# Patient Record
Sex: FEMALE | Race: WHITE | NOT HISPANIC OR LATINO | Employment: OTHER | URBAN - METROPOLITAN AREA
[De-identification: names, ages, dates, MRNs, and addresses within clinical notes are randomized per-mention and may not be internally consistent; named-entity substitution may affect disease eponyms.]

---

## 2017-01-23 ENCOUNTER — GENERIC CONVERSION - ENCOUNTER (OUTPATIENT)
Dept: OTHER | Facility: OTHER | Age: 72
End: 2017-01-23

## 2017-01-31 ENCOUNTER — HOSPITAL ENCOUNTER (OUTPATIENT)
Dept: NON INVASIVE DIAGNOSTICS | Facility: CLINIC | Age: 72
Discharge: HOME | End: 2017-01-31
Payer: MEDICARE

## 2017-01-31 DIAGNOSIS — I65.23 OCCLUSION AND STENOSIS OF BILATERAL CAROTID ARTERIES: ICD-10-CM

## 2017-01-31 DIAGNOSIS — I74.09 OTHER ARTERIAL EMBOLISM AND THROMBOSIS OF ABDOMINAL AORTA (HCC): ICD-10-CM

## 2017-01-31 DIAGNOSIS — I70.213 ATHEROSCLEROSIS OF NATIVE ARTERY OF BOTH LOWER EXTREMITIES WITH INTERMITTENT CLAUDICATION (HCC): ICD-10-CM

## 2017-01-31 PROCEDURE — 93923 UPR/LXTR ART STDY 3+ LVLS: CPT

## 2017-01-31 PROCEDURE — 93978 VASCULAR STUDY: CPT

## 2017-01-31 PROCEDURE — 93925 LOWER EXTREMITY STUDY: CPT

## 2017-01-31 PROCEDURE — 93880 EXTRACRANIAL BILAT STUDY: CPT

## 2017-02-20 ENCOUNTER — HOSPITAL ENCOUNTER (OUTPATIENT)
Dept: CT IMAGING | Facility: HOSPITAL | Age: 72
Discharge: HOME | End: 2017-02-20
Payer: COMMERCIAL

## 2017-02-20 DIAGNOSIS — Z72.0 TOBACCO USE: ICD-10-CM

## 2017-03-21 ENCOUNTER — ALLSCRIPTS OFFICE VISIT (OUTPATIENT)
Dept: OTHER | Facility: OTHER | Age: 72
End: 2017-03-21

## 2017-04-06 ENCOUNTER — GENERIC CONVERSION - ENCOUNTER (OUTPATIENT)
Dept: OTHER | Facility: OTHER | Age: 72
End: 2017-04-06

## 2017-04-07 ENCOUNTER — ALLSCRIPTS OFFICE VISIT (OUTPATIENT)
Dept: OTHER | Facility: OTHER | Age: 72
End: 2017-04-07

## 2017-04-14 ENCOUNTER — GENERIC CONVERSION - ENCOUNTER (OUTPATIENT)
Dept: OTHER | Facility: OTHER | Age: 72
End: 2017-04-14

## 2017-06-26 ENCOUNTER — ALLSCRIPTS OFFICE VISIT (OUTPATIENT)
Dept: OTHER | Facility: OTHER | Age: 72
End: 2017-06-26

## 2017-07-10 ENCOUNTER — GENERIC CONVERSION - ENCOUNTER (OUTPATIENT)
Dept: OTHER | Facility: OTHER | Age: 72
End: 2017-07-10

## 2017-07-15 ENCOUNTER — APPOINTMENT (OUTPATIENT)
Dept: LAB | Facility: CLINIC | Age: 72
End: 2017-07-15
Payer: MEDICARE

## 2017-07-15 ENCOUNTER — ALLSCRIPTS OFFICE VISIT (OUTPATIENT)
Dept: OTHER | Facility: OTHER | Age: 72
End: 2017-07-15

## 2017-07-15 DIAGNOSIS — H65.191 OTHER ACUTE NONSUPPURATIVE OTITIS MEDIA, RIGHT EAR: ICD-10-CM

## 2017-07-15 LAB
BASOPHILS # BLD AUTO: 0.05 THOUSANDS/ΜL (ref 0–0.1)
BASOPHILS NFR BLD AUTO: 1 % (ref 0–1)
EOSINOPHIL # BLD AUTO: 0.13 THOUSAND/ΜL (ref 0–0.61)
EOSINOPHIL NFR BLD AUTO: 2 % (ref 0–6)
ERYTHROCYTE [DISTWIDTH] IN BLOOD BY AUTOMATED COUNT: 14.4 % (ref 11.6–15.1)
HCT VFR BLD AUTO: 45 % (ref 34.8–46.1)
HGB BLD-MCNC: 15.2 G/DL (ref 11.5–15.4)
LYMPHOCYTES # BLD AUTO: 1.92 THOUSANDS/ΜL (ref 0.6–4.47)
LYMPHOCYTES NFR BLD AUTO: 25 % (ref 14–44)
MCH RBC QN AUTO: 30.3 PG (ref 26.8–34.3)
MCHC RBC AUTO-ENTMCNC: 33.8 G/DL (ref 31.4–37.4)
MCV RBC AUTO: 90 FL (ref 82–98)
MONOCYTES # BLD AUTO: 0.41 THOUSAND/ΜL (ref 0.17–1.22)
MONOCYTES NFR BLD AUTO: 5 % (ref 4–12)
NEUTROPHILS # BLD AUTO: 5.01 THOUSANDS/ΜL (ref 1.85–7.62)
NEUTS SEG NFR BLD AUTO: 67 % (ref 43–75)
NRBC BLD AUTO-RTO: 0 /100 WBCS
PLATELET # BLD AUTO: 344 THOUSANDS/UL (ref 149–390)
PMV BLD AUTO: 10.1 FL (ref 8.9–12.7)
RBC # BLD AUTO: 5.02 MILLION/UL (ref 3.81–5.12)
WBC # BLD AUTO: 7.55 THOUSAND/UL (ref 4.31–10.16)

## 2017-07-15 PROCEDURE — 36415 COLL VENOUS BLD VENIPUNCTURE: CPT

## 2017-07-15 PROCEDURE — 85025 COMPLETE CBC W/AUTO DIFF WBC: CPT

## 2017-07-17 ENCOUNTER — ALLSCRIPTS OFFICE VISIT (OUTPATIENT)
Dept: OTHER | Facility: OTHER | Age: 72
End: 2017-07-17

## 2017-10-17 ENCOUNTER — APPOINTMENT (OUTPATIENT)
Dept: LAB | Facility: CLINIC | Age: 72
End: 2017-10-17
Payer: MEDICARE

## 2017-10-17 ENCOUNTER — ALLSCRIPTS OFFICE VISIT (OUTPATIENT)
Dept: OTHER | Facility: OTHER | Age: 72
End: 2017-10-17

## 2017-10-17 ENCOUNTER — GENERIC CONVERSION - ENCOUNTER (OUTPATIENT)
Dept: OTHER | Facility: OTHER | Age: 72
End: 2017-10-17

## 2017-10-17 DIAGNOSIS — R73.01 IMPAIRED FASTING GLUCOSE: ICD-10-CM

## 2017-10-17 DIAGNOSIS — R09.89 OTHER SPECIFIED SYMPTOMS AND SIGNS INVOLVING THE CIRCULATORY AND RESPIRATORY SYSTEMS: ICD-10-CM

## 2017-10-17 LAB
ALBUMIN SERPL BCP-MCNC: 3.6 G/DL (ref 3.5–5)
ALP SERPL-CCNC: 79 U/L (ref 46–116)
ALT SERPL W P-5'-P-CCNC: 21 U/L (ref 12–78)
ANION GAP SERPL CALCULATED.3IONS-SCNC: 6 MMOL/L (ref 4–13)
AST SERPL W P-5'-P-CCNC: 14 U/L (ref 5–45)
BASOPHILS # BLD AUTO: 0.05 THOUSANDS/ΜL (ref 0–0.1)
BASOPHILS NFR BLD AUTO: 1 % (ref 0–1)
BILIRUB SERPL-MCNC: 0.33 MG/DL (ref 0.2–1)
BILIRUB UR QL STRIP: NEGATIVE
BUN SERPL-MCNC: 16 MG/DL (ref 5–25)
CALCIUM SERPL-MCNC: 8.7 MG/DL (ref 8.3–10.1)
CHLORIDE SERPL-SCNC: 107 MMOL/L (ref 100–108)
CHOLEST SERPL-MCNC: 126 MG/DL (ref 50–200)
CLARITY UR: CLEAR
CO2 SERPL-SCNC: 26 MMOL/L (ref 21–32)
COLOR UR: YELLOW
CREAT SERPL-MCNC: 0.96 MG/DL (ref 0.6–1.3)
CREAT UR-MCNC: 110 MG/DL
EOSINOPHIL # BLD AUTO: 0.11 THOUSAND/ΜL (ref 0–0.61)
EOSINOPHIL NFR BLD AUTO: 1 % (ref 0–6)
ERYTHROCYTE [DISTWIDTH] IN BLOOD BY AUTOMATED COUNT: 14 % (ref 11.6–15.1)
EST. AVERAGE GLUCOSE BLD GHB EST-MCNC: 128 MG/DL
GFR SERPL CREATININE-BSD FRML MDRD: 60 ML/MIN/1.73SQ M
GLUCOSE P FAST SERPL-MCNC: 100 MG/DL (ref 65–99)
GLUCOSE UR STRIP-MCNC: NEGATIVE MG/DL
HBA1C MFR BLD: 6.1 % (ref 4.2–6.3)
HCT VFR BLD AUTO: 46.3 % (ref 34.8–46.1)
HDLC SERPL-MCNC: 49 MG/DL (ref 40–60)
HGB BLD-MCNC: 15.2 G/DL (ref 11.5–15.4)
HGB UR QL STRIP.AUTO: NEGATIVE
KETONES UR STRIP-MCNC: NEGATIVE MG/DL
LEUKOCYTE ESTERASE UR QL STRIP: NEGATIVE
LYMPHOCYTES # BLD AUTO: 2.33 THOUSANDS/ΜL (ref 0.6–4.47)
LYMPHOCYTES NFR BLD AUTO: 24 % (ref 14–44)
MCH RBC QN AUTO: 29.8 PG (ref 26.8–34.3)
MCHC RBC AUTO-ENTMCNC: 32.8 G/DL (ref 31.4–37.4)
MCV RBC AUTO: 91 FL (ref 82–98)
MICROALBUMIN UR-MCNC: <5 MG/L (ref 0–20)
MICROALBUMIN/CREAT 24H UR: <5 MG/G CREATININE (ref 0–30)
MONOCYTES # BLD AUTO: 0.58 THOUSAND/ΜL (ref 0.17–1.22)
MONOCYTES NFR BLD AUTO: 6 % (ref 4–12)
NEUTROPHILS # BLD AUTO: 6.51 THOUSANDS/ΜL (ref 1.85–7.62)
NEUTS SEG NFR BLD AUTO: 68 % (ref 43–75)
NITRITE UR QL STRIP: NEGATIVE
NONHDLC SERPL-MCNC: 77 MG/DL
NRBC BLD AUTO-RTO: 0 /100 WBCS
PH UR STRIP.AUTO: 5.5 [PH] (ref 4.5–8)
PLATELET # BLD AUTO: 321 THOUSANDS/UL (ref 149–390)
PMV BLD AUTO: 10.3 FL (ref 8.9–12.7)
POTASSIUM SERPL-SCNC: 4.4 MMOL/L (ref 3.5–5.3)
PROT SERPL-MCNC: 7.1 G/DL (ref 6.4–8.2)
PROT UR STRIP-MCNC: NEGATIVE MG/DL
RBC # BLD AUTO: 5.1 MILLION/UL (ref 3.81–5.12)
SODIUM SERPL-SCNC: 139 MMOL/L (ref 136–145)
SP GR UR STRIP.AUTO: 1.02 (ref 1–1.03)
T4 FREE SERPL-MCNC: 1.17 NG/DL (ref 0.76–1.46)
TSH SERPL DL<=0.05 MIU/L-ACNC: 1.46 UIU/ML (ref 0.36–3.74)
UROBILINOGEN UR QL STRIP.AUTO: 0.2 E.U./DL
WBC # BLD AUTO: 9.6 THOUSAND/UL (ref 4.31–10.16)

## 2017-10-17 PROCEDURE — 82465 ASSAY BLD/SERUM CHOLESTEROL: CPT

## 2017-10-17 PROCEDURE — 82570 ASSAY OF URINE CREATININE: CPT

## 2017-10-17 PROCEDURE — 81003 URINALYSIS AUTO W/O SCOPE: CPT

## 2017-10-17 PROCEDURE — 83036 HEMOGLOBIN GLYCOSYLATED A1C: CPT

## 2017-10-17 PROCEDURE — 83718 ASSAY OF LIPOPROTEIN: CPT

## 2017-10-17 PROCEDURE — 36415 COLL VENOUS BLD VENIPUNCTURE: CPT

## 2017-10-17 PROCEDURE — 82043 UR ALBUMIN QUANTITATIVE: CPT

## 2017-10-17 PROCEDURE — 84443 ASSAY THYROID STIM HORMONE: CPT

## 2017-10-17 PROCEDURE — 80053 COMPREHEN METABOLIC PANEL: CPT

## 2017-10-17 PROCEDURE — 84439 ASSAY OF FREE THYROXINE: CPT

## 2017-10-17 PROCEDURE — 85025 COMPLETE CBC W/AUTO DIFF WBC: CPT

## 2017-10-18 ENCOUNTER — TRANSCRIBE ORDERS (OUTPATIENT)
Dept: ADMINISTRATIVE | Facility: HOSPITAL | Age: 72
End: 2017-10-18

## 2017-10-18 DIAGNOSIS — Z72.0 TOBACCO ABUSE: Primary | ICD-10-CM

## 2018-01-11 NOTE — MISCELLANEOUS
Message  patient called, she had GI workup for abd pain and her GI dr told her to see if we wanted to move up Nov 2016 dopplers, However she needs to go to [place] for an ill sister  she wanted to know, if she can do it when she comes back  she denies weight loss or voimiting, and abd pain is with and without eating  she will call us when she returns from [place] and try to get doppler then  Active Problems    1  Abdominal bruit (785 9) (R09 89)   2  Abdominal discomfort, epigastric (789 06) (R10 13)   3  Abdominal pain, RUQ (789 01) (R10 11)   4  Adhesive capsulitis of left shoulder (726 0) (M75 02)   5  Aortoiliac occlusive disease (444 09) (I74 09)   6  Atherosclerosis of both carotid arteries (433 10,433 30) (I65 23)   7  Atherosclerosis of native artery of both lower extremities with intermittent claudication   (440 21) (I70 213)   8  Bilateral carotid bruits (785 9) (R09 89)   9  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)   10  Chest pain (786 50) (R07 9)   11  Chronic obstructive pulmonary disease (496) (J44 9)   12  Encounter for colorectal cancer screening (V76 51) (Z12 11,Z12 12)   13  Hyperlipidemia (272 4) (E78 5)   14  Impaired fasting glucose (790 21) (R73 01)   15  Murmur (785 2) (R01 1)   16  Need for influenza vaccination (V04 81) (Z23)   17  Need for pneumococcal vaccination (V03 82) (Z23)   18  Oral thrush (112 0) (B37 0)   19  Osteoporosis (733 00) (M81 0)   20  Preoperative clearance (V72 84) (Z01 818)   21  Restless leg syndrome (333 94) (G25 81)   22  Shoulder pain (719 41) (M25 519)   23  Sinusitis (473 9) (J32 9)   24  Subclavian artery stenosis, left (447 1) (I77 1)   25  Tachycardia (785 0) (R00 0)   26  Vision loss (369 9) (H54 7)    Current Meds   1  ALPRAZolam 0 5 MG Oral Tablet (Xanax); 1 tab bid prn; Last Rx:21Wsi4472 Ordered   2  Aspirin EC 81 MG Oral Tablet Delayed Release; TAKE 1 TABLET DAILY; Therapy: 04Aug2016 to (Evaluate:02Nov2016); Last Rx:04Aug2016 Ordered   3  Atorvastatin Calcium 40 MG Oral Tablet; TAKE 1 TABLET DAILY  Requested for:   32Adw3644; Last Rx:94Stk5347 Ordered   4  Breo Ellipta 100-25 MCG/INH Inhalation Aerosol Powder Breath Activated; take 1   inhalation by mouth once daily; Therapy: 51Nby1937 to (Evaluate:82Jbg2531)  Requested for: 18WSS3970; Last   Rx:15Mar2016 Ordered   5  Carafate 1 GM/10ML Oral Suspension; TAKE 10 ML 4 TIMES DAILY; Therapy: 72Mwi8989 to (Evaluate:75Fxb2927)  Requested for: 98Qwl5780; Last   Rx:54Czt7309; Status: [address] to Pharmacy - Awaiting Verification   Ordered   6  Eye Drops SOLN;   Therapy: (Recorded:30Bdh6359) to Recorded   7  Famotidine 40 MG Oral Tablet; TAKE 1 TABLET TWICE DAILY; Therapy: 44Edl2219 to (Evaluate:36Wiw3351)  Requested for: 23Pbm0628; Last   Rx:42Izu9539; Status: ACTIVE - Transmit to Pharmacy - Awaiting Verification   Ordered   8  Omeprazole 20 MG Oral Capsule Delayed Release; TAKE 1 CAPSULE Daily; Therapy: 99NAC6615 to (Kajal Blake)  Requested for: 25TPZ2543; Last   Rx:11Mar2016 Ordered   9  Risedronate Sodium 35 MG Oral Tablet (Actonel); take 1 tablet by mouth every week; Therapy: 69OMJ3301 to (Lashanda John)  Requested for: 21Hxi9686; Last   Rx:69Xon4547 Ordered   10  ROPINIRole HCl - 0 5 MG Oral Tablet; 1 at hs;    Therapy: 89WDQ5506 to (Last Rx:03Nyk5233)  Requested for: 04Ozi2953 Ordered    Allergies    1  Augmentin TABS   2  Spiriva HandiHaler CAPS   3  Tylenol with Codeine #3 TABS    Signatures   Electronically signed by :  Joey Powell, ; Sep  9 2016  2:04PM EST                       (Author)

## 2018-01-12 VITALS
SYSTOLIC BLOOD PRESSURE: 116 MMHG | DIASTOLIC BLOOD PRESSURE: 64 MMHG | HEIGHT: 60 IN | HEART RATE: 96 BPM | WEIGHT: 121.5 LBS | OXYGEN SATURATION: 93 % | BODY MASS INDEX: 23.85 KG/M2

## 2018-01-12 NOTE — RESULT NOTES
Verified Results  NM MYOCARDIAL PERFUSION SPECT (RX STRESS AND/OR REST) 63LTG5217 11:52AM Ana Maria Pemberton Order Number: [id number]    - Patient Instructions: To schedule this appointment, please contact Central Scheduling at [phone]  Test Name Result Flag Reference   NM MYOCARDIAL PERFUSION SPECT (RX STRESS AND/OR REST) (Report)     [address], [address] [place] [address]   [phone]     Rest/Stress Gated SPECT Myocardial Perfusion Imaging After Regadenoson     Patient: Wilfredo Victoria   MR number: [id number]   Account number: [de-identified]   : 1945   Age: 73 years   Gender: Female   Status: Outpatient   Location: [facility]   Height: 60 in   Weight: 120 lb   BP: 122/ 60 mmHg     Allergies: ALLERGIES NOT ON FILE     Diagnosis: R07 9 - Chest pain, unspecified     Referring Physician: Lesly Munguia MD   Technician: Gerda PETERS, BA, AART(N)   Group: [facility]   Other: Flor Villanueva MS, CCT   Interpreting Physician: Marie Schmidt MD     INDICATIONS: Chest Discomfort Detection of coronary artery disease  HISTORY: Chest Discomfort The patient is a 73year old  female  Chest   pain status: recent onset chest pain  Coronary artery disease risk factors:   smoking, family history of premature coronary artery disease, and   post-menopausal state  Co-morbidity: history of lung disease  Medications:   aspirin and a lipid lowering agent  REST ECG: Normal baseline ECG  PROCEDURE: The study was performed at [address] [place] [address]  A   regadenoson infusion pharmacologic stress test was performed  Gated SPECT   myocardial perfusion imaging was performed after stress and at rest  Systolic   blood pressure was 122 mmHg, at the start of the study  Diastolic blood   pressure was 60 mmHg, at the start of the study  The heart rate was 84 bpm, at   the start of the study   Patient was not experiencing chest pain at the time of   the injection of the radiopharmaceutical    Regadenoson protocol:   HR bpm SBP mmHg DBP mmHg Symptoms ST change Rhythm/conduct   Baseline 84 122 60 none none NSR, no ectopy   Immediate 122 -- -- -- -- --   1 min 116 130 60 -- -- --   2 min 109 -- -- -- -- --   3 min 108 122 58 -- -- --   4 min 104 -- -- -- -- --   No medications or fluids given  STRESS SUMMARY: Duration of pharmacologic stress was 1 min  Maximal heart rate   during stress was 122 bpm  The heart rate response to stress was normal  There   was normal resting blood pressure with an appropriate response to stress  The   rate-pressure product for the peak heart rate and blood pressure was 81742  There was no chest pain during stress  The stress test was terminated due to   protocol completion  The stress ECG was negative for ischemia  There were no   stress arrhythmias or conduction abnormalities  ISOTOPE ADMINISTRATION:   Resting isotope administration Stress isotope administration   Agent Sestamibi Sestamibi   Dose 9 81 mCi 31 71 mCi   Date 08/10/2016 08/10/2016     The radiopharmaceutical was injected at the peak effect of pharmacologic   stress  MYOCARDIAL PERFUSION IMAGING:   The image quality was good  Rotating projection images reveal mild breast   attenuation and mild subdiaphragmatic activity  Left ventricular size was   normal      PERFUSION DEFECTS:   - There were no perfusion defects  GATED SPECT:   The calculated left ventricular ejection fraction was 85 %  Left ventricular   ejection fraction was within normal limits by visual estimate  There was no   diagnostic evidence for left ventricular regional abnormality  SUMMARY:   - Stress results: There was no chest pain during stress  - ECG conclusions: The stress ECG was negative for ischemia  There were no   stress arrhythmias or conduction abnormalities  - Perfusion imaging:  There were no perfusion defects    - Gated SPECT: The calculated left ventricular ejection fraction was 85 %  Left ventricular ejection fraction was within normal limits by visual estimate  There was no diagnostic evidence for left ventricular regional abnormality  IMPRESSIONS: Normal study after pharmacologic stress  Myocardial perfusion   imaging was normal at rest and with stress   Left ventricular systolic function   was normal      Prepared and signed by     Abdoulaye Kitchen MD   Signed 08/10/2016 18:31:03

## 2018-01-12 NOTE — PROGRESS NOTES
Assessment    1  Tachycardia (785 0) (R00 0)   2  Chest pain (786 50) (R07 9)    Plan   EKG/ECG- POC; Status:Active; Requested IKI:[id number];   PYF:32EJH5302; Last Updated By:Jeremy Haji; 2/4/2016 2:31:58 PM;Ordered; For:Chest pain, Tachycardia; Ordered By:Jeremy Haji;      Discussion/Summary    CHEST PAIN AND TACHYCARDIA- PT WITH RISK FACTORS INCLUDING TOBACCO USE AND VASCUALR DISEASE  N[facility] Po UNCHANGED EKG  WE OBTAINED AN APPT FOR THE PT TO BE SEEN BY DR TYSON CASH AT 2PM      CASE D/W  [facility] WHO EVALUATED THE PT AND AGREES WITH THE A/P  Chief Complaint  UPPER ABD/LOWER STERNAL PAIN      History of Present Illness  HPI: PT REPORTS PAIN FOR 3 DAYS DESCRIBED AS A ZAPPING FEELING IN THE CENTER OF HER CHEST    SHE INDICATES EPIGASTRIC/LOWER STERNAL AREA  SHE DENIES IT BEING HEARTBURN/REFLUX  COMES AND GOES  NON EXERTIONAL, NOT RELATED TO FOOD OR EMPTY STOMACH  NO RELATED SX OF SOB/N/V/DIZZINESS/SWEATING  SHE THOUGHT WAS ANXIETY SO SHE TOOK 1/4 OF HER ALPRAZOLAM 0 5MG AND TUMS  IT HELPED THE FIRST TIME, BUT NOT AFTER THAT  NO CHANGE IN HER STOOLS  NO DIARRHEA OR CONSTIPATION  NO RECENT ILLNESS SUCH AS URI SX, NO F/C/S S    SHE DESCRIBES FEELING OFF A BIT, LIKE "NOT HERSELF" AND A LITTLE LH LATELY    SHE DOES SMOKE AND HAS VASCULAR DISEASE FOR WHICH SHE SEES DR Adeline Arias  Chest Pain (Brief): The patient is being seen for an initial evaluation of chest pain  Symptoms:  chest pain, but no shortness of breath, no cough, no fever, no orthopnea, no peripheral edema and no palpitations  Associated symptoms:  anxiety, but no sweating, no dizziness, no syncope, no heartburn, no nausea, no vomiting, no leg pain, no leg swelling, no fatigue, no weakness and no weight gain  Review of Systems    Constitutional: No fever, no chills, feels well, no tiredness, no recent weight gain or loss  ENT: no ear ache, no loss of hearing, no nosebleeds or nasal discharge, no sore throat or hoarseness  Cardiovascular: chest pain and fast heart rate, but the heart rate was not slow, no intermittent leg claudication, no palpitations and no lower extremity edema  Respiratory: no complaints of shortness of breath, no wheezing, no dyspnea on exertion, no orthopnea or PND  Gastrointestinal: no complaints of abdominal pain, no constipation, no nausea or diarrhea, no vomiting, no bloody stools and as noted in HPI  Genitourinary: no complaints of dysuria, no incontinence, no pelvic pain, no dysmenorrhea, no vaginal discharge or abnormal vaginal bleeding  Musculoskeletal: no complaints of arthralgia, no myalgia, no joint swelling or stiffness, no limb pain or swelling  Neurological: no complaints of headache, no confusion, no numbness or tingling, no dizziness or fainting  ROS reviewed  Active Problems    1  Abdominal bruit (785 9) (R09 89)   2  Adhesive capsulitis of left shoulder (726 0) (M75 02)   3  Aortoiliac occlusive disease (444 09) (I74 09)   4  Atherosclerosis of native artery of both lower extremities with intermittent claudication   (440 21) (I70 213)   5  Bilateral carotid bruits (785 9) (R09 89)   6  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)   7  Chronic obstructive pulmonary disease (496) (J44 9)   8  Headache (784 0) (R51)   9  Hyperlipidemia (272 4) (E78 5)   10  Impaired fasting glucose (790 21) (R73 01)   11  Murmur (785 2) (R01 1)   12  Need for influenza vaccination (V04 81) (Z23)   13  Need for pneumococcal vaccination (V03 82) (Z23)   14  Oral thrush (112 0) (B37 0)   15  Osteoporosis (733 00) (M81 0)   16  Preoperative clearance (V72 84) (Z01 818)   17  Shoulder pain (719 41) (M25 519)   18  Subclavian artery stenosis, left (447 1) (I77 1)   19  Vision loss (369 9) (H54 7)    Past Medical History  Active Problems And Past Medical History Reviewed: The active problems and past medical history were reviewed and updated today        Social History    · Alcohol Use (History)   · Cigarette smoker (305 1) (F17 210)   · Current every day smoker (305 1) (F17 200)   · Current Smoker (305 1)   · Living With Adult Children   · Marital History -    · Never Used Drugs   · 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  The social history was reviewed and updated today  Family History  Family History Reviewed: The family history was reviewed and updated today  Current Meds   1  ALPRAZolam 0 5 MG Oral Tablet; 1 tab bid prn; Last Rx:22Sep2015 Ordered   2  Atorvastatin Calcium 20 MG Oral Tablet; TAKE 1 TABLET DAILY AS DIRECTED    Requested for: 43UFW9511; Last Rx:29Sep2015 Ordered   3  Breo Ellipta 100-25 MCG/INH Inhalation Aerosol Powder Breath Activated; USE 1   INHALATION ONCE DAILY; Therapy: 67Stx3279 to (Evaluate:16Mar2016)  Requested for: 60PDQ2563; Last   Rx:59Uim0956 Ordered   4  Eye Drops SOLN;   Therapy: (Recorded:01Ceq4302) to Recorded   5  Nystatin 624771 UNIT/ML Mouth/Throat Suspension; SWISH AND SWALLOW ONE   TEASPOONFUL (5 ML) FOUR TIMES DAILY; Therapy: 27FPZ2734 to (Last Rx:19Jan2016)  Requested for: 06OJG8630 Ordered   6  Risedronate Sodium 35 MG Oral Tablet; take 1 tablet by mouth every week; Therapy: 62XBO6627 to (Pasha Parsons)  Requested for: 77MWV8224; Last   Rx:16Nov2015 Ordered    The medication list was reviewed and updated today  Allergies    1  Augmentin TABS   2  Spiriva HandiHaler CAPS   3  Tylenol with Codeine #3 TABS    Vitals   Recorded: 72SQI8822 01:21PM   Heart Rate 547   Systolic 255   Diastolic 78   Height 5 ft    Weight 117 lb 8 0 oz   BMI Calculated 22 95   BSA Calculated 1 48     Physical Exam    Constitutional   General appearance: No acute distress, well appearing and well nourished  Eyes   Conjunctiva and lids: No swelling, erythema or discharge  Pupils and irises: Equal, round and reactive to light      Ears, Nose, Mouth, and Throat   External inspection of ears and nose: Normal     Otoscopic examination: Tympanic membranes translucent with normal light reflex  Canals patent without erythema  Nasal mucosa, septum, and turbinates: Normal without edema or erythema  Oropharynx: Normal with no erythema, edema, exudate or lesions  Pulmonary   Respiratory effort: No increased work of breathing or signs of respiratory distress  Auscultation of lungs: Clear to auscultation  Cardiovascular   Auscultation of heart: Abnormal   The heart rate was tachycardic  Examination of extremities for edema and/or varicosities: Normal     Abdomen   Abdomen: Non-tender, no masses  Liver and spleen: No hepatomegaly or splenomegaly  Lymphatic   Palpation of lymph nodes in neck: No lymphadenopathy  Musculoskeletal   Gait and station: Normal          Results/Data    Rhythm and rate: sinus tachycardia  ST segment: the ST segments are normal    T waves:   normal    Comparison to prior ECGs: COMPARED TO [facility] EKG IN 9/2015, SEEN AND REVIEWED BY DR COWAN no interval change        Signatures   Electronically signed by : Alfonso Lopez, [facility]; Feb 4 2016  2:35PM EST                       (Author)    Electronically signed by : EZEQUIEL Alva ; Feb 4 2016  4:26PM EST

## 2018-01-13 VITALS
SYSTOLIC BLOOD PRESSURE: 118 MMHG | WEIGHT: 119.25 LBS | HEIGHT: 60 IN | OXYGEN SATURATION: 96 % | DIASTOLIC BLOOD PRESSURE: 68 MMHG | HEART RATE: 109 BPM | TEMPERATURE: 98.4 F | BODY MASS INDEX: 23.41 KG/M2

## 2018-01-13 VITALS
WEIGHT: 118.25 LBS | OXYGEN SATURATION: 96 % | DIASTOLIC BLOOD PRESSURE: 70 MMHG | BODY MASS INDEX: 23.22 KG/M2 | SYSTOLIC BLOOD PRESSURE: 124 MMHG | TEMPERATURE: 99 F | HEIGHT: 60 IN | HEART RATE: 102 BPM

## 2018-01-14 VITALS
OXYGEN SATURATION: 99 % | DIASTOLIC BLOOD PRESSURE: 62 MMHG | BODY MASS INDEX: 23.41 KG/M2 | TEMPERATURE: 98.5 F | HEIGHT: 60 IN | WEIGHT: 119.25 LBS | HEART RATE: 92 BPM | SYSTOLIC BLOOD PRESSURE: 120 MMHG

## 2018-01-14 VITALS
WEIGHT: 121 LBS | HEIGHT: 60 IN | RESPIRATION RATE: 16 BRPM | DIASTOLIC BLOOD PRESSURE: 78 MMHG | SYSTOLIC BLOOD PRESSURE: 136 MMHG | HEART RATE: 88 BPM | BODY MASS INDEX: 23.75 KG/M2

## 2018-01-14 NOTE — PROGRESS NOTES
Assessment    1  Chest pain (786 50) (R07 9)   2  Aortoiliac occlusive disease (444 09) (I74 09)   3  Subclavian artery stenosis, left (447 1) (I77 1)   4  Hyperlipidemia (272 4) (E78 5)   5  Atherosclerosis of both carotid arteries (433 10,433 30) (I65 23)   6  Chronic obstructive pulmonary disease (496) (J44 9)   7  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)    Discussion/Summary    EKG from earlier reviewed  in view of persistent chest pain in a patient with severe vascular disease, I referred the patient to go to the ER immediately  EMS were called and will be transferring causing the patient to theER  Further management based on workup in the hospital   counseling regarding tobacco cessation given  S/L NTG 0 4 mg x 1 relieved the pain    F/U after discharge from hospital    The treatment plan was reviewed with the patient/guardian  The patient/guardian understands and agrees with the treatment plan   total time of encounter was 60 minutes and 50 minutes was spent counseling  Chief Complaint  lower chest pain      History of Present Illness  HPI: Referred by Dr Payal Morocho  46-year-old female vasculopath with aortoiliac obstructive disease, left subclavian artery stenosis, bilateral carotid artery plaque, celiac artery stenosis, COPD, HLP, heavy smoking history who is referred for a three-day history of chest pain  Patient states that she has been getting chest pains for the last 3 days, episodic, located in the lower chest, pounding and constrictive in nature  Denies associated SOB, lightheadedness, diaphoresis  Comes and goes multiple times during the day  It is happening even while I was examining the patient  Patient was given sublingual nitroglycerin and it eased the pain  denies nausea, vomiting  Patient is not a hypertensive but her readings today have been on the higher side  She has also been tachycardic  Review of Systems      Cardiac: as noted in HPI     Skin: No complaints of nonhealing sores or skin rash  Genitourinary: No complaints of recurrent urinary tract infections, frequent urination at night, difficult urination, blood in urine, kidney stones, loss of bladder control, kidney problems, denies any birth control or hormone replacement, is not post menopausal, not currently pregnant  Psychological: No complaints of feeling depressed, anxiety, panic attacks, or difficulty concentrating  General: No complaints of trouble sleeping, lack of energy, fatigue, appetite changes, weight changes, fever, frequent infections, or night sweats  Respiratory: as noted in HPI  HEENT: No complaints of serious problems, hearing problems, nose problems, throat problems, or snoring  Gastrointestinal: as noted in HPI   Hematologic: No complaints of bleeding disorders, anemia, blood clots, or excessive brusing  Neurological: No complaints of numbness, tingling, dizziness, weakness, seizures, headaches, syncope or fainting, AM fatigue, daytime sleepiness, no witnessed apnea episodes  Musculoskeletal: No complaints of arthritis, back pain, or painfull swelling  ROS reviewed  Active Problems    1  Abdominal bruit (785 9) (R09 89)   2  Adhesive capsulitis of left shoulder (726 0) (M75 02)   3  Aortoiliac occlusive disease (444 09) (I74 09)   4  Atherosclerosis of native artery of both lower extremities with intermittent claudication   (440 21) (I70 213)   5  Bilateral carotid bruits (785 9) (R09 89)   6  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)   7  Chest pain (786 50) (R07 9)   8  Chronic obstructive pulmonary disease (496) (J44 9)   9  Headache (784 0) (R51)   10  Hyperlipidemia (272 4) (E78 5)   11  Impaired fasting glucose (790 21) (R73 01)   12  Murmur (785 2) (R01 1)   13  Need for influenza vaccination (V04 81) (Z23)   14  Need for pneumococcal vaccination (V03 82) (Z23)   15  Oral thrush (112 0) (B37 0)   16  Osteoporosis (733 00) (M81 0)   17  Preoperative clearance (V72 84) (Z01 818)   18  Shoulder pain (719 41) (M25 519)   19  Subclavian artery stenosis, left (447 1) (I77 1)   20  Tachycardia (785 0) (R00 0)   21  Vision loss (369 9) (H54 7)    Past Medical History    · History of Anxiety (300 00) (F41 9)   · History of Encounter for screening mammogram for malignant neoplasm of breast  (V76 12) (Z12 31)   · History of chronic sinusitis (V12 69) (Z87 09)   · History of upper respiratory infection (V12 09) (Z87 09)   · History of Limb pain (729 5) (M79 609)    The active problems and past medical history were reviewed and updated today  Surgical History    · History of  Section   · History of Complete Colonoscopy   · History of Tonsillectomy    The surgical history was reviewed and updated today  Family History    · Family history of Mother  At Age 64    · Family history of Father  At Age 39    · Family history of Arthritis (V17 7)   · Family history of Carcinoma Of The Pancreas   · Family history of Sister  At Age 79    · Family history of Brother  At Age 76    · Family history of Acute Myocardial Infarction (V17 3)   · Family history of Cirrhosis   · Family history of Prostate Cancer (V16 42)   · Family history of Skin Cancer (V16 8)   · Family history of Stroke Syndrome (V17 1)    The family history was reviewed and updated today  Social History    · Alcohol Use (History)   · Cigarette smoker (305 1) (F17 210)   · Current every day smoker (305 1) (F17 200)   · Current Smoker (305 1)   · Living With Adult Children   · Marital History -    · Never Used Drugs   · 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  The social history was reviewed and updated today  The social history was reviewed and is unchanged  Current Meds   1  ALPRAZolam 0 5 MG Oral Tablet (Xanax); 1 tab bid prn; Last Rx:37Ajc6072 Ordered   2  Atorvastatin Calcium 20 MG Oral Tablet; TAKE 1 TABLET DAILY AS DIRECTED    Requested for: 10RGV5845; Last Rx:02Vfz0574 Ordered   3  Breo Ellipta 100-25 MCG/INH Inhalation Aerosol Powder Breath Activated; USE 1   INHALATION ONCE DAILY; Therapy: 30Yfk8673 to (Evaluate:16Mar2016)  Requested for: 39ZQP4528; Last   Rx:18Sep2015 Ordered   4  Eye Drops SOLN;   Therapy: (Recorded:62Vim7225) to Recorded   5  Nystatin 287591 UNIT/ML Mouth/Throat Suspension; SWISH AND SWALLOW ONE   TEASPOONFUL (5 ML) FOUR TIMES DAILY; Therapy: 29VLI2714 to (Last Rx:19Jan2016)  Requested for: 74AZO1498 Ordered   6  Risedronate Sodium 35 MG Oral Tablet (Actonel); take 1 tablet by mouth every week; Therapy: 75PEQ2171 to (Jason Simmons)  Requested for: 33GJS2168; Last   Rx:16Nov2015 Ordered    The medication list was reviewed and updated today  Allergies    1  Augmentin TABS   2  Spiriva HandiHaler CAPS   3  Tylenol with Codeine #3 TABS    Vitals  Signs [Data Includes: Current Encounter]    Heart Rate: 088  Systolic: 974  Diastolic: 78  Height: 5 ft   Weight: 117 lb 8 0 oz  BMI Calculated: 22 95  BSA Calculated: 1 48  O2 Saturation: 97    Physical Exam    Constitutional   General appearance: Abnormal   Anxious  Eyes   Conjunctiva and Sclera examination: Conjunctiva pink, sclera anicteric  Ears, Nose, Mouth, and Throat - External inspection of ears and nose: Normal without deformities or discharge  Oropharynx: Abnormal  poor oral hygiene  Neck   Neck and thyroid: Normal, supple, trachea midline, no thyromegaly  Pulmonary   Respiratory effort: No increased work of breathing or signs of respiratory distress  Auscultation of lungs: Abnormal   Occasional wheeze b/l  Cardiovascular   Palpation of heart: Normal PMI, no thrills  Auscultation of heart: Normal rate and rhythm, normal S1 and S2, no murmurs  Carotid pulses: Abnormal   Carotid artery bruit +  Examination of extremities for edema and/or varicosities: Normal     Chest - Chest: Normal    Abdomen   Abdomen: Non-tender and no distention      Liver and spleen: No hepatomegaly or splenomegaly  Musculoskeletal Gait and station: Normal gait  Digits and nails: Normal without clubbing or cyanosis  Skin - Skin and subcutaneous tissue: Normal without rashes or lesions  Skin is warm and well perfused, normal turgor      Neurologic - Speech: Normal     Psychiatric - Orientation to person, place, and time: Normal  Mood and affect: Normal       Results/Data  2/4/16 - NSR, no acute ST-T changes      Signatures   Electronically signed by : EZEQUIEL Gama ; Feb 4 2016  3:23PM EST                       (Author)

## 2018-01-15 VITALS
OXYGEN SATURATION: 96 % | HEIGHT: 60 IN | DIASTOLIC BLOOD PRESSURE: 68 MMHG | BODY MASS INDEX: 23.98 KG/M2 | SYSTOLIC BLOOD PRESSURE: 132 MMHG | HEART RATE: 90 BPM | WEIGHT: 122.13 LBS

## 2018-01-15 VITALS
BODY MASS INDEX: 23.73 KG/M2 | DIASTOLIC BLOOD PRESSURE: 62 MMHG | OXYGEN SATURATION: 98 % | HEART RATE: 91 BPM | WEIGHT: 121.5 LBS | SYSTOLIC BLOOD PRESSURE: 116 MMHG

## 2018-01-15 NOTE — PROGRESS NOTES
Assessment    1  Encounter for preventive health examination (V70 0) (Z00 00)    Plan  Hyperlipidemia    · (1) CBC/PLT/DIFF; Status:Active; Requested for:15Fmn5609;    · (1) COMPREHENSIVE METABOLIC PANEL; Status:Active; Requested for:08Klr3459;    · (1) LDL,DIRECT; Status:Active; Requested for:56Kbh5914;    · (1) LIPID PANEL, NON FASTING W/O TRIG; Status:Active; Requested for:86Nrq2543;    · (1) T4, FREE; Status:Active; Requested for:48Wfo6296;    · (1) TSH; Status:Active; Requested for:29Vkg7406;    · (1) URINALYSIS (will reflex a microscopy if leukocytes, occult blood, protein or nitrites are  not within normal limits); Status:Active; Requested for:43Sqe6030;   Impaired fasting glucose    · (1) HEMOGLOBIN A1C; Status:Active; Requested for:71Fyj6097;    · (1) MICROALBUMIN CREATININE RATIO, RANDOM URINE; Status:Active; Requested  for:46Hui0929;   Tobacco abuse    · * CT LUNG SCREENING PROGRAM; Status:Hold For - Scheduling; Requested  for:37Cws6658;     Discussion/Summary    Routine laboratory testing may be done today  Come back after the new year with your daughter for a smoking cessation program together  Impression: Initial Annual Wellness Visit, with preventive exam as well as age and risk appropriate counseling completed  Cardiovascular screening and counseling: due for a lipid panel and Dx - V81 2 Screen for CV Disorder  Diabetes screening and counseling: due for blood glucose and Dx - V77 1 Screen for DM  Colorectal cancer screening and counseling: screening is current  Breast cancer screening and counseling: the risks and benefits of screening were discussed  Cervical cancer screening and counseling: screening not indicated  Osteoporosis screening and counseling: screening not indicated  Abdominal aortic aneurysm screening and counseling: screening not indicated  Glaucoma screening and counseling: the risks and benefits of screening were discussed     HIV screening and counseling: screening not indicated  Immunizations: influenza vaccine is due today, the risks and benefits of pneumococcal vaccination were discussed with the patient and hepatitis B vaccination series is not indicated at this time due to the patient's low risk of ferdinand the disease  Advance Directive Planning: not complete, she was encouraged to follow-up with me to discuss her questions and/or decisions  History of Present Illness  Annual wellness visit   A vasculopath:  Abdominal ultrasound documents aortic stenosis distally above the bifurcation secondary to atherosclerosis with the presence of plaque  Examination of the legs was unremarkable  This aortic stenosis is felt to be the cause of her claudication symptoms  Nonobstructive carotid disease; bilateral plaque with less than 50% obstruction  Left subclavian stenosis; again recommendation is control of risk factors and avoidance of blood pressure in the left arm  Incidental left celiac nonocclusive plaque without symptoms  Risk factors are principally cigarette smoking although she does have impaired fasting glucose with hemoglobin A1c last reported at 6  The patient also has chronic lung disease, and osteoporosis   The patient is being seen for the initial annual wellness visit  Medicare Screening and Risk Factors   Hospitalizations: no previous hospitalizations  Once per lifetime medicare screening tests: AAA screening US has not yet been done  Medicare Screening Tests Risk Questions   Osteoporosis risk assessment: , female gender and over 48years of age  Drug and Alcohol Use: The patient is a current cigarette smoker  The patient reports rare alcohol use  She has never used illicit drugs     Diet and Physical Activity: Current diet includes frequent junk food, 1 servings of vegetables per day, 1 servings of meat per day, 2 servings of dairy products per day, 0 cups of coffee per day, 4 cups of tea per day and 1 cans of regular soda per day  She exercises infrequently  Exercise: walking  Mood Disorder and Cognitive Impairment Screening: PHQ-9 Depression Scale   Over the past 2 weeks, how often have you been bothered by the following problems? 1 ) Little interest or pleasure in doing things? Not at all    2 ) Feeling down, depressed or hopeless? Not at all    3 ) Trouble falling asleep or sleeping too much? Not at all    4 ) Feeling tired or having little energy? Not at all    5 ) Poor appetite or overeating? Not at all    6 ) Feeling bad about yourself, or that you are a failure, or have let yourself or your family down? Not at all    7 ) Trouble concentrating on things, such as reading a newspaper or watching television? Not at all    8 ) Moving or speaking so slowly that other people could have noticed, or the opposite, moving or speaking faster than usual? Not at all    9 ) Thoughts that you would be off dead or of hurting yourself in some way? Not at all  TOTAL SCORE: 0    Functional Ability/Level of Safety: Hearing is normal bilaterally, normal in the right ear, normal in the left ear and a hearing aid is not used  The patient is currently able to do activities of daily living without limitations, able to do instrumental activities of daily living without limitations, able to participate in social activities without limitations and able to drive without limitations  Fall risk factors: The patient fell 0 times in the past 12 months  Advance Directives: Advance directives: no living will, no durable power of  for health care directives and no advance directives  Co-Managers and Medical Equipment/Suppliers: See Patient Care Team      Patient Care Team    Care Team Member Role Specialty Office Number   Arielle HORN  Pulmonary Medicine [phone]     Review of Systems    Over the past 2 weeks, how often have you been bothered by the following problems? 1 ) Little interest or pleasure in doing things? Not at all  2  ) Feeling down, depressed or hopeless? Not at all    3 ) Trouble falling asleep or sleeping too much? Not at all    4 ) Feeling tired or having little energy? Not at all    5 ) Poor appetite or overeating? Not at all    6 ) Feeling bad about yourself, or that you are a failure, or have let yourself or your family down? Not at all    7 ) Trouble concentrating on things, such as reading a newspaper or watching television? Not at all    8 ) Moving or speaking so slowly that other people could have noticed, or the opposite, moving or speaking faster than usual? Not at all    9 ) Thoughts that you would be better off dead or of hurting yourself in some way? 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)   8  Chest pain (786 50) (R07 9)   9  Chronic obstructive pulmonary disease (496) (J44 9)   10  Encounter for colorectal cancer screening (V76 51) (Z12 11,Z12 12)   11  Hyperlipidemia (272 4) (E78 5)   12  Impaired fasting glucose (790 21) (R73 01)   13  Murmur (785 2) (R01 1)   14  Need for influenza vaccination (V04 81) (Z23)   15  Need for pneumococcal vaccination (V03 82) (Z23)   16  Need for revaccination (V05 9) (Z23)   17  Oral thrush (112 0) (B37 0)   18  Osteoporosis (733 00) (M81 0)   19  Preoperative clearance (V72 84) (Z01 818)   20  Restless leg syndrome (333 94) (G25 81)   21  Shoulder pain (719 41) (M25 519)   22  Sinusitis (473 9) (J32 9)   23  Subclavian artery stenosis, left (447 1) (I77 1)   24  Tachycardia (785 0) (R00 0)   25  Tobacco abuse (305 1) (Z72 0)   26   Vision loss (369 9) (H54 7)    Past Medical History    1  History of Anxiety (300 00) (F41 9)   2  History of Chest pain (786 50) (R07 9)   3  History of Encounter for screening mammogram for malignant neoplasm of breast   (V76 12) (Z12 31)   4  History of Headache (784 0) (R51)   5  History of chronic sinusitis (V12 69) (Z87 09)   6  History of upper respiratory infection (V12 09) (Z87 09)   7  History of Limb pain (729 5) (M79 609)    Surgical History    1  History of  Section   2  History of Complete Colonoscopy   3  History of Tonsillectomy    Family History  Mother    1  Family history of Mother  At Age 64  Father    2  Family history of Father  At Age 39  Sister    1  Family history of Arthritis (V17 7)   4  Family history of Carcinoma Of The Pancreas   5  Family history of Sister  At Age 79  Brother    10  Family history of Brother  At Age 76  Family History    7  Family history of Acute Myocardial Infarction (V17 3)   8  Family history of Cirrhosis   9  Family history of Prostate Cancer (V16 42)   10  Family history of Skin Cancer (V16 8)   11  Family history of Stroke Syndrome (V17 1)    Social History    · Alcohol Use (History)   · Cigarette smoker (305 1) (F17 210)   · Current every day smoker (305 1) (F17 200)   · Current Smoker (305 1)   · Living With Adult Children   · Marital History -    · Never Used Drugs   · Retired From Work    Current Meds   1  ALPRAZolam 0 5 MG Oral Tablet; 1 tab bid prn; Last Rx:23Gkt1565 Ordered   2  Aspirin EC 81 MG Oral Tablet Delayed Release; TAKE 1 TABLET DAILY; Therapy: 14Zuq4985 to (Evaluate:2016); Last Rx:21Ofx3238 Ordered   3  Atorvastatin Calcium 40 MG Oral Tablet; TAKE 1 TABLET DAILY  Requested for:   49Gyw9313; Last Rx:41Tys5297 Ordered   4  Breo Ellipta 100-25 MCG/INH Inhalation Aerosol Powder Breath Activated; take 1   inhalation by mouth once daily;    Therapy: 97Uqb6049 to (Evaluate:2017)  Requested for: 48VLK8046; Last Rx: 35SBV8628 Ordered   5  Carafate 1 GM/10ML Oral Suspension; TAKE 10 ML 4 TIMES DAILY; Therapy: 80Frr4280 to (Evaluate:27Rpt9458)  Requested for: 97Aks6809; Last   Rx:71Fps7703; Status: [address] to Pharmacy - Awaiting Verification Ordered   6  Cyclobenzaprine HCl - 10 MG Oral Tablet; TAKE 1 TABLET 3 TIMES DAILY AS NEEDED; Therapy: 57IHN8866 to (Flori Segura)  Requested for: 92JDG1726; Last   Rx:05Oct2016; Status: ACTIVE - Transmit to [facility] Verification Ordered   7  Eye Drops SOLN;   Therapy: (Recorded:15Gsr4521) to Recorded   8  Famotidine 40 MG Oral Tablet; TAKE 1 TABLET TWICE DAILY; Therapy: 17Ejl0411 to (Evaluate:29Mcn7835)  Requested for: 13Xqz2201; Last   Rx:50Xwj4727; Status: ACTIVE - Transmit to [facility] Verification Ordered   9  Ibuprofen 600 MG Oral Tablet; TAKE 1 TABLET EVERY 6 TO 8 HOURS AS NEEDED; Therapy: 25JWY8577 to (Merrick Blake)  Requested for: 76WCJ8186; Last   Rx:05Oct2016 Ordered   10  Omeprazole 20 MG Oral Capsule Delayed Release; TAKE 1 CAPSULE Daily; Therapy: 52MDQ2444 to (Fabiana Sheridan)  Requested for: 02EHI3891; Last    Rx:11Mar2016 Ordered   11  Risedronate Sodium 35 MG Oral Tablet; take 1 tablet by mouth every week; Therapy: 21BZO8101 to (Flori Segura)  Requested for: 68Peo6124; Last    Rx:38Uyl4117 Ordered   12  ROPINIRole HCl - 0 5 MG Oral Tablet; 1 at hs;    Therapy: 99MMW7577 to (Last Rx:85Soc5275)  Requested for: 68Qba6848 Ordered    Allergies    1  Augmentin TABS   2  Spiriva HandiHaler CAPS   3   Tylenol with Codeine #3 TABS    Immunizations  Influenza --- Belvia Fries: 15-Oct-2015   PCV --- Belvia Fries: 04-Aug-2016   PPSV --- Belvia Fries: 16-Nov-2015; Jay Davila: never   Tdap --- Alivia Valdivia: not sure     Vitals  Signs   Recorded: 22Dec2016 01:15PM   Heart Rate: 84  Systolic: 140  Diastolic: 70  Height: 5 ft   Weight: 117 lb 4 00 oz  BMI Calculated: 22 9  BSA Calculated: 1 48    Results/Data  PHQ-2 Adult Depression Screening 61Rah4835 01: 17PM User, [employer]     Test Name Result Flag Reference   PHQ-2 Adult Depression Score 0     Over the last two weeks, how often have you been bothered by any of the following problems? Little interest or pleasure in doing things: Not at all - 0  Feeling down, depressed, or hopeless: Not at all - 0   PHQ-2 Adult Depression Screening Negative       Falls Risk Assessment (Dx Z13 89 Screen for Neurologic Disorder) 94KIH0611 01:17PM User, [employer]     Test Name Result Flag Reference   Falls Risk      No falls in the past year       Health Management  Health Maintenance   COLONOSCOPY; every 5 years; Last 13CXC7919; Next Due: 26VJR5479; Active  Digital Bilateral Screening Mammogram With CAD; every 1 year; Next Due: 31SVT4262;  Overdue    Signatures   Electronically signed by : Crissie Habermann, M D ; Dec 22 2016  1:43PM EST                       (Author)

## 2018-01-15 NOTE — RESULT NOTES
Verified Results  * US ABDOMEN COMPLETE 31Gyi1411 10:20AM Rory Myers Order Number: [id number]    - Patient Instructions: To schedule this appointment, please contact Central Scheduling at [phone]  Test Name Result Flag Reference   US ABDOMEN COMPLETE (Report)     ABDOMEN ULTRASOUND, COMPLETE     INDICATION: Upper abdominal pain  COMPARISON: None     TECHNIQUE:  Real-time ultrasound of the abdomen was performed with a curvilinear transducer with both volumetric sweeps and still imaging techniques  FINDINGS:     PANCREAS: Visualized portions of the pancreas are within normal limits  AORTA AND IVC: Atherosclerotic changes abdominal aorta  IVC unremarkable by grayscale  LIVER:   Size: Within normal range  The liver measures 13 4 cm in the midclavicular line  Contour: Surface contour is smooth  Parenchyma: Echogenicity and echotexture are within normal limits  No evidence of suspicious mass  The main portal vein is patent and hepatopetal       BILIARY:   The gallbladder is normal in caliber  No wall thickening or pericholecystic fluid  No stones or sludge identified  Sonographic Marybeth Pages sign is negative  No intrahepatic biliary dilatation  CBD measures 5 mm  No choledocholithiasis  KIDNEY:    Right kidney measures 9 3 x 3 6 cm  Within normal limits  Left kidney measures 9 8 x 4 3 cm  No hydronephrosis or shadowing calculus  2 1 x 1 8 x 2 1 cm simple upper pole cyst      SPLEEN:    Measures 7 4 cm  Within normal limits  ASCITES: None  IMPRESSION:     No acute findings       2 cm simple left renal cyst        Workstation performed: [id number]     Signed by:   Giles Naylor DO   9/4/16

## 2018-01-22 VITALS
BODY MASS INDEX: 23.95 KG/M2 | HEIGHT: 60 IN | WEIGHT: 122 LBS | OXYGEN SATURATION: 96 % | HEART RATE: 90 BPM | DIASTOLIC BLOOD PRESSURE: 68 MMHG | SYSTOLIC BLOOD PRESSURE: 132 MMHG

## 2018-02-19 ENCOUNTER — TRANSCRIBE ORDERS (OUTPATIENT)
Dept: NON INVASIVE DIAGNOSTICS | Facility: CLINIC | Age: 73
End: 2018-02-19

## 2018-02-19 DIAGNOSIS — I77.1 CELIAC ARTERY STENOSIS (HCC): ICD-10-CM

## 2018-02-19 DIAGNOSIS — I65.23 ASYMPTOMATIC BILATERAL CAROTID ARTERY STENOSIS: ICD-10-CM

## 2018-02-19 DIAGNOSIS — I74.09 AORTOILIAC OCCLUSIVE DISEASE (HCC): Primary | ICD-10-CM

## 2018-02-19 DIAGNOSIS — I77.1 SUBCLAVIAN ARTERY STENOSIS, LEFT (HCC): ICD-10-CM

## 2018-02-19 DIAGNOSIS — I73.9 INTERMITTENT CLAUDICATION (HCC): ICD-10-CM

## 2018-02-21 ENCOUNTER — HOSPITAL ENCOUNTER (OUTPATIENT)
Dept: NON INVASIVE DIAGNOSTICS | Facility: CLINIC | Age: 73
Discharge: HOME | End: 2018-02-21
Payer: MEDICARE

## 2018-02-21 ENCOUNTER — OFFICE VISIT (OUTPATIENT)
Dept: PULMONOLOGY | Facility: CLINIC | Age: 73
End: 2018-02-21
Payer: MEDICARE

## 2018-02-21 VITALS
OXYGEN SATURATION: 95 % | HEART RATE: 101 BPM | WEIGHT: 124 LBS | DIASTOLIC BLOOD PRESSURE: 80 MMHG | SYSTOLIC BLOOD PRESSURE: 116 MMHG | BODY MASS INDEX: 24.35 KG/M2 | HEIGHT: 60 IN

## 2018-02-21 DIAGNOSIS — F41.9 ANXIETY: ICD-10-CM

## 2018-02-21 DIAGNOSIS — J43.2 CENTRILOBULAR EMPHYSEMA (HCC): ICD-10-CM

## 2018-02-21 DIAGNOSIS — R00.2 PALPITATIONS: ICD-10-CM

## 2018-02-21 DIAGNOSIS — Z72.0 TOBACCO ABUSE: ICD-10-CM

## 2018-02-21 DIAGNOSIS — I77.1 CELIAC ARTERY STENOSIS (HCC): ICD-10-CM

## 2018-02-21 DIAGNOSIS — Z72.0 TOBACCO USE: ICD-10-CM

## 2018-02-21 DIAGNOSIS — I74.09 AORTOILIAC OCCLUSIVE DISEASE (HCC): ICD-10-CM

## 2018-02-21 DIAGNOSIS — I73.9 INTERMITTENT CLAUDICATION (HCC): ICD-10-CM

## 2018-02-21 DIAGNOSIS — J43.2 CENTRILOBULAR EMPHYSEMA (HCC): Primary | ICD-10-CM

## 2018-02-21 PROBLEM — I65.23 ASYMPTOMATIC BILATERAL CAROTID ARTERY STENOSIS: Status: ACTIVE | Noted: 2017-03-21

## 2018-02-21 PROCEDURE — 93978 VASCULAR STUDY: CPT

## 2018-02-21 PROCEDURE — 93923 UPR/LXTR ART STDY 3+ LVLS: CPT

## 2018-02-21 PROCEDURE — 93971 EXTREMITY STUDY: CPT | Performed by: SURGERY

## 2018-02-21 PROCEDURE — 99214 OFFICE O/P EST MOD 30 MIN: CPT | Performed by: PHYSICIAN ASSISTANT

## 2018-02-21 RX ORDER — ALBUTEROL SULFATE 90 UG/1
2 AEROSOL, METERED RESPIRATORY (INHALATION) EVERY 6 HOURS PRN
COMMUNITY
Start: 2017-10-17 | End: 2019-04-11 | Stop reason: SDUPTHER

## 2018-02-21 RX ORDER — FLUTICASONE FUROATE AND VILANTEROL 100; 25 UG/1; UG/1
POWDER RESPIRATORY (INHALATION) DAILY
COMMUNITY
Start: 2015-09-18 | End: 2018-04-08 | Stop reason: SDUPTHER

## 2018-02-21 RX ORDER — ASPIRIN 81 MG/1
1 TABLET ORAL DAILY
COMMUNITY
Start: 2016-08-04 | End: 2020-12-08 | Stop reason: ALTCHOICE

## 2018-02-21 RX ORDER — ALPRAZOLAM 0.5 MG/1
1 TABLET ORAL 2 TIMES DAILY PRN
COMMUNITY
End: 2018-05-25 | Stop reason: SDUPTHER

## 2018-02-21 RX ORDER — ROPINIROLE 0.5 MG/1
TABLET, FILM COATED ORAL
Refills: 0 | COMMUNITY
Start: 2018-02-04 | End: 2018-10-11 | Stop reason: SDUPTHER

## 2018-02-21 RX ORDER — NICOTINE POLACRILEX 4 MG/1
1 GUM, CHEWING ORAL DAILY
COMMUNITY
Start: 2016-03-11 | End: 2018-05-24 | Stop reason: HOSPADM

## 2018-02-21 RX ORDER — ATORVASTATIN CALCIUM 40 MG/1
TABLET, FILM COATED ORAL
Refills: 0 | COMMUNITY
Start: 2017-12-26 | End: 2018-10-11 | Stop reason: SDUPTHER

## 2018-02-21 NOTE — PROGRESS NOTES
Assessment:    1  Centrilobular emphysema (HCC)     2  Palpitations     3  Anxiety     4  Tobacco abuse         Plan: 80 y/o female still actively smoking about 12 cigarettes a day with greater than a 55 pack year smoking history with significant PMH of COPD/emphysema, PAD, and Osteoporosis presenting for f/u of her COPD  1   COPD, mild -continue with Breo 1 puff once daily  Rinse mouth after use  Patient has not felt the need to use her rescue inhaler  Advised patient to still keep on hand and use as needed  Patient has PFT scheduled for this Friday  Episodes of palpitations/SOB do not seem to be related to COPD  Advised patient to follow up with her PCP or cardiologist for further evaluation  Discussed that having increased anxiety/stress may be precipitating these episodes  Patient understands  Review deep breathing exercises and to attempt to vagal maneuvers if this episode occurs again  Discussed reasons to go into the ED including episode that persists, chest pain, lightheadedness/dizziness  Patient understands  2   Tobacco abuse -strongly encouraged patient to quit smoking  She is very stressed and seems very reluctant to quit completely  Encouraged patient to try to cut down on the number of cigarettes  She will try this  Subjective:     Patient ID: Haydee Anne is a 81 y o  female  Chief Complaint: SOB    HPI  80 y/o female still actively smoking about 12 cigarettes a day with greater than a 55 pack year smoking history with significant PMH of COPD/emphysema, PAD, and Osteoporosis presenting for f/u of her COPD  Patient is complaining of episodes palpitations/shortness of breath that come on randomly  States these episodes may last a few minutes but never exceed more than 15-30 min  She reports a fullness sensation in her throat when she has the symptoms  This has happened in the past, however, has never lasted for a week    She does not notice any increased shortness of breath with exertion above baseline  Patient has not felt the need to use her rescue inhaler and continues to use her Breo 1 puff once a day  She states she is going to [place] to see her sick sister who has end-stage COPD until April  She reports being extremely stressed out with a family member and present and her sister being so sick  She has plans to get the PFTs completed this Friday  Review of Systems  Review of Systems   Constitution: Negative for chills, decreased appetite, fever, malaise/fatigue and weight gain  HENT: Negative for congestion  Eyes: Negative for visual disturbance  Cardiovascular: Positive for irregular heartbeat and palpitations  Negative for dyspnea on exertion, leg swelling and orthopnea  Respiratory: Positive for shortness of breath  Negative for cough, sleep disturbances due to breathing, sputum production and wheezing  Hematologic/Lymphatic: Negative for adenopathy  Skin: Negative for rash  Musculoskeletal: Negative for muscle weakness  Gastrointestinal: Negative for abdominal pain, diarrhea, nausea and vomiting  Neurological: Negative for excessive daytime sleepiness  Psychiatric/Behavioral: Negative for altered mental status and hallucinations  Allergic/Immunologic: Negative for environmental allergies  Objective:  /80 (BP Location: Left arm, Patient Position: Sitting)   Pulse 101   Ht 5' (1 524 m)   Wt 56 2 kg (124 lb)   SpO2 95%   BMI 24 22 kg/m²     Physical Exam   Constitutional: She is oriented to person, place, and time  She appears well-developed and well-nourished  HENT:   Head: Normocephalic and atraumatic  Neck: Normal range of motion  Cardiovascular: Normal rate and regular rhythm  No murmur heard  Pulmonary/Chest: Effort normal and breath sounds normal  She has no wheezes  She has no rales  Abdominal: Soft  There is no tenderness  Musculoskeletal: Normal range of motion  She exhibits no edema or tenderness  Lymphadenopathy:     She has no cervical adenopathy  Neurological: She is alert and oriented to person, place, and time  Skin: Skin is warm and dry  Psychiatric: She has a normal mood and affect  Her behavior is normal    Nursing note and vitals reviewed  Lab/Image Review: Reviewed all pertinent labs/radiology results  No past medical history on file  Social History   Substance Use Topics    Smoking status: Current Every Day Smoker     Packs/day: 15 00     Types: Cigars     Start date: 2/21/1962    Smokeless tobacco: Never Used    Alcohol use Not on file       No family history on file      Patient Active Problem List   Diagnosis    Anxiety    Aortoiliac occlusive disease ([facility] [address] )    Asymptomatic bilateral carotid artery stenosis    Atherosclerosis of native artery of both lower extremities with intermittent claudication (HCC)    Centrilobular emphysema (HCC)    Hyperlipidemia    Impaired fasting glucose    Osteoporosis    Restless leg syndrome    Subclavian artery stenosis, left (HCC)    Tobacco abuse

## 2018-02-26 ENCOUNTER — TELEPHONE (OUTPATIENT)
Dept: ADMINISTRATIVE | Facility: HOSPITAL | Age: 73
End: 2018-02-26

## 2018-02-26 NOTE — TELEPHONE ENCOUNTER
called pt to schedule follow up from irene / she stated she is going to [facility] to help her sister on a family emergency  i have scheduled her for 4/10 when she will return   she complains of job le pain w/ walking no pain at rest

## 2018-02-26 NOTE — TELEPHONE ENCOUNTER
----- Message from Bessy Lima sent at 2/22/2018  8:53 AM EST -----  Per consensus " Call patient for OV with PA-C/CRNP to review study and severity of symptoms"

## 2018-02-27 ENCOUNTER — HOSPITAL ENCOUNTER (OUTPATIENT)
Dept: NON INVASIVE DIAGNOSTICS | Facility: CLINIC | Age: 73
Discharge: HOME | End: 2018-02-27
Payer: MEDICARE

## 2018-02-27 DIAGNOSIS — I77.1 SUBCLAVIAN ARTERY STENOSIS, LEFT (HCC): ICD-10-CM

## 2018-02-27 DIAGNOSIS — I65.23 ASYMPTOMATIC BILATERAL CAROTID ARTERY STENOSIS: ICD-10-CM

## 2018-02-27 PROCEDURE — 93880 EXTRACRANIAL BILAT STUDY: CPT

## 2018-03-01 ENCOUNTER — HOSPITAL ENCOUNTER (OUTPATIENT)
Dept: PULMONOLOGY | Facility: HOSPITAL | Age: 73
Discharge: HOME | End: 2018-03-01
Payer: MEDICARE

## 2018-03-01 DIAGNOSIS — Z72.0 TOBACCO ABUSE: ICD-10-CM

## 2018-03-01 PROCEDURE — 94760 N-INVAS EAR/PLS OXIMETRY 1: CPT

## 2018-03-01 PROCEDURE — 94727 GAS DIL/WSHOT DETER LNG VOL: CPT

## 2018-03-01 PROCEDURE — 94729 DIFFUSING CAPACITY: CPT

## 2018-03-01 PROCEDURE — 94060 EVALUATION OF WHEEZING: CPT | Performed by: INTERNAL MEDICINE

## 2018-03-01 PROCEDURE — 94726 PLETHYSMOGRAPHY LUNG VOLUMES: CPT | Performed by: INTERNAL MEDICINE

## 2018-03-01 PROCEDURE — 94060 EVALUATION OF WHEEZING: CPT

## 2018-03-01 PROCEDURE — 94729 DIFFUSING CAPACITY: CPT | Performed by: INTERNAL MEDICINE

## 2018-03-01 RX ORDER — ALBUTEROL SULFATE 2.5 MG/3ML
2.5 SOLUTION RESPIRATORY (INHALATION) ONCE
Status: COMPLETED | OUTPATIENT
Start: 2018-03-01 | End: 2018-03-01

## 2018-03-01 RX ADMIN — ALBUTEROL SULFATE 2.5 MG: 2.5 SOLUTION RESPIRATORY (INHALATION) at 10:29

## 2018-03-02 PROCEDURE — 93880 EXTRACRANIAL BILAT STUDY: CPT | Performed by: SURGERY

## 2018-03-06 DIAGNOSIS — R91.1 LUNG NODULE: ICD-10-CM

## 2018-03-06 DIAGNOSIS — R06.02 SHORTNESS OF BREATH: Primary | ICD-10-CM

## 2018-03-07 NOTE — PROGRESS NOTES
History of Present Illness    Revaccination   Vaccine Information: Vaccine(s) Given (names): [id number]  Spoke with patient regarding vaccine out of temperature range and risks and benefits of revaccination  Action(s): Pt will be revaccinated  Pt contacted and will call back  Pt called (attempt 1): [phone]    Pt called (attempt 2): [phone]    Pt called (attempt 3): [phone]    Letter Sent (Regular and Certified): [id number]   Active Problems    1  Abdominal bruit (785 9) (R09 89)   2  Adhesive capsulitis of left shoulder (726 0) (M75 02)   3  Aortoiliac occlusive disease (444 09) (I74 09)   4  Atherosclerosis of both carotid arteries (433 10,433 30) (I65 23)   5  Atherosclerosis of native artery of both lower extremities with intermittent claudication   (440 21) (I70 213)   6  Bilateral carotid bruits (785 9) (R09 89)   7  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)   8  Chest pain (786 50) (R07 9)   9  Chronic obstructive pulmonary disease (496) (J44 9)   10  Encounter for colorectal cancer screening (V76 51) (Z12 11,Z12 12)   11  Hyperlipidemia (272 4) (E78 5)   12  Impaired fasting glucose (790 21) (R73 01)   13  Murmur (785 2) (R01 1)   14  Need for influenza vaccination (V04 81) (Z23)   15  Need for pneumococcal vaccination (V03 82) (Z23)   16  Need for revaccination (V05 9) (Z23)   17  Oral thrush (112 0) (B37 0)   18  Osteoporosis (733 00) (M81 0)   19  Preoperative clearance (V72 84) (Z01 818)   20  Restless leg syndrome (333 94) (G25 81)   21  Shoulder pain (719 41) (M25 519)   22  Sinusitis (473 9) (J32 9)   23  Subclavian artery stenosis, left (447 1) (I77 1)   24  Tachycardia (785 0) (R00 0)   25  Tobacco abuse (305 1) (Z72 0)   26  Vision loss (369 9) (H54 7)    Immunizations  Influenza --- Florencia Russell: 15-Oct-2015  (70y)   PCV --- Florencia Russell: 04-Aug-2016  (71y)   PPSV --- Florencia Russell: 16-Nov-2015  (70y);  Series2: never   Tdap --- Florencia Russell: not sure     Current Meds 1  ALPRAZolam 0 5 MG Oral Tablet; 1 tab bid prn   2  Aspirin EC 81 MG Oral Tablet Delayed Release; TAKE 1 TABLET DAILY   3  Atorvastatin Calcium 40 MG Oral Tablet; TAKE 1 TABLET DAILY   4  Breo Ellipta 100-25 MCG/INH Inhalation Aerosol Powder Breath Activated; take 1   inhalation by mouth once daily   5  Carafate 1 GM/10ML Oral Suspension; TAKE 10 ML 4 TIMES DAILY   6  Cyclobenzaprine HCl - 10 MG Oral Tablet; TAKE 1 TABLET 3 TIMES DAILY AS NEEDED   7  Eye Drops SOLN   8  Famotidine 40 MG Oral Tablet; TAKE 1 TABLET TWICE DAILY   9  Ibuprofen 600 MG Oral Tablet; TAKE 1 TABLET EVERY 6 TO 8 HOURS AS NEEDED   10  Omeprazole 20 MG Oral Capsule Delayed Release; TAKE 1 CAPSULE Daily   11  Risedronate Sodium 35 MG Oral Tablet; take 1 tablet by mouth every week   12  ROPINIRole HCl - 0 5 MG Oral Tablet; 1 at hs    Allergies    1  Augmentin TABS   2  Spiriva HandiHaler CAPS   3   Tylenol with Codeine #3 TABS    Signatures   Electronically signed by : EZEQUIEL Spann ; Dec 28 2016  3:45PM EST

## 2018-03-21 DIAGNOSIS — I65.23 OCCLUSION AND STENOSIS OF BILATERAL CAROTID ARTERIES: ICD-10-CM

## 2018-03-21 DIAGNOSIS — I77.1 STRICTURE OF ARTERY (HCC): ICD-10-CM

## 2018-03-21 DIAGNOSIS — I74.09 OTHER ARTERIAL EMBOLISM AND THROMBOSIS OF ABDOMINAL AORTA (HCC): ICD-10-CM

## 2018-03-21 DIAGNOSIS — I73.9 PERIPHERAL VASCULAR DISEASE (HCC): ICD-10-CM

## 2018-04-06 ENCOUNTER — TELEPHONE (OUTPATIENT)
Dept: PULMONOLOGY | Facility: CLINIC | Age: 73
End: 2018-04-06

## 2018-04-06 NOTE — TELEPHONE ENCOUNTER
She was due for a ct chest on 3/6/18 that is why propably they called, let her know to get it done when she is back   Thnx

## 2018-04-06 NOTE — TELEPHONE ENCOUNTER
The pt called and said our office called her and she does not know why  She is in [place] at the moment and does not know when she will be back      Please advise  Thank you

## 2018-04-08 DIAGNOSIS — J44.9 COPD (CHRONIC OBSTRUCTIVE PULMONARY DISEASE) WITH CHRONIC BRONCHITIS (HCC): Primary | ICD-10-CM

## 2018-04-09 RX ORDER — FLUTICASONE FUROATE AND VILANTEROL TRIFENATATE 100; 25 UG/1; UG/1
POWDER RESPIRATORY (INHALATION)
Qty: 3 EACH | Refills: 3 | Status: SHIPPED | OUTPATIENT
Start: 2018-04-09 | End: 2018-10-29 | Stop reason: SDUPTHER

## 2018-05-14 ENCOUNTER — OFFICE VISIT (OUTPATIENT)
Dept: PULMONOLOGY | Facility: CLINIC | Age: 73
End: 2018-05-14
Payer: MEDICARE

## 2018-05-14 VITALS
SYSTOLIC BLOOD PRESSURE: 120 MMHG | HEIGHT: 60 IN | WEIGHT: 125 LBS | HEART RATE: 86 BPM | DIASTOLIC BLOOD PRESSURE: 70 MMHG | BODY MASS INDEX: 24.54 KG/M2

## 2018-05-14 DIAGNOSIS — Z72.0 TOBACCO ABUSE: ICD-10-CM

## 2018-05-14 DIAGNOSIS — R91.1 LUNG NODULE: ICD-10-CM

## 2018-05-14 DIAGNOSIS — J43.2 CENTRILOBULAR EMPHYSEMA (HCC): ICD-10-CM

## 2018-05-14 DIAGNOSIS — R06.02 SHORTNESS OF BREATH: Primary | ICD-10-CM

## 2018-05-14 PROCEDURE — 99214 OFFICE O/P EST MOD 30 MIN: CPT | Performed by: INTERNAL MEDICINE

## 2018-05-14 RX ORDER — PANTOPRAZOLE SODIUM 40 MG/1
40 TABLET, DELAYED RELEASE ORAL DAILY
COMMUNITY
End: 2018-08-24 | Stop reason: CLARIF

## 2018-05-14 RX ORDER — ATORVASTATIN CALCIUM 20 MG/1
TABLET, FILM COATED ORAL
COMMUNITY
End: 2018-05-24 | Stop reason: HOSPADM

## 2018-05-14 RX ORDER — SIMVASTATIN 40 MG
40 TABLET ORAL
COMMUNITY
End: 2018-05-24 | Stop reason: HOSPADM

## 2018-05-14 RX ORDER — ALPRAZOLAM 0.5 MG/1
TABLET ORAL
COMMUNITY
End: 2018-05-17 | Stop reason: CLARIF

## 2018-05-14 RX ORDER — FERROUS SULFATE 325(65) MG
325 TABLET ORAL
COMMUNITY
End: 2018-08-24 | Stop reason: CLARIF

## 2018-05-14 RX ORDER — FUROSEMIDE 40 MG/1
40 TABLET ORAL 2 TIMES DAILY
Status: ON HOLD | COMMUNITY
End: 2018-05-24

## 2018-05-14 RX ORDER — FLUTICASONE FUROATE AND VILANTEROL 100; 25 UG/1; UG/1
POWDER RESPIRATORY (INHALATION)
COMMUNITY
End: 2018-05-14 | Stop reason: SDUPTHER

## 2018-05-14 NOTE — PROGRESS NOTES
Assessment/Plan:   Diagnoses and all orders for this visit:    Shortness of breath    Centrilobular emphysema (HCC)    Lung nodule    Tobacco abuse    Other orders  -     calcium-vitamin D (OSCAL) 250-125 MG-UNIT per tablet; Take 1 tablet by mouth daily  -     simvastatin (ZOCOR) 40 mg tablet; Take 40 mg by mouth daily at bedtime  -     pantoprazole (PROTONIX) 40 mg tablet; Take 40 mg by mouth daily  -     metoprolol tartrate (LOPRESSOR) 25 mg tablet; Take 12 5 mg by mouth every 12 (twelve) hours  -     furosemide (LASIX) 40 mg tablet; Take 40 mg by mouth 2 (two) times a day  -     ferrous sulfate 325 (65 Fe) mg tablet; Take 325 mg by mouth daily with breakfast  -     atorvastatin (LIPITOR) 20 mg tablet; atorvastatin 20 mg tablet  -     ALPRAZolam (XANAX) 0 5 mg tablet; alprazolam 0 5 mg tablet  -     sodium chloride () 2 % hypertonic ophthalmic solution; Sue 128 2 % eye drops  -     Discontinue: fluticasone furoate-vilanterol (BREO ELLIPTA); as directed      COPD /chronic bronchitis, continue with the albuterol via the nebulizer 4 times daily as needed  Continue with breo one puff daily  Rinse mouth after use  Long discussion again with the patient with regards to smoking cessation, she says she is trying to quit on her own, does not tolerate the Chantix, she is having the nicotine patches and she is will plan to use it from next week  CT of the chest for lung cancer screening in 2017 with moderate diffuse centrilobular emphysema no evidence of any lung nodules  She still did not get the CT of the chest that was ordered for this years in 2018  She will  the order and schedule the CT chest   Her recent PFTs demonstrated that the spirometry is normal, no obstructive ventilatory limitation, lung volumes are normal moderately decreased DLCO  Vaccinations up-to-date  Follow-up in 6 months or when necessary earlier as needed  Return in about 6 months (around 11/14/2018)    All questions are answered to the patient's satisfaction and understanding  She verbalizes understanding  She is encouraged to call with any further questions or concerns  Portions of the record may have been created with voice recognition software  Occasional wrong word or "sound a like" substitutions may have occurred due to the inherent limitations of voice recognition software  Read the chart carefully and recognize, using context, where substitutions have occurred  ______________________________________________________________________    Chief Complaint: No chief complaint on file  Patient ID: Mae Nuñez is a 81 y o  y o  female has a past medical history of Chronic sinusitis  5/14/2018  80 y/o female still actively smoking about 12 cigarettes a day with greater than a 55 pack year smoking history with significant PMH of COPD/emphysema, PAD, and Osteoporosis presenting for f/u of her COPD      Review of Systems   Constitutional: Positive for fatigue  Negative for activity change, appetite change, chills, diaphoresis, fever and unexpected weight change  HENT: Negative for congestion, dental problem, drooling, nosebleeds, postnasal drip, rhinorrhea, sinus pressure, sore throat and voice change  Eyes: Negative for discharge, itching and visual disturbance  Respiratory: Positive for cough (clear sputum, no hemoptysis), shortness of breath and wheezing  Cardiovascular: Negative for chest pain, palpitations and leg swelling  Endocrine: Negative for cold intolerance and heat intolerance  Allergic/Immunologic: Negative for food allergies and immunocompromised state  Neurological: Negative for dizziness, facial asymmetry, speech difficulty and weakness  Hematological: Negative for adenopathy  Psychiatric/Behavioral: Negative for agitation, confusion and sleep disturbance  The patient is not nervous/anxious  Smoking history: She reports that she has been smoking Cigars    She started smoking about 56 years ago  She has been smoking about 15 00 packs per day   She has never used smokeless tobacco     The following portions of the patient's history were reviewed and updated as appropriate: allergies, current medications, past family history, past medical history, past social history, past surgical history and problem list     Immunization History   Administered Date(s) Administered    Influenza Split High Dose Preservative Free IM 10/15/2015, 12/22/2016    Pneumococcal Conjugate 13-Valent 08/04/2016    Pneumococcal Polysaccharide PPV23 1945, 11/16/2015    Tdap 1945     Current Outpatient Prescriptions   Medication Sig Dispense Refill    albuterol (VENTOLIN HFA) 90 mcg/act inhaler Inhale 2 puffs every 6 (six) hours as needed      ALPRAZolam (XANAX) 0 5 mg tablet Take 1 tablet by mouth 2 (two) times a day as needed      aspirin ([employer] ASPIRIN EC) 81 mg EC tablet Take 1 tablet by mouth daily      atorvastatin (LIPITOR) 40 mg tablet   0    BREO ELLIPTA USE 1 PUFF EVERY DAY 3 each 3    pantoprazole (PROTONIX) 40 mg tablet Take 40 mg by mouth daily      sodium chloride () 2 % hypertonic ophthalmic solution Sue 128 2 % eye drops      ALPRAZolam (XANAX) 0 5 mg tablet alprazolam 0 5 mg tablet      atorvastatin (LIPITOR) 20 mg tablet atorvastatin 20 mg tablet      calcium-vitamin D (OSCAL) 250-125 MG-UNIT per tablet Take 1 tablet by mouth daily      ferrous sulfate 325 (65 Fe) mg tablet Take 325 mg by mouth daily with breakfast      furosemide (LASIX) 40 mg tablet Take 40 mg by mouth 2 (two) times a day      metoprolol tartrate (LOPRESSOR) 25 mg tablet Take 12 5 mg by mouth every 12 (twelve) hours      Naphazoline-Polyethyl Glycol 0 012-0 2 % SOLN Apply to eye      Omeprazole 20 MG TBEC Take 1 capsule by mouth daily      rOPINIRole (REQUIP) 0 5 mg tablet   0    simvastatin (ZOCOR) 40 mg tablet Take 40 mg by mouth daily at bedtime       No current facility-administered medications for this visit  Allergies: Augmentin  [amoxicillin-pot clavulanate]; Tiotropium; and Tylenol with codeine #3  [acetaminophen-codeine]    Objective:  Vitals:    05/14/18 1056   BP: 120/70   Pulse: 86   Weight: 56 7 kg (125 lb)   Height: 5' (1 524 m)   PF: 94 L/min        Wt Readings from Last 3 Encounters:   05/14/18 56 7 kg (125 lb)   02/21/18 56 2 kg (124 lb)   10/17/17 55 3 kg (122 lb)     Body mass index is 24 41 kg/m²  Physical Exam   Constitutional: She is oriented to person, place, and time  She appears well-developed and well-nourished  HENT:   Head: Normocephalic and atraumatic  Eyes: Conjunctivae are normal  Pupils are equal, round, and reactive to light  Neck: Normal range of motion  Neck supple  No JVD present  No thyromegaly present  Cardiovascular: Normal rate, regular rhythm and normal heart sounds  Exam reveals no gallop and no friction rub  No murmur heard  Pulmonary/Chest: Effort normal and breath sounds normal  No respiratory distress  She has no wheezes  She has no rales  She exhibits no tenderness  Abdominal: Soft  Bowel sounds are normal    Musculoskeletal: Normal range of motion  She exhibits no edema, tenderness or deformity  Lymphadenopathy:     She has no cervical adenopathy  Neurological: She is alert and oriented to person, place, and time  Skin: Skin is warm and dry  Psychiatric: She has a normal mood and affect  Nursing note and vitals reviewed

## 2018-05-17 ENCOUNTER — OFFICE VISIT (OUTPATIENT)
Dept: INTERNAL MEDICINE CLINIC | Facility: CLINIC | Age: 73
End: 2018-05-17
Payer: MEDICARE

## 2018-05-17 VITALS
HEIGHT: 60 IN | TEMPERATURE: 98.7 F | DIASTOLIC BLOOD PRESSURE: 68 MMHG | BODY MASS INDEX: 24.54 KG/M2 | WEIGHT: 125 LBS | SYSTOLIC BLOOD PRESSURE: 120 MMHG | HEART RATE: 107 BPM | OXYGEN SATURATION: 94 %

## 2018-05-17 DIAGNOSIS — J06.9 VIRAL UPPER RESPIRATORY TRACT INFECTION: Primary | ICD-10-CM

## 2018-05-17 LAB — S PYO AG THROAT QL: NEGATIVE

## 2018-05-17 PROCEDURE — 87880 STREP A ASSAY W/OPTIC: CPT | Performed by: PHYSICIAN ASSISTANT

## 2018-05-17 PROCEDURE — 99214 OFFICE O/P EST MOD 30 MIN: CPT | Performed by: PHYSICIAN ASSISTANT

## 2018-05-17 RX ORDER — FEXOFENADINE HCL AND PSEUDOEPHEDRINE HCI 60; 120 MG/1; MG/1
1 TABLET, EXTENDED RELEASE ORAL 2 TIMES DAILY
Qty: 30 TABLET | Refills: 0 | Status: SHIPPED | OUTPATIENT
Start: 2018-05-17 | End: 2018-05-24 | Stop reason: HOSPADM

## 2018-05-17 NOTE — PROGRESS NOTES
Assessment/Plan:  Coughing sore throat runny nose symptoms on off for 5 weeks with multiple treatments rapid strep negative her symptoms are due to allergies  Will treat these more aggressively with anti-inflammatories and decongestant antihistamine       Diagnoses and all orders for this visit:    Viral upper respiratory tract infection  -     POCT rapid strepA  -     fexofenadine-pseudoephedrine (ALLEGRA-D)  MG per tablet; Take 1 tablet by mouth 2 (two) times a day        No problem-specific Assessment & Plan notes found for this encounter  Subjective:      Patient ID: Aster Lemus is a 81 y o  female  Five week history of runny nose intermittent coughing plugged up ears scratchy throat that has been intermittent  Today her sore throat is worse no hoarseness no stridor hemoptysis or worsening shortness of breath she has known severe COPD chronic bronchitis in the past 5 weeks she has been treated with steroids and 2 courses of antibiotics with no benefit to her symptoms  She is a smoker      Sore Throat    Associated symptoms include coughing  Pertinent negatives include no abdominal pain, congestion, diarrhea, drooling, ear discharge, ear pain, headaches, neck pain, shortness of breath, stridor, trouble swallowing or vomiting  Fever   Associated symptoms include coughing and a sore throat  Pertinent negatives include no abdominal pain, arthralgias, chest pain, chills, congestion, diaphoresis, fatigue, fever, headaches, joint swelling, myalgias, nausea, neck pain, numbness, rash, vomiting or weakness  Cough   Associated symptoms include a sore throat  Pertinent negatives include no chest pain, chills, ear pain, eye redness, fever, headaches, myalgias, postnasal drip, rash, rhinorrhea, shortness of breath or wheezing  The following portions of the patient's history were reviewed and updated as appropriate:   She has a past medical history of Chronic sinusitis  ,   does not have any pertinent problems on file  ,   has a past surgical history that includes  section; Colonoscopy; and Tonsillectomy  ,  family history includes Arthritis in her sister; Cirrhosis in her family; Heart attack in her family; Pancreatic cancer in her sister; Prostate cancer in her family; Skin cancer in her family; Stroke in her family  ,   reports that she has been smoking Cigars  She started smoking about 56 years ago  She has been smoking about 15 00 packs per day  She has never used smokeless tobacco  She reports that she drinks alcohol  She reports that she does not use drugs  ,  is allergic to augmentin  [amoxicillin-pot clavulanate]; spiriva handihaler  [tiotropium bromide monohydrate]; tiotropium; and tylenol with codeine #3  [acetaminophen-codeine]     Current Outpatient Prescriptions   Medication Sig Dispense Refill    albuterol (VENTOLIN HFA) 90 mcg/act inhaler Inhale 2 puffs every 6 (six) hours as needed      ALPRAZolam (XANAX) 0 5 mg tablet Take 1 tablet by mouth 2 (two) times a day as needed      aspirin ([employer] ASPIRIN EC) 81 mg EC tablet Take 1 tablet by mouth daily      atorvastatin (LIPITOR) 40 mg tablet   0    BREO ELLIPTA USE 1 PUFF EVERY DAY 3 each 3    rOPINIRole (REQUIP) 0 5 mg tablet   0    sodium chloride () 2 % hypertonic ophthalmic solution Sue 128 2 % eye drops      atorvastatin (LIPITOR) 20 mg tablet atorvastatin 20 mg tablet      calcium-vitamin D (OSCAL) 250-125 MG-UNIT per tablet Take 1 tablet by mouth daily      ferrous sulfate 325 (65 Fe) mg tablet Take 325 mg by mouth daily with breakfast      fexofenadine-pseudoephedrine (ALLEGRA-D)  MG per tablet Take 1 tablet by mouth 2 (two) times a day 30 tablet 0    furosemide (LASIX) 40 mg tablet Take 40 mg by mouth 2 (two) times a day      metoprolol tartrate (LOPRESSOR) 25 mg tablet Take 12 5 mg by mouth every 12 (twelve) hours      Naphazoline-Polyethyl Glycol 0 012-0 2 % SOLN Apply to eye      Omeprazole 20 MG TBEC Take 1 capsule by mouth daily      pantoprazole (PROTONIX) 40 mg tablet Take 40 mg by mouth daily      simvastatin (ZOCOR) 40 mg tablet Take 40 mg by mouth daily at bedtime       No current facility-administered medications for this visit  Review of Systems   Constitutional: Negative for activity change, appetite change, chills, diaphoresis, fatigue, fever and unexpected weight change  HENT: Positive for sore throat  Negative for congestion, dental problem, drooling, ear discharge, ear pain, facial swelling, hearing loss, nosebleeds, postnasal drip, rhinorrhea, sinus pain, sinus pressure, sneezing, tinnitus, trouble swallowing and voice change  Eyes: Negative for photophobia, pain, discharge, redness, itching and visual disturbance  Respiratory: Positive for cough  Negative for apnea, choking, chest tightness, shortness of breath, wheezing and stridor  Cardiovascular: Negative for chest pain, palpitations and leg swelling  Gastrointestinal: Negative for abdominal distention, abdominal pain, anal bleeding, blood in stool, constipation, diarrhea, nausea, rectal pain and vomiting  Endocrine: Negative for cold intolerance, heat intolerance, polydipsia, polyphagia and polyuria  Genitourinary: Negative for decreased urine volume, difficulty urinating, dysuria, enuresis, flank pain, frequency, genital sores, hematuria and urgency  Musculoskeletal: Negative for arthralgias, back pain, gait problem, joint swelling, myalgias, neck pain and neck stiffness  Skin: Negative for color change, pallor, rash and wound  Neurological: Negative for dizziness, tremors, seizures, syncope, facial asymmetry, speech difficulty, weakness, light-headedness, numbness and headaches  Hematological: Negative for adenopathy  Does not bruise/bleed easily     Psychiatric/Behavioral: Negative for agitation, behavioral problems, confusion, decreased concentration, dysphoric mood, hallucinations, self-injury, sleep disturbance and suicidal ideas  The patient is not nervous/anxious and is not hyperactive  Objective:  Vitals:    05/17/18 1803   BP: 120/68   BP Location: Right arm   Patient Position: Sitting   Cuff Size: Large   Pulse: (!) 107   Temp: 98 7 °F (37 1 °C)   TempSrc: Oral   SpO2: 94%   Weight: 56 7 kg (125 lb)   Height: 5' (1 524 m)     Body mass index is 24 41 kg/m²  Physical Exam   Constitutional: She is oriented to person, place, and time  She appears well-developed  HENT:   Head: Normocephalic  Right Ear: External ear normal    Left Ear: External ear normal    Mouth/Throat: Oropharynx is clear and moist    Eyes: Conjunctivae are normal  Pupils are equal, round, and reactive to light  Neck: Neck supple  No thyromegaly present  Cardiovascular: Normal rate, regular rhythm and intact distal pulses  Murmur heard  Pulmonary/Chest: Breath sounds normal  No respiratory distress (Decreased breath sounds)  She has no wheezes (Scattered rhonchi)  Abdominal: Soft  Bowel sounds are normal    Musculoskeletal: Normal range of motion  Neurological: She is alert and oriented to person, place, and time  She has normal reflexes  Skin: Skin is warm and dry

## 2018-05-21 ENCOUNTER — APPOINTMENT (EMERGENCY)
Dept: RADIOLOGY | Facility: HOSPITAL | Age: 73
DRG: 191 | End: 2018-05-21
Payer: MEDICARE

## 2018-05-21 ENCOUNTER — HOSPITAL ENCOUNTER (INPATIENT)
Facility: HOSPITAL | Age: 73
LOS: 3 days | Discharge: HOME | DRG: 191 | End: 2018-05-24
Attending: EMERGENCY MEDICINE | Admitting: FAMILY MEDICINE
Payer: MEDICARE

## 2018-05-21 DIAGNOSIS — Z72.0 TOBACCO ABUSE: ICD-10-CM

## 2018-05-21 DIAGNOSIS — J18.9 PNEUMONIA: Primary | ICD-10-CM

## 2018-05-21 DIAGNOSIS — R06.00 DYSPNEA: ICD-10-CM

## 2018-05-21 DIAGNOSIS — J44.0 CHRONIC OBSTRUCTIVE PULMONARY DISEASE WITH ACUTE LOWER RESPIRATORY INFECTION (HCC): ICD-10-CM

## 2018-05-21 DIAGNOSIS — J40 TRACHEOBRONCHITIS: ICD-10-CM

## 2018-05-21 PROBLEM — R50.9 FEVER: Status: ACTIVE | Noted: 2018-05-21

## 2018-05-21 PROBLEM — I73.9 PVD (PERIPHERAL VASCULAR DISEASE) (HCC): Status: ACTIVE | Noted: 2018-05-21

## 2018-05-21 LAB
ALBUMIN SERPL BCP-MCNC: 2.8 G/DL (ref 3.5–5)
ALP SERPL-CCNC: 110 U/L (ref 46–116)
ALT SERPL W P-5'-P-CCNC: 27 U/L (ref 12–78)
ANION GAP SERPL CALCULATED.3IONS-SCNC: 12 MMOL/L (ref 4–13)
AST SERPL W P-5'-P-CCNC: 17 U/L (ref 5–45)
BASOPHILS # BLD MANUAL: 0.06 THOUSAND/UL (ref 0–0.1)
BASOPHILS NFR MAR MANUAL: 1 % (ref 0–1)
BILIRUB SERPL-MCNC: 0.3 MG/DL (ref 0.2–1)
BUN SERPL-MCNC: 13 MG/DL (ref 5–25)
CALCIUM SERPL-MCNC: 8.8 MG/DL (ref 8.3–10.1)
CHLORIDE SERPL-SCNC: 100 MMOL/L (ref 100–108)
CO2 SERPL-SCNC: 25 MMOL/L (ref 21–32)
CREAT SERPL-MCNC: 1.19 MG/DL (ref 0.6–1.3)
EOSINOPHIL # BLD MANUAL: 0 THOUSAND/UL (ref 0–0.4)
EOSINOPHIL NFR BLD MANUAL: 0 % (ref 0–6)
ERYTHROCYTE [DISTWIDTH] IN BLOOD BY AUTOMATED COUNT: 13.9 % (ref 11.6–15.1)
GFR SERPL CREATININE-BSD FRML MDRD: 46 ML/MIN/1.73SQ M
GLUCOSE SERPL-MCNC: 139 MG/DL (ref 65–140)
HCT VFR BLD AUTO: 39.4 % (ref 34.8–46.1)
HGB BLD-MCNC: 13 G/DL (ref 11.5–15.4)
LACTATE SERPL-SCNC: 3.2 MMOL/L (ref 0.5–2)
LYMPHOCYTES # BLD AUTO: 0.68 THOUSAND/UL (ref 0.6–4.47)
LYMPHOCYTES # BLD AUTO: 11 % (ref 14–44)
MCH RBC QN AUTO: 29.2 PG (ref 26.8–34.3)
MCHC RBC AUTO-ENTMCNC: 33 G/DL (ref 31.4–37.4)
MCV RBC AUTO: 89 FL (ref 82–98)
MONOCYTES # BLD AUTO: 0.31 THOUSAND/UL (ref 0–1.22)
MONOCYTES NFR BLD: 5 % (ref 4–12)
NEUTROPHILS # BLD MANUAL: 5.14 THOUSAND/UL (ref 1.85–7.62)
NEUTS BAND NFR BLD MANUAL: 2 % (ref 0–8)
NEUTS SEG NFR BLD AUTO: 81 % (ref 43–75)
NRBC BLD AUTO-RTO: 0 /100 WBCS
PLATELET # BLD AUTO: 304 THOUSANDS/UL (ref 149–390)
PLATELET # BLD AUTO: 328 THOUSANDS/UL (ref 149–390)
PLATELET BLD QL SMEAR: ADEQUATE
PMV BLD AUTO: 9.4 FL (ref 8.9–12.7)
PMV BLD AUTO: 9.6 FL (ref 8.9–12.7)
POTASSIUM SERPL-SCNC: 3.5 MMOL/L (ref 3.5–5.3)
PROT SERPL-MCNC: 7 G/DL (ref 6.4–8.2)
RBC # BLD AUTO: 4.45 MILLION/UL (ref 3.81–5.12)
RBC MORPH BLD: NORMAL
S PYO AG THROAT QL: NEGATIVE
SODIUM SERPL-SCNC: 137 MMOL/L (ref 136–145)
TOTAL CELLS COUNTED SPEC: 100
TROPONIN I SERPL-MCNC: <0.02 NG/ML
WBC # BLD AUTO: 6.19 THOUSAND/UL (ref 4.31–10.16)

## 2018-05-21 PROCEDURE — 85049 AUTOMATED PLATELET COUNT: CPT | Performed by: FAMILY MEDICINE

## 2018-05-21 PROCEDURE — 96374 THER/PROPH/DIAG INJ IV PUSH: CPT

## 2018-05-21 PROCEDURE — 96361 HYDRATE IV INFUSION ADD-ON: CPT

## 2018-05-21 PROCEDURE — 84484 ASSAY OF TROPONIN QUANT: CPT | Performed by: EMERGENCY MEDICINE

## 2018-05-21 PROCEDURE — 87040 BLOOD CULTURE FOR BACTERIA: CPT | Performed by: EMERGENCY MEDICINE

## 2018-05-21 PROCEDURE — 80053 COMPREHEN METABOLIC PANEL: CPT | Performed by: EMERGENCY MEDICINE

## 2018-05-21 PROCEDURE — 36415 COLL VENOUS BLD VENIPUNCTURE: CPT | Performed by: EMERGENCY MEDICINE

## 2018-05-21 PROCEDURE — 87430 STREP A AG IA: CPT | Performed by: EMERGENCY MEDICINE

## 2018-05-21 PROCEDURE — 94760 N-INVAS EAR/PLS OXIMETRY 1: CPT

## 2018-05-21 PROCEDURE — 93005 ELECTROCARDIOGRAM TRACING: CPT

## 2018-05-21 PROCEDURE — 85007 BL SMEAR W/DIFF WBC COUNT: CPT | Performed by: EMERGENCY MEDICINE

## 2018-05-21 PROCEDURE — 71046 X-RAY EXAM CHEST 2 VIEWS: CPT

## 2018-05-21 PROCEDURE — 83605 ASSAY OF LACTIC ACID: CPT | Performed by: EMERGENCY MEDICINE

## 2018-05-21 PROCEDURE — 99285 EMERGENCY DEPT VISIT HI MDM: CPT

## 2018-05-21 PROCEDURE — 99223 1ST HOSP IP/OBS HIGH 75: CPT | Performed by: FAMILY MEDICINE

## 2018-05-21 PROCEDURE — 87070 CULTURE OTHR SPECIMN AEROBIC: CPT | Performed by: EMERGENCY MEDICINE

## 2018-05-21 PROCEDURE — 87631 RESP VIRUS 3-5 TARGETS: CPT | Performed by: FAMILY MEDICINE

## 2018-05-21 PROCEDURE — 94664 DEMO&/EVAL PT USE INHALER: CPT

## 2018-05-21 PROCEDURE — 94640 AIRWAY INHALATION TREATMENT: CPT

## 2018-05-21 PROCEDURE — 85027 COMPLETE CBC AUTOMATED: CPT | Performed by: EMERGENCY MEDICINE

## 2018-05-21 RX ORDER — ASPIRIN 81 MG/1
81 TABLET ORAL DAILY
Status: DISCONTINUED | OUTPATIENT
Start: 2018-05-22 | End: 2018-05-24 | Stop reason: HOSPADM

## 2018-05-21 RX ORDER — ALBUTEROL SULFATE 90 UG/1
2 AEROSOL, METERED RESPIRATORY (INHALATION) EVERY 6 HOURS PRN
Status: DISCONTINUED | OUTPATIENT
Start: 2018-05-21 | End: 2018-05-24 | Stop reason: HOSPADM

## 2018-05-21 RX ORDER — ACETAMINOPHEN 325 MG/1
650 TABLET ORAL ONCE
Status: COMPLETED | OUTPATIENT
Start: 2018-05-21 | End: 2018-05-21

## 2018-05-21 RX ORDER — OXYMETAZOLINE HYDROCHLORIDE 0.05 G/100ML
2 SPRAY NASAL ONCE
Status: COMPLETED | OUTPATIENT
Start: 2018-05-21 | End: 2018-05-21

## 2018-05-21 RX ORDER — ACETAMINOPHEN 325 MG/1
650 TABLET ORAL EVERY 6 HOURS PRN
Status: DISCONTINUED | OUTPATIENT
Start: 2018-05-21 | End: 2018-05-24 | Stop reason: HOSPADM

## 2018-05-21 RX ORDER — NICOTINE 21 MG/24HR
1 PATCH, TRANSDERMAL 24 HOURS TRANSDERMAL DAILY
Status: DISCONTINUED | OUTPATIENT
Start: 2018-05-22 | End: 2018-05-24 | Stop reason: HOSPADM

## 2018-05-21 RX ORDER — ALPRAZOLAM 0.5 MG/1
0.5 TABLET ORAL 2 TIMES DAILY PRN
Status: DISCONTINUED | OUTPATIENT
Start: 2018-05-21 | End: 2018-05-23

## 2018-05-21 RX ORDER — FUROSEMIDE 40 MG/1
40 TABLET ORAL 2 TIMES DAILY
Status: DISCONTINUED | OUTPATIENT
Start: 2018-05-21 | End: 2018-05-22

## 2018-05-21 RX ORDER — PANTOPRAZOLE SODIUM 20 MG/1
20 TABLET, DELAYED RELEASE ORAL
Status: DISCONTINUED | OUTPATIENT
Start: 2018-05-22 | End: 2018-05-24 | Stop reason: HOSPADM

## 2018-05-21 RX ORDER — ROPINIROLE 0.25 MG/1
0.5 TABLET, FILM COATED ORAL
Status: DISCONTINUED | OUTPATIENT
Start: 2018-05-21 | End: 2018-05-24 | Stop reason: HOSPADM

## 2018-05-21 RX ORDER — ONDANSETRON 2 MG/ML
4 INJECTION INTRAMUSCULAR; INTRAVENOUS EVERY 6 HOURS PRN
Status: DISCONTINUED | OUTPATIENT
Start: 2018-05-21 | End: 2018-05-24 | Stop reason: HOSPADM

## 2018-05-21 RX ORDER — ACETAMINOPHEN 325 MG/1
650 TABLET ORAL ONCE
Status: DISCONTINUED | OUTPATIENT
Start: 2018-05-21 | End: 2018-05-21

## 2018-05-21 RX ORDER — SODIUM CHLORIDE, SODIUM LACTATE, POTASSIUM CHLORIDE, CALCIUM CHLORIDE 600; 310; 30; 20 MG/100ML; MG/100ML; MG/100ML; MG/100ML
75 INJECTION, SOLUTION INTRAVENOUS CONTINUOUS
Status: DISCONTINUED | OUTPATIENT
Start: 2018-05-21 | End: 2018-05-22

## 2018-05-21 RX ORDER — ATORVASTATIN CALCIUM 40 MG/1
40 TABLET, FILM COATED ORAL
Status: DISCONTINUED | OUTPATIENT
Start: 2018-05-22 | End: 2018-05-24 | Stop reason: HOSPADM

## 2018-05-21 RX ORDER — ALBUTEROL SULFATE 2.5 MG/3ML
5 SOLUTION RESPIRATORY (INHALATION) ONCE
Status: COMPLETED | OUTPATIENT
Start: 2018-05-21 | End: 2018-05-21

## 2018-05-21 RX ADMIN — SODIUM CHLORIDE 1000 ML: 0.9 INJECTION, SOLUTION INTRAVENOUS at 18:25

## 2018-05-21 RX ADMIN — CEFTRIAXONE 1000 MG: 1 INJECTION, SOLUTION INTRAVENOUS at 18:56

## 2018-05-21 RX ADMIN — ACETAMINOPHEN 650 MG: 325 TABLET ORAL at 18:27

## 2018-05-21 RX ADMIN — OXYMETAZOLINE HYDROCHLORIDE 2 SPRAY: 5 SPRAY NASAL at 16:27

## 2018-05-21 RX ADMIN — AZITHROMYCIN MONOHYDRATE 500 MG: 500 INJECTION, POWDER, LYOPHILIZED, FOR SOLUTION INTRAVENOUS at 20:01

## 2018-05-21 RX ADMIN — ROPINIROLE 0.5 MG: 0.25 TABLET, FILM COATED ORAL at 21:07

## 2018-05-21 RX ADMIN — DEXAMETHASONE SODIUM PHOSPHATE 10 MG: 10 INJECTION, SOLUTION INTRAMUSCULAR; INTRAVENOUS at 16:28

## 2018-05-21 RX ADMIN — FLUTICASONE PROPIONATE AND SALMETEROL 2 PUFF: 50; 250 POWDER RESPIRATORY (INHALATION) at 21:04

## 2018-05-21 RX ADMIN — IPRATROPIUM BROMIDE 0.5 MG: 0.5 SOLUTION RESPIRATORY (INHALATION) at 16:29

## 2018-05-21 RX ADMIN — ALBUTEROL SULFATE 5 MG: 2.5 SOLUTION RESPIRATORY (INHALATION) at 16:29

## 2018-05-21 RX ADMIN — SODIUM CHLORIDE, SODIUM LACTATE, POTASSIUM CHLORIDE, AND CALCIUM CHLORIDE 75 ML/HR: .6; .31; .03; .02 INJECTION, SOLUTION INTRAVENOUS at 20:51

## 2018-05-21 RX ADMIN — METOPROLOL TARTRATE 12.5 MG: 25 TABLET ORAL at 21:07

## 2018-05-21 RX ADMIN — SODIUM CHLORIDE 1000 ML: 0.9 INJECTION, SOLUTION INTRAVENOUS at 20:01

## 2018-05-21 NOTE — ED PROVIDER NOTES
History  Chief Complaint   Patient presents with    Cough     states she has been having fevers as high as 101 since Friday, cough with green mucus, h/o COPD      68-year-old female presents for evaluation of cough  She states that she has been coughing on and off for the past month  She went to her pulmonologist last week, he stated that everything seemed to be allergies  She saw her doctor last week as well, the same diagnosis was made  Since then she has developed a fever and states that her coughing is worse  Her cough is productive of greenish mucus  She is feeling unwell, run down, presents mildly tachycardic and is coughing, feeling chilled  She does have a past medical history of COPD  She was treated multiple times last month for sinusitis, she was given prednisone, Levaquin, doxycycline did not seem to alleviate her sinusitis  Prior to Admission Medications   Prescriptions Last Dose Informant Patient Reported? Taking?    ALPRAZolam (XANAX) 0 5 mg tablet  Self Yes No   Sig: Take 1 tablet by mouth 2 (two) times a day as needed   BREO ELLIPTA  Self No No   Sig: USE 1 PUFF EVERY DAY   Naphazoline-Polyethyl Glycol 0 012-0 2 % SOLN  Self Yes No   Sig: Apply to eye   Omeprazole 20 MG TBEC  Self Yes No   Sig: Take 1 capsule by mouth daily   albuterol (VENTOLIN HFA) 90 mcg/act inhaler  Self Yes No   Sig: Inhale 2 puffs every 6 (six) hours as needed   aspirin ([employer] ASPIRIN EC) 81 mg EC tablet  Self Yes No   Sig: Take 1 tablet by mouth daily   atorvastatin (LIPITOR) 20 mg tablet  Self Yes No   Sig: atorvastatin 20 mg tablet   atorvastatin (LIPITOR) 40 mg tablet  Self Yes No   calcium-vitamin D (OSCAL) 250-125 MG-UNIT per tablet  Self Yes No   Sig: Take 1 tablet by mouth daily   ferrous sulfate 325 (65 Fe) mg tablet  Self Yes No   Sig: Take 325 mg by mouth daily with breakfast   fexofenadine-pseudoephedrine (ALLEGRA-D)  MG per tablet   No No   Sig: Take 1 tablet by mouth 2 (two) times a day furosemide (LASIX) 40 mg tablet  Self Yes No   Sig: Take 40 mg by mouth 2 (two) times a day   metoprolol tartrate (LOPRESSOR) 25 mg tablet  Self Yes No   Sig: Take 12 5 mg by mouth every 12 (twelve) hours   pantoprazole (PROTONIX) 40 mg tablet  Self Yes No   Sig: Take 40 mg by mouth daily   rOPINIRole (REQUIP) 0 5 mg tablet  Self Yes No   simvastatin (ZOCOR) 40 mg tablet  Self Yes No   Sig: Take 40 mg by mouth daily at bedtime   sodium chloride () 2 % hypertonic ophthalmic solution  Self Yes No   Sig: Sue 128 2 % eye drops      Facility-Administered Medications: None       Past Medical History:   Diagnosis Date    Chronic sinusitis     Last assessed: 13    [facility]     Hyperlipidemia     Restless leg syndrome        Past Surgical History:   Procedure Laterality Date    CATARACT EXTRACTION       SECTION      COLONOSCOPY      Complete  Resolved: 13    TONSILLECTOMY         Family History   Problem Relation Age of Onset    Arthritis Sister     Pancreatic cancer Sister     Heart attack Family      Acute Myocardial Infarction    Cirrhosis Family     Prostate cancer Family     Skin cancer Family     Stroke Family      Syndrome     I have reviewed and agree with the history as documented  Social History   Substance Use Topics    Smoking status: Current Every Day Smoker     Packs/day: 15 00     Types: Cigars     Start date: 1962    Smokeless tobacco: Never Used    Alcohol use No        Review of Systems   Constitutional: Positive for chills, fatigue and fever (102 at home)  HENT: Positive for congestion, postnasal drip, rhinorrhea and sore throat  Negative for sinus pain and sinus pressure  Respiratory: Positive for cough, chest tightness, shortness of breath and wheezing  Cardiovascular: Negative for chest pain, palpitations and leg swelling     Gastrointestinal: Negative for abdominal pain, constipation, diarrhea, nausea and vomiting  Genitourinary: Negative for dysuria and flank pain  Musculoskeletal: Negative for back pain and neck pain  Skin: Negative for color change and rash  Allergic/Immunologic: Negative for immunocompromised state  Neurological: Negative for dizziness, syncope, light-headedness and headaches  Physical Exam  Physical Exam   Constitutional: She is oriented to person, place, and time  She appears well-developed and well-nourished  No distress  HENT:   Head: Normocephalic and atraumatic  Mouth/Throat: Oropharynx is clear and moist    Nasal turbinates are pale consistent with chronic sinus disease  Eyes: Conjunctivae and EOM are normal  Pupils are equal, round, and reactive to light  Right eye exhibits no discharge  Left eye exhibits no discharge  No scleral icterus  Neck: Normal range of motion  Neck supple  No JVD present  Cardiovascular: Regular rhythm, normal heart sounds and intact distal pulses  Exam reveals no gallop and no friction rub  No murmur heard  Mild tachycardia   Pulmonary/Chest: She is in respiratory distress  She has wheezes  She has rales  She exhibits no tenderness  Decreased air movement  Abdominal: Soft  Bowel sounds are normal  She exhibits no distension  There is no tenderness  There is no rebound and no guarding  Musculoskeletal: Normal range of motion  She exhibits no edema, tenderness or deformity  Neurological: She is alert and oriented to person, place, and time  No cranial nerve deficit  Skin: Skin is warm and dry  No rash noted  She is not diaphoretic  No erythema  No pallor  Psychiatric: She has a normal mood and affect  Her behavior is normal    Vitals reviewed        Vital Signs  ED Triage Vitals [05/21/18 1531]   Temperature Pulse Respirations Blood Pressure SpO2   98 6 °F (37 °C) 104 18 137/62 99 %      Temp Source Heart Rate Source Patient Position - Orthostatic VS BP Location FiO2 (%)   Oral Monitor Sitting Right arm -- Pain Score       No Pain           Vitals:    05/21/18 1531 05/21/18 1804   BP: 137/62 105/51   Pulse: 104 (!) 119   Patient Position - Orthostatic VS: Sitting Lying       Visual Acuity      ED Medications  Medications   sodium chloride 0 9 % bolus 1,000 mL (1,000 mL Intravenous New Bag 5/21/18 1825)   azithromycin (ZITHROMAX) 500 mg in sodium chloride 0 9% 250mL IVPB 500 mg (not administered)   cefTRIAXone (ROCEPHIN) IVPB (premix) 1,000 mg (1,000 mg Intravenous New Bag 5/21/18 1856)   albuterol inhalation solution 5 mg (5 mg Nebulization Given 5/21/18 1629)   ipratropium (ATROVENT) 0 02 % inhalation solution 0 5 mg (0 5 mg Nebulization Given 5/21/18 1629)   dexamethasone 10 mg/mL oral liquid 10 mg 1 mL (10 mg Oral Given 5/21/18 1628)   oxymetazoline (AFRIN) 0 05 % nasal spray 2 spray (2 sprays Each Nare Given 5/21/18 1627)   acetaminophen (TYLENOL) tablet 650 mg (650 mg Oral Given 5/21/18 1827)       Diagnostic Studies  Results Reviewed     Procedure Component Value Units Date/Time    Blood culture [[id number]] Collected:  05/21/18 1847    Lab Status: In process Specimen:  Blood from Arm, Left Updated:  05/21/18 1850    Troponin I [[id number]]  (Normal) Collected:  05/21/18 1822    Lab Status:  Final result Specimen:  Blood from Arm, Right Updated:  05/21/18 1850     Troponin I <0 02 ng/mL     Narrative:         [employer] Chemistry analyzer 99% cutoff is > 0 04 ng/mL in network labs    o cTnI 99% cutoff is useful only when applied to patients in the clinical setting of myocardial ischemia  o cTnI 99% cutoff should be interpreted in the context of clinical history, ECG findings and possibly cardiac imaging to establish correct diagnosis  o cTnI 99% cutoff may be suggestive but clearly not indicative of a coronary event without the clinical setting of myocardial ischemia      Comprehensive metabolic panel [[id number]]  (Abnormal) Collected:  05/21/18 1822    Lab Status:  Final result Specimen:  Blood from Arm, Right Updated:  05/21/18 1848     Sodium 137 mmol/L      Potassium 3 5 mmol/L      Chloride 100 mmol/L      CO2 25 mmol/L      Anion Gap 12 mmol/L      BUN 13 mg/dL      Creatinine 1 19 mg/dL      Glucose 139 mg/dL      Calcium 8 8 mg/dL      AST 17 U/L      ALT 27 U/L      Alkaline Phosphatase 110 U/L      Total Protein 7 0 g/dL      Albumin 2 8 (L) g/dL      Total Bilirubin 0 30 mg/dL      eGFR 46 ml/min/1 73sq m     Narrative:         National Kidney Disease Education Program recommendations are as follows:  GFR calculation is accurate only with a steady state creatinine  Chronic Kidney disease less than 60 ml/min/1 73 sq  meters  Kidney failure less than 15 ml/min/1 73 sq  meters  Blood culture [[id number]] Collected:  05/21/18 1822    Lab Status: In process Specimen:  Blood from Arm, Right Updated:  05/21/18 1828    CBC and differential [[id number]] Collected:  05/21/18 1822    Lab Status: In process Specimen:  Blood from Arm, Right Updated:  05/21/18 1828    Lactic acid, plasma [[id number]] Collected:  05/21/18 1822    Lab Status: In process Specimen:  Blood from Arm, Right Updated:  05/21/18 1828    Rapid Beta strep screen [[id number]]  (Normal) Collected:  05/21/18 1626    Lab Status:  Final result Specimen:  Throat from Throat Updated:  05/21/18 1644     Rapid Strep A Screen Negative    Throat culture [[id number]] Collected:  05/21/18 1626    Lab Status: In process Specimen:  Throat from Throat Updated:  05/21/18 1644                 XR chest 2 views   ED Interpretation by Claudia Bell DO (05/21 1718)   Posterior pneumonia                   Procedures  ECG 12 Lead Documentation  Date/Time: 5/21/2018 6:39 PM  Performed by: Yadira Mauricio  Authorized by: Yadira Mauricio     Indications / Diagnosis:  SOB  ECG reviewed by me, the ED Provider: yes    Patient location:  ED  Previous ECG:     Previous ECG:  Unavailable    Comparison to cardiac monitor: Yes    Interpretation:     Interpretation: normal    Rate:     ECG rate:  108    ECG rate assessment: tachycardic    Rhythm:     Rhythm: sinus tachycardia    Ectopy:     Ectopy: none    QRS:     QRS axis:  Normal    QRS intervals:  Normal  Conduction:     Conduction: normal    ST segments:     ST segments:  Normal  T waves:     T waves: normal             Phone Contacts  ED Phone Contact    ED Course  ED Course as of May 21 1907   Mon May 21, 2018   1717 RAPID STREP A SCREEN: Negative                               MDM  Number of Diagnoses or Management Options  Diagnosis management comments: Cough, COPD exacerbation  Also with sinus disease, either allergies or sinusitis  Will treat with breathing treatments, allergy treatment with Afrin to clear up nasal congestion and postnasal drip  Evaluation w x-ray for pneumonia  Strep swab because of sore throat  Discussed with patient that the chest x-ray is concerning for pneumonia  Patient does not appear much improved after breathing treatment  Now amounting a fever as well  Still tachycardic  Patient will be treated for pneumonia with IV antibiotics, brought into the hospital as she has failed outpatient treatment of this element in the past month  Amount and/or Complexity of Data Reviewed  Clinical lab tests: reviewed and ordered  Tests in the radiology section of CPT®: ordered and reviewed      CritCare Time    Disposition  Final diagnoses:   Pneumonia   Dyspnea     Time reflects when diagnosis was documented in both MDM as applicable and the Disposition within this note     Time User Action Codes Description Comment    5/21/2018  7:05 PM Olaf Sampson Add [J18 9] Pneumonia     5/21/2018  7:05 PM Sher Sampson Add [R06 00] Dyspnea       ED Disposition     ED Disposition Condition Comment    Admit  Case was discussed with S[facility] Barajas [facility]) and the patient's admission status was agreed to be Admission Status: inpatient status to the service of Dr Janett Real)           Follow-up Information    None         Patient's Medications   Discharge Prescriptions    No medications on file     No discharge procedures on file      ED Provider  Electronically Signed by           Abdoulaye Guardado DO  05/21/18 5093

## 2018-05-21 NOTE — H&P
H&P- Davis Nelson 1945, 72 y o  female MRN: [id number]    Unit/Bed#: ED 13 Encounter: [id number]    Primary Care Provider: Brittnee Collier MD   Date and time admitted to hospital: 5/21/2018  3:26 PM        * Fever   Assessment & Plan    Most likely secondary to pulmonary infection  No wbc Elevation  Follow chest x-ray  -continue IV antibiotic for now  -Tylenol  -IV fluid  -follow procalcitonin        Hyperlipidemia   Assessment & Plan    Continue statin        Chronic obstructive pulmonary disease with acute lower respiratory infection (HCC)   Assessment & Plan    Continue home medication  -IV antibiotic  -p rm  Nebulizer  -incentive spirometry            VTE Prophylaxis: Enoxaparin (Lovenox)  / sequential compression device   Code Status:full code  POLST: There is no POLST form on file for this patient (pre-hospital)    Anticipated Length of Stay:  Patient will be admitted on an Inpatient basis with an anticipated length of stay of > 2 midnights  Justification for Hospital Stay: fever most likely secondary to community-acquired pneumonia on IV antibiotic  Total Time for Visit, including Counseling / Coordination of Care: 1 hour  Greater than 50% of this total time spent on direct patient counseling and coordination of care  Chief Complaint:   Fever and cough    History of Present Illness:    Davis Nelson is a 72 y o  female with significant past medical history of COPD, HTN  Patient came to the emergency department complaining of fever and cough for the last 4 days  Patient said that she was recently evaluated by PCP with a were conceded that she was having an allergy and viral  Infection  Patient stated fever is getting worse for the last 24 hours component with congested cough greenish  sputum  Review of Systems:    Review of Systems   Constitutional: Positive for chills, diaphoresis and fever  Negative for activity change and appetite change  HENT: Positive for congestion   Negative for dental problem, drooling, ear discharge, ear pain and facial swelling  Eyes: Negative for pain, discharge, redness and itching  Respiratory: Positive for cough  Negative for apnea, choking, chest tightness, shortness of breath, wheezing and stridor  Gastrointestinal: Negative for abdominal distention, abdominal pain, anal bleeding, blood in stool and constipation  Endocrine: Negative for cold intolerance and heat intolerance  Genitourinary: Negative for difficulty urinating, dyspareunia, dysuria, enuresis, flank pain and frequency  Musculoskeletal: Negative for arthralgias, back pain, gait problem, joint swelling, myalgias and neck pain  Skin: Positive for pallor  Neurological: Positive for light-headedness  Negative for dizziness and headaches  Psychiatric/Behavioral: Negative for agitation  Past Medical and Surgical History:     Past Medical History:   Diagnosis Date    Chronic sinusitis     Last assessed: 13    [facility]     Hyperlipidemia     Restless leg syndrome        Past Surgical History:   Procedure Laterality Date    CATARACT EXTRACTION       SECTION      COLONOSCOPY      Complete  Resolved: 13    TONSILLECTOMY         Meds/Allergies:    Prior to Admission medications    Medication Sig Start Date End Date Taking?  Authorizing Provider   albuterol (VENTOLIN HFA) 90 mcg/act inhaler Inhale 2 puffs every 6 (six) hours as needed 10/17/17   Historical Provider, MD   ALPRAZolam Iris Summer) 0 5 mg tablet Take 1 tablet by mouth 2 (two) times a day as needed    Historical Provider, MD   aspirin ([employer] ASPIRIN EC) 81 mg EC tablet Take 1 tablet by mouth daily 16   Historical Provider, MD   atorvastatin (LIPITOR) 20 mg tablet atorvastatin 20 mg tablet    Historical Provider, MD   atorvastatin (LIPITOR) 40 mg tablet  17   Historical Provider, MD Gunjan Montero USE 1 PUFF EVERY DAY 18   Kirk Almaguer MD calcium-vitamin D (OSCAL) 250-125 MG-UNIT per tablet Take 1 tablet by mouth daily    Historical Provider, MD   ferrous sulfate 325 (65 Fe) mg tablet Take 325 mg by mouth daily with breakfast    Historical Provider, MD   fexofenadine-pseudoephedrine (ALLEGRA-D)  MG per tablet Take 1 tablet by mouth 2 (two) times a day 5/17/18   Kami Saucedo PA-C   furosemide (LASIX) 40 mg tablet Take 40 mg by mouth 2 (two) times a day    Historical Provider, MD   metoprolol tartrate (LOPRESSOR) 25 mg tablet Take 12 5 mg by mouth every 12 (twelve) hours    Historical Provider, MD   Naphazoline-Polyethyl Glycol 0 012-0 2 % SOLN Apply to eye    Historical Provider, MD   Omeprazole 20 MG TBEC Take 1 capsule by mouth daily 3/11/16   Historical Provider, MD   pantoprazole (PROTONIX) 40 mg tablet Take 40 mg by mouth daily    Historical Provider, MD   rOPINIRole (REQUIP) 0 5 mg tablet  2/4/18   Historical Provider, MD   simvastatin (ZOCOR) 40 mg tablet Take 40 mg by mouth daily at bedtime    Historical Provider, MD   sodium chloride () 2 % hypertonic ophthalmic solution Sue 128 2 % eye drops    Historical Provider, MD     I have reviewed home medications with patient personally  Allergies: Allergies   Allergen Reactions    Augmentin  [Amoxicillin-Pot Clavulanate]     Spiriva Handihaler  [Tiotropium Bromide Monohydrate]     Tiotropium     Tylenol With Codeine #3  [Acetaminophen-Codeine]        Social History:     Marital Status:     Occupation:   Patient Pre-hospital Living Situation:   Patient Pre-hospital Level of Mobility:   Patient Pre-hospital Diet Restrictions:   Substance Use History:   History   Alcohol Use No     History   Smoking Status    Current Every Day Smoker    Packs/day: 15 00    Types: Cigars    Start date: 2/21/1962   Smokeless Tobacco    Never Used     History   Drug Use No       Family History:    non-contributory    Physical Exam:     Vitals:   Blood Pressure: 105/51 (05/21/18 1804)  Pulse: (!) 119 (05/21/18 1804)  Temperature: 98 6 °F (37 °C) (05/21/18 1531)  Temp Source: Oral (05/21/18 1531)  Respirations: 18 (05/21/18 1804)  Height: 5' (152 4 cm) (05/21/18 1531)  Weight - Scale: 55 3 kg (122 lb) (05/21/18 1531)  SpO2: 94 % (05/21/18 1804)    Physical Exam   Constitutional: She is oriented to person, place, and time  No distress  Acutely ill appearance   HENT:   Head: Normocephalic and atraumatic  Right Ear: External ear normal    Left Ear: External ear normal    Mouth/Throat: No oropharyngeal exudate  Cardiovascular: Regular rhythm, normal heart sounds and intact distal pulses  Exam reveals no gallop and no friction rub  No murmur heard  Sinus tachycardia   Pulmonary/Chest: She has no wheezes  She has no rales  She exhibits no tenderness  Decreased breath sounds bilaterally with  Right lower field crackles   Abdominal: Soft  Bowel sounds are normal  She exhibits no distension and no mass  There is no tenderness  There is no rebound and no guarding  Musculoskeletal: She exhibits no edema or tenderness  Neurological: She is alert and oriented to person, place, and time  No cranial nerve deficit  Coordination normal    Skin: Skin is warm and dry  She is not diaphoretic  Psychiatric: She has a normal mood and affect  Additional Data:     Lab Results: I have personally reviewed pertinent reports          Results from last 7 days  Lab Units 05/21/18  1822   WBC Thousand/uL 6 19   HEMOGLOBIN g/dL 13 0   HEMATOCRIT % 39 4   PLATELETS Thousands/uL 328   LYMPHO PCT % 11*   MONO PCT MAN % 5   EOSINO PCT MANUAL % 0       Results from last 7 days  Lab Units 05/21/18  1822   SODIUM mmol/L 137   POTASSIUM mmol/L 3 5   CHLORIDE mmol/L 100   CO2 mmol/L 25   BUN mg/dL 13   CREATININE mg/dL 1 19   CALCIUM mg/dL 8 8   TOTAL PROTEIN g/dL 7 0   BILIRUBIN TOTAL mg/dL 0 30   ALK PHOS U/L 110   ALT U/L 27   AST U/L 17   GLUCOSE RANDOM mg/dL 139           Imaging: I have personally reviewed pertinent reports  No results found  EKG, Pathology, and Other Studies Reviewed on Admission:   · EKG:     Allscripts / Epic Records Reviewed: Yes     ** Please Note: This note has been constructed using a voice recognition system   **

## 2018-05-21 NOTE — ASSESSMENT & PLAN NOTE
Most likely secondary to pulmonary infection  No wbc Elevation     Follow chest x-ray  -continue IV antibiotic for now  -Tylenol  -IV fluid  -follow procalcitonin

## 2018-05-21 NOTE — ED PROCEDURE NOTE
PROCEDURE  ECG 12 Lead Documentation  Date/Time: 5/21/2018 6:39 PM  Performed by: Lizette Buchanan by: Leatha Rodriguez     Indications / Diagnosis:  Pneumonia  ECG reviewed by me, the ED Provider: yes    Patient location:  ED  Previous ECG:     Previous ECG:  Unavailable    Comparison to cardiac monitor: Yes    Interpretation:     Interpretation: abnormal    Rate:     ECG rate:  108    ECG rate assessment: tachycardic    Rhythm:     Rhythm: sinus tachycardia    Ectopy:     Ectopy: none    QRS:     QRS axis:  Normal    QRS intervals:  Normal  Conduction:     Conduction: normal    ST segments:     ST segments:  Normal  T waves:     T waves: flattening      Flattening:  V2         Eduar Martinez DO  05/21/18 1919

## 2018-05-22 PROBLEM — J40 TRACHEOBRONCHITIS: Status: ACTIVE | Noted: 2018-05-22

## 2018-05-22 LAB
ANION GAP SERPL CALCULATED.3IONS-SCNC: 11 MMOL/L (ref 4–13)
ATRIAL RATE: 108 BPM
BASOPHILS # BLD MANUAL: 0 THOUSAND/UL (ref 0–0.1)
BASOPHILS NFR MAR MANUAL: 0 % (ref 0–1)
BUN SERPL-MCNC: 12 MG/DL (ref 5–25)
CALCIUM SERPL-MCNC: 8.3 MG/DL (ref 8.3–10.1)
CHLORIDE SERPL-SCNC: 104 MMOL/L (ref 100–108)
CO2 SERPL-SCNC: 23 MMOL/L (ref 21–32)
CREAT SERPL-MCNC: 0.9 MG/DL (ref 0.6–1.3)
EOSINOPHIL # BLD MANUAL: 0 THOUSAND/UL (ref 0–0.4)
EOSINOPHIL NFR BLD MANUAL: 0 % (ref 0–6)
ERYTHROCYTE [DISTWIDTH] IN BLOOD BY AUTOMATED COUNT: 14.2 % (ref 11.6–15.1)
FLUAV AG SPEC QL: NORMAL
FLUBV AG SPEC QL: NORMAL
GFR SERPL CREATININE-BSD FRML MDRD: 65 ML/MIN/1.73SQ M
GLUCOSE SERPL-MCNC: 204 MG/DL (ref 65–140)
HCT VFR BLD AUTO: 37.3 % (ref 34.8–46.1)
HGB BLD-MCNC: 12.1 G/DL (ref 11.5–15.4)
L PNEUMO1 AG UR QL IA.RAPID: NEGATIVE
LYMPHOCYTES # BLD AUTO: 0.89 THOUSAND/UL (ref 0.6–4.47)
LYMPHOCYTES # BLD AUTO: 16 % (ref 14–44)
MCH RBC QN AUTO: 29.1 PG (ref 26.8–34.3)
MCHC RBC AUTO-ENTMCNC: 32.4 G/DL (ref 31.4–37.4)
MCV RBC AUTO: 90 FL (ref 82–98)
MONOCYTES # BLD AUTO: 0.22 THOUSAND/UL (ref 0–1.22)
MONOCYTES NFR BLD: 4 % (ref 4–12)
NEUTROPHILS # BLD MANUAL: 4.46 THOUSAND/UL (ref 1.85–7.62)
NEUTS BAND NFR BLD MANUAL: 1 % (ref 0–8)
NEUTS SEG NFR BLD AUTO: 79 % (ref 43–75)
NRBC BLD AUTO-RTO: 0 /100 WBCS
P AXIS: 66 DEGREES
PLATELET # BLD AUTO: 321 THOUSANDS/UL (ref 149–390)
PLATELET BLD QL SMEAR: ADEQUATE
PMV BLD AUTO: 10.1 FL (ref 8.9–12.7)
POTASSIUM SERPL-SCNC: 4.2 MMOL/L (ref 3.5–5.3)
PR INTERVAL: 168 MS
QRS AXIS: 70 DEGREES
QRSD INTERVAL: 74 MS
QT INTERVAL: 314 MS
QTC INTERVAL: 420 MS
RBC # BLD AUTO: 4.16 MILLION/UL (ref 3.81–5.12)
RSV B RNA SPEC QL NAA+PROBE: NORMAL
S PNEUM AG UR QL: NEGATIVE
SODIUM SERPL-SCNC: 138 MMOL/L (ref 136–145)
T WAVE AXIS: 50 DEGREES
TOTAL CELLS COUNTED SPEC: 100
VENTRICULAR RATE: 108 BPM
WBC # BLD AUTO: 5.57 THOUSAND/UL (ref 4.31–10.16)

## 2018-05-22 PROCEDURE — 87449 NOS EACH ORGANISM AG IA: CPT | Performed by: FAMILY MEDICINE

## 2018-05-22 PROCEDURE — 94760 N-INVAS EAR/PLS OXIMETRY 1: CPT

## 2018-05-22 PROCEDURE — 99233 SBSQ HOSP IP/OBS HIGH 50: CPT | Performed by: INTERNAL MEDICINE

## 2018-05-22 PROCEDURE — 85027 COMPLETE CBC AUTOMATED: CPT | Performed by: FAMILY MEDICINE

## 2018-05-22 PROCEDURE — 93010 ELECTROCARDIOGRAM REPORT: CPT | Performed by: INTERNAL MEDICINE

## 2018-05-22 PROCEDURE — 85007 BL SMEAR W/DIFF WBC COUNT: CPT | Performed by: FAMILY MEDICINE

## 2018-05-22 PROCEDURE — 80048 BASIC METABOLIC PNL TOTAL CA: CPT | Performed by: FAMILY MEDICINE

## 2018-05-22 RX ORDER — AZITHROMYCIN 250 MG/1
500 TABLET, FILM COATED ORAL EVERY 24 HOURS
Status: DISCONTINUED | OUTPATIENT
Start: 2018-05-22 | End: 2018-05-23

## 2018-05-22 RX ORDER — BENZONATATE 100 MG/1
100 CAPSULE ORAL 3 TIMES DAILY PRN
Status: DISCONTINUED | OUTPATIENT
Start: 2018-05-22 | End: 2018-05-24 | Stop reason: HOSPADM

## 2018-05-22 RX ORDER — METHYLPREDNISOLONE SODIUM SUCCINATE 40 MG/ML
40 INJECTION, POWDER, LYOPHILIZED, FOR SOLUTION INTRAMUSCULAR; INTRAVENOUS EVERY 8 HOURS SCHEDULED
Status: DISCONTINUED | OUTPATIENT
Start: 2018-05-22 | End: 2018-05-23

## 2018-05-22 RX ADMIN — PANTOPRAZOLE SODIUM 20 MG: 20 TABLET, DELAYED RELEASE ORAL at 06:06

## 2018-05-22 RX ADMIN — AZITHROMYCIN 500 MG: 250 TABLET, FILM COATED ORAL at 20:25

## 2018-05-22 RX ADMIN — ASPIRIN 81 MG: 81 TABLET, COATED ORAL at 08:51

## 2018-05-22 RX ADMIN — ALPRAZOLAM 0.5 MG: 0.5 TABLET ORAL at 22:35

## 2018-05-22 RX ADMIN — METHYLPREDNISOLONE SODIUM SUCCINATE 40 MG: 40 INJECTION, POWDER, FOR SOLUTION INTRAMUSCULAR; INTRAVENOUS at 21:30

## 2018-05-22 RX ADMIN — ATORVASTATIN CALCIUM 40 MG: 40 TABLET, FILM COATED ORAL at 16:00

## 2018-05-22 RX ADMIN — BENZONATATE 100 MG: 100 CAPSULE ORAL at 13:05

## 2018-05-22 RX ADMIN — CEFTRIAXONE 1000 MG: 1 INJECTION, SOLUTION INTRAVENOUS at 20:25

## 2018-05-22 RX ADMIN — METHYLPREDNISOLONE SODIUM SUCCINATE 40 MG: 40 INJECTION, POWDER, FOR SOLUTION INTRAMUSCULAR; INTRAVENOUS at 16:00

## 2018-05-22 RX ADMIN — ROPINIROLE 0.5 MG: 0.25 TABLET, FILM COATED ORAL at 21:30

## 2018-05-22 RX ADMIN — BENZONATATE 100 MG: 100 CAPSULE ORAL at 20:00

## 2018-05-22 RX ADMIN — FLUTICASONE PROPIONATE AND SALMETEROL 2 PUFF: 50; 250 POWDER RESPIRATORY (INHALATION) at 08:51

## 2018-05-22 RX ADMIN — FLUTICASONE PROPIONATE AND SALMETEROL 2 PUFF: 50; 250 POWDER RESPIRATORY (INHALATION) at 21:33

## 2018-05-22 RX ADMIN — FUROSEMIDE 40 MG: 40 TABLET ORAL at 08:48

## 2018-05-22 RX ADMIN — METOPROLOL TARTRATE 12.5 MG: 25 TABLET ORAL at 21:30

## 2018-05-22 RX ADMIN — ALPRAZOLAM 0.5 MG: 0.5 TABLET ORAL at 16:00

## 2018-05-22 RX ADMIN — NICOTINE 1 PATCH: 14 PATCH, EXTENDED RELEASE TRANSDERMAL at 08:50

## 2018-05-22 RX ADMIN — ENOXAPARIN SODIUM 40 MG: 40 INJECTION SUBCUTANEOUS at 08:48

## 2018-05-22 NOTE — RESPIRATORY THERAPY NOTE
RT Protocol Note  Shanta Stager 72 y o  female MRN: [id number]  Unit/Bed#: -01 Encounter: [id number]    Assessment    Principal Problem:    Fever  Active Problems:    Hyperlipidemia    Chronic obstructive pulmonary disease with acute lower respiratory infection ([facility] )    PVD (peripheral vascular disease) ([facility] )      Home Pulmonary Medications:  Breo  Albuterol inhaler       Past Medical History:   Diagnosis Date    Chronic sinusitis     Last assessed: 11/11/13    COPD [facility]chronic obstructive pulmonary disease) ([facility])     Hyperlipidemia     Restless leg syndrome      Social History     Social History    Marital status:      Spouse name: N/A    Number of children: 2    Years of education: N/A     Occupational History    Retired/      Social History Main Topics    Smoking status: Current Every Day Smoker     Packs/day: 15 00     Types: Cigars     Start date: 2/21/1962    Smokeless tobacco: Never Used    Alcohol use No    Drug use: No    Sexual activity: Not Asked     Other Topics Concern    None     Social History Narrative    Living w/adult children    No advance directive on file       Subjective    Subjective Data: No SOB at this time  Objective    Physical Exam:   Assessment Type: Assess only  General Appearance: Alert, Awake  Respiratory Pattern: Normal, Spontaneous, Symmetrical  Chest Assessment: Chest expansion symmetrical, Trachea midline  Bilateral Breath Sounds: Diminished, Clear, Rhonchi  Cough: Strong, Non-productive  O2 Device: Room Air    Vitals:  Blood pressure 118/56, pulse 98, temperature 98 7 °F (37 1 °C), temperature source Oral, resp  rate 18, height 5' (1 524 m), weight 55 3 kg (122 lb), SpO2 95 %  Imaging and other studies: I have personally reviewed pertinent reports  O2 Device: Room Air     Plan    Respiratory Plan: No distress/Pulmonary history        Resp Comments: Pt here for fever  possible pneumonia  HX COPD  Breo at home  Albuterol PRN  No sob or distress

## 2018-05-22 NOTE — PROGRESS NOTES
The azithromycin has / have been converted to Oral per [facility] IV-to-PO Auto-Conversion Protocol for Adults as approved by the Pharmacy and Therapeutics Committee  The patient met all eligible criteria:  3 Age = 27years old   2) Received at least one dose of the IV form   3) Receiving at least one other scheduled oral/enteral medication   4) Tolerating an oral/enteral diet      and did not have any exclusions:   1) Critical care patient   2) Active GI bleed (IF assessing H2RAs or PPIs)   3) Continuous tube feeding (IF assessing cipro, doxycycline, levofloxacin, minocycline, rifampin, or voriconazole)   4) Receiving PO vancomycin (IF assessing metronidazole)   5) Persistent nausea and/or vomiting   6) Ileus or gastrointestinal obstruction   7) Lynn/nasogastric tube set for continuous suction   8) Specific order not to automatically convert to PO (in the order's comments or if discussed in the most recent Infectious Disease or primary team's progress notes)     The azithromycin has / have been converted to Oral per [facility] IV-to-PO Auto-Conversion Protocol for Adults as approved by the Pharmacy and Therapeutics Committee  The patient met all eligible criteria:  3 Age = 27years old   2) Received at least one dose of the IV form   3) Receiving at least one other scheduled oral/enteral medication   4) Tolerating an oral/enteral diet      and did not have any exclusions:   1) Critical care patient   2) Active GI bleed (IF assessing H2RAs or PPIs)   3) Continuous tube feeding (IF assessing cipro, doxycycline, levofloxacin, minocycline, rifampin, or voriconazole)   4) Receiving PO vancomycin (IF assessing metronidazole)   5) Persistent nausea and/or vomiting   6) Ileus or gastrointestinal obstruction   7) Lynn/nasogastric tube set for continuous suction   8) Specific order not to automatically convert to PO (in the order's comments or if discussed in the most recent Infectious Disease or primary team's progress notes)

## 2018-05-22 NOTE — PROGRESS NOTES
[facility] Internal Medicine Progress Note  Patient: Mak Hernandez 72 y o  female   MRN: [id number]  PCP: Mamta Duong MD  Unit/Bed#: -[facility] Encounter: [id number]  Date Of Visit: 18    Assessment/Plan:    Principal Problem:    Chronic obstructive pulmonary disease with acute lower respiratory infection ([facility] )  Active Problems:    Hyperlipidemia    Tobacco abuse    Fever    PVD (peripheral vascular disease) ([facility] )    Tracheobronchitis    Present on Admission:   Hyperlipidemia   Fever   Chronic obstructive pulmonary disease with acute lower respiratory infection ([facility] )   PVD (peripheral vascular disease) ([facility] )   Tracheobronchitis   Tobacco abuse      · COPD exacerbation with acute tracheobronchitis-likely causing her symptoms  Continue with current treatment  She also continues to smoke cigarettes and was strongly encouraged to quit smoking  Continue with nicotine patch  · Fever-likely secondary to tracheobronchitis  Cultures so far have been negative  · Hyperlipidemia  Continue with statin  · PVD  Secondary risk factor reduction      VTE Pharmacologic Prophylaxis:   Pharmacologic: Enoxaparin (Lovenox)  Mechanical VTE Prophylaxis in Place: Yes    Patient Centered Rounds: I have performed bedside rounds with nursing staff today  Discussions with Specialists or Other Care Team Provider:  Yes    Education and Discussions with Family / Patient:  Yes      Current Length of Stay: 1 day(s)    Current Patient Status: Inpatient   Certification Statement: The patient will continue to require additional inpatient hospital stay due to COPD exacerbation/IV antibiotics/steroids    Discharge Plan:  Home when stable    Code Status: Level 1 - Full Code      Subjective:   Feels better  Continues to have significant cough  Trying to bring up phlegm  Denies any nausea or vomiting    Denies any fever this morning    Objective:     Vitals:   Temp (24hrs), Av 5 °F (36 9 °C), Min:98 °F (36 7 °C), Max:98 8 °F (37 1 °C)    HR:  [] 83  Resp:  [18-35] 18  BP: (103-137)/(51-62) 103/62  SpO2:  [94 %-99 %] 95 %  Body mass index is 23 83 kg/m²  Input and Output Summary (last 24 hours): Intake/Output Summary (Last 24 hours) at 05/22/18 1500  Last data filed at 05/22/18 1300   Gross per 24 hour   Intake             1520 ml   Output                0 ml   Net             1520 ml           Physical Exam:     Vital signs are reviewed as above  Constitutional:  Patient in bed  Having bouts of cough and also short of breath with cough  Eyes: EOM grossly intact  Conjunctivae slightly pale  Patient has anicteric sclera  HENT: Oropharynx are moist    Neck: Neck is supple  I was not able to visualize any JVD at 90°  There is no significant lymphadenopathy  I also did not notice any significant thyromegaly  Cardiac: I did not hear any rubs or gallop  Patient appears to be in sinus rhythm  Respiratory:  Having bouts of cough  Poor air entry in general with bilateral coarse wheezing  GI: Abdomen is soft  It is grossly nontender  Bowel sounds are adequate  I was not able to appreciate any hepatosplenomegaly  Neurologic:  Patient is awake and alert  Neurological examination is grossly intact  No obvious focal neurological deficit noticed  Skin: Skin is warm and dry  Psychiatric:  Anxious  Musculoskeletal  Patient moving all extremities while in bed   Has chronic arthritic joints   Extremities: Patient has no significant cyanosis, clubbing, or lower extremity edema       Additional Data:     Labs:      Results from last 7 days  Lab Units 05/22/18  0508   WBC Thousand/uL 5 57   HEMOGLOBIN g/dL 12 1   HEMATOCRIT % 37 3   PLATELETS Thousands/uL 321   LYMPHO PCT % 16   MONO PCT MAN % 4   EOSINO PCT MANUAL % 0       Results from last 7 days  Lab Units 05/22/18  0508 05/21/18  1822   SODIUM mmol/L 138 137   POTASSIUM mmol/L 4 2 3 5   CHLORIDE mmol/L 104 100   CO2 mmol/L 23 25   BUN mg/dL 12 13   CREATININE mg/dL 0 90 1 19 CALCIUM mg/dL 8 3 8 8   TOTAL PROTEIN g/dL  --  7 0   BILIRUBIN TOTAL mg/dL  --  0 30   ALK PHOS U/L  --  110   ALT U/L  --  27   AST U/L  --  17   GLUCOSE RANDOM mg/dL 204* 139           * I Have Reviewed All Lab Data Listed Above  * Additional Pertinent Lab Tests Reviewed: All Labs Within Last 24 Hours Reviewed      Recent Cultures (last 7 days):       Results from last 7 days  Lab Units 05/22/18  0301 05/21/18 2003   INFLUENZA A PCR   --  None Detected   INFLUENZA B PCR   --  None Detected   RSV PCR   --  None Detected   LEGIONELLA URINARY ANTIGEN  Negative  --        Last 24 Hours Medication List:     Current Facility-Administered Medications:  acetaminophen 650 mg Oral Q6H PRN Eugenio Gaona MD   albuterol 2 puff Inhalation Q6H PRN Suzie Adamson MD   ALPRAZolam 0 5 mg Oral BID PRN Suzie Adamson MD   aspirin 81 mg Oral Daily Suzie Adamson MD   atorvastatin 40 mg Oral Daily With Damian Rasheed MD   azithromycin 500 mg Oral Q24H Rika Nicole MD   benzonatate 100 mg Oral TID PRN Rika Nicole MD   cefTRIAXone 1,000 mg Intravenous Q24H Suzie Adamson MD   enoxaparin 40 mg Subcutaneous Daily Suzie Adamson MD   fluticasone-salmeterol 2 puff Inhalation Q12H [facility] Suzie Adamson MD   metoprolol tartrate 12 5 mg Oral Q12H [facility] Suzie Adamson MD   nicotine 1 patch Transdermal Daily Suzie Adamson MD   ondansetron 4 mg Intravenous Q6H PRN Suzie Adamson MD   pantoprazole 20 mg Oral Early Morning Suzie Adamson MD   rOPINIRole 0 5 mg Oral HS Suzie Adamson MD        Today, Patient Was Seen By: Rika Nicole MD    ** Please Note: Dragon 360 Dictation voice to text software may have been used in the creation of this document   **

## 2018-05-22 NOTE — PHYSICIAN ADVISOR
Current patient class: Inpatient  The patient is currently on Hospital Day: 2      The patient was admitted to the hospital at [facility] on 5/21/18 for the following diagnosis:  Cough [R05]  Pneumonia [J18 9]  Dyspnea [R06 00]       There is documentation in the medical record of an expected length of stay of at least 2 midnights  The patient is therefore expected to satisfy the 2 midnight benchmark and given the 2 midnight presumption is appropriate for INPATIENT ADMISSION  Given this expectation of a satisfying stay, CMS instructs us that the patient is most often appropriate for inpatient admission under part A provided medical necessity is documented in the chart  After review of the relevant documentation, labs, vital signs and test results, the patient is appropriate for INPATIENT ADMISSION  Admission to the hospital as an inpatient is a complex decision making process which requires the practitioner to consider the patients presenting complaint, history and physical examination and all relevant testing  With this in mind, in this case, the patient was deemed appropriate for INPATIENT ADMISSION  After review of the documentation and testing available at the time of the admission I concur with this clinical determination of medical necessity  Rationale is as follows: The patient is a 72 yrs old Female who presented to the ED at 5/21/2018  3:26 PM with a chief complaint of Cough (states she has been having fevers as high as 101 since Friday, cough with green mucus, h/o COPD )     Patient admitted with a complaint of fever and a productive cough for greenish mucus  She has a medical history of COPD and HTN  Abnormal labs included an elevated lactate level of 3 2, blood culture results are pending  A CXR did not reveal acute pathology and a CT of the  Chest is ordered  She was place on IV antibiotics, nebulizer treatment and incentive spirometry    The concern for pneumonia continues and IV antibiotics will be maintained untill all lab and imaging results are completed  A two night admission status to the hospital would be considered appropriate for her  The patients vitals on arrival were ED Triage Vitals [18 1531]   Temperature Pulse Respirations Blood Pressure SpO2   98 6 °F (37 °C) 104 18 137/62 99 %      Temp Source Heart Rate Source Patient Position - Orthostatic VS BP Location FiO2 (%)   Oral Monitor Sitting Right arm --      Pain Score       No Pain           Past Medical History:   Diagnosis Date    Chronic sinusitis     Last assessed: 13    [facility]     Hyperlipidemia     Restless leg syndrome      Past Surgical History:   Procedure Laterality Date    CATARACT EXTRACTION       SECTION      COLONOSCOPY      Complete  Resolved: 13    TONSILLECTOMY             Consults have been placed to:   None    Vitals:    18 2300 18 0300 18 0700 18 0928   BP: 109/52 106/53 103/62    BP Location: Right arm Right arm Right arm    Pulse: 80 91 83    Resp:  18    Temp: 98 4 °F (36 9 °C) 98 °F (36 7 °C) 98 8 °F (37 1 °C)    TempSrc: Oral Oral Oral    SpO2: 95% 95% 95% 95%   Weight:       Height:           Most recent labs:    Recent Labs      18   1822   18   0508   WBC  6 19   --   5 57   HGB  13 0   --   12 1   HCT  39 4   --   37 3   PLT  328   < >  321   K  3 5   --   4 2   NA  137   --   138   CALCIUM  8 8   --   8 3   BUN  13   --   12   CREATININE  1 19   --   0 90   TROPONINI  <0 02   --    --    AST  17   --    --    ALT  27   --    --    ALKPHOS  110   --    --    BILITOT  0 30   --    --     < > = values in this interval not displayed         Scheduled Meds:  Current Facility-Administered Medications:  acetaminophen 650 mg Oral Q6H PRN Augie Borden MD   albuterol 2 puff Inhalation Q6H PRN Windy Gaona MD   ALPRAZolam 0 5 mg Oral BID PRN Eugenio MEHTA Jenni Martínez MD   aspirin 81 mg Oral Daily Guilherme Bearden MD   atorvastatin 40 mg Oral Daily With Norm Harman MD   azithromycin 500 mg Oral Q24H Sue Ojeda MD   benzonatate 100 mg Oral TID PRN Sue Ojeda MD   cefTRIAXone 1,000 mg Intravenous Q24H Guilherme Bearden MD   enoxaparin 40 mg Subcutaneous Daily Guilherme Bearden MD   fluticasone-salmeterol 2 puff Inhalation Q12H [facility] Guilherme Bearden MD   metoprolol tartrate 12 5 mg Oral Q12H [facility] Guilherme Bearden MD   nicotine 1 patch Transdermal Daily Guilherme Bearden MD   ondansetron 4 mg Intravenous Q6H PRN Guilherme Bearden MD   pantoprazole 20 mg Oral Early Morning Guilherme Bearden MD   rOPINIRole 0 5 mg Oral HS Guilherme Bearden MD     Continuous Infusions:   PRN Meds:   acetaminophen    albuterol    ALPRAZolam    benzonatate    ondansetron    Surgical procedures (if appropriate):

## 2018-05-22 NOTE — CASE MANAGEMENT
Initial Clinical Review    Admission: Date/Time/Statement: 5/21/18 @ 1907     Orders Placed This Encounter   Procedures    Inpatient Admission (expected length of stay for this patient is greater than two midnights)     Standing Status:   Standing     Number of Occurrences:   1     Order Specific Question:   Admitting Physician     Answer:   Clare Heard [[id number]]     Order Specific Question:   Level of Care     Answer:   Med Surg [16]     Order Specific Question:   Bed request comments     Answer:   tele     Order Specific Question:   Estimated length of stay     Answer:   More than 2 Midnights     Order Specific Question:   Certification     Answer:   I certify that inpatient services are medically necessary for this patient for a duration of greater than two midnights  See H&P and MD Progress Notes for additional information about the patient's course of treatment  ED: Date/Time/Mode of Arrival:   ED Arrival Information     Expected Arrival Acuity Means of Arrival Escorted By Service Admission Type    - 5/21/2018 15:25 Urgent Ambulance [facility] General Medicine Urgent    Arrival Complaint    fever          Chief Complaint:   Chief Complaint   Patient presents with    Cough     states she has been having fevers as high as 101 since Friday, cough with green mucus, h/o COPD        History of Illness: Everett Ely is a 72 y o  female with significant past medical history of COPD, HTN  Patient came to the emergency department complaining of fever and cough for the last 4 days  Patient said that she was recently evaluated by PCP with a were conceded that she was having an allergy and viral  Infection  Patient stated fever is getting worse for the last 24 hours component with congested cough greenish  sputum      ED Vital Signs:   ED Triage Vitals [05/21/18 1531]   Temperature Pulse Respirations Blood Pressure SpO2   98 6 °F (37 °C) 104 18 137/62 99 %      Temp Source Heart Rate Source Patient Position - Orthostatic VS BP Location FiO2 (%)   Oral Monitor Sitting Right arm --      Pain Score       No Pain        Wt Readings from Last 1 Encounters:   05/21/18 55 3 kg (122 lb)       Vital Signs (abnormal): wnl     Abnormal Labs/Diagnostic Test Results: lactic acid  3 2, alb  2 8  CXR -wnl     ED Treatment:   Medication Administration from 05/21/2018 1525 to 05/21/2018 2034       Date/Time Order Dose Route Action Action by Comments     05/21/2018 1629 albuterol inhalation solution 5 mg 5 mg Nebulization Given Rissa Martínez RN      05/21/2018 1629 ipratropium (ATROVENT) 0 02 % inhalation solution 0 5 mg 0 5 mg Nebulization Given Rissa Martínez RN      05/21/2018 1628 dexamethasone 10 mg/mL oral liquid 10 mg 1 mL 10 mg Oral Given Rissa Martínez RN      05/21/2018 1627 oxymetazoline (AFRIN) 0 05 % nasal spray 2 spray 2 spray Each Nare Given Rissa Martínez RN      05/21/2018 1827 acetaminophen (TYLENOL) tablet 650 mg 650 mg Oral Given Rissa Martínez RN      05/21/2018 2000 sodium chloride 0 9 % bolus 1,000 mL 0 mL Intravenous Stopped Norberto Degree CHUCHO Edwards      05/21/2018 1825 sodium chloride 0 9 % bolus 1,000 mL 1,000 mL Intravenous [address] Rissa Martínez RN      05/21/2018 2001 azithromycin (ZITHROMAX) 500 mg in sodium chloride 0 9% 250mL IVPB 500 mg 500 mg Intravenous New [address] CHUCHO Edwards      05/21/2018 1926 cefTRIAXone (ROCEPHIN) IVPB (premix) 1,000 mg 0 mg Intravenous Stopped Norberto Degree CHUCHO Edwards      05/21/2018 1856 cefTRIAXone (ROCEPHIN) IVPB (premix) 1,000 mg 1,000 mg Intravenous New Bag Norberto Degree CHUCHO Edwards      05/21/2018 2001 sodium chloride 0 9 % bolus 1,000 mL 1,000 mL Intravenous New Bag Kaushik Miller RN           Past Medical/Surgical History:    Active Ambulatory Problems     Diagnosis Date Noted    Anxiety 11/06/2013    [facility]ortoiliac occlusive disease [facility] ) 11/24/2015    Asymptomatic bilateral carotid artery stenosis 03/21/2017    Atherosclerosis of native artery of both lower extremities with intermittent claudication ([facility] ) 10/15/2015    Centrilobular emphysema ([facility] ) 08/04/2015    Hyperlipidemia 11/01/2013    Impaired fasting glucose 11/18/2013    Osteoporosis 11/06/2013    Restless leg syndrome 08/04/2016    Subclavian artery stenosis, left ([facility] ) 11/24/2015    Tobacco abuse 09/26/2016     Resolved Ambulatory Problems     Diagnosis Date Noted    No Resolved Ambulatory Problems     Past Medical History:   Diagnosis Date    Chronic sinusitis     [facility]     Hyperlipidemia     Restless leg syndrome        Admitting Diagnosis: Cough [R05]  Pneumonia [J18 9]  Dyspnea [R06 00]    Age/Sex: 72 y o  female    Assessment/Plan:   * Fever   Assessment & Plan     Most likely secondary to pulmonary infection  No wbc Elevation  Follow chest x-ray  -continue IV antibiotic for now  -Tylenol  -IV fluid  -follow procalcitonin          Hyperlipidemia   Assessment & Plan     Continue statin          Chronic obstructive pulmonary disease with acute lower respiratory infection ([facility] )   Assessment & Plan     Continue home medication  -IV antibiotic  -p rm  Nebulizer  -incentive spirometry                VTE Prophylaxis: Enoxaparin (Lovenox)  / sequential compression device   Code Status:full code  POLST: There is no POLST form on file for this patient (pre-hospital)     Anticipated Length of Stay:  Patient will be admitted on an Inpatient basis with an anticipated length of stay of > 2 midnights     Justification for Hospital Stay: fever most likely secondary to community-acquired pneumonia on IV antibiotic    Admission Orders:  Scheduled Meds:   Current Facility-Administered Medications:  acetaminophen 650 mg Oral Q6H PRN Cinda Ruelas MD   albuterol 2 puff Inhalation Q6H PRN Eugenio Gaona MD   ALPRAZolam 0 5 mg Oral BID PRN Cinda Ruelas MD   aspirin 81 mg Oral Daily Cinda Ruelas MD atorvastatin 40 mg Oral Daily With Anibal Lopez MD   cefTRIAXone 1,000 mg Intravenous Q24H Martine Sotelo MD   And       azithromycin 500 mg Intravenous Q24H Eugenio Gaona MD   benzonatate 100 mg Oral TID PRN David Mckenna MD   enoxaparin 40 mg Subcutaneous Daily Martine Sotelo MD   fluticasone-salmeterol 2 puff Inhalation Q12H [address] Martine Sotelo MD   metoprolol tartrate 12 5 mg Oral Q12H [address] Martine Sotelo MD   nicotine 1 patch Transdermal Daily Martine Sotelo MD   ondansetron 4 mg Intravenous Q6H PRN Martine Sotelo MD   pantoprazole 20 mg Oral Early Morning Martine Sotelo MD   rOPINIRole 0 5 mg Oral HS Eugenio Gaona MD     Continuous Infusions:    PRN Meds:   acetaminophen    albuterol    ALPRAZolam    benzonatate    ondansetron    Tele   Reg diet   SCD  elevate HOB  Up and OOB as fawad   Aspiration precautions  Droplet isolation   Oral care   Inc spirom   5/22  Cbc, bmp , strep pneumoniae , legionella antigen ua   Gluc  204

## 2018-05-23 PROBLEM — R50.9 FEVER: Status: RESOLVED | Noted: 2018-05-21 | Resolved: 2018-05-23

## 2018-05-23 LAB — BACTERIA THROAT CULT: NORMAL

## 2018-05-23 PROCEDURE — 99232 SBSQ HOSP IP/OBS MODERATE 35: CPT | Performed by: INTERNAL MEDICINE

## 2018-05-23 RX ORDER — CEFDINIR 300 MG/1
300 CAPSULE ORAL EVERY 12 HOURS SCHEDULED
Status: DISCONTINUED | OUTPATIENT
Start: 2018-05-23 | End: 2018-05-24 | Stop reason: HOSPADM

## 2018-05-23 RX ORDER — PREDNISONE 20 MG/1
40 TABLET ORAL DAILY
Status: DISCONTINUED | OUTPATIENT
Start: 2018-05-23 | End: 2018-05-24 | Stop reason: HOSPADM

## 2018-05-23 RX ORDER — ALPRAZOLAM 0.5 MG/1
0.5 TABLET ORAL 3 TIMES DAILY PRN
Status: DISCONTINUED | OUTPATIENT
Start: 2018-05-23 | End: 2018-05-24 | Stop reason: HOSPADM

## 2018-05-23 RX ORDER — PREDNISONE 20 MG/1
40 TABLET ORAL DAILY
Status: DISCONTINUED | OUTPATIENT
Start: 2018-05-24 | End: 2018-05-23

## 2018-05-23 RX ADMIN — ALPRAZOLAM 0.5 MG: 0.5 TABLET ORAL at 11:41

## 2018-05-23 RX ADMIN — ENOXAPARIN SODIUM 40 MG: 40 INJECTION SUBCUTANEOUS at 10:26

## 2018-05-23 RX ADMIN — BENZONATATE 100 MG: 100 CAPSULE ORAL at 02:27

## 2018-05-23 RX ADMIN — FLUTICASONE PROPIONATE AND SALMETEROL 2 PUFF: 50; 250 POWDER RESPIRATORY (INHALATION) at 21:18

## 2018-05-23 RX ADMIN — CEFDINIR 300 MG: 300 CAPSULE ORAL at 21:18

## 2018-05-23 RX ADMIN — ROPINIROLE 0.5 MG: 0.25 TABLET, FILM COATED ORAL at 21:21

## 2018-05-23 RX ADMIN — ATORVASTATIN CALCIUM 40 MG: 40 TABLET, FILM COATED ORAL at 16:21

## 2018-05-23 RX ADMIN — METOPROLOL TARTRATE 12.5 MG: 25 TABLET ORAL at 10:26

## 2018-05-23 RX ADMIN — CEFDINIR 300 MG: 300 CAPSULE ORAL at 10:26

## 2018-05-23 RX ADMIN — FLUTICASONE PROPIONATE AND SALMETEROL 2 PUFF: 50; 250 POWDER RESPIRATORY (INHALATION) at 10:27

## 2018-05-23 RX ADMIN — ASPIRIN 81 MG: 81 TABLET, COATED ORAL at 10:25

## 2018-05-23 RX ADMIN — PANTOPRAZOLE SODIUM 20 MG: 20 TABLET, DELAYED RELEASE ORAL at 05:27

## 2018-05-23 RX ADMIN — PREDNISONE 40 MG: 20 TABLET ORAL at 10:26

## 2018-05-23 RX ADMIN — NICOTINE 1 PATCH: 14 PATCH, EXTENDED RELEASE TRANSDERMAL at 10:28

## 2018-05-23 RX ADMIN — METOPROLOL TARTRATE 12.5 MG: 25 TABLET ORAL at 21:18

## 2018-05-23 NOTE — PLAN OF CARE
Problem: DISCHARGE PLANNING  Goal: Discharge to home or other facility with appropriate resources  INTERVENTIONS:  - Identify barriers to discharge w/patient and caregiver  - Arrange for needed discharge resources and transportation as appropriate  - Identify discharge learning needs (meds, wound care, etc )  - Arrange for interpretive services to assist at discharge as needed  - Refer to Case Management Department for coordinating discharge planning if the patient needs post-hospital services based on physician/advanced practitioner order or complex needs related to functional status, cognitive ability, or social support system   Outcome: Progressing  Spoke with patient in her room andshe states she lives with her daughter in an apartment with her daughter  She has a full flight of stairs which she uses daily but states that sometimes she has to rest half way up due to SOB  She states she is independent with ADL's and ambulation and has no DME  She states she and her daughter are always walking everywhere and are seldom home which makes it impossible for homecare for her  She purchases her meds at [facility] in [place] and can afford her medications  There is no history of mental illness nor substance abuse  She has no POA  Neither her or her daughter drive, but she says she will call a friend or Darshana Cameron to transfer home at [facility]

## 2018-05-23 NOTE — SOCIAL WORK
Spoke with patient in her room anddianae states she lives with her daughter in an apartment with her daughter  She has a full flight of stairs which she uses daily but states that sometimes she has to rest half way up due to SOB  She states she is independent with ADL's and ambulation and has no DME  She states she and her daughter are always walking everywhere and are seldom home which makes it impossible for homecare for her  She purchases her meds at [facility] in [facility] and can afford her medications  There is no history of mental illness nor substance abuse  She has no POA  Neither her or her daughter drive, but she says she will call a friend or Carmela Swan to transfer home at [facility]  CM reviewed discharge planning process including the following: identifying help at home, patient preference for discharge planning needs, pharmacy preference, and availability of treatment team to discuss questions or concerns patient and/or family may have regarding understanding medications and recognizing signs and symptoms once discharged  CM also encouraged patient to follow up with all recommended appointments after discharge  Patient advised of importance for patient and family to participate in managing patients medical well being  CM name and role reviewed and Discharge Checklist provided  Encouraged patient and caregiver to review prior to discharge

## 2018-05-23 NOTE — PROGRESS NOTES
[address] [facility] Progress Note  Patient: Mee Zepeda 72 y o  female   MRN: [id number]  PCP: Dionna Benjamin MD  Unit/Bed#: -[facility] Encounter: [id number]  Date Of Visit: 05/23/18    Assessment/Plan:    Principal Problem:    Chronic obstructive pulmonary disease with acute lower respiratory infection ([facility] )  Active Problems:    Hyperlipidemia    Tobacco abuse    PVD (peripheral vascular disease) ([facility] )    Tracheobronchitis    Present on Admission:   Hyperlipidemia   (Resolved) Fever   Chronic obstructive pulmonary disease with acute lower respiratory infection ([facility] )   PVD (peripheral vascular disease) ([place] [address] )   Tracheobronchitis   Tobacco abuse      · COPD exacerbation with acute tracheobronchitis  She sounds better  I think she had some issues with IV steroids or night and was hallucinating  Changed steroids to prednisone  Also changed antibiotics to oral   Fever is resolved  · Hallucinations-likely secondary to medications  Back to her baseline  Continue to monitor  · PVD  Encouraged to quit smoking  Continue with statin  · Anxiety disorder  While being on steroids, I thing she had increase in her anxiety symptoms and also was hallucinating as above  Increase the frequency of her benzodiazepine to be taken p r n  VTE Pharmacologic Prophylaxis:   Pharmacologic: Enoxaparin (Lovenox)  Mechanical VTE Prophylaxis in Place: Yes    Patient Centered Rounds: I have performed bedside rounds with nursing staff today  Discussions with Specialists or Other Care Team Provider:  Yes    Education and Discussions with Family / Patient:  Yes        Current Length of Stay: 2 day(s)    Current Patient Status: Inpatient   Certification Statement: The patient will continue to require additional inpatient hospital stay due to COPD exacerbation/fever/hallucinations    Discharge Plan:  Home hopefully tomorrow    Code Status: Level 1 - Full Code      Subjective:   Patient feels better this morning    She was having lot more coughing last night and then mentally she just went below stay  She was seeing things and see was shooting someone in the room and just could not keep herself straight  Denies any chest pain  Denies any nausea or vomiting  Denies any fever or chills today    Objective:     Vitals:   Temp (24hrs), Av 4 °F (36 9 °C), Min:97 8 °F (36 6 °C), Max:98 9 °F (37 2 °C)    HR:  [76-98] 76  Resp:  [18-19] 18  BP: ()/(58-59) 121/59  SpO2:  [90 %-95 %] 91 %  Body mass index is 23 83 kg/m²  Input and Output Summary (last 24 hours): Intake/Output Summary (Last 24 hours) at 18 0997  Last data filed at 18 2236   Gross per 24 hour   Intake              480 ml   Output                0 ml   Net              480 ml           Physical Exam:     Vital signs are reviewed as above  Constitutional:  Patient sitting in bed  She is definitely less short of breath and also not coughing as much    Eyes: EOM grossly intact  Conjunctivae slightly pale  Patient has anicteric sclera  HENT: Oropharynx are moist   Neck: Neck is supple  Cardiac: I did not hear any rubs or gallop  Patient appears to be in sinus rhythm  Respiratory:  Definitely less short of breath and not having as much cough as yesterday  Also has somewhat better air entry with less wheezing and coarse crackles   GI: Abdomen is soft  It is grossly nontender  Bowel sounds are adequate  I was not able to appreciate any hepatosplenomegaly  Neurologic:  Patient is awake and alert  Neurological examination is grossly intact  No obvious focal neurological deficit noticed  Skin: Skin is warm and dry  Psychiatric: Mood and affect are pleasant although was anxious at times  Musculoskeletal  Patient moving all extremities      Extremities: Patient has no significant cyanosis, clubbing, or lower extremity edema       Additional Data:     Labs:      Results from last 7 days  Lab Units 18  0508   WBC Thousand/uL 5 57 HEMOGLOBIN g/dL 12 1   HEMATOCRIT % 37 3   PLATELETS Thousands/uL 321   LYMPHO PCT % 16   MONO PCT MAN % 4   EOSINO PCT MANUAL % 0       Results from last 7 days  Lab Units 05/22/18  0508 05/21/18  1822   SODIUM mmol/L 138 137   POTASSIUM mmol/L 4 2 3 5   CHLORIDE mmol/L 104 100   CO2 mmol/L 23 25   BUN mg/dL 12 13   CREATININE mg/dL 0 90 1 19   CALCIUM mg/dL 8 3 8 8   TOTAL PROTEIN g/dL  --  7 0   BILIRUBIN TOTAL mg/dL  --  0 30   ALK PHOS U/L  --  110   ALT U/L  --  27   AST U/L  --  17   GLUCOSE RANDOM mg/dL 204* 139           * I Have Reviewed All Lab Data Listed Above  * Additional Pertinent Lab Tests Reviewed: All Labs Within Last 24 Hours Reviewed      Recent Cultures (last 7 days):       Results from last 7 days  Lab Units 05/22/18  0301 05/21/18 2003 05/21/18  1847 05/21/18  1822   BLOOD CULTURE   --   --  No Growth at 24 hrs  No Growth at 24 hrs     INFLUENZA A PCR   --  None Detected  --   --    INFLUENZA B PCR   --  None Detected  --   --    RSV PCR   --  None Detected  --   --    LEGIONELLA URINARY ANTIGEN  Negative  --   --   --        Last 24 Hours Medication List:     Current Facility-Administered Medications:  acetaminophen 650 mg Oral Q6H PRN Amber Hager MD   albuterol 2 puff Inhalation Q6H PRN Amber Hager MD   ALPRAZolam 0 5 mg Oral TID PRN Aline Powers MD   aspirin 81 mg Oral Daily Amber Hager MD   atorvastatin 40 mg Oral Daily With Ian Yan MD   benzonatate 100 mg Oral TID PRN Aline Powers MD   cefdinir 300 mg Oral Q12H [address] Aline Powers MD   enoxaparin 40 mg Subcutaneous Daily Amber Hager MD   fluticasone-salmeterol 2 puff Inhalation Q12H [address] Amber Hager MD   metoprolol tartrate 12 5 mg Oral Q12H [address] Amber Hager MD   nicotine 1 patch Transdermal Daily Amber Hager MD   ondansetron 4 mg Intravenous Q6H PRN Amber Hager MD   pantoprazole 20 mg Oral Early Morning Petra Menard MD   predniSONE 40 mg Oral Daily Ramon Ford MD   rOPINIRole 0 5 mg Oral HS Petra Menard MD        Today, Patient Was Seen By: Ramon Ford MD    ** Please Note: Dragon 360 Dictation voice to text software may have been used in the creation of this document   **

## 2018-05-24 ENCOUNTER — TRANSITIONAL CARE MANAGEMENT (OUTPATIENT)
Dept: INTERNAL MEDICINE CLINIC | Facility: CLINIC | Age: 73
End: 2018-05-24

## 2018-05-24 VITALS
HEIGHT: 60 IN | OXYGEN SATURATION: 95 % | RESPIRATION RATE: 18 BRPM | BODY MASS INDEX: 23.95 KG/M2 | DIASTOLIC BLOOD PRESSURE: 67 MMHG | WEIGHT: 122 LBS | TEMPERATURE: 98.7 F | SYSTOLIC BLOOD PRESSURE: 146 MMHG | HEART RATE: 68 BPM

## 2018-05-24 PROCEDURE — 99238 HOSP IP/OBS DSCHRG MGMT 30/<: CPT | Performed by: INTERNAL MEDICINE

## 2018-05-24 RX ORDER — BENZONATATE 100 MG/1
100 CAPSULE ORAL 3 TIMES DAILY PRN
Qty: 20 CAPSULE | Refills: 0 | Status: SHIPPED | OUTPATIENT
Start: 2018-05-24 | End: 2018-08-24 | Stop reason: CLARIF

## 2018-05-24 RX ORDER — NICOTINE 21 MG/24HR
1 PATCH, TRANSDERMAL 24 HOURS TRANSDERMAL DAILY
Qty: 10 PATCH | Refills: 0 | Status: SHIPPED | OUTPATIENT
Start: 2018-05-25 | End: 2018-06-04

## 2018-05-24 RX ORDER — NICOTINE 21 MG/24HR
1 PATCH, TRANSDERMAL 24 HOURS TRANSDERMAL DAILY
Qty: 10 PATCH | Refills: 0 | Status: SHIPPED | OUTPATIENT
Start: 2018-05-25 | End: 2018-05-24

## 2018-05-24 RX ORDER — CEFDINIR 300 MG/1
300 CAPSULE ORAL EVERY 12 HOURS SCHEDULED
Qty: 14 CAPSULE | Refills: 0 | Status: SHIPPED | OUTPATIENT
Start: 2018-05-24 | End: 2018-05-31

## 2018-05-24 RX ORDER — HYDROCODONE POLISTIREX AND CHLORPHENIRAMINE POLISTIREX 10; 8 MG/5ML; MG/5ML
5 SUSPENSION, EXTENDED RELEASE ORAL EVERY 12 HOURS PRN
Qty: 120 ML | Refills: 0 | Status: SHIPPED | OUTPATIENT
Start: 2018-05-24 | End: 2018-06-03

## 2018-05-24 RX ORDER — PREDNISONE 10 MG/1
40 TABLET ORAL DAILY
Qty: 26 TABLET | Refills: 0 | Status: SHIPPED | OUTPATIENT
Start: 2018-05-25 | End: 2018-05-27

## 2018-05-24 RX ORDER — FUROSEMIDE 40 MG/1
40 TABLET ORAL DAILY
Refills: 0
Start: 2018-05-24 | End: 2018-08-24 | Stop reason: CLARIF

## 2018-05-24 RX ORDER — HYDROCODONE POLISTIREX AND CHLORPHENIRAMINE POLISTIREX 10; 8 MG/5ML; MG/5ML
5 SUSPENSION, EXTENDED RELEASE ORAL EVERY 12 HOURS PRN
Status: DISCONTINUED | OUTPATIENT
Start: 2018-05-24 | End: 2018-05-24 | Stop reason: HOSPADM

## 2018-05-24 RX ADMIN — ASPIRIN 81 MG: 81 TABLET, COATED ORAL at 08:09

## 2018-05-24 RX ADMIN — METOPROLOL TARTRATE 12.5 MG: 25 TABLET ORAL at 08:16

## 2018-05-24 RX ADMIN — ENOXAPARIN SODIUM 40 MG: 40 INJECTION SUBCUTANEOUS at 08:09

## 2018-05-24 RX ADMIN — FLUTICASONE PROPIONATE AND SALMETEROL 2 PUFF: 50; 250 POWDER RESPIRATORY (INHALATION) at 08:10

## 2018-05-24 RX ADMIN — ALPRAZOLAM 0.5 MG: 0.5 TABLET ORAL at 10:58

## 2018-05-24 RX ADMIN — CEFDINIR 300 MG: 300 CAPSULE ORAL at 08:09

## 2018-05-24 RX ADMIN — PANTOPRAZOLE SODIUM 20 MG: 20 TABLET, DELAYED RELEASE ORAL at 05:01

## 2018-05-24 RX ADMIN — NICOTINE 1 PATCH: 14 PATCH, EXTENDED RELEASE TRANSDERMAL at 08:12

## 2018-05-24 RX ADMIN — BENZONATATE 100 MG: 100 CAPSULE ORAL at 05:01

## 2018-05-24 RX ADMIN — PREDNISONE 40 MG: 20 TABLET ORAL at 08:09

## 2018-05-24 NOTE — DISCHARGE SUMMARY
Discharge Summary - [facility]    Patient Information: Tk Heard 72 y o  female MRN: [id number]  Unit/Bed#: -01 Encounter: [id number]    Discharging Physician / Practitioner: Mamta Simms MD  PCP: Cristiano Baires MD  Admission Date: 5/21/2018  Discharge Date: 05/24/18    Reason for Admission:  Fever    Discharge Diagnoses:     Principal Problem:    Chronic obstructive pulmonary disease with acute lower respiratory infection ([facility] )  Active Problems:    Hyperlipidemia    Tobacco abuse    PVD (peripheral vascular disease) ([facility] )    Tracheobronchitis  Resolved Problems:    Fever    Present on Admission:   Hyperlipidemia   (Resolved) Fever   Chronic obstructive pulmonary disease with acute lower respiratory infection ([facility] )   PVD (peripheral vascular disease) ([place] [address] )   Tracheobronchitis   Tobacco abuse    Consultations During Hospital Stay:  · None    Procedures Performed:     · Chest x-ray unremarkable-specially not showing any pneumonia    Significant Findings:     · As above    Incidental Findings:   · None     Test Results Pending at Discharge (will require follow up): · None     Outpatient Tests Requested:  · None    Complications:  None    Hospital Course:     Tk Heard is a 72 y o  female patient who originally presented to the hospital on 5/21/2018 due to cough and fevers  She has seen a pulmonologist and also her primary care physician on outpatient basis  Diagnosed with some allergies and prescribed medications  She appeared  to have COPD exacerbation with tracheobronchitis  Feeling better since she came in  No more fevers  Cultures have been negative  She would continue tapering doses of oral steroids in addition to antibiotics for few more days  Note:  Her med rec has multiple duplicate medications  Discussed with RN    Patient resume her medications what she was taking at home including statin/PPI/diuretic-not sure why she is on a diuretic although I have cut back on the dose  Patient also had an episode of hallucinating when she was on IV steroids  Changed to prednisone and she has been feeling fine  Condition at Discharge: fair     Discharge Day Visit / Exam:     Subjective:  Had lot more coughing last night although better now  Denies any fever or chills  Denies any nausea or vomiting  She in general feels better than since she came in  Vitals: Blood Pressure: 146/67 (05/24/18 0816)  Pulse: 68 (05/24/18 0816)  Temperature: 98 7 °F (37 1 °C) (05/24/18 0816)  Temp Source: Oral (05/24/18 0816)  Respirations: 18 (05/24/18 0816)  Height: 5' (152 4 cm) (05/21/18 2109)  Weight - Scale: 55 3 kg (122 lb) (05/21/18 2109)  SpO2: 95 % (05/24/18 0816)  Exam:        Vital signs are reviewed as above  Lying in bed  Air entry is improving  S1 and S2 audible  Awake and alert  Oriented x3  Mood and affect are pleasant  She gets anxious quite easily  Abdomen is soft  Nontender  Bowel sounds are audible            Discharge instructions/Information to patient and family:   See after visit summary for information provided to patient and family  Provisions for Follow-Up Care:  See after visit summary for information related to follow-up care and any pertinent home health orders  Disposition:     Home    For Discharges to [facility] SNF:   · Not Applicable to this Patient - Not Applicable to this Patient    Planned Readmission:  None     Discharge Statement:  I spent 25+ minutes discharging the patient  This time was spent on the day of discharge  I had direct contact with the patient on the day of discharge  Greater than 50% of the total time was spent examining patient, answering all patient questions, arranging and discussing plan of care with patient as well as directly providing post-discharge instructions  Additional time then spent on discharge activities      Discharge Medications:  See after visit summary for reconciled discharge medications provided to patient and family  ** Please Note: Dragon 360 Dictation voice to text software may have been used in the creation of this document   **

## 2018-05-24 NOTE — PLAN OF CARE

## 2018-05-24 NOTE — DISCHARGE INSTRUCTIONS
Benzonatate (By mouth)   Benzonatate (hqu-FIZ-rt-rivas)  Treats cough  Brand Name(s): Dominik Parkinson   There may be other brand names for this medicine  When This Medicine Should Not Be Used: You should not use this medicine if you have had an allergic reaction to benzonatate in the past    How to Use This Medicine:   Capsule, Liquid Filled Capsule  · Your doctor will tell you how much medicine to take and how often  · Swallow the capsules whole; do not crush or chew  Chewing the capsule may numb your mouth and throat and you could choke  If a dose is missed:   · Take the missed dose as soon as possible  · If it is almost time for the next dose, wait until then to take your medicine and skip the missed dose  · You should not use two doses at the same time  How to Store and Dispose of This Medicine:   · Keep this medication in the original tightly closed, light-resistant container  · Store at room temperature, away from heat, moisture, and light  · Ask your pharmacist, doctor, or health caregiver about the best way to dispose of any outdated medicine or medicine no longer needed  · Keep all medicine away from children  Drugs and Foods to Avoid:   Ask your doctor or pharmacist before using any other medicine, including over-the-counter medicines, vitamins, and herbal products  · Avoid drinking alcohol while taking this medicine  Warnings While Using This Medicine:   · If you are pregnant or breastfeeding, talk to your doctor before taking this medicine  · Benzonatate is not recommended for children younger than 8years old    Possible Side Effects While Using This Medicine:   Call your doctor right away if you notice any of these side effects:  · Trouble breathing  · Tightness in the chest or throat  · Tremors, shakiness, seizures  · Skin rash, itching  If you notice these less serious side effects, talk with your doctor:   · Nausea, upset stomach  · Headache  · Mild dizziness  · Drowsiness  · Constipation  · Stuffy nose  If you notice other side effects that you think are caused by this medicine, tell your doctor  Call your doctor for medical advice about side effects  You may report side effects to FDA at 3-356-BUZ-7422  © 2017 [address] [employer] Information is for End User's use only and may not be sold, redistributed or otherwise used for commercial purposes  The above information is an  only  It is not intended as medical advice for individual conditions or treatments  Talk to your doctor, nurse or pharmacist before following any medical regimen to see if it is safe and effective for you  Cefdinir (By mouth)   Cefdinir (SEF-di-gentry)  Treats bacterial infections  This medicine is a cephalosporin antibiotic  Brand Name(s):   There may be other brand names for this medicine  When This Medicine Should Not Be Used: This medicine is not right for everyone  Do not use it if you had an allergic reaction to cefdinir or to any type of cephalosporin  How to Use This Medicine:   Capsule, Liquid  · Your doctor will tell you how much medicine to use  Do not use more than directed  · Shake the oral liquid well before use  · Measure the oral liquid medicine with a marked measuring spoon, oral syringe, or medicine cup  · Take all of the medicine in your prescription to clear up your infection, even if you feel better after the first few doses  · This medicine is not for long-term use  · Missed dose: Take a dose as soon as you remember  If it is almost time for your next dose, wait until then and take a regular dose  Do not take extra medicine to make up for a missed dose  · Store the medicine in a closed container at room temperature, away from heat, moisture, and direct light  Discard any unused portion of the oral liquid after 10 days    Drugs and Foods to Avoid:   Ask your doctor or pharmacist before using any other medicine, including over-the-counter medicines, vitamins, and herbal products  · Some medicines can affect how cefdinir works  Tell your doctor if you are also taking probenecid  · If you use antacids or iron supplements (including those found in multivitamins), take them at least 2 hours before or 2 hours after you take cefdinir  Warnings While Using This Medicine:   · Tell your doctor if you are pregnant or breastfeeding, have kidney disease, or had an allergic reaction to penicillin antibiotics or any other medicines  · This medicine can cause diarrhea  Call your doctor if the diarrhea becomes severe, does not stop, or is bloody  Do not take any medicine to stop diarrhea until you have talked to your doctor  Diarrhea can occur 2 months or more after you stop taking this medicine  · If you have diabetes, use Clinistix® or Valeria-Tape® for urine glucose tests while you are taking cefdinir  Cefdinir capsules and liquid may cause incorrect results on the Clinitest® urine glucose test   · Tell any doctor or dentist who treats you that you are using this medicine  This medicine may affect certain medical test results  · Call your doctor if your symptoms do not improve or if they get worse  · Keep all medicine out of the reach of children  Never share your medicine with anyone    Possible Side Effects While Using This Medicine:   Call your doctor right away if you notice any of these side effects:  · Allergic reaction: Itching or hives, swelling in your face or hands, swelling or tingling in your mouth or throat, chest tightness, trouble breathing  · Blistering, peeling, red skin rash  · Red or black stools  · Severe diarrhea or stomach pain  · Sores or white patches on your lips, mouth, or throat  If you notice these less serious side effects, talk with your doctor:   · Bloated feeling, constipation, loss of appetite, nausea, vomiting, or upset stomach  · Dizziness, drowsiness, sleepiness, or unusual weakness  · Dry mouth  · Trouble sleeping  If you notice other side effects that you think are caused by this medicine, tell your doctor  Call your doctor for medical advice about side effects  You may report side effects to FDA at 8-384-LFL-3257  © 2017 [address] [employer] Information is for End User's use only and may not be sold, redistributed or otherwise used for commercial purposes  The above information is an  only  It is not intended as medical advice for individual conditions or treatments  Talk to your doctor, nurse or pharmacist before following any medical regimen to see if it is safe and effective for you  Furosemide (By mouth)   Furosemide (exvj-KB-bt-mide)  Treats fluid retention (edema) and high blood pressure  This medicine is a diuretic (water pill)  Brand Name(s): Active-Medicated Specimen Collection Kit, Diuscreen Multi-Drug Medicated Test Kit, Lasix, RX-Specimen Collection Kit, Specimen Collection Kit   There may be other brand names for this medicine  When This Medicine Should Not Be Used: This medicine is not right for everyone  Do not use it if you had an allergic reaction to furosemide  How to Use This Medicine:   Liquid, Tablet  · Take your medicine as directed  Your dose may need to be changed several times to find what works best for you  · You may take this medicine with food if it upsets your stomach  · Oral liquid: Measure the oral liquid medicine with a marked measuring spoon, oral syringe, or medicine cup  · Tablet: Swallow the tablet whole  Do not crush, break, or chew it  · Missed dose: Take a dose as soon as you remember  If it is almost time for your next dose, wait until then and take a regular dose  Do not take extra medicine to make up for a missed dose  · Store the medicine in a closed container at room temperature, away from heat, moisture, and direct light    Drugs and Foods to Avoid:   Ask your doctor or pharmacist before using any other medicine, including over-the-counter medicines, vitamins, and herbal products  · Some medicines can affect how furosemide works  Tell your doctor if you are also using any of the following:  ¨ Cisplatin, cyclosporine, digoxin, ethacrynic acid, licorice, lithium, methotrexate, or phenytoin  ¨ Adrenocorticotropic hormone (ACTH)  ¨ Laxative  ¨ Medicine to treat an infection  ¨ NSAID pain or arthritis medicine (including aspirin, diclofenac, ibuprofen, indomethacin, naproxen)  ¨ Other blood pressure medicines  ¨ Steroid medicine (including dexamethasone, hydrocortisone, methylprednisolone, prednisolone, prednisone)  ¨ Thyroid medicine  · If you also take sucralfate, allow at least 2 hours between the time you take furosemide and the time you take sucralfate  · Alcohol, narcotic pain medicine, or sleeping pills may cause you to feel more lightheaded, dizzy, or faint when used with this medicine  Warnings While Using This Medicine:   · Tell your doctor if you are pregnant or breastfeeding, or if you have kidney disease, liver disease (including cirrhosis), diabetes, gout, low blood pressure, lupus, an enlarged prostate, trouble urinating, or an allergy to sulfa drugs  Tell your doctor if you are on a low-salt diet  · This medicine may cause the following problems:   ¨ Low levels of minerals in your blood, such as potassium and sodium  ¨ Blood sugar level changes  ¨ Hearing problems  · Make sure any doctor or dentist who treats you knows that you are using this medicine  · This medicine could lower your blood pressure too much, especially when you first use it or if you are dehydrated  Stand or sit up slowly if you feel lightheaded or dizzy  · This medicine may make your skin more sensitive to sunlight  Wear sunscreen  Do not use sunlamps or tanning beds  · Your doctor will do lab tests at regular visits to check on the effects of this medicine  Keep all appointments  · Keep all medicine out of the reach of children   Never share your medicine with anyone  Possible Side Effects While Using This Medicine:   Call your doctor right away if you notice any of these side effects:  · Allergic reaction: Itching or hives, swelling in your face or hands, swelling or tingling in your mouth or throat, chest tightness, trouble breathing  · Blistering, peeling, red skin rash  · Confusion, weakness, muscle twitching  · Dry mouth, increased thirst, muscle cramps, uneven heartbeat  · Sudden and severe stomach pain, nausea, vomiting, fever, lightheadedness  · Hearing loss, ringing in the ears  · Lightheadedness, dizziness, fainting  · Severe diarrhea  · Unusual bleeding or bruising  · Yellow skin or eyes  If you notice these less serious side effects, talk with your doctor:   · Loss of appetite, stomach cramps  If you notice other side effects that you think are caused by this medicine, tell your doctor  Call your doctor for medical advice about side effects  You may report side effects to FDA at 9-969-FDA-0064  © 2017 [address] [employer]  Information is for End User's use only and may not be sold, redistributed or otherwise used for commercial purposes  The above information is an  only  It is not intended as medical advice for individual conditions or treatments  Talk to your doctor, nurse or pharmacist before following any medical regimen to see if it is safe and effective for you  Hydrocodone/Chlorpheniramine (By mouth)   Chlorpheniramine (klor-fen-IR-a-meen), Hydrocodone (uib-zlbz-CLO-done)  Treats cough and other symptoms of a cold or allergies  This medicine contains a narcotic cough suppressant  Brand Name(s): HYDROcodone and Chlorpheniramine Pennkinetic, TussiCaps, Tussionex Pennkinetic, Vituz   There may be other brand names for this medicine  When This Medicine Should Not Be Used: This medicine is not right for everyone  Do not use it if you had an allergic reaction to hydrocodone or chlorpheniramine    How to Use This Medicine:   Long Acting Capsule, Liquid  · Your doctor will tell you how much medicine to use  Do not use more than directed  · Do not mix this medicine with liquids or other medicines  · Oral liquid: Measure the oral liquid medicine with a marked measuring spoon, oral syringe, or medicine cup  Shake the medicine well before you use it  Rinse the measuring device or dosing spoon after each use  · Swallow the extended-release capsule whole  Do not crush, break, or chew it  · This medicine should come with a Medication Guide  Ask your pharmacist for a copy if you do not have one  · Missed dose: Take a dose as soon as you remember  If it is almost time for your next dose, wait until then and take a regular dose  Do not take extra medicine to make up for a missed dose  · Store the medicine in a closed container at room temperature, away from heat, moisture, and direct light  Drugs and Foods to Avoid:   Ask your doctor or pharmacist before using any other medicine, including over-the-counter medicines, vitamins, and herbal products  · Do not use this medicine if you are using or have used an MAO inhibitor within the past 14 days  · Some medicines can affect how hydrocodone and chlorpheniramine work  Tell your doctor if you are using medicine to treat depression, anxiety, or mental health problems  · Do not drink alcohol while you are using this medicine  · Tell your doctor if you use anything else that makes you sleepy  Some examples are allergy medicine, narcotic pain medicine, and alcohol  Warnings While Using This Medicine:   · Tell your doctor if you are pregnant or breastfeeding, or if you have kidney disease, liver disease, Say disease, an enlarged prostate or trouble urinating, glaucoma, heart disease, high blood pressure, lung or breathing problems (such as asthma), stomach or bowel problems, or thyroid problems   Tell your doctor if you have a history of head injury or drug or alcohol addiction  · This medicine may cause the following problems:  ¨ Respiratory depression (severe breathing problem)  ¨ Risk of overdose, which can lead to death  · This medicine may make you dizzy or drowsy  Do not drive or do anything else that could be dangerous until you know how this medicine affects you  · This medicine can be habit-forming  Do not use more than your prescribed dose  Call your doctor if you think your medicine is not working  · This medicine may cause constipation, especially with long-term use  Ask your doctor if you should use a laxative to prevent and treat constipation  · Keep all medicine out of the reach of children  Never share your medicine with anyone  Possible Side Effects While Using This Medicine:   Call your doctor right away if you notice any of these side effects:  · Allergic reaction: Itching or hives, swelling in your face or hands, swelling or tingling in your mouth or throat, chest tightness, trouble breathing  · Blue lips, fingernails, or skin, trouble breathing  · Extreme dizziness or weakness, shallow breathing, slow heartbeat, seizures, and cold, clammy skin  · Severe constipation  If you notice these less serious side effects, talk with your doctor:   · Drowsiness  · Dry mouth or throat  · Mild constipation, nausea, or vomiting  If you notice other side effects that you think are caused by this medicine, tell your doctor  Call your doctor for medical advice about side effects  You may report side effects to FDA at 4-751-FDA-7867  © 2017 [address] [employer] Information is for End User's use only and may not be sold, redistributed or otherwise used for commercial purposes  The above information is an  only  It is not intended as medical advice for individual conditions or treatments  Talk to your doctor, nurse or pharmacist before following any medical regimen to see if it is safe and effective for you        Prednisone (By mouth)   Prednisone (PRED-ni-sone)  Treats many diseases and conditions, especially problems related to inflammation  This medicine is a corticosteroid  Brand Name(s): Contrast Allergy PreMed Pack, Aneesh, predniSONE Intensol   There may be other brand names for this medicine  When This Medicine Should Not Be Used: This medicine is not right for everyone  Do not use if you had an allergic reaction to prednisone or if you are pregnant  How to Use This Medicine:   Liquid, Tablet, Delayed Release Tablet  · Take your medicine as directed  Your dose may need to be changed several times to find what works best for you  · It is best to take this medicine with food or milk  · Swallow the delayed-release tablet whole  Do not crush, break, or chew it  · Measure the oral liquid medicine with a marked measuring spoon, oral syringe, or medicine cup  · Missed dose: Take a dose as soon as you remember  If it is almost time for your next dose, wait until then and take a regular dose  Do not take extra medicine to make up for a missed dose  · Store the medicine in a closed container at room temperature, away from heat, moisture, and direct light  Do not freeze the oral liquid  Drugs and Foods to Avoid:   Ask your doctor or pharmacist before using any other medicine, including over-the-counter medicines, vitamins, and herbal products  · Tell your doctor if you use any of the following:  ¨ Aminoglutethimide, amphotericin B, carbamazepine, cholestyramine, cyclosporine, digoxin, isoniazid, ketoconazole, phenobarbital, phenytoin, or rifampin  ¨ Blood thinner, such as warfarin  ¨ NSAID pain or arthritis medicine, such as aspirin, diclofenac, ibuprofen, naproxen, celecoxib  ¨ Diuretic (water pill)  ¨ Diabetes medicine  ¨ Macrolide antibiotic, such as azithromycin, clarithromycin, erythromycin  ¨ Estrogen, including birth control pills or hormone replacement therapy  · This medicine may interfere with vaccines   Ask your doctor before you get a flu shot or any other vaccines  Warnings While Using This Medicine:   · It is not safe to take this medicine during pregnancy  It could harm an unborn baby  Tell your doctor right away if you become pregnant  · Tell your doctor if you are breastfeeding or if you have kidney problems, heart failure, high blood pressure, a recent heart attack, diabetes, glaucoma, osteoporosis, or thyroid problems  Tell your doctor about any infection you have  Also tell your doctor if you have had mental or emotional problems (such as depression) or stomach or bowel problems (such as an ulcer or diverticulitis)  · This medicine may cause the following problems:  ¨ Mood or behavior changes  ¨ Higher blood pressure, retaining water, changes in salt or potassium levels in your body  ¨ Cataracts or glaucoma (with long-term use)  ¨ Weak bones or osteoporosis (with long-term use)  ¨ Slow growth in children (with long-term use)  ¨ Muscle problems (with high doses, especially if you have myasthenia gravis or similar nerve and muscle problems)  · Do not stop using this medicine suddenly  Your doctor will need to slowly decrease your dose before you stop it completely  · This medicine could cause you to get infections more easily  Tell your doctor right away if you are exposed to chicken pox, measles, or other serious infection  Tell your doctor if you had a serious infection in the past, such as tuberculosis or herpes  · Tell your doctor about any extra stress or anxiety in your life  Your dose might need to be changed for a short time  · Tell any doctor or dentist who treats you that you are using this medicine  This medicine may affect certain medical test results  · Keep all medicine out of the reach of children  Never share your medicine with anyone    Possible Side Effects While Using This Medicine:   Call your doctor right away if you notice any of these side effects:  · Allergic reaction: Itching or hives, swelling in your face or hands, swelling or tingling in your mouth or throat, chest tightness, trouble breathing  · Dark freckles, skin color changes, coldness, weakness, tiredness, nausea, vomiting, weight loss  · Depression, unusual thoughts, feelings, or behaviors, trouble sleeping  · Fever, chills, cough, sore throat, and body aches  · Muscle pain or weakness  · Rapid weight gain, swelling in your hands, ankles, or feet  · Severe stomach pain, nausea, vomiting, or red or black stools  · Skin changes or growths  · Trouble seeing, eye pain, headache  If you notice these less serious side effects, talk with your doctor:   · Increased appetite  · Round, puffy face  · Weight gain around your neck, upper back, breast, face, or waist  If you notice other side effects that you think are caused by this medicine, tell your doctor  Call your doctor for medical advice about side effects  You may report side effects to FDA at 9-773-FDA-6268  © 2017 [address] [employer] Information is for End User's use only and may not be sold, redistributed or otherwise used for commercial purposes  The above information is an  only  It is not intended as medical advice for individual conditions or treatments  Talk to your doctor, nurse or pharmacist before following any medical regimen to see if it is safe and effective for you

## 2018-05-25 ENCOUNTER — TRANSITIONAL CARE MANAGEMENT (OUTPATIENT)
Dept: INTERNAL MEDICINE CLINIC | Facility: CLINIC | Age: 73
End: 2018-05-25

## 2018-05-25 DIAGNOSIS — F41.1 GENERALIZED ANXIETY DISORDER: Primary | ICD-10-CM

## 2018-05-26 LAB
BACTERIA BLD CULT: NORMAL
BACTERIA BLD CULT: NORMAL

## 2018-05-29 ENCOUNTER — TELEPHONE (OUTPATIENT)
Dept: INTERNAL MEDICINE CLINIC | Facility: CLINIC | Age: 73
End: 2018-05-29

## 2018-05-29 RX ORDER — ALPRAZOLAM 0.5 MG/1
TABLET ORAL
Qty: 30 TABLET | Refills: 5 | Status: SHIPPED | OUTPATIENT
Start: 2018-05-29 | End: 2018-06-05 | Stop reason: SDUPTHER

## 2018-05-29 NOTE — TELEPHONE ENCOUNTER
Please specify directions on how to take the alprazolam, pharm  Is asking  According to allscripts she takes it BID however you only ask to dispense 30 a month    Please clarify so that I can inform the pharmacy

## 2018-05-29 NOTE — TELEPHONE ENCOUNTER
[facility] CALLED AND NEEDS THE DIRECTION ON THE ALPRAZOLAM MEDICATION    [id number] [facility]  [phone]

## 2018-06-05 ENCOUNTER — PATIENT OUTREACH (OUTPATIENT)
Dept: FAMILY MEDICINE CLINIC | Facility: CLINIC | Age: 73
End: 2018-06-05

## 2018-06-05 ENCOUNTER — OFFICE VISIT (OUTPATIENT)
Dept: INTERNAL MEDICINE CLINIC | Facility: CLINIC | Age: 73
End: 2018-06-05
Payer: MEDICARE

## 2018-06-05 VITALS
WEIGHT: 124 LBS | SYSTOLIC BLOOD PRESSURE: 130 MMHG | HEART RATE: 93 BPM | DIASTOLIC BLOOD PRESSURE: 78 MMHG | HEIGHT: 60 IN | OXYGEN SATURATION: 100 % | TEMPERATURE: 98.4 F | BODY MASS INDEX: 24.35 KG/M2

## 2018-06-05 DIAGNOSIS — F41.9 ANXIETY: Primary | ICD-10-CM

## 2018-06-05 DIAGNOSIS — F41.1 GENERALIZED ANXIETY DISORDER: ICD-10-CM

## 2018-06-05 PROCEDURE — 99495 TRANSJ CARE MGMT MOD F2F 14D: CPT | Performed by: PHYSICIAN ASSISTANT

## 2018-06-05 RX ORDER — ALPRAZOLAM 0.5 MG/1
TABLET ORAL
Qty: 30 TABLET | Refills: 0 | Status: SHIPPED | OUTPATIENT
Start: 2018-06-05 | End: 2019-02-05 | Stop reason: SDUPTHER

## 2018-06-05 NOTE — PROGRESS NOTES
Assessment/Plan:  She is doing well with her breathing  She feels very weak most likely from the prolonged high doses of steroids  She has not smoked in 2 weeks since she has been discharged  She finished the prednisone yesterday she is off of antibiotics  I reviewed all of her hospital data  No changes in her medications continue close follow-up with Pulmonary and here in a month    No problem-specific Assessment & Plan notes found for this encounter  Diagnoses and all orders for this visit:    Anxiety    Generalized anxiety disorder  -     ALPRAZolam (XANAX) 0 5 mg tablet; anxiety        Subjective:     Patient ID: Demetrio Hammond is a 72 y o  female  Hospital follow-up she was hospitalized for 3 days 2 weeks ago because of decompensated COPD  She received a lot of IV steroids which caused encephalopathy with confusion  This has resolved her breathing has improved  She is not smoking any cigarettes she has a nicotine patch  She has been feeling somewhat anxious and nervous  Her respiratory status is stable no chest pain dizziness syncope she is afraid she might be over eating        Review of Systems   Constitutional: Positive for appetite change and fatigue  Negative for activity change, chills, diaphoresis, fever and unexpected weight change  HENT: Negative for congestion, dental problem, drooling, ear discharge, ear pain, facial swelling, hearing loss, nosebleeds, postnasal drip, rhinorrhea, sinus pain, sinus pressure, sneezing, sore throat, tinnitus, trouble swallowing and voice change  Eyes: Negative for photophobia, pain, discharge, redness, itching and visual disturbance  Respiratory: Negative for apnea, cough, choking, chest tightness, shortness of breath, wheezing and stridor  Cardiovascular: Negative for chest pain, palpitations and leg swelling     Gastrointestinal: Negative for abdominal distention, abdominal pain, anal bleeding, blood in stool, constipation, diarrhea, nausea, rectal pain and vomiting  Endocrine: Negative for cold intolerance, heat intolerance, polydipsia, polyphagia and polyuria  Genitourinary: Negative for decreased urine volume, difficulty urinating, dysuria, enuresis, flank pain, frequency, genital sores, hematuria and urgency  Musculoskeletal: Negative for arthralgias, back pain, gait problem, joint swelling, myalgias, neck pain and neck stiffness  Skin: Negative for color change, pallor, rash and wound  Neurological: Negative for dizziness, tremors, seizures, syncope, facial asymmetry, speech difficulty, weakness, light-headedness, numbness and headaches  Hematological: Negative for adenopathy  Does not bruise/bleed easily  Psychiatric/Behavioral: Negative for agitation, behavioral problems, confusion, decreased concentration, dysphoric mood, hallucinations, self-injury, sleep disturbance and suicidal ideas  The patient is nervous/anxious  The patient is not hyperactive  Objective:     Physical Exam   Constitutional: She is oriented to person, place, and time  She appears well-developed  HENT:   Head: Normocephalic  Right Ear: External ear normal    Left Ear: External ear normal    Mouth/Throat: Oropharynx is clear and moist    Eyes: Conjunctivae are normal  Pupils are equal, round, and reactive to light  Neck: Neck supple  No thyromegaly present  Cardiovascular: Normal rate, regular rhythm, normal heart sounds and intact distal pulses  Exam reveals no gallop and no friction rub  No murmur heard  Pulmonary/Chest: Effort normal and breath sounds normal  No respiratory distress (Decreased breath sounds)  She has no wheezes  She has no rales  She exhibits no tenderness  Abdominal: Soft  Bowel sounds are normal  She exhibits no distension  Musculoskeletal: Normal range of motion  She exhibits no edema  Neurological: She is alert and oriented to person, place, and time  She has normal reflexes  Skin: Skin is warm and dry  Psychiatric: She has a normal mood and affect  Her behavior is normal  Judgment and thought content normal          Vitals:    06/05/18 1550   BP: 130/78   BP Location: Left arm   Pulse: 93   Temp: 98 4 °F (36 9 °C)   SpO2: 100%   Weight: 56 2 kg (124 lb)   Height: 5' (1 524 m)       Transitional Care Management Review:  Mee Zepeda is a 72 y o  female here for TCM follow up       During the TCM phone call patient stated:    Hospital care reviewed:  Records reviewed  Patient was hopsitalized at:  [facility]  Date of admission:  5/21/18  Date of discharge:  5/24/18  Diagnosis:  pneumonia  Were the patients medicaitons reviewed and updated:  (Comment: discharge meds reviewed)  Current symptoms:  Cough  Cough Severity:  Moderate  Did you obtain your prescribed medications:  Yes  Do you need help managing your perscriptions or medications:  No  Is transportation to your appointments needed:  No  I have advised the patient to call PCP with any new or worsening symptoms (please type in name along with any credentials):  Mariela Chicas LPN  Living Arrangements:  Children  Are you recieving outpatient services:  No  Are you recieving home care services:  No  Interperter language line required?:  No  Counseling:  Patient  Counseling topics:  Importance of RX compliance  Comments:  Pt will ball back next week to schedule  TCM 5/24-pneumonia             Lizette Hutton PA-C

## 2018-06-06 ENCOUNTER — TELEPHONE (OUTPATIENT)
Dept: INTERNAL MEDICINE CLINIC | Facility: CLINIC | Age: 73
End: 2018-06-06

## 2018-06-06 NOTE — TELEPHONE ENCOUNTER
Pt was here yesterday to see Sami-She was fine  She had just left our office, picked up dinner and had a 103 4 fever by the time she got home  She's hasn't been out of the house, afraid of getting sick again and she said it hit her out of no where  She had no energy and fever of 103 4, not able to eat and keeps sweating though her clothes-had to change 5 times last night  She's asking for a doctor's opinion  She's afraid of ending up in the hospital again       Call back at [phone]

## 2018-06-07 ENCOUNTER — APPOINTMENT (EMERGENCY)
Dept: CT IMAGING | Facility: HOSPITAL | Age: 73
End: 2018-06-07
Payer: MEDICARE

## 2018-06-07 ENCOUNTER — HOSPITAL ENCOUNTER (EMERGENCY)
Facility: HOSPITAL | Age: 73
Discharge: HOME HEALTH | End: 2018-06-07
Admitting: EMERGENCY MEDICINE
Payer: MEDICARE

## 2018-06-07 VITALS
TEMPERATURE: 98.4 F | SYSTOLIC BLOOD PRESSURE: 133 MMHG | RESPIRATION RATE: 18 BRPM | DIASTOLIC BLOOD PRESSURE: 69 MMHG | HEART RATE: 95 BPM | OXYGEN SATURATION: 98 %

## 2018-06-07 DIAGNOSIS — J44.1 COPD EXACERBATION (HCC): Primary | ICD-10-CM

## 2018-06-07 LAB
ALBUMIN SERPL BCP-MCNC: 2.9 G/DL (ref 3.5–5)
ALP SERPL-CCNC: 90 U/L (ref 46–116)
ALT SERPL W P-5'-P-CCNC: 62 U/L (ref 12–78)
ANION GAP SERPL CALCULATED.3IONS-SCNC: 8 MMOL/L (ref 4–13)
AST SERPL W P-5'-P-CCNC: 21 U/L (ref 5–45)
ATRIAL RATE: 86 BPM
BACTERIA UR QL AUTO: ABNORMAL /HPF
BASOPHILS # BLD AUTO: 0.04 THOUSANDS/ΜL (ref 0–0.1)
BASOPHILS NFR BLD AUTO: 0 % (ref 0–1)
BILIRUB SERPL-MCNC: 0.5 MG/DL (ref 0.2–1)
BILIRUB UR QL STRIP: NEGATIVE
BUN SERPL-MCNC: 13 MG/DL (ref 5–25)
CALCIUM SERPL-MCNC: 9.1 MG/DL (ref 8.3–10.1)
CHLORIDE SERPL-SCNC: 101 MMOL/L (ref 100–108)
CLARITY UR: CLEAR
CO2 SERPL-SCNC: 27 MMOL/L (ref 21–32)
COLOR UR: ABNORMAL
CREAT SERPL-MCNC: 0.97 MG/DL (ref 0.6–1.3)
EOSINOPHIL # BLD AUTO: 0.09 THOUSAND/ΜL (ref 0–0.61)
EOSINOPHIL NFR BLD AUTO: 1 % (ref 0–6)
ERYTHROCYTE [DISTWIDTH] IN BLOOD BY AUTOMATED COUNT: 15 % (ref 11.6–15.1)
GFR SERPL CREATININE-BSD FRML MDRD: 59 ML/MIN/1.73SQ M
GLUCOSE SERPL-MCNC: 153 MG/DL (ref 65–140)
GLUCOSE UR STRIP-MCNC: NEGATIVE MG/DL
HCT VFR BLD AUTO: 42.5 % (ref 34.8–46.1)
HGB BLD-MCNC: 13.7 G/DL (ref 11.5–15.4)
HGB UR QL STRIP.AUTO: ABNORMAL
IMM GRANULOCYTES # BLD AUTO: 0.06 THOUSAND/UL (ref 0–0.2)
IMM GRANULOCYTES NFR BLD AUTO: 1 % (ref 0–2)
KETONES UR STRIP-MCNC: NEGATIVE MG/DL
LACTATE SERPL-SCNC: 1.7 MMOL/L (ref 0.5–2)
LEUKOCYTE ESTERASE UR QL STRIP: NEGATIVE
LYMPHOCYTES # BLD AUTO: 1.08 THOUSANDS/ΜL (ref 0.6–4.47)
LYMPHOCYTES NFR BLD AUTO: 10 % (ref 14–44)
MCH RBC QN AUTO: 29.3 PG (ref 26.8–34.3)
MCHC RBC AUTO-ENTMCNC: 32.2 G/DL (ref 31.4–37.4)
MCV RBC AUTO: 91 FL (ref 82–98)
MONOCYTES # BLD AUTO: 0.77 THOUSAND/ΜL (ref 0.17–1.22)
MONOCYTES NFR BLD AUTO: 7 % (ref 4–12)
NEUTROPHILS # BLD AUTO: 9.3 THOUSANDS/ΜL (ref 1.85–7.62)
NEUTS SEG NFR BLD AUTO: 81 % (ref 43–75)
NITRITE UR QL STRIP: NEGATIVE
NON-SQ EPI CELLS URNS QL MICRO: ABNORMAL /HPF
NRBC BLD AUTO-RTO: 0 /100 WBCS
P AXIS: 60 DEGREES
PH UR STRIP.AUTO: 6 [PH] (ref 4.5–8)
PLATELET # BLD AUTO: 365 THOUSANDS/UL (ref 149–390)
PMV BLD AUTO: 9.8 FL (ref 8.9–12.7)
POTASSIUM SERPL-SCNC: 3.7 MMOL/L (ref 3.5–5.3)
PR INTERVAL: 146 MS
PROT SERPL-MCNC: 7.4 G/DL (ref 6.4–8.2)
PROT UR STRIP-MCNC: NEGATIVE MG/DL
QRS AXIS: 66 DEGREES
QRSD INTERVAL: 72 MS
QT INTERVAL: 338 MS
QTC INTERVAL: 404 MS
RBC # BLD AUTO: 4.68 MILLION/UL (ref 3.81–5.12)
RBC #/AREA URNS AUTO: ABNORMAL /HPF
SODIUM SERPL-SCNC: 136 MMOL/L (ref 136–145)
SP GR UR STRIP.AUTO: 1.01 (ref 1–1.03)
T WAVE AXIS: 63 DEGREES
UROBILINOGEN UR QL STRIP.AUTO: 0.2 E.U./DL
VENTRICULAR RATE: 86 BPM
WBC # BLD AUTO: 11.34 THOUSAND/UL (ref 4.31–10.16)
WBC #/AREA URNS AUTO: ABNORMAL /HPF

## 2018-06-07 PROCEDURE — 80053 COMPREHEN METABOLIC PANEL: CPT | Performed by: NURSE PRACTITIONER

## 2018-06-07 PROCEDURE — 83605 ASSAY OF LACTIC ACID: CPT | Performed by: NURSE PRACTITIONER

## 2018-06-07 PROCEDURE — 36415 COLL VENOUS BLD VENIPUNCTURE: CPT | Performed by: NURSE PRACTITIONER

## 2018-06-07 PROCEDURE — 99284 EMERGENCY DEPT VISIT MOD MDM: CPT

## 2018-06-07 PROCEDURE — 71250 CT THORAX DX C-: CPT

## 2018-06-07 PROCEDURE — 93010 ELECTROCARDIOGRAM REPORT: CPT | Performed by: INTERNAL MEDICINE

## 2018-06-07 PROCEDURE — 81001 URINALYSIS AUTO W/SCOPE: CPT | Performed by: NURSE PRACTITIONER

## 2018-06-07 PROCEDURE — 85025 COMPLETE CBC W/AUTO DIFF WBC: CPT | Performed by: NURSE PRACTITIONER

## 2018-06-07 PROCEDURE — 96360 HYDRATION IV INFUSION INIT: CPT

## 2018-06-07 PROCEDURE — 93005 ELECTROCARDIOGRAM TRACING: CPT

## 2018-06-07 RX ORDER — ALBUTEROL SULFATE 2.5 MG/3ML
2.5 SOLUTION RESPIRATORY (INHALATION) EVERY 6 HOURS PRN
Qty: 75 ML | Refills: 0 | Status: SHIPPED | OUTPATIENT
Start: 2018-06-07 | End: 2018-06-14

## 2018-06-07 RX ORDER — METHYLPREDNISOLONE 4 MG/1
TABLET ORAL
Qty: 21 TABLET | Refills: 0 | Status: SHIPPED | OUTPATIENT
Start: 2018-06-07 | End: 2018-07-05

## 2018-06-07 RX ORDER — DOXYCYCLINE HYCLATE 100 MG/1
100 CAPSULE ORAL 2 TIMES DAILY
Qty: 20 CAPSULE | Refills: 0 | Status: SHIPPED | OUTPATIENT
Start: 2018-06-07 | End: 2018-06-17

## 2018-06-07 RX ADMIN — SODIUM CHLORIDE 1000 ML: 0.9 INJECTION, SOLUTION INTRAVENOUS at 10:49

## 2018-06-07 NOTE — ED PROVIDER NOTES
History  Chief Complaint   Patient presents with    Fever - 9 weeks to 74 years     Pt c/o fever (104) and chills since Tuesday  Patient was recently admitted for pna x 2 weeks ago  Denies SOB  Last tylenol was 50      60-year-old female presenting here today with a chief complaint of intermittent fevers  She reports that she had been admitted about 2 weeks ago and since her discharge she has never felt better  She was treated for bronchitis/pneumonia/COPD exacerbation  She reports that she has had increased cough chills sweats  Will evaluate her for recurrence airway disease as well as the possibility of other sources of fever such as urinary tract infection from cough reflux            Prior to Admission Medications   Prescriptions Last Dose Informant Patient Reported? Taking?    ALPRAZolam (XANAX) 0 5 mg tablet   No No   Sig: anxiety   BREO ELLIPTA  Self No No   Sig: USE 1 PUFF EVERY DAY   Naphazoline-Polyethyl Glycol 0 012-0 2 % SOLN  Self Yes No   Sig: Apply to eye   albuterol (VENTOLIN HFA) 90 mcg/act inhaler  Self Yes No   Sig: Inhale 2 puffs every 6 (six) hours as needed   aspirin ([employer] ASPIRIN EC) 81 mg EC tablet  Self Yes No   Sig: Take 1 tablet by mouth daily   atorvastatin (LIPITOR) 40 mg tablet  Self Yes No   benzonatate (TESSALON PERLES) 100 mg capsule   No No   Sig: Take 1 capsule (100 mg total) by mouth 3 (three) times a day as needed for cough   calcium-vitamin D (OSCAL) 250-125 MG-UNIT per tablet  Self Yes No   Sig: Take 1 tablet by mouth daily   ferrous sulfate 325 (65 Fe) mg tablet  Self Yes No   Sig: Take 325 mg by mouth daily with breakfast   furosemide (LASIX) 40 mg tablet   No No   Sig: Take 1 tablet (40 mg total) by mouth daily   metoprolol tartrate (LOPRESSOR) 25 mg tablet  Self Yes No   Sig: Take 12 5 mg by mouth every 12 (twelve) hours   pantoprazole (PROTONIX) 40 mg tablet  Self Yes No   Sig: Take 40 mg by mouth daily   rOPINIRole (REQUIP) 0 5 mg tablet  Self Yes No   sodium chloride () 2 % hypertonic ophthalmic solution  Self Yes No   Sig: Sue 128 2 % eye drops      Facility-Administered Medications: None       Past Medical History:   Diagnosis Date    Chronic sinusitis     Last assessed: 13    COPD (chronic obstructive pulmonary disease) (HCC)     Hyperlipidemia     PNA (pneumonia)     Restless leg syndrome        Past Surgical History:   Procedure Laterality Date    CATARACT EXTRACTION       SECTION      COLONOSCOPY      Complete  Resolved: 13    TONSILLECTOMY         Family History   Problem Relation Age of Onset    Arthritis Sister     Pancreatic cancer Sister     Heart attack Family      Acute Myocardial Infarction    Cirrhosis Family     Prostate cancer Family     Skin cancer Family     Stroke Family      Syndrome     I have reviewed and agree with the history as documented  Social History   Substance Use Topics    Smoking status: Former Smoker     Packs/day: 15 00     Types: Cigarettes     Start date: 1962     Quit date: 2018    Smokeless tobacco: Never Used    Alcohol use No        Review of Systems   Constitutional: Negative for activity change, fatigue and fever  HENT: Negative for congestion, ear pain, rhinorrhea and sore throat  Eyes: Negative  Respiratory: Positive for cough and wheezing  Negative for shortness of breath  Gastrointestinal: Negative for abdominal pain, diarrhea, nausea and vomiting  Endocrine: Negative  Genitourinary: Negative for difficulty urinating, dyspareunia, dysuria, flank pain, frequency, menstrual problem, pelvic pain, urgency, vaginal bleeding, vaginal discharge and vaginal pain  Musculoskeletal: Negative for arthralgias and myalgias  Skin: Negative for color change and pallor  Neurological: Negative for dizziness, speech difficulty, weakness and headaches  Hematological: Negative for adenopathy  Psychiatric/Behavioral: Negative for confusion         Physical Exam  Physical Exam   Constitutional: She is oriented to person, place, and time  She appears well-developed and well-nourished  She is cooperative  Non-toxic appearance  She does not have a sickly appearance  She does not appear ill  No distress  HENT:   Head: Normocephalic and atraumatic  Right Ear: Tympanic membrane and external ear normal    Left Ear: Tympanic membrane and external ear normal    Nose: No rhinorrhea, sinus tenderness or nasal deformity  No epistaxis  Right sinus exhibits no maxillary sinus tenderness and no frontal sinus tenderness  Left sinus exhibits no maxillary sinus tenderness and no frontal sinus tenderness  Mouth/Throat: Oropharynx is clear and moist and mucous membranes are normal  Normal dentition  Eyes: EOM are normal  Pupils are equal, round, and reactive to light  Neck: Normal range of motion  Neck supple  Cardiovascular: Normal rate, regular rhythm and normal heart sounds  No murmur heard  Pulmonary/Chest: Effort normal  No accessory muscle usage  No respiratory distress  She has wheezes  She has no rales  She exhibits no tenderness  Abdominal: Soft  She exhibits no distension  There is no guarding  Musculoskeletal: Normal range of motion  She exhibits no edema or tenderness  Lymphadenopathy:     She has no cervical adenopathy  Neurological: She is alert and oriented to person, place, and time  She exhibits normal muscle tone  Skin: Skin is warm and dry  No rash noted  No erythema  Psychiatric: She has a normal mood and affect  Nursing note and vitals reviewed        Vital Signs  ED Triage Vitals [06/07/18 0942]   Temperature Pulse Respirations Blood Pressure SpO2   98 4 °F (36 9 °C) 100 18 147/72 99 %      Temp Source Heart Rate Source Patient Position - Orthostatic VS BP Location FiO2 (%)   Oral Monitor Lying Left arm --      Pain Score       --           Vitals:    06/07/18 0942 06/07/18 1230   BP: 147/72 133/69   Pulse: 100 95   Patient Position - Orthostatic VS: Lying Lying       Visual Acuity      ED Medications  Medications   sodium chloride 0 9 % bolus 1,000 mL (0 mL Intravenous Stopped 6/7/18 1149)       Diagnostic Studies  Results Reviewed     Procedure Component Value Units Date/Time    Urine Microscopic [[id number]]  (Abnormal) Collected:  06/07/18 1305    Lab Status:  Final result Specimen:  Urine from Urine, Clean Catch Updated:  06/07/18 1326     RBC, UA 1-2 (A) /hpf      WBC, UA 0-1 (A) /hpf      Epithelial Cells Occasional /hpf      Bacteria, UA None Seen /hpf     UA w Reflex to Microscopic w Reflex to Culture [[id number]]  (Abnormal) Collected:  06/07/18 1305    Lab Status:  Final result Specimen:  Urine from Urine, Clean Catch Updated:  06/07/18 1311     Color, UA Light Yellow     Clarity, UA Clear     Specific Gravity, UA 1 010     pH, UA 6 0     Leukocytes, UA Negative     Nitrite, UA Negative     Protein, UA Negative mg/dl      Glucose, UA Negative mg/dl      Ketones, UA Negative mg/dl      Urobilinogen, UA 0 2 E U /dl      Bilirubin, UA Negative     Blood, UA Trace-Intact (A)    Lactic acid, plasma [[id number]]  (Normal) Collected:  06/07/18 1038    Lab Status:  Final result Specimen:  Blood from Arm, Right Updated:  06/07/18 1110     LACTIC ACID 1 7 mmol/L     Narrative:         Result may be elevated if tourniquet was used during collection      Comprehensive metabolic panel [[id number]]  (Abnormal) Collected:  06/07/18 1038    Lab Status:  Final result Specimen:  Blood from Arm, Right Updated:  06/07/18 1105     Sodium 136 mmol/L      Potassium 3 7 mmol/L      Chloride 101 mmol/L      CO2 27 mmol/L      Anion Gap 8 mmol/L      BUN 13 mg/dL      Creatinine 0 97 mg/dL      Glucose 153 (H) mg/dL      Calcium 9 1 mg/dL      AST 21 U/L      ALT 62 U/L      Alkaline Phosphatase 90 U/L      Total Protein 7 4 g/dL      Albumin 2 9 (L) g/dL      Total Bilirubin 0 50 mg/dL      eGFR 59 ml/min/1 73sq m     Narrative:         National Kidney Disease Education Program recommendations are as follows:  GFR calculation is accurate only with a steady state creatinine  Chronic Kidney disease less than 60 ml/min/1 73 sq  meters  Kidney failure less than 15 ml/min/1 73 sq  meters  CBC and differential [[id number]]  (Abnormal) Collected:  06/07/18 1038    Lab Status:  Final result Specimen:  Blood from Arm, Right Updated:  06/07/18 1049     WBC 11 34 (H) Thousand/uL      RBC 4 68 Million/uL      Hemoglobin 13 7 g/dL      Hematocrit 42 5 %      MCV 91 fL      MCH 29 3 pg      MCHC 32 2 g/dL      RDW 15 0 %      MPV 9 8 fL      Platelets 603 Thousands/uL      nRBC 0 /100 WBCs      Neutrophils Relative 81 (H) %      Immat GRANS % 1 %      Lymphocytes Relative 10 (L) %      Monocytes Relative 7 %      Eosinophils Relative 1 %      Basophils Relative 0 %      Neutrophils Absolute 9 30 (H) Thousands/µL      Immature Grans Absolute 0 06 Thousand/uL      Lymphocytes Absolute 1 08 Thousands/µL      Monocytes Absolute 0 77 Thousand/µL      Eosinophils Absolute 0 09 Thousand/µL      Basophils Absolute 0 04 Thousands/µL                  CT chest without contrast   Final Result by Santino Deal MD (06/07 1140)      Minimal new scattered areas of subpleural reticular interstitial disease  Differential includes interstitial scarring, postinflammatory disease, or peripheral infectious bronchiolitis  No airspace consolidation  Trace new bilateral pleural effusions  COPD  Workstation performed: [id number]                    Procedures  Procedures       Phone Contacts  ED Phone Contact    ED Course                               MDM  Number of Diagnoses or Management Options  COPD exacerbation [facility]): new and requires workup  Diagnosis management comments: Patient offered rehabilitation services but declined because of distance to [place]  She did okay visiting nurses  Will treat her for COPD exacerbation again         Amount and/or Complexity of Data Reviewed  Clinical lab tests: ordered and reviewed  Tests in the radiology section of CPT®: reviewed and ordered  Tests in the medicine section of CPT®: reviewed and ordered  Independent visualization of images, tracings, or specimens: yes    Patient Progress  Patient progress: stable    CritCare Time    Disposition  Final diagnoses:   COPD exacerbation ([address] )     Time reflects when diagnosis was documented in both MDM as applicable and the Disposition within this note     Time User Action Codes Description Comment    6/7/2018  2:15 PM Isela Claudio Add [J44 1] COPD exacerbation [facility])       ED Disposition     ED Disposition Condition Comment    Discharge  Everett Ely discharge to home/self care      Condition at discharge: Good        Follow-up Information     Follow up With Specialties Details Why [address] [facility]  Follow up  [facility] [address]  Follow up [address]    Karoline Contreras MD Internal Medicine Schedule an appointment as soon as possible for a visit For Continued Evaluation [address] [address]  [phone]            Discharge Medication List as of 6/7/2018  2:18 PM      START taking these medications    Details   albuterol (2 5 mg/3 mL) 0 083 % nebulizer solution Take 1 vial (2 5 mg total) by nebulization every 6 (six) hours as needed for wheezing or shortness of breath for up to 7 days, Starting Thu 6/7/2018, Until Thu 6/14/2018, Print      doxycycline hyclate (VIBRAMYCIN) 100 mg capsule Take 1 capsule (100 mg total) by mouth 2 (two) times a day for 10 days, Starting Thu 6/7/2018, Until Sun 6/17/2018, Print      Methylprednisolone (MEDROL) 4 MG TBPK Use as directed on package, Print         CONTINUE these medications which have NOT CHANGED    Details   albuterol (VENTOLIN HFA) 90 mcg/act inhaler Inhale 2 puffs every 6 (six) hours as needed, Starting Tue 10/17/2017, Historical Med      ALPRAZolam (XANAX) 0 5 mg tablet anxiety, Normal      aspirin ([employer] ASPIRIN EC) 81 mg EC tablet Take 1 tablet by mouth daily, Starting Thu 8/4/2016, Historical Med      atorvastatin (LIPITOR) 40 mg tablet Starting Tue 12/26/2017, Historical Med      benzonatate (TESSALON PERLES) 100 mg capsule Take 1 capsule (100 mg total) by mouth 3 (three) times a day as needed for cough, Starting Thu 5/24/2018, Print      BREO ELLIPTA USE 1 PUFF EVERY DAY, Normal      calcium-vitamin D (OSCAL) 250-125 MG-UNIT per tablet Take 1 tablet by mouth daily, Historical Med      ferrous sulfate 325 (65 Fe) mg tablet Take 325 mg by mouth daily with breakfast, Historical Med      furosemide (LASIX) 40 mg tablet Take 1 tablet (40 mg total) by mouth daily, Starting Thu 5/24/2018, No Print      metoprolol tartrate (LOPRESSOR) 25 mg tablet Take 12 5 mg by mouth every 12 (twelve) hours, Historical Med      Naphazoline-Polyethyl Glycol 0 012-0 2 % SOLN Apply to eye, Historical Med      pantoprazole (PROTONIX) 40 mg tablet Take 40 mg by mouth daily, Historical Med      rOPINIRole (REQUIP) 0 5 mg tablet Starting Sun 2/4/2018, Historical Med      sodium chloride () 2 % hypertonic ophthalmic solution Sue 128 2 % eye drops, Historical Med             Outpatient Discharge Orders  Nebulizer     Ambulatory Referral to Home Health   Standing Status: Future  Standing Exp   Date: 12/07/18         ED Provider  Electronically Signed by           ABIGAIL Bashir  06/07/18 4167

## 2018-06-07 NOTE — SOCIAL WORK
CM followed up with pt and family at bedside  Pt reports she is not interested in SNF at this time and decided she is open to [facility]  Pt agreeable to [facility]  Pt reports she saw PCP since previous admission and they advised her to return to ED  CM confirmed PCP and offered to setup OP CM and pt was agreeable  CM offered information for Sylvie Agrawal should pt decide to pursue pulmonary rehab after returning home  Pt is agreeable and reports if she goes home and things do not improve in next couple days she will reach out to [facility] lm to make OP CM referral  CM sent [facility] referral  CM updated AVS  CM updated nurse   CM attempted to update ED Attending in person but he was unavailable, CM updated via TT

## 2018-06-07 NOTE — SOCIAL WORK
CM consulted as pt to discuss SNF as pt had recent IP Stay 5/21-5/24  CM met with pt and family at bedside  Pt explained she has hx of smoking and recently quit which she feels has exacerbated her symptoms  Pt reported, "I should've kept smoking" and CM stressed importance of smoking cessation  CM discussed STR and pt reports she was open to it when talking to ED Attending but at this time is unsure  CM discussed importance of STR for pulmonary and pt agrees it would be beneficial but is unsure if she wants to go  CM offered HHC and pt declined  CM offered pt and family time to discuss and pt agreed  CM updated ED Attending and nurse  CM will follow up with pt and family

## 2018-06-07 NOTE — DISCHARGE INSTRUCTIONS
COPD (Chronic Obstructive Pulmonary Disease)   WHAT YOU NEED TO KNOW:   Chronic obstructive pulmonary disease (COPD) is a lung disease that makes it hard for you to breathe  It is usually a result of lung damage caused by years of irritation and inflammation in your lungs  DISCHARGE INSTRUCTIONS:   Call 911 if:   · You feel lightheaded, short of breath, and have chest pain  Return to the emergency department if:   · You are confused, dizzy, or feel faint  · Your arm or leg feels warm, tender, and painful  It may look swollen and red  · You cough up blood  Contact your healthcare provider if:   · You have more shortness of breath than usual      · You need more medicine than usual to control your symptoms  · You are coughing or wheezing more than usual      · You are coughing up more mucus, or it is a different color or has a different odor  · You gain more than 3 pounds in a week  · You have a fever, a runny or stuffy nose, and a sore throat, or other cold or flu symptoms  · Your skin, lips, or nails start to turn blue  · You have swelling in your legs or ankles  · You are very tired or weak for more than a day  · You notice changes in your mood, or changes in your ability to think or concentrate  · You have questions or concerns about your condition or care  Medicines:   · Medicines  may be used to open your airways, decrease swelling and inflammation in your lungs, or treat an infection  You may need 2 or more medicines  A short-acting medicine relieves symptoms quickly  Long-acting medicines will control or prevent symptoms  Ask for more information about the medicines you are given and how to use them safely  · Take your medicine as directed  Contact your healthcare provider if you think your medicine is not helping or if you have side effects  Tell him or her if you are allergic to any medicine  Keep a list of the medicines, vitamins, and herbs you take  Include the amounts, and when and why you take them  Bring the list or the pill bottles to follow-up visits  Carry your medicine list with you in case of an emergency  Help make breathing easier:   · Use pursed-lip breathing any time you feel short of breath  Take a deep breath in through your nose  Slowly breathe out through your mouth with your lips pursed for twice as long as you inhaled  You can also practice this breathing pattern while you bend, lift, climb stairs, or exercise  It slows down your breathing and helps move more air in and out of your lungs  · Do not smoke, and avoid others who smoke  Nicotine and other substances can cause lung irritation or damage and make it harder for you to breathe  Do not use e-cigarettes or smokeless tobacco  They still contain nicotine  Ask your healthcare provider for information if you currently smoke and need help to quit  For support and more information:  ¨ [facility]  Phone: 1- 777 - 234-8776  Web Address: [employer]      · Be aware of and avoid anything that makes your symptoms worse  Stay out of high altitudes and places with high humidity  Stay inside, or cover your mouth and nose with a scarf when you are outside during cold weather  Stay inside on days when air pollution or pollen counts are high  Do not use aerosol sprays such as deodorant, bug spray, and hair spray  Manage COPD and help prevent exacerbations:  COPD is a serious condition that gets worse over time  A COPD exacerbation means your symptoms suddenly get worse  It is important to prevent exacerbations  An exacerbation can cause more lung damage  COPD cannot be cured, but you can take action to feel better and prevent COPD exacerbations:  · Protect yourself from germs  Germs can get into your lungs and cause an infection  An infection in your lungs can create more mucus and make it harder to breathe   An infection can also create swelling in your airways and prevent air from getting in  You can decrease your risk for infection by doing the following:     [facility] your hands often with soap and water  Carry germ-killing gel with you  You can use the gel to clean your hands when soap and water are not available  ¨ Do not touch your eyes, nose, or mouth unless you have washed your hands first      ¨ Always cover your mouth when you cough  Cough into a tissue or your shirtsleeve so you do not spread germs from your hands  ¨ Try to avoid people who have a cold or the flu  If you are sick, stay away from others as much as possible  · Drink more liquids  This will help to keep your air passages moist and help you cough up mucus  Ask how much liquid to drink each day and which liquids are best for you  · Exercise daily  Exercise for at least 20 minutes each day to help increase your energy and decrease shortness of breath  Walking or riding a bike are good ways to exercise  Talk to your healthcare provider about the best exercise plan for you  · Ask about vaccines  Your healthcare provider may recommend that you get regular flu and pneumonia vaccines  Pneumonia can become life-threatening for a person who has COPD  Ask about other vaccines you may need  Ask your healthcare provider about the flu and pneumonia vaccines  All adults should get the flu (influenza) vaccine every year as soon as it becomes available  The pneumonia vaccine is given to adults aged 72 or older to prevent pneumococcal disease, such as pneumonia  Adults aged 23 to 59 years who are at high risk for pneumococcal disease also should get the pneumococcal vaccine  It may need to be repeated 1 or 5 years later  Pulmonary rehabilitation:  Your healthcare provider may recommend a program to help you manage your symptoms and improve your quality of life  It may include nutritional counseling and exercise to strengthen your lungs     Make decisions about your choices for future treatment:  Ask for information about advanced medical directives and living kim  These documents help you decide and write down your choices for treatment and end-of-life care  It is best to complete them when you feel well and can think clearly about your wishes  The information can then be kept for future use if you are in the hospital or become very ill  Follow up with your healthcare provider as directed: You may need more tests  Your healthcare provider may refer you to a pulmonary (lung) specialist  Write down your questions so you remember to ask them during your visits  © 2017 [facility] [place] Information is for End User's use only and may not be sold, redistributed or otherwise used for commercial purposes  All illustrations and images included in CareNotes® are the copyrighted property of [employer]  or [address]  The above information is an  only  It is not intended as medical advice for individual conditions or treatments  Talk to your doctor, nurse or pharmacist before following any medical regimen to see if it is safe and effective for you

## 2018-06-14 ENCOUNTER — PATIENT OUTREACH (OUTPATIENT)
Dept: FAMILY MEDICINE CLINIC | Facility: CLINIC | Age: 73
End: 2018-06-14

## 2018-06-14 NOTE — SOCIAL WORK
Received call from Pavel at [facility] pt is on her way into their facility as a direct admission  Pavel requesting H&P and Facesheet from last admission for direct admission process as case is closed out in [place] - requested information faxed to [address] [place] [address]

## 2018-06-20 DIAGNOSIS — F41.1 GENERALIZED ANXIETY DISORDER: ICD-10-CM

## 2018-06-21 ENCOUNTER — PATIENT OUTREACH (OUTPATIENT)
Dept: FAMILY MEDICINE CLINIC | Facility: CLINIC | Age: 73
End: 2018-06-21

## 2018-06-29 ENCOUNTER — PATIENT OUTREACH (OUTPATIENT)
Dept: FAMILY MEDICINE CLINIC | Facility: CLINIC | Age: 73
End: 2018-06-29

## 2018-07-02 ENCOUNTER — TELEPHONE (OUTPATIENT)
Dept: PULMONOLOGY | Facility: CLINIC | Age: 73
End: 2018-07-02

## 2018-07-02 DIAGNOSIS — J43.2 CENTRILOBULAR EMPHYSEMA (HCC): Primary | ICD-10-CM

## 2018-07-02 RX ORDER — ALBUTEROL SULFATE 2.5 MG/3ML
2.5 SOLUTION RESPIRATORY (INHALATION) EVERY 6 HOURS PRN
Qty: 360 ML | Refills: 1 | Status: SHIPPED | OUTPATIENT
Start: 2018-07-02 | End: 2018-07-05 | Stop reason: SDUPTHER

## 2018-07-05 ENCOUNTER — OFFICE VISIT (OUTPATIENT)
Dept: INTERNAL MEDICINE CLINIC | Facility: CLINIC | Age: 73
End: 2018-07-05
Payer: MEDICARE

## 2018-07-05 VITALS
SYSTOLIC BLOOD PRESSURE: 132 MMHG | DIASTOLIC BLOOD PRESSURE: 72 MMHG | WEIGHT: 126.9 LBS | OXYGEN SATURATION: 97 % | BODY MASS INDEX: 24.91 KG/M2 | HEIGHT: 60 IN | HEART RATE: 112 BPM

## 2018-07-05 DIAGNOSIS — R06.02 SHORTNESS OF BREATH: ICD-10-CM

## 2018-07-05 DIAGNOSIS — I74.09 AORTOILIAC OCCLUSIVE DISEASE (HCC): ICD-10-CM

## 2018-07-05 DIAGNOSIS — R73.01 IMPAIRED FASTING GLUCOSE: Primary | ICD-10-CM

## 2018-07-05 DIAGNOSIS — J43.2 CENTRILOBULAR EMPHYSEMA (HCC): ICD-10-CM

## 2018-07-05 DIAGNOSIS — E78.5 HYPERLIPIDEMIA, UNSPECIFIED HYPERLIPIDEMIA TYPE: ICD-10-CM

## 2018-07-05 DIAGNOSIS — I65.23 ASYMPTOMATIC BILATERAL CAROTID ARTERY STENOSIS: ICD-10-CM

## 2018-07-05 DIAGNOSIS — I70.213 ATHEROSCLEROSIS OF NATIVE ARTERY OF BOTH LOWER EXTREMITIES WITH INTERMITTENT CLAUDICATION (HCC): ICD-10-CM

## 2018-07-05 DIAGNOSIS — J44.1 COPD EXACERBATION (HCC): ICD-10-CM

## 2018-07-05 PROCEDURE — 99214 OFFICE O/P EST MOD 30 MIN: CPT | Performed by: INTERNAL MEDICINE

## 2018-07-05 RX ORDER — METHYLPREDNISOLONE 4 MG/1
TABLET ORAL
Qty: 21 TABLET | Refills: 0 | Status: SHIPPED | OUTPATIENT
Start: 2018-07-05 | End: 2018-07-13

## 2018-07-05 RX ORDER — BUDESONIDE 1 MG/2ML
1 INHALANT ORAL DAILY
Qty: 60 ML | Refills: 0 | Status: SHIPPED | OUTPATIENT
Start: 2018-07-05 | End: 2018-08-24

## 2018-07-05 RX ORDER — AZITHROMYCIN 250 MG/1
TABLET, FILM COATED ORAL
Qty: 6 TABLET | Refills: 0 | Status: SHIPPED | OUTPATIENT
Start: 2018-07-05 | End: 2018-07-10

## 2018-07-05 RX ORDER — ALBUTEROL SULFATE 2.5 MG/3ML
2.5 SOLUTION RESPIRATORY (INHALATION) EVERY 6 HOURS PRN
Qty: 360 ML | Refills: 0 | Status: SHIPPED | OUTPATIENT
Start: 2018-07-05 | End: 2018-10-11 | Stop reason: SDUPTHER

## 2018-07-05 NOTE — PATIENT INSTRUCTIONS
A patient with recent multiple exacerbations of COPD who continues to have moderate symptoms recommendations for today include:   Repeat course of Zithromax x5 days   Repeat course of corticosteroids x5 days   Continued use of inhaler with 3 medications, not 2  Use ipratropium, use albuterol, and also use Pulmicort  Pulmicort should be twice a day while he albuterol  /Ipratropium 4 times a day  Continue Breo     Follow-up here in about 2 weeks

## 2018-07-05 NOTE — PROGRESS NOTES
Assessment/Plan:       Diagnoses and all orders for this visit:    Impaired fasting glucose    Centrilobular emphysema (HCC)  -     albuterol (2 5 mg/3 mL) 0 083 % nebulizer solution; Take 1 vial (2 5 mg total) by nebulization every 6 (six) hours as needed for wheezing or shortness of breath    Aortoiliac occlusive disease (HCC)    Asymptomatic bilateral carotid artery stenosis    Atherosclerosis of native artery of both lower extremities with intermittent claudication (HCC)    Hyperlipidemia, unspecified hyperlipidemia type              Subjective:      Patient ID: Sumanth Richards is a 72 y o  female  Follow-up visit of a 65-year-old female who has had a 3 month history of multiple pulmonary problems  Hospitalized several times with exacerbations of bronchitis and superimposed pneumonia  Went to pulmonary rehab  Finally home only in the past several days  Continues to complain of shortness of breath on exertion and continues to have symptoms suggesting rhinitis / sinusitis, and postnasal drip, but does not have wheezing  Medications are outlined  Currently using Breo, also a nebulizer with ipratropium and albuterol  Not currently on corticosteroids  Not currently on antibiotic  Recent chest CT without contrast documents interstitial inflammatory changes, not very severe, along with minimal pleural effusion  The following portions of the patient's history were reviewed and updated as appropriate:   She has a past medical history of Chronic sinusitis; COPD (chronic obstructive pulmonary disease) ([address] ); Hyperlipidemia; PNA (pneumonia); and Restless leg syndrome  ,   does not have any pertinent problems on file  ,   has a past surgical history that includes  section; Colonoscopy; Tonsillectomy; and Cataract extraction  ,  family history includes Arthritis in her sister; Cirrhosis in her family; Heart attack in her family;  Pancreatic cancer in her sister; Prostate cancer in her family; Skin cancer in her family; Stroke in her family  ,   reports that she quit smoking about 6 weeks ago  Her smoking use included Cigarettes  She started smoking about 56 years ago  She smoked 15 00 packs per day  She has never used smokeless tobacco  She reports that she does not drink alcohol or use drugs  ,  is allergic to augmentin [amoxicillin-pot clavulanate]; spiriva handihaler  [tiotropium bromide monohydrate]; tiotropium; and tylenol with codeine #3  [acetaminophen-codeine]     Current Outpatient Prescriptions   Medication Sig Dispense Refill    albuterol (2 5 mg/3 mL) 0 083 % nebulizer solution Take 1 vial (2 5 mg total) by nebulization every 6 (six) hours as needed for wheezing or shortness of breath 360 mL 1    albuterol (VENTOLIN HFA) 90 mcg/act inhaler Inhale 2 puffs every 6 (six) hours as needed      ALPRAZolam (XANAX) 0 5 mg tablet anxiety 30 tablet 0    aspirin ([employer] ASPIRIN EC) 81 mg EC tablet Take 1 tablet by mouth daily      atorvastatin (LIPITOR) 40 mg tablet   0    benzonatate (TESSALON PERLES) 100 mg capsule Take 1 capsule (100 mg total) by mouth 3 (three) times a day as needed for cough 20 capsule 0    BREO ELLIPTA USE 1 PUFF EVERY DAY 3 each 3    calcium-vitamin D (OSCAL) 250-125 MG-UNIT per tablet Take 1 tablet by mouth daily      ferrous sulfate 325 (65 Fe) mg tablet Take 325 mg by mouth daily with breakfast      furosemide (LASIX) 40 mg tablet Take 1 tablet (40 mg total) by mouth daily  0    Methylprednisolone (MEDROL) 4 MG TBPK Use as directed on package 21 tablet 0    metoprolol tartrate (LOPRESSOR) 25 mg tablet Take 12 5 mg by mouth every 12 (twelve) hours      Naphazoline-Polyethyl Glycol 0 012-0 2 % SOLN Apply to eye      pantoprazole (PROTONIX) 40 mg tablet Take 40 mg by mouth daily      rOPINIRole (REQUIP) 0 5 mg tablet   0    sodium chloride () 2 % hypertonic ophthalmic solution Sue 128 2 % eye drops       No current facility-administered medications for this visit  Review of Systems   Constitutional: Negative for chills and fever  HENT: Positive for postnasal drip and rhinorrhea  Negative for sore throat and trouble swallowing  Eyes: Negative for pain  Respiratory: Positive for shortness of breath  Negative for cough and wheezing  Cardiovascular: Negative for chest pain and leg swelling  Gastrointestinal: Negative for abdominal pain, diarrhea, nausea and vomiting  Endocrine: Negative for cold intolerance and heat intolerance  Genitourinary: Negative for dysuria, frequency and pelvic pain  Musculoskeletal: Negative for arthralgias and joint swelling  Skin: Negative for rash and wound  Allergic/Immunologic: Negative for immunocompromised state  Neurological: Negative for dizziness, seizures, syncope and headaches  Psychiatric/Behavioral: Negative for dysphoric mood  The patient is not nervous/anxious  Objective:  Vitals:    07/05/18 1258   BP: 132/72   Pulse: (!) 112   SpO2: 97%      Physical Exam   Constitutional: She is oriented to person, place, and time  She appears well-developed and well-nourished  HENT:   Head: Normocephalic and atraumatic  Eyes: EOM are normal  Pupils are equal, round, and reactive to light  Neck: Normal range of motion  Neck supple  No tracheal deviation present  No thyromegaly present  Cardiovascular: Normal rate, regular rhythm and normal heart sounds  Exam reveals no gallop  No murmur heard  Pulmonary/Chest: No respiratory distress  She has decreased breath sounds  She has no wheezes  She has no rales  Abdominal: Soft  Bowel sounds are normal  There is no tenderness  Musculoskeletal: Normal range of motion  She exhibits no tenderness or deformity  Neurological: She is alert and oriented to person, place, and time  Coordination normal    Skin: Skin is warm  Psychiatric: She has a normal mood and affect   Judgment normal

## 2018-07-07 ENCOUNTER — TELEPHONE (OUTPATIENT)
Dept: INTERNAL MEDICINE CLINIC | Facility: CLINIC | Age: 73
End: 2018-07-07

## 2018-07-07 NOTE — TELEPHONE ENCOUNTER
Sees Dr Alex De La Cruz; on a lot of meds- 3 steroids  One of the steroids is to help infection  Losing voice    How can she handle taking with the other meds?     Please call this p#: [phone]

## 2018-07-13 ENCOUNTER — OFFICE VISIT (OUTPATIENT)
Dept: INTERNAL MEDICINE CLINIC | Facility: CLINIC | Age: 73
End: 2018-07-13
Payer: MEDICARE

## 2018-07-13 VITALS
HEART RATE: 123 BPM | HEIGHT: 60 IN | DIASTOLIC BLOOD PRESSURE: 72 MMHG | BODY MASS INDEX: 25.09 KG/M2 | OXYGEN SATURATION: 98 % | SYSTOLIC BLOOD PRESSURE: 132 MMHG | WEIGHT: 127.8 LBS

## 2018-07-13 DIAGNOSIS — J43.2 CENTRILOBULAR EMPHYSEMA (HCC): Primary | ICD-10-CM

## 2018-07-13 PROBLEM — J32.9 CHRONIC SINUSITIS: Status: ACTIVE | Noted: 2018-07-13

## 2018-07-13 PROBLEM — J18.9 PNA (PNEUMONIA): Status: RESOLVED | Noted: 2018-07-13 | Resolved: 2018-07-13

## 2018-07-13 PROCEDURE — 99214 OFFICE O/P EST MOD 30 MIN: CPT | Performed by: INTERNAL MEDICINE

## 2018-07-13 NOTE — PROGRESS NOTES
Assessment/Plan:       Diagnoses and all orders for this visit:    Centrilobular emphysema ([address] )  -     Pulse Oximetry with exercise; Future  -     Ambulatory Referral to Pulmonary Rehabilitation; Future              Subjective:      Patient ID: Tk Heard is a 72 y o  female  Severe centrilobular emphysema  A 53-year-old woman who has a greater than 50 pack year history of smoking developed acute bronchopneumonia few months ago  She required hospitalization and rehab  Since that incident, she has has struggle to get back to baseline  Continues to report intermittent coughing with principally severe dyspnea with fairly minimal exertion    Using albuterol, Breo, and budesonide via inhalation  Complaint of chest tightness  The following portions of the patient's history were reviewed and updated as appropriate:   She has a past medical history of Chronic sinusitis; Hyperlipidemia; PNA (pneumonia); and Restless leg syndrome  ,   does not have any pertinent problems on file  ,   has a past surgical history that includes  section; Colonoscopy; Tonsillectomy; and Cataract extraction  ,  family history includes Arthritis in her sister; Cirrhosis in her family; Heart attack in her family; Pancreatic cancer in her sister; Prostate cancer in her family; Skin cancer in her family; Stroke in her family  ,   reports that she quit smoking about 7 weeks ago  Her smoking use included Cigarettes  She started smoking about 56 years ago  She smoked 15 00 packs per day  She has never used smokeless tobacco  She reports that she does not drink alcohol or use drugs  ,  is allergic to augmentin [amoxicillin-pot clavulanate]; spiriva handihaler  [tiotropium bromide monohydrate]; tiotropium; and tylenol with codeine #3  [acetaminophen-codeine]     Current Outpatient Prescriptions   Medication Sig Dispense Refill    albuterol (2 5 mg/3 mL) 0 083 % nebulizer solution Take 1 vial (2 5 mg total) by nebulization every 6 (six) hours as needed for wheezing or shortness of breath 360 mL 0    albuterol (VENTOLIN HFA) 90 mcg/act inhaler Inhale 2 puffs every 6 (six) hours as needed      ALPRAZolam (XANAX) 0 5 mg tablet anxiety 30 tablet 0    aspirin ([employer]S ASPIRIN EC) 81 mg EC tablet Take 1 tablet by mouth daily      atorvastatin (LIPITOR) 40 mg tablet   0    benzonatate (TESSALON PERLES) 100 mg capsule Take 1 capsule (100 mg total) by mouth 3 (three) times a day as needed for cough 20 capsule 0    BREO ELLIPTA USE 1 PUFF EVERY DAY 3 each 3    budesonide (PULMICORT) 1 MG/2ML nebulizer solution Take 1 mL (1 mg total) by nebulization daily 60 mL 0    calcium-vitamin D (OSCAL) 250-125 MG-UNIT per tablet Take 1 tablet by mouth daily      ferrous sulfate 325 (65 Fe) mg tablet Take 325 mg by mouth daily with breakfast      furosemide (LASIX) 40 mg tablet Take 1 tablet (40 mg total) by mouth daily  0    metoprolol tartrate (LOPRESSOR) 25 mg tablet Take 12 5 mg by mouth every 12 (twelve) hours      Naphazoline-Polyethyl Glycol 0 012-0 2 % SOLN Apply to eye      pantoprazole (PROTONIX) 40 mg tablet Take 40 mg by mouth daily      rOPINIRole (REQUIP) 0 5 mg tablet   0    sodium chloride () 2 % hypertonic ophthalmic solution Sue 128 2 % eye drops       No current facility-administered medications for this visit  Review of Systems   Constitutional: Positive for activity change and fatigue  Respiratory: Positive for cough, chest tightness and shortness of breath  Negative for wheezing  Cardiovascular: Negative for chest pain, palpitations and leg swelling  Endocrine: Negative for heat intolerance  Genitourinary: Negative for difficulty urinating  Musculoskeletal: Positive for arthralgias  Neurological: Negative for dizziness and numbness  Hematological: Negative for adenopathy  Psychiatric/Behavioral: Positive for dysphoric mood           Objective:  Vitals:    07/13/18 1503   BP: 132/72   Pulse: (!) 123 SpO2: 98%      Physical Exam   Constitutional: She is oriented to person, place, and time  No distress  HENT:   Head: Normocephalic and atraumatic  Eyes: EOM are normal  Pupils are equal, round, and reactive to light  Neck: Normal range of motion  Neck supple  No tracheal deviation present  No thyromegaly present  Cardiovascular: Normal rate, regular rhythm and normal heart sounds  Exam reveals no gallop  No murmur heard  Pulmonary/Chest: No respiratory distress  She has decreased breath sounds  She has no wheezes  She has no rales  Abdominal: Soft  Bowel sounds are normal  There is no tenderness  Musculoskeletal: Normal range of motion  She exhibits no tenderness or deformity  Neurological: She is alert and oriented to person, place, and time  Coordination normal    Skin: Skin is warm  Psychiatric: She has a normal mood and affect   Judgment normal

## 2018-07-13 NOTE — PATIENT INSTRUCTIONS
Very classic pink puffer -typical centrilobular cigarette smokers emphysema  Slowly improving with time  Her pulse ox with  Modest exertion does not justify use of home oxygen    Recommendation here  Referral to outpatient pulmonary rehabilitation  Home nocturnal O2 assessment  Follow up with Pulmonary Medicine and with me

## 2018-07-19 ENCOUNTER — TELEPHONE (OUTPATIENT)
Dept: PULMONOLOGY | Facility: CLINIC | Age: 73
End: 2018-07-19

## 2018-07-24 ENCOUNTER — TELEPHONE (OUTPATIENT)
Dept: INTERNAL MEDICINE CLINIC | Facility: CLINIC | Age: 73
End: 2018-07-24

## 2018-07-24 NOTE — TELEPHONE ENCOUNTER
Sore throat since 2-1/2 weeks ago    Dr prescribed ABX  Sherman Mercado which she finished    Now her right gland is really hurting   up to her ear    Couldn't even swallow water through the night    Took advil then tylenol    Had to get up 2 am and take xanax to calm her down     Is asking if medication can be prescribed again ? ???

## 2018-07-25 NOTE — TELEPHONE ENCOUNTER
Daughter called-she has the cell phone  Scheduled for Wednesday 8/1- she only wants to see Xiomy Arrington  Daughter is letting her mom know

## 2018-08-01 ENCOUNTER — HOSPITAL ENCOUNTER (OUTPATIENT)
Dept: PULMONOLOGY | Facility: HOSPITAL | Age: 73
Discharge: HOME | End: 2018-08-01
Attending: INTERNAL MEDICINE
Payer: MEDICARE

## 2018-08-01 DIAGNOSIS — J43.2 CENTRILOBULAR EMPHYSEMA (HCC): ICD-10-CM

## 2018-08-01 PROCEDURE — 94618 PULMONARY STRESS TESTING: CPT

## 2018-08-15 ENCOUNTER — TELEPHONE (OUTPATIENT)
Dept: PULMONOLOGY | Facility: CLINIC | Age: 73
End: 2018-08-15

## 2018-08-24 ENCOUNTER — OFFICE VISIT (OUTPATIENT)
Dept: PULMONOLOGY | Facility: CLINIC | Age: 73
End: 2018-08-24
Payer: MEDICARE

## 2018-08-24 ENCOUNTER — OFFICE VISIT (OUTPATIENT)
Dept: INTERNAL MEDICINE CLINIC | Facility: CLINIC | Age: 73
End: 2018-08-24
Payer: MEDICARE

## 2018-08-24 VITALS
HEART RATE: 99 BPM | DIASTOLIC BLOOD PRESSURE: 64 MMHG | SYSTOLIC BLOOD PRESSURE: 128 MMHG | WEIGHT: 133 LBS | BODY MASS INDEX: 25.11 KG/M2 | OXYGEN SATURATION: 96 % | HEIGHT: 61 IN

## 2018-08-24 VITALS
WEIGHT: 135 LBS | OXYGEN SATURATION: 95 % | HEIGHT: 61 IN | BODY MASS INDEX: 25.49 KG/M2 | DIASTOLIC BLOOD PRESSURE: 80 MMHG | SYSTOLIC BLOOD PRESSURE: 120 MMHG | HEART RATE: 85 BPM

## 2018-08-24 DIAGNOSIS — R09.81 CHRONIC NASAL CONGESTION: ICD-10-CM

## 2018-08-24 DIAGNOSIS — J43.2 CENTRILOBULAR EMPHYSEMA (HCC): Primary | ICD-10-CM

## 2018-08-24 DIAGNOSIS — J43.2 CENTRILOBULAR EMPHYSEMA (HCC): ICD-10-CM

## 2018-08-24 DIAGNOSIS — R06.00 DOE (DYSPNEA ON EXERTION): Primary | ICD-10-CM

## 2018-08-24 PROCEDURE — 99214 OFFICE O/P EST MOD 30 MIN: CPT | Performed by: PHYSICIAN ASSISTANT

## 2018-08-24 PROCEDURE — 99213 OFFICE O/P EST LOW 20 MIN: CPT | Performed by: INTERNAL MEDICINE

## 2018-08-24 RX ORDER — FLUTICASONE PROPIONATE 50 MCG
1 SPRAY, SUSPENSION (ML) NASAL
Qty: 16 G | Refills: 3 | Status: SHIPPED | OUTPATIENT
Start: 2018-08-24 | End: 2019-05-31 | Stop reason: CLARIF

## 2018-08-24 NOTE — PROGRESS NOTES
Assessment:    1  MENDOZA (dyspnea on exertion)  Complete pulmonary function test   2  Centrilobular emphysema (HCC)  Complete pulmonary function test   3  Chronic nasal congestion  fluticasone (FLONASE) 50 mcg/act nasal spray       Plan: 72 y o  female former smoker who quit in 5/2018 with greater than a 55 pack year smoking history with significant PMH of COPD/emphysema, PAD, HTN, HLD who presents for f/u after multiple hospitalizations and for SOB  Patient was hospitalized from 5/21/2018 to 5/24/2018 for COPD exacerbation and acute tracheobronchitis treated with IV antibiotics and IV Solu-Medrol  1   Dyspnea on exertion - Decline in functional status and SOB since COPD exacerbation in May 2018  Last PFTs completed in March 2018 with normal spirometry, lung volumes, with moderately decreased DLCO of 47 %  Given significant worsening in activity level, will plan on repeat PFTs to compare to prior to assess for any decline in lung function  Discussed natural declines/progression of COPD  She is unable to complete spirometry today due to another doctor's appointment  Will order for complete PFTs with DLCO  Reviewed 6 minutes walk test, 97 % on room air at rest   On room air with exertion, maintain SpO2 of 91-94 % throughout the walk  She was able to complete the entire testing without having to stop for shortness of breath  Discussed continuing Breo 100/25 one puff once daily  Demonstrated proper inhaler technique  Rinse mouth after use  Advised patient to start using the nebulizer 1st thing in the a m  prior to LABA/ICS inhaler  Discussed considering addition of LAMA, however, apparently patient had a bad reaction to Spiriva handihaler where she felt that her breathing was significantly worse  Will hold off for now  Discussed how allergy/chronic sinusitis can precipitate breathing issues  Will attempt to better control the symptoms    Add in fluticasone nasal spray on a daily basis once in each nostril twice daily  Reviewed proper nasal spray technique  Discussed addition of Neti pot or nasal saline rinses to use nightly  Given long history of chronic sinusitis and recurrent infections also discussed considering ENT evaluation in the future if no improvement with above treatment  Patient understands and is in agreement  Follow-up in 2 months or sooner if needed    Subjective:     Patient ID: Jerri Marrero is a 72 y o  female  Chief Complaint: SOB    HPI  72 y o  female former smoker who quit in 5/2018 with greater than a 55 pack year smoking history with significant PMH of COPD/emphysema, PAD, HTN, HLD who presents for f/u after multiple hospitalizations and for SOB  Patient was hospitalized from 5/21/2018 to 5/24/2018 for COPD exacerbation and acute tracheobronchitis treated with IV antibiotics and IV Solu-Medrol  On top of this hospitalization she was also treated with multiple courses of prednisone while she was in [place] taking care of her sister  She states that ever since this hospitalization she has not been feeling well 2/2 shortness of breath and cough/hoarseness  She states that prior to this hospitalization she was able to walk around without difficulty  Now, she feels as though her activities are severely limited  She uses her Breo inhaler once daily  She rarely uses her rescue inhaler even though she should probably use it more frequently she also complains of chronic congestion and sinus issues for which she has seen an ENT specialist within the last year  She has stopped using any nasal sprays other than saline or rinses  She does state that her sinus issues have been going on for years  She currently denies any fever, chills, chest pain  Does continue to have a cough as well that is productive of mucus  Review of Systems  Review of Systems   Constitution: Negative for chills, decreased appetite, fever, malaise/fatigue and weight gain  HENT: Positive for hoarse voice  Negative for congestion  Recurrent sinusitis   Eyes: Negative for visual disturbance  Cardiovascular: Negative for dyspnea on exertion, leg swelling and orthopnea  Respiratory: Positive for cough, shortness of breath and sputum production  Negative for sleep disturbances due to breathing and wheezing  Hematologic/Lymphatic: Negative for adenopathy  Skin: Negative for rash  Musculoskeletal: Negative for muscle weakness  Gastrointestinal: Negative for abdominal pain, diarrhea, nausea and vomiting  Neurological: Negative for excessive daytime sleepiness  Psychiatric/Behavioral: Negative for altered mental status and hallucinations  Allergic/Immunologic: Positive for environmental allergies  Objective:  /80   Pulse 85   Ht 5' 1" (1 549 m)   Wt 61 2 kg (135 lb)   SpO2 95%   BMI 25 51 kg/m²     Physical Exam   Constitutional: She is oriented to person, place, and time  She appears well-developed and well-nourished  No distress  HENT:   Head: Normocephalic and atraumatic  Neck: Normal range of motion  (+)hoarseness   Cardiovascular: Normal rate and regular rhythm  No murmur heard  Pulmonary/Chest: Effort normal and breath sounds normal  No respiratory distress  She has no wheezes  She has no rales  She exhibits no tenderness  Abdominal: Soft  There is no tenderness  Musculoskeletal: Normal range of motion  She exhibits no edema or tenderness  Lymphadenopathy:     She has no cervical adenopathy  Neurological: She is alert and oriented to person, place, and time  Skin: Skin is warm and dry  She is not diaphoretic  Psychiatric: She has a normal mood and affect  Her behavior is normal    Nursing note and vitals reviewed  Lab/Image Review: Reviewed all pertinent labs/radiology results       Past Medical History:   Diagnosis Date    Chronic sinusitis     Last assessed: 11/11/13    Hyperlipidemia     Lung nodule     PNA (pneumonia)     Restless leg syndrome     Tobacco abuse        Social History   Substance Use Topics    Smoking status: Former Smoker     Packs/day: 15 00     Types: Cigarettes     Start date: 2/21/1962     Quit date: 5/24/2018    Smokeless tobacco: Never Used    Alcohol use No       Family History   Problem Relation Age of Onset    Arthritis Sister     Pancreatic cancer Sister     Heart attack Family         Acute Myocardial Infarction    Cirrhosis Family     Prostate cancer Family     Skin cancer Family     Stroke Family         Syndrome       Patient Active Problem List   Diagnosis    Anxiety    Aortoiliac occlusive disease ([facility] )    Asymptomatic bilateral carotid artery stenosis    Atherosclerosis of native artery of both lower extremities with intermittent claudication (HCC)    Centrilobular emphysema (HCC)    Hyperlipidemia    Impaired fasting glucose    Osteoporosis    Restless leg syndrome    Subclavian artery stenosis, left (HCC)    Tobacco abuse    PVD (peripheral vascular disease) ([facility] )    Chronic sinusitis       Current Outpatient Prescriptions on File Prior to Visit   Medication Sig Dispense Refill    albuterol (2 5 mg/3 mL) 0 083 % nebulizer solution Take 1 vial (2 5 mg total) by nebulization every 6 (six) hours as needed for wheezing or shortness of breath 360 mL 0    albuterol (VENTOLIN HFA) 90 mcg/act inhaler Inhale 2 puffs every 6 (six) hours as needed      ALPRAZolam (XANAX) 0 5 mg tablet anxiety 30 tablet 0    aspirin ([employer] ASPIRIN EC) 81 mg EC tablet Take 1 tablet by mouth daily      atorvastatin (LIPITOR) 40 mg tablet   0    benzonatate (TESSALON PERLES) 100 mg capsule Take 1 capsule (100 mg total) by mouth 3 (three) times a day as needed for cough 20 capsule 0    BREO ELLIPTA USE 1 PUFF EVERY DAY 3 each 3    Naphazoline-Polyethyl Glycol 0 012-0 2 % SOLN Apply to eye      rOPINIRole (REQUIP) 0 5 mg tablet   0    sodium chloride () 2 % hypertonic ophthalmic solution Sue 128 2 % eye drops      calcium-vitamin D (OSCAL) 250-125 MG-UNIT per tablet Take 1 tablet by mouth daily      ferrous sulfate 325 (65 Fe) mg tablet Take 325 mg by mouth daily with breakfast      furosemide (LASIX) 40 mg tablet Take 1 tablet (40 mg total) by mouth daily (Patient not taking: Reported on 8/24/2018 )  0    metoprolol tartrate (LOPRESSOR) 25 mg tablet Take 12 5 mg by mouth every 12 (twelve) hours      pantoprazole (PROTONIX) 40 mg tablet Take 40 mg by mouth daily      [DISCONTINUED] budesonide (PULMICORT) 1 MG/2ML nebulizer solution Take 1 mL (1 mg total) by nebulization daily 60 mL 0     No current facility-administered medications on file prior to visit          Allergies   Allergen Reactions    Augmentin [Amoxicillin-Pot Clavulanate]      Pt received ceftriaxone 1 g IV on 5-21-18    Spiriva Handihaler  [Tiotropium Bromide Monohydrate]     Tiotropium     Tylenol With Codeine #3  [Acetaminophen-Codeine]

## 2018-08-24 NOTE — PROGRESS NOTES
Assessment/Plan:       Diagnoses and all orders for this visit:    Centrilobular emphysema [facility])          Patient Instructions   Doing well; revisit end oct        Subjective:      Patient ID: Jordon Becker is a 72 y o  female  Severe centrilobular emphysema  A 53-year-old woman who has a greater than 50 pack year history of smoking developed acute bronchopneumonia a  few months ago  She required hospitalization and rehab  Since that incident, she has has struggle to get back to baseline  However, she stopped smoking and has had a great deal of imorovement   Using albuterol, Breo, and budesonide via inhalation  Complaint of chest tightness  The following portions of the patient's history were reviewed and updated as appropriate:   She has a past medical history of Chronic sinusitis; Hyperlipidemia; Lung nodule; PNA (pneumonia); Restless leg syndrome; and Tobacco abuse ,   does not have any pertinent problems on file  ,   has a past surgical history that includes  section; Colonoscopy; Tonsillectomy; and Cataract extraction  ,  family history includes Arthritis in her sister; Cirrhosis in her family; Heart attack in her family; Pancreatic cancer in her sister; Prostate cancer in her family; Skin cancer in her family; Stroke in her family  ,   reports that she quit smoking about 3 months ago  Her smoking use included Cigarettes  She started smoking about 56 years ago  She smoked 15 00 packs per day  She has never used smokeless tobacco  She reports that she does not drink alcohol or use drugs  ,  is allergic to augmentin [amoxicillin-pot clavulanate]; spiriva handihaler  [tiotropium bromide monohydrate]; tiotropium; and tylenol with codeine #3  [acetaminophen-codeine]     Current Outpatient Prescriptions   Medication Sig Dispense Refill    albuterol (2 5 mg/3 mL) 0 083 % nebulizer solution Take 1 vial (2 5 mg total) by nebulization every 6 (six) hours as needed for wheezing or shortness of breath 360 mL 0    albuterol (VENTOLIN HFA) 90 mcg/act inhaler Inhale 2 puffs every 6 (six) hours as needed      ALPRAZolam (XANAX) 0 5 mg tablet anxiety 30 tablet 0    aspirin ([employer] ASPIRIN EC) 81 mg EC tablet Take 1 tablet by mouth daily      atorvastatin (LIPITOR) 40 mg tablet   0    BREO ELLIPTA USE 1 PUFF EVERY DAY 3 each 3    fluticasone (FLONASE) 50 mcg/act nasal spray 1 spray into each nostril 2 (two) times a day 16 g 3    Naphazoline-Polyethyl Glycol 0 012-0 2 % SOLN Apply to eye      rOPINIRole (REQUIP) 0 5 mg tablet   0    sodium chloride () 2 % hypertonic ophthalmic solution Sue 128 2 % eye drops       No current facility-administered medications for this visit  Review of Systems   Constitutional: Positive for fatigue  Negative for activity change  Respiratory: Positive for shortness of breath  Negative for cough, chest tightness and wheezing  Cardiovascular: Negative for chest pain, palpitations and leg swelling  Endocrine: Negative for heat intolerance  Genitourinary: Negative for difficulty urinating  Musculoskeletal: Positive for arthralgias  Neurological: Negative for dizziness and numbness  Hematological: Negative for adenopathy  Psychiatric/Behavioral: Negative for dysphoric mood  Objective:  Vitals:    08/24/18 1255   BP: 128/64   Pulse: 99   SpO2: 96%      Physical Exam   Constitutional: She is oriented to person, place, and time  No distress  Eyes: EOM are normal  Pupils are equal, round, and reactive to light  Cardiovascular: Normal rate, regular rhythm and normal heart sounds  Pulmonary/Chest: Effort normal  No respiratory distress  She has decreased breath sounds  She has no wheezes  She has no rales  Abdominal: Soft  Bowel sounds are normal  There is no tenderness  Musculoskeletal: Normal range of motion  She exhibits no tenderness or deformity  Neurological: She is alert and oriented to person, place, and time   Coordination normal    Skin: Skin is warm  Psychiatric: She has a normal mood and affect   Judgment normal

## 2018-09-24 ENCOUNTER — HOSPITAL ENCOUNTER (OUTPATIENT)
Dept: PULMONOLOGY | Facility: HOSPITAL | Age: 73
Discharge: HOME | End: 2018-09-24
Payer: MEDICARE

## 2018-09-24 DIAGNOSIS — R06.00 DOE (DYSPNEA ON EXERTION): ICD-10-CM

## 2018-09-24 DIAGNOSIS — J43.2 CENTRILOBULAR EMPHYSEMA (HCC): ICD-10-CM

## 2018-09-24 PROCEDURE — 94727 GAS DIL/WSHOT DETER LNG VOL: CPT

## 2018-09-24 PROCEDURE — 94060 EVALUATION OF WHEEZING: CPT

## 2018-09-24 PROCEDURE — 94729 DIFFUSING CAPACITY: CPT

## 2018-09-24 PROCEDURE — 94729 DIFFUSING CAPACITY: CPT | Performed by: INTERNAL MEDICINE

## 2018-09-24 PROCEDURE — 94060 EVALUATION OF WHEEZING: CPT | Performed by: INTERNAL MEDICINE

## 2018-09-24 PROCEDURE — 94760 N-INVAS EAR/PLS OXIMETRY 1: CPT

## 2018-09-24 PROCEDURE — 94726 PLETHYSMOGRAPHY LUNG VOLUMES: CPT | Performed by: INTERNAL MEDICINE

## 2018-09-24 RX ORDER — ALBUTEROL SULFATE 2.5 MG/3ML
2.5 SOLUTION RESPIRATORY (INHALATION) ONCE
Status: COMPLETED | OUTPATIENT
Start: 2018-09-24 | End: 2018-09-24

## 2018-09-24 RX ADMIN — ALBUTEROL SULFATE 2.5 MG: 2.5 SOLUTION RESPIRATORY (INHALATION) at 12:44

## 2018-09-25 DIAGNOSIS — I74.09 EMBOLISM FROM ABDOMINAL AORTA (HCC): Primary | ICD-10-CM

## 2018-10-11 ENCOUNTER — OFFICE VISIT (OUTPATIENT)
Dept: INTERNAL MEDICINE CLINIC | Facility: CLINIC | Age: 73
End: 2018-10-11
Payer: MEDICARE

## 2018-10-11 ENCOUNTER — OFFICE VISIT (OUTPATIENT)
Dept: PULMONOLOGY | Facility: CLINIC | Age: 73
End: 2018-10-11
Payer: MEDICARE

## 2018-10-11 VITALS
SYSTOLIC BLOOD PRESSURE: 120 MMHG | HEART RATE: 85 BPM | OXYGEN SATURATION: 94 % | WEIGHT: 135.4 LBS | DIASTOLIC BLOOD PRESSURE: 76 MMHG | TEMPERATURE: 98.1 F | BODY MASS INDEX: 25.58 KG/M2

## 2018-10-11 VITALS
HEART RATE: 80 BPM | BODY MASS INDEX: 25.68 KG/M2 | HEIGHT: 61 IN | SYSTOLIC BLOOD PRESSURE: 118 MMHG | OXYGEN SATURATION: 98 % | WEIGHT: 136 LBS | DIASTOLIC BLOOD PRESSURE: 70 MMHG

## 2018-10-11 DIAGNOSIS — R60.0 LOCALIZED EDEMA: Primary | ICD-10-CM

## 2018-10-11 DIAGNOSIS — Z23 NEED FOR INFLUENZA VACCINATION: ICD-10-CM

## 2018-10-11 DIAGNOSIS — F41.1 GENERALIZED ANXIETY DISORDER: ICD-10-CM

## 2018-10-11 DIAGNOSIS — J32.9 CHRONIC SINUSITIS, UNSPECIFIED LOCATION: ICD-10-CM

## 2018-10-11 DIAGNOSIS — R93.89 ABNORMAL CHEST CT: ICD-10-CM

## 2018-10-11 DIAGNOSIS — J43.2 CENTRILOBULAR EMPHYSEMA (HCC): Primary | ICD-10-CM

## 2018-10-11 PROCEDURE — G0008 ADMIN INFLUENZA VIRUS VAC: HCPCS | Performed by: PHYSICIAN ASSISTANT

## 2018-10-11 PROCEDURE — 99214 OFFICE O/P EST MOD 30 MIN: CPT | Performed by: PHYSICIAN ASSISTANT

## 2018-10-11 PROCEDURE — 90662 IIV NO PRSV INCREASED AG IM: CPT | Performed by: PHYSICIAN ASSISTANT

## 2018-10-11 RX ORDER — OLOPATADINE HYDROCHLORIDE 1 MG/ML
SOLUTION/ DROPS OPHTHALMIC
Refills: 0 | COMMUNITY
Start: 2018-09-11

## 2018-10-11 RX ORDER — ALBUTEROL SULFATE 2.5 MG/3ML
2.5 SOLUTION RESPIRATORY (INHALATION) EVERY 6 HOURS PRN
Qty: 360 ML | Refills: 3 | Status: SHIPPED | OUTPATIENT
Start: 2018-10-11

## 2018-10-11 RX ORDER — ROPINIROLE 0.5 MG/1
0.5 TABLET, FILM COATED ORAL
Qty: 90 TABLET | Refills: 0 | Status: CANCELLED | OUTPATIENT
Start: 2018-10-11

## 2018-10-11 RX ORDER — ROPINIROLE 0.5 MG/1
0.5 TABLET, FILM COATED ORAL
Qty: 90 TABLET | Refills: 1 | Status: SHIPPED | OUTPATIENT
Start: 2018-10-11 | End: 2019-01-15 | Stop reason: SDUPTHER

## 2018-10-11 RX ORDER — ATORVASTATIN CALCIUM 40 MG/1
40 TABLET, FILM COATED ORAL DAILY
Qty: 90 TABLET | Refills: 2 | Status: SHIPPED | OUTPATIENT
Start: 2018-10-11 | End: 2018-10-14 | Stop reason: SDUPTHER

## 2018-10-11 NOTE — PROGRESS NOTES
Assessment:    1  Centrilobular emphysema (HCC)  albuterol (2 5 mg/3 mL) 0 083 % nebulizer solution   2  Abnormal chest CT  CT chest without contrast   3  Chronic sinusitis, unspecified location         Plan:     Patient overall doing well, still with some dyspnea on exertion  She will continue with the Breo daily, albuterol 4 times per day as needed  MENDOZA likely multifactorial due to her emphysema, has also gained weight since her last visit which would contribute  Also likely deconditioning due to her PAD as she has pain with walking so has been walking significantly less - used to walk a few miles now only 1  She had recent repeat PFTs which showed normal spirometry with moderately decreased DLCO similar to prior PFT  I did discuss with her repeating an echo to look for pulmonary hypertension given decreased DLCO on PFT and shortness of breath  She does not currently wish to have an echocardiogram done  For her voice changes as well as her postnasal drip I recommended she go see ENT, she will let us know how she would like to see for me to put the referral in  She also has an appointment with her PCP today and will discuss it with them as well  Follow-up in 4 months or sooner if necessary    Subjective:     Patient ID: Susanna Phoenix is a 72 y o  female  Chief Complaint:  Patient is a 70 year-old female recently quit smoking in May 2018 with greater than 55 pack year smoking history past medical history of COPD/emphysema, PAD, osteoporosis, HTN, HLD  She was hospitalized in May for tracheobronchitis and COPD exacerbation  She is here today for follow-up  She overall is feeling improved, though she does still note some shortness of breath with exertion  She does also noticed that her voice trials off and she becomes short of breath if she talks for a long time  She has put on over 10 lb since she quit smoking in May  She is using her Breo as well as albuterol usually once per day            The following portions of the patient's history were reviewed in this encounter and updated as appropriate:   Review of Systems   Constitutional: Negative  HENT: Negative  Respiratory: Positive for shortness of breath  Cardiovascular: Negative  Gastrointestinal: Negative  Genitourinary: Negative  Musculoskeletal: Negative  Skin: Negative  Allergic/Immunologic: Negative  Neurological: Negative  Psychiatric/Behavioral: Negative  Objective:  /70   Pulse 80   Ht 5' 1" (1 549 m)   Wt 61 7 kg (136 lb)   SpO2 98%   BMI 25 70 kg/m²     Physical Exam   Constitutional: She is oriented to person, place, and time  She appears well-developed and well-nourished  No distress  HENT:   Mouth/Throat: Oropharynx is clear and moist    Eyes: Pupils are equal, round, and reactive to light  Neck: Normal range of motion  Cardiovascular: Normal rate and regular rhythm  Pulmonary/Chest: Effort normal  No respiratory distress  She has no decreased breath sounds  She has no wheezes  She has no rhonchi  She has no rales  Abdominal: Soft  There is no tenderness  Musculoskeletal: Normal range of motion  She exhibits no edema  Neurological: She is alert and oriented to person, place, and time  Skin: Skin is warm and dry  Psychiatric: She has a normal mood and affect

## 2018-10-11 NOTE — PROGRESS NOTES
Assessment/Plan:  Her medications refilled I reviewed all of her records breathing has been better now that she has quit smoking she has noticed increased swelling of her right leg compared to left no pain in the leg she has had arterial vascular evaluations to her legs  Will check the vein continue regular follow-up       Diagnoses and all orders for this visit:    Localized edema  -     VAS lower limb venous duplex study, unilateral/limited; Future    Generalized anxiety disorder  -     atorvastatin (LIPITOR) 40 mg tablet; Take 1 tablet (40 mg total) by mouth daily  -     rOPINIRole (REQUIP) 0 5 mg tablet; Take 1 tablet (0 5 mg total) by mouth daily at bedtime    Need for influenza vaccination  -     influenza vaccine, 8665-1082, high-dose, PF 0 5 mL, for patients 65 yr+ (FLUZONE HIGH-DOSE)    Other orders  -     olopatadine (PATANOL) 0 1 % ophthalmic solution;   -     Cancel: rOPINIRole (REQUIP) 0 5 mg tablet; Take 1 tablet (0 5 mg total) by mouth daily at bedtime        No problem-specific Assessment & Plan notes found for this encounter  Subjective:      Patient ID: Karyna Murphy is a 72 y o  female  She is here for follow-up medication refills her breathing has been stable following closely with Pulmonary she notices that her right leg is a little larger than left no pain in the leg or chest pain        The following portions of the patient's history were reviewed and updated as appropriate:   She has a past medical history of Chronic sinusitis; Hyperlipidemia; Lung nodule; PNA (pneumonia); Restless leg syndrome; and Tobacco abuse ,   does not have any pertinent problems on file  ,   has a past surgical history that includes  section; Colonoscopy; Tonsillectomy; and Cataract extraction  ,  family history includes Arthritis in her sister; Cirrhosis in her family; Heart attack in her family;  Pancreatic cancer in her sister; Prostate cancer in her family; Skin cancer in her family; Stroke in her family  ,   reports that she quit smoking about 4 months ago  Her smoking use included Cigarettes  She started smoking about 56 years ago  She smoked 15 00 packs per day  She has never used smokeless tobacco  She reports that she does not drink alcohol or use drugs  ,  is allergic to augmentin [amoxicillin-pot clavulanate]; spiriva handihaler  [tiotropium bromide monohydrate]; tiotropium; and tylenol with codeine #3  [acetaminophen-codeine]     Current Outpatient Prescriptions   Medication Sig Dispense Refill    albuterol (2 5 mg/3 mL) 0 083 % nebulizer solution Take 1 vial (2 5 mg total) by nebulization every 6 (six) hours as needed for wheezing or shortness of breath 360 mL 3    albuterol (VENTOLIN HFA) 90 mcg/act inhaler Inhale 2 puffs every 6 (six) hours as needed      ALPRAZolam (XANAX) 0 5 mg tablet anxiety 30 tablet 0    aspirin ([employer] ASPIRIN EC) 81 mg EC tablet Take 1 tablet by mouth daily      atorvastatin (LIPITOR) 40 mg tablet Take 1 tablet (40 mg total) by mouth daily 90 tablet 2    BREO ELLIPTA USE 1 PUFF EVERY DAY 3 each 3    fluticasone (FLONASE) 50 mcg/act nasal spray 1 spray into each nostril 2 (two) times a day 16 g 3    Naphazoline-Polyethyl Glycol 0 012-0 2 % SOLN Apply to eye      rOPINIRole (REQUIP) 0 5 mg tablet Take 1 tablet (0 5 mg total) by mouth daily at bedtime 90 tablet 1    sodium chloride () 2 % hypertonic ophthalmic solution Sue 128 2 % eye drops      olopatadine (PATANOL) 0 1 % ophthalmic solution   0     No current facility-administered medications for this visit  Review of Systems   Constitutional: Negative for activity change, appetite change, chills, diaphoresis, fatigue, fever and unexpected weight change  HENT: Negative for congestion, dental problem, drooling, ear discharge, ear pain, facial swelling, hearing loss, nosebleeds, postnasal drip, rhinorrhea, sinus pain, sinus pressure, sneezing, sore throat, tinnitus, trouble swallowing and voice change  Eyes: Negative for photophobia, pain, discharge, redness, itching and visual disturbance  Respiratory: Positive for shortness of breath  Negative for apnea, cough, choking, chest tightness, wheezing and stridor  Cardiovascular: Positive for leg swelling  Negative for chest pain and palpitations  Gastrointestinal: Negative for abdominal distention, abdominal pain, anal bleeding, blood in stool, constipation, diarrhea, nausea, rectal pain and vomiting  Endocrine: Negative for cold intolerance, heat intolerance, polydipsia, polyphagia and polyuria  Genitourinary: Negative for decreased urine volume, difficulty urinating, dysuria, enuresis, flank pain, frequency, genital sores, hematuria and urgency  Musculoskeletal: Negative for arthralgias, back pain, gait problem, joint swelling, myalgias, neck pain and neck stiffness  Skin: Negative for color change, pallor, rash and wound  Neurological: Negative for dizziness, tremors, seizures, syncope, facial asymmetry, speech difficulty, weakness, light-headedness, numbness and headaches  Hematological: Negative for adenopathy  Does not bruise/bleed easily  Psychiatric/Behavioral: Negative for agitation, behavioral problems, confusion, decreased concentration, dysphoric mood, hallucinations, self-injury, sleep disturbance and suicidal ideas  The patient is not nervous/anxious and is not hyperactive  Objective:  Vitals:    10/11/18 1134   BP: 120/76   BP Location: Right arm   Patient Position: Sitting   Cuff Size: Standard   Pulse: 85   Temp: 98 1 °F (36 7 °C)   SpO2: 94%   Weight: 61 4 kg (135 lb 6 4 oz)     Body mass index is 25 58 kg/m²  Physical Exam   Constitutional: She is oriented to person, place, and time  She appears well-developed  HENT:   Head: Normocephalic  Right Ear: External ear normal    Left Ear: External ear normal    Mouth/Throat: Oropharynx is clear and moist    Eyes: Pupils are equal, round, and reactive to light  Conjunctivae are normal    Neck: Neck supple  No thyromegaly present  Cardiovascular: Normal rate, regular rhythm, normal heart sounds and intact distal pulses  No murmur heard  Pulmonary/Chest: Effort normal and breath sounds normal  No respiratory distress  She has no wheezes  She has no rales  She exhibits no tenderness  Decreased breath sounds   Abdominal: Soft  Bowel sounds are normal  She exhibits no distension and no mass  Musculoskeletal: Normal range of motion  She exhibits edema (Entire right leg a little larger than left no pitting edema)  She exhibits no tenderness or deformity  Neurological: She is alert and oriented to person, place, and time  She has normal reflexes  Skin: Skin is warm and dry

## 2018-10-14 DIAGNOSIS — F41.1 GENERALIZED ANXIETY DISORDER: ICD-10-CM

## 2018-10-15 RX ORDER — ATORVASTATIN CALCIUM 40 MG/1
TABLET, FILM COATED ORAL
Qty: 90 TABLET | Refills: 3 | Status: SHIPPED | OUTPATIENT
Start: 2018-10-15 | End: 2019-11-17 | Stop reason: SDUPTHER

## 2018-10-19 ENCOUNTER — HOSPITAL ENCOUNTER (OUTPATIENT)
Dept: NON INVASIVE DIAGNOSTICS | Facility: CLINIC | Age: 73
Discharge: HOME | End: 2018-10-19
Payer: MEDICARE

## 2018-10-19 DIAGNOSIS — I74.09 EMBOLISM FROM ABDOMINAL AORTA (HCC): ICD-10-CM

## 2018-10-19 PROCEDURE — 93978 VASCULAR STUDY: CPT | Performed by: SURGERY

## 2018-10-19 PROCEDURE — 93978 VASCULAR STUDY: CPT

## 2018-10-23 ENCOUNTER — TELEPHONE (OUTPATIENT)
Dept: VASCULAR SURGERY | Facility: CLINIC | Age: 73
End: 2018-10-23

## 2018-10-23 NOTE — TELEPHONE ENCOUNTER
----- Message from Stanley Villanueva MD sent at 10/22/2018  4:40 PM EDT -----  Needs ov f/u ap vs vs  ----- Message -----  From: Dub Layer  Sent: 10/22/2018  10:34 AM  To: Stanley Villanueva MD    pls rev vas lab study

## 2018-10-29 DIAGNOSIS — J44.9 COPD (CHRONIC OBSTRUCTIVE PULMONARY DISEASE) WITH CHRONIC BRONCHITIS (HCC): ICD-10-CM

## 2018-10-29 RX ORDER — FLUTICASONE FUROATE AND VILANTEROL 100; 25 UG/1; UG/1
1 POWDER RESPIRATORY (INHALATION) DAILY
Qty: 3 EACH | Refills: 3 | Status: SHIPPED | OUTPATIENT
Start: 2018-10-29 | End: 2020-01-02

## 2018-10-30 ENCOUNTER — OFFICE VISIT (OUTPATIENT)
Dept: VASCULAR SURGERY | Facility: CLINIC | Age: 73
End: 2018-10-30
Payer: MEDICARE

## 2018-10-30 VITALS
SYSTOLIC BLOOD PRESSURE: 130 MMHG | DIASTOLIC BLOOD PRESSURE: 74 MMHG | HEART RATE: 72 BPM | RESPIRATION RATE: 18 BRPM | WEIGHT: 137 LBS | HEIGHT: 60 IN | BODY MASS INDEX: 26.9 KG/M2

## 2018-10-30 DIAGNOSIS — Z72.0 TOBACCO ABUSE: ICD-10-CM

## 2018-10-30 DIAGNOSIS — I65.23 ASYMPTOMATIC BILATERAL CAROTID ARTERY STENOSIS: ICD-10-CM

## 2018-10-30 DIAGNOSIS — I74.09 AORTOILIAC OCCLUSIVE DISEASE (HCC): Primary | ICD-10-CM

## 2018-10-30 DIAGNOSIS — I70.213 ATHEROSCLEROSIS OF NATIVE ARTERY OF BOTH LOWER EXTREMITIES WITH INTERMITTENT CLAUDICATION (HCC): ICD-10-CM

## 2018-10-30 DIAGNOSIS — I77.1 SUBCLAVIAN ARTERY STENOSIS, LEFT (HCC): ICD-10-CM

## 2018-10-30 PROCEDURE — 99215 OFFICE O/P EST HI 40 MIN: CPT | Performed by: PHYSICIAN ASSISTANT

## 2018-10-30 RX ORDER — CILOSTAZOL 50 MG/1
50 TABLET ORAL 2 TIMES DAILY
Qty: 60 TABLET | Refills: 2 | Status: SHIPPED | OUTPATIENT
Start: 2018-10-30 | End: 2018-10-30 | Stop reason: CLARIF

## 2018-10-30 NOTE — PATIENT INSTRUCTIONS
Aorto-Iliac Disease  Peripheral Arterial Disease  / claudication    Discussion: We reviewed Connie's history, symptoms and imaging study  Emma Ye describes porgression in claudication symptoms  The imaging -  AOIL shows severe stenosis at the bifurcation with overall preserved/normal ISABELLA's and above healing toe proessures  The LE arterial study is essentially on from February, 2017 so it had not been followed  I explained to Emma Ye that would should do further testing  She is anxious about things and isn't ready for surgery despite the increase in pain  I offered her cilostozol but she wants to hold off due to finances  Plan:  - Check exercise ISABELLA +/- CTA abd with runoff  - I encouraged Emma Ye to continue regular walking program  - patient education regarding peripheral arterial disease  - smoking cessation was discussed; recent quit  - continue optimal medical therapy on aspirin and moderate-high intensity statin; no recent lipid testing  - We will follow up after testing; Also her routine surveillance for March 2019 was scheduled    -Congratulations on quitting smoking! Symptoms of stroke:  - Unable to speak or understand speech  - Unable to move one side of the body (arm or leg) or visual changes  - Call 911 for any symptoms of stroke   ________________________________________________________________________________________________    Peripheral Artery Disease   AMBULATORY CARE:   Peripheral artery disease (PAD)  is narrow, weak, or blocked arteries  It may affect any arteries outside of your heart and brain  PAD is usually the result of a buildup of fat and cholesterol, also called plaque, along your artery walls  Inflammation, a blood clot, or abnormal cell growth could also block your arteries  PAD prevents normal blood flow to your legs and arms  You are at risk of an amputation if poor blood flow keeps wounds from healing or causes gangrene (tissue death)   Without treatment, PAD can also cause a heart attack or stroke  Common symptoms include:  Mild PAD usually does not cause symptoms  As the disease worsens over time, you may have the following:  · Pain or cramps in your leg or hip while you walk     · A numb, weak, or heavy feeling in your legs     · Dry, scaly, red, or pale skin on your legs     · Thick or brittle nails, or hair loss on your arms and legs     · Foot sores that will not heal     · Burning or aching in your feet and toes while resting (this may be worse when you lie down)  Call 911 for the following:   · You have any of the following signs of a heart attack:      ¨ Squeezing, pressure, or pain in your chest that lasts longer than 5 minutes or returns    ¨ Discomfort or pain in your back, neck, jaw, stomach, or arm     ¨ Trouble breathing    ¨ Nausea or vomiting    ¨ Lightheadedness or a sudden cold sweat, especially with chest pain or trouble breathing    · You have any of the following signs of a stroke:      ¨ Numbness or drooping on one side of your face     ¨ Weakness in an arm or leg    ¨ Confusion or difficulty speaking    ¨ Dizziness, a severe headache, or vision loss  Seek care immediately if:   · You have sores or wounds that will not heal      · You notice black or discolored skin on your arm or leg  · Your skin is cool to the touch  Contact your healthcare provider if:   · You have leg pain when you walk 1/8 mile (200 meters) or less, even with treatment  · Your legs are red, dry, or pale, even with treatment  · You have questions or concerns about your condition or care  Treatment for PAD  can help reduce your risk of a heart attack, stroke, or amputation  You may need more than one of the following:  · Medicines  may be given to prevent blood clots and reduce the risk of a heart attack or stroke  You may be given medicine to help prevent your PAD from getting worse      · A supervised exercise program  helps you stay active in normal daily activities and may prevent disability  Healthcare providers will help you safely walk or do strength training exercises 3 times a week for 30 to 60 minutes  You will do this for several months, then transition to walking on your own  · Angioplasty  is a procedure to open your artery so blood can flow through normally  A thin tube called a catheter is used to insert a small balloon into your artery  The balloon is inflated to open your blocked artery, and then removed  A tube called a stent may be placed in your artery to hold it open  · Bypass surgery  is used to make a new connection to your artery with a vein from another part of your body, or an artificial graft  The vein or graft is attached to your artery above and below your blockage  This allows blood to flow around the blocked portion of your artery  Manage and prevent PAD:   · Walk for 30 to 60 minutes at least 4 times a week  Your healthcare provider may also refer you to an supervised exercise program  The program helps increase how far you can walk without pain  It also helps you stay active in normal daily activities and may prevent disability caused by PAD  · Do not smoke  Nicotine and other chemicals in cigarettes and cigars can worsen PAD  They can also increase your risk for a heart attack or stroke  Ask your healthcare provider for information if you currently smoke and need help to quit  E-cigarettes or smokeless tobacco still contain nicotine  Talk to your healthcare provider before you use these products  · Manage other health conditions  Take your medicines as directed and follow your healthcare provider's instructions if you have high blood pressure or high cholesterol  Perform foot care and check your blood sugar levels as directed if you have diabetes  · Eat heart healthy foods  Eat whole grains, fruits, and vegetables every day  Limit salt and high-fat foods  Ask your healthcare provider for more information on a heart healthy diet  Ask if you need to lose weight  Your healthcare provider can help you create a healthy weight-loss plan  Follow up with your healthcare provider as directed:  Write down your questions so you remember to ask them during your visits  © 2017 [employer]  Information is for End User's use only and may not be sold, redistributed or otherwise used for commercial purposes  All illustrations and images included in CareNotes® are the copyrighted property of [employer]  or [employer]  The above information is an  only  It is not intended as medical advice for individual conditions or treatments  Talk to your doctor, nurse or pharmacist before following any medical regimen to see if it is safe and effective for you  Carotid Artery Disease   AMBULATORY CARE:   Carotid artery disease  is a condition that causes narrow or blocked carotid arteries  Your carotid arteries are the blood vessels that supply your brain with most of the blood it needs to work  You have 2 carotid arteries, one on each side of your neck  Call 911 for any of the following:   · You have any of the following signs of a stroke:      ¨ Numbness or drooping on one side of your face     ¨ Weakness in an arm or leg    ¨ Confusion or difficulty speaking    ¨ Dizziness, a severe headache, or vision loss    · You have any of the following signs of a heart attack:      ¨ Squeezing, pressure, or pain in your chest that lasts longer than 5 minutes or returns    ¨ Discomfort or pain in your back, neck, jaw, stomach, or arm     ¨ Trouble breathing    ¨ Nausea or vomiting    ¨ Lightheadedness or a sudden cold sweat, especially with chest pain or trouble breathing  Contact your healthcare provider if:   · You have questions or concerns about your condition or care  Signs and symptoms of carotid artery disease: You may have no signs or symptoms   Most commonly, carotid artery disease causes transient ischemic attacks (TIAs), or mini-strokes  You may have numbness, weakness, lack of movement, or vision or speech problems  A TIA goes away quickly and does not cause permanent damage  A TIA may be a warning sign that you are about to have a stroke  If you have any symptoms of a TIA or stroke, seek care immediately  Warning signs of a stroke: The word F A S T  can help you remember and recognize warning signs of a stroke  · F = Face:  One side of the face droops  · A = Arms:  One arm starts to drop when both arms are raised  · S = Speech:  Speech is slurred or sounds different than usual     · T = Time:  A person who is having a stroke needs to be seen immediately  A stroke is a medical emergency that needs immediate treatment  Some medicines and treatments work best if given within a few hours of a stroke  Treatment  for carotid artery disease depends on how narrow your arteries have become, your symptoms, and your general health  The goal of treatment is to lower your risk for a stroke  You may need any of the following:  · Take aspirin if directed  Your healthcare provider may suggest that you take an aspirin a day to prevent blood clots from forming in the carotid arteries  If your healthcare provider wants you to take aspirin daily, do not take acetaminophen or ibuprofen instead  · Control risk factors  High blood pressure, high cholesterol, heart disease, diabetes, and being overweight increase your risk for atherosclerosis  · Procedures can help open blocked arteries  A carotid endarterectomy is used to cut plaque out of the artery  An angioplasty is used to push the plaque against the artery wall with a balloon device  Sometimes a stent is placed during an angioplasty  A stent is a metal mesh tube that is placed in the artery to keep it open  Manage carotid artery disease:   · Eat a variety of healthy foods    Healthy foods include fruit, vegetables, whole-grain breads, low-fat dairy products, lean meat, and fish  Choose fish that are high in omega-3 fatty acids, such as salmon and fresh tuna  Ask your healthcare provider for more information on a heart healthy diet and the DASH eating plan  · Limit sodium (salt)  Sodium may increase your blood pressure  Add less table salt to your foods  Read food labels and choose foods that are low in sodium  Your healthcare provider may suggest you follow a low sodium diet  · Reach or maintain a healthy weight  Extra weight makes your heart work harder  Ask your healthcare provider how much you should weight  He can help you create a safe weight loss plan  Even a weight loss of 10% of your body weight can help your heart function better  · Exercise as directed  Exercise helps improve heart function and can help you manage your weight  Exercise can also help lower your cholesterol and blood sugar levels  Try to get at least 30 minutes of exercise at least 5 times each week  Try to be active every day  Your healthcare provider can help you create an exercise plan that works best for you  · Limit alcohol  Alcohol can increase your blood pressure and triglyceride levels  Men should limit alcohol to 2 drinks per day  Women should limit alcohol to 1 drink per day  A drink of alcohol is 12 ounces of beer, 5 ounces of wine, or 1½ ounces of liquor  · Do not smoke  Nicotine and other chemicals in cigarettes and cigars can cause heart and lung damage  Ask your healthcare provider for information if you currently smoke and need help to quit  E-cigarettes or smokeless tobacco still contain nicotine  Talk to your healthcare provider before you use these products  Follow up with your healthcare provider as directed:  Write down your questions so you remember to ask them during your visits  © 2017 [employer] Information is for End User's use only and may not be sold, redistributed or otherwise used for commercial purposes   All illustrations and images included in CareNotes® are the copyrighted property of [employer]  or [employer]  The above information is an  only  It is not intended as medical advice for individual conditions or treatments  Talk to your doctor, nurse or pharmacist before following any medical regimen to see if it is safe and effective for you

## 2018-10-30 NOTE — ASSESSMENT & PLAN NOTE
Plan:  -No symptoms of arm pain or dizziness  -avoid BP's in LEFT arm  -continue with routine surveillance with annual duplex

## 2018-10-30 NOTE — LETTER
October 30, 2018     Kirstie Neff MD  [address] [address]    Patient: Rakesh Noyola   YOB: 1945   Date of Visit: 10/30/2018     Dear Dr Diego Mix      Thank you for referring Rakesh Noyola to me for evaluation  Below are the relevant portions of my assessment and plan of care  If you have questions, please do not hesitate to call me  I look forward to following Zoltan Perez along with you  Sincerely,        Shalom Brock PA-C        CC: No Recipients    Progress Notes: Aortoiliac occlusive disease ([facility] [address] )  Rakesh Noyola 73 y/o F HTN, HLD, IFG, emphysema on inhalers/recent quit smoking in May 2018 who comes in for follow up of severe vascular disease  Zoltan Perez has known history of  AIOD, PAD with intermittent claudication, as well as carotid artery stenosis and subclavian disease  Zoltan Perez reports progression in her leg pain though she is limited by leg pain and MENDOZA  When she walks, she describes earlier onset pain in the both hips and down legs which makes her stop  She notices that she has to stop more frequently  These symptoms are particularly worse when walking up hills while she can walk flat for much longer  However, she also gets these hip/leg pain bending over and working around the house  Walking flat she does fine  She does not drive so she relies on walking  She quit smoking earlier this year and has gained some weight  VAS abdominal aorta/iliacs 10/19/2018:   Patient aorta; >75% stenosis at the aortic bifurcation  Bilateral Iliac, internal iliac, EIA patent  SMA, celiac, bilateral prox renals are patent  R ISABELLA 1/01/86/88; L 0 90/93/90    Exam:  I was unable to palpate the femoral pulses, but I did find good signals with the doppler  2+ pulses in the feet which are warm and overall motor-sensory intact  No wounds  Biphasic AT/DP signals bilaterally  n1 - 2 + LE edema    Discussion: We reviewed Connie's history, symptoms and imaging study  Amol May describes porgression in claudication symptoms  The imaging -  AOIL shows severe stenosis at the bifurcation with overall preserved/normal ISABELLA's and above healing toe proessures  The LE arterial study is essentially on from February, 2017 so it had not been followed  I explained to Amol May that would should do further testing  She is anxious about things and isn't ready for surgery despite the increase in pain  I offered her cilostozol but she wants to hold off due to finances  Plan:  - Check exercise ISABELLA +/- CTA abd with runoff  - I encouraged Amlo May to continue regular walking program  - patient education regarding peripheral arterial disease  - smoking cessation was discussed; recent quit  - continue optimal medical therapy on aspirin and moderate-high intensity statin; no recent lipid testing  - We will follow up after testing; Also her routine surveillance for March 2019 was scheduled  Asymptomatic bilateral carotid artery stenosis  VAS carotid du 2/27/18  CLEOPATRA < 34% 32/76  LICA  <64% 18/78; Vertebral artery flow is retrograde and monophasic waveforms in the subclavian artery suggestive of proximal disease  There is a >20 mmHg brachial pressure gradient (R>L)      Plan:  -No symptoms of carotid artery disease  -We reviewed last carotid duplex which looks ok  -Patient education regarding carotid artery disease/TIA  -Continue with aspirin and high intensity statin therapy  -Routine clinical monitoring and annual surveillance given SC and vertebral disease;  follow-up annual duplex in February 2019    Subclavian artery stenosis, left (HCC)  Plan:  -No symptoms of arm pain or dizziness  -avoid BP's in LEFT arm  -continue with routine surveillance with annual duplex    Tobacco abuse  -Stop smoking in May 2018  -motivated; continued smoking cessation was encouraged    Atherosclerosis of native artery of both lower extremities with intermittent claudication ([address] )  -Management as above under aortoiliac occlusive disease

## 2018-10-30 NOTE — ASSESSMENT & PLAN NOTE
VAS carotid du 2/27/18  CLEOPATRA < 67% 64/37  LICA  <32% 52/95; Vertebral artery flow is retrograde and monophasic waveforms in the subclavian artery suggestive of proximal disease  There is a >20 mmHg brachial pressure gradient (R>L)      Plan:  -No symptoms of carotid artery disease  -We reviewed last carotid duplex which looks ok  -Patient education regarding carotid artery disease/TIA  -Continue with aspirin and high intensity statin therapy  -Routine clinical monitoring and annual surveillance given SC and vertebral disease;  follow-up annual duplex in February 2019

## 2018-10-30 NOTE — PROGRESS NOTES
Assessment/Plan: Aortoiliac occlusive disease ([facility] [address] )  Max Narvaez 71 y/o F HTN, HLD, IFG, emphysema on inhalers/recent quit smoking in May 2018 who comes in for follow up of severe vascular disease  Ha Presume has known history of  AIOD, PAD with intermittent claudication, as well as carotid artery stenosis and subclavian disease  Ha Presume reports progression in her leg pain though she is limited by leg pain and MENDOZA  When she walks, she describes earlier onset pain in the both hips and down legs which makes her stop  She notices that she has to stop more frequently  These symptoms are particularly worse when walking up hills while she can walk flat for much longer  However, she also gets these hip/leg pain bending over and working around the house  Walking flat she does fine  She does not drive so she relies on walking  She quit smoking earlier this year and has gained some weight  VAS abdominal aorta/iliacs 10/19/2018:   Patient aorta; >75% stenosis at the aortic bifurcation  Bilateral Iliac, internal iliac, EIA patent  SMA, celiac, bilateral prox renals are patent  R ISABELLA 1/01/86/88; L 0 90/93/90    Exam:  I was unable to palpate the femoral pulses, but I did find good signals with the doppler  2+ pulses in the feet which are warm and overall motor-sensory intact  No wounds  Biphasic AT/DP signals bilaterally  1 - 2 + LE edema    Discussion: We reviewed Connie's history, symptoms and imaging study  Ha Presume describes progression in claudication symptoms  Though sx are daily and affecting her activities, she feels that she is not at the point where she really wants to go forward with invasive procedures  We discussed that the imaging -  AOIL shows severe stenosis at the bifurcation with increased velocity, but overall preserved/normal ISABELLA's and above healing toe proessures  The LE arterial study is essentially on from February, 2017 so it had not been followed       I explained to Ha Presume that would should do further testing  She is anxious about things and isn't ready for surgery despite the increase in pain  I offered her cilostozol but she wants to hold off due to finances  Plan:  - Check exercise ISABELLA +/- CTA abd with runoff  - I encouraged Ila Alves to continue regular walking program  - patient education regarding peripheral arterial disease  - smoking cessation was discussed; recent quit  - continue optimal medical therapy on aspirin and moderate-high intensity statin; no recent lipid testing  - We will follow up after testing; also her routine surveillance for March 2019 was scheduled  Asymptomatic bilateral carotid artery stenosis  VAS carotid du 2/27/18  CLEOPATRA < 92% 75/56  LICA  <70% 49/34; Vertebral artery flow is retrograde and monophasic waveforms in the subclavian artery suggestive of proximal disease  There is a >20 mmHg brachial pressure gradient (R>L)  Plan:  -No symptoms of carotid artery disease  -We reviewed last carotid duplex which looks ok  -Patient education regarding carotid artery disease/TIA  -Continue with aspirin and high intensity statin therapy  -Routine clinical monitoring and annual surveillance given SC and vertebral disease;  follow-up annual duplex in February 2019    Subclavian artery stenosis, left (HCC)  Plan:  -No symptoms of arm pain or dizziness  -avoid BP's in LEFT arm  -continue with routine surveillance with annual duplex    Tobacco abuse  -Stop smoking in May 2018  -motivated; continued smoking cessation was encouraged    Atherosclerosis of native artery of both lower extremities with intermittent claudication ([address] )  -Management as above under aortoiliac occlusive disease         Subjective:      Patient ID: Selena Pretty is a 72 y o  female  Patient had an C/ [address] on 10/19  Patient has KARYN leg pain  In her hips to calves/feet  She can walk about a block before pain starts  She has pain with bending and doing daiy activities in her hips and thighs   She has swelling in her right leg  Patient has a history of HLD and COPD  Patient takes ASA and atorvastatin  HPI   Selena Pretty 73 y/o F HTN, HLD, IFG, emphysema on inhalers/recent quit smoking in May 2018 who comes in for follow up of severe vascular disease  Ila Alves is followed AIOD with intermittent claudication, as well as carotid artery stenosis and subclavian disease  Ila Alves quit smoking this year and has gained some weight  Unfortunately, she reports progression in her leg pain  She describes at earlier onset pain and stopping more frequently  Ila Alves does not drive so she walks or takes the bus where ever she goes  She reports that a short distance walk of about 3 blocks that she was able to do last year now requires multiple times due to leg pain and shortness of breath  In the past, she had thigh pain  Now she has bilateral "hip" pain which goes from her hips down to her legs and feet  The symptoms occur when walking up any hills, mbut also bending over to do work at home  If she is walking in [place] where it is flat, she could walk much further before she has pain  She has no foot wounds  Ila Alves describes limitations due to leg pain but she is also short of breath  No CP  We reviewed her aorto iliac duplex study  Ila Alves also complains of leg swelling which worsens during the day  No calf pain  She tells me PCP is sending her for venous du but she isn't going to go  Since the study was ordered, I suggested that she go  We fit her with Tubigrip stockings to wear during the day and I asked her to raise her legs when she is at rest   Given her vascular disease, we will hold off on formal compression  Ila Alves has had no symptoms of TIA or stroke  We reviewed her last carotid duplex which is stable  I told her that since she does have subclavian and vertebral disease,w e will continue following the carotid du on an annual basis      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 is no longer seen; no sx of  mesenteric ischemia  Rx: NEBS, albuterol, rescue ASA 81, atorvastatin 40, Requip, BreoEllipta, Flonase, Xanax      VAS abdominal aorta/iliacs 10/19/2018:   Patient aorta; >75% stenosis at the aortic bifurcation  Bilateral Iliac, internal iliac, EIA patent  SMA, celiac, bilateral prox renals are patent  R ISABELLA 121/86/88  L ISABELLA  108/93/90    VAS carotid du 2/27/18  CLEOPATRA < 50 99/88  LICA<50% 45/06; Vertebral artery flow is retrograde and monophasic waveforms in the subclavian artery suggestive of proximal disease  There is a >20 mmHg brachial pressure gradient (R>L)  The following portions of the patient's history were reviewed and updated as appropriate: allergies, current medications, past family history, past medical history, past social history, past surgical history and problem list     Review of Systems   Constitutional: Negative  HENT: Negative  Eyes: Negative  Respiratory: Positive for shortness of breath (with walking)  Cardiovascular: Positive for leg swelling  Gastrointestinal: Negative  Endocrine: Negative  Genitourinary: Negative  Musculoskeletal:        Leg pain   Skin: Negative  Allergic/Immunologic: Negative  Neurological: Negative  Hematological: Negative  Psychiatric/Behavioral: Negative  Objective:    /74 (BP Location: Right arm, Patient Position: Sitting)   Pulse 72   Resp 18   Ht 5' (1 524 m)   Wt 62 1 kg (137 lb)   BMI 26 76 kg/m²        Physical Exam   Constitutional: She is oriented to person, place, and time  She appears well-developed and well-nourished  She is cooperative  HENT:   Head: Normocephalic and atraumatic  Eyes: Pupils are equal, round, and reactive to light  EOM are normal    Neck: Trachea normal  Neck supple  No JVD present  No thyromegaly present  Cardiovascular: Normal rate, regular rhythm, S1 normal, S2 normal and normal heart sounds  Exam reveals no gallop and no friction rub      No murmur heard  Pulses:       Carotid pulses are 2+ on the right side, and 2+ on the left side  Radial pulses are 2+ on the right side, and 2+ on the left side  Femoral pulses are 0 on the right side, and 0 on the left side  Dorsalis pedis pulses are 2+ on the right side, and 2+ on the left side  Posterior tibial pulses are 2+ on the right side, and 1+ on the left side  I was unable to palpate the femoral pulses, but I did find signals with the doppler  2+ pulses in the feet which are warm and overall motor-sensory intact  No wounds  Biphasic AT/DP signals bilaterally  1 - 2 + LE edema    No chronic skin changes   Pulmonary/Chest: Effort normal and breath sounds normal  No accessory muscle usage  No respiratory distress  She has no wheezes  She has no rales  Abdominal: Soft  Bowel sounds are normal  She exhibits no distension  There is no hepatosplenomegaly  There is no tenderness  Musculoskeletal: Normal range of motion  She exhibits edema  She exhibits no deformity  Neurological: She is alert and oriented to person, place, and time  Grossly normal    Skin: Skin is warm and dry  No lesion and no rash noted  No cyanosis  Nails show no clubbing  Psychiatric: She has a normal mood and affect  Nursing note and vitals reviewed          Vitals:    10/30/18 1311   BP: 130/74   BP Location: Right arm   Patient Position: Sitting   Pulse: 72   Resp: 18   Weight: 62 1 kg (137 lb)   Height: 5' (1 524 m)       Patient Active Problem List   Diagnosis    Anxiety    Aortoiliac occlusive disease ([facility] [address] )    Asymptomatic bilateral carotid artery stenosis    Atherosclerosis of native artery of both lower extremities with intermittent claudication (HCC)    Centrilobular emphysema (HCC)    Hyperlipidemia    Impaired fasting glucose    Osteoporosis    Restless leg syndrome    Subclavian artery stenosis, left (HCC)    Tobacco abuse    PVD (peripheral vascular disease) ([facility] [address] )    Chronic sinusitis    MENDOZA (dyspnea on exertion)       Past Surgical History:   Procedure Laterality Date    CATARACT EXTRACTION       SECTION      COLONOSCOPY      Complete  Resolved: 13    TONSILLECTOMY         Family History   Problem Relation Age of Onset    Arthritis Sister     Pancreatic cancer Sister     Heart attack Family         Acute Myocardial Infarction    Cirrhosis Family     Prostate cancer Family     Skin cancer Family     Stroke Family         Syndrome       Social History     Social History    Marital status:      Spouse name: N/A    Number of children: 2    Years of education: N/A     Occupational History    Retired/      Social History Main Topics    Smoking status: Former Smoker     Packs/day: 15 00     Types: Cigarettes     Start date: 1962     Quit date: 2018    Smokeless tobacco: Never Used    Alcohol use No    Drug use: No    Sexual activity: Not on file     Other Topics Concern    Not on file     Social History Narrative    Living w/adult children    No advance directive on file       Allergies   Allergen Reactions    Augmentin [Amoxicillin-Pot Clavulanate]      Pt received ceftriaxone 1 g IV on 18    Spiriva Handihaler  [Tiotropium Bromide Monohydrate]     Tiotropium     Tylenol With Codeine #3  [Acetaminophen-Codeine]          Current Outpatient Prescriptions:     albuterol (2 5 mg/3 mL) 0 083 % nebulizer solution, Take 1 vial (2 5 mg total) by nebulization every 6 (six) hours as needed for wheezing or shortness of breath, Disp: 360 mL, Rfl: 3    albuterol (VENTOLIN HFA) 90 mcg/act inhaler, Inhale 2 puffs every 6 (six) hours as needed, Disp: , Rfl:     ALPRAZolam (XANAX) 0 5 mg tablet, anxiety, Disp: 30 tablet, Rfl: 0    aspirin ([employer] ASPIRIN EC) 81 mg EC tablet, Take 1 tablet by mouth daily, Disp: , Rfl:     atorvastatin (LIPITOR) 40 mg tablet, TAKE 1 TABLET DAILY  , Disp: 90 tablet, Rfl: 3    fluticasone (FLONASE) 50 mcg/act nasal spray, 1 spray into each nostril 2 (two) times a day, Disp: 16 g, Rfl: 3    fluticasone-vilanterol (BREO ELLIPTA) 100-25 mcg/inh inhaler, Inhale 1 puff daily Rinse mouth after use , Disp: 3 each, Rfl: 3    olopatadine (PATANOL) 0 1 % ophthalmic solution, , Disp: , Rfl: 0    rOPINIRole (REQUIP) 0 5 mg tablet, Take 1 tablet (0 5 mg total) by mouth daily at bedtime, Disp: 90 tablet, Rfl: 1    sodium chloride () 2 % hypertonic ophthalmic solution, Sue 128 2 % eye drops, Disp: , Rfl:         VAS abdominal aorta/iliacs 10/19/2018: Impression:  The aorta is patent and normal in caliber  There is a >75% stenosis at the  aortic bifurcation  The bilateral common iliac, internal iliac, and external iliac arteries are  patent and normal in caliber  The superior mesenteric, celiac, and bilateral proximal renal arteries are  patent  Right Lower Limb  Ankle Pressure: 121  mm Hg ,  ISABELLA: 1 01  Previous 0 92  Right Metatarsal Pressure: 86  mm Hg  Right Big Toe Pressure 88 mm Hg ,     Left Lower Limb  Ankle Pressure 108  mm Hg ,  ISABELLA: 0 90  Previous 0 86  Left Metatarsal Pressure: 93  mm Hg  Left Big Toe Pressure 90  mm Hg ,     In comparison to the study of 2/21/2018, there is no significant interval  change in the disease process  Recommend repeat testing in 6 months as per protocol unless clinically  indicated  SIGNATURE:  Electronically Signed by: Cecille Leahy on 2018-10-19 04:40:12 PM    VAS carotid du 2/27/18  Indications:  Carotid disease w/o CVA [I65 29]  Yearly surveillance of carotid artery disease  Patient is asymptomatic at this  time  Risk Factors  The patient has history of hyperlipidemia and smoking  Clinical  Right Pressure:  118/58 mm Hg, Left Pressure:  88/ mm Hg  FINDINGS:     Right        Impression  PSV  EDV (cm/s)  Direction of Flow  Ratio    Dist  ICA                 79          24                      0 80    Mid   ICA                  81 29                      0 82    Prox  ICA    1 - 49%      92          27                      0 94    Dist CCA                  87          20                              Mid CCA                   98          16                      0 86    Prox CCA                  91          21                      1 00    Ext Carotid               92          14                      0 93    Prox Vert                 98          27  Antegrade                   Subclavian               133          16                              Innominate               114          22                                 Left         Impression  PSV  EDV (cm/s)  Direction of Flow  Ratio    Dist  ICA                 87          27                      1 04    Mid  ICA                 112          34                      1 33    Prox  ICA    1 - 49%      93          26                      1 11    Dist CCA                  86          24                              Mid CCA                   84          22                      0 73    Prox CCA                 116          19                              Ext Carotid               49           0                      0 58    Prox Vert                 57           0  Retrograde                  Subclavian               170          23                                       CONCLUSION:  Impression  RIGHT:  There is <50% stenosis noted in the internal carotid artery  Plaque is  heterogenous and smooth  Vertebral artery flow is antegrade  There is no significant subclavian artery  disease  LEFT:  There is <50% stenosis noted in the internal carotid artery  Plaque is  heterogenous and smooth  Vertebral artery flow is retrograde and monophasic  waveforms are visualized in the subclavian artery suggestive of proximal  disease  There is a >20 mmHg brachial pressure gradient (R>L)  Compared to previous study on 1-31-17, there is no significant change    Recommend repeat testing in 1 yr as per protocol unless clinically indicated  Internal carotid artery stenosis determination by consensus criteria from:  abigail Ritchie al  Carotid Artery Stenosis: Gray-Scale and Doppler US Diagnosis  - Society of Radiologists in [facility] [address], Radiology 2003;  [phone]  SIGNATURE:  Electronically Signed by: Rosi Selby MD, LUCAS on 2018-03-02 06:10:15 AM      LEATHA SNYDER Jan 2017  CLINICAL:  Indications: Atherosclerosis of native arteries of extremities with  intermittent claudication, bilateral legs [I70 213]  Patient reports increasing pain in both thighs (R=L) after walking 2 5 blocks  (Previously 5)  Subsides with rest  Worse on inclines  Denies rest pain  Risk Factors: The patient has history of hyperlipidemia  Current smoker  Clinical  Right Pressure:  110/ mm Hg, Left Pressure:  74/ mm Hg  FINDINGS:     Segment                Rig       Left                                            PSV  EDV  PSV  EDV    Common Femoral Artery   95    0   84    6    Prox Profunda           94    0   43    0    Prox SFA                95    0   97    0    Mid SFA                 60    0   71    0    Dist SFA                45    0   45    0    Proximal Pop            36    0   34    0    Distal Pop              35    0   43    0    Tibioperoneal           35        31         Prox Post Tibial        42    0   54    0    Dist Post Tibial        51    0   46    0    Prox  Ant  Tibial       34    0   44    0    Dist  Ant  Tibial       30    0   45    0    Prox Peroneal           22    8   37    0    Dist Peroneal           25    0   26    0             CONCLUSION:  Impression:  RIGHT LOWER LIMB:  This resting evaluation shows no evidence of significant lower extremity  arterial occlusive disease  Biphasic waveforms at ankle  Ankle/Brachial index: 091 (Normal) , Prior 0 92  PVR tracings are  dampened  Metatarsal pressure of 79 mmHg  Great toe pressure of 53 mmHg, within the healing range       LEFT LOWER LIMB:  This resting evaluation shows no evidence of significant lower extremity  arterial occlusive disease  Biphasic waveforms at ankle  Ankle/Brachial index:  0 95 (Normal)  , Prior 0 94  PVR tracings are dampened  Metatarsal pressure of 113 mmHg  Great toe pressure of 68 mmHg, within the healing range     Compared to previous study on 11-4-15, there is no significant change  Recommend repeat testing in 1year as per protocol unless otherwise indicated       SIGNATURE:  Electronically Signed by: Mauro Sales MD, RPVI on 2017-01-31 03:49:11 PM

## 2018-10-30 NOTE — ASSESSMENT & PLAN NOTE
Harrison Butterfield 73 y/o F HTN, HLD, IFG, emphysema on inhalers/recent quit smoking in May 2018 who comes in for follow up of severe vascular disease  Natan Leon has known history of  AIOD, PAD with intermittent claudication, as well as carotid artery stenosis and subclavian disease  Natan Leon reports progression in her leg pain though she is limited by leg pain and MENDOZA  When she walks, she describes earlier onset pain in the both hips and down legs which makes her stop  She notices that she has to stop more frequently  These symptoms are particularly worse when walking up hills while she can walk flat for much longer  However, she also gets these hip/leg pain bending over and working around the house  Walking flat she does fine  She does not drive so she relies on walking  She quit smoking earlier this year and has gained some weight  VAS abdominal aorta/iliacs 10/19/2018:   Patient aorta; >75% stenosis at the aortic bifurcation  Bilateral Iliac, internal iliac, EIA patent  SMA, celiac, bilateral prox renals are patent  R ISABELLA 1/01/86/88; L 0 90/93/90    Exam:  I was unable to palpate the femoral pulses, but I did find good signals with the doppler  2+ pulses in the feet which are warm and overall motor-sensory intact  No wounds  Biphasic AT/DP signals bilaterally  1 - 2 + LE edema    Discussion: We reviewed Connie's history, symptoms and imaging study  Natan Leon describes progression in claudication symptoms  Though sx are daily and affecting her activities, she feels that she is not at the point where she really wants to go forward with invasive procedures  We discussed that the imaging -  AOIL shows severe stenosis at the bifurcation with increased velocity, but overall preserved/normal ISABELLA's and above healing toe proessures  The LE arterial study is essentially on from February, 2017 so it had not been followed  I explained to Natan Leon that would should do further testing   She is anxious about things and isn't ready for surgery despite the increase in pain  I offered her cilostozol but she wants to hold off due to finances  Plan:  - Check exercise ISABELLA +/- CTA abd with runoff  - I encouraged Cecilio Watters to continue regular walking program  - patient education regarding peripheral arterial disease  - smoking cessation was discussed; recent quit  - continue optimal medical therapy on aspirin and moderate-high intensity statin; no recent lipid testing  - We will follow up after testing; also her routine surveillance for March 2019 was scheduled

## 2018-10-30 NOTE — LETTER
October 30, 2018     Colletta Lolling, MD  [address] [address]    Patient: Misael Kennedy   YOB: 1945   Date of Visit: 10/30/2018     Dear Dr Kavya Morris      Thank you for referring Misael Kennedy to me for evaluation  Below are the relevant portions of my assessment and plan of care  If you have questions, please do not hesitate to call me  I look forward to following Maury Pinzon along with you  Sincerely,        Aniya Chaudhari PA-C        CC: No Recipients    Progress Notes: Aortoiliac occlusive disease ([address] )  Misael Kennedy 71 y/o F HTN, HLD, IFG, emphysema on inhalers/recent quit smoking in May 2018 who comes in for follow up of severe vascular disease  Maury Pinzon has known history of  AIOD, PAD with intermittent claudication, as well as carotid artery stenosis and subclavian disease  Maury Pinzon reports progression in her leg pain though she is limited by leg pain and MENDOZA  When she walks, she describes earlier onset pain in the both hips and down legs which makes her stop  She notices that she has to stop more frequently  These symptoms are particularly worse when walking up hills while she can walk flat for much longer  However, she also gets these hip/leg pain bending over and working around the house  Walking flat she does fine  She does not drive so she relies on walking  She quit smoking earlier this year and has gained some weight  VAS abdominal aorta/iliacs 10/19/2018:   Patient aorta; >75% stenosis at the aortic bifurcation  Bilateral Iliac, internal iliac, EIA patent  SMA, celiac, bilateral prox renals are patent  R ISABELLA 1/01/86/88; L 0 90/93/90    Exam:  I was unable to palpate the femoral pulses, but I did find good signals with the doppler  2+ pulses in the feet which are warm and overall motor-sensory intact  No wounds  Biphasic AT/DP signals bilaterally  1 - 2 + LE edema    Discussion: We reviewed Connie's history, symptoms and imaging study  Zoltan Perez describes progression in claudication symptoms  Though sx are daily and affecting her activities, she feels that she is not at the point where she really wants to go forward with invasive procedures  We discussed that the imaging -  AOIL shows severe stenosis at the bifurcation with increased velocity, but overall preserved/normal ISABELLA's and above healing toe proessures  The LE arterial study is essentially on from February, 2017 so it had not been followed  I explained to Zoltna Perez that would should do further testing  She is anxious about things and isn't ready for surgery despite the increase in pain  I offered her cilostozol but she wants to hold off due to finances  Plan:  - Check exercise ISABELLA +/- CTA abd with runoff  - I encouraged Zoltan Perez to continue regular walking program  - patient education regarding peripheral arterial disease  - smoking cessation was discussed; recent quit  - continue optimal medical therapy on aspirin and moderate-high intensity statin; no recent lipid testing  - We will follow up after testing; also her routine surveillance for March 2019 was scheduled  Asymptomatic bilateral carotid artery stenosis  VAS carotid du 2/27/18  CLEOPATRA < 95% 38/87  LICA  <27% 34/67; Vertebral artery flow is retrograde and monophasic waveforms in the subclavian artery suggestive of proximal disease  There is a >20 mmHg brachial pressure gradient (R>L)      Plan:  -No symptoms of carotid artery disease  -We reviewed last carotid duplex which looks ok  -Patient education regarding carotid artery disease/TIA  -Continue with aspirin and high intensity statin therapy  -Routine clinical monitoring and annual surveillance given SC and vertebral disease;  follow-up annual duplex in February 2019    Subclavian artery stenosis, left (HCC)  Plan:  -No symptoms of arm pain or dizziness  -avoid BP's in LEFT arm  -continue with routine surveillance with annual duplex    Tobacco abuse  -Stop smoking in May 2018  -motivated; continued smoking cessation was encouraged    Atherosclerosis of native artery of both lower extremities with intermittent claudication ([facility] [address] )  -Management as above under aortoiliac occlusive disease

## 2019-01-15 DIAGNOSIS — F41.1 GENERALIZED ANXIETY DISORDER: ICD-10-CM

## 2019-01-15 RX ORDER — ROPINIROLE 0.5 MG/1
0.5 TABLET, FILM COATED ORAL
Qty: 90 TABLET | Refills: 0 | Status: SHIPPED | OUTPATIENT
Start: 2019-01-15 | End: 2019-01-17 | Stop reason: SDUPTHER

## 2019-01-15 RX ORDER — ROPINIROLE 0.5 MG/1
0.5 TABLET, FILM COATED ORAL
Qty: 90 TABLET | Refills: 0 | Status: SHIPPED | OUTPATIENT
Start: 2019-01-15 | End: 2019-01-15 | Stop reason: SDUPTHER

## 2019-01-15 NOTE — TELEPHONE ENCOUNTER
Patient is not been sleeping well at night , keeps waking  up     In Nov  she stated take 1 and 1/2 pill of ROPINIROLE daily       So she only has 6 days left and would like a refill with a  high dosage

## 2019-01-16 NOTE — TELEPHONE ENCOUNTER
Patient called to check on status of med    Higher dosage    Please call pt with questions   #[phone]

## 2019-01-17 ENCOUNTER — TELEPHONE (OUTPATIENT)
Dept: INTERNAL MEDICINE CLINIC | Facility: CLINIC | Age: 74
End: 2019-01-17

## 2019-01-17 DIAGNOSIS — F41.1 GENERALIZED ANXIETY DISORDER: ICD-10-CM

## 2019-01-17 RX ORDER — ROPINIROLE 1 MG/1
TABLET, FILM COATED ORAL
Qty: 60 TABLET | Refills: 5 | Status: SHIPPED | OUTPATIENT
Start: 2019-01-17 | End: 2019-11-04 | Stop reason: SDUPTHER

## 2019-01-17 NOTE — TELEPHONE ENCOUNTER
Patient is asking Dr Juanita Brownlee for a new script for Ropinirole  She is currently taking 0 5 mg tablet but would like a script to increase the dosage  She has been taking 1 and 1/2 tablets per night  She is awakened after about 1 hour and feels that the dosage might need to be stronger  Please advise patient  She only has enough pills through Saturday

## 2019-02-04 ENCOUNTER — TELEPHONE (OUTPATIENT)
Dept: INTERNAL MEDICINE CLINIC | Facility: CLINIC | Age: 74
End: 2019-02-04

## 2019-02-04 NOTE — TELEPHONE ENCOUNTER
Pt cld to have her XANAX refilled and sent to [facility] [address]  She stated she hasn't had this medication since June but is running low on it

## 2019-02-05 ENCOUNTER — HOSPITAL ENCOUNTER (OUTPATIENT)
Dept: NON INVASIVE DIAGNOSTICS | Facility: CLINIC | Age: 74
Discharge: HOME | End: 2019-02-05
Payer: MEDICARE

## 2019-02-05 ENCOUNTER — TELEPHONE (OUTPATIENT)
Dept: INTERNAL MEDICINE CLINIC | Facility: CLINIC | Age: 74
End: 2019-02-05

## 2019-02-05 DIAGNOSIS — R60.0 LOCALIZED EDEMA: ICD-10-CM

## 2019-02-05 DIAGNOSIS — F41.1 GENERALIZED ANXIETY DISORDER: ICD-10-CM

## 2019-02-05 DIAGNOSIS — I70.213 ATHEROSCLEROSIS OF NATIVE ARTERY OF BOTH LOWER EXTREMITIES WITH INTERMITTENT CLAUDICATION (HCC): ICD-10-CM

## 2019-02-05 DIAGNOSIS — I74.09 AORTOILIAC OCCLUSIVE DISEASE (HCC): ICD-10-CM

## 2019-02-05 PROCEDURE — 93971 EXTREMITY STUDY: CPT

## 2019-02-05 PROCEDURE — 93924 LWR XTR VASC STDY BILAT: CPT | Performed by: SURGERY

## 2019-02-05 PROCEDURE — 93924 LWR XTR VASC STDY BILAT: CPT

## 2019-02-05 PROCEDURE — 93971 EXTREMITY STUDY: CPT | Performed by: SURGERY

## 2019-02-05 RX ORDER — ALPRAZOLAM 0.5 MG/1
TABLET ORAL
Qty: 30 TABLET | Refills: 0 | Status: SHIPPED | OUTPATIENT
Start: 2019-02-05 | End: 2019-02-08

## 2019-02-05 RX ORDER — ALPRAZOLAM 0.5 MG/1
0.5 TABLET ORAL 2 TIMES DAILY PRN
Qty: 30 TABLET | Refills: 2 | Status: CANCELLED | OUTPATIENT
Start: 2019-02-05

## 2019-02-05 NOTE — TELEPHONE ENCOUNTER
Rec'ed RX for Xanax   5  Qty 30  Anxiety    No directions on the RX  Jayson Bhatia Pls put specific instructions on RX and re send

## 2019-02-08 DIAGNOSIS — F41.1 GENERALIZED ANXIETY DISORDER: ICD-10-CM

## 2019-02-08 RX ORDER — ALPRAZOLAM 0.5 MG/1
TABLET ORAL
Qty: 30 TABLET | Refills: 5 | Status: SHIPPED | OUTPATIENT
Start: 2019-02-08 | End: 2019-05-31 | Stop reason: CLARIF

## 2019-02-08 NOTE — TELEPHONE ENCOUNTER
Patient called, her Cynthia Arriaga was called into [facility]    Pt is unable to take the [facility] Cynthia Arriaga    She needs it called into [facility] # [phone]    Patient # [phone]

## 2019-02-13 NOTE — TELEPHONE ENCOUNTER
Pt called stating that [facility] received her prescription for her [facility] jane Colberto but there is no specific directions for it   The directions need to be sent into [facility] [address]  phone [phone]

## 2019-02-13 NOTE — TELEPHONE ENCOUNTER
Since he ordered 30 pills, I presume the instructions are 1 tab daily as needed for anxiety    Please call the pharmacy with these instructions

## 2019-03-11 ENCOUNTER — HOSPITAL ENCOUNTER (OUTPATIENT)
Dept: NON INVASIVE DIAGNOSTICS | Facility: CLINIC | Age: 74
Discharge: HOME | End: 2019-03-11
Payer: MEDICARE

## 2019-03-11 DIAGNOSIS — I77.1 SUBCLAVIAN ARTERY STENOSIS, LEFT (HCC): ICD-10-CM

## 2019-03-11 DIAGNOSIS — I74.09 AORTOILIAC OCCLUSIVE DISEASE (HCC): ICD-10-CM

## 2019-03-11 DIAGNOSIS — I70.213 ATHEROSCLEROSIS OF NATIVE ARTERY OF BOTH LOWER EXTREMITIES WITH INTERMITTENT CLAUDICATION (HCC): ICD-10-CM

## 2019-03-11 DIAGNOSIS — I65.23 ASYMPTOMATIC BILATERAL CAROTID ARTERY STENOSIS: ICD-10-CM

## 2019-03-11 PROCEDURE — 93880 EXTRACRANIAL BILAT STUDY: CPT

## 2019-03-11 PROCEDURE — 93978 VASCULAR STUDY: CPT | Performed by: SURGERY

## 2019-03-11 PROCEDURE — 93880 EXTRACRANIAL BILAT STUDY: CPT | Performed by: SURGERY

## 2019-03-11 PROCEDURE — 93978 VASCULAR STUDY: CPT

## 2019-03-25 ENCOUNTER — OFFICE VISIT (OUTPATIENT)
Dept: VASCULAR SURGERY | Facility: CLINIC | Age: 74
End: 2019-03-25
Payer: MEDICARE

## 2019-03-25 VITALS
WEIGHT: 142 LBS | HEART RATE: 96 BPM | BODY MASS INDEX: 27.88 KG/M2 | HEIGHT: 60 IN | DIASTOLIC BLOOD PRESSURE: 78 MMHG | SYSTOLIC BLOOD PRESSURE: 140 MMHG | TEMPERATURE: 98.8 F

## 2019-03-25 DIAGNOSIS — I65.23 ASYMPTOMATIC BILATERAL CAROTID ARTERY STENOSIS: ICD-10-CM

## 2019-03-25 DIAGNOSIS — I70.213 ATHEROSCLEROSIS OF NATIVE ARTERY OF BOTH LOWER EXTREMITIES WITH INTERMITTENT CLAUDICATION (HCC): ICD-10-CM

## 2019-03-25 DIAGNOSIS — I74.09 AORTOILIAC OCCLUSIVE DISEASE (HCC): ICD-10-CM

## 2019-03-25 DIAGNOSIS — I73.9 PVD (PERIPHERAL VASCULAR DISEASE) (HCC): ICD-10-CM

## 2019-03-25 DIAGNOSIS — Z72.0 TOBACCO ABUSE: Primary | ICD-10-CM

## 2019-03-25 PROCEDURE — 99215 OFFICE O/P EST HI 40 MIN: CPT | Performed by: PHYSICIAN ASSISTANT

## 2019-03-25 NOTE — LETTER
March 25, 2019     Dionna Pompa MD  [address] [address]    Patient: Naa Davila   YOB: 1945   Date of Visit: 3/25/2019     Dear Dr Luciano Husain      Thank you for referring Naa Davila to me for evaluation  Below are the relevant portions of my assessment and plan of care  If you have questions, please do not hesitate to call me  I look forward to following Nadeen Alcala along with you  Sincerely,        Dilip Stewart PA-C        CC: No Recipients    Progress Notes: Aortoiliac occlusive disease (HCC)  73F HTN, HLD, IFG, emphysema on inhalers/quit smoking in May 2018 who comes in for follow up of severe vascular disease  Nadeen Alcala is followed AIOD with intermittent claudication, as well as carotid artery stenosis and subclavian disease  Nadeen Alcala had progressive worsening of claudication  She describes calf tightness R> L after about 2 blocks of walking  She stops several times when she walks and she has had to cut down on activities as she cannot keep up  She enjoys walking around [facility] and traveling but she is now afraid to do go since she can't walk well  She quit smoking last year and says that she is no longer feels limited by shortness of breath but by leg pain  VAS AOIL   Patent  aorta; >75% stenosis aortic bifurcation  Patent CHARISMA/Internal  Iliac/EIAs; SMA patent  >75% celiac (new finding)  R 0 86/80/71 (prior 1 01); L 0 79 (prior 0 90)/102/72    Exercise ISABELLA 2/5/19:    R 0 87/63/54; post 0 66; L 0 79/75/58; post 0 55 (calf cramping)    Plan:    Severe, lifestyle limiting claudication now at the point to where she wants to consider an intervention  We reviewed her vascular studies  ABIs have dropped    She underwent an exercise ISABELLA wear with very minimal exercise she dropped into the severe claudication range     -check CTA of the abdomen with runoff to delineate anatomy and identify if she is a candidate for revascularization for treatment of lifestyle limiting claudication; check renal function; she will need hydration protocol - likely 2 hrs IV hydration pre and post  -continue with aspirin 81 and atorvastatin 40  -continue daily and progressive walking program  -exercise to maintain healthy weight  -follow up OV after CTA      Asymptomatic bilateral carotid artery stenosis  Carotid artery disease  -no signs or symptoms of TIA/ stroke  -carotid du stable; <50 % bilaterally; R 61/14; L 128/27  -patient education regarding stroke; any symptoms of stroke she should call 911  -follow up duplex in 2 years    Tobacco abuse  -quit May 2018  -continue smoking cessation encouraged    Atherosclerosis of native artery of both lower extremities with intermittent claudication ([facility] [address] )  -management as discussed under aortoiliac disease

## 2019-03-25 NOTE — ASSESSMENT & PLAN NOTE
73F HTN, HLD, IFG, emphysema on inhalers/quit smoking in May 2018 who comes in for follow up of severe vascular disease  Angie Navas is followed AIOD with intermittent claudication, as well as carotid artery stenosis and subclavian disease  Angie Navas had progressive worsening of claudication  She describes calf tightness R> L after about 2 blocks of walking  She stops several times when she walks and she has had to cut down on activities as she cannot keep up  She enjoys walking around [facility] and traveling but she is now afraid to do go since she can't walk well  She quit smoking last year and says that she is no longer feels limited by shortness of breath but by leg pain  VAS AOIL   Patent  aorta; >75% stenosis aortic bifurcation  Patent CHARISMA/Internal  Iliac/EIAs; SMA patent  >75% celiac (new finding)  R 0 86/80/71 (prior 1 01); L 0 79 (prior 0 90)/102/72    Exercise ISABELLA 2/5/19:    R 0 87/63/54; post 0 66; L 0 79/75/58; post 0 55 (calf cramping)    Plan:    Severe, lifestyle limiting claudication now at the point to where she wants to consider an intervention  We reviewed her vascular studies  ABIs have dropped    She underwent an exercise ISABELLA wear with very minimal exercise she dropped into the severe claudication range     -check CTA of the abdomen with runoff to delineate anatomy and identify if she is a candidate for revascularization for treatment of lifestyle limiting claudication; check renal function; she will need hydration protocol - likely 2 hrs IV hydration pre and post  -continue with aspirin 81 and atorvastatin 40  -continue daily and progressive walking program  -exercise to maintain healthy weight  -follow up OV after CTA

## 2019-03-25 NOTE — PATIENT INSTRUCTIONS
Aortoiliac occlusive disease (HCC)  73F HTN, HLD, IFG, emphysema on inhalers/quit smoking in May 2018 who comes in for follow up of severe vascular disease  Emma Ye is followed AIOD with intermittent claudication, as well as carotid artery stenosis and subclavian disease  Emma Ye had progressive worsening of claudication  She describes calf tightness after about 2 blocks of walking  She stops several times when she walks  Her activities are being the changed she cannot keep up with activities is she feels she should  She quit smoking last year  She no longer feels limited by shortness of breath but by leg pain  Plan  -Severe, lifestyle limiting claudication now at the point to where she wants to consider an intervention   -check CTA of the abdomen with runoff; will need renal function checked and hydration protocol  -continue with aspirin 81 and atorvastatin 40  -continue daily and progressive walking program  -exercise to maintain healthy weight  -follow up after CTA      Asymptomatic bilateral carotid artery stenosis  Carotid artery disease  -no signs or symptoms of TIA/ stroke  -carotid du stable; <50 % bilaterally; R 61/14; L 128/27  -patient education regarding stroke; any symptoms of stroke she should call 911  -follow up duplex in 2 years          Peripheral Artery Disease   AMBULATORY CARE:   Peripheral artery disease (PAD)  is narrow, weak, or blocked arteries  It may affect any arteries outside of your heart and brain  PAD is usually the result of a buildup of fat and cholesterol, also called plaque, along your artery walls  Inflammation, a blood clot, or abnormal cell growth could also block your arteries  PAD prevents normal blood flow to your legs and arms  You are at risk of an amputation if poor blood flow keeps wounds from healing or causes gangrene (tissue death)  Without treatment, PAD can also cause a heart attack or stroke    Common symptoms include:  Mild PAD usually does not cause symptoms  As the disease worsens over time, you may have the following:  · Pain or cramps in your leg or hip while you walk     · A numb, weak, or heavy feeling in your legs     · Dry, scaly, red, or pale skin on your legs     · Thick or brittle nails, or hair loss on your arms and legs     · Foot sores that will not heal     · Burning or aching in your feet and toes while resting (this may be worse when you lie down)  Call 911 for the following:   · You have any of the following signs of a heart attack:      ¨ Squeezing, pressure, or pain in your chest that lasts longer than 5 minutes or returns    ¨ Discomfort or pain in your back, neck, jaw, stomach, or arm     ¨ Trouble breathing    ¨ Nausea or vomiting    ¨ Lightheadedness or a sudden cold sweat, especially with chest pain or trouble breathing    · You have any of the following signs of a stroke:      ¨ Numbness or drooping on one side of your face     ¨ Weakness in an arm or leg    ¨ Confusion or difficulty speaking    ¨ Dizziness, a severe headache, or vision loss  Seek care immediately if:   · You have sores or wounds that will not heal      · You notice black or discolored skin on your arm or leg  · Your skin is cool to the touch  Contact your healthcare provider if:   · You have leg pain when you walk 1/8 mile (200 meters) or less, even with treatment  · Your legs are red, dry, or pale, even with treatment  · You have questions or concerns about your condition or care  Treatment for PAD  can help reduce your risk of a heart attack, stroke, or amputation  You may need more than one of the following:  · Medicines  may be given to prevent blood clots and reduce the risk of a heart attack or stroke  You may be given medicine to help prevent your PAD from getting worse  · A supervised exercise program  helps you stay active in normal daily activities and may prevent disability   Healthcare providers will help you safely walk or do strength training exercises 3 times a week for 30 to 60 minutes  You will do this for several months, then transition to walking on your own  · Angioplasty  is a procedure to open your artery so blood can flow through normally  A thin tube called a catheter is used to insert a small balloon into your artery  The balloon is inflated to open your blocked artery, and then removed  A tube called a stent may be placed in your artery to hold it open  · Bypass surgery  is used to make a new connection to your artery with a vein from another part of your body, or an artificial graft  The vein or graft is attached to your artery above and below your blockage  This allows blood to flow around the blocked portion of your artery  Manage and prevent PAD:   · Walk for 30 to 60 minutes at least 4 times a week  Your healthcare provider may also refer you to an supervised exercise program  The program helps increase how far you can walk without pain  It also helps you stay active in normal daily activities and may prevent disability caused by PAD  · Do not smoke  Nicotine and other chemicals in cigarettes and cigars can worsen PAD  They can also increase your risk for a heart attack or stroke  Ask your healthcare provider for information if you currently smoke and need help to quit  E-cigarettes or smokeless tobacco still contain nicotine  Talk to your healthcare provider before you use these products  · Manage other health conditions  Take your medicines as directed and follow your healthcare provider's instructions if you have high blood pressure or high cholesterol  Perform foot care and check your blood sugar levels as directed if you have diabetes  · Eat heart healthy foods  Eat whole grains, fruits, and vegetables every day  Limit salt and high-fat foods  Ask your healthcare provider for more information on a heart healthy diet  Ask if you need to lose weight   Your healthcare provider can help you create a healthy weight-loss plan  Follow up with your healthcare provider as directed:  Write down your questions so you remember to ask them during your visits  © 2017 [employer] Information is for End User's use only and may not be sold, redistributed or otherwise used for commercial purposes  All illustrations and images included in CareNotes® are the copyrighted property of [employer]  or [address]  The above information is an  only  It is not intended as medical advice for individual conditions or treatments  Talk to your doctor, nurse or pharmacist before following any medical regimen to see if it is safe and effective for you

## 2019-03-25 NOTE — PROGRESS NOTES
Assessment/Plan: Aortoiliac occlusive disease (HCC)  73F HTN, HLD, IFG, emphysema on inhalers/quit smoking in May 2018 who comes in for follow up of severe vascular disease  Nadeen Alcala is followed AIOD with intermittent claudication, as well as carotid artery stenosis and subclavian disease  Nadeen Alcala had progressive worsening of claudication  She describes calf tightness R> L after about 2 blocks of walking  She stops several times when she walks and she has had to cut down on activities as she cannot keep up  She enjoys walking around [facility] and traveling but she is now afraid to do go since she can't walk well  She quit smoking last year and says that she is no longer feels limited by shortness of breath but by leg pain  VAS AOIL   Patent  aorta; >75% stenosis aortic bifurcation  Patent CHARISMA/Internal  Iliac/EIAs; SMA patent  >75% celiac (new finding)  R 0 86/80/71 (prior 1 01); L 0 79 (prior 0 90)/102/72    Exercise ISABELLA 2/5/19:    R 0 87/63/54; post 0 66; L 0 79/75/58; post 0 55 (calf cramping)    Plan:    Severe, lifestyle limiting claudication now at the point to where she wants to consider an intervention  We reviewed her vascular studies  ABIs have dropped    She underwent an exercise ISABELLA wear with very minimal exercise she dropped into the severe claudication range     -check CTA of the abdomen with runoff to delineate anatomy and identify if she is a candidate for revascularization for treatment of lifestyle limiting claudication; check renal function; she will need hydration protocol - likely 2 hrs IV hydration pre and post  -continue with aspirin 81 and atorvastatin 40  -continue daily and progressive walking program  -exercise to maintain healthy weight  -follow up OV after CTA      Asymptomatic bilateral carotid artery stenosis  Carotid artery disease  -no signs or symptoms of TIA/ stroke  -carotid du stable; <50 % bilaterally; R 61/14; L 128/27  -patient education regarding stroke; any symptoms of stroke she should call 911  -follow up duplex in 2 years    Tobacco abuse  -quit May 2018  -continue smoking cessation encouraged    Atherosclerosis of native artery of both lower extremities with intermittent claudication ([facility] [address] )  -management as discussed under aortoiliac disease           Subjective:      Patient ID: Natasha Bhakta is a 72 y o  female  Patient returns to the office today to review results of multiple studies she's recently had done  She was last seen on 10/30/18  Patient complains of bilateral leg pain when walking and she frequently has to stop and rest  She also complains of rest pain at night  Patient denies abdominal/flank pain and also all TIA/CVA symptoms  Patient quit smoking about a year ago  HPI    Natasha Bhakta 50C HTN, HLD, IFG, emphysema on inhalers/quit smoking in May 2018 who comes in for follow up of severe vascular disease  Adali Jeyson is followed AIOD with intermittent claudication, as well as carotid artery stenosis and subclavian disease  Progressive calf pain - R>L  2 blocks  She is to make multiple stops when she is walking due to calf claudication and leg fatigue  She says the right leg almost entirely gives out at this point  She is altering her activities and avoiding going on trips that she enjoys  She also has back pain/with sounds like possible hip pain with activity  No sharp shooting pains down the legs  She is troubled by severe restless leg syndrome  She is now having severe restless legs in the evening that only at night  She quit smoking last year  She reports that her breathing has improved and is no longer limiting her    Though she still craves tobacco  very tired     13/0 97; eGFR 59 (last year)    Imaging:        VAS AOIL   Patent  aorta; >75% stenosis aortic bifurcation  Patent CHARISMA/Internal  Iliac/EIAs; SMA patent  >75% celiac (new finding)  R 0 86/80/71 (prior 1 01); L 0 79 (prior 0 90)/102/72  Exercise ISABELLA 2/5/19:    R 0 87/63/54; post 0 66; L 0 79/75/58; post 0 55 (calf cramping)   VAS carotid duplex 3/11/2019  <50 % bilaterally; R 61/14; L 128/27    Aortoiliac occlusive disease/ claudication  -ISABELLA falling from normal into claudication range  -Symptoms          VAS AOIL   Patent  aorta; >75% stenosis aortic bifurcation  Patent CHARISMA/Internal  Iliac/EIAs; SMA patent  >75% celiac (new finding)  R 0 86/80/71 (prior 1 01); L 0 79 (prior 0 90)/102/72      The following portions of the patient's history were reviewed and updated as appropriate: allergies, current medications, past family history, past medical history, past social history, past surgical history and problem list     ROS was reviewed by me    Review of Systems   Constitutional: Positive for activity change  HENT: Negative  Eyes: Negative  Respiratory: Negative  Cardiovascular: Negative  Gastrointestinal: Negative  Endocrine: Negative  Genitourinary: Negative  Musculoskeletal: Positive for back pain  Leg pain   Skin: Negative  Allergic/Immunologic: Negative  Neurological: Positive for numbness  Hematological: Negative  Psychiatric/Behavioral: Negative  Objective:    /78 (BP Location: Right arm, Patient Position: Sitting)   Pulse 96   Temp 98 8 °F (37 1 °C)   Ht 5' (1 524 m)   Wt 64 4 kg (142 lb)   BMI 27 73 kg/m²        Physical Exam   Constitutional: She is oriented to person, place, and time  She appears well-developed and well-nourished  She is cooperative  HENT:   Head: Normocephalic and atraumatic  Eyes: Pupils are equal, round, and reactive to light  EOM are normal    Neck: Trachea normal  Neck supple  No JVD present  No thyromegaly present  Cardiovascular: Normal rate, regular rhythm, S1 normal, S2 normal and normal heart sounds  Exam reveals no gallop and no friction rub  No murmur heard  Pulses:       Carotid pulses are 2+ on the right side, and 2+ on the left side         Radial pulses are 2+ on the right side, and 2+ on the left side  Femoral pulses are 0 on the right side, and 0 on the left side  Dorsalis pedis pulses are 2+ on the right side, and 2+ on the left side  No femoral pulses appreciated    2+ DP pulses B    Feet are warm and well perfused    Trace to mild edema right greater than left    Calves nontender    No wounds    Monophasic AT/ DP/PT signals           Pulmonary/Chest: Effort normal and breath sounds normal  No accessory muscle usage  No respiratory distress  She has no wheezes  She has no rales  Abdominal: Soft  Bowel sounds are normal  She exhibits no distension  There is no hepatosplenomegaly  There is no tenderness  Musculoskeletal: Normal range of motion  She exhibits no deformity  Neurological: She is alert and oriented to person, place, and time  Grossly normal    Skin: Skin is warm and dry  No lesion and no rash noted  No cyanosis  Nails show no clubbing  Psychiatric: She has a normal mood and affect  Nursing note and vitals reviewed  I have reviewed and made appropriate changes to the review of systems input by the medical assistant      Vitals:    19 1309 19 1311   BP: 100/70 140/78   BP Location: Left arm Right arm   Patient Position: Sitting Sitting   Pulse: 96    Temp: 98 8 °F (37 1 °C)    Weight: 64 4 kg (142 lb)    Height: 5' (1 524 m)        Patient Active Problem List   Diagnosis    Anxiety    Aortoiliac occlusive disease ([address] )    Asymptomatic bilateral carotid artery stenosis    Atherosclerosis of native artery of both lower extremities with intermittent claudication (HCC)    Centrilobular emphysema (HCC)    Hyperlipidemia    Impaired fasting glucose    Osteoporosis    Restless leg syndrome    Subclavian artery stenosis, left (HCC)    Tobacco abuse    PVD (peripheral vascular disease) (HCC)    Chronic sinusitis    MENDOZA (dyspnea on exertion)       Past Surgical History:   Procedure Laterality Date    CATARACT EXTRACTION       SECTION      COLONOSCOPY      Complete  Resolved: 13    TONSILLECTOMY         Family History   Problem Relation Age of Onset    Arthritis Sister     Pancreatic cancer Sister     Heart attack Family         Acute Myocardial Infarction    Cirrhosis Family     Prostate cancer Family     Skin cancer Family     Stroke Family         Syndrome       Social History     Socioeconomic History    Marital status:       Spouse name: Not on file    Number of children: 2    Years of education: Not on file    Highest education level: Not on file   Occupational History    Occupation: Retired/   Social Needs    Financial resource strain: Not on file    Food insecurity:     Worry: Not on file     Inability: Not on file    Transportation needs:     Medical: Not on file     Non-medical: Not on file   Tobacco Use    Smoking status: Former Smoker     Packs/day: 15 00     Types: Cigarettes     Start date: 1962     Last attempt to quit: 2018     Years since quittin 8    Smokeless tobacco: Never Used   Substance and Sexual Activity    Alcohol use: No    Drug use: No    Sexual activity: Not on file   Lifestyle    Physical activity:     Days per week: Not on file     Minutes per session: Not on file    Stress: Not on file   Relationships    Social connections:     Talks on phone: Not on file     Gets together: Not on file     Attends Restoration service: Not on file     Active member of club or organization: Not on file     Attends meetings of clubs or organizations: Not on file     Relationship status: Not on file    Intimate partner violence:     Fear of current or ex partner: Not on file     Emotionally abused: Not on file     Physically abused: Not on file     Forced sexual activity: Not on file   Other Topics Concern    Not on file   Social History Narrative    Living w/adult children    No advance directive on file       Allergies   Allergen Reactions    Augmentin [Amoxicillin-Pot Clavulanate]      Pt received ceftriaxone 1 g IV on 5-21-18    Spiriva Handihaler  [Tiotropium Bromide Monohydrate]     Tiotropium     Tylenol With Codeine #3  [Acetaminophen-Codeine]          Current Outpatient Medications:     albuterol (2 5 mg/3 mL) 0 083 % nebulizer solution, Take 1 vial (2 5 mg total) by nebulization every 6 (six) hours as needed for wheezing or shortness of breath, Disp: 360 mL, Rfl: 3    albuterol (VENTOLIN HFA) 90 mcg/act inhaler, Inhale 2 puffs every 6 (six) hours as needed, Disp: , Rfl:     ALPRAZolam (XANAX) 0 5 mg tablet, anxiety, Disp: 30 tablet, Rfl: 5    aspirin ([employer] ASPIRIN EC) 81 mg EC tablet, Take 1 tablet by mouth daily, Disp: , Rfl:     atorvastatin (LIPITOR) 40 mg tablet, TAKE 1 TABLET DAILY  , Disp: 90 tablet, Rfl: 3    fluticasone (FLONASE) 50 mcg/act nasal spray, 1 spray into each nostril 2 (two) times a day, Disp: 16 g, Rfl: 3    fluticasone-vilanterol (BREO ELLIPTA) 100-25 mcg/inh inhaler, Inhale 1 puff daily Rinse mouth after use , Disp: 3 each, Rfl: 3    olopatadine (PATANOL) 0 1 % ophthalmic solution, , Disp: , Rfl: 0    rOPINIRole (REQUIP) 1 mg tablet, 1-2 TABLETS HS TO CONTROL  RESTLESS LEG, Disp: 60 tablet, Rfl: 5    sodium chloride () 2 % hypertonic ophthalmic solution, Sue 128 2 % eye drops, Disp: , Rfl:

## 2019-03-25 NOTE — ASSESSMENT & PLAN NOTE
Carotid artery disease  -no signs or symptoms of TIA/ stroke  -carotid du stable; <50 % bilaterally; R 61/14; L 128/27  -patient education regarding stroke; any symptoms of stroke she should call 911  -follow up duplex in 2 years

## 2019-03-30 LAB
BUN SERPL-MCNC: 17 MG/DL (ref 7–25)
BUN/CREAT SERPL: 16 (CALC) (ref 6–22)
CALCIUM SERPL-MCNC: 9.3 MG/DL (ref 8.6–10.4)
CHLORIDE SERPL-SCNC: 104 MMOL/L (ref 98–110)
CO2 SERPL-SCNC: 27 MMOL/L (ref 20–32)
CREAT SERPL-MCNC: 1.07 MG/DL (ref 0.6–0.93)
GLUCOSE SERPL-MCNC: 95 MG/DL (ref 65–99)
POTASSIUM SERPL-SCNC: 4.8 MMOL/L (ref 3.5–5.3)
SL AMB EGFR AFRICAN AMERICAN: 60 ML/MIN/1.73M2
SL AMB EGFR NON AFRICAN AMERICAN: 52 ML/MIN/1.73M2
SODIUM SERPL-SCNC: 140 MMOL/L (ref 135–146)

## 2019-04-04 ENCOUNTER — HOSPITAL ENCOUNTER (OUTPATIENT)
Dept: CT IMAGING | Facility: HOSPITAL | Age: 74
Discharge: HOME | End: 2019-04-04
Payer: MEDICARE

## 2019-04-04 ENCOUNTER — TELEPHONE (OUTPATIENT)
Dept: ADMINISTRATIVE | Facility: HOSPITAL | Age: 74
End: 2019-04-04

## 2019-04-04 DIAGNOSIS — I73.9 PVD (PERIPHERAL VASCULAR DISEASE) (HCC): ICD-10-CM

## 2019-04-04 DIAGNOSIS — I70.213 ATHEROSCLEROSIS OF NATIVE ARTERY OF BOTH LOWER EXTREMITIES WITH INTERMITTENT CLAUDICATION (HCC): ICD-10-CM

## 2019-04-04 DIAGNOSIS — I74.09 AORTOILIAC OCCLUSIVE DISEASE (HCC): ICD-10-CM

## 2019-04-04 PROCEDURE — 75635 CT ANGIO ABDOMINAL ARTERIES: CPT

## 2019-04-04 RX ADMIN — IOHEXOL 120 ML: 350 INJECTION, SOLUTION INTRAVENOUS at 08:56

## 2019-04-05 ENCOUNTER — TELEPHONE (OUTPATIENT)
Dept: INTERNAL MEDICINE CLINIC | Facility: CLINIC | Age: 74
End: 2019-04-05

## 2019-04-05 ENCOUNTER — TELEPHONE (OUTPATIENT)
Dept: VASCULAR SURGERY | Facility: CLINIC | Age: 74
End: 2019-04-05

## 2019-04-10 ENCOUNTER — HOSPITAL ENCOUNTER (OUTPATIENT)
Dept: CT IMAGING | Facility: HOSPITAL | Age: 74
Discharge: HOME | End: 2019-04-10
Payer: MEDICARE

## 2019-04-10 DIAGNOSIS — R93.89 ABNORMAL CHEST CT: ICD-10-CM

## 2019-04-10 PROCEDURE — 71250 CT THORAX DX C-: CPT

## 2019-04-11 ENCOUNTER — TELEPHONE (OUTPATIENT)
Dept: INTERNAL MEDICINE CLINIC | Facility: CLINIC | Age: 74
End: 2019-04-11

## 2019-04-11 ENCOUNTER — OFFICE VISIT (OUTPATIENT)
Dept: INTERNAL MEDICINE CLINIC | Facility: CLINIC | Age: 74
End: 2019-04-11
Payer: MEDICARE

## 2019-04-11 ENCOUNTER — APPOINTMENT (OUTPATIENT)
Dept: LAB | Facility: CLINIC | Age: 74
End: 2019-04-11
Payer: MEDICARE

## 2019-04-11 ENCOUNTER — OFFICE VISIT (OUTPATIENT)
Dept: PULMONOLOGY | Facility: CLINIC | Age: 74
End: 2019-04-11
Payer: MEDICARE

## 2019-04-11 VITALS
HEIGHT: 60 IN | OXYGEN SATURATION: 96 % | BODY MASS INDEX: 28.03 KG/M2 | SYSTOLIC BLOOD PRESSURE: 140 MMHG | HEART RATE: 95 BPM | WEIGHT: 142.8 LBS | DIASTOLIC BLOOD PRESSURE: 80 MMHG

## 2019-04-11 VITALS
DIASTOLIC BLOOD PRESSURE: 80 MMHG | OXYGEN SATURATION: 98 % | SYSTOLIC BLOOD PRESSURE: 120 MMHG | WEIGHT: 143 LBS | HEART RATE: 93 BPM | BODY MASS INDEX: 28.07 KG/M2 | HEIGHT: 60 IN

## 2019-04-11 DIAGNOSIS — I65.23 ASYMPTOMATIC BILATERAL CAROTID ARTERY STENOSIS: ICD-10-CM

## 2019-04-11 DIAGNOSIS — I77.1 SUBCLAVIAN ARTERY STENOSIS, LEFT (HCC): ICD-10-CM

## 2019-04-11 DIAGNOSIS — K86.89 PANCREATIC MASS: ICD-10-CM

## 2019-04-11 DIAGNOSIS — I70.213 ATHEROSCLEROSIS OF NATIVE ARTERY OF BOTH LOWER EXTREMITIES WITH INTERMITTENT CLAUDICATION (HCC): ICD-10-CM

## 2019-04-11 DIAGNOSIS — I74.09 AORTOILIAC OCCLUSIVE DISEASE (HCC): ICD-10-CM

## 2019-04-11 DIAGNOSIS — J32.9 CHRONIC SINUSITIS, UNSPECIFIED LOCATION: ICD-10-CM

## 2019-04-11 DIAGNOSIS — K86.89 PANCREATIC MASS: Primary | ICD-10-CM

## 2019-04-11 DIAGNOSIS — I73.9 PVD (PERIPHERAL VASCULAR DISEASE) (HCC): ICD-10-CM

## 2019-04-11 DIAGNOSIS — J44.9 COPD (CHRONIC OBSTRUCTIVE PULMONARY DISEASE) WITH CHRONIC BRONCHITIS (HCC): ICD-10-CM

## 2019-04-11 DIAGNOSIS — J43.2 CENTRILOBULAR EMPHYSEMA (HCC): Primary | ICD-10-CM

## 2019-04-11 DIAGNOSIS — G83.9 PARESIS (HCC): ICD-10-CM

## 2019-04-11 LAB
ALBUMIN SERPL BCP-MCNC: 3.8 G/DL (ref 3.5–5)
ALP SERPL-CCNC: 98 U/L (ref 46–116)
ALT SERPL W P-5'-P-CCNC: 29 U/L (ref 12–78)
AMYLASE SERPL-CCNC: 55 IU/L (ref 25–115)
ANION GAP SERPL CALCULATED.3IONS-SCNC: 5 MMOL/L (ref 4–13)
AST SERPL W P-5'-P-CCNC: 14 U/L (ref 5–45)
BACTERIA UR QL AUTO: ABNORMAL /HPF
BASOPHILS # BLD AUTO: 0.06 THOUSANDS/ΜL (ref 0–0.1)
BASOPHILS NFR BLD AUTO: 1 % (ref 0–1)
BILIRUB SERPL-MCNC: 0.32 MG/DL (ref 0.2–1)
BILIRUB UR QL STRIP: NEGATIVE
BUN SERPL-MCNC: 16 MG/DL (ref 5–25)
CALCIUM SERPL-MCNC: 8.7 MG/DL (ref 8.3–10.1)
CHLORIDE SERPL-SCNC: 105 MMOL/L (ref 100–108)
CHOLEST SERPL-MCNC: 158 MG/DL (ref 50–200)
CLARITY UR: CLEAR
CO2 SERPL-SCNC: 26 MMOL/L (ref 21–32)
COLOR UR: YELLOW
CREAT SERPL-MCNC: 1.08 MG/DL (ref 0.6–1.3)
EOSINOPHIL # BLD AUTO: 0.18 THOUSAND/ΜL (ref 0–0.61)
EOSINOPHIL NFR BLD AUTO: 3 % (ref 0–6)
ERYTHROCYTE [DISTWIDTH] IN BLOOD BY AUTOMATED COUNT: 14.5 % (ref 11.6–15.1)
GFR SERPL CREATININE-BSD FRML MDRD: 52 ML/MIN/1.73SQ M
GLUCOSE P FAST SERPL-MCNC: 95 MG/DL (ref 65–99)
GLUCOSE UR STRIP-MCNC: NEGATIVE MG/DL
HCT VFR BLD AUTO: 45.9 % (ref 34.8–46.1)
HDLC SERPL-MCNC: 46 MG/DL (ref 40–60)
HGB BLD-MCNC: 14.5 G/DL (ref 11.5–15.4)
HGB UR QL STRIP.AUTO: NEGATIVE
HYALINE CASTS #/AREA URNS LPF: ABNORMAL /LPF
IMM GRANULOCYTES # BLD AUTO: 0.03 THOUSAND/UL (ref 0–0.2)
IMM GRANULOCYTES NFR BLD AUTO: 0 % (ref 0–2)
KETONES UR STRIP-MCNC: NEGATIVE MG/DL
LDLC SERPL CALC-MCNC: 78 MG/DL (ref 0–100)
LEUKOCYTE ESTERASE UR QL STRIP: ABNORMAL
LIPASE SERPL-CCNC: 124 U/L (ref 73–393)
LYMPHOCYTES # BLD AUTO: 1.93 THOUSANDS/ΜL (ref 0.6–4.47)
LYMPHOCYTES NFR BLD AUTO: 28 % (ref 14–44)
MCH RBC QN AUTO: 28.6 PG (ref 26.8–34.3)
MCHC RBC AUTO-ENTMCNC: 31.6 G/DL (ref 31.4–37.4)
MCV RBC AUTO: 91 FL (ref 82–98)
MONOCYTES # BLD AUTO: 0.58 THOUSAND/ΜL (ref 0.17–1.22)
MONOCYTES NFR BLD AUTO: 8 % (ref 4–12)
NEUTROPHILS # BLD AUTO: 4.17 THOUSANDS/ΜL (ref 1.85–7.62)
NEUTS SEG NFR BLD AUTO: 60 % (ref 43–75)
NITRITE UR QL STRIP: NEGATIVE
NON-SQ EPI CELLS URNS QL MICRO: ABNORMAL /HPF
NRBC BLD AUTO-RTO: 0 /100 WBCS
PH UR STRIP.AUTO: 5.5 [PH]
PLATELET # BLD AUTO: 340 THOUSANDS/UL (ref 149–390)
PMV BLD AUTO: 10.5 FL (ref 8.9–12.7)
POTASSIUM SERPL-SCNC: 4.2 MMOL/L (ref 3.5–5.3)
PROT SERPL-MCNC: 7.3 G/DL (ref 6.4–8.2)
PROT UR STRIP-MCNC: NEGATIVE MG/DL
RBC # BLD AUTO: 5.07 MILLION/UL (ref 3.81–5.12)
RBC #/AREA URNS AUTO: ABNORMAL /HPF
SODIUM SERPL-SCNC: 136 MMOL/L (ref 136–145)
SP GR UR STRIP.AUTO: 1.02 (ref 1–1.03)
TRIGL SERPL-MCNC: 170 MG/DL
TSH SERPL DL<=0.05 MIU/L-ACNC: 2.67 UIU/ML (ref 0.36–3.74)
UROBILINOGEN UR QL STRIP.AUTO: 0.2 E.U./DL
WBC # BLD AUTO: 6.95 THOUSAND/UL (ref 4.31–10.16)
WBC #/AREA URNS AUTO: ABNORMAL /HPF

## 2019-04-11 PROCEDURE — 83690 ASSAY OF LIPASE: CPT

## 2019-04-11 PROCEDURE — 80053 COMPREHEN METABOLIC PANEL: CPT

## 2019-04-11 PROCEDURE — 99213 OFFICE O/P EST LOW 20 MIN: CPT | Performed by: INTERNAL MEDICINE

## 2019-04-11 PROCEDURE — 86301 IMMUNOASSAY TUMOR CA 19-9: CPT

## 2019-04-11 PROCEDURE — 36415 COLL VENOUS BLD VENIPUNCTURE: CPT

## 2019-04-11 PROCEDURE — 84443 ASSAY THYROID STIM HORMONE: CPT

## 2019-04-11 PROCEDURE — 99214 OFFICE O/P EST MOD 30 MIN: CPT | Performed by: INTERNAL MEDICINE

## 2019-04-11 PROCEDURE — 85025 COMPLETE CBC W/AUTO DIFF WBC: CPT

## 2019-04-11 PROCEDURE — 80061 LIPID PANEL: CPT

## 2019-04-11 PROCEDURE — G0439 PPPS, SUBSEQ VISIT: HCPCS | Performed by: INTERNAL MEDICINE

## 2019-04-11 PROCEDURE — 82150 ASSAY OF AMYLASE: CPT

## 2019-04-11 PROCEDURE — 81001 URINALYSIS AUTO W/SCOPE: CPT | Performed by: INTERNAL MEDICINE

## 2019-04-11 RX ORDER — AZELASTINE HYDROCHLORIDE, FLUTICASONE PROPIONATE 137; 50 UG/1; UG/1
SPRAY, METERED NASAL DAILY
COMMUNITY
End: 2019-05-16 | Stop reason: SDUPTHER

## 2019-04-11 RX ORDER — ALBUTEROL SULFATE 90 UG/1
2 AEROSOL, METERED RESPIRATORY (INHALATION) EVERY 6 HOURS PRN
Qty: 1 INHALER | Refills: 3 | Status: SHIPPED | OUTPATIENT
Start: 2019-04-11 | End: 2019-08-09 | Stop reason: CLARIF

## 2019-04-11 RX ORDER — ALBUTEROL SULFATE 90 UG/1
2 AEROSOL, METERED RESPIRATORY (INHALATION) EVERY 6 HOURS PRN
Status: CANCELLED | OUTPATIENT
Start: 2019-04-11

## 2019-04-12 LAB — CANCER AG19-9 SERPL-ACNC: 28 U/ML (ref 0–35)

## 2019-04-18 ENCOUNTER — CONSULT (OUTPATIENT)
Dept: SURGICAL ONCOLOGY | Facility: CLINIC | Age: 74
End: 2019-04-18
Payer: MEDICARE

## 2019-04-18 ENCOUNTER — OFFICE VISIT (OUTPATIENT)
Dept: INTERNAL MEDICINE CLINIC | Facility: CLINIC | Age: 74
End: 2019-04-18
Payer: MEDICARE

## 2019-04-18 VITALS
DIASTOLIC BLOOD PRESSURE: 64 MMHG | WEIGHT: 143 LBS | BODY MASS INDEX: 28.07 KG/M2 | RESPIRATION RATE: 18 BRPM | TEMPERATURE: 98.3 F | SYSTOLIC BLOOD PRESSURE: 144 MMHG | HEIGHT: 60 IN | HEART RATE: 100 BPM

## 2019-04-18 VITALS
DIASTOLIC BLOOD PRESSURE: 70 MMHG | WEIGHT: 142.8 LBS | OXYGEN SATURATION: 94 % | BODY MASS INDEX: 28.36 KG/M2 | HEART RATE: 104 BPM | SYSTOLIC BLOOD PRESSURE: 130 MMHG

## 2019-04-18 DIAGNOSIS — K86.89 PANCREATIC MASS: Primary | ICD-10-CM

## 2019-04-18 PROCEDURE — 99205 OFFICE O/P NEW HI 60 MIN: CPT | Performed by: SURGERY

## 2019-04-18 PROCEDURE — 99212 OFFICE O/P EST SF 10 MIN: CPT | Performed by: INTERNAL MEDICINE

## 2019-04-22 ENCOUNTER — OFFICE VISIT (OUTPATIENT)
Dept: VASCULAR SURGERY | Facility: CLINIC | Age: 74
End: 2019-04-22
Payer: MEDICARE

## 2019-04-22 VITALS
DIASTOLIC BLOOD PRESSURE: 78 MMHG | HEART RATE: 88 BPM | RESPIRATION RATE: 18 BRPM | BODY MASS INDEX: 28.27 KG/M2 | WEIGHT: 144 LBS | HEIGHT: 60 IN | TEMPERATURE: 99.1 F | SYSTOLIC BLOOD PRESSURE: 126 MMHG

## 2019-04-22 DIAGNOSIS — I70.213 ATHEROSCLEROSIS OF NATIVE ARTERY OF BOTH LOWER EXTREMITIES WITH INTERMITTENT CLAUDICATION (HCC): ICD-10-CM

## 2019-04-22 DIAGNOSIS — I73.9 PVD (PERIPHERAL VASCULAR DISEASE) (HCC): ICD-10-CM

## 2019-04-22 DIAGNOSIS — N18.30 STAGE 3 CHRONIC KIDNEY DISEASE (HCC): ICD-10-CM

## 2019-04-22 DIAGNOSIS — I74.09 AORTOILIAC OCCLUSIVE DISEASE (HCC): Primary | ICD-10-CM

## 2019-04-22 PROCEDURE — 99214 OFFICE O/P EST MOD 30 MIN: CPT | Performed by: SURGERY

## 2019-04-22 RX ORDER — SODIUM CHLORIDE 9 MG/ML
300 INJECTION, SOLUTION INTRAVENOUS CONTINUOUS
Status: CANCELLED | OUTPATIENT
Start: 2019-04-22 | End: 2019-04-22

## 2019-04-22 RX ORDER — CHLORHEXIDINE GLUCONATE 0.12 MG/ML
15 RINSE ORAL EVERY 12 HOURS SCHEDULED
Status: CANCELLED | OUTPATIENT
Start: 2019-04-22

## 2019-04-25 ENCOUNTER — TELEPHONE (OUTPATIENT)
Dept: SURGICAL ONCOLOGY | Facility: CLINIC | Age: 74
End: 2019-04-25

## 2019-04-26 LAB
BUN SERPL-MCNC: 21 MG/DL (ref 7–25)
BUN/CREAT SERPL: 20 (CALC) (ref 6–22)
CREAT SERPL-MCNC: 1.05 MG/DL (ref 0.6–0.93)
SL AMB EGFR AFRICAN AMERICAN: 62 ML/MIN/1.73M2
SL AMB EGFR NON AFRICAN AMERICAN: 53 ML/MIN/1.73M2

## 2019-05-08 ENCOUNTER — TELEPHONE (OUTPATIENT)
Dept: VASCULAR SURGERY | Facility: CLINIC | Age: 74
End: 2019-05-08

## 2019-05-15 ENCOUNTER — HOSPITAL ENCOUNTER (OUTPATIENT)
Dept: MRI IMAGING | Facility: HOSPITAL | Age: 74
Discharge: HOME | End: 2019-05-15
Attending: SURGERY
Payer: MEDICARE

## 2019-05-15 DIAGNOSIS — K86.89 PANCREATIC MASS: ICD-10-CM

## 2019-05-15 PROCEDURE — A9585 GADOBUTROL INJECTION: HCPCS | Performed by: SURGERY

## 2019-05-15 PROCEDURE — 74183 MRI ABD W/O CNTR FLWD CNTR: CPT

## 2019-05-15 RX ADMIN — GADOBUTROL 6 ML: 604.72 INJECTION INTRAVENOUS at 16:50

## 2019-05-16 ENCOUNTER — TELEPHONE (OUTPATIENT)
Dept: PULMONOLOGY | Facility: CLINIC | Age: 74
End: 2019-05-16

## 2019-05-16 ENCOUNTER — TELEPHONE (OUTPATIENT)
Dept: SURGICAL ONCOLOGY | Facility: CLINIC | Age: 74
End: 2019-05-16

## 2019-05-16 DIAGNOSIS — J30.9 ALLERGIC RHINITIS, UNSPECIFIED SEASONALITY, UNSPECIFIED TRIGGER: Primary | ICD-10-CM

## 2019-05-16 RX ORDER — AZELASTINE HYDROCHLORIDE, FLUTICASONE PROPIONATE 137; 50 UG/1; UG/1
1 SPRAY, METERED NASAL DAILY
Qty: 1 BOTTLE | Refills: 3 | Status: SHIPPED | OUTPATIENT
Start: 2019-05-16 | End: 2019-05-31 | Stop reason: CLARIF

## 2019-05-17 ENCOUNTER — TELEPHONE (OUTPATIENT)
Dept: INTERNAL MEDICINE CLINIC | Facility: CLINIC | Age: 74
End: 2019-05-17

## 2019-05-21 ENCOUNTER — TELEPHONE (OUTPATIENT)
Dept: GASTROENTEROLOGY | Facility: CLINIC | Age: 74
End: 2019-05-21

## 2019-05-27 ENCOUNTER — ANESTHESIA EVENT (OUTPATIENT)
Dept: GASTROENTEROLOGY | Facility: HOSPITAL | Age: 74
End: 2019-05-27

## 2019-05-27 RX ORDER — SODIUM CHLORIDE 9 MG/ML
100 INJECTION, SOLUTION INTRAVENOUS CONTINUOUS
Status: CANCELLED | OUTPATIENT
Start: 2019-05-27

## 2019-05-28 ENCOUNTER — TELEPHONE (OUTPATIENT)
Dept: INTERNAL MEDICINE CLINIC | Facility: CLINIC | Age: 74
End: 2019-05-28

## 2019-05-28 ENCOUNTER — ANESTHESIA (OUTPATIENT)
Dept: GASTROENTEROLOGY | Facility: HOSPITAL | Age: 74
End: 2019-05-28

## 2019-05-28 ENCOUNTER — HOSPITAL ENCOUNTER (OUTPATIENT)
Dept: GASTROENTEROLOGY | Facility: HOSPITAL | Age: 74
Setting detail: OUTPATIENT SURGERY
Discharge: HOME | End: 2019-05-28
Attending: INTERNAL MEDICINE
Payer: MEDICARE

## 2019-05-28 VITALS
HEART RATE: 73 BPM | TEMPERATURE: 97.6 F | OXYGEN SATURATION: 99 % | HEIGHT: 60 IN | DIASTOLIC BLOOD PRESSURE: 56 MMHG | SYSTOLIC BLOOD PRESSURE: 108 MMHG | WEIGHT: 144 LBS | RESPIRATION RATE: 16 BRPM | BODY MASS INDEX: 28.27 KG/M2

## 2019-05-28 DIAGNOSIS — K20.90 ESOPHAGITIS: Primary | ICD-10-CM

## 2019-05-28 DIAGNOSIS — K86.89 PANCREATIC MASS: ICD-10-CM

## 2019-05-28 PROCEDURE — 43239 EGD BIOPSY SINGLE/MULTIPLE: CPT | Performed by: INTERNAL MEDICINE

## 2019-05-28 PROCEDURE — 88305 TISSUE EXAM BY PATHOLOGIST: CPT | Performed by: PATHOLOGY

## 2019-05-28 PROCEDURE — 43237 ENDOSCOPIC US EXAM ESOPH: CPT | Performed by: INTERNAL MEDICINE

## 2019-05-28 RX ORDER — PROPOFOL 10 MG/ML
INJECTION, EMULSION INTRAVENOUS AS NEEDED
Status: DISCONTINUED | OUTPATIENT
Start: 2019-05-28 | End: 2019-05-28 | Stop reason: SURG

## 2019-05-28 RX ORDER — SODIUM CHLORIDE 9 MG/ML
INJECTION, SOLUTION INTRAVENOUS CONTINUOUS PRN
Status: DISCONTINUED | OUTPATIENT
Start: 2019-05-28 | End: 2019-05-28 | Stop reason: SURG

## 2019-05-28 RX ORDER — PROPOFOL 10 MG/ML
INJECTION, EMULSION INTRAVENOUS CONTINUOUS PRN
Status: DISCONTINUED | OUTPATIENT
Start: 2019-05-28 | End: 2019-05-28 | Stop reason: SURG

## 2019-05-28 RX ADMIN — PROPOFOL 50 MG: 10 INJECTION, EMULSION INTRAVENOUS at 08:21

## 2019-05-28 RX ADMIN — PROPOFOL 50 MG: 10 INJECTION, EMULSION INTRAVENOUS at 08:14

## 2019-05-28 RX ADMIN — PROPOFOL 50 MG: 10 INJECTION, EMULSION INTRAVENOUS at 08:11

## 2019-05-28 RX ADMIN — PROPOFOL 50 MG: 10 INJECTION, EMULSION INTRAVENOUS at 08:36

## 2019-05-28 RX ADMIN — PROPOFOL 100 MCG/KG/MIN: 10 INJECTION, EMULSION INTRAVENOUS at 08:09

## 2019-05-28 RX ADMIN — SODIUM CHLORIDE: 0.9 INJECTION, SOLUTION INTRAVENOUS at 08:05

## 2019-05-30 ENCOUNTER — TELEPHONE (OUTPATIENT)
Dept: GASTROENTEROLOGY | Facility: CLINIC | Age: 74
End: 2019-05-30

## 2019-05-31 ENCOUNTER — OFFICE VISIT (OUTPATIENT)
Dept: INTERNAL MEDICINE CLINIC | Facility: CLINIC | Age: 74
End: 2019-05-31
Payer: MEDICARE

## 2019-05-31 ENCOUNTER — APPOINTMENT (OUTPATIENT)
Dept: LAB | Facility: CLINIC | Age: 74
End: 2019-05-31
Payer: MEDICARE

## 2019-05-31 VITALS
SYSTOLIC BLOOD PRESSURE: 122 MMHG | DIASTOLIC BLOOD PRESSURE: 80 MMHG | OXYGEN SATURATION: 98 % | BODY MASS INDEX: 28.54 KG/M2 | TEMPERATURE: 98.2 F | HEART RATE: 91 BPM | WEIGHT: 145.4 LBS | HEIGHT: 60 IN

## 2019-05-31 DIAGNOSIS — J43.2 CENTRILOBULAR EMPHYSEMA (HCC): ICD-10-CM

## 2019-05-31 DIAGNOSIS — Z12.39 SCREENING FOR BREAST CANCER: ICD-10-CM

## 2019-05-31 DIAGNOSIS — M89.9 DISORDER OF BONE: ICD-10-CM

## 2019-05-31 DIAGNOSIS — I73.9 PVD (PERIPHERAL VASCULAR DISEASE) (HCC): ICD-10-CM

## 2019-05-31 DIAGNOSIS — N18.30 STAGE 3 CHRONIC KIDNEY DISEASE (HCC): ICD-10-CM

## 2019-05-31 DIAGNOSIS — J02.9 PHARYNGITIS, UNSPECIFIED ETIOLOGY: ICD-10-CM

## 2019-05-31 DIAGNOSIS — Z23 NEED FOR INFLUENZA VACCINATION: Primary | ICD-10-CM

## 2019-05-31 DIAGNOSIS — I65.23 ASYMPTOMATIC BILATERAL CAROTID ARTERY STENOSIS: ICD-10-CM

## 2019-05-31 DIAGNOSIS — I74.09 AORTOILIAC OCCLUSIVE DISEASE (HCC): ICD-10-CM

## 2019-05-31 DIAGNOSIS — Z13.820 SCREENING FOR OSTEOPOROSIS: ICD-10-CM

## 2019-05-31 DIAGNOSIS — G25.81 RESTLESS LEG SYNDROME: ICD-10-CM

## 2019-05-31 LAB
BASOPHILS # BLD AUTO: 0.07 THOUSANDS/ΜL (ref 0–0.1)
BASOPHILS NFR BLD AUTO: 1 % (ref 0–1)
EOSINOPHIL # BLD AUTO: 0.15 THOUSAND/ΜL (ref 0–0.61)
EOSINOPHIL NFR BLD AUTO: 2 % (ref 0–6)
ERYTHROCYTE [DISTWIDTH] IN BLOOD BY AUTOMATED COUNT: 14.6 % (ref 11.6–15.1)
HCT VFR BLD AUTO: 45.5 % (ref 34.8–46.1)
HGB BLD-MCNC: 14.5 G/DL (ref 11.5–15.4)
IMM GRANULOCYTES # BLD AUTO: 0.03 THOUSAND/UL (ref 0–0.2)
IMM GRANULOCYTES NFR BLD AUTO: 0 % (ref 0–2)
LYMPHOCYTES # BLD AUTO: 1.73 THOUSANDS/ΜL (ref 0.6–4.47)
LYMPHOCYTES NFR BLD AUTO: 25 % (ref 14–44)
MCH RBC QN AUTO: 28.7 PG (ref 26.8–34.3)
MCHC RBC AUTO-ENTMCNC: 31.9 G/DL (ref 31.4–37.4)
MCV RBC AUTO: 90 FL (ref 82–98)
MONOCYTES # BLD AUTO: 0.5 THOUSAND/ΜL (ref 0.17–1.22)
MONOCYTES NFR BLD AUTO: 7 % (ref 4–12)
NEUTROPHILS # BLD AUTO: 4.37 THOUSANDS/ΜL (ref 1.85–7.62)
NEUTS SEG NFR BLD AUTO: 65 % (ref 43–75)
NRBC BLD AUTO-RTO: 0 /100 WBCS
PLATELET # BLD AUTO: 353 THOUSANDS/UL (ref 149–390)
PMV BLD AUTO: 10.2 FL (ref 8.9–12.7)
RBC # BLD AUTO: 5.06 MILLION/UL (ref 3.81–5.12)
S PYO AG THROAT QL: NEGATIVE
WBC # BLD AUTO: 6.85 THOUSAND/UL (ref 4.31–10.16)

## 2019-05-31 PROCEDURE — 36415 COLL VENOUS BLD VENIPUNCTURE: CPT

## 2019-05-31 PROCEDURE — 87880 STREP A ASSAY W/OPTIC: CPT | Performed by: PHYSICIAN ASSISTANT

## 2019-05-31 PROCEDURE — 99214 OFFICE O/P EST MOD 30 MIN: CPT | Performed by: PHYSICIAN ASSISTANT

## 2019-05-31 PROCEDURE — 85025 COMPLETE CBC W/AUTO DIFF WBC: CPT

## 2019-06-06 ENCOUNTER — OFFICE VISIT (OUTPATIENT)
Dept: SURGICAL ONCOLOGY | Facility: CLINIC | Age: 74
End: 2019-06-06
Payer: MEDICARE

## 2019-06-06 VITALS
WEIGHT: 147.5 LBS | SYSTOLIC BLOOD PRESSURE: 110 MMHG | HEART RATE: 98 BPM | TEMPERATURE: 98.9 F | RESPIRATION RATE: 16 BRPM | HEIGHT: 60 IN | DIASTOLIC BLOOD PRESSURE: 62 MMHG | BODY MASS INDEX: 28.96 KG/M2

## 2019-06-06 DIAGNOSIS — K86.89 MASS OF PANCREAS: Primary | ICD-10-CM

## 2019-06-06 PROCEDURE — 99215 OFFICE O/P EST HI 40 MIN: CPT | Performed by: SURGERY

## 2019-06-13 ENCOUNTER — TELEPHONE (OUTPATIENT)
Dept: VASCULAR SURGERY | Facility: CLINIC | Age: 74
End: 2019-06-13

## 2019-06-18 ENCOUNTER — TELEPHONE (OUTPATIENT)
Dept: VASCULAR SURGERY | Facility: CLINIC | Age: 74
End: 2019-06-18

## 2019-06-20 ENCOUNTER — TELEPHONE (OUTPATIENT)
Dept: VASCULAR SURGERY | Facility: CLINIC | Age: 74
End: 2019-06-20

## 2019-06-24 DIAGNOSIS — I74.09 AORTOILIAC OCCLUSIVE DISEASE (HCC): Primary | ICD-10-CM

## 2019-06-27 ENCOUNTER — TELEPHONE (OUTPATIENT)
Dept: VASCULAR SURGERY | Facility: CLINIC | Age: 74
End: 2019-06-27

## 2019-06-28 ENCOUNTER — TELEPHONE (OUTPATIENT)
Dept: VASCULAR SURGERY | Facility: CLINIC | Age: 74
End: 2019-06-28

## 2019-07-03 ENCOUNTER — TELEPHONE (OUTPATIENT)
Dept: VASCULAR SURGERY | Facility: CLINIC | Age: 74
End: 2019-07-03

## 2019-07-03 NOTE — TELEPHONE ENCOUNTER
Nurse Navigation Transportation Call    Notified by [facility] Scheduling that patient needs transportation to [facility] w/ hydration on 7/18  I called patient and went over transportation expectations and requirements  She was unaware of the need for hydration pre-procedure, nor was she aware of her CKD III diagnosis  I explained the purpose of hydration and counseled her on the procedure after she stated how nervous she was about it  I will contact nursing and [facility]x Scheduling to follow up on scheduling hydration for patient      In the mean time [facility] has been set up for patient on 7/18 @ 930

## 2019-07-15 ENCOUNTER — TELEPHONE (OUTPATIENT)
Dept: RADIOLOGY | Facility: HOSPITAL | Age: 74
End: 2019-07-15

## 2019-07-17 ENCOUNTER — TELEPHONE (OUTPATIENT)
Dept: INPATIENT UNIT | Facility: HOSPITAL | Age: 74
End: 2019-07-17

## 2019-07-26 ENCOUNTER — TELEPHONE (OUTPATIENT)
Dept: VASCULAR SURGERY | Facility: CLINIC | Age: 74
End: 2019-07-26

## 2019-07-26 DIAGNOSIS — I74.09 AORTOILIAC OCCLUSIVE DISEASE (HCC): Primary | ICD-10-CM

## 2019-07-26 NOTE — TELEPHONE ENCOUNTER
Received call from pt, her agram on 7-18-19 was cancelled, pt needed transportation and they did not pick her up, so now she wants to schedule it  SJ      Called pt back with the procedure date as 8-22-19 at [facility] with Dr Tisha Mishra  Pt will go for labs   Pt will see PCP for H&P update  Pt was told NPO from midnight, hospital will call with arrival time  No hold on ASA  She needs transportation   SJ

## 2019-07-29 ENCOUNTER — TELEPHONE (OUTPATIENT)
Dept: VASCULAR SURGERY | Facility: CLINIC | Age: 74
End: 2019-07-29

## 2019-07-29 DIAGNOSIS — I74.09 AORTOILIAC OCCLUSIVE DISEASE (HCC): Primary | ICD-10-CM

## 2019-07-29 NOTE — TELEPHONE ENCOUNTER
Transportation with [facility] services successfully set up for patient  Confirmed with patient that they have no other means of reliable transportation and is appropriate for [facility]  Patient expectations reviewed with patient to their understanding  Confirmed that she will get a call the night before with J[facility] from [facility], but also told patient not to cancel if she doesn't hear from transport services  Patient verbalizes understanding

## 2019-08-08 RX ORDER — FLUTICASONE PROPIONATE 50 MCG
SPRAY, SUSPENSION (ML) NASAL
COMMUNITY
End: 2020-11-10 | Stop reason: SDUPTHER

## 2019-08-08 RX ORDER — DOXYCYCLINE HYCLATE 100 MG
TABLET ORAL
COMMUNITY
End: 2019-09-03 | Stop reason: SDUPTHER

## 2019-08-08 RX ORDER — DOXYCYCLINE HYCLATE 100 MG/1
CAPSULE ORAL
COMMUNITY
End: 2019-09-19 | Stop reason: HOSPADM

## 2019-08-08 RX ORDER — ALPRAZOLAM 0.5 MG/1
TABLET ORAL
Refills: 5 | COMMUNITY
Start: 2019-06-27 | End: 2020-02-10 | Stop reason: SDUPTHER

## 2019-08-08 RX ORDER — CEFDINIR 300 MG/1
CAPSULE ORAL
COMMUNITY
End: 2019-10-23

## 2019-08-09 ENCOUNTER — APPOINTMENT (OUTPATIENT)
Dept: LAB | Facility: CLINIC | Age: 74
End: 2019-08-09
Payer: MEDICARE

## 2019-08-09 ENCOUNTER — OFFICE VISIT (OUTPATIENT)
Dept: PULMONOLOGY | Facility: CLINIC | Age: 74
End: 2019-08-09
Payer: MEDICARE

## 2019-08-09 ENCOUNTER — CONSULT (OUTPATIENT)
Dept: INTERNAL MEDICINE CLINIC | Facility: CLINIC | Age: 74
End: 2019-08-09
Payer: MEDICARE

## 2019-08-09 VITALS
BODY MASS INDEX: 28.05 KG/M2 | HEART RATE: 89 BPM | SYSTOLIC BLOOD PRESSURE: 120 MMHG | TEMPERATURE: 97.8 F | WEIGHT: 148.6 LBS | OXYGEN SATURATION: 96 % | HEIGHT: 61 IN | DIASTOLIC BLOOD PRESSURE: 70 MMHG

## 2019-08-09 VITALS
WEIGHT: 148 LBS | BODY MASS INDEX: 29.06 KG/M2 | DIASTOLIC BLOOD PRESSURE: 60 MMHG | HEART RATE: 98 BPM | RESPIRATION RATE: 12 BRPM | HEIGHT: 60 IN | SYSTOLIC BLOOD PRESSURE: 122 MMHG | OXYGEN SATURATION: 96 %

## 2019-08-09 DIAGNOSIS — R06.00 DOE (DYSPNEA ON EXERTION): ICD-10-CM

## 2019-08-09 DIAGNOSIS — I73.9 PVD (PERIPHERAL VASCULAR DISEASE) (HCC): ICD-10-CM

## 2019-08-09 DIAGNOSIS — I74.09 AORTOILIAC OCCLUSIVE DISEASE (HCC): ICD-10-CM

## 2019-08-09 DIAGNOSIS — J43.2 CENTRILOBULAR EMPHYSEMA (HCC): Primary | ICD-10-CM

## 2019-08-09 DIAGNOSIS — N18.30 STAGE 3 CHRONIC KIDNEY DISEASE (HCC): ICD-10-CM

## 2019-08-09 DIAGNOSIS — E78.5 HYPERLIPIDEMIA, UNSPECIFIED HYPERLIPIDEMIA TYPE: ICD-10-CM

## 2019-08-09 DIAGNOSIS — Z01.818 PREOP EXAMINATION: ICD-10-CM

## 2019-08-09 DIAGNOSIS — K86.89 PANCREATIC MASS: ICD-10-CM

## 2019-08-09 DIAGNOSIS — R93.89 ABNORMAL CHEST CT: ICD-10-CM

## 2019-08-09 DIAGNOSIS — R49.0 HOARSENESS OF VOICE: ICD-10-CM

## 2019-08-09 DIAGNOSIS — I70.213 ATHEROSCLEROSIS OF NATIVE ARTERY OF BOTH LOWER EXTREMITIES WITH INTERMITTENT CLAUDICATION (HCC): ICD-10-CM

## 2019-08-09 DIAGNOSIS — G25.81 RESTLESS LEG SYNDROME: ICD-10-CM

## 2019-08-09 DIAGNOSIS — F41.9 ANXIETY: ICD-10-CM

## 2019-08-09 LAB
ANION GAP SERPL CALCULATED.3IONS-SCNC: 4 MMOL/L (ref 4–13)
BUN SERPL-MCNC: 15 MG/DL (ref 5–25)
CALCIUM SERPL-MCNC: 9.3 MG/DL (ref 8.3–10.1)
CHLORIDE SERPL-SCNC: 108 MMOL/L (ref 100–108)
CO2 SERPL-SCNC: 28 MMOL/L (ref 21–32)
CREAT SERPL-MCNC: 1.04 MG/DL (ref 0.6–1.3)
ERYTHROCYTE [DISTWIDTH] IN BLOOD BY AUTOMATED COUNT: 14.7 % (ref 11.6–15.1)
GFR SERPL CREATININE-BSD FRML MDRD: 54 ML/MIN/1.73SQ M
GLUCOSE SERPL-MCNC: 108 MG/DL (ref 65–140)
HCT VFR BLD AUTO: 46.5 % (ref 34.8–46.1)
HGB BLD-MCNC: 14.7 G/DL (ref 11.5–15.4)
INR PPP: 1.03 (ref 0.84–1.19)
MCH RBC QN AUTO: 28.9 PG (ref 26.8–34.3)
MCHC RBC AUTO-ENTMCNC: 31.6 G/DL (ref 31.4–37.4)
MCV RBC AUTO: 91 FL (ref 82–98)
PLATELET # BLD AUTO: 332 THOUSANDS/UL (ref 149–390)
PMV BLD AUTO: 10.3 FL (ref 8.9–12.7)
POTASSIUM SERPL-SCNC: 4.3 MMOL/L (ref 3.5–5.3)
PROTHROMBIN TIME: 13.1 SECONDS (ref 11.6–14.5)
RBC # BLD AUTO: 5.09 MILLION/UL (ref 3.81–5.12)
SODIUM SERPL-SCNC: 140 MMOL/L (ref 136–145)
WBC # BLD AUTO: 6.86 THOUSAND/UL (ref 4.31–10.16)

## 2019-08-09 PROCEDURE — 80048 BASIC METABOLIC PNL TOTAL CA: CPT

## 2019-08-09 PROCEDURE — 94010 BREATHING CAPACITY TEST: CPT | Performed by: PHYSICIAN ASSISTANT

## 2019-08-09 PROCEDURE — 85027 COMPLETE CBC AUTOMATED: CPT

## 2019-08-09 PROCEDURE — 99214 OFFICE O/P EST MOD 30 MIN: CPT | Performed by: PHYSICIAN ASSISTANT

## 2019-08-09 PROCEDURE — 85610 PROTHROMBIN TIME: CPT

## 2019-08-09 PROCEDURE — 36415 COLL VENOUS BLD VENIPUNCTURE: CPT

## 2019-08-09 RX ORDER — HYDROXYZINE HYDROCHLORIDE 25 MG/1
25 TABLET, FILM COATED ORAL EVERY 6 HOURS PRN
Qty: 30 TABLET | Refills: 3 | Status: SHIPPED | OUTPATIENT
Start: 2019-08-09 | End: 2019-09-19 | Stop reason: HOSPADM

## 2019-08-09 NOTE — PROGRESS NOTES
Assessment:    1  Centrilobular emphysema (HCC)  POCT spirometry   2  MENDOZA (dyspnea on exertion)  Echo complete with contrast if indicated    Ambulatory Referral to Otolaryngology   3  Hoarseness of voice  Ambulatory Referral to Otolaryngology    CANCELED: Ambulatory Referral to Otolaryngology   4  Abnormal chest CT  CT chest wo contrast         Plan:     Patient here today for follow up and clearance for upcoming vascular procedure  She is stable with her breathing, continues to have trouble breathing with exertion such as stairs  Spirometry done today shows normal spirometry which has been the case in the past  On her prior PFT she did also have decreased DLCO, she had deferred an echo at that time to look for any pulmonary hypertension  She also noted hoarse voice for several months/years for which ENT referral was recommended in the past  She did note some improvement in her postnasal drip with dymista but still has hoarseness  Last CT chest showed moderate emphysematous changes and some nonspecific subpleural denisities which will be rechecked in 6 months  Continues with the Breo, using her nebulizer a few times per week  She is low-moderate risk for pulmonary complications for upcoming procedure given her emphysema and decreased DLCO on prior PFT  She will also be seeing her PCP today for clearance as well  I did order an echo given her persistent MENDOZA as well as ENT consult for her hoarseness to be sure there is no vocal cord dysfunction  She will follow up in 3 months or sooner if necessary  Subjective:     Patient ID: Lester Soni is a 69 y o  female  Chief Complaint:  Patient is a 45-year-old female quit smoking in May 2018 with greater than 55 pack year smoking history past medical history of COPD/emphysema, PAD, osteoporosis, HTN, HLD  She is here today for follow-up    She also requires pulmonary clearance for upcoming angiogram   She continues to have dyspnea on exertion, particularly with stairs  Feels that she cannot get air in  She has been using her breo, uses the nebulizer on occasion in the mornings  She continues to have a hoarse voice as well, has noticed improvement in her postnasal drip with the dymista though she still does have some  Breathing Problem   She complains of difficulty breathing and shortness of breath  This is a chronic problem  The current episode started more than 1 year ago  The problem occurs daily  The problem has been unchanged  Associated symptoms include postnasal drip  Her symptoms are aggravated by climbing stairs and strenuous activity  Her symptoms are alleviated by rest  Risk factors for lung disease include smoking/tobacco exposure  Her past medical history is significant for COPD  Review of Systems   Constitutional: Negative  HENT: Positive for postnasal drip and voice change  Respiratory: Positive for shortness of breath  Cardiovascular: Negative  Gastrointestinal: Negative  Genitourinary: Negative  Musculoskeletal: Negative  Skin: Negative  Allergic/Immunologic: Negative  Neurological: Negative  Psychiatric/Behavioral: Negative  Objective:  /60 (BP Location: Right arm, Patient Position: Sitting, Cuff Size: Standard)   Pulse 98   Resp 12   Ht 5' (1 524 m)   Wt 67 1 kg (148 lb)   SpO2 96%   BMI 28 90 kg/m²   Physical Exam   Constitutional: She is oriented to person, place, and time  She appears well-developed and well-nourished  No distress  HENT:   Mouth/Throat: Oropharynx is clear and moist    Eyes: Pupils are equal, round, and reactive to light  Cardiovascular: Normal rate and regular rhythm  Pulmonary/Chest: Effort normal and breath sounds normal  No respiratory distress  She has no decreased breath sounds  She has no wheezes  She has no rhonchi  She has no rales  Abdominal: Soft  There is no tenderness  Musculoskeletal: Normal range of motion  She exhibits no edema     Neurological: She is alert and oriented to person, place, and time  Skin: Skin is warm and dry  Psychiatric: She has a normal mood and affect

## 2019-08-09 NOTE — H&P (VIEW-ONLY)
Assessment/Plan: she is tentatively cleared for her aortic aneurysm Endo stent  Pending the results of her lab work  Physical exam is unremarkable  Will try Atarax for her daytime restless leg  symptoms       Diagnoses and all orders for this visit:    Centrilobular emphysema ([address] )    Aortoiliac occlusive disease ([address] )    Atherosclerosis of native artery of both lower extremities with intermittent claudication (HCC)    PVD (peripheral vascular disease) (HCC)    Stage 3 chronic kidney disease (HCC)    Anxiety  -     hydrOXYzine HCL (ATARAX) 25 mg tablet; Take 1 tablet (25 mg total) by mouth every 6 (six) hours as needed for anxiety    Restless leg syndrome  -     hydrOXYzine HCL (ATARAX) 25 mg tablet; Take 1 tablet (25 mg total) by mouth every 6 (six) hours as needed for anxiety    Hyperlipidemia, unspecified hyperlipidemia type    Pancreatic mass    Preop examination        No problem-specific Assessment & Plan notes found for this encounter  Subjective:      Patient ID: Mee Zepeda is a 69 y o  female  She is here for surgical clearance she is having Endo stent for I abdominal aortic aneurysm  She has some occlusive symptoms with claudication in both of her legs and lower back  No foot ulcers  No chest pain or unusual shortness of breath she has known severe COPD followed by Pulmonary and doing well with this she has stopped smoking about a year ago  History of restless leg nicely controlled at night with ropinirole she is starting to have some restless leg syndrome some the daytime  History of mildly decreased GFR which has been stable a pancreatic mass which has been followed closely  Biopsy has been attempted  Undergoing further imaging after the vascular procedure    No abdominal pain weight loss  Hyperlipidemia well controlled with medication      The following portions of the patient's history were reviewed and updated as appropriate:   She has a past medical history of Anxiety, Chronic sinusitis, COPD (chronic obstructive pulmonary disease) ([facility] ), Hyperlipidemia, Lung nodule, PNA (pneumonia), PVD (peripheral vascular disease) ([facility] ), Restless leg syndrome, Stage 3 chronic kidney disease ([facility] ) (2019), and Tobacco abuse ,  does not have any pertinent problems on file  ,   has a past surgical history that includes  section; Colonoscopy; Tonsillectomy; Cataract extraction; and Shoulder surgery  ,  family history includes Arthritis in her sister; Cirrhosis in her family; Heart attack in her family; Melanoma in her brother; No Known Problems in her father and mother; Pancreatic cancer (age of onset: 61) in her sister; Prostate cancer in her brother and family; Skin cancer in her family; Stroke in her family  ,   reports that she quit smoking about 14 months ago  Her smoking use included cigarettes  She started smoking about 57 years ago  She has a 840 00 pack-year smoking history  She has never used smokeless tobacco  She reports that she drinks alcohol  She reports that she does not use drugs  ,  is allergic to spiriva handihaler [tiotropium bromide monohydrate]; augmentin [amoxicillin-pot clavulanate]; tiotropium; and tylenol with codeine #3  [acetaminophen-codeine]     Current Outpatient Medications   Medication Sig Dispense Refill    albuterol (2 5 mg/3 mL) 0 083 % nebulizer solution Take 1 vial (2 5 mg total) by nebulization every 6 (six) hours as needed for wheezing or shortness of breath 360 mL 3    ALPRAZolam (XANAX) 0 5 mg tablet ONE TABLET ONCE DAILY AS NEEDED FOR ANXIETY  JEREMIE AT OFFICE  5    aspirin ([employer] ASPIRIN EC) 81 mg EC tablet Take 1 tablet by mouth daily      atorvastatin (LIPITOR) 40 mg tablet TAKE 1 TABLET DAILY   90 tablet 3    cefdinir (OMNICEF) 300 mg capsule cefdinir 300 mg capsule      fluticasone (FLONASE) 50 mcg/act nasal spray fluticasone propionate 50 mcg/actuation nasal spray,suspension      fluticasone-vilanterol (BREO ELLIPTA) 100-25 mcg/inh inhaler Inhale 1 puff daily Rinse mouth after use  3 each 3    olopatadine (PATANOL) 0 1 % ophthalmic solution   0    rOPINIRole (REQUIP) 1 mg tablet 1-2 TABLETS HS TO CONTROL  RESTLESS LEG 60 tablet 5    sodium chloride () 2 % hypertonic ophthalmic solution Sue 128 2 % eye drops      doxycycline hyclate (VIBRA-TABS) 100 mg tablet doxycycline hyclate 100 mg tablet      doxycycline hyclate (VIBRAMYCIN) 100 mg capsule doxycycline hyclate 100 mg capsule      hydrOXYzine HCL (ATARAX) 25 mg tablet Take 1 tablet (25 mg total) by mouth every 6 (six) hours as needed for anxiety 30 tablet 3     No current facility-administered medications for this visit  Review of Systems   Constitutional: Negative for activity change, appetite change, chills, diaphoresis, fatigue, fever and unexpected weight change  HENT: Negative for congestion, dental problem, drooling, ear discharge, ear pain, facial swelling, hearing loss, nosebleeds, postnasal drip, rhinorrhea, sinus pressure, sinus pain, sneezing, sore throat, tinnitus, trouble swallowing and voice change  Eyes: Negative for photophobia, pain, discharge, redness, itching and visual disturbance  Respiratory: Positive for shortness of breath ( stable)  Negative for apnea, cough, choking, chest tightness, wheezing and stridor  Cardiovascular: Negative for chest pain, palpitations and leg swelling  Gastrointestinal: Negative for abdominal distention, abdominal pain, anal bleeding, blood in stool, constipation, diarrhea, nausea, rectal pain and vomiting  Endocrine: Negative for cold intolerance, heat intolerance, polydipsia, polyphagia and polyuria  Genitourinary: Negative for decreased urine volume, difficulty urinating, dysuria, enuresis, flank pain, frequency, genital sores, hematuria and urgency  Musculoskeletal: Negative for arthralgias, back pain, gait problem, joint swelling, myalgias, neck pain and neck stiffness     Skin: Negative for color change, pallor, rash and wound  Neurological: Negative for dizziness, tremors, seizures, syncope, facial asymmetry, speech difficulty, weakness, light-headedness, numbness and headaches  Hematological: Negative for adenopathy  Does not bruise/bleed easily  Psychiatric/Behavioral: Negative for agitation, behavioral problems, confusion, decreased concentration, dysphoric mood, hallucinations, self-injury, sleep disturbance and suicidal ideas  The patient is nervous/anxious  The patient is not hyperactive  Objective:  Vitals:    08/09/19 0958   BP: 120/70   BP Location: Left arm   Patient Position: Sitting   Cuff Size: Standard   Pulse: 89   Temp: 97 8 °F (36 6 °C)   SpO2: 96%   Weight: 67 4 kg (148 lb 9 6 oz)   Height: 5' 1" (1 549 m)     Body mass index is 28 08 kg/m²  Physical Exam   Constitutional: She is oriented to person, place, and time  She appears well-developed  HENT:   Head: Normocephalic  Right Ear: External ear normal    Left Ear: External ear normal    Mouth/Throat: Oropharynx is clear and moist    Eyes: Pupils are equal, round, and reactive to light  Conjunctivae are normal    Neck: Neck supple  No JVD present  No tracheal deviation present  No thyromegaly present  Cardiovascular: Normal rate, regular rhythm, normal heart sounds and intact distal pulses  Exam reveals no gallop and no friction rub  No murmur heard  Foot pulses not felt feet are warm no skin lesions in her feet   Pulmonary/Chest: Effort normal  No stridor  No respiratory distress  She has decreased breath sounds  She has no wheezes  She has no rales  She exhibits no tenderness  Abdominal: Soft  Bowel sounds are normal    Musculoskeletal: Normal range of motion  She exhibits no edema  Lymphadenopathy:     She has no cervical adenopathy  Neurological: She is alert and oriented to person, place, and time  She has normal reflexes  She displays normal reflexes  No cranial nerve deficit or sensory deficit   She exhibits normal muscle tone  Coordination normal    Skin: Skin is warm and dry

## 2019-08-09 NOTE — PROGRESS NOTES
Assessment/Plan: she is tentatively cleared for her aortic aneurysm Endo stent  Pending the results of her lab work  Physical exam is unremarkable  Will try Atarax for her daytime restless leg  symptoms       Diagnoses and all orders for this visit:    Centrilobular emphysema ([address] )    Aortoiliac occlusive disease ([address] )    Atherosclerosis of native artery of both lower extremities with intermittent claudication (HCC)    PVD (peripheral vascular disease) (HCC)    Stage 3 chronic kidney disease (HCC)    Anxiety  -     hydrOXYzine HCL (ATARAX) 25 mg tablet; Take 1 tablet (25 mg total) by mouth every 6 (six) hours as needed for anxiety    Restless leg syndrome  -     hydrOXYzine HCL (ATARAX) 25 mg tablet; Take 1 tablet (25 mg total) by mouth every 6 (six) hours as needed for anxiety    Hyperlipidemia, unspecified hyperlipidemia type    Pancreatic mass    Preop examination        No problem-specific Assessment & Plan notes found for this encounter  Subjective:      Patient ID: Amy Melendez is a 69 y o  female  She is here for surgical clearance she is having Endo stent for I abdominal aortic aneurysm  She has some occlusive symptoms with claudication in both of her legs and lower back  No foot ulcers  No chest pain or unusual shortness of breath she has known severe COPD followed by Pulmonary and doing well with this she has stopped smoking about a year ago  History of restless leg nicely controlled at night with ropinirole she is starting to have some restless leg syndrome some the daytime  History of mildly decreased GFR which has been stable a pancreatic mass which has been followed closely  Biopsy has been attempted  Undergoing further imaging after the vascular procedure    No abdominal pain weight loss  Hyperlipidemia well controlled with medication      The following portions of the patient's history were reviewed and updated as appropriate:   She has a past medical history of Anxiety, Chronic sinusitis, COPD (chronic obstructive pulmonary disease) ([facility] ), Hyperlipidemia, Lung nodule, PNA (pneumonia), PVD (peripheral vascular disease) ([facility] ), Restless leg syndrome, Stage 3 chronic kidney disease ([facility] ) (2019), and Tobacco abuse ,  does not have any pertinent problems on file  ,   has a past surgical history that includes  section; Colonoscopy; Tonsillectomy; Cataract extraction; and Shoulder surgery  ,  family history includes Arthritis in her sister; Cirrhosis in her family; Heart attack in her family; Melanoma in her brother; No Known Problems in her father and mother; Pancreatic cancer (age of onset: 61) in her sister; Prostate cancer in her brother and family; Skin cancer in her family; Stroke in her family  ,   reports that she quit smoking about 14 months ago  Her smoking use included cigarettes  She started smoking about 57 years ago  She has a 840 00 pack-year smoking history  She has never used smokeless tobacco  She reports that she drinks alcohol  She reports that she does not use drugs  ,  is allergic to spiriva handihaler [tiotropium bromide monohydrate]; augmentin [amoxicillin-pot clavulanate]; tiotropium; and tylenol with codeine #3  [acetaminophen-codeine]     Current Outpatient Medications   Medication Sig Dispense Refill    albuterol (2 5 mg/3 mL) 0 083 % nebulizer solution Take 1 vial (2 5 mg total) by nebulization every 6 (six) hours as needed for wheezing or shortness of breath 360 mL 3    ALPRAZolam (XANAX) 0 5 mg tablet ONE TABLET ONCE DAILY AS NEEDED FOR ANXIETY  JEREMIE AT OFFICE  5    aspirin ([employer]S ASPIRIN EC) 81 mg EC tablet Take 1 tablet by mouth daily      atorvastatin (LIPITOR) 40 mg tablet TAKE 1 TABLET DAILY   90 tablet 3    cefdinir (OMNICEF) 300 mg capsule cefdinir 300 mg capsule      fluticasone (FLONASE) 50 mcg/act nasal spray fluticasone propionate 50 mcg/actuation nasal spray,suspension      fluticasone-vilanterol (BREO ELLIPTA) 100-25 mcg/inh inhaler Inhale 1 puff daily Rinse mouth after use  3 each 3    olopatadine (PATANOL) 0 1 % ophthalmic solution   0    rOPINIRole (REQUIP) 1 mg tablet 1-2 TABLETS HS TO CONTROL  RESTLESS LEG 60 tablet 5    sodium chloride () 2 % hypertonic ophthalmic solution Sue 128 2 % eye drops      doxycycline hyclate (VIBRA-TABS) 100 mg tablet doxycycline hyclate 100 mg tablet      doxycycline hyclate (VIBRAMYCIN) 100 mg capsule doxycycline hyclate 100 mg capsule      hydrOXYzine HCL (ATARAX) 25 mg tablet Take 1 tablet (25 mg total) by mouth every 6 (six) hours as needed for anxiety 30 tablet 3     No current facility-administered medications for this visit  Review of Systems   Constitutional: Negative for activity change, appetite change, chills, diaphoresis, fatigue, fever and unexpected weight change  HENT: Negative for congestion, dental problem, drooling, ear discharge, ear pain, facial swelling, hearing loss, nosebleeds, postnasal drip, rhinorrhea, sinus pressure, sinus pain, sneezing, sore throat, tinnitus, trouble swallowing and voice change  Eyes: Negative for photophobia, pain, discharge, redness, itching and visual disturbance  Respiratory: Positive for shortness of breath ( stable)  Negative for apnea, cough, choking, chest tightness, wheezing and stridor  Cardiovascular: Negative for chest pain, palpitations and leg swelling  Gastrointestinal: Negative for abdominal distention, abdominal pain, anal bleeding, blood in stool, constipation, diarrhea, nausea, rectal pain and vomiting  Endocrine: Negative for cold intolerance, heat intolerance, polydipsia, polyphagia and polyuria  Genitourinary: Negative for decreased urine volume, difficulty urinating, dysuria, enuresis, flank pain, frequency, genital sores, hematuria and urgency  Musculoskeletal: Negative for arthralgias, back pain, gait problem, joint swelling, myalgias, neck pain and neck stiffness     Skin: Negative for color change, pallor, rash and wound  Neurological: Negative for dizziness, tremors, seizures, syncope, facial asymmetry, speech difficulty, weakness, light-headedness, numbness and headaches  Hematological: Negative for adenopathy  Does not bruise/bleed easily  Psychiatric/Behavioral: Negative for agitation, behavioral problems, confusion, decreased concentration, dysphoric mood, hallucinations, self-injury, sleep disturbance and suicidal ideas  The patient is nervous/anxious  The patient is not hyperactive  Objective:  Vitals:    08/09/19 0958   BP: 120/70   BP Location: Left arm   Patient Position: Sitting   Cuff Size: Standard   Pulse: 89   Temp: 97 8 °F (36 6 °C)   SpO2: 96%   Weight: 67 4 kg (148 lb 9 6 oz)   Height: 5' 1" (1 549 m)     Body mass index is 28 08 kg/m²  Physical Exam   Constitutional: She is oriented to person, place, and time  She appears well-developed  HENT:   Head: Normocephalic  Right Ear: External ear normal    Left Ear: External ear normal    Mouth/Throat: Oropharynx is clear and moist    Eyes: Pupils are equal, round, and reactive to light  Conjunctivae are normal    Neck: Neck supple  No JVD present  No tracheal deviation present  No thyromegaly present  Cardiovascular: Normal rate, regular rhythm, normal heart sounds and intact distal pulses  Exam reveals no gallop and no friction rub  No murmur heard  Foot pulses not felt feet are warm no skin lesions in her feet   Pulmonary/Chest: Effort normal  No stridor  No respiratory distress  She has decreased breath sounds  She has no wheezes  She has no rales  She exhibits no tenderness  Abdominal: Soft  Bowel sounds are normal    Musculoskeletal: Normal range of motion  She exhibits no edema  Lymphadenopathy:     She has no cervical adenopathy  Neurological: She is alert and oriented to person, place, and time  She has normal reflexes  She displays normal reflexes  No cranial nerve deficit or sensory deficit   She exhibits normal muscle tone  Coordination normal    Skin: Skin is warm and dry

## 2019-08-11 ENCOUNTER — TELEPHONE (OUTPATIENT)
Dept: RADIOLOGY | Facility: HOSPITAL | Age: 74
End: 2019-08-11

## 2019-08-12 ENCOUNTER — TELEPHONE (OUTPATIENT)
Dept: RADIOLOGY | Facility: HOSPITAL | Age: 74
End: 2019-08-12

## 2019-08-12 NOTE — PROGRESS NOTES
Pre-instructions given to Jerri Sickle  No food or drink after midnight, patient has transport arranged with [facility], may have to stay overnight, take Aspirin 81 mg in am also ok to take inhalers, nebulizer  Also ok to take Xanax  Verbalizes understanding  Transport arranged with [facility] for 6:30 - 6:45  Jaspal Camargo in short stay notified of transport time as it will affect arrival time  Case discussed with Dr Jud Bay  Ok to do in IR

## 2019-08-14 ENCOUNTER — TELEPHONE (OUTPATIENT)
Dept: INTERNAL MEDICINE CLINIC | Facility: CLINIC | Age: 74
End: 2019-08-14

## 2019-08-14 ENCOUNTER — TELEPHONE (OUTPATIENT)
Dept: INPATIENT UNIT | Facility: HOSPITAL | Age: 74
End: 2019-08-14

## 2019-08-14 NOTE — TELEPHONE ENCOUNTER
Called medicare started a PA, was approved confirmation will be faxed over in 2 hours, pt will notified thru mail  called pharmacy to rerun script   Spoke to [address] pa is good until 12/31/19 and back date from 12/31/18

## 2019-08-15 ENCOUNTER — HOSPITAL ENCOUNTER (OUTPATIENT)
Dept: RADIOLOGY | Facility: HOSPITAL | Age: 74
Discharge: HOME | End: 2019-08-15
Attending: SURGERY | Admitting: SURGERY
Payer: MEDICARE

## 2019-08-15 VITALS
SYSTOLIC BLOOD PRESSURE: 105 MMHG | HEART RATE: 77 BPM | OXYGEN SATURATION: 93 % | WEIGHT: 147 LBS | RESPIRATION RATE: 18 BRPM | HEIGHT: 60 IN | BODY MASS INDEX: 28.86 KG/M2 | DIASTOLIC BLOOD PRESSURE: 61 MMHG

## 2019-08-15 DIAGNOSIS — I74.09 AORTOILIAC OCCLUSIVE DISEASE (HCC): ICD-10-CM

## 2019-08-15 LAB
INR PPP: 0.97 (ref 0.84–1.19)
PROTHROMBIN TIME: 12.5 SECONDS (ref 11.6–14.5)

## 2019-08-15 PROCEDURE — 37236 OPEN/PERQ PLACE STENT 1ST: CPT | Performed by: SURGERY

## 2019-08-15 PROCEDURE — 85610 PROTHROMBIN TIME: CPT | Performed by: SURGERY

## 2019-08-15 PROCEDURE — 36200 PLACE CATHETER IN AORTA: CPT | Performed by: SURGERY

## 2019-08-15 PROCEDURE — C1769 GUIDE WIRE: HCPCS

## 2019-08-15 PROCEDURE — C1760 CLOSURE DEV, VASC: HCPCS

## 2019-08-15 PROCEDURE — 76937 US GUIDE VASCULAR ACCESS: CPT

## 2019-08-15 PROCEDURE — C1894 INTRO/SHEATH, NON-LASER: HCPCS

## 2019-08-15 PROCEDURE — C1876 STENT, NON-COA/NON-COV W/DEL: HCPCS

## 2019-08-15 PROCEDURE — 75625 CONTRAST EXAM ABDOMINL AORTA: CPT

## 2019-08-15 PROCEDURE — 37236 OPEN/PERQ PLACE STENT 1ST: CPT

## 2019-08-15 PROCEDURE — 99153 MOD SED SAME PHYS/QHP EA: CPT

## 2019-08-15 PROCEDURE — C1725 CATH, TRANSLUMIN NON-LASER: HCPCS

## 2019-08-15 PROCEDURE — 99152 MOD SED SAME PHYS/QHP 5/>YRS: CPT | Performed by: SURGERY

## 2019-08-15 PROCEDURE — 99152 MOD SED SAME PHYS/QHP 5/>YRS: CPT

## 2019-08-15 RX ORDER — FENTANYL CITRATE 50 UG/ML
INJECTION, SOLUTION INTRAMUSCULAR; INTRAVENOUS CODE/TRAUMA/SEDATION MEDICATION
Status: COMPLETED | OUTPATIENT
Start: 2019-08-15 | End: 2019-08-15

## 2019-08-15 RX ORDER — ASPIRIN 325 MG
325 TABLET, DELAYED RELEASE (ENTERIC COATED) ORAL ONCE
Status: COMPLETED | OUTPATIENT
Start: 2019-08-15 | End: 2019-08-15

## 2019-08-15 RX ORDER — ACETAMINOPHEN 325 MG/1
325 TABLET ORAL EVERY 4 HOURS PRN
Status: DISCONTINUED | OUTPATIENT
Start: 2019-08-15 | End: 2019-08-15 | Stop reason: HOSPADM

## 2019-08-15 RX ORDER — SODIUM CHLORIDE 9 MG/ML
100 INJECTION, SOLUTION INTRAVENOUS CONTINUOUS
Status: DISPENSED | OUTPATIENT
Start: 2019-08-15 | End: 2019-08-15

## 2019-08-15 RX ORDER — HEPARIN SODIUM 1000 [USP'U]/ML
INJECTION, SOLUTION INTRAVENOUS; SUBCUTANEOUS CODE/TRAUMA/SEDATION MEDICATION
Status: COMPLETED | OUTPATIENT
Start: 2019-08-15 | End: 2019-08-15

## 2019-08-15 RX ORDER — MIDAZOLAM HYDROCHLORIDE 1 MG/ML
INJECTION INTRAMUSCULAR; INTRAVENOUS CODE/TRAUMA/SEDATION MEDICATION
Status: COMPLETED | OUTPATIENT
Start: 2019-08-15 | End: 2019-08-15

## 2019-08-15 RX ADMIN — FENTANYL CITRATE 25 MCG: 50 INJECTION, SOLUTION INTRAMUSCULAR; INTRAVENOUS at 10:33

## 2019-08-15 RX ADMIN — FENTANYL CITRATE 25 MCG: 50 INJECTION, SOLUTION INTRAMUSCULAR; INTRAVENOUS at 10:27

## 2019-08-15 RX ADMIN — ASPIRIN 325 MG: 325 TABLET, DELAYED RELEASE ORAL at 13:48

## 2019-08-15 RX ADMIN — FENTANYL CITRATE 25 MCG: 50 INJECTION, SOLUTION INTRAMUSCULAR; INTRAVENOUS at 10:53

## 2019-08-15 RX ADMIN — IODIXANOL 46 ML: 320 INJECTION, SOLUTION INTRAVASCULAR at 11:00

## 2019-08-15 RX ADMIN — FENTANYL CITRATE 25 MCG: 50 INJECTION, SOLUTION INTRAMUSCULAR; INTRAVENOUS at 10:20

## 2019-08-15 RX ADMIN — SODIUM CHLORIDE 200 ML: 9 INJECTION, SOLUTION INTRAVENOUS at 08:14

## 2019-08-15 RX ADMIN — MIDAZOLAM 1 MG: 1 INJECTION INTRAMUSCULAR; INTRAVENOUS at 10:32

## 2019-08-15 RX ADMIN — PROTAMINE SULFATE 20 MG: 10 INJECTION, SOLUTION INTRAVENOUS at 10:59

## 2019-08-15 RX ADMIN — HEPARIN SODIUM 4000 UNITS: 1000 INJECTION INTRAVENOUS; SUBCUTANEOUS at 10:21

## 2019-08-15 RX ADMIN — MIDAZOLAM 1 MG: 1 INJECTION INTRAMUSCULAR; INTRAVENOUS at 10:20

## 2019-08-15 NOTE — DISCHARGE INSTRUCTIONS
DISCHARGE INSTRUCTIONS  ARTERIOGRAM/ANGIOPLASTY/STENT    Following discharge from the hospital, you may have some questions about your procedure, your activities or your general condition  These instructions may answer some of your questions and help you adjust during the first few weeks following your operation  ACTIVITY: The evening following the procedure you should be sure someone remains with you until the next morning  Rest as much as possible, sitting, lying or reclining  Use the stairs as little as possible  The following day, limit your activity to walking  Avoid stooping or heavy lifting (no more than 30 lbs) for the first three days  Walking up steps and normal activities may be resumed as you feel ready  You should not drive a car for at least two days following discharge from the hospital  You may ride in a car  If you have any questions regarding a particular activity, please discuss with your doctor or nurse before you are discharged  DIET:  Resume your normal diet  Try to eat low fat and low cholesterol foods  Drink more liquids than usual for the next 24 hours  INCISION: Your doctor may have chosen to use a type of adhesive glue, to close your incision  The glue is used to cover the incision, assist in closure, and prevent contamination  This adhesive will darken and peel away on its own within one to two weeks  You may shower after the procedure, but do not scrub the incision  Sitting in a tub is not recommended for the two days following the procedure or if you have any open wounds  It is normal to have some bruising, swelling or mild discoloration around the incision  IF increasing redness or pain develops, call our office immediately  If present, you may remove the band-aid or steri-strips over your wound after two days  If you notice any active bleeding at the site, apply pressure to the site and call our office ([phone])      FOLLOW UP STUDIES:  Your doctor will discuss whether further treatments or follow-up studies are necessary at your first post procedure visit  PLEASE CALL THE OFFICE IF YOU HAVE ANY QUESTIONS  [phone] [facility] FREE [phone]  [address] [place] [address] , [address], [place], [address]  [address], [address], [place], [address] [facility]  [address], [place] [address] [facility], [facility], [address], [facility], [address]    [address], [address]  [address] [facility], [facility] 97   12[address] [facility], [address] [facility], [address] [facility]  [address] [facility], [address] [facility], [facility]

## 2019-08-15 NOTE — INTERVAL H&P NOTE
H&P reviewed  After examining the patient I find no changes in the patients condition since the H&P had been written      Vitals:    08/15/19 1130   BP: 105/61   Pulse: 77   Resp: 18   SpO2:

## 2019-08-20 ENCOUNTER — TELEPHONE (OUTPATIENT)
Dept: PULMONOLOGY | Facility: CLINIC | Age: 74
End: 2019-08-20

## 2019-08-20 DIAGNOSIS — R49.0 HOARSENESS OF VOICE: Primary | ICD-10-CM

## 2019-08-20 DIAGNOSIS — K86.89 PANCREATIC MASS: Primary | ICD-10-CM

## 2019-08-20 NOTE — PROGRESS NOTES
Pt recently had a stent placed, is no longer able to have MRI done  Ordering CT with panc protocol instead

## 2019-08-21 ENCOUNTER — TELEPHONE (OUTPATIENT)
Dept: SURGICAL ONCOLOGY | Facility: CLINIC | Age: 74
End: 2019-08-21

## 2019-08-30 NOTE — PROGRESS NOTES
Assessment/Plan: Aortoiliac occlusive disease ([facility] [address] )  Patient recently underwent distal aortic stenosis stenting with Protege 14mm x 40mm stent  I have reviewed the imaging from aortogram and intervention  She is on daily aspirin for antiplatelet agent which she should continue long term  Follow up duplex to be done in 3 months  Carotid artery plaque, bilateral  Bilateral< 50%  Needs every 2 yearly carotid duplex and risk factor modification with antiplatelet and statin  Overweight (BMI 25 0-29  9)  We discussed the importance of weight loss  Set realistic goal and  Regular exercise and diet modification is important  Atherosclerosis of native artery of both lower extremities with intermittent claudication ([address] )  Since placement of aortic stent he has had improvement in her symptoms  Claudication has resolved  Diagnoses and all orders for this visit:    Aortoiliac occlusive disease ([address] )  -     VAS abdominal aorta/iliacs; complete study; Future  -     VAS carotid complete study; Future    Atherosclerosis of native artery of both lower extremities with intermittent claudication (HCC)    PVD (peripheral vascular disease) (HCC)    Carotid artery plaque, bilateral    Overweight (BMI 25 0-29  9)          Subjective:      [facility] ID[facility] 69 [facility]  Patient is here for follow-up after recent angiogram with stenting of distal abdominal aortic stenosis on July 18, 2019  Currently she has had significant improvement  She is able to walk without interruption now  Claudication has resolved  She does some lower back pain  She also occasionally has cramping in her lower abdomen and groin area  Her ankles are slightly swollen  Since she quit smoking last year she has gained 30 pounds  She claims that she has not been eating healthy  She takes aspirin and statin      The following portions of the patient's history were reviewed and updated as appropriate: allergies, current medications, past family history, past medical history, past social history, past surgical history and problem list     Review of Systems   Constitutional: Negative  HENT: Negative  Eyes: Negative  Respiratory: Negative  Cardiovascular: Negative  Gastrointestinal: Negative  Endocrine: Negative  Genitourinary: Negative  Musculoskeletal: Positive for back pain and gait problem  Leg cramping   Skin: Negative  Allergic/Immunologic: Negative  Hematological: Negative  Psychiatric/Behavioral: Negative  I have reviewed the review of systems as entered and made appropriate changes as necessary    Objective:      /76 (BP Location: Right arm, Patient Position: Sitting)   Pulse 84   Temp 98 4 °F (36 9 °C)   Resp 20   Ht 5' (1 524 m)   Wt 68 5 kg (151 lb)   BMI 29 49 kg/m²          Physical Exam   Constitutional: She is oriented to person, place, and time  She appears well-developed and well-nourished  No distress  HENT:   Head: Normocephalic and atraumatic  Eyes: Conjunctivae are normal  Right eye exhibits no discharge  Left eye exhibits no discharge  No scleral icterus  Neck: Normal range of motion  Cardiovascular: Normal rate, regular rhythm, normal heart sounds and intact distal pulses  Exam reveals no gallop and no friction rub  No murmur heard  Pulmonary/Chest: Effort normal and breath sounds normal  No stridor  No respiratory distress  She has no rales  She exhibits no tenderness  Abdominal: Soft  Bowel sounds are normal  She exhibits no distension and no mass  There is no tenderness  There is no guarding  Right groin puncture site soft without any ecchymosis   Musculoskeletal: Normal range of motion  She exhibits edema (Trace ankle edema bilaterally)  She exhibits no tenderness or deformity  Neurological: She is alert and oriented to person, place, and time  Skin: Skin is warm and dry  Capillary refill takes less than 2 seconds   No rash noted  She is not diaphoretic  No erythema  Psychiatric: She has a normal mood and affect  Her behavior is normal    Nursing note and vitals reviewed

## 2019-09-03 ENCOUNTER — OFFICE VISIT (OUTPATIENT)
Dept: VASCULAR SURGERY | Facility: CLINIC | Age: 74
End: 2019-09-03
Payer: MEDICARE

## 2019-09-03 VITALS
HEIGHT: 60 IN | WEIGHT: 151 LBS | SYSTOLIC BLOOD PRESSURE: 138 MMHG | HEART RATE: 84 BPM | DIASTOLIC BLOOD PRESSURE: 76 MMHG | RESPIRATION RATE: 20 BRPM | TEMPERATURE: 98.4 F | BODY MASS INDEX: 29.64 KG/M2

## 2019-09-03 DIAGNOSIS — E66.3 OVERWEIGHT (BMI 25.0-29.9): ICD-10-CM

## 2019-09-03 DIAGNOSIS — I70.213 ATHEROSCLEROSIS OF NATIVE ARTERY OF BOTH LOWER EXTREMITIES WITH INTERMITTENT CLAUDICATION (HCC): ICD-10-CM

## 2019-09-03 DIAGNOSIS — I73.9 PVD (PERIPHERAL VASCULAR DISEASE) (HCC): ICD-10-CM

## 2019-09-03 DIAGNOSIS — I74.09 AORTOILIAC OCCLUSIVE DISEASE (HCC): Primary | ICD-10-CM

## 2019-09-03 DIAGNOSIS — I65.23 CAROTID ARTERY PLAQUE, BILATERAL: ICD-10-CM

## 2019-09-03 PROCEDURE — 99214 OFFICE O/P EST MOD 30 MIN: CPT | Performed by: SURGERY

## 2019-09-03 NOTE — ASSESSMENT & PLAN NOTE
Patient recently underwent distal aortic stenosis stenting with Protege 14mm x 40mm stent  I have reviewed the imaging from aortogram and intervention  She is on daily aspirin for antiplatelet agent which she should continue long term  Follow up duplex to be done in 3 months

## 2019-09-03 NOTE — PATIENT INSTRUCTIONS
We discussed the importance of weight loss  Set realistic goal of losing  1 lb per month, 12 lb or so per year  Regular exercise and diet modification is important  Weight Management   AMBULATORY CARE:   Why it is important to manage your weight:  Being overweight increases your risk of health conditions such as heart disease, high blood pressure, type 2 diabetes, and certain types of cancer  It can also increase your risk for osteoarthritis, sleep apnea, and other respiratory problems  Aim for a slow, steady weight loss  Even a small amount of weight loss can lower your risk of health problems  How to lose weight safely:  A safe and healthy way to lose weight is to eat fewer calories and get regular exercise  You can lose up about 1 pound a week by decreasing the number of calories you eat by 500 calories each day  You can decrease calories by eating smaller portion sizes or by cutting out high-calorie foods  Read labels to find out how many calories are in the foods you eat  You can also burn calories with exercise such as walking, swimming, or biking  You will be more likely to keep weight off if you make these changes part of your lifestyle  Healthy meal plan for weight management:  A healthy meal plan includes a variety of foods, contains fewer calories, and helps you stay healthy  A healthy meal plan includes the following:  · Eat whole-grain foods more often  A healthy meal plan should contain fiber  Fiber is the part of grains, fruits, and vegetables that is not broken down by your body  Whole-grain foods are healthy and provide extra fiber in your diet  Some examples of whole-grain foods are whole-wheat breads and pastas, oatmeal, brown rice, and bulgur  · Eat a variety of vegetables every day  Include dark, leafy greens such as spinach, kale, brooke greens, and mustard greens  Eat yellow and orange vegetables such as carrots, sweet potatoes, and winter squash       · Eat a variety of fruits every day  Choose fresh or canned fruit (canned in its own juice or light syrup) instead of juice  Fruit juice has very little or no fiber  · Eat low-fat dairy foods  Drink fat-free (skim) milk or 1% milk  Eat fat-free yogurt and low-fat cottage cheese  Try low-fat cheeses such as mozzarella and other reduced-fat cheeses  · Choose meat and other protein foods that are low in fat  Choose beans or other legumes such as split peas or lentils  Choose fish, skinless poultry (chicken or turkey), or lean cuts of red meat (beef or pork)  Before you cook meat or poultry, cut off any visible fat  · Use less fat and oil  Try baking foods instead of frying them  Add less fat, such as margarine, sour cream, regular salad dressing and mayonnaise to foods  Eat fewer high-fat foods  Some examples of high-fat foods include french fries, doughnuts, ice cream, and cakes  · Eat fewer sweets  Limit foods and drinks that are high in sugar  This includes candy, cookies, regular soda, and sweetened drinks  Ways to decrease calories:   · Eat smaller portions  ¨ Use a small plate with smaller servings  ¨ Do not eat second helpings  ¨ When you eat at a restaurant, ask for a box and place half of your meal in the box before you eat  ¨ Share an entrée with someone else  · Replace high-calorie snacks with healthy, low-calorie snacks  ¨ Choose fresh fruit, vegetables, fat-free rice cakes, or air-popped popcorn instead of potato chips, nuts, or chocolate  ¨ Choose water or calorie-free drinks instead of soda or sweetened drinks  · Eat regular meals  Skipping meals can lead to overeating later in the day  Eat a healthy snack in place of a meal if you do not have time to eat a regular meal      · Do not shop for groceries when you are hungry  You may be more likely to make unhealthy food choices  Take a grocery list of healthy foods and shop after you have eaten    Exercise:  Exercise at least 30 minutes per day on most days of the week  Some examples of exercise include walking, biking, dancing, and swimming  You can also fit in more physical activity by taking the stairs instead of the elevator or parking farther away from stores  Ask your healthcare provider about the best exercise plan for you  Other things to consider as you try to lose weight:   · Be aware of situations that may give you the urge to overeat, such as eating while watching television  Find ways to avoid these situations  For example, read a book, go for a walk, or do crafts  · Meet with a weight loss support group or friends who are also trying to lose weight  This may help you stay motivated to continue working on your weight loss goals  © 2017 [facility] Information is for End User's use only and may not be sold, redistributed or otherwise used for commercial purposes  All illustrations and images included in CareNotes® are the copyrighted property of [employer]  or [employer]  The above information is an  only  It is not intended as medical advice for individual conditions or treatments  Talk to your doctor, nurse or pharmacist before following any medical regimen to see if it is safe and effective for you

## 2019-09-03 NOTE — ASSESSMENT & PLAN NOTE
Bilateral< 50%  Needs every 2 yearly carotid duplex and risk factor modification with antiplatelet and statin

## 2019-09-03 NOTE — LETTER
September 3, 2019     Minal Valero MD  [address] [address]    Patient: Jordon Becker   YOB: 1945   Date of Visit: 9/3/2019       Dear Dr Nomi Vera: Thank you for referring Jrodon Becker to me for evaluation  Below are my notes for this consultation  If you have questions, please do not hesitate to call me  I look forward to following your patient along with you  Sincerely,        Ghazala Metcalf MD        CC: MD Yevgeniy Pérez MD [place], MD  9/3/2019 12:57 PM  Sign at close encounter  Assessment/Plan: Aortoiliac occlusive disease ([address] )  Patient recently underwent distal aortic stenosis stenting with Protege 14mm x 40mm stent  I have reviewed the imaging from aortogram and intervention  She is on daily aspirin for antiplatelet agent which she should continue long term  Follow up duplex to be done in 3 months  Carotid artery plaque, bilateral  Bilateral< 50%  Needs every 2 yearly carotid duplex and risk factor modification with antiplatelet and statin  Overweight (BMI 25 0-29  9)  We discussed the importance of weight loss  Set realistic goal and  Regular exercise and diet modification is important  Atherosclerosis of native artery of both lower extremities with intermittent claudication ([address] )  Since placement of aortic stent he has had improvement in her symptoms  Claudication has resolved  Diagnoses and all orders for this visit:    Aortoiliac occlusive disease ([address] )  -     VAS abdominal aorta/iliacs; complete study; Future  -     VAS carotid complete study; Future    Atherosclerosis of native artery of both lower extremities with intermittent claudication (HCC)    PVD (peripheral vascular disease) (HCC)    Carotid artery plaque, bilateral    Overweight (BMI 25 0-29  9)          Subjective:      Patient ID: Jordon Becker is a 69 y o  female  atin     [facility]  Patient is here for follow-up after recent angiogram with stenting of distal abdominal aortic stenosis on July 18, 2019  Currently she has had significant improvement  She is able to walk without interruption now  Claudication has resolved  She does some lower back pain  She also occasionally has cramping in her lower abdomen and groin area  Her ankles are slightly swollen  Since she quit smoking last year she has gained 30 pounds  She claims that she has not been eating healthy  She takes aspirin and statin  The following portions of the patient's history were reviewed and updated as appropriate: allergies, current medications, past family history, past medical history, past social history, past surgical history and problem list     Review of Systems   Constitutional: Negative  HENT: Negative  Eyes: Negative  Respiratory: Negative  Cardiovascular: Negative  Gastrointestinal: Negative  Endocrine: Negative  Genitourinary: Negative  Musculoskeletal: Positive for back pain and gait problem  Leg cramping   Skin: Negative  Allergic/Immunologic: Negative  Hematological: Negative  Psychiatric/Behavioral: Negative  I have reviewed the review of systems as entered and made appropriate changes as necessary    Objective:      /76 (BP Location: Right arm, Patient Position: Sitting)   Pulse 84   Temp 98 4 °F (36 9 °C)   Resp 20   Ht 5' (1 524 m)   Wt 68 5 kg (151 lb)   BMI 29 49 kg/m²           Physical Exam   Constitutional: She is oriented to person, place, and time  She appears well-developed and well-nourished  No distress  HENT:   Head: Normocephalic and atraumatic  Eyes: Conjunctivae are normal  Right eye exhibits no discharge  Left eye exhibits no discharge  No scleral icterus  Neck: Normal range of motion  Cardiovascular: Normal rate, regular rhythm, normal heart sounds and intact distal pulses  Exam reveals no gallop and no friction rub  No murmur heard    Pulmonary/Chest: Effort normal and breath sounds normal  No stridor  No respiratory distress  She has no rales  She exhibits no tenderness  Abdominal: Soft  Bowel sounds are normal  She exhibits no distension and no mass  There is no tenderness  There is no guarding  Right groin puncture site soft without any ecchymosis   Musculoskeletal: Normal range of motion  She exhibits edema (Trace ankle edema bilaterally)  She exhibits no tenderness or deformity  Neurological: She is alert and oriented to person, place, and time  Skin: Skin is warm and dry  Capillary refill takes less than 2 seconds  No rash noted  She is not diaphoretic  No erythema  Psychiatric: She has a normal mood and affect  Her behavior is normal    Nursing note and vitals reviewed

## 2019-09-03 NOTE — ASSESSMENT & PLAN NOTE
We discussed the importance of weight loss  Set realistic goal and  Regular exercise and diet modification is important

## 2019-09-04 ENCOUNTER — HOSPITAL ENCOUNTER (OUTPATIENT)
Dept: NON INVASIVE DIAGNOSTICS | Facility: CLINIC | Age: 74
Discharge: HOME | End: 2019-09-04
Payer: MEDICARE

## 2019-09-04 ENCOUNTER — HOSPITAL ENCOUNTER (OUTPATIENT)
Dept: MAMMOGRAPHY | Facility: CLINIC | Age: 74
Discharge: HOME | End: 2019-09-04
Payer: MEDICARE

## 2019-09-04 ENCOUNTER — APPOINTMENT (OUTPATIENT)
Dept: MAMMOGRAPHY | Facility: CLINIC | Age: 74
End: 2019-09-04
Payer: MEDICARE

## 2019-09-04 ENCOUNTER — TELEPHONE (OUTPATIENT)
Dept: INTERNAL MEDICINE CLINIC | Facility: CLINIC | Age: 74
End: 2019-09-04

## 2019-09-04 ENCOUNTER — APPOINTMENT (OUTPATIENT)
Dept: LAB | Facility: CLINIC | Age: 74
End: 2019-09-04
Payer: MEDICARE

## 2019-09-04 VITALS — HEIGHT: 60 IN | BODY MASS INDEX: 29.64 KG/M2 | WEIGHT: 151 LBS

## 2019-09-04 DIAGNOSIS — Z13.820 SCREENING FOR OSTEOPOROSIS: ICD-10-CM

## 2019-09-04 DIAGNOSIS — Z12.39 SCREENING FOR BREAST CANCER: ICD-10-CM

## 2019-09-04 DIAGNOSIS — K86.89 MASS OF PANCREAS: ICD-10-CM

## 2019-09-04 DIAGNOSIS — M89.9 DISORDER OF BONE: ICD-10-CM

## 2019-09-04 DIAGNOSIS — R06.00 DOE (DYSPNEA ON EXERTION): ICD-10-CM

## 2019-09-04 LAB
BUN SERPL-MCNC: 18 MG/DL (ref 5–25)
CREAT SERPL-MCNC: 1.13 MG/DL (ref 0.6–1.3)
GFR SERPL CREATININE-BSD FRML MDRD: 49 ML/MIN/1.73SQ M

## 2019-09-04 PROCEDURE — 77080 DXA BONE DENSITY AXIAL: CPT

## 2019-09-04 PROCEDURE — 82565 ASSAY OF CREATININE: CPT

## 2019-09-04 PROCEDURE — 77067 SCR MAMMO BI INCL CAD: CPT

## 2019-09-04 PROCEDURE — 93306 TTE W/DOPPLER COMPLETE: CPT

## 2019-09-04 PROCEDURE — 77063 BREAST TOMOSYNTHESIS BI: CPT

## 2019-09-04 PROCEDURE — 84520 ASSAY OF UREA NITROGEN: CPT

## 2019-09-04 PROCEDURE — 93306 TTE W/DOPPLER COMPLETE: CPT | Performed by: INTERNAL MEDICINE

## 2019-09-04 PROCEDURE — 36415 COLL VENOUS BLD VENIPUNCTURE: CPT

## 2019-09-04 NOTE — TELEPHONE ENCOUNTER
----- Message from Miguel Dumont PA-C sent at 9/4/2019  1:47 PM EDT -----   Bone density is low  Please ask her to take calcium/vitamin-D twice a day  Caltrate

## 2019-09-13 ENCOUNTER — HOSPITAL ENCOUNTER (OUTPATIENT)
Dept: CT IMAGING | Facility: HOSPITAL | Age: 74
Discharge: HOME | End: 2019-09-13
Attending: SURGERY
Payer: MEDICARE

## 2019-09-13 DIAGNOSIS — K86.89 PANCREATIC MASS: ICD-10-CM

## 2019-09-13 PROCEDURE — 74160 CT ABDOMEN W/CONTRAST: CPT

## 2019-09-13 RX ADMIN — IOHEXOL 50 ML: 240 INJECTION, SOLUTION INTRATHECAL; INTRAVASCULAR; INTRAVENOUS; ORAL at 10:59

## 2019-09-13 RX ADMIN — IOHEXOL 100 ML: 350 INJECTION, SOLUTION INTRAVENOUS at 11:00

## 2019-09-19 ENCOUNTER — OFFICE VISIT (OUTPATIENT)
Dept: SURGICAL ONCOLOGY | Facility: CLINIC | Age: 74
End: 2019-09-19
Payer: MEDICARE

## 2019-09-19 VITALS
TEMPERATURE: 99.4 F | DIASTOLIC BLOOD PRESSURE: 68 MMHG | HEIGHT: 60 IN | HEART RATE: 95 BPM | BODY MASS INDEX: 29.64 KG/M2 | SYSTOLIC BLOOD PRESSURE: 110 MMHG | WEIGHT: 151 LBS | RESPIRATION RATE: 18 BRPM

## 2019-09-19 DIAGNOSIS — K86.89 PANCREATIC MASS: Primary | ICD-10-CM

## 2019-09-19 PROCEDURE — 99213 OFFICE O/P EST LOW 20 MIN: CPT | Performed by: SURGERY

## 2019-09-19 NOTE — PROGRESS NOTES
Surgical Oncology Follow Up       Nesha  43  [facility]  [facility] [place]  [address] [facility] [place] [address]    Luc Ford  1945  [id number]  [facility]  [facility]  [address]  [address] [facility] [place] [address]    Diagnoses and all orders for this visit:    Pancreatic mass  -     CT abdomen pelvis w contrast; Future  -     BUN; Future  -     Cancer antigen 19-9; Future  -     Chromogranin A; Future  -     Creatinine, serum; Future        Chief Complaint   Patient presents with    Follow-up     3 Months        Return in about 6 months (around 3/19/2020) for Office Visit, Imaging - See orders, Labs - See Treatment Plan  No history exists  Staging:    Treatment history:  Endoscopic ultrasound May 28, 2019  Ovoid mass measuring 1 8 x 1 6 cm with smooth margins  Biopsies were unsuccessful  Current treatment:  Observation  Disease status:         History of Present Illness:  Patient returns in follow-up of her CT  She is doing well at this time with no complaints  She did have some diarrhea after drinking barium  No nausea or vomiting  She does lose her voice at the end of the day and is following up with ENT regarding this  Follow-up CT from September 13, 2019 reveals an enhancing 1 6 x 1 5 cm lesion of the posterior inferior aspect of the pancreatic head  This is stable  I personally reviewed the films  Review of Systems  Complete ROS Surg Onc:   Complete ROS Surg Onc:   Constitutional: The patient denies new or recent history of general fatigue, no recent weight loss, no change in appetite  Eyes: No complaints of visual problems, no scleral icterus  ENT: no complaints of ear pain, no hoarseness, no difficulty swallowing,  no tinnitus and no new masses in head, oral cavity, or neck  Cardiovascular: No complaints of chest pain, no palpitations, no ankle edema     Respiratory: No complaints of shortness of breath, no cough  Gastrointestinal: No complaints of jaundice, no bloody stools, no pale stools  Genitourinary: No complaints of dysuria, no hematuria, no nocturia, no frequent urination, no urethral discharge  Musculoskeletal: No complaints of weakness, paralysis, joint stiffness or arthralgias  Integumentary: No complaints of rash, no new lesions  Neurological: No complaints of convulsions, no seizures, no dizziness  Hematologic/Lymphatic: No complaints of easy bruising  Endocrine:  No hot or cold intolerance  No polydipsia, polyphagia, or polyuria  Allergy/immunology:  No environmental allergies  No food allergies  Not immunocompromised  Skin:  No pallor or rash  No wound  Patient Active Problem List   Diagnosis    Anxiety    Aortoiliac occlusive disease ([facility] )    Carotid artery plaque, bilateral    Centrilobular emphysema (HCC)    Hyperlipidemia    Impaired fasting glucose    Osteoporosis    Restless leg syndrome    Subclavian artery stenosis, left (HCC)    PVD (peripheral vascular disease) (HCC)    Chronic sinusitis    MENDOZA (dyspnea on exertion)    Pancreatic mass    Paresis (HCC)    [facility]    Overweight (BMI 25 0-29  9)     Past Medical History:   Diagnosis Date    Anxiety     Atherosclerosis of native artery of both lower extremities with intermittent claudication ([facility] ) 10/15/2015    Transitioned From: Cardiovascular Symptoms    Carotid artery plaque, bilateral 3/21/2017    Transitioned From:  Atherosclerosis of both carotid arteries    Chronic sinusitis     Last assessed: 11/11/13    COPD (chronic obstructive pulmonary disease) (HCC)     Hyperlipidemia     Lung nodule     PNA (pneumonia)     PVD (peripheral vascular disease) (HCC)     Restless leg syndrome     Stage 3 chronic kidney disease ([facility] ) 4/22/2019    Tobacco abuse      Past Surgical History:   Procedure Laterality Date    CATARACT EXTRACTION   SECTION      COLONOSCOPY      Complete  Resolved: 13    IR ABDOMINAL ANGIOGRAPHY / INTERVENTION  8/15/2019    SHOULDER SURGERY      For frozen shoulder     TONSILLECTOMY       Family History   Problem Relation Age of Onset    No Known Problems Mother     No Known Problems Father     Arthritis Sister     Pancreatic cancer Sister 61    Melanoma Brother         twice    Prostate cancer Brother     Heart attack Family         Acute Myocardial Infarction    Cirrhosis Family     Prostate cancer Family     Skin cancer Family     Stroke Family         Syndrome     Social History     Socioeconomic History    Marital status:       Spouse name: Not on file    Number of children: 2    Years of education: Not on file    Highest education level: Not on file   Occupational History    Occupation: Retired/   Social Needs    Financial resource strain: Not on file    Food insecurity:     Worry: Not on file     Inability: Not on file    Transportation needs:     Medical: Not on file     Non-medical: Not on file   Tobacco Use    Smoking status: Former Smoker     Packs/day: 15 00     Years: 56 00     Pack years: 840 00     Types: Cigarettes     Start date: 1962     Last attempt to quit: 2018     Years since quittin 3    Smokeless tobacco: Never Used   Substance and Sexual Activity    Alcohol use: Yes     Frequency: Monthly or less     Drinks per session: 1 or 2     Binge frequency: Never    Drug use: No    Sexual activity: Not Currently     Partners: Male   Lifestyle    Physical activity:     Days per week: 0 days     Minutes per session: 0 min    Stress: Patient refused   Relationships    Social connections:     Talks on phone: Not on file     Gets together: Not on file     Attends Buddhist service: Not on file     Active member of club or organization: Not on file     Attends meetings of clubs or organizations: Not on file     Relationship status: Not on file    Intimate partner violence:     Fear of current or ex partner: Not on file     Emotionally abused: Not on file     Physically abused: Not on file     Forced sexual activity: Not on file   Other Topics Concern    Not on file   Social History Narrative    Living w/adult children    No advance directive on file       Current Outpatient Medications:     albuterol (2 5 mg/3 mL) 0 083 % nebulizer solution, Take 1 vial (2 5 mg total) by nebulization every 6 (six) hours as needed for wheezing or shortness of breath, Disp: 360 mL, Rfl: 3    ALPRAZolam (XANAX) 0 5 mg tablet, ONE TABLET ONCE DAILY AS NEEDED FOR ANXIETY  JEREMIE AT OFFICE, Disp: , Rfl: 5    aspirin ([employer] ASPIRIN EC) 81 mg EC tablet, Take 1 tablet by mouth daily, Disp: , Rfl:     atorvastatin (LIPITOR) 40 mg tablet, TAKE 1 TABLET DAILY  , Disp: 90 tablet, Rfl: 3    cefdinir (OMNICEF) 300 mg capsule, cefdinir 300 mg capsule, Disp: , Rfl:     fluticasone (FLONASE) 50 mcg/act nasal spray, fluticasone propionate 50 mcg/actuation nasal spray,suspension, Disp: , Rfl:     fluticasone-vilanterol (BREO ELLIPTA) 100-25 mcg/inh inhaler, Inhale 1 puff daily Rinse mouth after use , Disp: 3 each, Rfl: 3    olopatadine (PATANOL) 0 1 % ophthalmic solution, , Disp: , Rfl: 0    rOPINIRole (REQUIP) 1 mg tablet, 1-2 TABLETS HS TO CONTROL  RESTLESS LEG, Disp: 60 tablet, Rfl: 5    sodium chloride () 2 % hypertonic ophthalmic solution, Sue 128 2 % eye drops, Disp: , Rfl:   Allergies   Allergen Reactions    Spiriva Handihaler [Tiotropium Bromide Monohydrate] Shortness Of Breath    Augmentin [Amoxicillin-Pot Clavulanate] Abdominal Pain     Pt received ceftriaxone 1 g IV on 5-21-18    Tiotropium     Tylenol With Codeine #3  [Acetaminophen-Codeine]      Vitals:    09/19/19 1448   BP: 110/68   Pulse: 95   Resp: 18   Temp: 99 4 °F (37 4 °C)       Physical Exam  Constitutional: General appearance:  The Patient is well-developed and well-nourished who appears the stated age in no acute distress  Patient is pleasant and talkative  HEENT:  Normocephalic  Sclerae are anicteric  Mucous membranes are moist  Neck is supple without adenopathy  No JVD  Chest: The lungs are clear to auscultation  Cardiac: Heart is regular rate  Abdomen: Abdomen is soft, non-tender, non-distended and without masses  Extremities: There is no clubbing or cyanosis  There is no edema  Symmetric  Neuro: Grossly nonfocal  Gait is normal      Lymphatic: No evidence of cervical adenopathy bilaterally  No evidence of axillary adenopathy bilaterally  No evidence of inguinal adenopathy bilaterally  Skin: Warm, anicteric  Psych:  Patient is pleasant and talkative  Breasts:        Pathology:  [unfilled]    Labs:      Imaging  Ct Abdomen W Contrast    Result Date: 9/17/2019  Narrative: CT ABDOMEN WITH IV CONTRAST INDICATION:   K86 9: Disease of pancreas, unspecified  COMPARISON: CT from 4/4/2019 and MRI from 5/15/2019 TECHNIQUE:  CT examination of the abdomen was performed  Scanning through the abdomen was performed in arterial, venous and delayed phases according a protocol spefically designed to evaluate upper abdominal viscera  Axial, sagittal, and coronal 2D reformatted images were created from the source data and submitted for interpretation  Radiation dose length product (DLP) for this visit:  839 mGy-cm   This examination, like all CT scans performed in the [facility], was performed utilizing techniques to minimize radiation dose exposure, including the use of iterative reconstruction and automated exposure control  IV Contrast:  50 mL of iohexol (OMNIPAQUE) 100 mL of iohexol (OMNIPAQUE) Enteric Contrast:  Enteric contrast was administered  FINDINGS: ABDOMEN LOWER CHEST:  There is mild emphysema partially imaged  LIVER/BILIARY TREE:  There is fatty infiltration of the liver    Left hepatic lobe hyperdensities were shown to represent cysts on recent MRI  GALLBLADDER:  No calcified gallstones  No pericholecystic inflammatory change  SPLEEN:  Unremarkable  PANCREAS:  There is redemonstration of an enhancing lesion measuring 1 6 x 1 5 cm along the posterior inferior aspect of the pancreatic head  Overall size and appearance is stable since the prior examination  Portal vein is patent  There is no encasement of the regional arterial vasculature  ADRENAL GLANDS:  Unremarkable  KIDNEYS/URETERS:  One or more simple appearing renal cyst(s) is noted  Otherwise unremarkable kidneys  No hydronephrosis  VISUALIZED STOMACH AND BOWEL:  Unremarkable  ABDOMINAL CAVITY:  No ascites or free intraperitoneal air  No lymphadenopathy  VESSELS:  There is atherosclerotic calcification  ABDOMINAL WALL:  There is a small fat-containing umbilical hernia  OSSEOUS STRUCTURES:  There are age appropriate degenerative changes  No acute fracture or destructive osseous lesion  Impression: Stable pancreatic head lesion, as described above  No evidence for regional spread of disease or vascular encasement  Remainder of the findings, as described above  Workstation performed: [id number]     Dxa Bone Density Spine Hip And Pelvis    Result Date: 9/4/2019  Narrative: CENTRAL  DXA SCAN CLINICAL HISTORY:   69year old post-menopausal  female risk factors include past history of smoking, COPD  Menopause at age 48  Previously treated with Actonel  TECHNIQUE: Bone densitometry was performed using a Horizon C bone densitometer  Regions of interest appear properly placed  There are no obvious fractures or other confounding variables which could limit the study  Degenerative changes in lumbar spine may spuriously elevate bone density  COMPARISON:  None  RESULTS: LUMBAR SPINE:  L1, L3, L4: BMD 0 884 gm/cm2 T-score -1 5 Z-score 0 7 LEFT TOTAL HIP: BMD 0 740 gm/cm2 T-score -1 7 Z-score 0 0 LEFT FEMORAL NECK: BMD 0 551 gm/cm2 T-score -2 7 Z-score -0 8     Impression: 1    Based on the [facility] classification, the T-score of -2 7 in the left femoral neck is consistent with osteoporosis  2   According to the [facility], prescription therapy is recommended with a T-score of -2 5 or less in the spine or hip after appropriate evaluation to exclude secondary causes  3   A daily intake of at least 1200 mg Calcium and 800 to 1000 IU of Vitamin D, as well as weight bearing and muscle strengthening exercise, fall prevention and avoidance of tobacco and excessive alcohol intake as  basic preventive measures are suggested  4   Repeat DXA  in 18 - 24 months, on the same machine, as clinically indicated  The 10 year risk of hip fracture is 4 7%, with the 10 year risk of major osteoporotic fracture being 16%, as calculated by the [facility] fracture risk assessment tool (FRAX)  The current NOF guidelines recommend treating patients with FRAX 10 year risk score of >3% for hip fracture and >20% for major osteoporotic fracture  Please note, the calculated FRAX score may not be accurate and may actually be lower if there has been a prior history of osteoporosis therapy  WHO CLASSIFICATION: Normal (a T-score of -1 0 or higher) Low bone mineral density (a T-score of less than -1 0 but higher than -2 5) Osteoporosis (a T-score of -2 5 or less) Severe osteoporosis (a T-score of -2 5 or less with a fragility fracture)   Workstation performed: [id number]     Mammo Screening Bilateral W 3d & Cad    Result Date: 9/11/2019  Narrative: DIAGNOSIS: Screening for breast cancer RELEVANT HISTORY: Family Breast Cancer History: No known family history of breast cancer  Family Medical History: No known relevant family medical history  Personal History: No known relevant hormone history  No known relevant surgical history  No known relevant medical history  COMPARISONS: Prior mammograms dated: 03/14/2013 and 03/31/2010 INDICATION: Henri Canseco is a 69 y o  female presenting for screening mammography   TECHNIQUE: The current study was evaluated with Computer Aided Detection  TISSUE DENSITY: The breasts are almost entirely fatty  The patient is scheduled in a reminder system for screening mammography  8-10% of cancers will be missed on mammography  Management of a palpable abnormality must be based on clinical grounds  Patients will be notified of their results via letter from our facility  Accredited by [employer] of Radiology and FDA  RISK ASSESSMENT: 5 Year Tyrer-Cuzick: 1 09 % 10 Year Tyrer-Cuzick: 2 32 % Lifetime Tyrer-Cuzick: 3 12 % FINDINGS: Bilateral There are no suspicious masses, grouped microcalcifications or areas of architectural distortion  The skin and nipple areolar complex are unremarkable  Impression: No mammographic evidence of malignancy  ASSESSMENT/BI-RADS CATEGORY: Left: 2 - Benign Right: 2 - Benign Overall: 2 - Benign RECOMMENDATION:      - Routine screening mammogram in 1 year for both breasts  Workstation ID: [id number]     I reviewed the above laboratory and imaging data  Discussion/Summary: 53-year-old female with a pancreatic mass  The etiology of this lesion is unclear  This did have smooth margins by endoscopic ultrasound  It did not have the typical imaging appearance pancreatic adenocarcinoma or a neuroendocrine tumor  This has been stable on repeat imaging  I discussed treatment options including surgical resection, but I suspect this is going to be a benign lesion or a low-grade tumor such as a neuroendocrine tumor  She does not wish to undergo surgery a unless this is frankly malignant  I would be comfortable observing this and she does not wish to undergo any of the risks of pancreas surgery which we had discussed previously  We will plan on repeating her CT in 6 months  She is agreeable to this  All of their questions were answered

## 2019-09-19 NOTE — LETTER
September 19, 2019     Veronica Christie MD  [facility] [address] [address]    Patient: Simin Pitts   YOB: 1945   Date of Visit: 9/19/2019       Dear Dr Jose Rafael Hart: Thank you for referring Simin Pitts to me for evaluation  Below are my notes for this consultation  If you have questions, please do not hesitate to call me  I look forward to following your patient along with you  Sincerely,        Shon Cifuentes MD        CC: No Recipients  Shon Cifuentes MD  9/19/2019  3:07 PM  Sign at close encounter               Surgical Oncology Follow Up       [facility]  [address] [facility] [address] [facility]  1945  [id number]  [place]  43  [facility] [place]  [facility]  [address]  [facility] [address]    Diagnoses and all orders for this visit:    Pancreatic mass  -     CT abdomen pelvis w contrast; Future  -     BUN; Future  -     Cancer antigen 19-9; Future  -     Chromogranin A; Future  -     Creatinine, serum; Future        Chief Complaint   Patient presents with    Follow-up     3 Months        Return in about 6 months (around 3/19/2020) for Office Visit, Imaging - See orders, Labs - See Treatment Plan  No history exists  Staging:    Treatment history:  Endoscopic ultrasound May 28, 2019  Ovoid mass measuring 1 8 x 1 6 cm with smooth margins  Biopsies were unsuccessful  Current treatment:  Observation  Disease status:         History of Present Illness:  Patient returns in follow-up of her CT  She is doing well at this time with no complaints  She did have some diarrhea after drinking barium  No nausea or vomiting  She does lose her voice at the end of the day and is following up with ENT regarding this  Follow-up CT from September 13, 2019 reveals an enhancing 1 6 x 1 5 cm lesion of the posterior inferior aspect of the pancreatic head  This is stable  I personally reviewed the films  Review of Systems  Complete ROS Surg Onc:   Complete ROS Surg Onc:   Constitutional: The patient denies new or recent history of general fatigue, no recent weight loss, no change in appetite  Eyes: No complaints of visual problems, no scleral icterus  ENT: no complaints of ear pain, no hoarseness, no difficulty swallowing,  no tinnitus and no new masses in head, oral cavity, or neck  Cardiovascular: No complaints of chest pain, no palpitations, no ankle edema  Respiratory: No complaints of shortness of breath, no cough  Gastrointestinal: No complaints of jaundice, no bloody stools, no pale stools  Genitourinary: No complaints of dysuria, no hematuria, no nocturia, no frequent urination, no urethral discharge  Musculoskeletal: No complaints of weakness, paralysis, joint stiffness or arthralgias  Integumentary: No complaints of rash, no new lesions  Neurological: No complaints of convulsions, no seizures, no dizziness  Hematologic/Lymphatic: No complaints of easy bruising  Endocrine:  No hot or cold intolerance  No polydipsia, polyphagia, or polyuria  Allergy/immunology:  No environmental allergies  No food allergies  Not immunocompromised  Skin:  No pallor or rash  No wound  Patient Active Problem List   Diagnosis    Anxiety    Aortoiliac occlusive disease ([address] )    Carotid artery plaque, bilateral    Centrilobular emphysema (HCC)    Hyperlipidemia    Impaired fasting glucose    Osteoporosis    Restless leg syndrome    Subclavian artery stenosis, left (HCC)    PVD (peripheral vascular disease) (HCC)    Chronic sinusitis    MENDOZA (dyspnea on exertion)    Pancreatic mass    Paresis (HCC)    Stage 3 chronic kidney disease ([address] )    Overweight (BMI 25 0-29  9)     Past Medical History:   Diagnosis Date    Anxiety     Atherosclerosis of native artery of both lower extremities with intermittent claudication ([address] ) 10/15/2015 Transitioned From: Cardiovascular Symptoms    Carotid artery plaque, bilateral 3/21/2017    Transitioned From: Atherosclerosis of both carotid arteries    Chronic sinusitis     Last assessed: 13    COPD (chronic obstructive pulmonary disease) (HCC)     Hyperlipidemia     Lung nodule     PNA (pneumonia)     PVD (peripheral vascular disease) (HCC)     Restless leg syndrome     Stage 3 chronic kidney disease ([facility] ) 2019    Tobacco abuse      Past Surgical History:   Procedure Laterality Date    CATARACT EXTRACTION       SECTION      COLONOSCOPY      Complete  Resolved: 13    IR ABDOMINAL ANGIOGRAPHY / INTERVENTION  8/15/2019    SHOULDER SURGERY      For frozen shoulder     TONSILLECTOMY       Family History   Problem Relation Age of Onset    No Known Problems Mother     No Known Problems Father     Arthritis Sister     Pancreatic cancer Sister 61    Melanoma Brother         twice    Prostate cancer Brother     Heart attack Family         Acute Myocardial Infarction    Cirrhosis Family     Prostate cancer Family     Skin cancer Family     Stroke Family         Syndrome     Social History     Socioeconomic History    Marital status:       Spouse name: Not on file    Number of children: 2    Years of education: Not on file    Highest education level: Not on file   Occupational History    Occupation: Retired/   Social Needs    Financial resource strain: Not on file    Food insecurity:     Worry: Not on file     Inability: Not on file    Transportation needs:     Medical: Not on file     Non-medical: Not on file   Tobacco Use    Smoking status: Former Smoker     Packs/day: 15 00     Years: 56 00     Pack years: 840 00     Types: Cigarettes     Start date: 1962     Last attempt to quit: 2018     Years since quittin 3    Smokeless tobacco: Never Used   Substance and Sexual Activity    Alcohol use: Yes     Frequency: Monthly or less     Drinks per session: 1 or 2     Binge frequency: Never    Drug use: No    Sexual activity: Not Currently     Partners: Male   Lifestyle    Physical activity:     Days per week: 0 days     Minutes per session: 0 min    Stress: Patient refused   Relationships    Social connections:     Talks on phone: Not on file     Gets together: Not on file     Attends [employer] service: Not on file     Active member of club or organization: Not on file     Attends meetings of clubs or organizations: Not on file     Relationship status: Not on file    Intimate partner violence:     Fear of current or ex partner: Not on file     Emotionally abused: Not on file     Physically abused: Not on file     Forced sexual activity: Not on file   Other Topics Concern    Not on file   Social History Narrative    Living w/adult children    No advance directive on file       Current Outpatient Medications:     albuterol (2 5 mg/3 mL) 0 083 % nebulizer solution, Take 1 vial (2 5 mg total) by nebulization every 6 (six) hours as needed for wheezing or shortness of breath, Disp: 360 mL, Rfl: 3    ALPRAZolam (XANAX) 0 5 mg tablet, ONE TABLET ONCE DAILY AS NEEDED FOR ANXIETY  JEREMIE AT OFFICE, Disp: , Rfl: 5    aspirin ([employer] ASPIRIN EC) 81 mg EC tablet, Take 1 tablet by mouth daily, Disp: , Rfl:     atorvastatin (LIPITOR) 40 mg tablet, TAKE 1 TABLET DAILY  , Disp: 90 tablet, Rfl: 3    cefdinir (OMNICEF) 300 mg capsule, cefdinir 300 mg capsule, Disp: , Rfl:     fluticasone (FLONASE) 50 mcg/act nasal spray, fluticasone propionate 50 mcg/actuation nasal spray,suspension, Disp: , Rfl:     fluticasone-vilanterol (BREO ELLIPTA) 100-25 mcg/inh inhaler, Inhale 1 puff daily Rinse mouth after use , Disp: 3 each, Rfl: 3    olopatadine (PATANOL) 0 1 % ophthalmic solution, , Disp: , Rfl: 0    rOPINIRole (REQUIP) 1 mg tablet, 1-2 TABLETS HS TO CONTROL  RESTLESS LEG, Disp: 60 tablet, Rfl: 5    sodium chloride (MIKO 128) 2 % hypertonic ophthalmic solution, Seu 128 2 % eye drops, Disp: , Rfl:   Allergies   Allergen Reactions    Spiriva Handihaler [Tiotropium Bromide Monohydrate] Shortness Of Breath    Augmentin [Amoxicillin-Pot Clavulanate] Abdominal Pain     Pt received ceftriaxone 1 g IV on 5-21-18    Tiotropium     Tylenol With Codeine #3  [Acetaminophen-Codeine]      Vitals:    09/19/19 1448   BP: 110/68   Pulse: 95   Resp: 18   Temp: 99 4 °F (37 4 °C)       Physical Exam  Constitutional: General appearance: The Patient is well-developed and well-nourished who appears the stated age in no acute distress  Patient is pleasant and talkative  HEENT:  Normocephalic  Sclerae are anicteric  Mucous membranes are moist  Neck is supple without adenopathy  No JVD  Chest: The lungs are clear to auscultation  Cardiac: Heart is regular rate  Abdomen: Abdomen is soft, non-tender, non-distended and without masses  Extremities: There is no clubbing or cyanosis  There is no edema  Symmetric  Neuro: Grossly nonfocal  Gait is normal      Lymphatic: No evidence of cervical adenopathy bilaterally  No evidence of axillary adenopathy bilaterally  No evidence of inguinal adenopathy bilaterally  Skin: Warm, anicteric  Psych:  Patient is pleasant and talkative  Breasts:        Pathology:  [unfilled]    Labs:      Imaging  Ct Abdomen W Contrast    Result Date: 9/17/2019  Narrative: CT ABDOMEN WITH IV CONTRAST INDICATION:   K86 9: Disease of pancreas, unspecified  COMPARISON: CT from 4/4/2019 and MRI from 5/15/2019 TECHNIQUE:  CT examination of the abdomen was performed  Scanning through the abdomen was performed in arterial, venous and delayed phases according a protocol spefically designed to evaluate upper abdominal viscera  Axial, sagittal, and coronal 2D reformatted images were created from the source data and submitted for interpretation  Radiation dose length product (DLP) for this visit:  839 mGy-cm     This examination, like all CT scans performed in the [facility], was performed utilizing techniques to minimize radiation dose exposure, including the use of iterative reconstruction and automated exposure control  IV Contrast:  50 mL of iohexol (OMNIPAQUE) 100 mL of iohexol (OMNIPAQUE) Enteric Contrast:  Enteric contrast was administered  FINDINGS: ABDOMEN LOWER CHEST:  There is mild emphysema partially imaged  LIVER/BILIARY TREE:  There is fatty infiltration of the liver  Left hepatic lobe hyperdensities were shown to represent cysts on recent MRI  GALLBLADDER:  No calcified gallstones  No pericholecystic inflammatory change  SPLEEN:  Unremarkable  PANCREAS:  There is redemonstration of an enhancing lesion measuring 1 6 x 1 5 cm along the posterior inferior aspect of the pancreatic head  Overall size and appearance is stable since the prior examination  Portal vein is patent  There is no encasement of the regional arterial vasculature  ADRENAL GLANDS:  Unremarkable  KIDNEYS/URETERS:  One or more simple appearing renal cyst(s) is noted  Otherwise unremarkable kidneys  No hydronephrosis  VISUALIZED STOMACH AND BOWEL:  Unremarkable  ABDOMINAL CAVITY:  No ascites or free intraperitoneal air  No lymphadenopathy  VESSELS:  There is atherosclerotic calcification  ABDOMINAL WALL:  There is a small fat-containing umbilical hernia  OSSEOUS STRUCTURES:  There are age appropriate degenerative changes  No acute fracture or destructive osseous lesion  Impression: Stable pancreatic head lesion, as described above  No evidence for regional spread of disease or vascular encasement  Remainder of the findings, as described above  Workstation performed: [id number]     Dxa Bone Density Spine Hip And Pelvis    Result Date: 9/4/2019  Narrative: CENTRAL  DXA SCAN CLINICAL HISTORY:   69year old post-menopausal  female risk factors include past history of smoking, COPD  Menopause at age 48   Previously treated with Actonel  TECHNIQUE: Bone densitometry was performed using a Horizon C bone densitometer  Regions of interest appear properly placed  There are no obvious fractures or other confounding variables which could limit the study  Degenerative changes in lumbar spine may spuriously elevate bone density  COMPARISON:  None  RESULTS: LUMBAR SPINE:  L1, L3, L4: BMD 0 884 gm/cm2 T-score -1 5 Z-score 0 7 LEFT TOTAL HIP: BMD 0 740 gm/cm2 T-score -1 7 Z-score 0 0 LEFT FEMORAL NECK: BMD 0 551 gm/cm2 T-score -2 7 Z-score -0 8     Impression: 1  Based on the [facility] classification, the T-score of -2 7 in the left femoral neck is consistent with osteoporosis  2   According to the [address] [place] [address], prescription therapy is recommended with a T-score of -2 5 or less in the spine or hip after appropriate evaluation to exclude secondary causes  3   A daily intake of at least 1200 mg Calcium and 800 to 1000 IU of Vitamin D, as well as weight bearing and muscle strengthening exercise, fall prevention and avoidance of tobacco and excessive alcohol intake as  basic preventive measures are suggested  4   Repeat DXA  in 18 - 24 months, on the same machine, as clinically indicated  The 10 year risk of hip fracture is 4 7%, with the 10 year risk of major osteoporotic fracture being 16%, as calculated by the [facility] fracture risk assessment tool (FRAX)  The current NOF guidelines recommend treating patients with FRAX 10 year risk score of >3% for hip fracture and >20% for major osteoporotic fracture  Please note, the calculated FRAX score may not be accurate and may actually be lower if there has been a prior history of osteoporosis therapy   WHO CLASSIFICATION: Normal (a T-score of -1 0 or higher) Low bone mineral density (a T-score of less than -1 0 but higher than -2 5) Osteoporosis (a T-score of -2 5 or less) Severe osteoporosis (a T-score of -2 5 or less with a fragility fracture)   Workstation performed: [id number]     [facility] Screening Bilateral W 3d & Cad    Result Date: 9/11/2019  Narrative: DIAGNOSIS: Screening for breast cancer RELEVANT HISTORY: Family Breast Cancer History: No known family history of breast cancer  Family Medical History: No known relevant family medical history  Personal History: No known relevant hormone history  No known relevant surgical history  No known relevant medical history  COMPARISONS: Prior mammograms dated: 03/14/2013 and 03/31/2010 INDICATION: Tk Heard is a 69 y o  female presenting for screening mammography  TECHNIQUE: The current study was evaluated with Computer Aided Detection  TISSUE DENSITY: The breasts are almost entirely fatty  The patient is scheduled in a reminder system for screening mammography  8-10% of cancers will be missed on mammography  Management of a palpable abnormality must be based on clinical grounds  Patients will be notified of their results via letter from our facility  Accredited by [employer] of [employer] and FDA  RISK ASSESSMENT: 5 Year Tyrer-Cuzick: 1 09 % 10 Year Tyrer-Cuzick: 2 32 % Lifetime Tyrer-Cuzick: 3 12 % FINDINGS: Bilateral There are no suspicious masses, grouped microcalcifications or areas of architectural distortion  The skin and nipple areolar complex are unremarkable  Impression: No mammographic evidence of malignancy  ASSESSMENT/BI-RADS CATEGORY: Left: 2 - Benign Right: 2 - Benign Overall: 2 - Benign RECOMMENDATION:      - Routine screening mammogram in 1 year for both breasts  Workstation ID: [id number]     I reviewed the above laboratory and imaging data  Discussion/Summary: 79-year-old female with a pancreatic mass  The etiology of this lesion is unclear  This did have smooth margins by endoscopic ultrasound  It did not have the typical imaging appearance pancreatic adenocarcinoma or a neuroendocrine tumor  This has been stable on repeat imaging   I discussed treatment options including surgical resection, but I suspect this is going to be a benign lesion or a low-grade tumor such as a neuroendocrine tumor  She does not wish to undergo surgery a unless this is frankly malignant  I would be comfortable observing this and she does not wish to undergo any of the risks of pancreas surgery which we had discussed previously  We will plan on repeating her CT in 6 months  She is agreeable to this  All of their questions were answered

## 2019-10-14 ENCOUNTER — TELEPHONE (OUTPATIENT)
Dept: PULMONOLOGY | Facility: CLINIC | Age: 74
End: 2019-10-14

## 2019-10-14 NOTE — TELEPHONE ENCOUNTER
I can send Brent Braxton to the pharmacy (should be cheaper) and she has an appt scheduled with mily soon  Could likely give her some samples at that time   If she is agreeable to trying the wixela (generic advair) please let her know not to take it the same time as breo as they are basically the same medication

## 2019-10-14 NOTE — TELEPHONE ENCOUNTER
SHE SAID THANK YOU , BUT HER SISTER GAVE HER MONEY TO GET THE BREO   SHE WILL SEE [place] NEXT MONTH !

## 2019-10-19 ENCOUNTER — HOSPITAL ENCOUNTER (OUTPATIENT)
Dept: CT IMAGING | Facility: HOSPITAL | Age: 74
Discharge: HOME | End: 2019-10-19
Payer: MEDICARE

## 2019-10-19 DIAGNOSIS — R93.89 ABNORMAL CHEST CT: ICD-10-CM

## 2019-10-19 PROCEDURE — 71250 CT THORAX DX C-: CPT

## 2019-10-23 ENCOUNTER — OFFICE VISIT (OUTPATIENT)
Dept: INTERNAL MEDICINE CLINIC | Facility: CLINIC | Age: 74
End: 2019-10-23
Payer: MEDICARE

## 2019-10-23 ENCOUNTER — TELEPHONE (OUTPATIENT)
Dept: INTERNAL MEDICINE CLINIC | Facility: CLINIC | Age: 74
End: 2019-10-23

## 2019-10-23 ENCOUNTER — OFFICE VISIT (OUTPATIENT)
Dept: PULMONOLOGY | Facility: CLINIC | Age: 74
End: 2019-10-23
Payer: MEDICARE

## 2019-10-23 VITALS
SYSTOLIC BLOOD PRESSURE: 156 MMHG | WEIGHT: 152.6 LBS | BODY MASS INDEX: 29.96 KG/M2 | OXYGEN SATURATION: 98 % | RESPIRATION RATE: 18 BRPM | HEIGHT: 60 IN | HEART RATE: 97 BPM | DIASTOLIC BLOOD PRESSURE: 78 MMHG

## 2019-10-23 VITALS
BODY MASS INDEX: 29.64 KG/M2 | DIASTOLIC BLOOD PRESSURE: 70 MMHG | WEIGHT: 151 LBS | HEART RATE: 95 BPM | HEIGHT: 60 IN | OXYGEN SATURATION: 98 % | SYSTOLIC BLOOD PRESSURE: 130 MMHG

## 2019-10-23 DIAGNOSIS — R06.00 DOE (DYSPNEA ON EXERTION): Primary | ICD-10-CM

## 2019-10-23 DIAGNOSIS — J43.2 CENTRILOBULAR EMPHYSEMA (HCC): Primary | ICD-10-CM

## 2019-10-23 DIAGNOSIS — R49.0 HOARSENESS: ICD-10-CM

## 2019-10-23 DIAGNOSIS — R10.13 EPIGASTRIC PAIN: ICD-10-CM

## 2019-10-23 DIAGNOSIS — Z23 NEED FOR INFLUENZA VACCINATION: ICD-10-CM

## 2019-10-23 DIAGNOSIS — R93.89 ABNORMAL CHEST CT: ICD-10-CM

## 2019-10-23 PROBLEM — K20.90 ESOPHAGITIS: Status: ACTIVE | Noted: 2019-10-23

## 2019-10-23 PROCEDURE — 99214 OFFICE O/P EST MOD 30 MIN: CPT | Performed by: INTERNAL MEDICINE

## 2019-10-23 PROCEDURE — 99214 OFFICE O/P EST MOD 30 MIN: CPT | Performed by: PHYSICIAN ASSISTANT

## 2019-10-23 PROCEDURE — 93000 ELECTROCARDIOGRAM COMPLETE: CPT | Performed by: INTERNAL MEDICINE

## 2019-10-23 PROCEDURE — 90662 IIV NO PRSV INCREASED AG IM: CPT | Performed by: INTERNAL MEDICINE

## 2019-10-23 PROCEDURE — G0008 ADMIN INFLUENZA VIRUS VAC: HCPCS | Performed by: INTERNAL MEDICINE

## 2019-10-23 RX ORDER — FAMOTIDINE 20 MG/1
20 TABLET, FILM COATED ORAL 2 TIMES DAILY
Qty: 60 TABLET | Refills: 3 | Status: SHIPPED | OUTPATIENT
Start: 2019-10-23 | End: 2020-03-02 | Stop reason: CLARIF

## 2019-10-23 NOTE — PROGRESS NOTES
Assessment/Plan:   Diagnoses and all orders for this visit:    Centrilobular emphysema ([address] )    Abnormal chest CT  -     CT chest wo contrast; Future    Hoarseness     Patient is here today for follow-up  Since her last visit she had her vascular surgery done and has significant improvement in her leg pain  She saw ENT, was given Omnicef and prednisone course with significant improvement in her hoarseness as well as her shortness of breath, reports being told that there was inflammation and mucus production by the vocal cords  She has had some return of these similar symptoms, denies any mucous production or fever/chills  She had a repeat CT scan done which now shows a new   left upper lobe reticular nodular opacity  She does not have any current acute infectious symptoms, also just completed a course of Omnicef within the past few weeks  Will repeat a CT scan in 6-8 weeks to assess for resolution  She has a follow-up with ENT next week  We did also order an echocardiogram for her to look for pulmonary hypertension  Does show very mildly increased pulmonary artery pressure likely related to her underlying emphysema  She will continue with her Breo, albuterol 4 times per day as needed  She will follow-up with us in about 6 weeks once her CT scan is done, sooner if necessary  Return in about 6 weeks (around 12/4/2019)  All questions are answered to the patient's satisfaction and understanding  She verbalizes understanding  She is encouraged to call with any further questions or concerns  Portions of the record may have been created with voice recognition software  Occasional wrong word or "sound a like" substitutions may have occurred due to the inherent limitations of voice recognition software    Read the chart carefully and recognize, using context, where substitutions have occurred  Electronically Signed by Hollie Benson PA-C    ______________________________________________________________________    Chief Complaint:   Chief Complaint   Patient presents with    Emphysema     fup       Patient ID: Rosalie Antoine is a 69 y o  y o  female has a past medical history of Anxiety, Atherosclerosis of native artery of both lower extremities with intermittent claudication ([facility] ) (10/15/2015), Carotid artery plaque, bilateral (3/21/2017), Chronic sinusitis, COPD (chronic obstructive pulmonary disease) ([facility] ), Hyperlipidemia, Lung nodule, PNA (pneumonia), PVD (peripheral vascular disease) ([facility] ), Restless leg syndrome, Stage 3 chronic kidney disease ([facility] ) (4/22/2019), and Tobacco abuse  10/23/2019  Patient presents today for follow-up visit  Patient is a 82-year-old female former smoker who quit smoking in May 2018 with greater than 55 pack year smoking history past medical history of COPD/emphysema, PAD, osteoporosis, HTN, HLD  She is here today for follow-up  At her last visit we ordered an ENT consult as well as an echocardiogram   She had her vascular surgery done and has significant improvement in her leg pain  She saw ENT and states they told her she had a lot of mucus production inflammation, she was given a course of Omnicef as well as steroids and felt significant improvement  Since then she feels she has had increased hoarseness of her voice again as well as sensation of not being able to get in enough air similar to prior  She continues with her Breo daily, does not use her albuterol on a regular basis  She denies any mucus production, no fever or chills  No increased need for her rescue medication  Review of Systems   Constitutional: Negative  HENT: Positive for voice change  Respiratory: Positive for shortness of breath  Cardiovascular: Negative  Gastrointestinal: Negative  Genitourinary: Negative  Musculoskeletal: Negative  Skin: Negative  Allergic/Immunologic: Negative  Neurological: Negative  Psychiatric/Behavioral: Negative  Smoking history: She reports that she quit smoking about 17 months ago  Her smoking use included cigarettes  She started smoking about 57 years ago  She has a 840 00 pack-year smoking history  She has never used smokeless tobacco     The following portions of the patient's history were reviewed and updated as appropriate: allergies, current medications, past family history, past medical history, past social history, past surgical history and problem list     Immunization History   Administered Date(s) Administered    INFLUENZA 11/07/2017    Influenza Split High Dose Preservative Free IM 10/15/2015, 12/22/2016    Influenza, high dose seasonal 0 5 mL 10/11/2018    Pneumococcal Conjugate 13-Valent 08/04/2016    Pneumococcal Polysaccharide PPV23 1945, 11/16/2015    Tdap 1945     Current Outpatient Medications   Medication Sig Dispense Refill    albuterol (2 5 mg/3 mL) 0 083 % nebulizer solution Take 1 vial (2 5 mg total) by nebulization every 6 (six) hours as needed for wheezing or shortness of breath 360 mL 3    ALPRAZolam (XANAX) 0 5 mg tablet ONE TABLET ONCE DAILY AS NEEDED FOR ANXIETY  JEREMIE AT OFFICE  5    aspirin ([employer]S ASPIRIN EC) 81 mg EC tablet Take 1 tablet by mouth daily      atorvastatin (LIPITOR) 40 mg tablet TAKE 1 TABLET DAILY  90 tablet 3    fluticasone-vilanterol (BREO ELLIPTA) 100-25 mcg/inh inhaler Inhale 1 puff daily Rinse mouth after use  3 each 3    olopatadine (PATANOL) 0 1 % ophthalmic solution   0    rOPINIRole (REQUIP) 1 mg tablet 1-2 TABLETS HS TO CONTROL  RESTLESS LEG 60 tablet 5    sodium chloride () 2 % hypertonic ophthalmic solution Sue 128 2 % eye drops      fluticasone (FLONASE) 50 mcg/act nasal spray fluticasone propionate 50 mcg/actuation nasal spray,suspension       No current facility-administered medications for this visit        Allergies: Spiriva handihaler [tiotropium bromide monohydrate]; Augmentin [amoxicillin-pot clavulanate]; Tiotropium; and Tylenol with codeine #3  [acetaminophen-codeine]    Objective:  Vitals:    10/23/19 1145   BP: 130/70   Pulse: 95   SpO2: 98%   Weight: 68 5 kg (151 lb)   Height: 5' (1 524 m)   Oxygen Therapy  SpO2: 98 %    Wt Readings from Last 3 Encounters:   10/23/19 68 5 kg (151 lb)   09/19/19 68 5 kg (151 lb)   09/04/19 68 5 kg (151 lb)     Body mass index is 29 49 kg/m²  Physical Exam   Constitutional: She is oriented to person, place, and time  She appears well-developed and well-nourished  No distress  HENT:   Mouth/Throat: Oropharynx is clear and moist    Eyes: Pupils are equal, round, and reactive to light  Cardiovascular: Normal rate, regular rhythm and normal heart sounds  No murmur heard  Pulmonary/Chest: Effort normal and breath sounds normal  No accessory muscle usage  No respiratory distress  She has no decreased breath sounds  She has no wheezes  She has no rhonchi  She has no rales  Abdominal: Soft  There is no tenderness  Musculoskeletal: Normal range of motion  Neurological: She is alert and oriented to person, place, and time  Skin: Skin is warm and dry  No rash noted  Psychiatric: She has a normal mood and affect  Lab Review:   Lab Results   Component Value Date    K 4 3 08/09/2019    K 4 8 03/29/2019     08/09/2019     03/29/2019    CO2 28 08/09/2019    CO2 27 03/29/2019    BUN 18 09/04/2019    BUN 21 04/25/2019    CREATININE 1 13 09/04/2019    CALCIUM 9 3 08/09/2019    CALCIUM 9 3 03/29/2019     Lab Results   Component Value Date    WBC 6 86 08/09/2019    HGB 14 7 08/09/2019    HCT 46 5 (H) 08/09/2019    MCV 91 08/09/2019     08/09/2019       Diagnostics:  I have personally reviewed pertinent reports      Reviewed echocardiogram and CT chest  Office Spirometry Results:     ESS:    Ct Chest Wo Contrast    Result Date: 10/23/2019  Narrative: CT CHEST WITHOUT IV CONTRAST INDICATION:   R93 89: Abnormal findings on diagnostic imaging of other specified body structures  COMPARISON:  4/10/2019 TECHNIQUE: CT examination of the chest was performed without intravenous contrast   Axial, sagittal, and coronal 2D reformatted images were created from the source data and submitted for interpretation  Radiation dose length product (DLP) for this visit:  204 mGy-cm   This examination, like all CT scans performed in the [facility], was performed utilizing techniques to minimize radiation dose exposure, including the use of iterative reconstruction and automated exposure control  FINDINGS: LUNGS:  Moderate emphysematous disease noted predominantly involving the upper lung zones  Left upper lobe reticulonodular opacity with some groundglass density concerning for pulmonary infiltrate  Given nodular component, three-month follow-up CT the chest is advised to ensure resolution or stability  Additional right middle and lower lobe peripheral reticular densities noted similar to prior study and suspected to reflect chronic sequelae of infectious or inflammatory process  No endobronchial lesion or pneumothorax  PLEURA:  Unremarkable  HEART/GREAT VESSELS:  Unremarkable for patient's age  MEDIASTINUM AND GINNY:  Unremarkable  CHEST WALL AND LOWER NECK:   Unremarkable  VISUALIZED STRUCTURES IN THE UPPER ABDOMEN:  Left renal cyst noted as before  OSSEOUS STRUCTURES:  No acute fracture or destructive osseous lesion  Impression: 1  New left upper lobe reticulonodular opacity concerning for possible infiltrate  Recommend follow-up CT chest in 3 months time after medical treatment to ensure stability or resolution  2   Moderate COPD with scattered peripheral right-sided reticular opacities appearing stable and likely reflecting sequelae of previous infectious or inflammatory process  The study was marked in [facility] for immediate notification   Workstation performed: [id number]

## 2019-10-23 NOTE — PROGRESS NOTES
Assessment/Plan:       Diagnoses and all orders for this visit:    MENDOZA (dyspnea on exertion)  -     POCT ECG  -     NM myocardial perfusion spect (rx stress and/or rest); Future  -     famotidine (PEPCID) 20 mg tablet; Take 1 tablet (20 mg total) by mouth 2 (two) times a day    Epigastric pain  -     famotidine (PEPCID) 20 mg tablet; Take 1 tablet (20 mg total) by mouth 2 (two) times a day                Subjective:      Patient ID: Juve Rivera is a 69 y o  female  Left epigastric sensation   A 2 to three-month history of an odd sensation occurring in the left epigastric region which the patient variously describes as a sticking in a rumbling  This is been occurring without provocation  It has been occurring about 5 or 6 times a day  It can happen at rest and can happen at exertion  When it happens, it last about 15 seconds  There is no radiation  There is no dyspnea orthopnea or PND  The frequency and quality and duration of the symptom has been unchanged since onset  A known vasculopath   Recent distal aortic stenosis stenting with Protege 14mm x 40mm stent  This was done a couple of months ago  The symptom was claudication and this has resolved  Carotid occlusive disease followed by vascular surgery in with sequential ultrasounds  Treatment is aspirin and a statin  Also, nonsmoking  COPD noted  Prior smoking history  The patient has stop but she still has of course the illness  upper airway disease:  The patient was seen by an ENT physician and diagnosed with rhinitis and possible sinusitis  She was treated with antibiotic and steroid  She had improvement of a sensation of congestion but now the dose treatments have finish this sensation has recurred  No history of CAD  However, high risk due to known generalized ASVD  Known to have a pancreatic mass about 1 5 x 1 6 cm  Endoscopic ultrasound attempted biopsy did not reach it  This was done about a month ago    During this procedure, the endoscopy component did note erythematous  esophageal mucosa      The following portions of the patient's history were reviewed and updated as appropriate:   She has a past medical history of Anxiety, Atherosclerosis of native artery of both lower extremities with intermittent claudication ([facility] ) (10/15/2015), Carotid artery plaque, bilateral (3/21/2017), Chronic sinusitis, COPD (chronic obstructive pulmonary disease) ([place] [id number] ), Hyperlipidemia, Lung nodule, PNA (pneumonia), PVD (peripheral vascular disease) ([place] [address] ), Restless leg syndrome, Stage 3 chronic kidney disease ([facility] ) (2019), and Tobacco abuse ,  does not have any pertinent problems on file  ,   has a past surgical history that includes  section; Colonoscopy; Tonsillectomy; Cataract extraction; Shoulder surgery; and IR abdominal angiography / intervention (8/15/2019)  ,  family history includes Arthritis in her sister; Cirrhosis in her family; Heart attack in her family; Melanoma in her brother; No Known Problems in her father and mother; Pancreatic cancer (age of onset: 61) in her sister; Prostate cancer in her brother and family; Skin cancer in her family; Stroke in her family  ,   reports that she quit smoking about 17 months ago  Her smoking use included cigarettes  She started smoking about 57 years ago  She has a 840 00 pack-year smoking history  She has never used smokeless tobacco  She reports that she drinks alcohol  She reports that she does not use drugs  ,  is allergic to augmentin [amoxicillin-pot clavulanate]; tiotropium; and tylenol with codeine #3  [acetaminophen-codeine]     Current Outpatient Medications   Medication Sig Dispense Refill    albuterol (2 5 mg/3 mL) 0 083 % nebulizer solution Take 1 vial (2 5 mg total) by nebulization every 6 (six) hours as needed for wheezing or shortness of breath 360 mL 3    ALPRAZolam (XANAX) 0 5 mg tablet ONE TABLET ONCE DAILY AS NEEDED FOR ANXIETY   JEREMIE AT OFFICE  5    aspirin ([employer]S ASPIRIN EC) 81 mg EC tablet Take 1 tablet by mouth daily      atorvastatin (LIPITOR) 40 mg tablet TAKE 1 TABLET DAILY  90 tablet 3    olopatadine (PATANOL) 0 1 % ophthalmic solution   0    rOPINIRole (REQUIP) 1 mg tablet 1-2 TABLETS HS TO CONTROL  RESTLESS LEG 60 tablet 5    sodium chloride () 2 % hypertonic ophthalmic solution Sue 128 2 % eye drops      famotidine (PEPCID) 20 mg tablet Take 1 tablet (20 mg total) by mouth 2 (two) times a day 60 tablet 3    fluticasone (FLONASE) 50 mcg/act nasal spray fluticasone propionate 50 mcg/actuation nasal spray,suspension      fluticasone-vilanterol (BREO ELLIPTA) 100-25 mcg/inh inhaler Inhale 1 puff daily Rinse mouth after use  (Patient not taking: Reported on 10/23/2019) 3 each 3     No current facility-administered medications for this visit  Review of Systems   Constitutional: Positive for unexpected weight change  Negative for activity change  HENT: Positive for congestion  Respiratory: Positive for shortness of breath  Cardiovascular: Negative for chest pain  Gastrointestinal: Positive for abdominal pain  Genitourinary: Negative for difficulty urinating  Musculoskeletal: Positive for arthralgias  Objective:  Vitals:    10/23/19 1540   BP: 156/78   Pulse: 97   Resp: 18   SpO2: 98%      Physical Exam   Constitutional: She is oriented to person, place, and time  No distress  An alert female patient who appears to be stated age  She is visibly gained lot of weight in the past year  Eyes: Pupils are equal, round, and reactive to light  EOM are normal    Cardiovascular: Normal rate, regular rhythm and normal heart sounds  Pulmonary/Chest: Effort normal  No respiratory distress  She has decreased breath sounds  She has no wheezes  She has no rales  Abdominal: Soft  Bowel sounds are normal  There is no tenderness  Large pannus precludes assessment of organ size    The patient indicates that this sensation occurs to the left of the epigastrium and blood palpation of that region does not reproduce the symptom  Musculoskeletal: Normal range of motion  She exhibits no tenderness or deformity  Neurological: She is alert and oriented to person, place, and time  Coordination normal    Skin: Skin is warm  Psychiatric: She has a normal mood and affect  Judgment normal          Patient Instructions    A syndrome of epigastric discomfort whose description is completely nondiagnostic associated with  A normal physical exam   known vasculopathy   known pancreatic mass   recently visualized esophageal inflammation     Diagnostic plan: Nonwalking stress test to look for evidence of coronary disease  The description is atypical but the patient is female and is a diffuse vasculopath  Therapeutic plan:  Trial of  An H2 blocker given the presence of esophageal inflammation

## 2019-10-23 NOTE — PATIENT INSTRUCTIONS
A syndrome of epigastric discomfort whose description is completely nondiagnostic associated with  A normal physical exam   known vasculopathy   known pancreatic mass   recently visualized esophageal inflammation     Diagnostic plan: Nonwalking stress test to look for evidence of coronary disease  The description is atypical but the patient is female and is a diffuse vasculopath  Therapeutic plan:  Trial of  An H2 blocker given the presence of esophageal inflammation

## 2019-10-28 ENCOUNTER — TELEPHONE (OUTPATIENT)
Dept: PULMONOLOGY | Facility: CLINIC | Age: 74
End: 2019-10-28

## 2019-11-04 DIAGNOSIS — F41.1 GENERALIZED ANXIETY DISORDER: ICD-10-CM

## 2019-11-04 RX ORDER — ROPINIROLE 1 MG/1
TABLET, FILM COATED ORAL
Qty: 60 TABLET | Refills: 5 | Status: SHIPPED | OUTPATIENT
Start: 2019-11-04 | End: 2020-09-14

## 2019-11-17 DIAGNOSIS — F41.1 GENERALIZED ANXIETY DISORDER: ICD-10-CM

## 2019-11-19 RX ORDER — ATORVASTATIN CALCIUM 40 MG/1
TABLET, FILM COATED ORAL
Qty: 90 TABLET | Refills: 3 | Status: SHIPPED | OUTPATIENT
Start: 2019-11-19 | End: 2020-02-10 | Stop reason: SDUPTHER

## 2019-11-22 ENCOUNTER — TELEPHONE (OUTPATIENT)
Dept: INTERNAL MEDICINE CLINIC | Facility: CLINIC | Age: 74
End: 2019-11-22

## 2019-11-22 NOTE — TELEPHONE ENCOUNTER
Patient called- said she got a call from our office today with no voicemail  Thought it might be from our office  Please advice if anyone called   [phone]

## 2019-11-25 NOTE — TELEPHONE ENCOUNTER
Spoke with: patient  Re:  Phone call  Provider's message/resuts/instructions/inquiries relayed in full detal   Comments:    Told pt that we did not find any notes stating someone had tried to contact her    Pt said she is going for her stress test tomorrow and unless she hears from us first,  will call Dr Asif Cervantes next week for the resutls

## 2019-11-26 ENCOUNTER — HOSPITAL ENCOUNTER (OUTPATIENT)
Dept: NON INVASIVE DIAGNOSTICS | Facility: CLINIC | Age: 74
Discharge: HOME | End: 2019-11-26
Payer: MEDICARE

## 2019-11-26 DIAGNOSIS — R06.00 DOE (DYSPNEA ON EXERTION): ICD-10-CM

## 2019-11-26 LAB
ARRHY DURING EX: NORMAL
CHEST PAIN STATEMENT: NORMAL
MAX DIASTOLIC BP: 60 MMHG
MAX HEART RATE: 112 BPM
MAX PREDICTED HEART RATE: 148 BPM
MAX. SYSTOLIC BP: 136 MMHG
PROTOCOL NAME: NORMAL
REASON FOR TERMINATION: NORMAL
TARGET HR FORMULA: NORMAL
TEST INDICATION: NORMAL
TIME IN EXERCISE PHASE: NORMAL

## 2019-11-26 PROCEDURE — 78452 HT MUSCLE IMAGE SPECT MULT: CPT

## 2019-11-26 PROCEDURE — 78452 HT MUSCLE IMAGE SPECT MULT: CPT | Performed by: INTERNAL MEDICINE

## 2019-11-26 PROCEDURE — 93018 CV STRESS TEST I&R ONLY: CPT | Performed by: INTERNAL MEDICINE

## 2019-11-26 PROCEDURE — A9502 TC99M TETROFOSMIN: HCPCS

## 2019-11-26 PROCEDURE — 93016 CV STRESS TEST SUPVJ ONLY: CPT | Performed by: INTERNAL MEDICINE

## 2019-11-26 PROCEDURE — 93017 CV STRESS TEST TRACING ONLY: CPT

## 2019-11-26 RX ADMIN — REGADENOSON 0.4 MG: 0.08 INJECTION, SOLUTION INTRAVENOUS at 13:20

## 2019-12-03 ENCOUNTER — TELEPHONE (OUTPATIENT)
Dept: OTHER | Facility: OTHER | Age: 74
End: 2019-12-03

## 2019-12-06 ENCOUNTER — OFFICE VISIT (OUTPATIENT)
Dept: PULMONOLOGY | Facility: CLINIC | Age: 74
End: 2019-12-06
Payer: MEDICARE

## 2019-12-06 VITALS
SYSTOLIC BLOOD PRESSURE: 120 MMHG | HEIGHT: 60 IN | DIASTOLIC BLOOD PRESSURE: 62 MMHG | WEIGHT: 151 LBS | BODY MASS INDEX: 29.64 KG/M2 | OXYGEN SATURATION: 95 % | HEART RATE: 90 BPM

## 2019-12-06 DIAGNOSIS — J32.9 CHRONIC SINUSITIS, UNSPECIFIED LOCATION: ICD-10-CM

## 2019-12-06 DIAGNOSIS — J43.2 CENTRILOBULAR EMPHYSEMA (HCC): Primary | ICD-10-CM

## 2019-12-06 DIAGNOSIS — R91.1 PULMONARY NODULE: ICD-10-CM

## 2019-12-06 PROCEDURE — 99213 OFFICE O/P EST LOW 20 MIN: CPT | Performed by: PHYSICIAN ASSISTANT

## 2019-12-06 RX ORDER — FLUTICASONE FUROATE AND VILANTEROL 100; 25 UG/1; UG/1
1 POWDER RESPIRATORY (INHALATION) DAILY
Qty: 1 INHALER | Refills: 0 | Status: SHIPPED | COMMUNITY
Start: 2019-12-06 | End: 2020-02-12

## 2019-12-06 NOTE — PATIENT INSTRUCTIONS
Follow-up for your next appointment in approximately 6 weeks  Please do not hesitate to call the office prior to your next appointment should you have any questions or concerns  Worrisome symptoms that should prompt a call to 911 or visit to the ER include chest pain, severe shortness of breath, cyanosis (blue discoloration of the skin), dizziness/light-headedness, passing out  Continue to follow up with your Primary Care Provider and any other specialists you see

## 2019-12-06 NOTE — PROGRESS NOTES
Assessment:    1  Centrilobular emphysema (HCC)  fluticasone-vilanterol (BREO ELLIPTA) 100-25 mcg/inh inhaler   2  Pulmonary nodule     3  Chronic sinusitis, unspecified location         Plan:   Patient presenting today for follow-up  Breathing has been stable until running out of the Breo, slightly worsening dyspnea since then  Resume Breo 1 puff daily, rarely requiring the rescue inhaler  Again discussed with her she needs to schedule the chest CT to follow-up on the pulmonary nodule  Continue follow-up with ENT  She uses Flonase daily  All of the patient's questions were answered to their satisfaction and understanding, which was verbalized  Patient was advised to call the office prior to next appointment should they have any questions or concerns prior  Patient made aware of worrisome symptoms that should prompt a visit to the ER or call to 911 such as chest pain, severe shortness of breath, cyanosis, throat closing, syncope, etc     Subjective:     Patient ID: Dinorah Lowery is a 69 y o  female  Chief Complaint:  Lori Johnson is a 17-year-old female former smoker quit over 1 year ago with past medical history including COPD/emphysema, chronic sinusitis, pulmonary nodule, peripheral vascular disease, pancreatic mass, osteoporosis  She presents today for follow-up  She reports that her breathing had been overall stable until she ran out of her Breo  Since then, she has had worsening dyspnea on exertion  She does not use her Ventolin when she becomes short of breath  She tries to anticipate when she will become short of breath and uses it beforehand  She uses her nebulizer a few times a week  She continues follow-up with ENT regarding the postnasal drip and voice hoarseness  Since her last visit, she has again completed a course of Omnicef and prednisone per her ENT  She has not yet scheduled her follow-up chest CT regarding the pulmonary nodule    She recently completed a stress test with Cardiology which appears to have been negative for ischemia  She verbalizes frustration with her multiple medical comorbidities and appointments  She has approximately a 55 pack year smoking history  The following portions of the patient's history were reviewed in this encounter and updated as appropriate: Past medical, social, surgical, family, allergies    Review of Systems   Constitutional: Positive for fatigue  Respiratory: Positive for cough and shortness of breath  Neurological: Negative for syncope  All other systems reviewed and are negative  Objective:    Physical Exam   Constitutional: She is oriented to person, place, and time  She appears well-developed and well-nourished  No distress  HENT:   Head: Normocephalic and atraumatic  Right Ear: External ear normal    Left Ear: External ear normal    Nose: Nose normal    Mouth/Throat: Oropharynx is clear and moist  No oropharyngeal exudate  Eyes: Conjunctivae and EOM are normal  Right eye exhibits no discharge  Left eye exhibits no discharge  No scleral icterus  Neck: Normal range of motion  Neck supple  No tracheal deviation present  Cardiovascular: Normal rate, regular rhythm and normal heart sounds  Exam reveals no gallop and no friction rub  No murmur heard  Pulmonary/Chest: Effort normal and breath sounds normal  No stridor  No respiratory distress  She has no wheezes  She has no rales  Abdominal: She exhibits no distension  There is no guarding  Musculoskeletal: Normal range of motion  She exhibits no edema, tenderness or deformity  Neurological: She is alert and oriented to person, place, and time  No cranial nerve deficit  Skin: Skin is warm and dry  No rash noted  She is not diaphoretic  No erythema  No pallor  Psychiatric: She has a normal mood and affect  Her behavior is normal  Judgment and thought content normal    Nursing note and vitals reviewed        Lab Review:   Hospital Outpatient Visit on 11/26/2019   Component Date Value    Protocol Name 11/26/2019 LEXISCAN-SIT     Time In Exercise Phase 11/26/2019 00:03:00     MAX   SYSTOLIC BP 98/12/0548 097     Max Diastolic Bp 62/57/0082 60     Max Heart Rate 11/26/2019 112     Max Predicted Heart Rate 11/26/2019 148     Reason for Termination 11/26/2019 Protocol Complete     Test Indication 11/26/2019 Dyspnea on effort     Target Hr Formular 11/26/2019 (220 - Age)*85%     Arrhy During Ex 11/26/2019 none     Chest Pain Statement 11/26/2019 none

## 2019-12-13 ENCOUNTER — TELEPHONE (OUTPATIENT)
Dept: INTERNAL MEDICINE CLINIC | Facility: CLINIC | Age: 74
End: 2019-12-13

## 2019-12-23 ENCOUNTER — TELEPHONE (OUTPATIENT)
Dept: INTERNAL MEDICINE CLINIC | Facility: CLINIC | Age: 74
End: 2019-12-23

## 2019-12-23 NOTE — TELEPHONE ENCOUNTER
She said patient had stress test done w/ Dx code of   R 06 09  which will not be covered    She said the DX that will cover this test would be shortness of breathe      She needs a verbal ok to change to SOB & a note in Epic stating that you spoke to her & decided to change the DX to SOB

## 2020-01-02 DIAGNOSIS — J44.9 COPD (CHRONIC OBSTRUCTIVE PULMONARY DISEASE) WITH CHRONIC BRONCHITIS (HCC): ICD-10-CM

## 2020-01-02 RX ORDER — FLUTICASONE FUROATE AND VILANTEROL 100; 25 UG/1; UG/1
POWDER RESPIRATORY (INHALATION)
Qty: 1 INHALER | Refills: 0 | Status: SHIPPED | OUTPATIENT
Start: 2020-01-02 | End: 2020-02-10 | Stop reason: SDUPTHER

## 2020-01-22 ENCOUNTER — HOSPITAL ENCOUNTER (OUTPATIENT)
Dept: NON INVASIVE DIAGNOSTICS | Facility: CLINIC | Age: 75
Discharge: HOME | End: 2020-01-22
Payer: MEDICARE

## 2020-01-22 DIAGNOSIS — I74.09 AORTOILIAC OCCLUSIVE DISEASE (HCC): ICD-10-CM

## 2020-01-22 PROCEDURE — 93978 VASCULAR STUDY: CPT | Performed by: SURGERY

## 2020-01-22 PROCEDURE — 93924 LWR XTR VASC STDY BILAT: CPT

## 2020-01-22 PROCEDURE — 93978 VASCULAR STUDY: CPT

## 2020-01-29 ENCOUNTER — TELEPHONE (OUTPATIENT)
Dept: INTERNAL MEDICINE CLINIC | Facility: CLINIC | Age: 75
End: 2020-01-29

## 2020-01-29 NOTE — TELEPHONE ENCOUNTER
[facility] CALLED AND SAID THE PEPCID 20MG HAS BEEN ON BACKORDER FOR THE LAST FEW MONTHS  NEEDS ALTERNATIVE  [id number]   [phone]

## 2020-01-30 NOTE — TELEPHONE ENCOUNTER
Pt informed of Dr Kamala Cuello message and transferred to [facility] to make appt

## 2020-02-10 ENCOUNTER — OFFICE VISIT (OUTPATIENT)
Dept: INTERNAL MEDICINE CLINIC | Facility: CLINIC | Age: 75
End: 2020-02-10
Payer: MEDICARE

## 2020-02-10 VITALS
HEART RATE: 102 BPM | DIASTOLIC BLOOD PRESSURE: 70 MMHG | SYSTOLIC BLOOD PRESSURE: 120 MMHG | BODY MASS INDEX: 29.84 KG/M2 | HEIGHT: 60 IN | OXYGEN SATURATION: 95 % | WEIGHT: 152 LBS

## 2020-02-10 DIAGNOSIS — F41.1 GENERALIZED ANXIETY DISORDER: ICD-10-CM

## 2020-02-10 DIAGNOSIS — F41.9 ANXIETY: ICD-10-CM

## 2020-02-10 DIAGNOSIS — J43.2 CENTRILOBULAR EMPHYSEMA (HCC): ICD-10-CM

## 2020-02-10 DIAGNOSIS — G83.9 PARESIS (HCC): ICD-10-CM

## 2020-02-10 DIAGNOSIS — M54.50 LUMBAR PAIN: ICD-10-CM

## 2020-02-10 DIAGNOSIS — N18.30 STAGE 3 CHRONIC KIDNEY DISEASE (HCC): Primary | ICD-10-CM

## 2020-02-10 DIAGNOSIS — E78.5 HYPERLIPIDEMIA, UNSPECIFIED HYPERLIPIDEMIA TYPE: ICD-10-CM

## 2020-02-10 DIAGNOSIS — R10.13 EPIGASTRIC PAIN: ICD-10-CM

## 2020-02-10 PROCEDURE — 1036F TOBACCO NON-USER: CPT | Performed by: INTERNAL MEDICINE

## 2020-02-10 PROCEDURE — 1160F RVW MEDS BY RX/DR IN RCRD: CPT | Performed by: INTERNAL MEDICINE

## 2020-02-10 PROCEDURE — 99213 OFFICE O/P EST LOW 20 MIN: CPT | Performed by: INTERNAL MEDICINE

## 2020-02-10 PROCEDURE — 3008F BODY MASS INDEX DOCD: CPT | Performed by: INTERNAL MEDICINE

## 2020-02-10 PROCEDURE — 4040F PNEUMOC VAC/ADMIN/RCVD: CPT | Performed by: INTERNAL MEDICINE

## 2020-02-10 RX ORDER — ALPRAZOLAM 0.5 MG/1
0.5 TABLET ORAL 2 TIMES DAILY PRN
Qty: 60 TABLET | Refills: 1 | Status: SHIPPED | OUTPATIENT
Start: 2020-02-10 | End: 2020-02-10 | Stop reason: SDUPTHER

## 2020-02-10 RX ORDER — CYCLOBENZAPRINE HCL 5 MG
5 TABLET ORAL 3 TIMES DAILY PRN
Qty: 90 TABLET | Refills: 0 | Status: SHIPPED | OUTPATIENT
Start: 2020-02-10 | End: 2020-03-02 | Stop reason: CLARIF

## 2020-02-10 RX ORDER — ATORVASTATIN CALCIUM 40 MG/1
40 TABLET, FILM COATED ORAL DAILY
Qty: 90 TABLET | Refills: 3
Start: 2020-02-10 | End: 2020-12-28

## 2020-02-10 RX ORDER — ALPRAZOLAM 0.5 MG/1
0.5 TABLET ORAL 2 TIMES DAILY PRN
Qty: 60 TABLET | Refills: 1
Start: 2020-02-10 | End: 2021-01-12 | Stop reason: SDUPTHER

## 2020-02-10 NOTE — PROGRESS NOTES
BMI Counseling: Body mass index is 29 69 kg/m²  The BMI is above normal  Nutrition recommendations include decreasing portion sizes and moderation in carbohydrate intake  Exercise recommendations include moderate physical activity 150 minutes/week

## 2020-02-10 NOTE — PATIENT INSTRUCTIONS
A patient who has multiple chronic problems are being appropriately followed up various level of specialty care  See me back here again in about 4-5 months

## 2020-02-10 NOTE — PROGRESS NOTES
Assessment/Plan:       Diagnoses and all orders for this visit:    Stage 3 chronic kidney disease ([address] )    Centrilobular emphysema (HCC)    Hyperlipidemia, unspecified hyperlipidemia type    Epigastric pain                Subjective:      Patient ID: Enmanuel Zavala is a 69 y o  female  I had seen this patient about 4 months ago  She had an odd sensation in the left part of the epigastrium reading over but the left upper quadrant variously describes a sticking sensation or a rumbling   The description was completely nondiagnostic and the exam was normal   I gave her a therapeutic trial of Pepcid and within 2 weeks to sensation had disappeared  She stop the drug about 2 weeks ago and the sensation has not recurred    Follows with Dr Evert Montalvo for a pancreatic lesion of uncertain significance and further workup is in progress  Follows with pulmonary  CT abnormalities of the long are noted which are again nondiagnostic and repeat is in progress  The following portions of the patient's history were reviewed and updated as appropriate:   She has a past medical history of Anxiety, Atherosclerosis of native artery of both lower extremities with intermittent claudication ([address] ) (10/15/2015), Carotid artery plaque, bilateral (3/21/2017), Chronic sinusitis, COPD (chronic obstructive pulmonary disease) ([address] ), Hyperlipidemia, Lung nodule, PNA (pneumonia), PVD (peripheral vascular disease) ([address] ), Restless leg syndrome, Stage 3 chronic kidney disease ([facility] ) (2019), and Tobacco abuse ,  does not have any pertinent problems on file  ,   has a past surgical history that includes  section; Colonoscopy; Tonsillectomy; Cataract extraction; Shoulder surgery; and IR abdominal angiography / intervention (8/15/2019)  ,  family history includes Arthritis in her sister; Cirrhosis in her family;  Heart attack in her family; Melanoma in her brother; No Known Problems in her father and mother; Pancreatic cancer (age of onset: 61) in her sister; Prostate cancer in her brother and family; Skin cancer in her family; Stroke in her family  ,   reports that she quit smoking about 20 months ago  Her smoking use included cigarettes  She started smoking about 58 years ago  She has a 840 00 pack-year smoking history  She has never used smokeless tobacco  She reports that she drinks alcohol  She reports that she does not use drugs  ,  is allergic to augmentin [amoxicillin-pot clavulanate]; tiotropium; and tylenol with codeine #3  [acetaminophen-codeine]     Current Outpatient Medications   Medication Sig Dispense Refill    albuterol (2 5 mg/3 mL) 0 083 % nebulizer solution Take 1 vial (2 5 mg total) by nebulization every 6 (six) hours as needed for wheezing or shortness of breath 360 mL 3    ALPRAZolam (XANAX) 0 5 mg tablet ONE TABLET ONCE DAILY AS NEEDED FOR ANXIETY  JEREMIE AT OFFICE  5    aspirin ([employer] ASPIRIN EC) 81 mg EC tablet Take 1 tablet by mouth daily      atorvastatin (LIPITOR) 40 mg tablet take 1 tablet by mouth once daily 90 tablet 3    fluticasone (FLONASE) 50 mcg/act nasal spray fluticasone propionate 50 mcg/actuation nasal spray,suspension      fluticasone-vilanterol (BREO ELLIPTA) 100-25 mcg/inh inhaler Inhale 1 puff daily Rinse mouth after use  1 Inhaler 0    olopatadine (PATANOL) 0 1 % ophthalmic solution   0    rOPINIRole (REQUIP) 1 mg tablet TAKE 1 TO 2 TABLETS AT BEDTIME AS NEEDED TO CONTROL RESTLESS LEGS 60 tablet 5    sodium chloride () 2 % hypertonic ophthalmic solution Sue 128 2 % eye drops      famotidine (PEPCID) 20 mg tablet Take 1 tablet (20 mg total) by mouth 2 (two) times a day (Patient not taking: Reported on 2/10/2020) 60 tablet 3     No current facility-administered medications for this visit  Review of Systems   Constitutional: Positive for fatigue  Respiratory: Positive for cough and shortness of breath  Musculoskeletal: Positive for arthralgias and back pain     Neurological: Positive for weakness  Psychiatric/Behavioral: Positive for dysphoric mood  All other systems reviewed and are negative  Objective:  Vitals:    02/10/20 1139   BP: 120/70   Pulse: 102   SpO2: 95%      Physical Exam   Constitutional: She is oriented to person, place, and time  No distress  An overweight female patient who appears to be stated age   HENT:   Mouth/Throat: No oropharyngeal exudate  Eyes: No scleral icterus  Neck: Normal range of motion  No tracheal deviation present  Cardiovascular: Normal rate  Pulmonary/Chest: Effort normal  No respiratory distress  She has decreased breath sounds  She has no wheezes  She has no rhonchi  She has no rales  Abdominal: Soft  Neurological: She is alert and oriented to person, place, and time  Skin: Skin is warm and dry  Psychiatric: She has a normal mood and affect  There are no Patient Instructions on file for this visit

## 2020-02-12 DIAGNOSIS — J43.2 CENTRILOBULAR EMPHYSEMA (HCC): ICD-10-CM

## 2020-02-12 RX ORDER — FLUTICASONE FUROATE AND VILANTEROL 100; 25 UG/1; UG/1
POWDER RESPIRATORY (INHALATION)
Qty: 1 INHALER | Refills: 3 | Status: SHIPPED | OUTPATIENT
Start: 2020-02-12 | End: 2020-07-20

## 2020-02-25 ENCOUNTER — TELEPHONE (OUTPATIENT)
Dept: INTERNAL MEDICINE CLINIC | Facility: CLINIC | Age: 75
End: 2020-02-25

## 2020-02-25 DIAGNOSIS — N18.30 STAGE 3 CHRONIC KIDNEY DISEASE (HCC): Primary | ICD-10-CM

## 2020-02-25 NOTE — TELEPHONE ENCOUNTER
The 1st step is to do a 24 hour urine for creatinine clearance to better assessed the kidney function  Patellar I will generate a requisition to that effect  She needs to come in here to  the appropriate container for the urine collection and the instructions on how to do it properly

## 2020-02-25 NOTE — TELEPHONE ENCOUNTER
Informed patient, she stated that she is in so much pain and only the ibuprofen helped, she wants to know if you recommend anything that she can take

## 2020-02-25 NOTE — TELEPHONE ENCOUNTER
----- Message from Olive Gaytan MD sent at 2/25/2020 12:55 PM EST -----   She was asking for ibuprofen  I reviewed her chart    She should not take it because she has stage 3 kidney disease

## 2020-02-27 ENCOUNTER — TELEPHONE (OUTPATIENT)
Dept: INTERNAL MEDICINE CLINIC | Facility: CLINIC | Age: 75
End: 2020-02-27

## 2020-02-27 NOTE — TELEPHONE ENCOUNTER
Patient called- she said she was in 3 weeks ago to see Dr Kamala Cuello for back pain  She was given flexeril but she can't take it due to her restless leg - it makes it worse  She's taking ibuprofen (which Dr Kamala Cuello told her not to take due to her bad kidneys but she can't take it anymore and now even that's barely helping ) She said she's bed bound  She doesn't know what to do  [facility] Please advise       PP#[phone]

## 2020-03-02 ENCOUNTER — OFFICE VISIT (OUTPATIENT)
Dept: INTERNAL MEDICINE CLINIC | Facility: CLINIC | Age: 75
End: 2020-03-02
Payer: MEDICARE

## 2020-03-02 VITALS
BODY MASS INDEX: 30.44 KG/M2 | HEART RATE: 120 BPM | WEIGHT: 151 LBS | HEIGHT: 59 IN | OXYGEN SATURATION: 94 % | DIASTOLIC BLOOD PRESSURE: 68 MMHG | TEMPERATURE: 98.4 F | SYSTOLIC BLOOD PRESSURE: 126 MMHG

## 2020-03-02 DIAGNOSIS — F41.9 ANXIETY: ICD-10-CM

## 2020-03-02 DIAGNOSIS — J43.2 CENTRILOBULAR EMPHYSEMA (HCC): Primary | ICD-10-CM

## 2020-03-02 DIAGNOSIS — I74.09 AORTOILIAC OCCLUSIVE DISEASE (HCC): ICD-10-CM

## 2020-03-02 DIAGNOSIS — I77.1 SUBCLAVIAN ARTERY STENOSIS, LEFT (HCC): ICD-10-CM

## 2020-03-02 DIAGNOSIS — N18.30 STAGE 3 CHRONIC KIDNEY DISEASE (HCC): ICD-10-CM

## 2020-03-02 DIAGNOSIS — K86.89 PANCREATIC MASS: ICD-10-CM

## 2020-03-02 DIAGNOSIS — J32.9 CHRONIC SINUSITIS, UNSPECIFIED LOCATION: ICD-10-CM

## 2020-03-02 DIAGNOSIS — M54.50 LUMBAR PAIN: ICD-10-CM

## 2020-03-02 DIAGNOSIS — E78.5 HYPERLIPIDEMIA, UNSPECIFIED HYPERLIPIDEMIA TYPE: ICD-10-CM

## 2020-03-02 DIAGNOSIS — J44.1 COPD EXACERBATION (HCC): ICD-10-CM

## 2020-03-02 PROCEDURE — 3008F BODY MASS INDEX DOCD: CPT | Performed by: PHYSICIAN ASSISTANT

## 2020-03-02 PROCEDURE — 4040F PNEUMOC VAC/ADMIN/RCVD: CPT | Performed by: PHYSICIAN ASSISTANT

## 2020-03-02 PROCEDURE — 1160F RVW MEDS BY RX/DR IN RCRD: CPT | Performed by: PHYSICIAN ASSISTANT

## 2020-03-02 PROCEDURE — 99214 OFFICE O/P EST MOD 30 MIN: CPT | Performed by: PHYSICIAN ASSISTANT

## 2020-03-02 PROCEDURE — 1036F TOBACCO NON-USER: CPT | Performed by: PHYSICIAN ASSISTANT

## 2020-03-02 RX ORDER — PREDNISONE 10 MG/1
TABLET ORAL
Qty: 20 TABLET | Refills: 0 | Status: SHIPPED | OUTPATIENT
Start: 2020-03-02 | End: 2020-05-15

## 2020-03-02 RX ORDER — IBUPROFEN 200 MG
200 TABLET ORAL EVERY 6 HOURS PRN
Qty: 60 TABLET | Refills: 1 | Status: SHIPPED | OUTPATIENT
Start: 2020-03-02 | End: 2021-09-14 | Stop reason: ALTCHOICE

## 2020-03-02 RX ORDER — OXYCODONE HYDROCHLORIDE AND ACETAMINOPHEN 5; 325 MG/1; MG/1
TABLET ORAL
COMMUNITY
Start: 2020-02-27 | End: 2020-03-02 | Stop reason: SDUPTHER

## 2020-03-02 RX ORDER — METHOCARBAMOL 750 MG/1
750 TABLET, FILM COATED ORAL EVERY 6 HOURS PRN
Qty: 90 TABLET | Refills: 0 | Status: SHIPPED | OUTPATIENT
Start: 2020-03-02 | End: 2020-12-08 | Stop reason: ALTCHOICE

## 2020-03-02 RX ORDER — OXYCODONE HYDROCHLORIDE AND ACETAMINOPHEN 5; 325 MG/1; MG/1
1 TABLET ORAL EVERY 6 HOURS PRN
Qty: 10 TABLET | Refills: 0 | Status: SHIPPED | OUTPATIENT
Start: 2020-03-02 | End: 2020-03-27 | Stop reason: SDUPTHER

## 2020-03-02 RX ORDER — ALBUTEROL SULFATE 90 UG/1
2 AEROSOL, METERED RESPIRATORY (INHALATION) EVERY 6 HOURS PRN
COMMUNITY
End: 2020-07-20 | Stop reason: SDUPTHER

## 2020-03-02 NOTE — PROGRESS NOTES
Assessment/Plan: recent LS spine x-ray unremarkable will treat her with pain meds muscle relaxers heat and a course of steroids comprehensive spine referral       Diagnoses and all orders for this visit:    Centrilobular emphysema (HCC)    Chronic sinusitis, unspecified location    Subclavian artery stenosis, left (HCC)    Anxiety    Lumbar pain  -     methocarbamol (ROBAXIN) 750 mg tablet; Take 1 tablet (750 mg total) by mouth every 6 (six) hours as needed for muscle spasms  -     oxyCODONE-acetaminophen (PERCOCET) 5-325 mg per tablet; Take 1 tablet by mouth every 6 (six) hours as needed for moderate painMax Daily Amount: 4 tablets  -     predniSONE 10 mg tablet; Take 40 milligrams for 2 days, 30 milligrams for 2 days, 20 milligrams for 2 days, 10 milligrams for 2 days, then stop  -     ibuprofen (MOTRIN) 200 mg tablet; Take 1 tablet (200 mg total) by mouth every 6 (six) hours as needed for mild pain  -     Ambulatory Referral to Comprehensive Spine Program; Future    COPD exacerbation ([facility])    Aortoiliac occlusive disease ([facility] )    Stage 3 chronic kidney disease ([facility] )    Hyperlipidemia, unspecified hyperlipidemia type    Pancreatic mass    Other orders  -     Discontinue: oxyCODONE-acetaminophen (PERCOCET) 5-325 mg per tablet; for MODERATE TO SEVERE PAIN take 1 tablet every 6 hours on day 1    (REFER TO PRESCRIPTION NOTES)  -     albuterol (PROVENTIL HFA,VENTOLIN HFA) 90 mcg/act inhaler; Inhale 2 puffs every 6 (six) hours as needed for wheezing        No problem-specific Assessment & Plan notes found for this encounter  Subjective:      Patient ID: Gaston John is a 69 y o  female  Long-term history of chronic back pain radiating into her right buttock much worse in the past 2 weeks it hurts her to sit or lie down it hurts her to bend or twist she is more comfortable standing than sitting  No numbness of her leg no foot drop no skin rash    She recalls no specific recent injury seen in the ER last week given Flexeril which made her legs jumpy spine x-rays were unremarkable she has had previous back imaging years ago history of severe COPD currently stable in that regard she has had abdominal aneurysm stent chronic renal failure hyperlipidemia stable on meds      The following portions of the patient's history were reviewed and updated as appropriate:   She has a past medical history of Anxiety, Atherosclerosis of native artery of both lower extremities with intermittent claudication ([facility] ) (10/15/2015), Carotid artery plaque, bilateral (3/21/2017), Chronic sinusitis, COPD (chronic obstructive pulmonary disease) ([facility] ), Hyperlipidemia, Lung nodule, PNA (pneumonia), PVD (peripheral vascular disease) ([facility] ), Restless leg syndrome, Stage 3 chronic kidney disease ([facility] ) (2019), and Tobacco abuse ,  does not have any pertinent problems on file  ,   has a past surgical history that includes  section; Colonoscopy; Tonsillectomy; Cataract extraction; Shoulder surgery; IR abdominal angiography / intervention (8/15/2019); and Descending aortic aneurysm repair w/ stent (2019)  ,  family history includes Arthritis in her sister; Cirrhosis in her family; Heart attack in her family; Melanoma in her brother; No Known Problems in her father and mother; Pancreatic cancer (age of onset: 61) in her sister; Prostate cancer in her brother and family; Skin cancer in her family; Stroke in her family  ,   reports that she quit smoking about 21 months ago  Her smoking use included cigarettes  She started smoking about 58 years ago  She has a 840 00 pack-year smoking history  She has never used smokeless tobacco  She reports that she drinks alcohol  She reports that she does not use drugs  ,  is allergic to spiriva handihaler [tiotropium bromide monohydrate]; augmentin [amoxicillin-pot clavulanate]; tiotropium; and tylenol with codeine #3 [acetaminophen-codeine]     Current Outpatient Medications   Medication Sig Dispense Refill  albuterol (2 5 mg/3 mL) 0 083 % nebulizer solution Take 1 vial (2 5 mg total) by nebulization every 6 (six) hours as needed for wheezing or shortness of breath 360 mL 3    albuterol (PROVENTIL HFA,VENTOLIN HFA) 90 mcg/act inhaler Inhale 2 puffs every 6 (six) hours as needed for wheezing      ALPRAZolam (XANAX) 0 5 mg tablet Take 1 tablet (0 5 mg total) by mouth 2 (two) times a day as needed for anxiety 60 tablet 1    atorvastatin (LIPITOR) 40 mg tablet Take 1 tablet (40 mg total) by mouth daily 90 tablet 3    fluticasone (FLONASE) 50 mcg/act nasal spray fluticasone propionate 50 mcg/actuation nasal spray,suspension      fluticasone-vilanterol (BREO ELLIPTA) 100-25 mcg/inh inhaler INHALE 1 PUFF ONCE DAILY RINSE MOUTH AFTER USE 1 Inhaler 3    olopatadine (PATANOL) 0 1 % ophthalmic solution   0    oxyCODONE-acetaminophen (PERCOCET) 5-325 mg per tablet Take 1 tablet by mouth every 6 (six) hours as needed for moderate painMax Daily Amount: 4 tablets 10 tablet 0    rOPINIRole (REQUIP) 1 mg tablet TAKE 1 TO 2 TABLETS AT BEDTIME AS NEEDED TO CONTROL RESTLESS LEGS 60 tablet 5    sodium chloride () 2 % hypertonic ophthalmic solution Sue 128 2 % eye drops      aspirin (CVS ASPIRIN EC) 81 mg EC tablet Take 1 tablet by mouth daily      ibuprofen (MOTRIN) 200 mg tablet Take 1 tablet (200 mg total) by mouth every 6 (six) hours as needed for mild pain 60 tablet 1    methocarbamol (ROBAXIN) 750 mg tablet Take 1 tablet (750 mg total) by mouth every 6 (six) hours as needed for muscle spasms 90 tablet 0    predniSONE 10 mg tablet Take 40 milligrams for 2 days, 30 milligrams for 2 days, 20 milligrams for 2 days, 10 milligrams for 2 days, then stop 20 tablet 0     No current facility-administered medications for this visit  Review of Systems   Constitutional: Negative for activity change, appetite change, chills, diaphoresis, fatigue, fever and unexpected weight change     HENT: Negative for congestion, dental problem, drooling, ear discharge, ear pain, facial swelling, hearing loss, nosebleeds, postnasal drip, rhinorrhea, sinus pressure, sinus pain, sneezing, sore throat, tinnitus, trouble swallowing and voice change  Eyes: Negative for photophobia, pain, discharge, redness, itching and visual disturbance  Respiratory: Negative for apnea, cough, choking, chest tightness, shortness of breath, wheezing and stridor  Cardiovascular: Negative for chest pain, palpitations and leg swelling  Gastrointestinal: Negative for abdominal distention, abdominal pain, anal bleeding, blood in stool, constipation, diarrhea, nausea, rectal pain and vomiting  Endocrine: Negative for cold intolerance, heat intolerance, polydipsia, polyphagia and polyuria  Genitourinary: Negative for decreased urine volume, difficulty urinating, dysuria, enuresis, flank pain, frequency, genital sores, hematuria and urgency  Musculoskeletal: Positive for back pain and gait problem  Negative for arthralgias, joint swelling, myalgias, neck pain and neck stiffness  Skin: Negative for color change, pallor, rash and wound  Neurological: Negative for dizziness, tremors, seizures, syncope, facial asymmetry, speech difficulty, weakness, light-headedness, numbness and headaches  Hematological: Negative for adenopathy  Does not bruise/bleed easily  Psychiatric/Behavioral: Negative for agitation, behavioral problems, confusion, decreased concentration, dysphoric mood, hallucinations, self-injury, sleep disturbance and suicidal ideas  The patient is not nervous/anxious and is not hyperactive  Objective:  Vitals:    03/02/20 1423   BP: 126/68   BP Location: Left arm   Patient Position: Sitting   Cuff Size: Standard   Pulse: (!) 120   Temp: 98 4 °F (36 9 °C)   TempSrc: Tympanic   SpO2: 94%   Weight: 68 5 kg (151 lb)   Height: 4' 10 75" (1 492 m)     Body mass index is 30 76 kg/m²       Physical Exam   Constitutional: She is oriented to person, place, and time  She appears well-developed and well-nourished  HENT:   Head: Normocephalic and atraumatic  Right Ear: External ear normal    Left Ear: External ear normal    Nose: Nose normal    Mouth/Throat: Oropharynx is clear and moist  No oropharyngeal exudate  Eyes: Pupils are equal, round, and reactive to light  Conjunctivae are normal    Neck: Neck supple  No JVD present  No thyromegaly present  Cardiovascular: Normal rate, regular rhythm, normal heart sounds and intact distal pulses  No murmur heard  Pulmonary/Chest: Effort normal and breath sounds normal  No stridor  No respiratory distress  She has no wheezes  She has no rales  She exhibits no tenderness  Abdominal: Soft  Bowel sounds are normal  She exhibits no distension and no mass  There is no tenderness  There is no rebound and no guarding  No hernia  Musculoskeletal: Normal range of motion  She exhibits tenderness ( sciatic notch  Straight leg lift positive no foot drop sensation intact right leg)  She exhibits no edema or deformity  Stooped gait straightening lumbar lordotic curve   Lymphadenopathy:     She has no cervical adenopathy  Neurological: She is alert and oriented to person, place, and time  She has normal reflexes  She displays normal reflexes  No cranial nerve deficit or sensory deficit  She exhibits normal muscle tone  Coordination normal    Skin: Skin is warm and dry  Vitals reviewed

## 2020-03-10 DIAGNOSIS — M54.50 LUMBAR PAIN: Primary | ICD-10-CM

## 2020-03-10 NOTE — TELEPHONE ENCOUNTER
Patient called back stating that the last 2 days her sciatic pain has gotten worse and she cant even get out of bed to go to the bathroom  She is taking half of an  oxyCODONE-acetaminophen (PERCOCET) 5-325 mg per tablet and half of an  ALPRAZolam (XANAX) 0 5 mg tablet   but she really does not want to keep doing that  the pain is above her butt and to the left  She does not know what to do  She had gone to the ER before and they don't really help her    Please contact Lori Johnson  At  [phone]

## 2020-03-10 NOTE — TELEPHONE ENCOUNTER
Spoke with: patient  Re:  Back pain/ER  Provider's message/resuts/instructions/inquiries relayed in full detal   Comments:    Pt is considering going to ER

## 2020-03-16 ENCOUNTER — TELEPHONE (OUTPATIENT)
Dept: SURGICAL ONCOLOGY | Facility: CLINIC | Age: 75
End: 2020-03-16

## 2020-03-27 ENCOUNTER — OFFICE VISIT (OUTPATIENT)
Dept: INTERNAL MEDICINE CLINIC | Facility: CLINIC | Age: 75
End: 2020-03-27
Payer: MEDICARE

## 2020-03-27 ENCOUNTER — APPOINTMENT (OUTPATIENT)
Dept: LAB | Facility: CLINIC | Age: 75
End: 2020-03-27
Payer: MEDICARE

## 2020-03-27 VITALS
DIASTOLIC BLOOD PRESSURE: 70 MMHG | SYSTOLIC BLOOD PRESSURE: 150 MMHG | WEIGHT: 151 LBS | HEART RATE: 122 BPM | OXYGEN SATURATION: 95 % | BODY MASS INDEX: 30.76 KG/M2

## 2020-03-27 DIAGNOSIS — N18.30 STAGE 3 CHRONIC KIDNEY DISEASE (HCC): Primary | ICD-10-CM

## 2020-03-27 DIAGNOSIS — Z79.1 NSAID LONG-TERM USE: ICD-10-CM

## 2020-03-27 DIAGNOSIS — M54.50 LUMBAR PAIN: ICD-10-CM

## 2020-03-27 LAB
ANION GAP SERPL CALCULATED.3IONS-SCNC: 4 MMOL/L (ref 4–13)
BUN SERPL-MCNC: 17 MG/DL (ref 5–25)
CALCIUM SERPL-MCNC: 9.4 MG/DL (ref 8.3–10.1)
CHLORIDE SERPL-SCNC: 107 MMOL/L (ref 100–108)
CO2 SERPL-SCNC: 28 MMOL/L (ref 21–32)
CREAT SERPL-MCNC: 1.1 MG/DL (ref 0.6–1.3)
GFR SERPL CREATININE-BSD FRML MDRD: 50 ML/MIN/1.73SQ M
GLUCOSE SERPL-MCNC: 140 MG/DL (ref 65–140)
POTASSIUM SERPL-SCNC: 3.8 MMOL/L (ref 3.5–5.3)
SODIUM SERPL-SCNC: 139 MMOL/L (ref 136–145)

## 2020-03-27 PROCEDURE — 36415 COLL VENOUS BLD VENIPUNCTURE: CPT

## 2020-03-27 PROCEDURE — 4040F PNEUMOC VAC/ADMIN/RCVD: CPT | Performed by: INTERNAL MEDICINE

## 2020-03-27 PROCEDURE — 80048 BASIC METABOLIC PNL TOTAL CA: CPT

## 2020-03-27 PROCEDURE — 1036F TOBACCO NON-USER: CPT | Performed by: INTERNAL MEDICINE

## 2020-03-27 PROCEDURE — 1160F RVW MEDS BY RX/DR IN RCRD: CPT | Performed by: INTERNAL MEDICINE

## 2020-03-27 PROCEDURE — 99213 OFFICE O/P EST LOW 20 MIN: CPT | Performed by: INTERNAL MEDICINE

## 2020-03-27 RX ORDER — OXYCODONE HYDROCHLORIDE AND ACETAMINOPHEN 5; 325 MG/1; MG/1
1 TABLET ORAL EVERY 6 HOURS PRN
Qty: 120 TABLET | Refills: 0 | Status: SHIPPED | OUTPATIENT
Start: 2020-03-27 | End: 2021-03-25 | Stop reason: SDUPTHER

## 2020-03-27 NOTE — PATIENT INSTRUCTIONS
Sciatic pain:  Continuing high-dose NSAID  Check BMP  A add on Percocet up to 4 times a day  Discussed at length  The patient will become habituated but the pain is so severe I think it is justifiable

## 2020-03-27 NOTE — PROGRESS NOTES
Assessment/Plan:       Diagnoses and all orders for this visit:    Stage 3 chronic kidney disease (HCC)    Lumbar pain  -     oxyCODONE-acetaminophen (PERCOCET) 5-325 mg per tablet; Take 1 tablet by mouth every 6 (six) hours as needed for moderate painMax Daily Amount: 4 tablets    NSAID long-term use  -     Basic metabolic panel; Future                Subjective:      Patient ID: Shweta Watters is a 69 y o  female  A by now 3month-old history of a right sciatic pain which has not responded well to conservative management at all  She is using ibuprofen at a fairly high dose of 600 mg 3 times a day in addition to muscle relaxant Ativan and intermittent narcotics  Scheduled to see a spine specialist in the next week  No injury     COPD currently stable  abdominal aneurysm stent    Renal insufficiency   hyperlipidemia stable on meds      The following portions of the patient's history were reviewed and updated as appropriate:   She has a past medical history of Anxiety, Atherosclerosis of native artery of both lower extremities with intermittent claudication ([facility] [address] ) (10/15/2015), Carotid artery plaque, bilateral (3/21/2017), Chronic sinusitis, COPD (chronic obstructive pulmonary disease) ([facility] ), Hyperlipidemia, Lung nodule, PNA (pneumonia), PVD (peripheral vascular disease) ([facility] ), Restless leg syndrome, Stage 3 chronic kidney disease ([facility] [facility] ) (2019), and Tobacco abuse ,  does not have any pertinent problems on file  ,   has a past surgical history that includes  section; Colonoscopy; Tonsillectomy; Cataract extraction; Shoulder surgery; IR abdominal angiography / intervention (8/15/2019); and Descending aortic aneurysm repair w/ stent (2019)  ,  family history includes Arthritis in her sister; Cirrhosis in her family;  Heart attack in her family; Melanoma in her brother; No Known Problems in her father and mother; Pancreatic cancer (age of onset: 61) in her sister; Prostate cancer in her brother and family; Skin cancer in her family; Stroke in her family  ,   reports that she quit smoking about 22 months ago  Her smoking use included cigarettes  She started smoking about 58 years ago  She has a 840 00 pack-year smoking history  She has never used smokeless tobacco  She reports that she drinks alcohol  She reports that she does not use drugs  ,  is allergic to spiriva handihaler [tiotropium bromide monohydrate]; augmentin [amoxicillin-pot clavulanate]; tiotropium; and tylenol with codeine #3 [acetaminophen-codeine]     Current Outpatient Medications   Medication Sig Dispense Refill    albuterol (2 5 mg/3 mL) 0 083 % nebulizer solution Take 1 vial (2 5 mg total) by nebulization every 6 (six) hours as needed for wheezing or shortness of breath 360 mL 3    albuterol (PROVENTIL HFA,VENTOLIN HFA) 90 mcg/act inhaler Inhale 2 puffs every 6 (six) hours as needed for wheezing      ALPRAZolam (XANAX) 0 5 mg tablet Take 1 tablet (0 5 mg total) by mouth 2 (two) times a day as needed for anxiety 60 tablet 1    atorvastatin (LIPITOR) 40 mg tablet Take 1 tablet (40 mg total) by mouth daily 90 tablet 3    fluticasone (FLONASE) 50 mcg/act nasal spray fluticasone propionate 50 mcg/actuation nasal spray,suspension      fluticasone-vilanterol (BREO ELLIPTA) 100-25 mcg/inh inhaler INHALE 1 PUFF ONCE DAILY RINSE MOUTH AFTER USE 1 Inhaler 3    ibuprofen (MOTRIN) 200 mg tablet Take 1 tablet (200 mg total) by mouth every 6 (six) hours as needed for mild pain 60 tablet 1    methocarbamol (ROBAXIN) 750 mg tablet Take 1 tablet (750 mg total) by mouth every 6 (six) hours as needed for muscle spasms 90 tablet 0    olopatadine (PATANOL) 0 1 % ophthalmic solution   0    oxyCODONE-acetaminophen (PERCOCET) 5-325 mg per tablet Take 1 tablet by mouth every 6 (six) hours as needed for moderate painMax Daily Amount: 4 tablets 120 tablet 0    predniSONE 10 mg tablet Take 40 milligrams for 2 days, 30 milligrams for 2 days, 20 milligrams for 2 days, 10 milligrams for 2 days, then stop 20 tablet 0    rOPINIRole (REQUIP) 1 mg tablet TAKE 1 TO 2 TABLETS AT BEDTIME AS NEEDED TO CONTROL RESTLESS LEGS 60 tablet 5    sodium chloride () 2 % hypertonic ophthalmic solution Sue 128 2 % eye drops      aspirin ([employer] ASPIRIN EC) 81 mg EC tablet Take 1 tablet by mouth daily       No current facility-administered medications for this visit  Review of Systems   Musculoskeletal: Positive for back pain  All other systems reviewed and are negative  Objective:  Vitals:    03/27/20 1425   BP: 150/70   Pulse: (!) 122   SpO2: 95%      Physical Exam   Constitutional: She is oriented to person, place, and time  She appears distressed  Alert female patient who appears to be stated age in a moderate amount of pain distress  Unable to sit for a prolonged period of time  Cardiovascular: Normal rate  Pulmonary/Chest: Effort normal    Neurological: She is alert and oriented to person, place, and time  Skin: Skin is warm and dry  No rash noted  Patient Instructions     Sciatic pain:  Continuing high-dose NSAID  Check BMP  A add on Percocet up to 4 times a day  Discussed at length  The patient will become habituated but the pain is so severe I think it is justifiable

## 2020-03-30 ENCOUNTER — APPOINTMENT (OUTPATIENT)
Dept: LAB | Facility: CLINIC | Age: 75
End: 2020-03-30
Payer: MEDICARE

## 2020-03-30 DIAGNOSIS — N18.30 STAGE 3 CHRONIC KIDNEY DISEASE (HCC): ICD-10-CM

## 2020-03-30 LAB
COLLECT DURATION TIME UR: 22 HOURS
CREAT CL ?TM UR+SERPL-VRATE: 65.71
CREAT UR-MCNC: 71.7 MG/DL
PATIENT HEIGHT: 149 CM
PATIENT WEIGHT: 69 KG
SPECIMEN VOL UR: 1300 ML

## 2020-03-30 PROCEDURE — 82575 CREATININE CLEARANCE TEST: CPT

## 2020-04-20 ENCOUNTER — TELEMEDICINE (OUTPATIENT)
Dept: INTERNAL MEDICINE CLINIC | Facility: CLINIC | Age: 75
End: 2020-04-20
Payer: MEDICARE

## 2020-04-20 ENCOUNTER — TELEPHONE (OUTPATIENT)
Dept: INTERNAL MEDICINE CLINIC | Facility: CLINIC | Age: 75
End: 2020-04-20

## 2020-04-20 DIAGNOSIS — M25.572 ACUTE LEFT ANKLE PAIN: Primary | ICD-10-CM

## 2020-04-20 PROCEDURE — 99441 PR PHYS/QHP TELEPHONE EVALUATION 5-10 MIN: CPT | Performed by: INTERNAL MEDICINE

## 2020-04-22 ENCOUNTER — TELEPHONE (OUTPATIENT)
Dept: INTERNAL MEDICINE CLINIC | Facility: CLINIC | Age: 75
End: 2020-04-22

## 2020-05-03 ENCOUNTER — NURSE TRIAGE (OUTPATIENT)
Dept: OTHER | Facility: OTHER | Age: 75
End: 2020-05-03

## 2020-05-03 ENCOUNTER — TELEPHONE (OUTPATIENT)
Dept: OTHER | Facility: OTHER | Age: 75
End: 2020-05-03

## 2020-05-04 ENCOUNTER — APPOINTMENT (OUTPATIENT)
Dept: LAB | Facility: CLINIC | Age: 75
End: 2020-05-04
Payer: MEDICARE

## 2020-05-04 DIAGNOSIS — R30.0 DYSURIA: ICD-10-CM

## 2020-05-04 DIAGNOSIS — R30.0 DYSURIA: Primary | ICD-10-CM

## 2020-05-04 LAB
BACTERIA UR QL AUTO: ABNORMAL /HPF
BILIRUB UR QL STRIP: NEGATIVE
CLARITY UR: ABNORMAL
COLOR UR: YELLOW
GLUCOSE UR STRIP-MCNC: NEGATIVE MG/DL
HGB UR QL STRIP.AUTO: ABNORMAL
HYALINE CASTS #/AREA URNS LPF: ABNORMAL /LPF
KETONES UR STRIP-MCNC: NEGATIVE MG/DL
LEUKOCYTE ESTERASE UR QL STRIP: ABNORMAL
NITRITE UR QL STRIP: NEGATIVE
NON-SQ EPI CELLS URNS QL MICRO: ABNORMAL /HPF
PH UR STRIP.AUTO: 6 [PH]
PROT UR STRIP-MCNC: NEGATIVE MG/DL
RBC #/AREA URNS AUTO: ABNORMAL /HPF
SP GR UR STRIP.AUTO: 1.01 (ref 1–1.03)
UROBILINOGEN UR QL STRIP.AUTO: 0.2 E.U./DL
WBC #/AREA URNS AUTO: ABNORMAL /HPF

## 2020-05-04 PROCEDURE — 87186 SC STD MICRODIL/AGAR DIL: CPT | Performed by: INTERNAL MEDICINE

## 2020-05-04 PROCEDURE — 87086 URINE CULTURE/COLONY COUNT: CPT | Performed by: INTERNAL MEDICINE

## 2020-05-04 PROCEDURE — 81001 URINALYSIS AUTO W/SCOPE: CPT | Performed by: INTERNAL MEDICINE

## 2020-05-04 PROCEDURE — 87077 CULTURE AEROBIC IDENTIFY: CPT | Performed by: INTERNAL MEDICINE

## 2020-05-04 RX ORDER — SULFAMETHOXAZOLE AND TRIMETHOPRIM 800; 160 MG/1; MG/1
1 TABLET ORAL 2 TIMES DAILY
Qty: 14 TABLET | Refills: 0 | Status: SHIPPED | OUTPATIENT
Start: 2020-05-04 | End: 2020-05-11

## 2020-05-06 ENCOUNTER — TELEPHONE (OUTPATIENT)
Dept: INTERNAL MEDICINE CLINIC | Facility: CLINIC | Age: 75
End: 2020-05-06

## 2020-05-06 DIAGNOSIS — R30.0 DYSURIA: Primary | ICD-10-CM

## 2020-05-06 LAB
BACTERIA UR CULT: ABNORMAL
BACTERIA UR CULT: ABNORMAL

## 2020-05-06 RX ORDER — NITROFURANTOIN 25; 75 MG/1; MG/1
100 CAPSULE ORAL 2 TIMES DAILY
Qty: 10 CAPSULE | Refills: 0 | Status: SHIPPED | OUTPATIENT
Start: 2020-05-06 | End: 2020-05-11

## 2020-05-15 ENCOUNTER — TELEPHONE (OUTPATIENT)
Dept: PULMONOLOGY | Facility: CLINIC | Age: 75
End: 2020-05-15

## 2020-05-15 ENCOUNTER — TELEPHONE (OUTPATIENT)
Dept: INTERNAL MEDICINE CLINIC | Facility: CLINIC | Age: 75
End: 2020-05-15

## 2020-05-15 DIAGNOSIS — R10.13 EPIGASTRIC PAIN: Primary | ICD-10-CM

## 2020-05-15 RX ORDER — PANTOPRAZOLE SODIUM 40 MG/1
40 TABLET, DELAYED RELEASE ORAL
Qty: 30 TABLET | Refills: 5 | Status: SHIPPED | OUTPATIENT
Start: 2020-05-15 | End: 2020-12-15

## 2020-05-18 ENCOUNTER — TELEPHONE (OUTPATIENT)
Dept: PULMONOLOGY | Facility: CLINIC | Age: 75
End: 2020-05-18

## 2020-05-19 DIAGNOSIS — J44.9 CHRONIC OBSTRUCTIVE PULMONARY DISEASE, UNSPECIFIED COPD TYPE (HCC): Primary | ICD-10-CM

## 2020-06-03 DIAGNOSIS — K86.89 PANCREATIC MASS: Primary | ICD-10-CM

## 2020-06-12 ENCOUNTER — APPOINTMENT (OUTPATIENT)
Dept: LAB | Facility: CLINIC | Age: 75
End: 2020-06-12
Payer: MEDICARE

## 2020-06-12 DIAGNOSIS — K86.89 PANCREATIC MASS: ICD-10-CM

## 2020-06-12 LAB
BUN SERPL-MCNC: 16 MG/DL (ref 5–25)
CREAT SERPL-MCNC: 1.04 MG/DL (ref 0.6–1.3)
GFR SERPL CREATININE-BSD FRML MDRD: 53 ML/MIN/1.73SQ M

## 2020-06-12 PROCEDURE — 84520 ASSAY OF UREA NITROGEN: CPT

## 2020-06-12 PROCEDURE — 36415 COLL VENOUS BLD VENIPUNCTURE: CPT

## 2020-06-12 PROCEDURE — 82565 ASSAY OF CREATININE: CPT

## 2020-06-18 ENCOUNTER — TELEPHONE (OUTPATIENT)
Dept: PULMONOLOGY | Facility: CLINIC | Age: 75
End: 2020-06-18

## 2020-06-21 ENCOUNTER — APPOINTMENT (EMERGENCY)
Dept: RADIOLOGY | Facility: HOSPITAL | Age: 75
End: 2020-06-21
Payer: MEDICARE

## 2020-06-21 ENCOUNTER — NURSE TRIAGE (OUTPATIENT)
Dept: OTHER | Facility: OTHER | Age: 75
End: 2020-06-21

## 2020-06-21 ENCOUNTER — HOSPITAL ENCOUNTER (EMERGENCY)
Facility: HOSPITAL | Age: 75
Discharge: HOME | End: 2020-06-21
Attending: EMERGENCY MEDICINE | Admitting: EMERGENCY MEDICINE
Payer: MEDICARE

## 2020-06-21 VITALS
HEART RATE: 84 BPM | WEIGHT: 154.1 LBS | OXYGEN SATURATION: 93 % | TEMPERATURE: 99.7 F | RESPIRATION RATE: 22 BRPM | DIASTOLIC BLOOD PRESSURE: 63 MMHG | HEIGHT: 60 IN | SYSTOLIC BLOOD PRESSURE: 119 MMHG | BODY MASS INDEX: 30.25 KG/M2

## 2020-06-21 DIAGNOSIS — R50.9 FEVER: Primary | ICD-10-CM

## 2020-06-21 DIAGNOSIS — R05.9 COUGH: ICD-10-CM

## 2020-06-21 DIAGNOSIS — J44.9 COPD (CHRONIC OBSTRUCTIVE PULMONARY DISEASE) (HCC): ICD-10-CM

## 2020-06-21 LAB
ALBUMIN SERPL BCP-MCNC: 3.5 G/DL (ref 3.5–5)
ALP SERPL-CCNC: 94 U/L (ref 46–116)
ALT SERPL W P-5'-P-CCNC: 26 U/L (ref 12–78)
ANION GAP SERPL CALCULATED.3IONS-SCNC: 10 MMOL/L (ref 4–13)
APTT PPP: 23 SECONDS (ref 23–37)
AST SERPL W P-5'-P-CCNC: 19 U/L (ref 5–45)
BASOPHILS # BLD AUTO: 0.05 THOUSANDS/ΜL (ref 0–0.1)
BASOPHILS NFR BLD AUTO: 1 % (ref 0–1)
BILIRUB DIRECT SERPL-MCNC: 0.09 MG/DL (ref 0–0.2)
BILIRUB SERPL-MCNC: 0.4 MG/DL (ref 0.2–1)
BUN SERPL-MCNC: 14 MG/DL (ref 5–25)
CALCIUM SERPL-MCNC: 9 MG/DL (ref 8.3–10.1)
CHLORIDE SERPL-SCNC: 104 MMOL/L (ref 100–108)
CO2 SERPL-SCNC: 25 MMOL/L (ref 21–32)
CREAT SERPL-MCNC: 1.41 MG/DL (ref 0.6–1.3)
EOSINOPHIL # BLD AUTO: 0.17 THOUSAND/ΜL (ref 0–0.61)
EOSINOPHIL NFR BLD AUTO: 2 % (ref 0–6)
ERYTHROCYTE [DISTWIDTH] IN BLOOD BY AUTOMATED COUNT: 14.1 % (ref 11.6–15.1)
GFR SERPL CREATININE-BSD FRML MDRD: 37 ML/MIN/1.73SQ M
GLUCOSE SERPL-MCNC: 106 MG/DL (ref 65–140)
HCT VFR BLD AUTO: 44.6 % (ref 34.8–46.1)
HGB BLD-MCNC: 13.8 G/DL (ref 11.5–15.4)
IMM GRANULOCYTES # BLD AUTO: 0.05 THOUSAND/UL (ref 0–0.2)
IMM GRANULOCYTES NFR BLD AUTO: 1 % (ref 0–2)
INR PPP: 1 (ref 0.84–1.19)
LACTATE SERPL-SCNC: 1.5 MMOL/L (ref 0.5–2)
LYMPHOCYTES # BLD AUTO: 1.16 THOUSANDS/ΜL (ref 0.6–4.47)
LYMPHOCYTES NFR BLD AUTO: 11 % (ref 14–44)
MCH RBC QN AUTO: 28.4 PG (ref 26.8–34.3)
MCHC RBC AUTO-ENTMCNC: 30.9 G/DL (ref 31.4–37.4)
MCV RBC AUTO: 92 FL (ref 82–98)
MONOCYTES # BLD AUTO: 0.75 THOUSAND/ΜL (ref 0.17–1.22)
MONOCYTES NFR BLD AUTO: 7 % (ref 4–12)
NEUTROPHILS # BLD AUTO: 8.43 THOUSANDS/ΜL (ref 1.85–7.62)
NEUTS SEG NFR BLD AUTO: 78 % (ref 43–75)
NRBC BLD AUTO-RTO: 0 /100 WBCS
PLATELET # BLD AUTO: 306 THOUSANDS/UL (ref 149–390)
PMV BLD AUTO: 9.9 FL (ref 8.9–12.7)
POTASSIUM SERPL-SCNC: 4.2 MMOL/L (ref 3.5–5.3)
PROT SERPL-MCNC: 7.4 G/DL (ref 6.4–8.2)
PROTHROMBIN TIME: 13.2 SECONDS (ref 11.6–14.5)
RBC # BLD AUTO: 4.86 MILLION/UL (ref 3.81–5.12)
SARS-COV-2 RNA RESP QL NAA+PROBE: NEGATIVE
SODIUM SERPL-SCNC: 139 MMOL/L (ref 136–145)
TROPONIN I SERPL-MCNC: <0.02 NG/ML
WBC # BLD AUTO: 10.61 THOUSAND/UL (ref 4.31–10.16)

## 2020-06-21 PROCEDURE — 83605 ASSAY OF LACTIC ACID: CPT | Performed by: EMERGENCY MEDICINE

## 2020-06-21 PROCEDURE — 85610 PROTHROMBIN TIME: CPT | Performed by: EMERGENCY MEDICINE

## 2020-06-21 PROCEDURE — 36415 COLL VENOUS BLD VENIPUNCTURE: CPT | Performed by: EMERGENCY MEDICINE

## 2020-06-21 PROCEDURE — 85025 COMPLETE CBC W/AUTO DIFF WBC: CPT | Performed by: EMERGENCY MEDICINE

## 2020-06-21 PROCEDURE — 99285 EMERGENCY DEPT VISIT HI MDM: CPT | Performed by: EMERGENCY MEDICINE

## 2020-06-21 PROCEDURE — 80076 HEPATIC FUNCTION PANEL: CPT | Performed by: EMERGENCY MEDICINE

## 2020-06-21 PROCEDURE — 99284 EMERGENCY DEPT VISIT MOD MDM: CPT

## 2020-06-21 PROCEDURE — 80048 BASIC METABOLIC PNL TOTAL CA: CPT | Performed by: EMERGENCY MEDICINE

## 2020-06-21 PROCEDURE — 87635 SARS-COV-2 COVID-19 AMP PRB: CPT | Performed by: EMERGENCY MEDICINE

## 2020-06-21 PROCEDURE — 93005 ELECTROCARDIOGRAM TRACING: CPT

## 2020-06-21 PROCEDURE — 71045 X-RAY EXAM CHEST 1 VIEW: CPT

## 2020-06-21 PROCEDURE — 85730 THROMBOPLASTIN TIME PARTIAL: CPT | Performed by: EMERGENCY MEDICINE

## 2020-06-21 PROCEDURE — 87040 BLOOD CULTURE FOR BACTERIA: CPT | Performed by: EMERGENCY MEDICINE

## 2020-06-21 PROCEDURE — 84484 ASSAY OF TROPONIN QUANT: CPT | Performed by: EMERGENCY MEDICINE

## 2020-06-21 PROCEDURE — 87147 CULTURE TYPE IMMUNOLOGIC: CPT | Performed by: EMERGENCY MEDICINE

## 2020-06-21 RX ORDER — PREDNISONE 20 MG/1
40 TABLET ORAL ONCE
Status: COMPLETED | OUTPATIENT
Start: 2020-06-21 | End: 2020-06-21

## 2020-06-21 RX ORDER — PREDNISONE 20 MG/1
40 TABLET ORAL DAILY
Qty: 10 TABLET | Refills: 0 | Status: SHIPPED | OUTPATIENT
Start: 2020-06-21 | End: 2020-12-08 | Stop reason: ALTCHOICE

## 2020-06-21 RX ORDER — DOXYCYCLINE 100 MG/1
100 CAPSULE ORAL 2 TIMES DAILY
Qty: 20 CAPSULE | Refills: 0 | Status: SHIPPED | OUTPATIENT
Start: 2020-06-21 | End: 2020-07-01

## 2020-06-21 RX ORDER — DOXYCYCLINE HYCLATE 100 MG/1
100 CAPSULE ORAL ONCE
Status: COMPLETED | OUTPATIENT
Start: 2020-06-21 | End: 2020-06-21

## 2020-06-21 RX ADMIN — DOXYCYCLINE 100 MG: 100 CAPSULE ORAL at 19:47

## 2020-06-21 RX ADMIN — PREDNISONE 40 MG: 20 TABLET ORAL at 19:47

## 2020-06-22 LAB
ATRIAL RATE: 91 BPM
P AXIS: 58 DEGREES
PR INTERVAL: 170 MS
QRS AXIS: 53 DEGREES
QRSD INTERVAL: 68 MS
QT INTERVAL: 336 MS
QTC INTERVAL: 413 MS
T WAVE AXIS: 52 DEGREES
VENTRICULAR RATE: 91 BPM

## 2020-06-22 PROCEDURE — 93010 ELECTROCARDIOGRAM REPORT: CPT | Performed by: INTERNAL MEDICINE

## 2020-06-25 LAB
BACTERIA BLD CULT: ABNORMAL
GRAM STN SPEC: ABNORMAL

## 2020-06-26 ENCOUNTER — TELEPHONE (OUTPATIENT)
Dept: INTERNAL MEDICINE CLINIC | Facility: CLINIC | Age: 75
End: 2020-06-26

## 2020-06-26 LAB — BACTERIA BLD CULT: NORMAL

## 2020-06-26 NOTE — TELEPHONE ENCOUNTER
Pt has an appt scheduled for this Monday 6/29  She was supposed to have some testing done prior to the appt but ended up in the ER a few days ago and did not get them scheduled  Wants to know if she should keep this appt or reschedule until the tests are completed       [phone]

## 2020-07-02 ENCOUNTER — TELEPHONE (OUTPATIENT)
Dept: PULMONOLOGY | Facility: CLINIC | Age: 75
End: 2020-07-02

## 2020-07-20 ENCOUNTER — TELEPHONE (OUTPATIENT)
Dept: PULMONOLOGY | Facility: CLINIC | Age: 75
End: 2020-07-20

## 2020-07-20 DIAGNOSIS — J43.2 CENTRILOBULAR EMPHYSEMA (HCC): ICD-10-CM

## 2020-07-20 RX ORDER — FLUTICASONE FUROATE AND VILANTEROL 100; 25 UG/1; UG/1
1 POWDER RESPIRATORY (INHALATION) ONCE
Qty: 3 INHALER | Refills: 3 | Status: SHIPPED | OUTPATIENT
Start: 2020-07-20 | End: 2021-09-23

## 2020-07-20 RX ORDER — FLUTICASONE FUROATE AND VILANTEROL 100; 25 UG/1; UG/1
POWDER RESPIRATORY (INHALATION)
Qty: 1 INHALER | Refills: 3 | Status: SHIPPED | OUTPATIENT
Start: 2020-07-20 | End: 2020-12-08 | Stop reason: SDUPTHER

## 2020-07-20 RX ORDER — ALBUTEROL SULFATE 90 UG/1
2 AEROSOL, METERED RESPIRATORY (INHALATION) EVERY 6 HOURS PRN
Qty: 3 INHALER | Refills: 3 | Status: SHIPPED | OUTPATIENT
Start: 2020-07-20

## 2020-07-20 NOTE — TELEPHONE ENCOUNTER
SPOKE TO PATRICIA FROM [employer], SHE TOLD ME PT GOT THE NEBULIZER MACHINE FROM [facility], NEBULIZER SUPPLIES FAXED TO [facility], PT NOTIFIED

## 2020-07-20 NOTE — TELEPHONE ENCOUNTER
PATIENT CALLING YOU TO FOLLOW UP WITH NEBULIZER ORDER BEING SENT TO [facility]  THEY ARE STATED THEY HAVE NEVER RECEIVED YOUR FAX  CAN YOU PLEASE FOLLOW UP WITH PATIENT REGARDING THIS? THANK YOU

## 2020-08-13 ENCOUNTER — HOSPITAL ENCOUNTER (OUTPATIENT)
Dept: CT IMAGING | Facility: HOSPITAL | Age: 75
Discharge: HOME | End: 2020-08-13
Attending: SURGERY
Payer: MEDICARE

## 2020-08-13 DIAGNOSIS — K86.89 PANCREATIC MASS: ICD-10-CM

## 2020-08-13 PROCEDURE — 71260 CT THORAX DX C+: CPT

## 2020-08-13 PROCEDURE — 74177 CT ABD & PELVIS W/CONTRAST: CPT

## 2020-08-13 RX ADMIN — IOHEXOL 100 ML: 350 INJECTION, SOLUTION INTRAVENOUS at 10:38

## 2020-08-18 ENCOUNTER — TELEPHONE (OUTPATIENT)
Dept: PULMONOLOGY | Facility: CLINIC | Age: 75
End: 2020-08-18

## 2020-08-19 ENCOUNTER — TELEPHONE (OUTPATIENT)
Dept: SURGICAL ONCOLOGY | Facility: CLINIC | Age: 75
End: 2020-08-19

## 2020-08-19 ENCOUNTER — OFFICE VISIT (OUTPATIENT)
Dept: PULMONOLOGY | Facility: CLINIC | Age: 75
End: 2020-08-19
Payer: MEDICARE

## 2020-08-19 VITALS
TEMPERATURE: 98 F | OXYGEN SATURATION: 92 % | SYSTOLIC BLOOD PRESSURE: 124 MMHG | HEART RATE: 96 BPM | WEIGHT: 156 LBS | BODY MASS INDEX: 30.63 KG/M2 | HEIGHT: 60 IN | DIASTOLIC BLOOD PRESSURE: 84 MMHG

## 2020-08-19 DIAGNOSIS — E66.9 OBESITY (BMI 30-39.9): ICD-10-CM

## 2020-08-19 DIAGNOSIS — K86.89 PANCREATIC MASS: ICD-10-CM

## 2020-08-19 DIAGNOSIS — Z87.891 FORMER SMOKER: ICD-10-CM

## 2020-08-19 DIAGNOSIS — J43.2 CENTRILOBULAR EMPHYSEMA (HCC): Primary | ICD-10-CM

## 2020-08-19 DIAGNOSIS — K86.89 PANCREATIC MASS: Primary | ICD-10-CM

## 2020-08-19 DIAGNOSIS — J32.8 OTHER CHRONIC SINUSITIS: ICD-10-CM

## 2020-08-19 DIAGNOSIS — R06.02 SHORTNESS OF BREATH: ICD-10-CM

## 2020-08-19 PROCEDURE — 99215 OFFICE O/P EST HI 40 MIN: CPT | Performed by: PHYSICIAN ASSISTANT

## 2020-08-19 PROCEDURE — 1160F RVW MEDS BY RX/DR IN RCRD: CPT | Performed by: PHYSICIAN ASSISTANT

## 2020-08-19 PROCEDURE — 4040F PNEUMOC VAC/ADMIN/RCVD: CPT | Performed by: PHYSICIAN ASSISTANT

## 2020-08-19 PROCEDURE — 3008F BODY MASS INDEX DOCD: CPT | Performed by: PHYSICIAN ASSISTANT

## 2020-08-19 PROCEDURE — 1036F TOBACCO NON-USER: CPT | Performed by: PHYSICIAN ASSISTANT

## 2020-08-19 NOTE — PROGRESS NOTES
Assessment:    1  Centrilobular emphysema (HCC)  fluticasone-umeclidinium-vilanterol (TRELEGY) 100-62 5-25 MCG/INH inhaler   2  Other chronic sinusitis     3  Pancreatic mass     4  Former smoker     5  Shortness of breath  Complete PFT with post Bronchodilator and Six Minute walk   6  Obesity (BMI 30-39  9)  Ambulatory referral to Weight Management         Plan:   Patient presents for routine follow-up, feels her shortness of breath has significantly worsened over the past year  Will have her obtain complete PFTs with 6 minute walk  Recommended obtaining pulse oximeter to monitor oxygen saturations at home  Will trial Trelegy Ellipta in place of the [facility] for added benefit of LAMA  She did previously feel unwell on Spiriva, unclear if this was truly an allergic reaction or if she was ill during the time she was taking that inhaler  Discussed with patient that the Stephen Maurer has a similar medication in it that is also in Spiriva and that here is a risk for allergic reaction with the Trelegy  She verbalized understanding and would like to try it  If she has any adverse reactions she will discontinue use immediately and give us a call  She is aware not to use the [address] with the Trelegy  JOSEFA PRN  Following with ENT for her chronic sinus issues, likely exacerbating her underlying pulmonary disease  Reviewed CT of the chest done on 08/13 ordered by her surgical oncologist with stable severe background emphysema and resolution of previously noted pulmonary nodules  She also feels the weight gain correlates with worsening shortness of breath  We discussed the role obesity plays in overall health and comorbidities  Discussed how weight loss would be beneficial to the patient and how to accomplish this through healthy lifestyle changes/modifications  Referral to Weight Management provided  Anxiety is likely also playing a role  Recently underwent a cardiac workup which was negative       All of the patient's questions were answered to their satisfaction and understanding, which was verbalized  Patient was advised to call the office prior to next appointment should they have any questions or concerns prior  Patient made aware of worrisome symptoms that should prompt a visit to the ER or call to 911 such as chest pain, severe shortness of breath, cyanosis, throat closing, syncope, etc     I have spent > 45 minutes with Patient  today in which greater than 50% of this time was spent in counseling/coordination of care regarding Diagnostic results, Prognosis, Risks and benefits of tx options, Intructions for management, Patient and family education, Importance of tx compliance, Risk factor reductions and Impressions  Subjective:     Patient ID: Angelica Ackerman is a 70 y o  female  Chief Complaint:  Antoinette Parsons is a 31-year-old female former smoker quit about 2 years ago with past medical history including emphysema, chronic sinusitis, pancreatic mass, peripheral vascular disease presenting for follow-up  She reports that her shortness of breath continues to worsen  The dyspnea on exertion is limiting to her daily activities  She frequently has to stop and catch her breath while walking and it is also worse while bending forward  She denies frequent coughing or wheezing  She does sometimes have chest tightness  She also has significant sinus issues, for which she continues following up with ENT  She has gained 20-30 lb over a short period of time  She also seems to have a lot of anxiety  She has at least 100 pack year smoking history  She was seen in the ED in June for acute bronchitis and improved after taking doxycycline and prednisone  The following portions of the patient's history were reviewed in this encounter and updated as appropriate: Past medical, social, surgical, family, allergies    Review of Systems   Constitutional: Positive for fatigue and unexpected weight change (weight gain)  Negative for chills and fever  Respiratory: Positive for cough (sometimes) and shortness of breath  Negative for wheezing  Cardiovascular: Negative for chest pain and leg swelling  Neurological: Positive for light-headedness  Psychiatric/Behavioral: The patient is nervous/anxious  All other systems reviewed and are negative  Objective:  Vitals:    08/19/20 1320   BP: 124/84   Pulse: 96   Temp: 98 °F (36 7 °C)   SpO2: 92%   Weight: 70 8 kg (156 lb)   Height: 5' (1 524 m)       Physical Exam  Vitals signs and nursing note reviewed  Constitutional:       General: She is not in acute distress  Appearance: She is well-developed  She is obese  She is not diaphoretic  HENT:      Head: Normocephalic and atraumatic  Right Ear: External ear normal       Left Ear: External ear normal    Eyes:      General: No scleral icterus  Right eye: No discharge  Left eye: No discharge  Conjunctiva/sclera: Conjunctivae normal    Neck:      Musculoskeletal: Normal range of motion and neck supple  Trachea: No tracheal deviation  Cardiovascular:      Rate and Rhythm: Normal rate and regular rhythm  Heart sounds: Normal heart sounds  No murmur  No friction rub  No gallop  Pulmonary:      Effort: Pulmonary effort is normal  No respiratory distress  Breath sounds: Normal breath sounds  No stridor  No wheezing or rales  Abdominal:      General: There is no distension  Tenderness: There is no guarding  Musculoskeletal: Normal range of motion  General: No tenderness or deformity  Skin:     General: Skin is warm and dry  Coloration: Skin is not pale  Findings: No erythema or rash  Neurological:      Mental Status: She is alert and oriented to person, place, and time  Cranial Nerves: No cranial nerve deficit  Psychiatric:         Mood and Affect: Mood is anxious  Behavior: Behavior normal          Thought Content:  Thought content normal          Judgment: Judgment normal          Lab Review:   Admission on 06/21/2020, Discharged on 06/21/2020   Component Date Value    SARS-CoV-2 06/21/2020 Negative     WBC 06/21/2020 10 61*    RBC 06/21/2020 4 86     Hemoglobin 06/21/2020 13 8     Hematocrit 06/21/2020 44 6     MCV 06/21/2020 92     MCH 06/21/2020 28 4     MCHC 06/21/2020 30 9*    RDW 06/21/2020 14 1     MPV 06/21/2020 9 9     Platelets 56/55/1248 306     nRBC 06/21/2020 0     Neutrophils Relative 06/21/2020 78*    Immat GRANS % 06/21/2020 1     Lymphocytes Relative 06/21/2020 11*    Monocytes Relative 06/21/2020 7     Eosinophils Relative 06/21/2020 2     Basophils Relative 06/21/2020 1     Neutrophils Absolute 06/21/2020 8 43*    Immature Grans Absolute 06/21/2020 0 05     Lymphocytes Absolute 06/21/2020 1 16     Monocytes Absolute 06/21/2020 0 75     Eosinophils Absolute 06/21/2020 0 17     Basophils Absolute 06/21/2020 0 05     Protime 06/21/2020 13 2     INR 06/21/2020 1 00     PTT 06/21/2020 23     Blood Culture 06/21/2020 Staphylococcus coagulase negative*    Gram Stain Result 06/21/2020 Gram positive cocci in clusters*    Blood Culture 06/21/2020 No Growth After 5 Days       Sodium 06/21/2020 139     Potassium 06/21/2020 4 2     Chloride 06/21/2020 104     CO2 06/21/2020 25     ANION GAP 06/21/2020 10     BUN 06/21/2020 14     Creatinine 06/21/2020 1 41*    Glucose 06/21/2020 106     Calcium 06/21/2020 9 0     eGFR 06/21/2020 37     Total Bilirubin 06/21/2020 0 40     Bilirubin, Direct 06/21/2020 0 09     Alkaline Phosphatase 06/21/2020 94     AST 06/21/2020 19     ALT 06/21/2020 26     Total Protein 06/21/2020 7 4     Albumin 06/21/2020 3 5     LACTIC ACID 06/21/2020 1 5     Troponin I 06/21/2020 <0 02     Ventricular Rate 06/21/2020 91     Atrial Rate 06/21/2020 91     VT Interval 06/21/2020 170     QRSD Interval 06/21/2020 68     QT Interval 06/21/2020 336     QTC Interval 06/21/2020 413     P Axis 06/21/2020 58     QRS Axis 06/21/2020 53     T Wave Axis 06/21/2020 52

## 2020-08-20 ENCOUNTER — TELEPHONE (OUTPATIENT)
Dept: OTHER | Facility: OTHER | Age: 75
End: 2020-08-20

## 2020-08-20 NOTE — TELEPHONE ENCOUNTER
C [x] 8/20/20 11:15 AM 15 Jacques Montalvo MD [[id number]] [address] [address] [[id number]] FU     Patient called to cancel because she's not feeling well this morning  She will call back to schedule another appointment

## 2020-08-21 ENCOUNTER — APPOINTMENT (OUTPATIENT)
Dept: LAB | Facility: HOSPITAL | Age: 75
End: 2020-08-21
Attending: SURGERY
Payer: MEDICARE

## 2020-08-21 DIAGNOSIS — K86.89 PANCREATIC MASS: ICD-10-CM

## 2020-08-21 PROCEDURE — 86301 IMMUNOASSAY TUMOR CA 19-9: CPT

## 2020-08-21 PROCEDURE — 36415 COLL VENOUS BLD VENIPUNCTURE: CPT

## 2020-08-21 PROCEDURE — 86316 IMMUNOASSAY TUMOR OTHER: CPT

## 2020-08-22 LAB — CANCER AG19-9 SERPL-ACNC: 23 U/ML (ref 0–35)

## 2020-08-25 LAB — CGA SERPL-MCNC: 671.6 NG/ML (ref 0–101.8)

## 2020-08-31 ENCOUNTER — TELEPHONE (OUTPATIENT)
Dept: SURGICAL ONCOLOGY | Facility: CLINIC | Age: 75
End: 2020-08-31

## 2020-09-03 ENCOUNTER — OFFICE VISIT (OUTPATIENT)
Dept: SURGICAL ONCOLOGY | Facility: CLINIC | Age: 75
End: 2020-09-03
Payer: MEDICARE

## 2020-09-03 VITALS
DIASTOLIC BLOOD PRESSURE: 84 MMHG | TEMPERATURE: 98 F | HEART RATE: 102 BPM | SYSTOLIC BLOOD PRESSURE: 130 MMHG | WEIGHT: 154 LBS | BODY MASS INDEX: 30.23 KG/M2 | HEIGHT: 60 IN

## 2020-09-03 DIAGNOSIS — K86.89 PANCREATIC MASS: Primary | ICD-10-CM

## 2020-09-03 PROCEDURE — 99214 OFFICE O/P EST MOD 30 MIN: CPT | Performed by: SURGERY

## 2020-09-03 NOTE — LETTER
September 3, 2020     Lidia Wan MD  [address] [address]    Patient: Daniel Stovall   YOB: 1945   Date of Visit: 9/3/2020       Dear Dr Esteban Ramirez: Thank you for referring Daniel Stovall to me for evaluation  Below are my notes for this consultation  If you have questions, please do not hesitate to call me  I look forward to following your patient along with you  Sincerely,        Antoine Cade MD        CC: No Recipients  Antoine Cade MD  9/3/2020 12:09 PM  Sign when Signing Visit               Surgical Oncology Follow Up       [address]  [place] [address]    Daniel Stovall  1945  [id number]  [facility]  [facility] [place]  [facility] [place]  [address]  [place] [address]    Diagnoses and all orders for this visit:    Pancreatic mass  -     Chromogranin A; Future        No chief complaint on file  Return in about 6 months (around 3/3/2021) for Office Visit, Labs - See Treatment Plan  Oncology History    No history exists  Staging:    Treatment history:  Endoscopic ultrasound May 28, 2019  Ovoid mass measuring 1 8 x 1 6 cm with smooth margins  Biopsies were unsuccessful  Current treatment:  Observation  Disease status:     History of Present Illness:   Patient returns in follow-up of her CT  She is doing well  She denies any abdominal pain, nausea or vomiting  Her appetite is good  No unintentional weight loss  No fevers, chills, night sweats  Her CT from August 13, 2020 reveals a stable 1 6 x 1 5 cm partially enhancing mass in pancreatic head  She has severe emphysema  I personally reviewed the films  CA 19-9 is 23  Chromogranin A level is 671 6      Review of Systems  Complete ROS Surg Onc:   Complete ROS Surg Onc:   Constitutional: The patient denies new or recent history of general fatigue, no recent weight loss, no change in appetite  Eyes: No complaints of visual problems, no scleral icterus  ENT: no complaints of ear pain, no hoarseness, no difficulty swallowing,  no tinnitus and no new masses in head, oral cavity, or neck  Cardiovascular: No complaints of chest pain, no palpitations, no ankle edema  Respiratory: No complaints of shortness of breath, no cough  Gastrointestinal: No complaints of jaundice, no bloody stools, no pale stools  Genitourinary: No complaints of dysuria, no hematuria, no nocturia, no frequent urination, no urethral discharge  Musculoskeletal: No complaints of weakness, paralysis, joint stiffness or arthralgias  Integumentary: No complaints of rash, no new lesions  Neurological: No complaints of convulsions, no seizures, no dizziness  Hematologic/Lymphatic: No complaints of easy bruising  Endocrine:  No hot or cold intolerance  No polydipsia, polyphagia, or polyuria  Allergy/immunology:  No environmental allergies  No food allergies  Not immunocompromised  Skin:  No pallor or rash  No wound  Patient Active Problem List   Diagnosis    Anxiety    Aortoiliac occlusive disease ([address] )    Carotid artery plaque, bilateral    Centrilobular emphysema (HCC)    Hyperlipidemia    Impaired fasting glucose    Osteoporosis    Restless leg syndrome    Subclavian artery stenosis, left (HCC)    PVD (peripheral vascular disease) (HCC)    Chronic sinusitis    MENDOZA (dyspnea on exertion)    Pancreatic mass    Paresis (HCC)    Overweight (BMI 25 0-29  9)    Esophagitis     Past Medical History:   Diagnosis Date    Anxiety     Atherosclerosis of native artery of both lower extremities with intermittent claudication ([address] ) 10/15/2015    Transitioned From: Cardiovascular Symptoms    Carotid artery plaque, bilateral 3/21/2017    Transitioned From:  Atherosclerosis of both carotid arteries    Chronic sinusitis     Last assessed: 11/11/13    COPD (chronic obstructive pulmonary disease) (HCC)     Hyperlipidemia     Lung nodule     PNA (pneumonia)     PVD (peripheral vascular disease) (HCC)     Restless leg syndrome     Stage 3 chronic kidney disease ([facility] ) 2019    Tobacco abuse      Past Surgical History:   Procedure Laterality Date    CATARACT EXTRACTION       SECTION      COLONOSCOPY      Complete  Resolved: 13    DESCENDING AORTIC ANEURYSM REPAIR W/ STENT  2019    IR AORTAGRAM WITH RUN-OFF  8/15/2019    SHOULDER SURGERY      For frozen shoulder     TONSILLECTOMY       Family History   Problem Relation Age of Onset    No Known Problems Mother     No Known Problems Father     Arthritis Sister     Pancreatic cancer Sister 61    Melanoma Brother         twice    Prostate cancer Brother     Heart attack Family         Acute Myocardial Infarction    Cirrhosis Family     Prostate cancer Family     Skin cancer Family     Stroke Family         Syndrome     Social History     Socioeconomic History    Marital status:       Spouse name: Not on file    Number of children: 2    Years of education: Not on file    Highest education level: Not on file   Occupational History    Occupation: Retired/   Social Needs    Financial resource strain: Not on file    Food insecurity     Worry: Not on file     Inability: Not on file   [employer] needs     Medical: Not on file     Non-medical: Not on file   Tobacco Use    Smoking status: Former Smoker     Packs/day: 2 00     Years: 56 00     Pack years: 112 00     Types: Cigarettes     Start date: 1962     Last attempt to quit: 2018     Years since quittin 2    Smokeless tobacco: Never Used   Substance and Sexual Activity    Alcohol use: Yes     Frequency: Monthly or less     Drinks per session: 1 or 2     Binge frequency: Never     Comment: occasionally     Drug use: No    Sexual activity: Not Currently     Partners: Male   Lifestyle    Physical activity     Days per week: 0 days     Minutes per session: 0 min    Stress:  To some extent   Relationships    Social connections     Talks on phone: Not on file     Gets together: Not on file     Attends Alevism service: Not on file     Active member of club or organization: Not on file     Attends meetings of clubs or organizations: Not on file     Relationship status: Not on file    Intimate partner violence     Fear of current or ex partner: Not on file     Emotionally abused: Not on file     Physically abused: Not on file     Forced sexual activity: Not on file   Other Topics Concern    Not on file   Social History Narrative    Living w/adult children    No advance directive on file       Current Outpatient Medications:     albuterol (2 5 mg/3 mL) 0 083 % nebulizer solution, Take 1 vial (2 5 mg total) by nebulization every 6 (six) hours as needed for wheezing or shortness of breath, Disp: 360 mL, Rfl: 3    albuterol (PROVENTIL HFA,VENTOLIN HFA) 90 mcg/act inhaler, Inhale 2 puffs every 6 (six) hours as needed for wheezing, Disp: 3 Inhaler, Rfl: 3    ALPRAZolam (XANAX) 0 5 mg tablet, Take 1 tablet (0 5 mg total) by mouth 2 (two) times a day as needed for anxiety, Disp: 60 tablet, Rfl: 1    aspirin ([employer] ASPIRIN EC) 81 mg EC tablet, Take 1 tablet by mouth daily, Disp: , Rfl:     atorvastatin (LIPITOR) 40 mg tablet, Take 1 tablet (40 mg total) by mouth daily, Disp: 90 tablet, Rfl: 3    fluticasone (FLONASE) 50 mcg/act nasal spray, fluticasone propionate 50 mcg/actuation nasal spray,suspension, Disp: , Rfl:     fluticasone-umeclidinium-vilanterol (TRELEGY) 100-62 5-25 MCG/INH inhaler, Inhale 1 puff daily Rinse mouth after use , Disp: 1 Inhaler, Rfl: 0    fluticasone-vilanterol (BREO ELLIPTA) 100-25 mcg/inh inhaler, INHALE 1 PUFF ONCE DAILY RINSE MOUTH AFTER USE, Disp: 1 Inhaler, Rfl: 3    ibuprofen (MOTRIN) 200 mg tablet, Take 1 tablet (200 mg total) by mouth every 6 (six) hours as needed for mild pain (Patient taking differently: Take 200 mg by mouth every 6 (six) hours as needed for mild pain ), Disp: 60 tablet, Rfl: 1    methocarbamol (ROBAXIN) 750 mg tablet, Take 1 tablet (750 mg total) by mouth every 6 (six) hours as needed for muscle spasms, Disp: 90 tablet, Rfl: 0    olopatadine (PATANOL) 0 1 % ophthalmic solution, , Disp: , Rfl: 0    oxyCODONE-acetaminophen (PERCOCET) 5-325 mg per tablet, Take 1 tablet by mouth every 6 (six) hours as needed for moderate painMax Daily Amount: 4 tablets, Disp: 120 tablet, Rfl: 0    pantoprazole (PROTONIX) 40 mg tablet, Take 1 tablet (40 mg total) by mouth daily before breakfast, Disp: 30 tablet, Rfl: 5    predniSONE 20 mg tablet, Take 2 tablets (40 mg total) by mouth daily, Disp: 10 tablet, Rfl: 0    rOPINIRole (REQUIP) 1 mg tablet, TAKE 1 TO 2 TABLETS AT BEDTIME AS NEEDED TO CONTROL RESTLESS LEGS, Disp: 60 tablet, Rfl: 5    sodium chloride () 2 % hypertonic ophthalmic solution, Sue 128 2 % eye drops, Disp: , Rfl:     fluticasone-vilanterol (BREO ELLIPTA) 100-25 mcg/inh inhaler, Inhale 1 puff once for 1 dose Rinse mouth after use , Disp: 3 Inhaler, Rfl: 3  Allergies   Allergen Reactions    Spiriva Handihaler [Tiotropium Bromide Monohydrate] Shortness Of Breath    Augmentin [Amoxicillin-Pot Clavulanate] Abdominal Pain     Pt received ceftriaxone 1 g IV on 5-21-18, gets sharp pains     Tiotropium Abdominal Pain    Tylenol With Codeine #3 [Acetaminophen-Codeine] Abdominal Pain     Vitals:    09/03/20 1146   BP: 130/84   Pulse: 102   Temp: 98 °F (36 7 °C)       Physical Exam  Constitutional: General appearance: The Patient is well-developed and well-nourished who appears the stated age in no acute distress  Patient is pleasant and talkative  HEENT:  Normocephalic  Sclerae are anicteric  Mucous membranes are moist  Neck is supple without adenopathy  No JVD  Chest: The lungs are clear to auscultation  Cardiac: Heart is regular rate       Abdomen: Abdomen is soft, non-tender, non-distended and without masses  Extremities: There is no clubbing or cyanosis  There is no edema  Symmetric  Neuro: Grossly nonfocal  Gait is normal      Lymphatic: No evidence of cervical adenopathy bilaterally  No evidence of axillary adenopathy bilaterally  No evidence of inguinal adenopathy bilaterally  Skin: Warm, anicteric  Psych:  Patient is pleasant and talkative  Breasts:        Pathology:  [unfilled]    Labs:  Chromogranin A  0 0 - 101 8 ng/mL  671  6High       CA 19-9  0 - 35 U/mL  23            Imaging  Ct Chest Abdomen Pelvis W Contrast    Result Date: 8/16/2020  Narrative: CT CHEST, ABDOMEN AND PELVIS WITH IV CONTRAST INDICATION:   K86 89: Other specified diseases of pancreas  COMPARISON:  None  TECHNIQUE: CT examination of the chest, abdomen and pelvis was performed  Axial, sagittal, and coronal 2D reformatted images were created from the source data and submitted for interpretation  Radiation dose length product (DLP) for this visit:  0292 mGy-cm   This examination, like all CT scans performed in the [facility], was performed utilizing techniques to minimize radiation dose exposure, including the use of iterative reconstruction and automated exposure control  IV Contrast:  100 mL of iohexol (OMNIPAQUE) Enteric Contrast: Enteric contrast was administered  FINDINGS: CHEST LUNGS:  Stable severe background emphysema  Previously noted left upper lobe, right upper lobe and right middle lobe reticulonodular opacities have resolved  No nodule  No endotracheal or endobronchial lesion  PLEURA:  Unremarkable  HEART/GREAT VESSELS:  Unremarkable for patient's age  MEDIASTINUM AND GINNY:  Unremarkable  CHEST WALL AND LOWER NECK:   Stable 1 cm left breast retroareolar nodular density  ABDOMEN LIVER/BILIARY TREE:  Liver is diffusely decreased in density consistent with fatty change  No CT evidence of suspicious hepatic mass    Stable subcentimeter cyst in the left lobe  Normal hepatic contours  No biliary dilatation  GALLBLADDER:  No calcified gallstones  No pericholecystic inflammatory change  SPLEEN:  Unremarkable  PANCREAS:  Stable 1 6 x 1 5 cm partially enhancing mass in the posterior pancreatic head best seen on portal venous phase image 4/65  ADRENAL GLANDS:  Unremarkable  KIDNEYS/URETERS:  Stable left renal cyst  No hydronephrosis  STOMACH AND BOWEL:  There is colonic diverticulosis without evidence of acute diverticulitis  APPENDIX:  No findings to suggest appendicitis  ABDOMINOPELVIC CAVITY:  No ascites  No pneumoperitoneum  No lymphadenopathy  VESSELS:  Unremarkable for patient's age  PELVIS REPRODUCTIVE ORGANS:  Unremarkable for patient's age  URINARY BLADDER:  Unremarkable  ABDOMINAL WALL/INGUINAL REGIONS:  Unremarkable  OSSEOUS STRUCTURES:  No acute fracture or destructive osseous lesion  Impression: Stable 1 6 x 1 5 cm partially enhancing mass in the posterior pancreatic head best seen on portal venous phase image 4/65  Stable severe background emphysema with resolution of previously seen reticulonodular infiltrates  Stable fatty liver  Stable 1 cm left breast retroareolar nodular density  Workstation performed: [id number]     I reviewed the above laboratory and imaging data  Discussion/Summary: 53-year-old female with a pancreatic mass   The etiology of this lesion is unclear   This did have smooth margins by endoscopic ultrasound   It did not have the typical imaging appearance pancreatic adenocarcinoma or a neuroendocrine tumor  This has been stable on repeat imaging  Her chromogranin A level is elevated  I suspect that this is going to be a neuroendocrine tumor  I discussed treatment options including surgical resection  If this is a neuroendocrine tumor, this is under 2 cm  Did discuss the risk of lymph node metastasis increases with the lesion is over 2 cm in size    Lesions that are 1-2 cm in size, there is still a significant risk of lymph node metastasis, as high as 17% but since this has been stable, I would be comfortable with observation since she is asymptomatic, despite the elevated chromogranin level  I suspect this is elevated secondary to her long-term Protonix use  She does not wish to undergo surgery a unless this is frankly malignant  Consequently we will plan on observation  She only wishes to have imaging every year  Since the chromogranin level is elevated, we will repeat this in 6 months and I will see her again at that time for another clinical exam and to make sure she continues to be asymptomatic  She is agreeable to this    All of their questions were answered

## 2020-09-03 NOTE — PROGRESS NOTES
Surgical Oncology Follow Up       [facility]  [facility] Nancy Dowd  [address]  [place] [id number]    Haydee Anne  1945  [id number]  [facility]  [facility] Nancy Dowd  [address]  [place] [id number]    Diagnoses and all orders for this visit:    Pancreatic mass  -     Chromogranin A; Future        No chief complaint on file  Return in about 6 months (around 3/3/2021) for Office Visit, Labs - See Treatment Plan  Oncology History    No history exists  Staging:    Treatment history:  Endoscopic ultrasound May 28, 2019  Ovoid mass measuring 1 8 x 1 6 cm with smooth margins  Biopsies were unsuccessful  Current treatment:  Observation  Disease status:     History of Present Illness:   Patient returns in follow-up of her CT  She is doing well  She denies any abdominal pain, nausea or vomiting  Her appetite is good  No unintentional weight loss  No fevers, chills, night sweats  Her CT from August 13, 2020 reveals a stable 1 6 x 1 5 cm partially enhancing mass in pancreatic head  She has severe emphysema  I personally reviewed the films  CA 19-9 is 23  Chromogranin A level is 671 6  Review of Systems  Complete ROS Surg Onc:   Complete ROS Surg Onc:   Constitutional: The patient denies new or recent history of general fatigue, no recent weight loss, no change in appetite  Eyes: No complaints of visual problems, no scleral icterus  ENT: no complaints of ear pain, no hoarseness, no difficulty swallowing,  no tinnitus and no new masses in head, oral cavity, or neck  Cardiovascular: No complaints of chest pain, no palpitations, no ankle edema  Respiratory: No complaints of shortness of breath, no cough  Gastrointestinal: No complaints of jaundice, no bloody stools, no pale stools     Genitourinary: No complaints of dysuria, no hematuria, no nocturia, no frequent urination, no urethral discharge  Musculoskeletal: No complaints of weakness, paralysis, joint stiffness or arthralgias  Integumentary: No complaints of rash, no new lesions  Neurological: No complaints of convulsions, no seizures, no dizziness  Hematologic/Lymphatic: No complaints of easy bruising  Endocrine:  No hot or cold intolerance  No polydipsia, polyphagia, or polyuria  Allergy/immunology:  No environmental allergies  No food allergies  Not immunocompromised  Skin:  No pallor or rash  No wound  Patient Active Problem List   Diagnosis    Anxiety    [facility]    Carotid artery plaque, bilateral    Centrilobular emph[facility]sema ([facility])    Hyperlipidemia    Impaired fasting glucose    Osteoporosis    Restless leg syndrome    Subclavian artery stenosis, left ([facility])    PVD (peripheral vascular disease) ([facility])    Chronic sinusitis    MENDOZA (dyspnea on exertion)    Pancreatic mass    Paresis ([facility])    Overweight (BMI 25 0-29  9)    Esophagitis     Past Medical History:   Diagnosis Date    Anxiety     Atherosclerosis of native artery of both lower extremities with intermittent claudication ([facility] ) 10/15/2015    Transitioned From: Cardiovascular Symptoms    Carotid artery plaque, bilateral 3/21/2017    Transitioned From: Atherosclerosis of both carotid arteries    Chronic sinusitis     Last assessed: 13    COPD (chronic obstructive pulmonary disease) ([facility])     Hyperlipidemia     Lung nodule     PNA (pneumonia)     PVD (peripheral vascular disease) ([facility])     Restless leg syndrome     Stage 3 chronic kidney disease ([facility] ) 2019    Tobacco abuse      Past Surgical History:   Procedure Laterality Date    CATARACT EXTRACTION       SECTION      COLONOSCOPY      Complete   Resolved: 13    DESCENDING AORTIC ANEURYSM REPAIR W/ STENT  2019    IR AORTAGRAM WITH RUN-OFF  8/15/2019    SHOULDER SURGERY      For frozen shoulder     TONSILLECTOMY       Family History   Problem Relation Age of Onset    No Known Problems Mother     No Known Problems Father     Arthritis Sister     Pancreatic cancer Sister 61    Melanoma Brother         twice    Prostate cancer Brother     Heart attack Family         Acute Myocardial Infarction    Cirrhosis Family     Prostate cancer Family     Skin cancer Family     Stroke Family         Syndrome     Social History     Socioeconomic History    Marital status:      Spouse name: Not on file    Number of children: 2    Years of education: Not on file    Highest education level: Not on file   Occupational History    Occupation: Retired/   Social Needs    Financial resource strain: Not on file    Food insecurity     Worry: Not on file     Inability: Not on file   [employer] needs     Medical: Not on file     Non-medical: Not on file   Tobacco Use    Smoking status: Former Smoker     Packs/day: 2 00     Years: 56 00     Pack years: 112 00     Types: Cigarettes     Start date: 1962     Last attempt to quit: 2018     Years since quittin 2    Smokeless tobacco: Never Used   Substance and Sexual Activity    Alcohol use: Yes     Frequency: Monthly or less     Drinks per session: 1 or 2     Binge frequency: Never     Comment: occasionally     Drug use: No    Sexual activity: Not Currently     Partners: Male   Lifestyle    Physical activity     Days per week: 0 days     Minutes per session: 0 min    Stress:  To some extent   Relationships    Social connections     Talks on phone: Not on file     Gets together: Not on file     Attends Sabianism service: Not on file     Active member of club or organization: Not on file     Attends meetings of clubs or organizations: Not on file     Relationship status: Not on file    Intimate partner violence     Fear of current or ex partner: Not on file     Emotionally abused: Not on file     Physically abused: Not on file Forced sexual activity: Not on file   Other Topics Concern    Not on file   Social History Narrative    Living w/adult children    No advance directive on file       Current Outpatient Medications:     albuterol (2 5 mg/3 mL) 0 083 % nebulizer solution, Take 1 vial (2 5 mg total) by nebulization every 6 (six) hours as needed for wheezing or shortness of breath, Disp: 360 mL, Rfl: 3    albuterol (PROVENTIL HFA,VENTOLIN HFA) 90 mcg/act inhaler, Inhale 2 puffs every 6 (six) hours as needed for wheezing, Disp: 3 Inhaler, Rfl: 3    ALPRAZolam (XANAX) 0 5 mg tablet, Take 1 tablet (0 5 mg total) by mouth 2 (two) times a day as needed for anxiety, Disp: 60 tablet, Rfl: 1    aspirin ([employer] ASPIRIN EC) 81 mg EC tablet, Take 1 tablet by mouth daily, Disp: , Rfl:     atorvastatin (LIPITOR) 40 mg tablet, Take 1 tablet (40 mg total) by mouth daily, Disp: 90 tablet, Rfl: 3    fluticasone (FLONASE) 50 mcg/act nasal spray, fluticasone propionate 50 mcg/actuation nasal spray,suspension, Disp: , Rfl:     fluticasone-umeclidinium-vilanterol (TRELEGY) 100-62 5-25 MCG/INH inhaler, Inhale 1 puff daily Rinse mouth after use , Disp: 1 Inhaler, Rfl: 0    fluticasone-vilanterol (BREO ELLIPTA) 100-25 mcg/inh inhaler, INHALE 1 PUFF ONCE DAILY RINSE MOUTH AFTER USE, Disp: 1 Inhaler, Rfl: 3    ibuprofen (MOTRIN) 200 mg tablet, Take 1 tablet (200 mg total) by mouth every 6 (six) hours as needed for mild pain (Patient taking differently: Take 200 mg by mouth every 6 (six) hours as needed for mild pain ), Disp: 60 tablet, Rfl: 1    methocarbamol (ROBAXIN) 750 mg tablet, Take 1 tablet (750 mg total) by mouth every 6 (six) hours as needed for muscle spasms, Disp: 90 tablet, Rfl: 0    olopatadine (PATANOL) 0 1 % ophthalmic solution, , Disp: , Rfl: 0    oxyCODONE-acetaminophen (PERCOCET) 5-325 mg per tablet, Take 1 tablet by mouth every 6 (six) hours as needed for moderate painMax Daily Amount: 4 tablets, Disp: 120 tablet, Rfl: 0   pantoprazole (PROTONIX) 40 mg tablet, Take 1 tablet (40 mg total) by mouth daily before breakfast, Disp: 30 tablet, Rfl: 5    predniSONE 20 mg tablet, Take 2 tablets (40 mg total) by mouth daily, Disp: 10 tablet, Rfl: 0    rOPINIRole (REQUIP) 1 mg tablet, TAKE 1 TO 2 TABLETS AT BEDTIME AS NEEDED TO CONTROL RESTLESS LEGS, Disp: 60 tablet, Rfl: 5    sodium chloride () 2 % hypertonic ophthalmic solution, Sue 128 2 % eye drops, Disp: , Rfl:     fluticasone-vilanterol (BREO ELLIPTA) 100-25 mcg/inh inhaler, Inhale 1 puff once for 1 dose Rinse mouth after use , Disp: 3 Inhaler, Rfl: 3  Allergies   Allergen Reactions    Spiriva Handihaler [Tiotropium Bromide Monohydrate] Shortness Of Breath    Augmentin [Amoxicillin-Pot Clavulanate] Abdominal Pain     Pt received ceftriaxone 1 g IV on 5-21-18, gets sharp pains     Tiotropium Abdominal Pain    Tylenol With Codeine #3 [Acetaminophen-Codeine] Abdominal Pain     Vitals:    09/03/20 1146   BP: 130/84   Pulse: 102   Temp: 98 °F (36 7 °C)       Physical Exam  Constitutional: General appearance: The Patient is well-developed and well-nourished who appears the stated age in no acute distress  Patient is pleasant and talkative  HEENT:  Normocephalic  Sclerae are anicteric  Mucous membranes are moist  Neck is supple without adenopathy  No JVD  Chest: The lungs are clear to auscultation  Cardiac: Heart is regular rate  Abdomen: Abdomen is soft, non-tender, non-distended and without masses  Extremities: There is no clubbing or cyanosis  There is no edema  Symmetric  Neuro: Grossly nonfocal  Gait is normal      Lymphatic: No evidence of cervical adenopathy bilaterally  No evidence of axillary adenopathy bilaterally  No evidence of inguinal adenopathy bilaterally  Skin: Warm, anicteric  Psych:  Patient is pleasant and talkative  Breasts:        Pathology:  [unfilled]    Labs:  Chromogranin A  0 0 - 101 8 ng/mL  671  6High       CA 19-9 0 - 35 U/mL  23            Imaging  Ct Chest Abdomen Pelvis W Contrast    Result Date: 8/16/2020  Narrative: CT CHEST, ABDOMEN AND PELVIS WITH IV CONTRAST INDICATION:   K86 89: Other specified diseases of pancreas  COMPARISON:  None  TECHNIQUE: CT examination of the chest, abdomen and pelvis was performed  Axial, sagittal, and coronal 2D reformatted images were created from the source data and submitted for interpretation  Radiation dose length product (DLP) for this visit:  2551 mGy-cm   This examination, like all CT scans performed in the [facility], was performed utilizing techniques to minimize radiation dose exposure, including the use of iterative reconstruction and automated exposure control  IV Contrast:  100 mL of iohexol (OMNIPAQUE) Enteric Contrast: Enteric contrast was administered  FINDINGS: CHEST LUNGS:  Stable severe background emphysema  Previously noted left upper lobe, right upper lobe and right middle lobe reticulonodular opacities have resolved  No nodule  No endotracheal or endobronchial lesion  PLEURA:  Unremarkable  HEART/GREAT VESSELS:  Unremarkable for patient's age  MEDIASTINUM AND GINNY:  Unremarkable  CHEST WALL AND LOWER NECK:   Stable 1 cm left breast retroareolar nodular density  ABDOMEN LIVER/BILIARY TREE:  Liver is diffusely decreased in density consistent with fatty change  No CT evidence of suspicious hepatic mass  Stable subcentimeter cyst in the left lobe  Normal hepatic contours  No biliary dilatation  GALLBLADDER:  No calcified gallstones  No pericholecystic inflammatory change  SPLEEN:  Unremarkable  PANCREAS:  Stable 1 6 x 1 5 cm partially enhancing mass in the posterior pancreatic head best seen on portal venous phase image 4/65  ADRENAL GLANDS:  Unremarkable  KIDNEYS/URETERS:  Stable left renal cyst  No hydronephrosis  STOMACH AND BOWEL:  There is colonic diverticulosis without evidence of acute diverticulitis   APPENDIX:  No findings to suggest appendicitis  ABDOMINOPELVIC CAVITY:  No ascites  No pneumoperitoneum  No lymphadenopathy  VESSELS:  Unremarkable for patient's age  PELVIS REPRODUCTIVE ORGANS:  Unremarkable for patient's age  URINARY BLADDER:  Unremarkable  ABDOMINAL WALL/INGUINAL REGIONS:  Unremarkable  OSSEOUS STRUCTURES:  No acute fracture or destructive osseous lesion  Impression: Stable 1 6 x 1 5 cm partially enhancing mass in the posterior pancreatic head best seen on portal venous phase image 4/65  Stable severe background emphysema with resolution of previously seen reticulonodular infiltrates  Stable fatty liver  Stable 1 cm left breast retroareolar nodular density  Workstation performed: [id number]     I reviewed the above laboratory and imaging data  Discussion/Summary: 74-year-old female with a pancreatic mass   The etiology of this lesion is unclear   This did have smooth margins by endoscopic ultrasound   It did not have the typical imaging appearance pancreatic adenocarcinoma or a neuroendocrine tumor  This has been stable on repeat imaging  Her chromogranin A level is elevated  I suspect that this is going to be a neuroendocrine tumor  I discussed treatment options including surgical resection  If this is a neuroendocrine tumor, this is under 2 cm  Did discuss the risk of lymph node metastasis increases with the lesion is over 2 cm in size  Lesions that are 1-2 cm in size, there is still a significant risk of lymph node metastasis, as high as 17% but since this has been stable, I would be comfortable with observation since she is asymptomatic, despite the elevated chromogranin level  I suspect this is elevated secondary to her long-term Protonix use  She does not wish to undergo surgery a unless this is frankly malignant  Consequently we will plan on observation  She only wishes to have imaging every year    Since the chromogranin level is elevated, we will repeat this in 6 months and I will see her again at that time for another clinical exam and to make sure she continues to be asymptomatic  She is agreeable to this    All of their questions were answered

## 2020-09-05 ENCOUNTER — TELEPHONE (OUTPATIENT)
Dept: INTERNAL MEDICINE CLINIC | Facility: CLINIC | Age: 75
End: 2020-09-05

## 2020-09-05 NOTE — TELEPHONE ENCOUNTER
Patient has a headache for a week    back of head and neck  Lajean Cassette around eyes it hurts    she thinks its a sinus   Lajean Cassette Dr usually prescribes Slim Sos     would he do this for her ???  Please call pt back and let her know  Lajean Cassette

## 2020-09-13 DIAGNOSIS — F41.1 GENERALIZED ANXIETY DISORDER: ICD-10-CM

## 2020-09-14 RX ORDER — ROPINIROLE 1 MG/1
TABLET, FILM COATED ORAL
Qty: 60 TABLET | Refills: 5 | Status: SHIPPED | OUTPATIENT
Start: 2020-09-14 | End: 2021-05-03

## 2020-09-29 DIAGNOSIS — I71.4 ABDOMINAL AORTIC ANEURYSM WITHOUT RUPTURE (HCC): Primary | ICD-10-CM

## 2020-11-10 DIAGNOSIS — J32.9 CHRONIC SINUSITIS, UNSPECIFIED LOCATION: Primary | ICD-10-CM

## 2020-11-10 DIAGNOSIS — R21 RASH: ICD-10-CM

## 2020-11-10 RX ORDER — FLUTICASONE PROPIONATE 50 MCG
2 SPRAY, SUSPENSION (ML) NASAL DAILY
Qty: 1 BOTTLE | Refills: 2 | Status: SHIPPED | OUTPATIENT
Start: 2020-11-10 | End: 2021-09-14 | Stop reason: SDUPTHER

## 2020-11-25 ENCOUNTER — HOSPITAL ENCOUNTER (OUTPATIENT)
Dept: NON INVASIVE DIAGNOSTICS | Facility: CLINIC | Age: 75
Discharge: HOME | End: 2020-11-25
Payer: MEDICARE

## 2020-11-25 DIAGNOSIS — I71.4 ABDOMINAL AORTIC ANEURYSM WITHOUT RUPTURE (HCC): ICD-10-CM

## 2020-11-25 PROCEDURE — 93978 VASCULAR STUDY: CPT

## 2020-11-26 PROCEDURE — 93978 VASCULAR STUDY: CPT | Performed by: SURGERY

## 2020-12-07 ENCOUNTER — TELEPHONE (OUTPATIENT)
Dept: VASCULAR SURGERY | Facility: CLINIC | Age: 75
End: 2020-12-07

## 2020-12-08 ENCOUNTER — OFFICE VISIT (OUTPATIENT)
Dept: INTERNAL MEDICINE CLINIC | Facility: CLINIC | Age: 75
End: 2020-12-08
Payer: MEDICARE

## 2020-12-08 ENCOUNTER — TELEPHONE (OUTPATIENT)
Dept: VASCULAR SURGERY | Facility: CLINIC | Age: 75
End: 2020-12-08

## 2020-12-08 ENCOUNTER — OFFICE VISIT (OUTPATIENT)
Dept: VASCULAR SURGERY | Facility: CLINIC | Age: 75
End: 2020-12-08
Payer: MEDICARE

## 2020-12-08 VITALS
OXYGEN SATURATION: 96 % | TEMPERATURE: 99.6 F | BODY MASS INDEX: 30.9 KG/M2 | WEIGHT: 157.4 LBS | SYSTOLIC BLOOD PRESSURE: 144 MMHG | HEIGHT: 60 IN | HEART RATE: 101 BPM | DIASTOLIC BLOOD PRESSURE: 60 MMHG

## 2020-12-08 VITALS
SYSTOLIC BLOOD PRESSURE: 130 MMHG | HEART RATE: 92 BPM | BODY MASS INDEX: 30.23 KG/M2 | WEIGHT: 154 LBS | DIASTOLIC BLOOD PRESSURE: 80 MMHG | HEIGHT: 60 IN | TEMPERATURE: 98.3 F

## 2020-12-08 DIAGNOSIS — L82.1 SEBORRHEIC KERATOSES: ICD-10-CM

## 2020-12-08 DIAGNOSIS — R73.01 IMPAIRED FASTING GLUCOSE: ICD-10-CM

## 2020-12-08 DIAGNOSIS — R25.2 LEG CRAMPING: ICD-10-CM

## 2020-12-08 DIAGNOSIS — E78.5 HYPERLIPIDEMIA, UNSPECIFIED HYPERLIPIDEMIA TYPE: ICD-10-CM

## 2020-12-08 DIAGNOSIS — J43.2 CENTRILOBULAR EMPHYSEMA (HCC): ICD-10-CM

## 2020-12-08 DIAGNOSIS — M81.0 OSTEOPOROSIS, UNSPECIFIED OSTEOPOROSIS TYPE, UNSPECIFIED PATHOLOGICAL FRACTURE PRESENCE: ICD-10-CM

## 2020-12-08 DIAGNOSIS — I73.9 PVD (PERIPHERAL VASCULAR DISEASE) (HCC): ICD-10-CM

## 2020-12-08 DIAGNOSIS — I77.1 SUBCLAVIAN ARTERY STENOSIS, LEFT (HCC): ICD-10-CM

## 2020-12-08 DIAGNOSIS — L98.9 SKIN LESION: ICD-10-CM

## 2020-12-08 DIAGNOSIS — Z12.31 VISIT FOR SCREENING MAMMOGRAM: ICD-10-CM

## 2020-12-08 DIAGNOSIS — I65.23 CAROTID ARTERY PLAQUE, BILATERAL: ICD-10-CM

## 2020-12-08 DIAGNOSIS — Z11.59 ENCOUNTER FOR HEPATITIS C SCREENING TEST FOR LOW RISK PATIENT: ICD-10-CM

## 2020-12-08 DIAGNOSIS — I74.09 AORTOILIAC OCCLUSIVE DISEASE (HCC): Primary | ICD-10-CM

## 2020-12-08 DIAGNOSIS — Z11.4 ENCOUNTER FOR SCREENING FOR HIV: Primary | ICD-10-CM

## 2020-12-08 PROCEDURE — G0439 PPPS, SUBSEQ VISIT: HCPCS | Performed by: NURSE PRACTITIONER

## 2020-12-08 PROCEDURE — 99215 OFFICE O/P EST HI 40 MIN: CPT | Performed by: PHYSICIAN ASSISTANT

## 2020-12-08 PROCEDURE — 99214 OFFICE O/P EST MOD 30 MIN: CPT | Performed by: NURSE PRACTITIONER

## 2020-12-13 DIAGNOSIS — R10.13 EPIGASTRIC PAIN: ICD-10-CM

## 2020-12-15 RX ORDER — PANTOPRAZOLE SODIUM 40 MG/1
TABLET, DELAYED RELEASE ORAL
Qty: 30 TABLET | Refills: 5 | Status: SHIPPED | OUTPATIENT
Start: 2020-12-15 | End: 2021-07-11

## 2020-12-27 DIAGNOSIS — E78.5 HYPERLIPIDEMIA, UNSPECIFIED HYPERLIPIDEMIA TYPE: ICD-10-CM

## 2020-12-28 RX ORDER — ATORVASTATIN CALCIUM 40 MG/1
TABLET, FILM COATED ORAL
Qty: 90 TABLET | Refills: 3 | Status: SHIPPED | OUTPATIENT
Start: 2020-12-28 | End: 2021-12-20

## 2020-12-30 ENCOUNTER — APPOINTMENT (OUTPATIENT)
Dept: LAB | Facility: CLINIC | Age: 75
End: 2020-12-30
Payer: MEDICARE

## 2020-12-30 ENCOUNTER — OFFICE VISIT (OUTPATIENT)
Dept: DERMATOLOGY | Facility: CLINIC | Age: 75
End: 2020-12-30
Payer: MEDICARE

## 2020-12-30 VITALS — TEMPERATURE: 95.7 F

## 2020-12-30 DIAGNOSIS — R25.2 LEG CRAMPING: ICD-10-CM

## 2020-12-30 DIAGNOSIS — M81.0 OSTEOPOROSIS, UNSPECIFIED OSTEOPOROSIS TYPE, UNSPECIFIED PATHOLOGICAL FRACTURE PRESENCE: ICD-10-CM

## 2020-12-30 DIAGNOSIS — L57.0 ACTINIC KERATOSIS: Primary | ICD-10-CM

## 2020-12-30 DIAGNOSIS — R73.01 IMPAIRED FASTING GLUCOSE: ICD-10-CM

## 2020-12-30 DIAGNOSIS — Z11.4 ENCOUNTER FOR SCREENING FOR HIV: ICD-10-CM

## 2020-12-30 DIAGNOSIS — Q82.8: ICD-10-CM

## 2020-12-30 DIAGNOSIS — L82.1 VERRUCOUS KERATOSIS: ICD-10-CM

## 2020-12-30 DIAGNOSIS — Z11.59 ENCOUNTER FOR HEPATITIS C SCREENING TEST FOR LOW RISK PATIENT: ICD-10-CM

## 2020-12-30 DIAGNOSIS — L82.1 SEBORRHEIC KERATOSIS: ICD-10-CM

## 2020-12-30 DIAGNOSIS — E78.5 HYPERLIPIDEMIA, UNSPECIFIED HYPERLIPIDEMIA TYPE: ICD-10-CM

## 2020-12-30 DIAGNOSIS — Z13.89 SCREENING FOR SKIN CONDITION: ICD-10-CM

## 2020-12-30 LAB
25(OH)D3 SERPL-MCNC: 18.1 NG/ML (ref 30–100)
ALBUMIN SERPL BCP-MCNC: 3.7 G/DL (ref 3.5–5)
ALP SERPL-CCNC: 120 U/L (ref 46–116)
ALT SERPL W P-5'-P-CCNC: 33 U/L (ref 12–78)
ANION GAP SERPL CALCULATED.3IONS-SCNC: 5 MMOL/L (ref 4–13)
AST SERPL W P-5'-P-CCNC: 21 U/L (ref 5–45)
BILIRUB SERPL-MCNC: 0.32 MG/DL (ref 0.2–1)
BILIRUB UR QL STRIP: NEGATIVE
BUN SERPL-MCNC: 17 MG/DL (ref 5–25)
CALCIUM SERPL-MCNC: 9.7 MG/DL (ref 8.3–10.1)
CHLORIDE SERPL-SCNC: 107 MMOL/L (ref 100–108)
CHOLEST SERPL-MCNC: 163 MG/DL (ref 50–200)
CLARITY UR: CLEAR
CO2 SERPL-SCNC: 28 MMOL/L (ref 21–32)
COLOR UR: YELLOW
CREAT SERPL-MCNC: 1.14 MG/DL (ref 0.6–1.3)
ERYTHROCYTE [DISTWIDTH] IN BLOOD BY AUTOMATED COUNT: 14.8 % (ref 11.6–15.1)
EST. AVERAGE GLUCOSE BLD GHB EST-MCNC: 140 MG/DL
GFR SERPL CREATININE-BSD FRML MDRD: 48 ML/MIN/1.73SQ M
GLUCOSE P FAST SERPL-MCNC: 132 MG/DL (ref 65–99)
GLUCOSE UR STRIP-MCNC: NEGATIVE MG/DL
HBA1C MFR BLD: 6.5 %
HCT VFR BLD AUTO: 46.7 % (ref 34.8–46.1)
HCV AB SER QL: NORMAL
HDLC SERPL-MCNC: 44 MG/DL
HGB BLD-MCNC: 14.5 G/DL (ref 11.5–15.4)
HGB UR QL STRIP.AUTO: NEGATIVE
KETONES UR STRIP-MCNC: NEGATIVE MG/DL
LDLC SERPL CALC-MCNC: 81 MG/DL (ref 0–100)
LEUKOCYTE ESTERASE UR QL STRIP: NEGATIVE
MAGNESIUM SERPL-MCNC: 2.4 MG/DL (ref 1.6–2.6)
MCH RBC QN AUTO: 27.4 PG (ref 26.8–34.3)
MCHC RBC AUTO-ENTMCNC: 31 G/DL (ref 31.4–37.4)
MCV RBC AUTO: 88 FL (ref 82–98)
NITRITE UR QL STRIP: NEGATIVE
PH UR STRIP.AUTO: 5.5 [PH]
PLATELET # BLD AUTO: 318 THOUSANDS/UL (ref 149–390)
PMV BLD AUTO: 10.2 FL (ref 8.9–12.7)
POTASSIUM SERPL-SCNC: 4.3 MMOL/L (ref 3.5–5.3)
PROT SERPL-MCNC: 7.4 G/DL (ref 6.4–8.2)
PROT UR STRIP-MCNC: NEGATIVE MG/DL
RBC # BLD AUTO: 5.29 MILLION/UL (ref 3.81–5.12)
SODIUM SERPL-SCNC: 140 MMOL/L (ref 136–145)
SP GR UR STRIP.AUTO: 1.02 (ref 1–1.03)
TRIGL SERPL-MCNC: 190 MG/DL
UROBILINOGEN UR QL STRIP.AUTO: 0.2 E.U./DL
WBC # BLD AUTO: 6.93 THOUSAND/UL (ref 4.31–10.16)

## 2020-12-30 PROCEDURE — 99203 OFFICE O/P NEW LOW 30 MIN: CPT | Performed by: DERMATOLOGY

## 2020-12-30 PROCEDURE — 80061 LIPID PANEL: CPT

## 2020-12-30 PROCEDURE — 36415 COLL VENOUS BLD VENIPUNCTURE: CPT

## 2020-12-30 PROCEDURE — 81003 URINALYSIS AUTO W/O SCOPE: CPT | Performed by: NURSE PRACTITIONER

## 2020-12-30 PROCEDURE — 83735 ASSAY OF MAGNESIUM: CPT

## 2020-12-30 PROCEDURE — 87389 HIV-1 AG W/HIV-1&-2 AB AG IA: CPT

## 2020-12-30 PROCEDURE — 86803 HEPATITIS C AB TEST: CPT

## 2020-12-30 PROCEDURE — 83036 HEMOGLOBIN GLYCOSYLATED A1C: CPT

## 2020-12-30 PROCEDURE — 17110 DESTRUCTION B9 LES UP TO 14: CPT | Performed by: DERMATOLOGY

## 2020-12-30 PROCEDURE — 17000 DESTRUCT PREMALG LESION: CPT | Performed by: DERMATOLOGY

## 2020-12-30 PROCEDURE — 80053 COMPREHEN METABOLIC PANEL: CPT

## 2020-12-30 PROCEDURE — 82306 VITAMIN D 25 HYDROXY: CPT

## 2020-12-30 PROCEDURE — 85027 COMPLETE CBC AUTOMATED: CPT

## 2020-12-31 LAB — HIV 1+2 AB+HIV1 P24 AG SERPL QL IA: NORMAL

## 2021-01-08 ENCOUNTER — OFFICE VISIT (OUTPATIENT)
Dept: INTERNAL MEDICINE CLINIC | Facility: CLINIC | Age: 76
End: 2021-01-08
Payer: MEDICARE

## 2021-01-08 VITALS
OXYGEN SATURATION: 95 % | SYSTOLIC BLOOD PRESSURE: 120 MMHG | BODY MASS INDEX: 30.43 KG/M2 | TEMPERATURE: 97.5 F | HEART RATE: 104 BPM | DIASTOLIC BLOOD PRESSURE: 60 MMHG | HEIGHT: 60 IN | WEIGHT: 155 LBS

## 2021-01-08 DIAGNOSIS — J43.2 CENTRILOBULAR EMPHYSEMA (HCC): ICD-10-CM

## 2021-01-08 DIAGNOSIS — I73.9 PVD (PERIPHERAL VASCULAR DISEASE) (HCC): ICD-10-CM

## 2021-01-08 DIAGNOSIS — N18.30 STAGE 3 CHRONIC KIDNEY DISEASE, UNSPECIFIED WHETHER STAGE 3A OR 3B CKD (HCC): ICD-10-CM

## 2021-01-08 DIAGNOSIS — R73.01 IMPAIRED FASTING GLUCOSE: Primary | ICD-10-CM

## 2021-01-08 DIAGNOSIS — Z12.11 SCREEN FOR COLON CANCER: ICD-10-CM

## 2021-01-08 PROCEDURE — 99214 OFFICE O/P EST MOD 30 MIN: CPT | Performed by: INTERNAL MEDICINE

## 2021-01-08 NOTE — PATIENT INSTRUCTIONS
At a stable baseline  Some shortness of breath which is chronic and pretty minimal     Aching in the  Anterior thighs after exertion probably vascular but again she has had surgery, no longer smokes, is on antiplatelet agents and lipids  Possibly arthritic component  laboratory data reviewed    Hemoglobin A1c of 6 5%    F/u 6 months

## 2021-01-08 NOTE — PROGRESS NOTES
Assessment/Plan:       Diagnoses and all orders for this visit:    Impaired fasting glucose    Centrilobular emphysema (HCC)    PVD (peripheral vascular disease) (HCC)    Stage 3 chronic kidney disease, unspecified whether stage 3a or 3b CKD    Screen for colon cancer  -     Cologuard; Future                Subjective:      Patient ID: Rajeev Slaughter is a 70 y o  female  Follow-up visit for a 70year-old  She has multiple problems; a synopsis is atherosclerotic vascular disease of multiple areas with COPD  Both of these were a product of cigarette smoking  However, she stopped smoking about 1 year ago and feels significantly better  She also had vascular surgery  From the vascular note:       - Aortoiliac occlusive disease ([address] )  - S/P angioplasty and stenting of abdominal aorta with 14 mm x 40 mm Protege GPS stent and 10mm x 20mm  Balloon (08/2019, sheth)      VAS AOIL 11/25/2020     Aorta and distal stents are patent  Ao 1 8 cm  Increased velocity noted mid aorta, 50% stenosis  Common iliac arteries patent  Greater than 70% celiac artery  SMA is patent  R ISABELLA 1 0/great toe pressure 99; L ISABELLA 0 83 decreased/great toe pressure 95        Carotid artery plaque, bilateral  Subclavian artery stenosis, left (HCC)  -   PVD (peripheral vascular disease) (HCC)  -as above      Chronic lung disease symptoms are fairly minimal     Her major problem is anxious depression which in turn is due to social issues  She has 2 adult children both of whom are living with her in a 1 bedroom apartment   she gets aching in the thighs after a period of prolonged activity  It is probably vascular but I do not think it needs any further intervention beyond what is already being done  Reji Barthel is a thing of the past   She is taking aspirin and Lipitor        The following portions of the patient's history were reviewed and updated as appropriate:   She has a past medical history of Anxiety, Atherosclerosis of native artery of both lower extremities with intermittent claudication ([facility] ) (10/15/2015), Carotid artery plaque, bilateral (3/21/2017), Chronic sinusitis, COPD (chronic obstructive pulmonary disease) ([facility] ), Hyperlipidemia, Lung nodule, PNA (pneumonia), PVD (peripheral vascular disease) ([place] [address] ), Restless leg syndrome, Stage 3 chronic kidney disease (2019), and Tobacco abuse ,  does not have any pertinent problems on file  ,   has a past surgical history that includes  section; Colonoscopy; Tonsillectomy; Cataract extraction; Shoulder surgery; IR aortagram with run-off (8/15/2019); and Descending aortic aneurysm repair w/ stent (2019)  ,  family history includes Arthritis in her sister; Cirrhosis in her family; Heart attack in her family; Melanoma in her brother; No Known Problems in her father and mother; Pancreatic cancer (age of onset: 61) in her sister; Prostate cancer in her brother and family; Skin cancer in her family; Stroke in her family  ,   reports that she quit smoking about 2 years ago  Her smoking use included cigarettes  She started smoking about 58 years ago  She has a 112 00 pack-year smoking history  She has never used smokeless tobacco  She reports current alcohol use  She reports that she does not use drugs  ,  is allergic to spiriva handihaler [tiotropium bromide monohydrate]; augmentin [amoxicillin-pot clavulanate]; tiotropium; and tylenol with codeine #3 [acetaminophen-codeine]     Current Outpatient Medications   Medication Sig Dispense Refill    albuterol (2 5 mg/3 mL) 0 083 % nebulizer solution Take 1 vial (2 5 mg total) by nebulization every 6 (six) hours as needed for wheezing or shortness of breath 360 mL 3    albuterol (PROVENTIL HFA,VENTOLIN HFA) 90 mcg/act inhaler Inhale 2 puffs every 6 (six) hours as needed for wheezing 3 Inhaler 3    ALPRAZolam (XANAX) 0 5 mg tablet Take 1 tablet (0 5 mg total) by mouth 2 (two) times a day as needed for anxiety 60 tablet 1    atorvastatin (LIPITOR) 40 mg tablet take 1 tablet by mouth once daily 90 tablet 3    fluticasone (FLONASE) 50 mcg/act nasal spray 2 sprays into each nostril daily 1 Bottle 2    fluticasone-vilanterol (BREO ELLIPTA) 100-25 mcg/inh inhaler Inhale 1 puff once for 1 dose Rinse mouth after use  3 Inhaler 3    olopatadine (PATANOL) 0 1 % ophthalmic solution   0    pantoprazole (PROTONIX) 40 mg tablet TAKE 1 TABLET BY MOUTH DAILY BEFORE BREAKFAST 30 tablet 5    rOPINIRole (REQUIP) 1 mg tablet TAKE 1 TO 2 TABLETS AT BEDTIME AS NEEDED TO CONTROL RESTLESS LEGS 60 tablet 5    sodium chloride () 2 % hypertonic ophthalmic solution Sue 128 2 % eye drops      ibuprofen (MOTRIN) 200 mg tablet Take 1 tablet (200 mg total) by mouth every 6 (six) hours as needed for mild pain (Patient not taking: Reported on 1/8/2021) 60 tablet 1    oxyCODONE-acetaminophen (PERCOCET) 5-325 mg per tablet Take 1 tablet by mouth every 6 (six) hours as needed for moderate painMax Daily Amount: 4 tablets (Patient not taking: Reported on 1/8/2021) 120 tablet 0     No current facility-administered medications for this visit  Review of Systems   Musculoskeletal: Positive for arthralgias and myalgias  Psychiatric/Behavioral: Positive for decreased concentration  The patient is nervous/anxious  All other systems reviewed and are negative  Objective:  Vitals:    01/08/21 1423   BP: 120/60   Pulse: 104   Temp: 97 5 °F (36 4 °C)   SpO2: 95%      Physical Exam  Constitutional:       Appearance: She is well-developed  Comments:   A female patient who appears to be the stated age   HENT:      Head: Normocephalic and atraumatic  Eyes:      Pupils: Pupils are equal, round, and reactive to light  Neck:      Musculoskeletal: Normal range of motion and neck supple  Thyroid: No thyromegaly  Trachea: No tracheal deviation  Cardiovascular:      Rate and Rhythm: Normal rate and regular rhythm        Heart sounds: Normal heart sounds  No murmur  No gallop  Pulmonary:      Effort: No respiratory distress  Breath sounds: Decreased breath sounds present  No wheezing, rhonchi or rales  Abdominal:      General: Bowel sounds are normal       Palpations: Abdomen is soft  Tenderness: There is no abdominal tenderness  Musculoskeletal: Normal range of motion  General: No tenderness or deformity  Skin:     General: Skin is warm  Neurological:      Mental Status: She is alert and oriented to person, place, and time  Coordination: Coordination normal    Psychiatric:         Judgment: Judgment normal            Patient Instructions    At a stable baseline  Some shortness of breath which is chronic and pretty minimal     Aching in the  Anterior thighs after exertion probably vascular but again she has had surgery, no longer smokes, is on antiplatelet agents and lipids  Possibly arthritic component  laboratory data reviewed    Hemoglobin A1c of 6 5%    F/u 6 months

## 2021-01-12 ENCOUNTER — TELEPHONE (OUTPATIENT)
Dept: INTERNAL MEDICINE CLINIC | Facility: CLINIC | Age: 76
End: 2021-01-12

## 2021-01-12 DIAGNOSIS — F41.1 GENERALIZED ANXIETY DISORDER: ICD-10-CM

## 2021-01-12 RX ORDER — NALOXONE HYDROCHLORIDE 4 MG/.1ML
SPRAY NASAL
Qty: 1 EACH | Refills: 1 | Status: SHIPPED | OUTPATIENT
Start: 2021-01-12 | End: 2021-09-14 | Stop reason: ALTCHOICE

## 2021-01-12 RX ORDER — ALPRAZOLAM 0.5 MG/1
0.5 TABLET ORAL 2 TIMES DAILY PRN
Qty: 60 TABLET | Refills: 5 | Status: SHIPPED | OUTPATIENT
Start: 2021-01-12 | End: 2021-09-07

## 2021-01-12 RX ORDER — ALPRAZOLAM 0.5 MG/1
0.5 TABLET ORAL 2 TIMES DAILY PRN
Qty: 60 TABLET | Refills: 5
Start: 2021-01-12 | End: 2021-01-12 | Stop reason: SDUPTHER

## 2021-01-12 NOTE — TELEPHONE ENCOUNTER
Pt called and had an appt with Dr Josh Selby on Friday 01/08 he was supposed to send xanax to her pharmacy  I do not see that this was done      Please send rx to [address] [facility], [address] [facility]

## 2021-02-12 ENCOUNTER — TELEPHONE (OUTPATIENT)
Dept: GASTROENTEROLOGY | Facility: CLINIC | Age: 76
End: 2021-02-12

## 2021-03-25 DIAGNOSIS — M54.50 LUMBAR PAIN: ICD-10-CM

## 2021-03-25 NOTE — TELEPHONE ENCOUNTER
I called pt to inform her she reported at her last visit 1/8/21 that she was no longer taking percocet  Pt said yes she didn't need it at that time but the pain is back and now she need it

## 2021-03-26 RX ORDER — OXYCODONE HYDROCHLORIDE AND ACETAMINOPHEN 5; 325 MG/1; MG/1
1 TABLET ORAL EVERY 6 HOURS PRN
Qty: 120 TABLET | Refills: 0 | Status: SHIPPED | OUTPATIENT
Start: 2021-03-26 | End: 2022-05-04

## 2021-05-02 DIAGNOSIS — F41.1 GENERALIZED ANXIETY DISORDER: ICD-10-CM

## 2021-05-03 RX ORDER — ROPINIROLE 1 MG/1
TABLET, FILM COATED ORAL
Qty: 60 TABLET | Refills: 5 | Status: SHIPPED | OUTPATIENT
Start: 2021-05-03 | End: 2021-09-14 | Stop reason: ALTCHOICE

## 2021-05-10 ENCOUNTER — TELEPHONE (OUTPATIENT)
Dept: VASCULAR SURGERY | Facility: CLINIC | Age: 76
End: 2021-05-10

## 2021-05-10 DIAGNOSIS — F41.1 GENERALIZED ANXIETY DISORDER: ICD-10-CM

## 2021-05-10 NOTE — TELEPHONE ENCOUNTER
Patient of Dr Blair Rivera and is monitored for carotid stenosis every 2 years  Patient calls today with c/o L sided stiff neck and pain upon moving head  Denies any stroke-like symptoms such as numbness or tingling in one side of body  Reviewed chart and noticed patient is overdue for carotid duplex  Transferred to call center to schedule

## 2021-05-11 RX ORDER — ROPINIROLE 1 MG/1
TABLET, FILM COATED ORAL
Qty: 60 TABLET | Refills: 5 | OUTPATIENT
Start: 2021-05-11

## 2021-05-28 ENCOUNTER — TELEPHONE (OUTPATIENT)
Dept: VASCULAR SURGERY | Facility: CLINIC | Age: 76
End: 2021-05-28

## 2021-05-28 ENCOUNTER — TELEPHONE (OUTPATIENT)
Dept: INTERNAL MEDICINE CLINIC | Facility: CLINIC | Age: 76
End: 2021-05-28

## 2021-05-28 NOTE — TELEPHONE ENCOUNTER
Patient was at a wake 6 days ago and just found out last night someone there had covid    She is vaccinated & doesn't know if she should get tested  Efe Garber also was with a baby at that time    doesn't know if she could have passed it on to the baby  Efe Garber

## 2021-05-28 NOTE — TELEPHONE ENCOUNTER
Attempted to contact patient to schedule appointment(s) listed below  Requested patient call [phone] option 3 to schedule appointment(s)  Patient's appointment(s) are past due, expected ASAP      Dopplers  [] Abdominal Aorta Iliac (AOIL)  [x] Carotid (CV)   [] Celiac and/or Mesenteric  [] Endovascular Aortic Repair (EVAR)   [] Hemodialysis Access (HD)   [] Lower Limb Arterial (CLAUDIO)  [] Lower Limb Venous Duplex with Reflux (LEVDR)  [] Renal Artery  [] Upper Limb (UEA)    Advanced Imaging   [] CTA abdomen    [] CTA abdomen & pelvis    [] CT abdomen with/ without contrast  [] CT abdomen with contrast  [] CT abdomen without contrast    [] CT abdomen & pelvis with/ without contrast  [] CT abdomen & pelvis with contrast  [] CT abdomen & pelvis without contrast    Office Visit   [] New patient, patient last seen over 3 years ago  [x] Risk factor modification

## 2021-06-16 ENCOUNTER — APPOINTMENT (EMERGENCY)
Dept: RADIOLOGY | Facility: HOSPITAL | Age: 76
End: 2021-06-16
Payer: MEDICARE

## 2021-06-16 ENCOUNTER — APPOINTMENT (EMERGENCY)
Dept: CT IMAGING | Facility: HOSPITAL | Age: 76
End: 2021-06-16
Payer: MEDICARE

## 2021-06-16 ENCOUNTER — HOSPITAL ENCOUNTER (EMERGENCY)
Facility: HOSPITAL | Age: 76
Discharge: HOME | End: 2021-06-16
Attending: EMERGENCY MEDICINE
Payer: MEDICARE

## 2021-06-16 VITALS
OXYGEN SATURATION: 98 % | BODY MASS INDEX: 29.45 KG/M2 | TEMPERATURE: 98.4 F | WEIGHT: 150 LBS | SYSTOLIC BLOOD PRESSURE: 137 MMHG | DIASTOLIC BLOOD PRESSURE: 63 MMHG | RESPIRATION RATE: 16 BRPM | HEIGHT: 60 IN | HEART RATE: 88 BPM

## 2021-06-16 DIAGNOSIS — S92.251A AVULSION FRACTURE OF NAVICULAR BONE OF FOOT, RIGHT, CLOSED, INITIAL ENCOUNTER: Primary | ICD-10-CM

## 2021-06-16 DIAGNOSIS — S89.92XA LEFT KNEE INJURY, INITIAL ENCOUNTER: ICD-10-CM

## 2021-06-16 DIAGNOSIS — S82.831A CLOSED FRACTURE OF RIGHT DISTAL FIBULA: ICD-10-CM
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
## 2021-06-16 PROCEDURE — 99285 EMERGENCY DEPT VISIT HI MDM: CPT | Performed by: EMERGENCY MEDICINE

## 2021-06-16 PROCEDURE — 72192 CT PELVIS W/O DYE: CPT

## 2021-06-16 PROCEDURE — G1004 CDSM NDSC: HCPCS

## 2021-06-16 PROCEDURE — 73630 X-RAY EXAM OF FOOT: CPT

## 2021-06-16 PROCEDURE — 29515 APPLICATION SHORT LEG SPLINT: CPT | Performed by: EMERGENCY MEDICINE

## 2021-06-16 PROCEDURE — 73564 X-RAY EXAM KNEE 4 OR MORE: CPT

## 2021-06-16 PROCEDURE — 73610 X-RAY EXAM OF ANKLE: CPT

## 2021-06-16 PROCEDURE — 99284 EMERGENCY DEPT VISIT MOD MDM: CPT

## 2021-06-16 RX ORDER — BACITRACIN, NEOMYCIN, POLYMYXIN B 400; 3.5; 5 [USP'U]/G; MG/G; [USP'U]/G
1 OINTMENT TOPICAL ONCE
Status: COMPLETED | OUTPATIENT
Start: 2021-06-16 | End: 2021-06-16

## 2021-06-16 RX ORDER — ACETAMINOPHEN 325 MG/1
650 TABLET ORAL ONCE
Status: COMPLETED | OUTPATIENT
Start: 2021-06-16 | End: 2021-06-16

## 2021-06-16 RX ORDER — MORPHINE SULFATE 15 MG/1
7.5 TABLET ORAL EVERY 4 HOURS PRN
Qty: 14 TABLET | Refills: 0 | Status: SHIPPED | OUTPATIENT
Start: 2021-06-16 | End: 2021-06-26

## 2021-06-16 RX ORDER — MORPHINE SULFATE 15 MG/1
7.5 TABLET ORAL ONCE
Status: COMPLETED | OUTPATIENT
Start: 2021-06-16 | End: 2021-06-16

## 2021-06-16 RX ADMIN — MORPHINE SULFATE 7.5 MG: 15 TABLET ORAL at 14:34

## 2021-06-16 RX ADMIN — BACITRACIN, NEOMYCIN, POLYMYXIN B 1 SMALL APPLICATION: 400; 3.5; 5 OINTMENT TOPICAL at 14:24

## 2021-06-16 RX ADMIN — ACETAMINOPHEN 650 MG: 325 TABLET, FILM COATED ORAL at 13:48

## 2021-06-16 NOTE — ED PROVIDER NOTES
History  Chief Complaint   Patient presents with    Fall     Patient states she fell while leaving the giant today is now having increased pain right ankle, left knee which is radiating down her leg and increased lower back pain  Patient is a 72-year-old female with past medical history of COPD, carotid artery disease, peripheral vascular disease, GERD, stage 3 chronic kidney disease, restless leg syndrome, hyperlipidemia, descending aortic aneurysm status post repair with stent, presents to the emergency department after a mechanical trip and fall  Patient reports that she was walking at of [employer] with her  on her way to a doctor's appointment and was not paying attention when she stepped off a curb causing her to fall directly onto her knees  She denies any head strike or loss of consciousness  Patient was lying there unable to get up at 1st but eventually was able to get up however could not put weight on her right foot and ankle  She currently complains of right foot and ankle pain, pain in her right hip region as well as pain in her left knee that radiates down into her left foot  She suffered an abrasion to the left knee but no deep laceration  She reports when the injury 1st happened she was dizzy and nauseous but attributes that to pain and anxiety and those symptoms have passed  She has not taken anything for pain as of yet  She takes low-dose aspirin daily but denies being on any other blood thinners  She denies any headache, dizziness currently, near syncope, neck or back pain, chest pain, palpitations, dyspnea, pain with breathing, abdominal pain or distention, vomiting, recent change in bowel habits, cough, URI symptoms, hemoptysis, urinary symptoms or difficulty urinating, skin rash or color change, extremity weakness or paresthesia or other focal neurologic deficits        History provided by:  Patient   used: No    Fall  Associated symptoms: no abdominal pain, no back pain, no chest pain, no headaches, no nausea, no neck pain and no vomiting        Prior to Admission Medications   Prescriptions Last Dose Informant Patient Reported? Taking? ALPRAZolam (XANAX) 0 5 mg tablet   No No   Sig: Take 1 tablet (0 5 mg total) by mouth 2 (two) times a day as needed for anxiety   albuterol (2 5 mg/3 mL) 0 083 % nebulizer solution  Self No No   Sig: Take 1 vial (2 5 mg total) by nebulization every 6 (six) hours as needed for wheezing or shortness of breath   albuterol (PROVENTIL HFA,VENTOLIN HFA) 90 mcg/act inhaler  Self No No   Sig: Inhale 2 puffs every 6 (six) hours as needed for wheezing   atorvastatin (LIPITOR) 40 mg tablet  Self No No   Sig: take 1 tablet by mouth once daily   fluticasone (FLONASE) 50 mcg/act nasal spray  Self No No   Si sprays into each nostril daily   fluticasone-vilanterol (BREO ELLIPTA) 100-25 mcg/inh inhaler  Self No No   Sig: Inhale 1 puff once for 1 dose Rinse mouth after use  ibuprofen (MOTRIN) 200 mg tablet  Self No No   Sig: Take 1 tablet (200 mg total) by mouth every 6 (six) hours as needed for mild pain   Patient not taking: Reported on 2021   naloxone (NARCAN) 4 mg/0 1 mL nasal spray   No No   Sig: Administer 1 spray into a nostril  If no response after 2-3 minutes, give another dose in the other nostril using a new spray     olopatadine (PATANOL) 0 1 % ophthalmic solution  Self Yes No   oxyCODONE-acetaminophen (PERCOCET) 5-325 mg per tablet   No No   Sig: Take 1 tablet by mouth every 6 (six) hours as needed for moderate painMax Daily Amount: 4 tablets   pantoprazole (PROTONIX) 40 mg tablet  Self No No   Sig: TAKE 1 TABLET BY MOUTH DAILY BEFORE BREAKFAST   rOPINIRole (REQUIP) 1 mg tablet   No No   Sig: TAKE 1 TO 2 TABLETS AT BEDTIME AS NEEDED TO CONTROL RESTLESS LEGS   sodium chloride () 2 % hypertonic ophthalmic solution  Self Yes No   Sig: Sue 128 2 % eye drops      Facility-Administered Medications: None       Past Medical History:   Diagnosis Date    Anxiety     [facility] native [facility] of both lower [facility]    Transitioned From: Cardiovascular Symptoms    Carotid artery plaque, bilateral 3/21/2017    Transitioned From: Atherosclerosis of both carotid arteries    Chronic sinusitis     Last assessed: 13    [facility]     Hyperlipidemia     Lung nodule     PNA (pneumonia)     PVD [facility]peripheral vascular disease) [facility]     Restless leg syndrome     Stage 3 chronic kidney disease ([facility] ) 2019    Tobacco abuse        Past Surgical History:   Procedure Laterality Date    CATARACT EXTRACTION       SECTION      COLONOSCOPY      Complete  Resolved: 13    DESCENDING AORTIC ANEURYSM REPAIR W/ STENT  2019    IR AORTAGRAM WITH RUN-OFF  8/15/2019    SHOULDER SURGERY      For frozen shoulder     TONSILLECTOMY         Family History   Problem Relation Age of Onset    No Known Problems Mother     No Known Problems Father     Arthritis Sister     Pancreatic cancer Sister 61    Melanoma Brother         twice    Prostate cancer Brother     Heart attack Family         Acute Myocardial Infarction    Cirrhosis Family     Prostate cancer Family     Skin cancer Family     Stroke Family         Syndrome     I have reviewed and agree with the history as documented      E-Cigarette/Vaping    E-Cigarette Use Never User      E-Cigarette/Vaping Substances    Nicotine No     THC No     CBD No     Flavoring No     Other No     Unknown No      Social History     Tobacco Use    Smoking status: Former Smoker     Packs/day: 2 00     Years: 56 00     Pack years: 112 00     Types: Cigarettes     Start date: 1962     Quit date: 2018     Years since quitting: 3 0    Smokeless tobacco: Never Used   Vaping Use    Vaping Use: Never used   Substance Use Topics    Alcohol use: Yes     Comment: occasionally  Drug use: No       Review of Systems   Constitutional: Negative for chills and fever  HENT: Negative for congestion, ear pain, rhinorrhea and sore throat  Respiratory: Negative for cough, chest tightness, shortness of breath and wheezing  Cardiovascular: Negative for chest pain and palpitations  Gastrointestinal: Negative for abdominal pain, constipation, diarrhea, nausea and vomiting  Genitourinary: Negative for dysuria, flank pain, frequency and hematuria  Musculoskeletal: Positive for arthralgias and joint swelling  Negative for back pain, neck pain and neck stiffness  +Right ankle and foot pain  +Right hip/pelvic pain  +Left knee and foot pain  Skin: Negative for color change, pallor, rash and wound  Allergic/Immunologic: Negative for immunocompromised state  Neurological: Negative for dizziness, syncope, facial asymmetry, speech difficulty, weakness, light-headedness, numbness and headaches  Hematological: Negative for adenopathy  Does not bruise/bleed easily  Psychiatric/Behavioral: Negative for confusion and decreased concentration  All other systems reviewed and are negative  Physical Exam  Physical Exam  Vitals and nursing note reviewed  Constitutional:       General: She is not in acute distress  Appearance: Normal appearance  She is well-developed  She is not ill-appearing, toxic-appearing or diaphoretic  HENT:      Head: Normocephalic and atraumatic  Right Ear: External ear normal       Left Ear: External ear normal       Mouth/Throat:      Pharynx: No oropharyngeal exudate  Eyes:      Conjunctiva/sclera: Conjunctivae normal       Pupils: Pupils are equal, round, and reactive to light  Neck:      Vascular: No JVD  Cardiovascular:      Rate and Rhythm: Normal rate and regular rhythm  Pulses: Normal pulses  Heart sounds: Normal heart sounds  No murmur heard  No friction rub  No gallop      Pulmonary:      Effort: Pulmonary effort is normal  No respiratory distress  Breath sounds: Normal breath sounds  No wheezing, rhonchi or rales  Chest:      Chest wall: No tenderness  Abdominal:      General: There is no distension  Palpations: Abdomen is soft  Tenderness: There is no abdominal tenderness  There is no guarding or rebound  Musculoskeletal:         General: Tenderness present  No swelling  Normal range of motion  Cervical back: Normal range of motion and neck supple  No rigidity or tenderness  Comments: No midline cervical, thoracic or lumbar spine tenderness  No step-offs  No posterior rib or flank tenderness  RIGHT LOWER EXTREMITY:  Mild tenderness to the right tibial plateau without significant knee deformity or soft tissue swelling  No tenderness over the right fibular head, lower leg  Mild tenderness over the right lateral malleolus and over the proximal midfoot  Patient able to wiggle toes and has near-complete range of motion of the ankle but this does reproduce pain in the foot and ankle  There is tenderness in the right greater trochanter and right upper thigh/inguinal region  2+ popliteal, DP and PT pulse  LEFT LOWER EXTREMITY:  Superficial abrasion over the anterior left knee with mild soft tissue swelling and anterior knee tenderness  Full range of motion of left hip and knee joint  Full range of motion left ankle and foot  No reproducible tenderness in the lower leg, ankle or foot of the left lower extremity  2+ popliteal, DP and PT pulse  BILATERAL UPPER EXTREMITIES: nontender, have full range of motion, atraumatic, under neurovascularly intact  Lymphadenopathy:      Cervical: No cervical adenopathy  Skin:     General: Skin is warm and dry  Coloration: Skin is not pale  Findings: No erythema or rash  Neurological:      General: No focal deficit present  Mental Status: She is alert and oriented to person, place, and time  Sensory: No sensory deficit  Motor: No weakness  Psychiatric:         Mood and Affect: Mood normal          Behavior: Behavior normal          Vital Signs  ED Triage Vitals [06/16/21 1224]   Temperature Pulse Respirations Blood Pressure SpO2   98 4 °F (36 9 °C) 91 18 143/93 95 %      Temp Source Heart Rate Source Patient Position - Orthostatic VS BP Location FiO2 (%)   Oral Monitor Sitting Left arm --      Pain Score       6         Vitals:    06/16/21 1224 06/16/21 1259 06/16/21 1429   BP: 143/93  137/63   BP Location: Left arm  Right arm   Pulse: 91  88   Resp: 18  16   Temp: 98 4 °F (36 9 °C)     TempSrc: Oral     SpO2: 95% 95% 98%   Weight: 68 kg (150 lb)     Height: 5' (1 524 m)         Visual Acuity  Visual Acuity      Most Recent Value   L Pupil Size (mm)  3   R Pupil Size (mm)  3          ED Medications  Medications   acetaminophen (TYLENOL) tablet 650 mg (650 mg Oral Given 6/16/21 1348)   neomycin-bacitracin-polymyxin b (NEOSPORIN) ointment 1 small application (1 small application Topical Given 6/16/21 1424)   morphine (MSIR) IR tablet 7 5 mg (7 5 mg Oral Given 6/16/21 1434)       Diagnostic Studies  Results Reviewed     None                 XR knee 4+ views Right injury   ED Interpretation by Gamaliel Lizarraga DO (06/16 1347)   No acute osseous abnormality  XR ankle 3+ views RIGHT   ED Interpretation by Gamaliel Lizarraga DO (06/16 1347)   Partial avulsion fracture of the distal tip of the right fibula  XR foot 3+ views RIGHT   ED Interpretation by Gamaliel Lizarraga DO (06/16 1347)   Small avulsion fracture of the navicular bone of the right foot  XR knee 4+ views left injury   ED Interpretation by Gamaliel Lizarraga DO (06/16 1347)   No acute osseous abnormality  XR foot 3+ views LEFT   ED Interpretation by Gamaliel Lizarraga DO (06/16 1348)   No acute osseous abnormality  XR ankle 3+ views LEFT   ED Interpretation by Gamaliel Lizarraga DO (06/16 1348)   No acute osseous abnormality        CT pelvis wo contrast Final Result by Addis Boggs MD (06/16 9935)      No acute abnormality  Workstation performed: [id number]                    Procedures  Splint application    Date/Time: 6/16/2021 3:19 PM  Performed by: Stefani Diop DO  Authorized by: Stefani Diop DO   Universal Protocol:  Consent: Verbal consent obtained  Risks and benefits: risks, benefits and alternatives were discussed  Consent given by: patient  Patient understanding: patient states understanding of the procedure being performed  Radiology Images displayed and confirmed  If images not available, report reviewed: imaging studies available  Patient identity confirmed: verbally with patient      Pre-procedure details:     Sensation:  Normal    Skin color:  Normal  Procedure details:     Laterality:  Right    Location:  Ankle    Ankle:  R ankle    Splint type:  Short leg    Supplies:  Cotton padding and Ortho-Glass  Post-procedure details:     Pain:  Improved    Sensation:  Normal    Skin color:  Normal    Patient tolerance of procedure: Tolerated well, no immediate complications             ED Course  ED Course as of Jun 16 1535   Wed Jun 16, 2021   1514 Updated patient about x-rays as well as CT being essentially normal   I discussed fracture seen on right ankle and foot x-ray  Patient placed in a short-leg posterior splint on the right side  Please see separate procedure note  Patient's pain slightly improved after morphine  Discussed use of crutches and to remain nonweightbearing on right lower extremity until cleared by Orthopedic surgery  I advised use of the knee immobilizer on the left knee as needed  Advised taking Tylenol 1st for pain and saving the morphine for breakthrough pain  Advised not getting the splint wet or taking it off until seen by Orthopedics  Discussed ED return parameters                                                MDM  Number of Diagnoses or Management Options  Diagnosis management comments: 79-year-old female presents to the ED status post trip and fall injuring both lower extremities majority of which is in the right ankle and foot as well as the left knee  Patient also complains of right hip pain  No head strike or midline spine tenderness  Cervical spine cleared via nexus criteria  Offered analgesics but patient only wanted Tylenol at this time  Will obtain CT pelvis to rule out pelvic fracture, x-ray of the right and left knee, right and left ankle and right and left foot  Amount and/or Complexity of Data Reviewed  Tests in the radiology section of CPT®: ordered and reviewed  Independent visualization of images, tracings, or specimens: yes        Disposition  Final diagnoses:   Avulsion fracture of navicular bone of foot, right, closed, initial encounter   Closed fracture of right distal fibula   Left knee injury, initial encounter   Fall (on)(from) sidewalk curb, initial encounter     Time reflects when diagnosis was documented in both MDM as applicable and the Disposition within this note     Time User Action Codes Description Comment    6/16/2021  2:10 PM Stephon Clark Add [V69 [id number]] Avulsion fracture of navicular bone of foot, right, closed, initial encounter     6/16/2021  2:10 PM Stephon Clark Add [S56 [id number]] Closed fracture of right distal fibula     6/16/2021  3:15 PM Joe FRANCISCO Add [[id number]] Left knee injury, initial encounter     6/16/2021  3:16 PM Joe Al [W10  1XXA] Fall (on)(from) sidewalk curb, initial encounter       ED Disposition     ED Disposition Condition Date/Time Comment    Discharge Stable Wed Jun 16, 2021  3:17 PM Kendrick Maki discharge to home/self care              Follow-up Information     Follow up With Specialties Details Why Contact Info Additional Information    Janak Marino MD Internal Medicine Schedule an appointment as soon as possible for a visit  As needed [address]  [facility] [address] [phone]       [address] [facility] Orthopedic Surgery Schedule an appointment as soon as possible for a visit   [address] [facility] [address] [id number]  [address] [facility], [address], [place], [place], [phone]    [facility] Emergency Department Emergency Medicine Go to  If symptoms worsen [address] [facility] [address] [facility] Emergency Department, [address] [place] [address], [id number]          Patient's Medications   Discharge Prescriptions    MORPHINE (MSIR) 15 MG TABLET    Take 0 5 tablets (7 5 mg total) by mouth every 4 (four) hours as needed for moderate pain or severe pain for up to 10 daysMax Daily Amount: 45 mg       Start Date: 6/16/2021 End Date: 6/26/2021       Order Dose: 7 5 mg       Quantity: 14 tablet    Refills: 0     No discharge procedures on file      PDMP Review       Value Time User    PDMP Reviewed  Yes 3/26/2021  8:28 AM Aubrie Garza MD          ED Provider  Electronically Signed by           Fernandez Collins DO  06/16/21 DO Yuniel  06/16/21 8623

## 2021-06-16 NOTE — DISCHARGE INSTRUCTIONS
Foot Fracture in Adults   WHAT YOU NEED TO KNOW:   A foot fracture is a break in one or more of the bones in your foot  Foot fractures are commonly caused by trauma, falls, or repeated stress injuries  DISCHARGE INSTRUCTIONS:   Medicines:   · Antibiotics: This medicine is given to help treat or prevent an infection caused by bacteria  · NSAIDs:  These medicines decrease swelling and pain  NSAIDs are available without a doctor's order  Ask which medicine is right for you  Ask how much to take and when to take it  Take as directed  NSAIDs can cause stomach bleeding and kidney problems if not taken correctly  · Pain medicine: You may be given a prescription medicine to decrease pain  Do not wait until the pain is severe before you take this medicine  · Take your medicine as directed  Contact your healthcare provider if you think your medicine is not helping or if you have side effects  Tell him or her if you are allergic to any medicine  Keep a list of the medicines, vitamins, and herbs you take  Include the amounts, and when and why you take them  Bring the list or the pill bottles to follow-up visits  Carry your medicine list with you in case of an emergency  Follow up with your healthcare provider or bone specialist as directed: You may need to return to have your cast, splint, external fixation devices, or stitches removed  You may also need to return for tests to make sure your foot is healing  Write down your questions so you remember to ask them during your visits  Pin care: If you have pins in your foot, you will need to clean them daily  Cleaning the pins can help prevent an infection  Ask for more information about pin care  Wound care:  Carefully wash the wound with soap and water  Dry the area and put on new, clean bandages as directed  Change your bandages when they get wet or dirty    Self-care:   · Rest:  You may need to rest your foot and avoid activities that cause pain  For stress fractures, you will need to avoid the activity that caused the fracture until it heals  Ask when you can return to your normal activities such as work and sports  · Ice:  Ice helps decrease swelling and pain  Ice may also help prevent tissue damage  Use an ice pack or put crushed ice in a plastic bag  Cover it with a towel, and place it on your foot for 15 to 20 minutes every hour as directed  · Elevate your foot:  Raise your foot at or above the level of your heart as often as you can  This will help decrease swelling and pain  Prop your foot on pillows or blankets to keep it elevated comfortably  · Physical therapy: Once your foot has healed, a physical therapist can teach you exercises to help improve movement and strength, and to decrease pain  Cast or splint care:   · Check the skin around your cast and splint daily for any redness or open areas  · Do not use a sharp or pointed object to scratch your skin under the cast or splint  · Do not remove your splint unless your healthcare provider or orthopedic surgeon says it is okay  Bathing with a cast or splint:  Do not let your cast or splint get wet  Before bathing, cover the cast or splint with a plastic bag  Tape the bag to your skin above the cast or splint to seal out the water  Keep your foot out of the water in case the bag leaks  Ask when it is okay to take a bath or shower  Assistive devices: You may be given a hard-soled shoe to wear while your foot is healing  You also may need to use crutches to help you walk while your foot heals  It is important to use your crutches correctly  Ask for more information about how to use crutches  Contact your healthcare provider or bone specialist if:   · You have a fever  · You have new sores around your boot, cast, or splint  · You have new or worsening trouble moving your foot      · You notice a foul smell coming from under your cast     · Your boot, cast, or splint gets damaged  · You have questions or concerns about your condition or care  Seek care immediately or call 911 if:   · The pain in your injured foot gets worse even after you rest and take pain medicine  · The skin or toes of your foot become numb, swollen, cold, white, or blue  · You have more pain or swelling than you did before the cast was put on  · Your wound is draining fluid or pus  · Blood soaks through your bandage  · Your leg feels warm, tender, and painful  It may look swollen and red  · You suddenly feel lightheaded and short of breath  · You have chest pain when you take a deep breath or cough  You may cough up blood  © Copyright [employer] 2018 Information is for End User's use only and may not be sold, redistributed or otherwise used for commercial purposes  All illustrations and images included in CareNotes® are the copyrighted property of A D A M , Inc  or [employer]   The above information is an  only  It is not intended as medical advice for individual conditions or treatments  Talk to your doctor, nurse or pharmacist before following any medical regimen to see if it is safe and effective for you  Ankle Fracture   WHAT YOU NEED TO KNOW:   An ankle fracture is a break in 1 or more of the bones in your ankle  DISCHARGE INSTRUCTIONS:   Call your local emergency number (911 in the [address] [facility]) for any of the following:   · You feel lightheaded, short of breath, and have chest pain  · You cough up blood  Seek care immediately if:   · Your leg feels warm, tender, and painful  It may look swollen and red  · Blood soaks through your bandage  · You have severe pain in your ankle  · Your cast feels too tight  · Your foot or toes are cold or numb  · Your foot or toenails turn blue or gray  Call your doctor if:   · Your splint feels too tight  · Your swelling has increased or returned  · You have a fever      · Your pain does not go away, even after treatment  · You have questions or concerns about your condition or care  Medicines: You may need any of the following:  · Acetaminophen  decreases pain and fever  It is available without a doctor's order  Ask how much to take and how often to take it  Follow directions  Read the labels of all other medicines you are using to see if they also contain acetaminophen, or ask your doctor or pharmacist  Acetaminophen can cause liver damage if not taken correctly  Do not use more than 4 grams (4,000 milligrams) total of acetaminophen in one day  · NSAIDs , such as ibuprofen, help decrease swelling, pain, and fever  This medicine is available with or without a doctor's order  NSAIDs can cause stomach bleeding or kidney problems in certain people  If you take blood thinner medicine, always ask your healthcare provider if NSAIDs are safe for you  Always read the medicine label and follow directions  · Prescription pain medicine  may be given  Ask your healthcare provider how to take this medicine safely  Some prescription pain medicines contain acetaminophen  Do not take other medicines that contain acetaminophen without talking to your healthcare provider  Too much acetaminophen may cause liver damage  Prescription pain medicine may cause constipation  Ask your healthcare provider how to prevent or treat constipation  · Take your medicine as directed  Contact your healthcare provider if you think your medicine is not helping or if you have side effects  Tell him or her if you are allergic to any medicine  Keep a list of the medicines, vitamins, and herbs you take  Include the amounts, and when and why you take them  Bring the list or the pill bottles to follow-up visits  Carry your medicine list with you in case of an emergency  Follow up with your doctor in 1 to 2 days: Your fracture may need to be reduced (bones pushed back into place) or you may need surgery   Write down your questions so you remember to ask them during your visits  Support devices: You will be given a brace, cast, or splint to limit your movement and protect your ankle  You may need to use crutches to protect your ankle and decrease your pain as you move around  Do not remove your device and do not put weight on your injured ankle  Splint and cast care:  Cover the splint or cast before you bathe so it does not get wet  Tape 2 plastic trash bags to your skin above the cast  Try to keep your ankle out of the water as much as possible  Rest:  Rest your ankle so that it can heal  Return to normal activities as directed  Ice:  Apply ice on your ankle for 15 to 20 minutes every hour or as directed  Use an ice pack, or put crushed ice in a plastic bag  Cover it with a towel  Ice helps prevent tissue damage and decreases swelling and pain  Elevate:  Elevate your ankle above the level of your heart as often as you can  This will help decrease swelling and pain  Prop your ankle on pillows or blankets to keep it elevated comfortably  © Copyright [address] Information is for End User's use only and may not be sold, redistributed or otherwise used for commercial purposes  All illustrations and images included in CareNotes® are the copyrighted property of A D A M , Inc  or [address] [place] [address]  The above information is an  only  It is not intended as medical advice for individual conditions or treatments  Talk to your doctor, nurse or pharmacist before following any medical regimen to see if it is safe and effective for you  Splint Care   WHAT YOU NEED TO KNOW:   Splint care is important to help protect your splint until it comes off  Some splints are made of fiberglass or plaster that will need to dry and harden  Splint care will help the splint dry and harden correctly  Even after your splint hardens, it can be damaged    DISCHARGE INSTRUCTIONS:   Seek care immediately if:   · You have increased pain     · Your fingers or toes are numb or tingling  · You feel burning or stinging around your injury  · Your nails, fingers, or toes turn pale, blue, or gray, and feel cold  · You have new or increased trouble moving your fingers or toes  · Your swelling gets worse  · The skin under your splint is bleeding or leaking pus  Contact your healthcare provider if:   · Your hard splint gets wet or is damaged  · You have a fever  · Your splint feels tighter  · You have itchy, dry skin under your splint that is getting worse  · The skin under your splint is red, or you have a new sore  · You notice a bad smell coming from your splint  · You have questions or concerns about your condition or care  How to care for your splint:   · Wait for your hard splint to harden completely  You may have to wait up to 3 days before you can walk on a plaster splint  · Check your splint and the skin around it each day  Check your splint for damage, such as cracks and breaks  Check your skin for redness, increased swelling, and sores  Loosen the elastic bandage around your splint if it feels too tight  · Keep your splint clean and dry  Keep dirt out of your splint  Before you bathe, wrap your hard splint with 2 layers of plastic  Then put a plastic bag over it  Keep the plastic bag tightly sealed  You can also ask your healthcare provider about waterproof shields  Do not put your hard splint in the water , even with a plastic bag over it  A wet splint can make your skin itchy, and may lead to infection  · Do not put powders or deodorants inside your splint  These can dry your skin and increase itching  · Do not try to scratch the skin inside your hard splint with sharp objects  Sharp objects can break off inside your splint or hurt your skin  · Do not pull the padding out of your splint  The padding inside your splint protects your skin   You may develop a sore on your skin if you take out the padding  Follow up with your healthcare provider as directed:  Write down your questions so you remember to ask them during your visits  © Copyright [address] Information is for End User's use only and may not be sold, redistributed or otherwise used for commercial purposes  All illustrations and images included in CareNotes® are the copyrighted property of A D A M , Inc  or [facility]   The above information is an  only  It is not intended as medical advice for individual conditions or treatments  Talk to your doctor, nurse or pharmacist before following any medical regimen to see if it is safe and effective for you

## 2021-06-22 ENCOUNTER — OFFICE VISIT (OUTPATIENT)
Dept: OBGYN CLINIC | Facility: CLINIC | Age: 76
End: 2021-06-22
Payer: MEDICARE

## 2021-06-22 VITALS
HEIGHT: 60 IN | WEIGHT: 150.2 LBS | BODY MASS INDEX: 29.49 KG/M2 | HEART RATE: 96 BPM | DIASTOLIC BLOOD PRESSURE: 72 MMHG | SYSTOLIC BLOOD PRESSURE: 122 MMHG

## 2021-06-22 DIAGNOSIS — S82.61XA CLOSED AVULSION FRACTURE OF LATERAL MALLEOLUS OF RIGHT FIBULA, INITIAL ENCOUNTER: Primary | ICD-10-CM

## 2021-06-22 DIAGNOSIS — M17.12 PRIMARY OSTEOARTHRITIS OF LEFT KNEE: ICD-10-CM

## 2021-06-22 DIAGNOSIS — S80.02XA CONTUSION OF LEFT KNEE, INITIAL ENCOUNTER: ICD-10-CM

## 2021-06-22 PROCEDURE — 99204 OFFICE O/P NEW MOD 45 MIN: CPT | Performed by: ORTHOPAEDIC SURGERY

## 2021-06-22 PROCEDURE — 27786 TREATMENT OF ANKLE FRACTURE: CPT | Performed by: ORTHOPAEDIC SURGERY

## 2021-06-22 NOTE — PROGRESS NOTES
Orthopaedics Office Visit - New Patient Visit    ASSESSMENT/PLAN:    Assessment:   Right lateral malleolus avulsion fracture, DOI 6/16/21  Left knee osteoarthritis, contusion     Plan:   · Patient placed in a cam boot today  · Plan for fracture care, closed treatment R lateral malleolus avulsion fracture, mode of immobilization CAM boot  · WBAT with assistance of walker (which patient has at home)  · Start PT  · Ice and elevate    To Do Next Visit:  X-rays right ankle  Evaluate left knee and need for CS injection    _____________________________________________________  CHIEF COMPLAINT:  Chief Complaint   Patient presents with    Right Ankle - Pain    Left Knee - Pain         SUBJECTIVE:  Juan Antonio Ac is a 78 y o  female who presents with a chief complaint of right ankle and left knee pain  The pain began 1 week(s) ago and is associated with an acute injury  Patient reports on 6/16/21 she rolled her ankle stepping off of the curb causing her to fall  The patient describes the pain as aching, dull and sharp in intensity,  intermittent in timing, and localizes the pain to the  right lateral ankle  The pain is worse with weight bearing and relieved by rest   The pain is not associated with numbness and tingling  The pain is not associated with constitutional symptoms  The patient is not awoken at night by the pain  PAST MEDICAL HISTORY:  Past Medical History:   Diagnosis Date    Anxiety     Atherosclerosis of native artery of both lower extremities with intermittent claudication ([facility] [address] ) 10/15/2015    Transitioned From: Cardiovascular Symptoms    Carotid artery plaque, bilateral 3/21/2017    Transitioned From:  Atherosclerosis of both carotid arteries    Chronic sinusitis     Last assessed: 11/11/13    [facility] obstructive [facility]     Hyperlipidemia     Lung nodule     PNA (pneumonia)     PVD [facility]peripheral vascular disease) ([facility]     Restless leg syndrome     Stage 3 chronic kidney disease ([facility] [address] ) 2019    Tobacco abuse        PAST SURGICAL HISTORY:  Past Surgical History:   Procedure Laterality Date    CATARACT EXTRACTION       SECTION      COLONOSCOPY      Complete   Resolved: 13    DESCENDING AORTIC ANEURYSM REPAIR W/ STENT  2019    IR AORTAGRAM WITH RUN-OFF  8/15/2019    SHOULDER SURGERY      For frozen shoulder     TONSILLECTOMY         FAMILY HISTORY:  Family History   Problem Relation Age of Onset    No Known Problems Mother     No Known Problems Father     Arthritis Sister     Pancreatic cancer Sister 61    Melanoma Brother         twice    Prostate cancer Brother     Heart attack Family         Acute Myocardial Infarction    Cirrhosis Family     Prostate cancer Family     Skin cancer Family     Stroke Family         Syndrome       SOCIAL HISTORY:  Social History     Tobacco Use    Smoking status: Former Smoker     Packs/day: 2 00     Years: 56 00     Pack years: 112 00     Types: Cigarettes     Start date: 1962     Quit date: 2018     Years since quitting: 3 0    Smokeless tobacco: Never Used   Vaping Use    Vaping Use: Never used   Substance Use Topics    Alcohol use: Yes     Comment: occasionally     Drug use: No       MEDICATIONS:    Current Outpatient Medications:     albuterol (2 5 mg/3 mL) 0 083 % nebulizer solution, Take 1 vial (2 5 mg total) by nebulization every 6 (six) hours as needed for wheezing or shortness of breath, Disp: 360 mL, Rfl: 3    albuterol (PROVENTIL HFA,VENTOLIN HFA) 90 mcg/act inhaler, Inhale 2 puffs every 6 (six) hours as needed for wheezing, Disp: 3 Inhaler, Rfl: 3    ALPRAZolam (XANAX) 0 5 mg tablet, Take 1 tablet (0 5 mg total) by mouth 2 (two) times a day as needed for anxiety, Disp: 60 tablet, Rfl: 5    atorvastatin (LIPITOR) 40 mg tablet, take 1 tablet by mouth once daily, Disp: 90 tablet, Rfl: 3    fluticasone (FLONASE) 50 mcg/act nasal spray, 2 sprays into each nostril daily, Disp: 1 Bottle, Rfl: 2    morphine (MSIR) 15 mg tablet, Take 0 5 tablets (7 5 mg total) by mouth every 4 (four) hours as needed for moderate pain or severe pain for up to 10 daysMax Daily Amount: 45 mg, Disp: 14 tablet, Rfl: 0    naloxone (NARCAN) 4 mg/0 1 mL nasal spray, Administer 1 spray into a nostril  If no response after 2-3 minutes, give another dose in the other nostril using a new spray , Disp: 1 each, Rfl: 1    olopatadine (PATANOL) 0 1 % ophthalmic solution, , Disp: , Rfl: 0    oxyCODONE-acetaminophen (PERCOCET) 5-325 mg per tablet, Take 1 tablet by mouth every 6 (six) hours as needed for moderate painMax Daily Amount: 4 tablets, Disp: 120 tablet, Rfl: 0    pantoprazole (PROTONIX) 40 mg tablet, TAKE 1 TABLET BY MOUTH DAILY BEFORE BREAKFAST, Disp: 30 tablet, Rfl: 5    rOPINIRole (REQUIP) 1 mg tablet, TAKE 1 TO 2 TABLETS AT BEDTIME AS NEEDED TO CONTROL RESTLESS LEGS, Disp: 60 tablet, Rfl: 5    sodium chloride () 2 % hypertonic ophthalmic solution, Sue 128 2 % eye drops, Disp: , Rfl:     fluticasone-vilanterol (BREO ELLIPTA) 100-25 mcg/inh inhaler, Inhale 1 puff once for 1 dose Rinse mouth after use , Disp: 3 Inhaler, Rfl: 3    ibuprofen (MOTRIN) 200 mg tablet, Take 1 tablet (200 mg total) by mouth every 6 (six) hours as needed for mild pain (Patient not taking: Reported on 1/8/2021), Disp: 60 tablet, Rfl: 1    ALLERGIES:  Allergies   Allergen Reactions    Spiriva Handihaler [Tiotropium Bromide Monohydrate] Shortness Of Breath    Augmentin [Amoxicillin-Pot Clavulanate] Abdominal Pain     Pt received ceftriaxone 1 g IV on 5-21-18, gets sharp pains     Tiotropium Abdominal Pain    Tylenol With Codeine #3 [Acetaminophen-Codeine] Abdominal Pain       REVIEW OF SYSTEMS:  MSK: see below  Neuro: WNL  Pertinent items are otherwise noted in HPI  A comprehensive review of systems was otherwise negative      LABS:  HgA1c:   Lab Results   Component Value Date    HGBA1C 6 5 (H) 12/30/2020     BMP: Lab Results   Component Value Date    CALCIUM 9 7 12/30/2020    K 4 3 12/30/2020    CO2 28 12/30/2020     12/30/2020    BUN 17 12/30/2020    CREATININE 1 14 12/30/2020     CBC: No components found for: CBC    _____________________________________________________  PHYSICAL EXAMINATION:  Vital signs: /72   Pulse 96   Ht 5' (1 524 m)   Wt 68 1 kg (150 lb 3 2 oz)   BMI 29 33 kg/m²   General: No acute distress, awake and alert  Psychiatric: Mood and affect appear appropriate  HEENT: Trachea Midline, No torticollis, no apparent facial trauma  Cardiovascular: No audible murmurs; Extremities appear perfused  Pulmonary: No audible wheezing or stridor  Skin: No open lesions; see further details (if any) below    MUSCULOSKELETAL EXAMINATION:  Extremities:  Right ankle, left knee  Right Ankle Exam     Tenderness   The patient is experiencing tenderness in the lateral malleolus  Swelling: moderate    Other   Erythema: absent  Sensation: normal  Pulse: present     Comments:    Moderate ecchymosis and swelling lateral ankle  ROM and strength deferred due to ankle fracture  Patient can passively dorsi flex to neutral without pain  Left Knee Exam     Tenderness   The patient is experiencing no tenderness  Range of Motion   The patient has normal left knee ROM  Tests   Varus: negative Valgus: negative    Other   Erythema: absent  Sensation: normal  Pulse: present  Swelling: none  Effusion: no effusion present    Comments:    Ecchymosis anterior knee  _____________________________________________________  STUDIES REVIEWED:  I personally reviewed the images and interpretation is as follows: Right ankle x-rays demonstrates comminuted lateral malleolus fracture  Left knee x-rays demonstrates no fracture or dislocation, mild tricompartmental degenerative changes         PROCEDURES PERFORMED:  Fracture / Dislocation Treatment    Date/Time: 6/22/2021 10:18 AM  Performed by: Liane Banerjee MD  Authorized by: Cha Clark MD     Patient Location:  Clinic  Verbal consent obtained?: Yes    Consent given by:  Patient  Injury location:  Ankle  Location details:  Right ankle  Injury type:  Fracture  Fracture type: lateral malleolus    Fracture type: lateral malleolus    Local anesthesia used?: No    Manipulation performed?: No    Immobilization:  Brace  Neurovascular status: Neurovascularly intact    Patient tolerance:  Patient tolerated the procedure well with no immediate complications        Addy Winchester MD

## 2021-06-30 ENCOUNTER — TELEPHONE (OUTPATIENT)
Dept: INTERNAL MEDICINE CLINIC | Facility: CLINIC | Age: 76
End: 2021-06-30

## 2021-06-30 NOTE — TELEPHONE ENCOUNTER
Broke her ankle, In an air cast-has restless leg syndrome, unable to sleep --requesting call back     # [phone]

## 2021-07-01 NOTE — TELEPHONE ENCOUNTER
Patient fractured her ankle two weeks ago and will not be able to schedule any appointment at this time   She will call back at [phone] opt  3

## 2021-07-02 NOTE — TELEPHONE ENCOUNTER
Patient does not want to take that medication during the day is there anything else you can prescribe  She is asking about a low dose of Gabapentin

## 2021-07-11 DIAGNOSIS — R10.13 EPIGASTRIC PAIN: ICD-10-CM

## 2021-07-11 RX ORDER — PANTOPRAZOLE SODIUM 40 MG/1
TABLET, DELAYED RELEASE ORAL
Qty: 30 TABLET | Refills: 5 | Status: SHIPPED | OUTPATIENT
Start: 2021-07-11 | End: 2022-03-18

## 2021-07-20 ENCOUNTER — EVALUATION (OUTPATIENT)
Dept: PHYSICAL THERAPY | Facility: CLINIC | Age: 76
End: 2021-07-20
Payer: MEDICARE

## 2021-07-20 DIAGNOSIS — M17.12 PRIMARY OSTEOARTHRITIS OF LEFT KNEE: ICD-10-CM

## 2021-07-20 DIAGNOSIS — S82.61XA CLOSED AVULSION FRACTURE OF LATERAL MALLEOLUS OF RIGHT FIBULA, INITIAL ENCOUNTER: ICD-10-CM

## 2021-07-20 DIAGNOSIS — S80.02XA CONTUSION OF LEFT KNEE, INITIAL ENCOUNTER: ICD-10-CM

## 2021-07-20 PROCEDURE — 97161 PT EVAL LOW COMPLEX 20 MIN: CPT

## 2021-07-20 PROCEDURE — 97110 THERAPEUTIC EXERCISES: CPT

## 2021-07-20 NOTE — PROGRESS NOTES
PT Evaluation     Today's date: 2021  Patient name: Mariela Chang  : 1945  MRN: [id number]  Referring provider: Shanice Nuno MD  Dx:   Encounter Diagnosis     ICD-10-CM    1  Closed avulsion fracture of lateral malleolus of right fibula, initial encounter  S82  61XA Ambulatory referral to Physical Therapy     PT plan of care cert/re-cert   2  Primary osteoarthritis of left knee  M17 12 Ambulatory referral to Physical Therapy     PT plan of care cert/re-cert   3  Contusion of left knee, initial encounter  S80  02XA Ambulatory referral to Physical Therapy     PT plan of care cert/re-cert       Start Time: 0900  Stop Time: 0945  Total time in clinic (min): 45 minutes    Assessment  Assessment details: Mariela Chang is a pleasant 78 y o  female who presents with R ankle avulsion fracture and L knee pain  Pt demonstrates decreased ankle ROM with DF being the most limited  Pt demonstrates hypomobility in all joints assessed in the ankle with greatest limitation in the talocrural joint  Pt had no knee pain with L LE strength testing and demonstrated only minor weakness  Pt has pain with WB and admits to attempting to walk without her boot on numerous occasions over the last week and a half, however this has caused large spikes in pain and swelling  Pt was advised to be compliant with her boot in order to prevent delayed healing and/or progression of fracture  Pt was given an HEP focused on ankle mobility, proximal strengthening, and edema management  The patient's greatest concerns are the pain she is experiencing, concern at no signs of improvement, fear of not being able to keep active and future ill health (and wanting to prevent it)  No further referral appears necessary at this time based upon examination results      Primary movement impairment diagnosis of ankle hypomobility resulting in pathoanatomical symptoms of decreased ankle ROM, pain with movement and weight bearing, decreased LE strength and limiting her ability to care for self, don/doff socks, dress independently, drive, exercise or recreation, get off the toilet, get out of a chair, perform household chores, perform yard work, shop, squat to  objects from the floor, stand and walk  Primary Impairments:  1) ankle hypomobility  2) ankle edema  3) LE weakness    Etiologic factors include fall  Impairments: abnormal gait, abnormal muscle tone, abnormal or restricted ROM, activity intolerance, impaired balance, impaired physical strength, lacks appropriate home exercise program, pain with function, weight-bearing intolerance and poor body mechanics    Symptom irritability: moderateUnderstanding of Dx/Px/POC: good   Prognosis: good  Prognosis details: Positive prognostic indicators include absence of peripheralization and absence of observed red flags  Negative prognostic indicators include chronicity of symptoms, anxiety, hypertension, high symptom irritability, multiple concurrent orthopedic problems and obesity  Goals  Short Term Goals   Pt will improve ankle ROM by 5-10 degrees in all deficient planes order to improve functional ambulation  Pt will improve LE strength by 1/2 grade in all deficient muscle groups in order to improve safety with ambulation and function  Pt will decrease pain to 4/10 at its worst  Pt will be independent with a basic HEP    Long Term Goals  Pt will achieve a FOTO score of 66  Pt will improve ankle ROM to [facility] in order to maximize functional ambulation  Pt will improve LE strength to 4+/5 in all deficient muscle groups in order to maximize functional mobility and safety   Pt will be able to manage symptoms independently  Pt will decrease swelling by 1-2 cm   Pt will be independent with an extensive HEP       Plan  Patient would benefit from: skilled physical therapy  Planned modality interventions: Modalities PRN    Planned therapy interventions: activity modification, manual therapy, neuromuscular re-education, patient education, therapeutic activities, therapeutic exercise, graded activity, home exercise program, behavior modification and self care  Frequency: 2x week  Duration in weeks: 8  Treatment plan discussed with: patient        Subjective Evaluation    History of Present Illness  Mechanism of injury: History of Current Injury: Pt reports she fell off a curb where she caused a fracture in her ankle and a contusion in her L knee  Pt reports she has been wearing her boot however tried to take it off and walk about a week ago which caused a lot of pain  Pt is using a walker for ambulation as she cannot use the cruthces she was given  Pt is having difficulty walking with the boot and walker  Stairs are doable but difficult  Pain location/Descriptors: sharp pain on the lateral of the ankle  Aggravating factors: walking without the boot, pain in the boot as well due to pressure  Easing factors: nothing  AM/PM pattern: no true pattern  Imaging: avulsion fracture of R ankle  Very mild OA of L knee  Special Questions: N/T in the toes that is intermittent  Patient concerns: Pt wants to be able to get back to her routine of ADLs and recreational shopping  Occupation: retired            Not a recurrent problem   Quality of life: good    Pain  Current pain ratin  At best pain ratin  At worst pain ratin    Social Support  Steps to enter house: yes  Stairs in house: yes   Lives in: multiple-level home  Lives with: adult children    Employment status: not working        Objective     Tenderness     Right Ankle/Foot   Tenderness in the anterior ankle, anterior talofibular ligament, calcaneofibular ligament and posterior talofibular ligament       Neurological Testing     Sensation     Ankle/Foot   Left Ankle/Foot   Intact: light touch    Right Ankle/Foot   Intact: light touch     Active Range of Motion     Right Ankle/Foot   Dorsiflexion (ke): -16 degrees with pain  Plantar flexion: 40 degrees Inversion: 35 degrees   Eversion: 10 degrees with pain    Passive Range of Motion     Right Ankle/Foot    Dorsiflexion (ke): -2 degrees with pain  Inversion: 35 degrees with pain  Eversion: 15 degrees with pain    Joint Play     Right Ankle/Foot  Hypomobile in the talocrural joint, subtalar joint and midfoot  Strength/Myotome Testing     Left Hip   Planes of Motion   Flexion: 4+  Adduction: 5  External rotation: 4+  Internal rotation: 4+    Right Hip   Planes of Motion   Flexion: 4  Abduction: 4-  Adduction: 5  External rotation: 4  Internal rotation: 4    Left Knee   Flexion: 4+  Extension: 4+    Right Knee   Flexion: 4-  Extension: 4+    Left Ankle/Foot   Dorsiflexion: 5  Plantar flexion: 5  Inversion: 5  Eversion: 5  Great toe flexion: 5  Great toe extension: 5    Right Ankle/Foot   Dorsiflexion: 4  Plantar flexion: 4+  Inversion: 3+  Eversion: 3-  Great toe flexion: 4-  Great toe extension: 4-    Tests     Right Ankle/Foot   Negative for Tinel's sign (tarsal tunnel)       Swelling     Right Ankle/Foot   Figure 8: 50 2 cm  Malleoli: 23 7 cm             Precautions: COPD, osteoporosis       7/20            Manuals                                                                 Neuro Re-Ed                                                                                                        Ther Ex             Gastroc strap stretch 3x30" HEP            SLR flex/abd x10 ea HEP            Ankle ABC's x2 HEP            Ankle pumps x25 HEP                                                                Ther Activity                                       Gait Training                                       Modalities

## 2021-07-26 ENCOUNTER — APPOINTMENT (OUTPATIENT)
Dept: PHYSICAL THERAPY | Facility: CLINIC | Age: 76
End: 2021-07-26
Payer: MEDICARE

## 2021-08-01 DIAGNOSIS — S82.61XA CLOSED AVULSION FRACTURE OF LATERAL MALLEOLUS OF RIGHT FIBULA, INITIAL ENCOUNTER: Primary | ICD-10-CM

## 2021-08-06 ENCOUNTER — OFFICE VISIT (OUTPATIENT)
Dept: PHYSICAL THERAPY | Facility: CLINIC | Age: 76
End: 2021-08-06
Payer: MEDICARE

## 2021-08-06 DIAGNOSIS — S82.61XA CLOSED AVULSION FRACTURE OF LATERAL MALLEOLUS OF RIGHT FIBULA, INITIAL ENCOUNTER: Primary | ICD-10-CM

## 2021-08-06 DIAGNOSIS — M17.12 PRIMARY OSTEOARTHRITIS OF LEFT KNEE: ICD-10-CM

## 2021-08-06 DIAGNOSIS — S80.02XA CONTUSION OF LEFT KNEE, INITIAL ENCOUNTER: ICD-10-CM

## 2021-08-06 PROCEDURE — 97112 NEUROMUSCULAR REEDUCATION: CPT

## 2021-08-06 PROCEDURE — 97110 THERAPEUTIC EXERCISES: CPT

## 2021-08-06 PROCEDURE — 97140 MANUAL THERAPY 1/> REGIONS: CPT

## 2021-08-06 NOTE — PROGRESS NOTES
Daily Note     Today's date: 2021  Patient name: Amy Melendez  : 1945  MRN: [id number]  Referring provider: Chela Swan MD  Dx:   Encounter Diagnosis     ICD-10-CM    1  Closed avulsion fracture of lateral malleolus of right fibula, initial encounter  S82  61XA    2  Primary osteoarthritis of left knee  M17 12    3  Contusion of left knee, initial encounter  S80  02XA        Start Time: 1120  Stop Time: 1210  Total time in clinic (min): 50 minutes    Subjective: Pt reports she hasn't been very compliant with her HEP as it is hard  Pt also reports not always wearing her boot still  Objective: See treatment diary below      Assessment: Pt demonstrated an improvement in ROM especially DF and had further improvement following joint mobilizations  Pt was educated on proper way to perform her HEP as she was somewhat confused and performing it incorrectly  Pt was educated on strategies to increase her water intake as she complained of night cramping and that she drinks no water during the day  Pt was also educated on trying to be compliant with her boot when ambulating until being cleared by her physician as she states that she still isn't wearing it in the house  Pt was also educated on performing her HEP even though it is hard as this is the only way she can progress  Patient demonstrated fatigue post treatment, exhibited good technique with therapeutic exercises and would benefit from continued PT      Plan: Continue per plan of care        Precautions: COPD, osteoporosis                  Manuals             PROM  EM           Talocrural joint mobs P-A  Gr  II-III EM                                     Neuro Re-Ed             BAPS board   clocks x10, circles 2x5 ea CW/CCW                                                                                         Ther Ex             Gastroc strap stretch 3x30" HEP 3x30"           SLR flex/abd x10 ea HEP 2x10 ea           Ankle ABC's x2 HEP Ankle pumps x25 HEP            AROM DF hold   2x10 5"            Seated calf raises  Foam deficit x25 HEP           bike  6'                        Ther Activity                                       Gait Training                                       Modalities             cp  10'           Education  Water intake and nutrition

## 2021-08-10 ENCOUNTER — APPOINTMENT (OUTPATIENT)
Dept: RADIOLOGY | Facility: CLINIC | Age: 76
End: 2021-08-10
Payer: MEDICARE

## 2021-08-10 ENCOUNTER — OFFICE VISIT (OUTPATIENT)
Dept: OBGYN CLINIC | Facility: CLINIC | Age: 76
End: 2021-08-10

## 2021-08-10 VITALS
SYSTOLIC BLOOD PRESSURE: 169 MMHG | WEIGHT: 150 LBS | BODY MASS INDEX: 29.45 KG/M2 | HEART RATE: 101 BPM | DIASTOLIC BLOOD PRESSURE: 84 MMHG | HEIGHT: 60 IN

## 2021-08-10 DIAGNOSIS — M17.12 PRIMARY OSTEOARTHRITIS OF LEFT KNEE: ICD-10-CM

## 2021-08-10 DIAGNOSIS — S82.61XA CLOSED AVULSION FRACTURE OF LATERAL MALLEOLUS OF RIGHT FIBULA, INITIAL ENCOUNTER: ICD-10-CM

## 2021-08-10 DIAGNOSIS — S80.02XA CONTUSION OF LEFT KNEE, INITIAL ENCOUNTER: ICD-10-CM

## 2021-08-10 DIAGNOSIS — S82.61XD CLOSED AVULSION FRACTURE OF LATERAL MALLEOLUS OF RIGHT FIBULA WITH ROUTINE HEALING, SUBSEQUENT ENCOUNTER: Primary | ICD-10-CM

## 2021-08-10 PROCEDURE — 73610 X-RAY EXAM OF ANKLE: CPT

## 2021-08-10 PROCEDURE — 99024 POSTOP FOLLOW-UP VISIT: CPT | Performed by: ORTHOPAEDIC SURGERY

## 2021-08-10 NOTE — PROGRESS NOTES
Orthopaedics Office Visit - follow up  Patient Visit    ASSESSMENT/PLAN:    Assessment:   Right lateral malleolus avulsion fracture, DOI 6/16/21, healed  Left knee osteoarthritis   Left knee contusion contusion    Plan:   Pt was advised to continue therapy   Pt was provided with a wrapter brace and advised to DC camboot, therapy can help her do so if needed   Pt was advised to ween off of walker if and when she feels safe with the help of therapy    In regards to patient's left knee she has no complaints today and may follow up as needed for OA    To Do Next Visit:  Follow up in 2-3 weeks if she has continued pain    _____________________________________________________  CHIEF COMPLAINT:  Chief Complaint   Patient presents with    Left Knee - Follow-up    Right Ankle - Follow-up         SUBJECTIVE:  Coleman Roberts is a 78 y o  female who presents For a follow-up for right lateral malleolus avulsion fracture, left knee osteoarthritis and contusion  The new injury was 06/16/2021 when she stepped of of a curb     Patient was seen in the office 06/22/2021 where she was provided with a Cam boot and was advised to start therapy and weight bear as tolerated with a walker  Today patient states that she has attended therapy and is doing HEP  Pt states that she is not having any pain in her left knee and does continue to have some right ankle pain  Pt states that she is eager to DC the walker but is not quite ready  PAST MEDICAL HISTORY:  Past Medical History:   Diagnosis Date    Anxiety     Atherosclerosis of native artery of both lower extremities with intermittent claudication ([facility] [address] ) 10/15/2015    Transitioned From: Cardiovascular Symptoms    Carotid artery plaque, bilateral 3/21/2017    Transitioned From:  Atherosclerosis of both carotid arteries    Chronic sinusitis     Last assessed: 11/11/13    COPD (chronic obstructive pulmonary disease) (HCC)     Hyperlipidemia     Lung nodule     PNA (pneumonia)     PVD [facility]peripheral vascular disease) ([facility] [address] )     Restless leg syndrome     Stage 3 chronic kidney disease ([facility] [address] ) 2019    Tobacco abuse        PAST SURGICAL HISTORY:  Past Surgical History:   Procedure Laterality Date    CATARACT EXTRACTION       SECTION      COLONOSCOPY      Complete   Resolved: 13    DESCENDING AORTIC ANEURYSM REPAIR W/ STENT  2019    IR AORTAGRAM WITH RUN-OFF  8/15/2019    SHOULDER SURGERY      For frozen shoulder     TONSILLECTOMY         FAMILY HISTORY:  Family History   Problem Relation Age of Onset    No Known Problems Mother     No Known Problems Father     Arthritis Sister     Pancreatic cancer Sister 61    Melanoma Brother         twice    Prostate cancer Brother     Heart attack Family         Acute Myocardial Infarction    Cirrhosis Family     Prostate cancer Family     Skin cancer Family     Stroke Family         Syndrome       SOCIAL HISTORY:  Social History     Tobacco Use    Smoking status: Former Smoker     Packs/day: 2 00     Years: 56 00     Pack years: 112 00     Types: Cigarettes     Start date: 1962     Quit date: 2018     Years since quitting: 3 2    Smokeless tobacco: Never Used   Vaping Use    Vaping Use: Never used   Substance Use Topics    Alcohol use: Yes     Comment: occasionally     Drug use: No       MEDICATIONS:    Current Outpatient Medications:     albuterol (2 5 mg/3 mL) 0 083 % nebulizer solution, Take 1 vial (2 5 mg total) by nebulization every 6 (six) hours as needed for wheezing or shortness of breath, Disp: 360 mL, Rfl: 3    albuterol (PROVENTIL HFA,VENTOLIN HFA) 90 mcg/act inhaler, Inhale 2 puffs every 6 (six) hours as needed for wheezing, Disp: 3 Inhaler, Rfl: 3    ALPRAZolam (XANAX) 0 5 mg tablet, Take 1 tablet (0 5 mg total) by mouth 2 (two) times a day as needed for anxiety, Disp: 60 tablet, Rfl: 5    atorvastatin (LIPITOR) 40 mg tablet, take 1 tablet by mouth once daily, Disp: 90 tablet, Rfl: 3   fluticasone (FLONASE) 50 mcg/act nasal spray, 2 sprays into each nostril daily, Disp: 1 Bottle, Rfl: 2    gabapentin (NEURONTIN) 100 mg capsule, Take 1 capsule (100 mg total) by mouth 2 (two) times a day, Disp: 100 capsule, Rfl: 1    naloxone (NARCAN) 4 mg/0 1 mL nasal spray, Administer 1 spray into a nostril  If no response after 2-3 minutes, give another dose in the other nostril using a new spray , Disp: 1 each, Rfl: 1    olopatadine (PATANOL) 0 1 % ophthalmic solution, , Disp: , Rfl: 0    oxyCODONE-acetaminophen (PERCOCET) 5-325 mg per tablet, Take 1 tablet by mouth every 6 (six) hours as needed for moderate painMax Daily Amount: 4 tablets, Disp: 120 tablet, Rfl: 0    pantoprazole (PROTONIX) 40 mg tablet, take 1 tablet by mouth daily before breakfast, Disp: 30 tablet, Rfl: 5    rOPINIRole (REQUIP) 1 mg tablet, TAKE 1 TO 2 TABLETS AT BEDTIME AS NEEDED TO CONTROL RESTLESS LEGS, Disp: 60 tablet, Rfl: 5    sodium chloride () 2 % hypertonic ophthalmic solution, Sue 128 2 % eye drops, Disp: , Rfl:     fluticasone-vilanterol (BREO ELLIPTA) 100-25 mcg/inh inhaler, Inhale 1 puff once for 1 dose Rinse mouth after use , Disp: 3 Inhaler, Rfl: 3    ibuprofen (MOTRIN) 200 mg tablet, Take 1 tablet (200 mg total) by mouth every 6 (six) hours as needed for mild pain (Patient not taking: Reported on 1/8/2021), Disp: 60 tablet, Rfl: 1    ALLERGIES:  Allergies   Allergen Reactions    Spiriva Handihaler [Tiotropium Bromide Monohydrate] Shortness Of Breath    Augmentin [Amoxicillin-Pot Clavulanate] Abdominal Pain     Pt received ceftriaxone 1 g IV on 5-21-18, gets sharp pains     Tiotropium Abdominal Pain    Tylenol With Codeine #3 [Acetaminophen-Codeine] Abdominal Pain       REVIEW OF SYSTEMS:  MSK: right ankle fracture   Neuro: WNL  Pertinent items are otherwise noted in HPI  A comprehensive review of systems was otherwise negative      LABS:  HgA1c:   Lab Results   Component Value Date    HGBA1C 6 5 (H) 12/30/2020     BMP:   Lab Results   Component Value Date    CALCIUM 9 7 12/30/2020    K 4 3 12/30/2020    CO2 28 12/30/2020     12/30/2020    BUN 17 12/30/2020    CREATININE 1 14 12/30/2020     CBC: No components found for: CBC    _____________________________________________________  PHYSICAL EXAMINATION:  Vital signs: /84   Pulse 101   Ht 5' (1 524 m)   Wt 68 kg (150 lb)   BMI 29 29 kg/m²   General: No acute distress, awake and alert  Psychiatric: Mood and affect appear appropriate  HEENT: Trachea Midline, No torticollis, no apparent facial trauma  Cardiovascular: No audible murmurs;  Extremities appear perfused  Pulmonary: No audible wheezing or stridor  Skin: No open lesions; see further details (if any) below    MUSCULOSKELETAL EXAMINATION:  Extremities:    Right ankle    full active range of motion all planes   No tenderness over fracture site   Right lower extremity appears warm well perfused            _____________________________________________________  STUDIES REVIEWED:  I personally reviewed the images and interpretation is as follows:    xrays of the right  Ankle performed today show maintained stable alignment of lateral malleolus avulsion fracture in maintained alignment with interval healing    PROCEDURES PERFORMED:  Procedures    Scribe Attestation    I,:  Valri Crigler am acting as a scribe while in the presence of the attending physician :       I,:  Berry Merino MD personally performed the services described in this documentation    as scribed in my presence :

## 2021-08-13 ENCOUNTER — APPOINTMENT (OUTPATIENT)
Dept: PHYSICAL THERAPY | Facility: CLINIC | Age: 76
End: 2021-08-13
Payer: MEDICARE

## 2021-08-16 ENCOUNTER — OFFICE VISIT (OUTPATIENT)
Dept: PHYSICAL THERAPY | Facility: CLINIC | Age: 76
End: 2021-08-16
Payer: MEDICARE

## 2021-08-16 DIAGNOSIS — S80.02XA CONTUSION OF LEFT KNEE, INITIAL ENCOUNTER: ICD-10-CM

## 2021-08-16 DIAGNOSIS — S82.61XA CLOSED AVULSION FRACTURE OF LATERAL MALLEOLUS OF RIGHT FIBULA, INITIAL ENCOUNTER: Primary | ICD-10-CM

## 2021-08-16 DIAGNOSIS — M17.12 PRIMARY OSTEOARTHRITIS OF LEFT KNEE: ICD-10-CM

## 2021-08-16 PROCEDURE — 97112 NEUROMUSCULAR REEDUCATION: CPT

## 2021-08-16 PROCEDURE — 97140 MANUAL THERAPY 1/> REGIONS: CPT

## 2021-08-16 PROCEDURE — 97110 THERAPEUTIC EXERCISES: CPT

## 2021-08-16 NOTE — PROGRESS NOTES
Daily Note     Today's date: 2021  Patient name: Nuvia Hughes  : 1945  MRN: [id number]  Referring provider: Matt Lundborg, MD  Dx:   Encounter Diagnosis     ICD-10-CM    1  Closed avulsion fracture of lateral malleolus of right fibula, initial encounter  S82  61XA    2  Primary osteoarthritis of left knee  M17 12    3  Contusion of left knee, initial encounter  S80  02XA        Start Time: 1500  Stop Time: 1545  Total time in clinic (min): 45 minutes    Subjective: Pt reports she hasn't done her exercises everyday, but she's doing more of them than she was  Pt reports going down the stairs is the only thing that is still very painful  Objective: See treatment diary below      Assessment: Pt is now ambulating with a SPC and no boot or brace  Pt demonstrates even further improved ROM with eversion being most limited  Pt had no problem with weight bearing and calf raises were only slightly uncomfortable  Pt is still TTP along the lateral malleoli/foot  Pt was given an updated HEP  Patient demonstrated fatigue post treatment, exhibited good technique with therapeutic exercises and would benefit from continued PT      Plan: Continue per plan of care        Precautions: COPD, osteoporosis                 Manuals             PROM  EM EM          Talocrural joint mobs P-A  Gr  II-III EM EM Gr  II-III          Subtalar joint mobs   EM Gr  II-III                       Neuro Re-Ed             BAPS board   clocks x10, circles 2x5 ea CW/CCW circles x20 ea CW/CCW          Ankle MREs   2 rounds 60"                                                                           Ther Ex             Gastroc strap stretch 3x30" HEP 3x30" D/C          SLR flex/abd x10 ea HEP 2x10 ea           Ankle ABC's x2 HEP            Ankle pumps x25 HEP            AROM DF hold   2x10 5"            Seated calf raises  Foam deficit x25 HEP           bike  6' 6'          Calf raises   2x10          3 way hip   2x10 ea RTB HEP          4 way ankle    2x15 RTB HEP          Standing calf stretch   3x30" HEP          Ther Activity                                       Gait Training                                       Modalities             cp  10' 5'          Education  Water intake and nutrition

## 2021-08-24 ENCOUNTER — APPOINTMENT (OUTPATIENT)
Dept: PHYSICAL THERAPY | Facility: CLINIC | Age: 76
End: 2021-08-24
Payer: MEDICARE

## 2021-08-27 ENCOUNTER — APPOINTMENT (OUTPATIENT)
Dept: PHYSICAL THERAPY | Facility: CLINIC | Age: 76
End: 2021-08-27
Payer: MEDICARE

## 2021-08-30 ENCOUNTER — TELEPHONE (OUTPATIENT)
Dept: INTERNAL MEDICINE CLINIC | Facility: CLINIC | Age: 76
End: 2021-08-30

## 2021-08-30 NOTE — TELEPHONE ENCOUNTER
Pt has been taking rOPINIRole (REQUIP) 1 mg tablet for 2 yr's for restless leg symptom  [place] which is not helping anymore    she stopped taking it 2 days ago and started taking gabapentin, 1 in the am & 1 in the pm, which, so far is working    she wants to know if it was ok for her to just stop rOPINIRole after 2 yr's

## 2021-08-31 ENCOUNTER — APPOINTMENT (OUTPATIENT)
Dept: PHYSICAL THERAPY | Facility: CLINIC | Age: 76
End: 2021-08-31
Payer: MEDICARE

## 2021-09-03 ENCOUNTER — APPOINTMENT (OUTPATIENT)
Dept: PHYSICAL THERAPY | Facility: CLINIC | Age: 76
End: 2021-09-03
Payer: MEDICARE

## 2021-09-04 DIAGNOSIS — F41.1 GENERALIZED ANXIETY DISORDER: ICD-10-CM

## 2021-09-07 ENCOUNTER — TELEPHONE (OUTPATIENT)
Dept: INTERNAL MEDICINE CLINIC | Facility: CLINIC | Age: 76
End: 2021-09-07

## 2021-09-07 ENCOUNTER — OFFICE VISIT (OUTPATIENT)
Dept: PHYSICAL THERAPY | Facility: CLINIC | Age: 76
End: 2021-09-07
Payer: MEDICARE

## 2021-09-07 DIAGNOSIS — M17.12 PRIMARY OSTEOARTHRITIS OF LEFT KNEE: ICD-10-CM

## 2021-09-07 DIAGNOSIS — S80.02XA CONTUSION OF LEFT KNEE, INITIAL ENCOUNTER: ICD-10-CM

## 2021-09-07 DIAGNOSIS — S82.61XA CLOSED AVULSION FRACTURE OF LATERAL MALLEOLUS OF RIGHT FIBULA, INITIAL ENCOUNTER: Primary | ICD-10-CM

## 2021-09-07 PROCEDURE — 97140 MANUAL THERAPY 1/> REGIONS: CPT

## 2021-09-07 PROCEDURE — 97112 NEUROMUSCULAR REEDUCATION: CPT

## 2021-09-07 PROCEDURE — 97110 THERAPEUTIC EXERCISES: CPT

## 2021-09-07 RX ORDER — ALPRAZOLAM 0.5 MG/1
TABLET ORAL
Qty: 60 TABLET | Refills: 0 | Status: SHIPPED | OUTPATIENT
Start: 2021-09-07 | End: 2022-04-13 | Stop reason: SDUPTHER

## 2021-09-07 NOTE — TELEPHONE ENCOUNTER
Let the patient know that I will refill this medication today but she needs to make a follow-up appointment within the next 30 days with Dr Dexter He  Once she makes an appointment make me aware and I will enter blood work for her to have done prior to that appointment

## 2021-09-07 NOTE — PROGRESS NOTES
Daily Note     Today's date: 2021  Patient name: Jordon Becker  : 1945  MRN: [id number]  Referring provider: Nathanael Meek MD  Dx:   Encounter Diagnosis     ICD-10-CM    1  Closed avulsion fracture of lateral malleolus of right fibula, initial encounter  S82  61XA    2  Primary osteoarthritis of left knee  M17 12    3  Contusion of left knee, initial encounter  S80  02XA                   Subjective: Pt reports she is doing a lot better and feeling great  Objective: See treatment diary below      Assessment: Pt demonstrated improvements in all motion however still has difficulty with motor planning  Pt's gait has improved with no noticeable limp  Pt was able to progress interventiosn to include more standing balance/proprioception activities which were challenging  Pt had only a minor increase in soreness following treatment  Patient demonstrated fatigue post treatment, exhibited good technique with therapeutic exercises and would benefit from continued PT      Plan: Continue per plan of care  Continue to challenge balance/proprioception        Precautions: COPD, osteoporosis                Manuals             PROM  EM EM EM         Talocrural joint mobs P-A  Gr  II-III EM EM Gr  II-III          Subtalar joint mobs   EM Gr  II-III                       Neuro Re-Ed             BAPS board   clocks x10, circles 2x5 ea CW/CCW circles x20 ea CW/CCW          Ankle MREs   2 rounds 60" 2x60"         SLS    3x30"         Parallel foam walking     10x         BAPS board balance    square fwd/lateral 5x10"                                    Ther Ex             Gastroc strap stretch 3x30" HEP 3x30" D/C          SLR flex/abd x10 ea HEP 2x10 ea           Ankle ABC's x2 HEP            Ankle pumps x25 HEP            AROM DF hold   2x10 5"            Seated calf raises  Foam deficit x25 HEP           bike  6' 6' 5'         Calf raises   2x10          3 way hip   2x10 ea RTB HEP          4 way ankle    2x15 RTB HEP inversion/eversion/DF 20x ea RTB            Standing calf stretch   3x30" HEP          squats      10x                       Ther Activity                                        Gait Training                                       Modalities             cp  10' 5' 5'         Education  Water intake and nutrition

## 2021-09-09 ENCOUNTER — APPOINTMENT (OUTPATIENT)
Dept: PHYSICAL THERAPY | Facility: CLINIC | Age: 76
End: 2021-09-09
Payer: MEDICARE

## 2021-09-13 ENCOUNTER — OFFICE VISIT (OUTPATIENT)
Dept: PHYSICAL THERAPY | Facility: CLINIC | Age: 76
End: 2021-09-13
Payer: MEDICARE

## 2021-09-13 ENCOUNTER — TELEPHONE (OUTPATIENT)
Dept: GASTROENTEROLOGY | Facility: CLINIC | Age: 76
End: 2021-09-13

## 2021-09-13 DIAGNOSIS — S82.61XA CLOSED AVULSION FRACTURE OF LATERAL MALLEOLUS OF RIGHT FIBULA, INITIAL ENCOUNTER: Primary | ICD-10-CM

## 2021-09-13 DIAGNOSIS — S80.02XA CONTUSION OF LEFT KNEE, INITIAL ENCOUNTER: ICD-10-CM

## 2021-09-13 DIAGNOSIS — M17.12 PRIMARY OSTEOARTHRITIS OF LEFT KNEE: ICD-10-CM

## 2021-09-13 PROCEDURE — 97110 THERAPEUTIC EXERCISES: CPT

## 2021-09-13 PROCEDURE — 97112 NEUROMUSCULAR REEDUCATION: CPT

## 2021-09-13 NOTE — PROGRESS NOTES
Daily Note     Today's date: 2021  Patient name: Coleman Roberts  : 1945  MRN: [id number]  Referring provider: Nikki Pierce MD  Dx:   Encounter Diagnosis     ICD-10-CM    1  Closed avulsion fracture of lateral malleolus of right fibula, initial encounter  S82  61XA    2  Primary osteoarthritis of left knee  M17 12    3  Contusion of left knee, initial encounter  S80  02XA                   Subjective:  Pt reports BL leg pain  Pt suspects her symptoms have increased since her fall  She reports not sleeping well last night due to her RLS  Objective: See treatment diary below      Assessment: Treatment was modified for patient due to symptoms she attributes to her RLS that have recently progressed  Pt was unable to single leg stance due to pain  Pt was able to perform BAPs board forward without issue, but struggled with lateral due to feelings of weakness in her legs  Pt was able to perform total gym squats with minimal increase in symptoms  Patient demonstrated fatigue post treatment, exhibited good technique with therapeutic exercises and would benefit from continued PT     The patient was treated by Armanda Lombard, SPT under direct supervision of Atul Gill DPT  All treatment and POC decisions were made by the supervising PT  Plan: Continue per plan of care        Precautions: COPD, osteoporosis        8        Manuals             PROM  EM EM EM NG        Talocrural joint mobs P-A  Gr  II-III EM EM Gr  II-III          Subtalar joint mobs   EM Gr  II-III                       Neuro Re-Ed             BAPS board   clocks x10, circles 2x5 ea CW/CCW circles x20 ea CW/CCW          Ankle MREs   2 rounds 60" 2x60"         SLS    3x30"         Parallel foam walking     10x         BAPS board balance    square fwd/lateral 5x10"  Fwd/lat 5x10"                                  Ther Ex             Gastroc strap stretch 3x30" HEP 3x30" D/C          SLR flex/abd x10 ea HEP 2x10 ea           Ankle ABC's x2 HEP            Ankle pumps x25 HEP            AROM DF hold   2x10 5"            Seated calf raises  Foam deficit x25 HEP   10# 3x10        bike  6' 6' 5' 5'        Calf raises   2x10          3 way hip   2x10 ea RTB HEP          4 way ankle    2x15 RTB HEP inversion/eversion/DF 20x ea RTB    inversion/eversion/DF 20x ea RTB        Standing calf stretch   3x30" HEP          squats      10x 2x10 TG         Seated HS stretch      3x30"         Ther Activity                                        Gait Training                                       Modalities             cp  10' 5' 5'         Education  Water intake and nutrition

## 2021-09-14 ENCOUNTER — OFFICE VISIT (OUTPATIENT)
Dept: INTERNAL MEDICINE CLINIC | Facility: CLINIC | Age: 76
End: 2021-09-14
Payer: MEDICARE

## 2021-09-14 ENCOUNTER — APPOINTMENT (OUTPATIENT)
Dept: LAB | Facility: CLINIC | Age: 76
End: 2021-09-14
Payer: MEDICARE

## 2021-09-14 VITALS
BODY MASS INDEX: 35.06 KG/M2 | TEMPERATURE: 97.7 F | WEIGHT: 178.6 LBS | HEIGHT: 60 IN | DIASTOLIC BLOOD PRESSURE: 76 MMHG | OXYGEN SATURATION: 94 % | SYSTOLIC BLOOD PRESSURE: 134 MMHG | HEART RATE: 93 BPM

## 2021-09-14 DIAGNOSIS — S82.61XA CLOSED AVULSION FRACTURE OF LATERAL MALLEOLUS OF RIGHT FIBULA, INITIAL ENCOUNTER: ICD-10-CM

## 2021-09-14 DIAGNOSIS — J32.9 CHRONIC SINUSITIS, UNSPECIFIED LOCATION: ICD-10-CM

## 2021-09-14 DIAGNOSIS — N18.30 STAGE 3 CHRONIC KIDNEY DISEASE, UNSPECIFIED WHETHER STAGE 3A OR 3B CKD (HCC): ICD-10-CM

## 2021-09-14 DIAGNOSIS — R73.03 PREDIABETES: ICD-10-CM

## 2021-09-14 DIAGNOSIS — F41.9 ANXIETY: ICD-10-CM

## 2021-09-14 DIAGNOSIS — I73.9 PVD (PERIPHERAL VASCULAR DISEASE) (HCC): ICD-10-CM

## 2021-09-14 DIAGNOSIS — M25.571 ACUTE RIGHT ANKLE PAIN: ICD-10-CM

## 2021-09-14 DIAGNOSIS — N18.30 STAGE 3 CHRONIC KIDNEY DISEASE, UNSPECIFIED WHETHER STAGE 3A OR 3B CKD (HCC): Primary | ICD-10-CM

## 2021-09-14 DIAGNOSIS — E78.5 HYPERLIPIDEMIA, UNSPECIFIED HYPERLIPIDEMIA TYPE: ICD-10-CM

## 2021-09-14 DIAGNOSIS — F41.1 GENERALIZED ANXIETY DISORDER: ICD-10-CM

## 2021-09-14 DIAGNOSIS — E55.9 VITAMIN D DEFICIENCY: ICD-10-CM

## 2021-09-14 DIAGNOSIS — Z78.0 POST-MENOPAUSAL: ICD-10-CM

## 2021-09-14 DIAGNOSIS — J43.2 CENTRILOBULAR EMPHYSEMA (HCC): ICD-10-CM

## 2021-09-14 DIAGNOSIS — E78.2 MIXED HYPERLIPIDEMIA: ICD-10-CM

## 2021-09-14 DIAGNOSIS — I65.23 CAROTID ARTERY PLAQUE, BILATERAL: ICD-10-CM

## 2021-09-14 DIAGNOSIS — I77.1 SUBCLAVIAN ARTERY STENOSIS, LEFT (HCC): ICD-10-CM

## 2021-09-14 DIAGNOSIS — M81.0 AGE-RELATED OSTEOPOROSIS WITHOUT CURRENT PATHOLOGICAL FRACTURE: ICD-10-CM

## 2021-09-14 DIAGNOSIS — G25.81 RLS (RESTLESS LEGS SYNDROME): ICD-10-CM

## 2021-09-14 DIAGNOSIS — K86.89 PANCREATIC MASS: ICD-10-CM

## 2021-09-14 DIAGNOSIS — G25.81 RESTLESS LEG SYNDROME: ICD-10-CM

## 2021-09-14 DIAGNOSIS — L82.1 SEBORRHEIC KERATOSES: ICD-10-CM

## 2021-09-14 LAB
25(OH)D3 SERPL-MCNC: 21.4 NG/ML (ref 30–100)
ANION GAP SERPL CALCULATED.3IONS-SCNC: 3 MMOL/L (ref 4–13)
BASOPHILS # BLD AUTO: 0.05 THOUSANDS/ΜL (ref 0–0.1)
BASOPHILS NFR BLD AUTO: 1 % (ref 0–1)
BUN SERPL-MCNC: 18 MG/DL (ref 5–25)
CALCIUM SERPL-MCNC: 8.8 MG/DL (ref 8.3–10.1)
CHLORIDE SERPL-SCNC: 110 MMOL/L (ref 100–108)
CHOLEST SERPL-MCNC: 169 MG/DL (ref 50–200)
CO2 SERPL-SCNC: 28 MMOL/L (ref 21–32)
CREAT SERPL-MCNC: 1.18 MG/DL (ref 0.6–1.3)
EOSINOPHIL # BLD AUTO: 0.28 THOUSAND/ΜL (ref 0–0.61)
EOSINOPHIL NFR BLD AUTO: 4 % (ref 0–6)
ERYTHROCYTE [DISTWIDTH] IN BLOOD BY AUTOMATED COUNT: 15.4 % (ref 11.6–15.1)
EST. AVERAGE GLUCOSE BLD GHB EST-MCNC: 140 MG/DL
GFR SERPL CREATININE-BSD FRML MDRD: 46 ML/MIN/1.73SQ M
GLUCOSE P FAST SERPL-MCNC: 110 MG/DL (ref 65–99)
HBA1C MFR BLD: 6.5 %
HCT VFR BLD AUTO: 44.8 % (ref 34.8–46.1)
HDLC SERPL-MCNC: 38 MG/DL
HGB BLD-MCNC: 13.8 G/DL (ref 11.5–15.4)
IMM GRANULOCYTES # BLD AUTO: 0.02 THOUSAND/UL (ref 0–0.2)
IMM GRANULOCYTES NFR BLD AUTO: 0 % (ref 0–2)
LDLC SERPL CALC-MCNC: 84 MG/DL (ref 0–100)
LYMPHOCYTES # BLD AUTO: 1.86 THOUSANDS/ΜL (ref 0.6–4.47)
LYMPHOCYTES NFR BLD AUTO: 27 % (ref 14–44)
MCH RBC QN AUTO: 27.6 PG (ref 26.8–34.3)
MCHC RBC AUTO-ENTMCNC: 30.8 G/DL (ref 31.4–37.4)
MCV RBC AUTO: 90 FL (ref 82–98)
MONOCYTES # BLD AUTO: 0.5 THOUSAND/ΜL (ref 0.17–1.22)
MONOCYTES NFR BLD AUTO: 7 % (ref 4–12)
NEUTROPHILS # BLD AUTO: 4.25 THOUSANDS/ΜL (ref 1.85–7.62)
NEUTS SEG NFR BLD AUTO: 61 % (ref 43–75)
NRBC BLD AUTO-RTO: 0 /100 WBCS
PLATELET # BLD AUTO: 333 THOUSANDS/UL (ref 149–390)
PMV BLD AUTO: 10.2 FL (ref 8.9–12.7)
POTASSIUM SERPL-SCNC: 4 MMOL/L (ref 3.5–5.3)
RBC # BLD AUTO: 5 MILLION/UL (ref 3.81–5.12)
SODIUM SERPL-SCNC: 141 MMOL/L (ref 136–145)
TRIGL SERPL-MCNC: 237 MG/DL
TSH SERPL DL<=0.05 MIU/L-ACNC: 1.73 UIU/ML (ref 0.36–3.74)
WBC # BLD AUTO: 6.96 THOUSAND/UL (ref 4.31–10.16)

## 2021-09-14 PROCEDURE — 80061 LIPID PANEL: CPT

## 2021-09-14 PROCEDURE — 36415 COLL VENOUS BLD VENIPUNCTURE: CPT

## 2021-09-14 PROCEDURE — 82306 VITAMIN D 25 HYDROXY: CPT

## 2021-09-14 PROCEDURE — 99214 OFFICE O/P EST MOD 30 MIN: CPT | Performed by: NURSE PRACTITIONER

## 2021-09-14 PROCEDURE — 84443 ASSAY THYROID STIM HORMONE: CPT

## 2021-09-14 PROCEDURE — 85025 COMPLETE CBC W/AUTO DIFF WBC: CPT

## 2021-09-14 PROCEDURE — 80048 BASIC METABOLIC PNL TOTAL CA: CPT

## 2021-09-14 PROCEDURE — 83036 HEMOGLOBIN GLYCOSYLATED A1C: CPT

## 2021-09-14 RX ORDER — NALOXONE HYDROCHLORIDE 4 MG/.1ML
SPRAY NASAL
Qty: 1 EACH | Refills: 1 | Status: CANCELLED | OUTPATIENT
Start: 2021-09-14

## 2021-09-14 RX ORDER — FLUTICASONE PROPIONATE 50 MCG
2 SPRAY, SUSPENSION (ML) NASAL DAILY
Qty: 11.1 ML | Refills: 1 | Status: SHIPPED | OUTPATIENT
Start: 2021-09-14

## 2021-09-14 RX ORDER — GABAPENTIN 100 MG/1
200 CAPSULE ORAL 3 TIMES DAILY
Qty: 180 CAPSULE | Refills: 1 | Status: SHIPPED | OUTPATIENT
Start: 2021-09-14 | End: 2021-10-14 | Stop reason: SDUPTHER

## 2021-09-14 NOTE — ASSESSMENT & PLAN NOTE
The patient continues to follow with Dr Jadyn Sidhu in Surgical Oncology  Her last CAT scan was in August of 2020 which shows partially enhancing mass in the pancreatic head  Patient was due for follow-up with him in 6 months  I did encourage her to make an appointment as she is overdue for that follow-up      IMPRESSION:     Stable 1 6 x 1 5 cm partially enhancing mass in the posterior pancreatic head best seen on portal venous phase image 4/65      Stable severe background emphysema with resolution of previously seen reticulonodular infiltrates      Stable fatty liver      Stable 1 cm left breast retroareolar nodular density

## 2021-09-14 NOTE — ASSESSMENT & PLAN NOTE
The patient's BMI in the office today is 34  She currently is unable to exercise due to her healing right ankle fracture  She does ambulate with a cane  She is not monitoring her diet for foods high in fat  She has been prediabetic in the past and she is not watching her carbohydrates

## 2021-09-14 NOTE — PROGRESS NOTES
[facility] [address] [place] [address]    NAME: Amy Melendez  AGE: 78 y o  SEX: female  : 1945     DATE: 2021     Assessment and Plan:     Problem List Items Addressed This Visit        Respiratory    Centrilobular emphysema ([facility] [address] )       The patient continues to follow with pulmonology         Relevant Medications    fluticasone (FLONASE) 50 mcg/act nasal spray    Chronic sinusitis    Relevant Medications    fluticasone (FLONASE) 50 mcg/act nasal spray       Cardiovascular and Mediastinum    Carotid artery plaque, bilateral    Subclavian artery stenosis, left (HCC)       Continue to follow with   Vascular         PVD (peripheral vascular disease) ([facility] [address] )      Continue to follow with vascular            Musculoskeletal and Integument    Osteoporosis      Patient's last DEXA scan was 2019  She does have a history of osteoporosis  She had been on a medication for approximately 10-15 years in the past which has been stopped  She is not taking any vitamin-D or calcium over-the-counter  A DEXA scan has been ordered  I did encourage the patient to start vitamin-D and calcium in the dosing was supplied to the patient  Study Result    Narrative & Impression   CENTRAL  DXA SCAN     CLINICAL HISTORY:   69year old post-menopausal  female risk factors include past history of smoking, COPD  Menopause at age 48  Previously treated with Actonel      TECHNIQUE: Bone densitometry was performed using a Horizon C bone densitometer  Regions of interest appear properly placed  There are no obvious fractures or other confounding variables which could limit the study    Degenerative changes in lumbar spine   may spuriously elevate bone density      COMPARISON:  None      RESULTS:   LUMBAR SPINE:  L1, L3, L4:  BMD 0 884 gm/cm2  T-score -1 5  Z-score 0 7     LEFT TOTAL HIP:  BMD 0 740 gm/cm2  T-score -1 7  Z-score 0 0     LEFT FEMORAL NECK:  BMD 0 551 gm/cm2  T-score -2 7  Z-score -0 8           IMPRESSION:  1  Based on the [facility] classification, the T-score of -2 7 in the left femoral neck is consistent with osteoporosis  2   According to the [address], prescription therapy is recommended with a T-score of -2 5 or less in the spine or hip after appropriate evaluation to exclude secondary causes  3   A daily intake of at least 1200 mg Calcium and 800 to 1000 IU of Vitamin D, as well as weight bearing and muscle strengthening exercise, fall prevention and avoidance of tobacco and excessive alcohol intake as  basic preventive measures are   suggested  4   Repeat DXA  in 18 - 24 months, on the same machine, as clinically indicated  Seborrheic keratoses    Closed avulsion fracture of lateral malleolus of right fibula       Patient had been followed with Orthopedics  Over the past several days the patient has had an increase in the right lateral ankle pain  She continues with physical therapy  There is minimal swelling in that foot today  An x-ray of the right  Ankle was ordered for the patient  Relevant Orders    DXA bone density spine hip and pelvis       Genitourinary    Stage 3 chronic kidney disease ([facility] [address] ) - Primary     Lab Results   Component Value Date    EGFR 48 12/30/2020    EGFR 37 06/21/2020    EGFR 53 06/12/2020    CREATININE 1 14 12/30/2020    CREATININE 1 41 (H) 06/21/2020    CREATININE 1 04 06/12/2020    BMP ordered  Relevant Orders    Basic metabolic panel       Other    Anxiety    Relevant Orders    TSH, 3rd generation with Free T4 reflex    Hyperlipidemia       The patient's last lipid panel was in December of 2020  She continues on her Lipitor 40 mg a day  She is not monitoring her diet  Lipid panel has been ordered      Component      Latest Ref Rng & Units 12/30/2020   Cholesterol      50 - 200 mg/dL 163   Triglycerides      <=150 mg/dL 190 (H)   HDL      >=40 mg/dL 44   LDL Calculated      0 - 100 mg/dL 81 Relevant Orders    Lipid Panel with Direct LDL reflex    CBC and differential    Restless leg syndrome       The patient with an increase in her symptoms of restless leg  She has been on Requip in the past without good results  Currently  She is taking Neurontin  I will increase her Neurontin to 200 mg 3 times a day  Blood work has also been ordered to rule out any metabolic reasons for her increase in her symptoms         Relevant Orders    CBC and differential    Pancreatic mass      The patient continues to follow with Dr Pablo Paz in Surgical Oncology  Her last CAT scan was in August of 2020 which shows partially enhancing mass in the pancreatic head  Patient was due for follow-up with him in 6 months  I did encourage her to make an appointment as she is overdue for that follow-up  IMPRESSION:     Stable 1 6 x 1 5 cm partially enhancing mass in the posterior pancreatic head best seen on portal venous phase image 4/65      Stable severe background emphysema with resolution of previously seen reticulonodular infiltrates      Stable fatty liver      Stable 1 cm left breast retroareolar nodular density             BMI 34 0-34 9,adult       The patient's BMI in the office today is 34  She currently is unable to exercise due to her healing right ankle fracture  She does ambulate with a cane  She is not monitoring her diet for foods high in fat  She has been prediabetic in the past and she is not watching her carbohydrates  Prediabetes     Lab Results   Component Value Date    HGBA1C 6 5 (H) 12/30/2020       Patient's last hemoglobin A1c was elevated at 6 5 which puts her in the diabetic range  Patient at that time did not want treatment but wanted to watch her diet  An A1c has been ordered for today           Relevant Orders    HEMOGLOBIN A1C W/ EAG ESTIMATION    Vitamin D deficiency    Relevant Orders    Vitamin D 25 hydroxy    Acute right ankle pain    Relevant Orders    XR ankle 3+ vw right      Other Visit Diagnoses     Post-menopausal        Relevant Orders    DXA bone density spine hip and pelvis    Generalized anxiety disorder        RLS (restless legs syndrome)        Relevant Medications    gabapentin (NEURONTIN) 100 mg capsule          BMI Counseling: Body mass index is 34 88 kg/m²  The BMI is above normal  Nutrition recommendations include decreasing portion sizes, encouraging healthy choices of fruits and vegetables, decreasing fast food intake, consuming healthier snacks, limiting drinks that contain sugar, moderation in carbohydrate intake, increasing intake of lean protein, reducing intake of saturated and trans fat and reducing intake of cholesterol  Exercise recommendations include exercising 3-5 times per week  Rationale for BMI follow-up plan is due to patient being overweight or obese  BMI Counseling: Body mass index is 34 88 kg/m²  Follow-up plan was not completed due to elderly patient (74 years old) where weight reduction/weight gain would complicate underlying health condition such as: illness or physical disability  Ambulates with cane due to recent ankle fracture       Return in about 1 month (around 10/14/2021) for malcom Ross  Chief Complaint:     Chief Complaint   Patient presents with    Follow-up        History of Present Illness:     Kasia Chavez to the office today for an overdue follow-up  The patient has multiple complaints of constant pain, neck pain, muscle pain, back pain, restless leg syndrome  Patient is overdue for blood work and diagnostic testing  She is overdue for follow-up with pulmonology and Surgical Oncology  She is due for colonoscopy this month  Her last colonoscopy was in 2016 at which time multiple polyps were removed  She is due for mammogram and DEXA scan and these were both ordered  Blood work has been ordered for the patient  X-ray of the right ankle was ordered for the patient    I will contact her with those results  The patient was instructed to make an appointment with Dr Amando Powell in 1 month     Review of Systems:     Review of Systems   Constitutional: Positive for fatigue  Negative for activity change and fever  HENT: Negative for congestion, hearing loss, rhinorrhea, trouble swallowing and voice change  Eyes: Negative for photophobia, pain, discharge and visual disturbance  Respiratory: Negative for cough, chest tightness and shortness of breath  Cardiovascular: Negative for chest pain, palpitations and leg swelling  Gastrointestinal: Negative for abdominal pain, blood in stool, constipation, nausea and vomiting  Endocrine: Negative for cold intolerance and heat intolerance  Genitourinary: Negative for difficulty urinating, frequency, hematuria, urgency, vaginal bleeding and vaginal discharge  Musculoskeletal: Positive for arthralgias, myalgias and neck pain  Skin: Negative  Neurological: Negative for dizziness, weakness, numbness and headaches  Restless leg   Psychiatric/Behavioral: Positive for sleep disturbance  Negative for decreased concentration  The patient is nervous/anxious           Problem List:     Patient Active Problem List   Diagnosis    Anxiety    Aortoiliac occlusive disease ([facility] [address] )    Carotid artery plaque, bilateral    Centrilobular emphysema (HCC)    Hyperlipidemia    Osteoporosis    Restless leg syndrome    Subclavian artery stenosis, left (HCC)    PVD (peripheral vascular disease) (HCC)    Chronic sinusitis    Pancreatic mass    BMI 34 0-34 9,adult    Seborrheic keratoses    Stage 3 chronic kidney disease (HCC)    Closed avulsion fracture of lateral malleolus of right fibula    Prediabetes    Vitamin D deficiency    Acute right ankle pain        Objective:     /76 (BP Location: Left arm, Patient Position: Sitting, Cuff Size: Standard)   Pulse 93   Temp 97 7 °F (36 5 °C) (Temporal) Comment: no nsaids  Ht 5' (1 524 m)   Wt 81 kg (178 lb 9 6 oz)   SpO2 94%   BMI 34 88 kg/m²     Current Outpatient Medications   Medication Instructions    albuterol (PROVENTIL HFA,VENTOLIN HFA) 90 mcg/act inhaler 2 puffs, Inhalation, Every 6 hours PRN    albuterol 2 5 mg, Nebulization, Every 6 hours PRN    ALPRAZolam (XANAX) 0 5 mg tablet take 1 tablet by mouth twice a day if needed for anxiety    atorvastatin (LIPITOR) 40 mg tablet take 1 tablet by mouth once daily    fluticasone (FLONASE) 50 mcg/act nasal spray 2 sprays, Nasal, Daily    fluticasone-vilanterol (BREO ELLIPTA) 100-25 mcg/inh inhaler 1 puff, Inhalation, Once, Rinse mouth after use   gabapentin (NEURONTIN) 200 mg, Oral, 3 times daily    olopatadine (PATANOL) 0 1 % ophthalmic solution No dose, route, or frequency recorded   oxyCODONE-acetaminophen (PERCOCET) 5-325 mg per tablet 1 tablet, Oral, Every 6 hours PRN    pantoprazole (PROTONIX) 40 mg tablet take 1 tablet by mouth daily before breakfast    sodium chloride () 2 % hypertonic ophthalmic solution Sue 128 2 % eye drops         Physical Exam  Vitals reviewed  Constitutional:       Appearance: Normal appearance  She is obese  HENT:      Head: Normocephalic  Nose: Nose normal       Mouth/Throat:      Mouth: Mucous membranes are moist       Pharynx: Oropharynx is clear  Eyes:      Extraocular Movements: Extraocular movements intact  Pupils: Pupils are equal, round, and reactive to light  Cardiovascular:      Rate and Rhythm: Normal rate and regular rhythm  Pulmonary:      Effort: Pulmonary effort is normal       Breath sounds: Normal breath sounds  Musculoskeletal:         General: Tenderness (right lateral ankle) present  Normal range of motion  Skin:     General: Skin is warm and dry  Neurological:      General: No focal deficit present  Mental Status: She is alert and oriented to person, place, and time     Psychiatric:         Mood and Affect: Mood normal          Behavior: Behavior normal  Thought Content:  Thought content normal          Judgment: Judgment normal          [facility], [facility]

## 2021-09-14 NOTE — ASSESSMENT & PLAN NOTE
The patient's last lipid panel was in December of 2020  She continues on her Lipitor 40 mg a day  She is not monitoring her diet  Lipid panel has been ordered      Component      Latest Ref Rng & Units 12/30/2020   Cholesterol      50 - 200 mg/dL 163   Triglycerides      <=150 mg/dL 190 (H)   HDL      >=40 mg/dL 44   LDL Calculated      0 - 100 mg/dL 81

## 2021-09-14 NOTE — ASSESSMENT & PLAN NOTE
Lab Results   Component Value Date    HGBA1C 6 5 (H) 12/30/2020       Patient's last hemoglobin A1c was elevated at 6 5 which puts her in the diabetic range  Patient at that time did not want treatment but wanted to watch her diet  An A1c has been ordered for today

## 2021-09-14 NOTE — ASSESSMENT & PLAN NOTE
Patient's last DEXA scan was September of 2019  She does have a history of osteoporosis  She had been on a medication for approximately 10-15 years in the past which has been stopped  She is not taking any vitamin-D or calcium over-the-counter  A DEXA scan has been ordered  I did encourage the patient to start vitamin-D and calcium in the dosing was supplied to the patient  Study Result    Narrative & Impression   CENTRAL  DXA SCAN     CLINICAL HISTORY:   69year old post-menopausal  female risk factors include past history of smoking, COPD  Menopause at age 48  Previously treated with Actonel      TECHNIQUE: Bone densitometry was performed using a Horizon C bone densitometer  Regions of interest appear properly placed  There are no obvious fractures or other confounding variables which could limit the study  Degenerative changes in lumbar spine   may spuriously elevate bone density      COMPARISON:  None      RESULTS:   LUMBAR SPINE:  L1, L3, L4:  BMD 0 884 gm/cm2  T-score -1 5  Z-score 0 7     LEFT TOTAL HIP:  BMD 0 740 gm/cm2  T-score -1 7  Z-score 0 0     LEFT FEMORAL NECK:  BMD 0 551 gm/cm2  T-score -2 7  Z-score -0 8           IMPRESSION:  1  Based on the [facility] classification, the T-score of -2 7 in the left femoral neck is consistent with osteoporosis  2   According to the [facility], prescription therapy is recommended with a T-score of -2 5 or less in the spine or hip after appropriate evaluation to exclude secondary causes  3   A daily intake of at least 1200 mg Calcium and 800 to 1000 IU of Vitamin D, as well as weight bearing and muscle strengthening exercise, fall prevention and avoidance of tobacco and excessive alcohol intake as  basic preventive measures are   suggested  4   Repeat DXA  in 18 - 24 months, on the same machine, as clinically indicated

## 2021-09-14 NOTE — ASSESSMENT & PLAN NOTE
Patient had been followed with Orthopedics  Over the past several days the patient has had an increase in the right lateral ankle pain  She continues with physical therapy  There is minimal swelling in that foot today  An x-ray of the right  Ankle was ordered for the patient

## 2021-09-14 NOTE — ASSESSMENT & PLAN NOTE
The patient with an increase in her symptoms of restless leg  She has been on Requip in the past without good results  Currently  She is taking Neurontin  I will increase her Neurontin to 200 mg 3 times a day    Blood work has also been ordered to rule out any metabolic reasons for her increase in her symptoms

## 2021-09-14 NOTE — ASSESSMENT & PLAN NOTE
Lab Results   Component Value Date    EGFR 48 12/30/2020    EGFR 37 06/21/2020    EGFR 53 06/12/2020    CREATININE 1 14 12/30/2020    CREATININE 1 41 (H) 06/21/2020    CREATININE 1 04 06/12/2020    BMP ordered

## 2021-09-14 NOTE — PATIENT INSTRUCTIONS
Increase gabapentin to 200mg three times a day  Voltaren cream or icy hot to neck   Schedule mammogram  Schedule appointment with Dr Yoly Lucero 1200 mg daily  Vitamin D3 1000 units daily  Call Dr Altagracia Clements office to schedule colonoscopy        Restless Legs Syndrome   AMBULATORY CARE:   Restless legs syndrome  (RLS) is a condition that causes a powerful urge to move your legs and feet  You may also have tingling, creeping, itching, or throbbing sensations in your legs  You may have discomfort or pain  Movement relieves the symptoms for a short time  RLS is usually worse late in the day and at night  Your symptoms may come and go for days or weeks at a time, and worsen during periods of stress  Contact your healthcare provider if:   · You cannot sleep because of your symptoms  · Your symptoms are getting worse  · You have questions or concerns about your condition or care  Medicines:   · Medicines  may help decrease your RLS symptoms  · Take your medicine as directed  Contact your healthcare provider if you think your medicine is not helping or if you have side effects  Tell him of her if you are allergic to any medicine  Keep a list of the medicines, vitamins, and herbs you take  Include the amounts, and when and why you take them  Bring the list or the pill bottles to follow-up visits  Carry your medicine list with you in case of an emergency  Manage your symptoms:   · Keep your legs warm  Wear thick socks or use an electric blanket  It may also help to take a hot bath or massage your legs before bedtime  · Exercise regularly  Moderate physical activity such as walking and stretching may help relieve your symptoms  Ask your healthcare provider about the best exercise plan for you  · Get enough sleep  You may need to go to bed earlier to get the sleep you need  Go to bed and wake up at the same time every day  · Do not drink caffeine or alcohol in the evening    Do not smoke or use tobacco products in the evening  Caffeine, alcohol, and tobacco can prevent you from sleeping well  Follow up with your healthcare provider as directed:  Write down your questions so you remember to ask them during your visits  © Copyright [employer] 2021 Information is for End User's use only and may not be sold, redistributed or otherwise used for commercial purposes  All illustrations and images included in CareNotes® are the copyrighted property of [employer]  or Andre Johnson  The above information is an  only  It is not intended as medical advice for individual conditions or treatments  Talk to your doctor, nurse or pharmacist before following any medical regimen to see if it is safe and effective for you  Neck Exercises   AMBULATORY CARE:   Neck exercises  help reduce neck pain, and improve neck movement and strength  Neck exercises also help prevent long-term neck problems  What you need to know about neck exercises:   · Do the exercises every day,  or as often as directed by your healthcare provider  · Move slowly, gently, and smoothly  Avoid fast or jerky motions  · Stand and sit the way your healthcare provider shows you  Good posture may reduce your neck pain  Check your posture often, even when you are not doing your neck exercises  How to perform neck exercises safely:   · Exercise position:  You may sit or stand while you do neck exercises  Face forward  Your shoulders should be straight and relaxed, with a good posture  · Head tilts, forward and back:  Gently bow your head and try to touch your chin to your chest  Your healthcare provider may tell you to push on the back of your neck to help bow your head  Raise your chin back to the starting position  Tilt your head back as far as possible so you are looking up at the ceiling  Your healthcare provider may tell you to lift your chin to help tilt your head back   Return your head to the starting position  · Head tilts, side to side:  Tilt your head, bringing your ear toward your shoulder  Then tilt your head toward the other shoulder  · Head turns:  Turn your head to look over your shoulder  Tilt your chin down and try to touch it to your shoulder  Do not raise your shoulder to your chin  Face forward again  Do the same on the other side  · Head rolls:  Slowly bring your chin toward your chest  Next, roll your head to the right  Your ear should be positioned over your shoulder  Hold this position for 5 seconds  Roll your head back toward your chest and to the left into the same position  Hold for 5 seconds  Gently roll your head back and around in a clockwise [place] 3 times  Next, move your head in the reverse direction (counterclockwise) in a [place] 3 times  Do not shrug your shoulders upwards while you do this exercise  Contact your healthcare provider if:   · Your pain does not get better, or gets worse  · You have questions or concerns about your condition, care, or exercise program     © Copyright [employer] 2021 Information is for End User's use only and may not be sold, redistributed or otherwise used for commercial purposes  All illustrations and images included in CareNotes® are the copyrighted property of [employer] M [employer] Inc  or Andre Sifuentes   The above information is an  only  It is not intended as medical advice for individual conditions or treatments  Talk to your doctor, nurse or pharmacist before following any medical regimen to see if it is safe and effective for you

## 2021-09-15 ENCOUNTER — APPOINTMENT (OUTPATIENT)
Dept: PHYSICAL THERAPY | Facility: CLINIC | Age: 76
End: 2021-09-15
Payer: MEDICARE

## 2021-09-16 ENCOUNTER — APPOINTMENT (OUTPATIENT)
Dept: PHYSICAL THERAPY | Facility: CLINIC | Age: 76
End: 2021-09-16
Payer: MEDICARE

## 2021-09-17 ENCOUNTER — TELEPHONE (OUTPATIENT)
Dept: INTERNAL MEDICINE CLINIC | Facility: CLINIC | Age: 76
End: 2021-09-17

## 2021-09-17 DIAGNOSIS — E55.9 VITAMIN D DEFICIENCY: Primary | ICD-10-CM

## 2021-09-17 RX ORDER — ERGOCALCIFEROL 1.25 MG/1
50000 CAPSULE ORAL WEEKLY
Qty: 12 CAPSULE | Refills: 0 | Status: SHIPPED | OUTPATIENT
Start: 2021-09-17 | End: 2021-12-10

## 2021-09-17 NOTE — TELEPHONE ENCOUNTER
Contacted the patient with the results of her recent blood work  Her hemoglobin A1c remains at 6 5  Patient continues to be hesitant to start treatment for diabetes  She has an appointment with Dr Ryland Vinson on October 14th and she will discuss it with him then  Her other blood work is stable  Her vitamin-D level is low at 21  I will send high-dose vitamin-D to her pharmacy  The patient continues on her Neurontin 200 mg 3 times a day  The patient states that she did  Increase her bedtime dose to 300 mg at bedtime

## 2021-09-18 ENCOUNTER — HOSPITAL ENCOUNTER (OUTPATIENT)
Dept: RADIOLOGY | Facility: HOSPITAL | Age: 76
Discharge: HOME | End: 2021-09-18
Attending: INTERNAL MEDICINE
Payer: MEDICARE

## 2021-09-18 DIAGNOSIS — M25.572 ACUTE LEFT ANKLE PAIN: ICD-10-CM

## 2021-09-18 DIAGNOSIS — M25.571 ACUTE RIGHT ANKLE PAIN: ICD-10-CM

## 2021-09-18 PROCEDURE — 73610 X-RAY EXAM OF ANKLE: CPT

## 2021-09-20 ENCOUNTER — TELEPHONE (OUTPATIENT)
Dept: GYNECOLOGIC ONCOLOGY | Facility: CLINIC | Age: 76
End: 2021-09-20

## 2021-09-20 DIAGNOSIS — K86.89 PANCREATIC MASS: Primary | ICD-10-CM

## 2021-09-20 NOTE — TELEPHONE ENCOUNTER
Patient called asking for the order for her imaging to be placed for her pancreatic mass  She follows up once a year she comes an hour before her imaging appt to drink barium  She remembers Dr Zac Roberts stating at her last appt that she might not have to come in for a follow up after imaging since she takes public transportation to come to the appt and has to wait hours for the bus after her appt  Patient prefers [place] if not she can take a cab to the [facility] location

## 2021-09-21 ENCOUNTER — OFFICE VISIT (OUTPATIENT)
Dept: PHYSICAL THERAPY | Facility: CLINIC | Age: 76
End: 2021-09-21
Payer: MEDICARE

## 2021-09-21 DIAGNOSIS — M17.12 PRIMARY OSTEOARTHRITIS OF LEFT KNEE: ICD-10-CM

## 2021-09-21 DIAGNOSIS — S80.02XA CONTUSION OF LEFT KNEE, INITIAL ENCOUNTER: ICD-10-CM

## 2021-09-21 DIAGNOSIS — S82.61XA CLOSED AVULSION FRACTURE OF LATERAL MALLEOLUS OF RIGHT FIBULA, INITIAL ENCOUNTER: Primary | ICD-10-CM

## 2021-09-21 PROCEDURE — 97110 THERAPEUTIC EXERCISES: CPT

## 2021-09-21 PROCEDURE — 97112 NEUROMUSCULAR REEDUCATION: CPT

## 2021-09-21 PROCEDURE — 97140 MANUAL THERAPY 1/> REGIONS: CPT

## 2021-09-21 NOTE — PROGRESS NOTES
Daily Note     Today's date: 2021  Patient name: Lester Soni  : 1945  MRN: [id number]  Referring provider: Henry Bassett MD  Dx:   Encounter Diagnosis     ICD-10-CM    1  Closed avulsion fracture of lateral malleolus of right fibula, initial encounter  S82  61XA    2  Primary osteoarthritis of left knee  M17 12    3  Contusion of left knee, initial encounter  S80  02XA                   Subjective: Pt reports her ankle has been sore recently and she has been doing a lot of walking  Objective: See treatment diary below      Assessment: Pt had pain with active eversion and DF however following talocrural distraction mobs she had almost complete abolishment of pain  Pt was able to tolerate interventions well following manual treatment  Pt was educated again on performing her HEP  Pt had the most discomfort with SLS  Pt had most difficulty with parallel foam walking  Patient demonstrated fatigue post treatment, exhibited good technique with therapeutic exercises and would benefit from continued PT      Plan: Continue per plan of care        Precautions: COPD, osteoporosis              Manuals             PROM  EM EM EM NG EM       Talocrural joint mobs P-A  Gr  II-III EM EM Gr  II-III          Subtalar joint mobs   EM Gr  II-III          Talocrural distraction mobs      EM       Neuro Re-Ed             BAPS board   clocks x10, circles 2x5 ea CW/CCW circles x20 ea CW/CCW   circles x20 ea CW/CCW       Ankle MREs   2 rounds 60" 2x60"         SLS    3x30"         Parallel foam walking     10x  10x w turnaround on foam       BAPS board balance    square fwd/lateral 5x10"  Fwd/lat 5x10" Fwd/lat 5x10"                                 Ther Ex             Gastroc strap stretch 3x30" HEP 3x30" D/C          SLR flex/abd x10 ea HEP 2x10 ea           Ankle ABC's x2 HEP            Ankle pumps x25 HEP            AROM DF hold   2x10 5"            Seated calf raises  Foam deficit x25 HEP 10# 3x10 10# 3x10       bike  6' 6' 5' 5' 8'       Calf raises   2x10          3 way hip   2x10 ea RTB HEP          4 way ankle    2x15 RTB HEP inversion/eversion/DF 20x ea RTB    inversion/eversion/DF 20x ea RTB inversion/eversion/DF 20x ea RTB       Standing calf stretch   3x30" HEP          squats      10x 2x10 TG   3x10       Seated HS stretch      3x30"   3x30"       Ther Activity                                        Gait Training                                       Modalities             cp  10' 5' 5'         Education  Water intake and nutrition

## 2021-09-23 DIAGNOSIS — J43.2 CENTRILOBULAR EMPHYSEMA (HCC): ICD-10-CM

## 2021-09-23 RX ORDER — FLUTICASONE FUROATE AND VILANTEROL TRIFENATATE 100; 25 UG/1; UG/1
POWDER RESPIRATORY (INHALATION)
Qty: 180 BLISTER | Refills: 0 | Status: SHIPPED | OUTPATIENT
Start: 2021-09-23 | End: 2021-11-15 | Stop reason: ALTCHOICE

## 2021-09-23 NOTE — TELEPHONE ENCOUNTER
Can you have patient schedule a follow up? I refilled her Rollo Bong for 3 months - she was last seen 8/2020  Thanks

## 2021-09-28 ENCOUNTER — TELEPHONE (OUTPATIENT)
Dept: INTERNAL MEDICINE CLINIC | Facility: CLINIC | Age: 76
End: 2021-09-28

## 2021-09-28 ENCOUNTER — EVALUATION (OUTPATIENT)
Dept: PHYSICAL THERAPY | Facility: CLINIC | Age: 76
End: 2021-09-28
Payer: MEDICARE

## 2021-09-28 DIAGNOSIS — M17.12 PRIMARY OSTEOARTHRITIS OF LEFT KNEE: ICD-10-CM

## 2021-09-28 DIAGNOSIS — S82.61XA CLOSED AVULSION FRACTURE OF LATERAL MALLEOLUS OF RIGHT FIBULA, INITIAL ENCOUNTER: Primary | ICD-10-CM

## 2021-09-28 DIAGNOSIS — S80.02XA CONTUSION OF LEFT KNEE, INITIAL ENCOUNTER: ICD-10-CM

## 2021-09-28 PROCEDURE — 97140 MANUAL THERAPY 1/> REGIONS: CPT

## 2021-09-28 PROCEDURE — 97110 THERAPEUTIC EXERCISES: CPT

## 2021-09-28 NOTE — PROGRESS NOTES
Daily Note     Today's date: 2021  Patient name: Mak Hernandez  : 1945  MRN: [id number]  Referring provider: Roya Fraser MD  Dx:   Encounter Diagnosis     ICD-10-CM    1  Closed avulsion fracture of lateral malleolus of right fibula, initial encounter  S82  61XA    2  Primary osteoarthritis of left knee  M17 12    3  Contusion of left knee, initial encounter  S80  02XA                   Subjective: Pt reports that when she does her exercises she feels great, and when she doesn't she has pain  The patient reports improvement in symptoms since previous session  The patient reports 4/10 pain at it's worst over the past 24 hours, and reports 90% improvement in overall condition since beginning formal PT care  Pt reports she only wants to get a little stronger in her ankle musculature  Objective: See treatment diary below    Active Range of Motion     Right Ankle/Foot   Dorsiflexion (ke): 12 degrees with pain  Plantar flexion: 48 degrees   Inversion: 38 degrees   Eversion: 14 degrees with pain    Passive Range of Motion     Right Ankle/Foot    Dorsiflexion (ke): 14 degrees  Inversion: 38 degrees   Eversion: 16 degrees with pain    Joint Play     Right Ankle/Foot  Hypomobile in the talocrural joint, subtalar joint and midfoot  Strength/Myotome Testing     Right Hip   Planes of Motion   Flexion: 4+  Abduction: 4+  Extension: 4+  Adduction: 5  External rotation: 4+  Internal rotation: 5    Right Knee   Flexion: 5  Extension: 5    Right Ankle/Foot   Dorsiflexion: 5  Plantar flexion: 5  Inversion: 4-  Eversion: 3+  Great toe flexion: 5  Great toe extension: 5    Tests     Right Ankle/Foot   Negative for Tinel's sign (tarsal tunnel)  Swelling     Right Ankle/Foot   Figure 8: 49 5 cm  Malleoli: 23 6 cm      Assessment: Mak Hernandez is a pleasant 78 y o  female who has been receiving PT intervention for R ankle avulsion fx   The patient has demonstrated decreased pain, increased strength, increased ROM, increased joint mobility, improved body mechanics, improved gait mechanics  and increased activity tolerance since beginning treatment  Pt is now able to perform functional activities with little difficulty however her ankle still swells and becomes painful  Pt is able to complete all therapeutic interventions and reports that her ankle feels better after them, however her imaging still shows the fracture  Pt still demonstrates weakness with inversion and eversion and was educated to continue to try and strengthen her ankle in a pain free motion  Pt is a good candidate for continued therapy in order to continue to build strength and proprioception  Primary remaining impairments include:    1)  Ankle weaknes    2)  Decreased ankle proprioception    Goals  Short Term Goals   Pt will improve ankle ROM by 5-10 degrees in all deficient planes order to improve functional ambulation - MET  Pt will improve LE strength by 1/2 grade in all deficient muscle groups in order to improve safety with ambulation and function - MET  Pt will decrease pain to 4/10 at its worst - MET  Pt will be independent with a basic HEP - MET    Long Term Goals  Pt will achieve a FOTO score of 66 - MET  Pt will improve ankle ROM to [facility] in order to maximize functional ambulation - MET  Pt will improve LE strength to 4+/5 in all deficient muscle groups in order to maximize functional mobility and safety - ongoing  Pt will be able to manage symptoms independently - ongoing  Pt will decrease swelling by 1-2 cm - ongoing  Pt will be independent with an extensive HEP - MET      Plan: Continue per plan of care        Precautions: COPD, osteoporosis       7/20 8/6 8/16 9/7 9/13 9/21 9/28      Manuals             PROM  EM EM EM NG EM EM      Talocrural joint mobs P-A  Gr  II-III EM EM Gr  II-III          Subtalar joint mobs   EM Gr  II-III          Talocrural distraction mobs      EM       Neuro Re-Ed             BAPS board   clocks x10, circles 2x5 ea CW/CCW circles x20 ea CW/CCW   circles x20 ea CW/CCW       Ankle MREs   2 rounds 60" 2x60"         SLS    3x30"         Parallel foam walking     10x  10x w turnaround on foam       BAPS board balance    square fwd/lateral 5x10"  Fwd/lat 5x10" Fwd/lat 5x10"                                 Ther Ex             Gastroc strap stretch 3x30" HEP 3x30" D/C          SLR flex/abd x10 ea HEP 2x10 ea           Ankle ABC's x2 HEP            Ankle pumps x25 HEP            AROM DF hold   2x10 5"            Seated calf raises  Foam deficit x25 HEP   10# 3x10 10# 3x10       bike  6' 6' 5' 5' 8'       Calf raises   2x10          3 way hip   2x10 ea RTB HEP          4 way ankle    2x15 RTB HEP inversion/eversion/DF 20x ea RTB    inversion/eversion/DF 20x ea RTB inversion/eversion/DF 20x ea RTB       Standing calf stretch   3x30" HEP          squats      10x 2x10 TG   3x10       Seated HS stretch      3x30"   3x30"       Ther Activity                                        Gait Training                                       Modalities             cp  10' 5' 5'         Education  Water intake and nutrition

## 2021-09-29 ENCOUNTER — TELEPHONE (OUTPATIENT)
Dept: OBGYN CLINIC | Facility: HOSPITAL | Age: 76
End: 2021-09-29

## 2021-09-29 NOTE — TELEPHONE ENCOUNTER
Spoke to patient  She stated she is getting pain with PT and cannot stand with support on that ankle aloe, she cannot stand on one foot  She asked if she should come in to see us again  She stated she wanted to just be sure that our doctor the results of the most recent R ankle xray and was okay with her continuing PT  She will remove brace and continue PT  She only has 1 session left  Advised that she ambulated with a supportive sneaker

## 2021-09-29 NOTE — TELEPHONE ENCOUNTER
Patient sees Dr Harry Baker for her fx ankle  She wears a cast and goes to PT  She has this pain in her ankle  She had recent xrays done and her therapist said the results are in and there's still a fracture  She said after 2 months of wearing the cast and doing PT, should she come in to see Dr Harry Baker again or Should she continue or stop PT?      Please callback #[phone]

## 2021-09-30 NOTE — TELEPHONE ENCOUNTER
LM on  for return call  Please schedule her a follow up with Dr Blanton Channel next week when call returned

## 2021-10-03 DIAGNOSIS — S82.61XD CLOSED AVULSION FRACTURE OF LATERAL MALLEOLUS OF RIGHT FIBULA WITH ROUTINE HEALING, SUBSEQUENT ENCOUNTER: Primary | ICD-10-CM

## 2021-10-05 ENCOUNTER — OFFICE VISIT (OUTPATIENT)
Dept: OBGYN CLINIC | Facility: CLINIC | Age: 76
End: 2021-10-05
Payer: MEDICARE

## 2021-10-05 ENCOUNTER — APPOINTMENT (OUTPATIENT)
Dept: RADIOLOGY | Facility: CLINIC | Age: 76
End: 2021-10-05
Payer: MEDICARE

## 2021-10-05 VITALS
BODY MASS INDEX: 32.08 KG/M2 | HEART RATE: 90 BPM | WEIGHT: 163.4 LBS | DIASTOLIC BLOOD PRESSURE: 76 MMHG | SYSTOLIC BLOOD PRESSURE: 137 MMHG | HEIGHT: 60 IN

## 2021-10-05 DIAGNOSIS — S82.61XD CLOSED AVULSION FRACTURE OF LATERAL MALLEOLUS OF RIGHT FIBULA WITH ROUTINE HEALING, SUBSEQUENT ENCOUNTER: Primary | ICD-10-CM

## 2021-10-05 DIAGNOSIS — S82.61XD CLOSED AVULSION FRACTURE OF LATERAL MALLEOLUS OF RIGHT FIBULA WITH ROUTINE HEALING, SUBSEQUENT ENCOUNTER: ICD-10-CM

## 2021-10-05 PROCEDURE — 73610 X-RAY EXAM OF ANKLE: CPT

## 2021-10-05 PROCEDURE — 99213 OFFICE O/P EST LOW 20 MIN: CPT | Performed by: ORTHOPAEDIC SURGERY

## 2021-10-06 ENCOUNTER — HOSPITAL ENCOUNTER (OUTPATIENT)
Dept: NON INVASIVE DIAGNOSTICS | Facility: CLINIC | Age: 76
Discharge: HOME | End: 2021-10-06
Attending: SURGERY
Payer: MEDICARE

## 2021-10-06 DIAGNOSIS — I74.09 AORTOILIAC OCCLUSIVE DISEASE (HCC): ICD-10-CM

## 2021-10-06 PROCEDURE — 93880 EXTRACRANIAL BILAT STUDY: CPT

## 2021-10-08 PROCEDURE — 93880 EXTRACRANIAL BILAT STUDY: CPT | Performed by: SURGERY

## 2021-10-12 NOTE — PROGRESS NOTES
Pt saw her surgeon who stated that he feels she no longer needs PT and the pt feels the same  At the time of the pt's last visit she had not met all of her goals however she is confident in achieving them on her own with her HEP  Pt is to be formerly discharged from PT at this time

## 2021-10-14 ENCOUNTER — TELEPHONE (OUTPATIENT)
Dept: INTERNAL MEDICINE CLINIC | Facility: CLINIC | Age: 76
End: 2021-10-14

## 2021-10-14 ENCOUNTER — OFFICE VISIT (OUTPATIENT)
Dept: INTERNAL MEDICINE CLINIC | Facility: CLINIC | Age: 76
End: 2021-10-14
Payer: MEDICARE

## 2021-10-14 VITALS
BODY MASS INDEX: 31.92 KG/M2 | DIASTOLIC BLOOD PRESSURE: 70 MMHG | HEART RATE: 104 BPM | HEIGHT: 60 IN | TEMPERATURE: 98 F | WEIGHT: 162.6 LBS | SYSTOLIC BLOOD PRESSURE: 110 MMHG | OXYGEN SATURATION: 98 %

## 2021-10-14 DIAGNOSIS — I74.09 AORTOILIAC OCCLUSIVE DISEASE (HCC): ICD-10-CM

## 2021-10-14 DIAGNOSIS — E55.9 VITAMIN D DEFICIENCY: ICD-10-CM

## 2021-10-14 DIAGNOSIS — G25.81 RLS (RESTLESS LEGS SYNDROME): ICD-10-CM

## 2021-10-14 DIAGNOSIS — I65.23 CAROTID ARTERY PLAQUE, BILATERAL: ICD-10-CM

## 2021-10-14 DIAGNOSIS — N18.31 STAGE 3A CHRONIC KIDNEY DISEASE (HCC): ICD-10-CM

## 2021-10-14 DIAGNOSIS — Z12.11 COLON CANCER SCREENING: ICD-10-CM

## 2021-10-14 DIAGNOSIS — J43.2 CENTRILOBULAR EMPHYSEMA (HCC): ICD-10-CM

## 2021-10-14 DIAGNOSIS — I73.9 PVD (PERIPHERAL VASCULAR DISEASE) (HCC): ICD-10-CM

## 2021-10-14 DIAGNOSIS — I77.1 SUBCLAVIAN ARTERY STENOSIS, LEFT (HCC): ICD-10-CM

## 2021-10-14 PROCEDURE — 99214 OFFICE O/P EST MOD 30 MIN: CPT | Performed by: INTERNAL MEDICINE

## 2021-10-14 RX ORDER — GABAPENTIN 100 MG/1
CAPSULE ORAL
Qty: 600 CAPSULE | Refills: 1 | Status: SHIPPED | OUTPATIENT
Start: 2021-10-14 | End: 2021-10-19 | Stop reason: SDUPTHER

## 2021-10-15 ENCOUNTER — TELEPHONE (OUTPATIENT)
Dept: INTERNAL MEDICINE CLINIC | Facility: CLINIC | Age: 76
End: 2021-10-15

## 2021-10-15 ENCOUNTER — TELEPHONE (OUTPATIENT)
Dept: SURGICAL ONCOLOGY | Facility: CLINIC | Age: 76
End: 2021-10-15

## 2021-10-15 DIAGNOSIS — G25.81 RLS (RESTLESS LEGS SYNDROME): ICD-10-CM

## 2021-10-15 RX ORDER — GABAPENTIN 100 MG/1
CAPSULE ORAL
Qty: 600 CAPSULE | Refills: 1 | OUTPATIENT
Start: 2021-10-15

## 2021-10-19 ENCOUNTER — TELEPHONE (OUTPATIENT)
Dept: INTERNAL MEDICINE CLINIC | Facility: CLINIC | Age: 76
End: 2021-10-19

## 2021-10-20 ENCOUNTER — TELEPHONE (OUTPATIENT)
Dept: INTERNAL MEDICINE CLINIC | Facility: CLINIC | Age: 76
End: 2021-10-20

## 2021-10-26 ENCOUNTER — TELEPHONE (OUTPATIENT)
Dept: INTERNAL MEDICINE CLINIC | Facility: CLINIC | Age: 76
End: 2021-10-26

## 2021-11-09 ENCOUNTER — TELEPHONE (OUTPATIENT)
Dept: INTERNAL MEDICINE CLINIC | Facility: CLINIC | Age: 76
End: 2021-11-09

## 2021-11-15 ENCOUNTER — OFFICE VISIT (OUTPATIENT)
Dept: PULMONOLOGY | Facility: CLINIC | Age: 76
End: 2021-11-15
Payer: MEDICARE

## 2021-11-15 VITALS
HEIGHT: 60 IN | WEIGHT: 164 LBS | HEART RATE: 97 BPM | DIASTOLIC BLOOD PRESSURE: 80 MMHG | SYSTOLIC BLOOD PRESSURE: 110 MMHG | TEMPERATURE: 97.9 F | OXYGEN SATURATION: 97 % | BODY MASS INDEX: 32.2 KG/M2

## 2021-11-15 DIAGNOSIS — R06.02 SHORTNESS OF BREATH: Primary | ICD-10-CM

## 2021-11-15 DIAGNOSIS — J43.2 CENTRILOBULAR EMPHYSEMA (HCC): ICD-10-CM

## 2021-11-15 DIAGNOSIS — Z87.891 FORMER SMOKER: ICD-10-CM

## 2021-11-15 DIAGNOSIS — E66.9 OBESITY (BMI 30-39.9): ICD-10-CM

## 2021-11-15 PROCEDURE — 99214 OFFICE O/P EST MOD 30 MIN: CPT | Performed by: INTERNAL MEDICINE

## 2021-11-24 ENCOUNTER — HOSPITAL ENCOUNTER (OUTPATIENT)
Dept: CT IMAGING | Facility: HOSPITAL | Age: 76
Discharge: HOME | End: 2021-11-24
Attending: SURGERY
Payer: MEDICARE

## 2021-11-24 DIAGNOSIS — K86.89 PANCREATIC MASS: ICD-10-CM

## 2021-11-24 PROCEDURE — 74177 CT ABD & PELVIS W/CONTRAST: CPT

## 2021-11-24 PROCEDURE — G1004 CDSM NDSC: HCPCS

## 2021-11-24 PROCEDURE — 71260 CT THORAX DX C+: CPT

## 2021-11-24 RX ADMIN — IOHEXOL 100 ML: 350 INJECTION, SOLUTION INTRAVENOUS at 13:32

## 2021-12-02 ENCOUNTER — HOSPITAL ENCOUNTER (OUTPATIENT)
Dept: PULMONOLOGY | Facility: HOSPITAL | Age: 76
Discharge: HOME | End: 2021-12-02
Attending: INTERNAL MEDICINE
Payer: MEDICARE

## 2021-12-02 DIAGNOSIS — J43.2 CENTRILOBULAR EMPHYSEMA (HCC): ICD-10-CM

## 2021-12-02 PROCEDURE — 94727 GAS DIL/WSHOT DETER LNG VOL: CPT

## 2021-12-02 PROCEDURE — 94727 GAS DIL/WSHOT DETER LNG VOL: CPT | Performed by: INTERNAL MEDICINE

## 2021-12-02 PROCEDURE — 94618 PULMONARY STRESS TESTING: CPT | Performed by: INTERNAL MEDICINE

## 2021-12-02 PROCEDURE — 94729 DIFFUSING CAPACITY: CPT

## 2021-12-02 PROCEDURE — 94060 EVALUATION OF WHEEZING: CPT | Performed by: INTERNAL MEDICINE

## 2021-12-02 PROCEDURE — 94761 N-INVAS EAR/PLS OXIMETRY MLT: CPT

## 2021-12-02 PROCEDURE — 94729 DIFFUSING CAPACITY: CPT | Performed by: INTERNAL MEDICINE

## 2021-12-02 PROCEDURE — 94060 EVALUATION OF WHEEZING: CPT

## 2021-12-02 RX ORDER — ALBUTEROL SULFATE 2.5 MG/3ML
2.5 SOLUTION RESPIRATORY (INHALATION) ONCE
Status: COMPLETED | OUTPATIENT
Start: 2021-12-02 | End: 2021-12-02

## 2021-12-02 RX ADMIN — ALBUTEROL SULFATE 2.5 MG: 2.5 SOLUTION RESPIRATORY (INHALATION) at 14:48

## 2021-12-08 ENCOUNTER — HOSPITAL ENCOUNTER (OUTPATIENT)
Dept: NON INVASIVE DIAGNOSTICS | Facility: CLINIC | Age: 76
Discharge: HOME | End: 2021-12-08
Payer: MEDICARE

## 2021-12-08 DIAGNOSIS — I77.1 SUBCLAVIAN ARTERY STENOSIS, LEFT (HCC): ICD-10-CM

## 2021-12-08 DIAGNOSIS — R73.01 IMPAIRED FASTING GLUCOSE: ICD-10-CM

## 2021-12-08 DIAGNOSIS — I65.23 CAROTID ARTERY PLAQUE, BILATERAL: ICD-10-CM

## 2021-12-08 DIAGNOSIS — I74.09 AORTOILIAC OCCLUSIVE DISEASE (HCC): ICD-10-CM

## 2021-12-08 DIAGNOSIS — I73.9 PVD (PERIPHERAL VASCULAR DISEASE) (HCC): ICD-10-CM

## 2021-12-08 PROCEDURE — 93978 VASCULAR STUDY: CPT

## 2021-12-08 PROCEDURE — 93978 VASCULAR STUDY: CPT | Performed by: SURGERY

## 2021-12-08 PROCEDURE — 93923 UPR/LXTR ART STDY 3+ LVLS: CPT

## 2021-12-09 DIAGNOSIS — E55.9 VITAMIN D DEFICIENCY: ICD-10-CM

## 2021-12-10 RX ORDER — ERGOCALCIFEROL 1.25 MG/1
CAPSULE ORAL
Qty: 12 CAPSULE | Refills: 0 | Status: SHIPPED | OUTPATIENT
Start: 2021-12-10 | End: 2022-03-08

## 2021-12-15 ENCOUNTER — TELEPHONE (OUTPATIENT)
Dept: SURGICAL ONCOLOGY | Facility: CLINIC | Age: 76
End: 2021-12-15

## 2021-12-16 ENCOUNTER — TELEMEDICINE (OUTPATIENT)
Dept: SURGICAL ONCOLOGY | Facility: CLINIC | Age: 76
End: 2021-12-16
Payer: MEDICARE

## 2021-12-16 DIAGNOSIS — K86.89 PANCREATIC MASS: Primary | ICD-10-CM

## 2021-12-16 PROCEDURE — 99441 PR PHYS/QHP TELEPHONE EVALUATION 5-10 MIN: CPT | Performed by: SURGERY

## 2021-12-17 ENCOUNTER — TELEPHONE (OUTPATIENT)
Dept: HEMATOLOGY ONCOLOGY | Facility: CLINIC | Age: 76
End: 2021-12-17

## 2021-12-20 DIAGNOSIS — E78.5 HYPERLIPIDEMIA, UNSPECIFIED HYPERLIPIDEMIA TYPE: ICD-10-CM

## 2021-12-20 RX ORDER — ATORVASTATIN CALCIUM 40 MG/1
TABLET, FILM COATED ORAL
Qty: 90 TABLET | Refills: 3 | Status: SHIPPED | OUTPATIENT
Start: 2021-12-20 | End: 2022-02-15

## 2021-12-23 ENCOUNTER — TELEPHONE (OUTPATIENT)
Dept: INTERNAL MEDICINE CLINIC | Facility: CLINIC | Age: 76
End: 2021-12-23

## 2021-12-27 ENCOUNTER — TELEPHONE (OUTPATIENT)
Dept: INTERNAL MEDICINE CLINIC | Facility: CLINIC | Age: 76
End: 2021-12-27

## 2021-12-28 ENCOUNTER — TELEMEDICINE (OUTPATIENT)
Dept: INTERNAL MEDICINE CLINIC | Facility: CLINIC | Age: 76
End: 2021-12-28
Payer: MEDICARE

## 2021-12-28 ENCOUNTER — TELEMEDICINE (OUTPATIENT)
Dept: PULMONOLOGY | Facility: CLINIC | Age: 76
End: 2021-12-28
Payer: MEDICARE

## 2021-12-28 DIAGNOSIS — Z20.822 EXPOSURE TO COVID-19 VIRUS: ICD-10-CM

## 2021-12-28 DIAGNOSIS — Z20.822 SUSPECTED COVID-19 VIRUS INFECTION: ICD-10-CM

## 2021-12-28 DIAGNOSIS — J43.2 CENTRILOBULAR EMPHYSEMA (HCC): Primary | ICD-10-CM

## 2021-12-28 DIAGNOSIS — J96.11 CHRONIC HYPOXEMIC RESPIRATORY FAILURE (HCC): ICD-10-CM

## 2021-12-28 DIAGNOSIS — B34.9 VIRAL INFECTION, UNSPECIFIED: ICD-10-CM

## 2021-12-28 PROCEDURE — 99442 PR PHYS/QHP TELEPHONE EVALUATION 11-20 MIN: CPT | Performed by: NURSE PRACTITIONER

## 2021-12-28 PROCEDURE — 99441 PR PHYS/QHP TELEPHONE EVALUATION 5-10 MIN: CPT | Performed by: PHYSICIAN ASSISTANT

## 2021-12-28 RX ORDER — DOXYCYCLINE HYCLATE 100 MG/1
100 CAPSULE ORAL EVERY 12 HOURS SCHEDULED
Qty: 14 CAPSULE | Refills: 0 | Status: SHIPPED | OUTPATIENT
Start: 2021-12-28 | End: 2022-01-04

## 2021-12-29 PROCEDURE — U0003 INFECTIOUS AGENT DETECTION BY NUCLEIC ACID (DNA OR RNA); SEVERE ACUTE RESPIRATORY SYNDROME CORONAVIRUS 2 (SARS-COV-2) (CORONAVIRUS DISEASE [COVID-19]), AMPLIFIED PROBE TECHNIQUE, MAKING USE OF HIGH THROUGHPUT TECHNOLOGIES AS DESCRIBED BY CMS-2020-01-R: HCPCS | Performed by: NURSE PRACTITIONER

## 2021-12-29 PROCEDURE — U0005 INFEC AGEN DETEC AMPLI PROBE: HCPCS | Performed by: NURSE PRACTITIONER

## 2021-12-31 LAB — SARS-COV-2 RNA RESP QL NAA+PROBE: POSITIVE

## 2022-01-13 DIAGNOSIS — J44.9 CHRONIC OBSTRUCTIVE PULMONARY DISEASE, UNSPECIFIED COPD TYPE (HCC): Primary | ICD-10-CM

## 2022-01-13 RX ORDER — FLUTICASONE FUROATE AND VILANTEROL TRIFENATATE 100; 25 UG/1; UG/1
1 POWDER RESPIRATORY (INHALATION) DAILY
Qty: 180 BLISTER | Refills: 1 | Status: SHIPPED | OUTPATIENT
Start: 2022-01-13 | End: 2022-07-13

## 2022-02-03 ENCOUNTER — OFFICE VISIT (OUTPATIENT)
Dept: PULMONOLOGY | Facility: CLINIC | Age: 77
End: 2022-02-03
Payer: MEDICARE

## 2022-02-03 VITALS
OXYGEN SATURATION: 95 % | BODY MASS INDEX: 32.2 KG/M2 | SYSTOLIC BLOOD PRESSURE: 114 MMHG | HEART RATE: 99 BPM | WEIGHT: 164 LBS | HEIGHT: 60 IN | TEMPERATURE: 99.3 F | DIASTOLIC BLOOD PRESSURE: 78 MMHG

## 2022-02-03 DIAGNOSIS — J96.11 CHRONIC HYPOXEMIC RESPIRATORY FAILURE (HCC): ICD-10-CM

## 2022-02-03 DIAGNOSIS — R06.02 SHORTNESS OF BREATH: Primary | ICD-10-CM

## 2022-02-03 DIAGNOSIS — J43.2 CENTRILOBULAR EMPHYSEMA (HCC): ICD-10-CM

## 2022-02-03 DIAGNOSIS — R09.82 POSTNASAL DRIP: ICD-10-CM

## 2022-02-03 PROCEDURE — 99214 OFFICE O/P EST MOD 30 MIN: CPT | Performed by: PHYSICIAN ASSISTANT

## 2022-02-03 NOTE — PROGRESS NOTES
Assessment/Plan:   Diagnoses and all orders for this visit:    Shortness of breath    Centrilobular emphysema ([address] )    Chronic hypoxemic respiratory failure (HCC)  -     Home Oxygen with Portability  -     Portable Concentrators    Postnasal drip     Patient here today for follow up  Continues to note worsening shortness of breath/MENDOZA over time  Shortness of breath likely related to her COPD/emphysema, deconditioning  She recently had a PFT done which showed normal spirometry with moderately decreased DLCO  She did require 2L O2 with exertion  Suspect results may not be accurate given her severe emphysema on CT scans  She is using Breo daily, unable to tolerate Spiriva and does not want a different anticholinergic  I asked her to use the albuterol nebs more frequently at least once per day  She would also benefit from the use of the O2 as she did feel better with the O2 on during her 6 minute walk  During the walk her O2 sats on RA at rest were 94%, upon ambulation on RA her sats dropped to 85%  She was placed on 2L O2 and sats remained around 92%  She has agreed to the O2 at this point  She will also restart her nasal sprays which she had been on in the past for her post nasal drip  She will follow up with us in 3 months or sooner if necessary  Return in about 3 months (around 5/3/2022)  All questions are answered to the patient's satisfaction and understanding  She verbalizes understanding  She is encouraged to call with any further questions or concerns  Portions of the record may have been created with voice recognition software  Occasional wrong word or "sound a like" substitutions may have occurred due to the inherent limitations of voice recognition software  Read the chart carefully and recognize, using context, where substitutions have occurred      Electronically Signed by Moises Gómez PA-C    ______________________________________________________________________    Chief Complaint: No chief complaint on file  Patient ID: Aguila Miller is a 78 y o  y o  female has a past medical history of Anxiety, Atherosclerosis of native artery of both lower extremities with intermittent claudication ([facility] [address] ) (10/15/2015), Carotid artery plaque, bilateral (3/21/2017), Chronic sinusitis, COPD (chronic obstructive pulmonary disease) ([facility] [address] ), Fall (06/16/2021), Fracture, ankle closed, bimalleolar, right, initial encounter (06/2021), Hyperlipidemia, Lung nodule, PNA (pneumonia), PVD (peripheral vascular disease) ([facility] [address] ), Restless leg syndrome, Stage 3 chronic kidney disease ([facility] ) (4/22/2019), and Tobacco abuse  2/3/2022  Patient presents today for follow-up visit  Patient is a 77 yo female former smoker who quit over 2 years ago with PMH of emphysema/COPD, chronic sinusitis, peripheral vascular disease  She is here today for follow up  She has continued to note gradual worsening of her shortness of breath/MENDOZA over time  She also has chronic postnasal drip for which she has seen ENT in the past  She recently had a PFT done with 6 minute walk and did require oxygen but she declined the O2 at the time  She continues with her Breo daily, albuterol as needed  She is not using her nebulizer regularly  She did recently have COVID as well, she is vaccinated  She had mild symptoms and did not require hospitalization  Review of Systems   Constitutional: Negative  HENT: Negative  Respiratory: Positive for shortness of breath  Cardiovascular: Negative  Gastrointestinal: Negative  Genitourinary: Negative  Musculoskeletal: Negative  Skin: Negative  Allergic/Immunologic: Negative  Neurological: Negative  Psychiatric/Behavioral: Negative  Smoking history: She reports that she quit smoking about 3 years ago  Her smoking use included cigarettes  She started smoking about 59 years ago  She has a 112 00 pack-year smoking history   She has never used smokeless tobacco     The following portions of the patient's history were reviewed and updated as appropriate: allergies, current medications, past family history, past medical history, past social history, past surgical history and problem list     Immunization History   Administered Date(s) Administered    COVID-19 PFIZER VACCINE 0 3 ML IM 03/03/2021, 04/22/2021    INFLUENZA 11/07/2017    Influenza Split High Dose Preservative Free IM 10/15/2015, 12/22/2016    Influenza, high dose seasonal 0 7 mL 10/11/2018, 10/23/2019, 11/20/2020    Pneumococcal Conjugate 13-Valent 08/04/2016    Pneumococcal Polysaccharide PPV23 1945, 11/16/2015    Tdap 1945     Current Outpatient Medications   Medication Sig Dispense Refill    albuterol (PROVENTIL HFA,VENTOLIN HFA) 90 mcg/act inhaler Inhale 2 puffs every 6 (six) hours as needed for wheezing 3 Inhaler 3    ALPRAZolam (XANAX) 0 5 mg tablet take 1 tablet by mouth twice a day if needed for anxiety (Patient taking differently: as needed ) 60 tablet 0    atorvastatin (LIPITOR) 40 mg tablet take 1 tablet by mouth once daily 90 tablet 3    ergocalciferol (VITAMIN D2) 50,000 units take 1 capsule by mouth every week 12 capsule 0    fluticasone (FLONASE) 50 mcg/act nasal spray 2 sprays into each nostril daily 11 1 mL 1    fluticasone-vilanterol (Breo Ellipta) 100-25 mcg/inh inhaler Inhale 1 puff daily Rinse mouth after use   180 blister 1    gabapentin (NEURONTIN) 300 mg capsule Take 1 capsule (300 mg total) by mouth 2 (two) times a day This is instead of 100 mg 2 caps b i d  180 capsule 1    gabapentin (NEURONTIN) 400 mg capsule Take 1 capsule (400 mg total) by mouth daily at bedtime 90 capsule 1    olopatadine (PATANOL) 0 1 % ophthalmic solution    0    oxyCODONE-acetaminophen (PERCOCET) 5-325 mg per tablet Take 1 tablet by mouth every 6 (six) hours as needed for moderate painMax Daily Amount: 4 tablets 120 tablet 0    pantoprazole (PROTONIX) 40 mg tablet take 1 tablet by mouth daily before breakfast 30 tablet 5    sodium chloride () 2 % hypertonic ophthalmic solution Sue 128 2 % eye drops      albuterol (2 5 mg/3 mL) 0 083 % nebulizer solution Take 1 vial (2 5 mg total) by nebulization every 6 (six) hours as needed for wheezing or shortness of breath (Patient not taking: Reported on 12/28/2021 ) 360 mL 3     No current facility-administered medications for this visit  Allergies: Spiriva handihaler [tiotropium bromide monohydrate], Augmentin [amoxicillin-pot clavulanate], Tiotropium, and Tylenol with codeine #3 [acetaminophen-codeine]    Objective:  Vitals:    02/03/22 1320   BP: 114/78   Pulse: 99   Temp: 99 3 °F (37 4 °C)   SpO2: 95%   Weight: 74 4 kg (164 lb)   Height: 5' (1 524 m)   Oxygen Therapy  SpO2: 95 %    Wt Readings from Last 3 Encounters:   02/03/22 74 4 kg (164 lb)   11/15/21 74 4 kg (164 lb)   10/14/21 73 8 kg (162 lb 9 6 oz)     Body mass index is 32 03 kg/m²  Physical Exam  Vitals reviewed  Constitutional:       General: She is not in acute distress  Appearance: Normal appearance  She is not ill-appearing  HENT:      Head: Normocephalic and atraumatic  Mouth/Throat:      Pharynx: Oropharynx is clear  Eyes:      Conjunctiva/sclera: Conjunctivae normal    Cardiovascular:      Rate and Rhythm: Normal rate and regular rhythm  Pulmonary:      Effort: Pulmonary effort is normal  No respiratory distress  Breath sounds: Decreased breath sounds present  No wheezing, rhonchi or rales  Abdominal:      General: Abdomen is flat  There is no distension  Musculoskeletal:         General: Normal range of motion  Cervical back: Normal range of motion  Right lower leg: No edema  Left lower leg: No edema  Skin:     General: Skin is warm and dry  Neurological:      Mental Status: She is alert and oriented to person, place, and time     Psychiatric:         Mood and Affect: Mood normal          Behavior: Behavior normal          Lab Review:   Lab Results   Component Value Date    K 4 0 09/14/2021    K 4 8 03/29/2019     (H) 09/14/2021     03/29/2019    CO2 28 09/14/2021    CO2 27 03/29/2019    BUN 18 09/14/2021    BUN 21 04/25/2019    CREATININE 1 18 09/14/2021    CALCIUM 8 8 09/14/2021    CALCIUM 9 3 03/29/2019     Lab Results   Component Value Date    WBC 6 96 09/14/2021    HGB 13 8 09/14/2021    HCT 44 8 09/14/2021    MCV 90 09/14/2021     09/14/2021       Diagnostics:  I have personally reviewed pertinent reports  Reviewed PFT and recent imaging  Office Spirometry Results:     ESS:    No results found

## 2022-02-04 ENCOUNTER — TELEPHONE (OUTPATIENT)
Dept: PULMONOLOGY | Facility: CLINIC | Age: 77
End: 2022-02-04

## 2022-02-07 NOTE — TELEPHONE ENCOUNTER
Patient is calling in today to discuss more about her visit last week  She would like to talk more about the oxygen she is suppose to be on  She also is requesting a nebulizer  Can you please call her to discuss when you have a moment? Thank you

## 2022-02-15 ENCOUNTER — APPOINTMENT (OUTPATIENT)
Dept: LAB | Facility: CLINIC | Age: 77
End: 2022-02-15
Payer: MEDICARE

## 2022-02-15 ENCOUNTER — HOSPITAL ENCOUNTER (OUTPATIENT)
Dept: BONE DENSITY | Facility: CLINIC | Age: 77
Discharge: HOME | End: 2022-02-15
Payer: MEDICARE

## 2022-02-15 ENCOUNTER — OFFICE VISIT (OUTPATIENT)
Dept: VASCULAR SURGERY | Facility: CLINIC | Age: 77
End: 2022-02-15
Payer: MEDICARE

## 2022-02-15 ENCOUNTER — HOSPITAL ENCOUNTER (OUTPATIENT)
Dept: MAMMOGRAPHY | Facility: CLINIC | Age: 77
Discharge: HOME | End: 2022-02-15
Payer: MEDICARE

## 2022-02-15 ENCOUNTER — TELEPHONE (OUTPATIENT)
Dept: INTERNAL MEDICINE CLINIC | Facility: CLINIC | Age: 77
End: 2022-02-15

## 2022-02-15 VITALS
HEIGHT: 60 IN | HEART RATE: 104 BPM | WEIGHT: 165 LBS | BODY MASS INDEX: 32.39 KG/M2 | SYSTOLIC BLOOD PRESSURE: 128 MMHG | DIASTOLIC BLOOD PRESSURE: 70 MMHG

## 2022-02-15 DIAGNOSIS — I73.9 PVD (PERIPHERAL VASCULAR DISEASE) (HCC): ICD-10-CM

## 2022-02-15 DIAGNOSIS — Z12.31 VISIT FOR SCREENING MAMMOGRAM: ICD-10-CM

## 2022-02-15 DIAGNOSIS — I74.09 AORTOILIAC OCCLUSIVE DISEASE (HCC): ICD-10-CM

## 2022-02-15 DIAGNOSIS — I74.09 AORTOILIAC OCCLUSIVE DISEASE (HCC): Primary | ICD-10-CM

## 2022-02-15 DIAGNOSIS — E78.5 HYPERLIPIDEMIA, UNSPECIFIED HYPERLIPIDEMIA TYPE: ICD-10-CM

## 2022-02-15 DIAGNOSIS — N18.31 STAGE 3A CHRONIC KIDNEY DISEASE (HCC): ICD-10-CM

## 2022-02-15 DIAGNOSIS — S82.61XA CLOSED AVULSION FRACTURE OF LATERAL MALLEOLUS OF RIGHT FIBULA, INITIAL ENCOUNTER: ICD-10-CM

## 2022-02-15 DIAGNOSIS — I77.1 SUBCLAVIAN ARTERY STENOSIS, LEFT (HCC): ICD-10-CM

## 2022-02-15 DIAGNOSIS — I65.23 CAROTID ARTERY PLAQUE, BILATERAL: ICD-10-CM

## 2022-02-15 DIAGNOSIS — Z78.0 POST-MENOPAUSAL: ICD-10-CM

## 2022-02-15 LAB
ALBUMIN SERPL BCP-MCNC: 3.6 G/DL (ref 3.5–5)
ALP SERPL-CCNC: 109 U/L (ref 46–116)
ALT SERPL W P-5'-P-CCNC: 32 U/L (ref 12–78)
ANION GAP SERPL CALCULATED.3IONS-SCNC: 6 MMOL/L (ref 4–13)
AST SERPL W P-5'-P-CCNC: 17 U/L (ref 5–45)
BILIRUB SERPL-MCNC: 0.21 MG/DL (ref 0.2–1)
BUN SERPL-MCNC: 18 MG/DL (ref 5–25)
CALCIUM SERPL-MCNC: 9.1 MG/DL (ref 8.3–10.1)
CHLORIDE SERPL-SCNC: 108 MMOL/L (ref 100–108)
CO2 SERPL-SCNC: 25 MMOL/L (ref 21–32)
CREAT SERPL-MCNC: 1.19 MG/DL (ref 0.6–1.3)
GFR SERPL CREATININE-BSD FRML MDRD: 45 ML/MIN/1.73SQ M
GLUCOSE SERPL-MCNC: 111 MG/DL (ref 65–140)
POTASSIUM SERPL-SCNC: 4.6 MMOL/L (ref 3.5–5.3)
PROT SERPL-MCNC: 7.7 G/DL (ref 6.4–8.2)
SODIUM SERPL-SCNC: 139 MMOL/L (ref 136–145)

## 2022-02-15 PROCEDURE — 77067 SCR MAMMO BI INCL CAD: CPT

## 2022-02-15 PROCEDURE — 77080 DXA BONE DENSITY AXIAL: CPT

## 2022-02-15 PROCEDURE — 77063 BREAST TOMOSYNTHESIS BI: CPT

## 2022-02-15 PROCEDURE — 99214 OFFICE O/P EST MOD 30 MIN: CPT | Performed by: PHYSICIAN ASSISTANT

## 2022-02-15 PROCEDURE — 36415 COLL VENOUS BLD VENIPUNCTURE: CPT

## 2022-02-15 PROCEDURE — 80053 COMPREHEN METABOLIC PANEL: CPT

## 2022-02-15 RX ORDER — ROSUVASTATIN CALCIUM 20 MG/1
20 TABLET, COATED ORAL DAILY
Qty: 90 TABLET | Refills: 6 | Status: SHIPPED | OUTPATIENT
Start: 2022-02-15

## 2022-02-15 NOTE — PROGRESS NOTES
Assessment/Plan: Aortoiliac occlusive disease ([address] )  -     VAS abdominal aorta/iliacs; complete study; Future  -     Comprehensive metabolic panel; Future  -     rosuvastatin (CRESTOR) 20 MG tablet; Take 1 tablet (20 mg total) by mouth daily    -functionally limited due to severe emphysema; getting home O2  -no chest/abdmonial/back/flank pain  -no claudication    -AOIL 12/8/2021:  Patent distal aortic stent  Ao 1 8 cm  Mid Aorta 50-75% stenosis  ?R CHARISMA  L CHARISMA and EIA are patent  > 70% celiac stenosis  SMA and prox renals are patent     R ISABELLA 1 2, great toe 93, L ISABELLA 1, great toe 96    Plan:  Distal aortic stent is patent  However, there is moderate mid aortic narrowing which appears to have increased velocities  Continue with aspirin therapy  Consider intensified statin      -continue with aspirin 81  -due to progressive atherosclerosis, consider intensified "statin"  -start rosuvastatin 20 over atorvastatin, if ok with PCP  -we discussed the importance of good diabetes control  -follow-up AOIL in 6 months with office visit      Carotid artery plaque, bilateral  Subclavian artery stenosis, left (HCC)  -asymptomatic, mild MIC    -CV duplex 12/8/21: < 50% carotid artery stenosis (R 76/14, L 106/17); more proximal L SCA  -follow up duplex in 1-2 years  -continue with optimal medical therapy      PVD (peripheral vascular disease) (HCC)    Hyperlipidemia, unspecified hyperlipidemia type    Stage 3a chronic kidney disease (HCC)            Subjective:      Patient ID: Harrison Butterfield is a 78 y o  female  Pt is here to review AOIL 12/8/2021  No abdominal or back pain  No claudication  Pt is taking aspirin and atorvastatin  Pt is a former smoker  HPI    Ben Sweeney, HLD, IFG, CKD, severe emphysema on inhalers, (former smoker quit 2018), aortoiliac disease s/p aortic stent graft ('19), mild carotid artery and subclavian disease   Connie underwent balloon angioplasty and stenting of abdominal aorta with 14 mm x 40 mm Protege GPS stent and 10mm x 20mm  Balloon in 2019  She did well after surgery without any claudication  Unfortunately, she has has been suffering from increased shortness of breath due to emphysema which is quite limiting to her  She was also found to have pancreatic mass which is on observation at this time by surgery  2/15/22: Ms Bartolo Causey returns for annual vascular follow-up and to review recent testing  She is still very pleased with how good the legs feel after the aortic stenting procedure  She reports some bilateral hip pain and tenderness over the joint when she lays down  Otherwise, no abdominal, back, thigh, calf pain  No right pain, foot pain or wounds  We reviewed her AOIL study which shows patent aortic stent  There is some progression of aortic disease  No symptoms of TIA/stroke  We reviewed her CV duplex which shows only mild MIC which we can treat medically and monitor periodically  Unfortunately, she is quite limited due to SOB  In fact, she tells me that she is progressing to supplemental oxygen  She is arranging for home O2  Since she is still active and rides to bus to appointments, she is trying to get a portable tank  LDL 84  A1c 6 5         AOIL 12/8/2021    [facility] REPORT  CLINICAL:  Indications:  Patient presents to evaluate for progression of Aorto-Iliac  occlusive disease s/p revascularization  Patient is asymptomatic at this time  Operative History:  2019-07-18 Aorta Stent Distal  Risk Factors  The patient has history of Hyperlipidemia, CKD, MIC, Ao-il disease, Subclavian  artery stenosis, COPD and previous smoking (quit 1-5yrs ago)  Clinical  Right Pressure: 118/ mm Hg, Left Pressure: 73/ mm Hg       FINDINGS:     Unilateral             Impression  PSV (cm/s)  EDV (cm/s)  AP (cm)    Sup-Angelina Ao                                 81          16      1 8    Px Inf-David Ao          50-75%             314                  1 4    Ds Inf-David Ao - Stent                     208                         Celiac                 >70%               226          40             Prox  SMA                                 233          32                Right           Impression      PSV (cm/s)  EDV (cm/s)  AP (cm)   RAR    Prox CHARISMA        Not visualized                                           Dist CHARISMA        Not visualized                                           Prox  EIA                              111                  0 8          Dist EIA                               117                  0 7          Prox Renal                             160          34           1 98       Left            PSV (cm/s)  EDV (cm/s)  AP (cm)   RAR    Prox CHARISMA               153                  0 8          Dist CHARISMA               131                  0 7          Internal Iliac         113                               Prox  EIA              122                  0 7          Dist EIA                95                  0 6          Prox Renal             113          32           1 39             CONCLUSION:     Impression     The aorta and distal stent are patent measuring 1 8 cm in its greatest  diameter  There is a velocity increase noted in the mid aorta, suggestive of a 50-75%  stenosis  The right common iliac artery was not visualized due to overlaying bowel gas,  The right external iliac artery is patent  The left common iliac and external iliac arteries are patent and normal in  caliber  There is >70% stenosis is noted in the celiac artery  The superior mesenteric and bilateral proximal renal arteries are patent  Ankle-brachial indices are Rt: 1 2 which is normal, (Prior: 1 0) and Lt: 1 0  which is normal, (Prior: 0 83)  Great toe pressures are within the healing range  Rt: 93 mm Hg and Lt: 96 mm  Hg  There is a brachial pressure gradient of 45 mm Hg Right > Left  In comparison to the study on 11/25/2020, there is a velocity increase noted in  the mid aorta  CV duplex 10/6/21  FINDINGS:     Right        Impression  PSV  EDV (cm/s)  Direction of Flow  Ratio    Dist  ICA                 69          19                      0 87    Mid  ICA                  64          18                      0 81    Prox  ICA    1 - 49%      76          14                      0 96    Dist CCA                  85          20                              Mid CCA                   79          14                      1 01    Prox CCA                  78          14                      1 04    Ext Carotid              110          20                      1 39    Prox Vert                 81          17  Antegrade                   Subclavian               117           0                              Innominate                75          11                                 Left         Impression              PSV  EDV (cm/s)  Direction of Flow  Ratio    Dist  ICA                             67          13                      0 74    Mid  ICA                              52           9                      0 57    Prox  ICA    1 - 49%                 106          17                      1 16    Dist CCA                              87          15                              Mid CCA                               91          15                      0 59    Prox CCA                             155          22                              Ext Carotid                          104           8                      1 14    Prox Vert                             73           0  Retrograde                  Subclavian   More proximal stenosis   78           7                                      CONCLUSION:  Impression  RIGHT:  There is <50% stenosis noted in the internal carotid artery  Plaque is  heterogenous and irregular  Vertebral artery flow is antegrade  There is no significant subclavian artery  disease  LEFT:  There is <50% stenosis noted in the internal carotid artery   Plaque is  heterogenous and irregular  Vertebral artery flow is retrograde  Findings suggest a more proximal stenosis  Tech note: There is a 50mmHg brachial pressure gradient right > left  Compared to previous study on 03/11/2019, there is no significant change noted  The following portions of the patient's history were reviewed and updated as appropriate: allergies, current medications, past family history, past medical history, past social history, past surgical history and problem list     Review of Systems   Constitutional: Negative  HENT: Negative  Eyes: Negative  Respiratory: Positive for shortness of breath  Cardiovascular: Negative  Gastrointestinal: Negative  Endocrine: Negative  Genitourinary: Negative  Musculoskeletal: Negative  Skin: Negative  Allergic/Immunologic: Negative  Neurological: Negative  Hematological: Negative  Psychiatric/Behavioral: Negative  Objective:      /70 (BP Location: Right arm, Patient Position: Sitting, Cuff Size: Standard)   Pulse 104   Ht 5' (1 524 m)   Wt 74 8 kg (165 lb)   BMI 32 22 kg/m²          Physical Exam  Vitals and nursing note reviewed  Constitutional:       Appearance: She is well-developed  HENT:      Head: Normocephalic and atraumatic  Eyes:      Pupils: Pupils are equal, round, and reactive to light  Neck:      Thyroid: No thyromegaly  Vascular: No JVD  Trachea: Trachea normal    Cardiovascular:      Rate and Rhythm: Normal rate and regular rhythm  Pulses:           Carotid pulses are 2+ on the right side and 2+ on the left side  Radial pulses are 2+ on the right side and 2+ on the left side  Femoral pulses are 2+ on the right side  Dorsalis pedis pulses are 1+ on the right side and 2+ on the left side  Heart sounds: Normal heart sounds, S1 normal and S2 normal  No murmur heard  No friction rub  No gallop      Pulmonary:      Effort: Pulmonary effort is normal  No accessory muscle usage or respiratory distress  Breath sounds: No wheezing or rales  Comments:   Decreased BS  Abdominal:      General: Bowel sounds are normal  There is no distension  Palpations: Abdomen is soft  Tenderness: There is no abdominal tenderness  Comments: Soft, non-palpable, non-tender Ao   Musculoskeletal:         General: No deformity  Normal range of motion  Cervical back: Neck supple  Right lower leg: No edema  Left lower leg: No edema  Skin:     General: Skin is warm and dry  Capillary Refill: Capillary refill takes less than 2 seconds  Findings: No lesion or rash  Nails: There is no clubbing  Neurological:      Mental Status: She is alert and oriented to person, place, and time  Comments: Grossly normal    Psychiatric:         Behavior: Behavior is cooperative  I have reviewed and made appropriate changes to the review of systems input by the medical assistant  Vitals:    02/15/22 1334   BP: 128/70   BP Location: Right arm   Patient Position: Sitting   Cuff Size: Standard   Pulse: 104   Weight: 74 8 kg (165 lb)   Height: 5' (1 524 m)       Patient Active Problem List   Diagnosis    Anxiety    Aortoiliac occlusive disease ([address] )    Carotid artery plaque, bilateral    Centrilobular emphysema [facility])    Hyperlipidemia    Osteoporosis    Restless leg syndrome    Subclavian artery stenosis, left (HCC)    PVD (peripheral vascular disease) ([facility])    Chronic sinusitis    Pancreatic mass    BMI 34 0-34 9,adult    Seborrheic keratoses    Stage 3 chronic kidney disease ([facility])    Closed avulsion fracture of lateral malleolus of right fibula    Prediabetes    Vitamin D deficiency    Acute right ankle pain    COPD (chronic obstructive pulmonary disease) ([facility])       Past Surgical History:   Procedure Laterality Date    CATARACT EXTRACTION       SECTION      COLONOSCOPY      Complete   Resolved: 5/14/13    DESCENDING AORTIC ANEURYSM REPAIR W/ STENT  08/2019    IR AORTAGRAM WITH RUN-OFF  8/15/2019    SHOULDER SURGERY      For frozen shoulder     TONSILLECTOMY         Family History   Problem Relation Age of Onset    No Known Problems Mother     No Known Problems Father     Arthritis Sister     Pancreatic cancer Sister 61    Melanoma Brother         twice    Prostate cancer Brother     Heart attack Family         Acute Myocardial Infarction    Cirrhosis Family     Prostate cancer Family     Skin cancer Family     Stroke Family         Syndrome       Social History     Socioeconomic History    Marital status:       Spouse name: Not on file    Number of children: 2    Years of education: Not on file    Highest education level: Not on file   Occupational History    Occupation: Retired/   Tobacco Use    Smoking status: Former Smoker     Packs/day: 2 00     Years: 56 00     Pack years: 112 00     Types: Cigarettes     Start date: 2/21/1962     Quit date: 5/24/2018     Years since quitting: 3 7    Smokeless tobacco: Never Used   Vaping Use    Vaping Use: Never used   Substance and Sexual Activity    Alcohol use: Yes     Comment: occasionally     Drug use: No    Sexual activity: Not Currently     Partners: Male   Other Topics Concern    Not on file   Social History Narrative    Living w/adult children    No advance directive on file     Social Determinants of Health     Financial Resource Strain: Not on file   Food Insecurity: Not on file   Transportation Needs: Not on file   Physical Activity: Inactive    Days of Exercise per Week: 0 days    Minutes of Exercise per Session: 0 min   Stress: Stress Concern Present    Feeling of Stress : Very much   Social Connections: Not on file   Intimate Partner Violence: Not on file   Housing Stability: Not on file       Allergies   Allergen Reactions    Spiriva Handihaler [Tiotropium Bromide Monohydrate] Shortness Of Breath    Augmentin [Amoxicillin-Pot Clavulanate] Abdominal Pain     Pt received ceftriaxone 1 g IV on 5-21-18, gets sharp pains     Tiotropium Abdominal Pain    Tylenol With Codeine #3 [Acetaminophen-Codeine] Abdominal Pain         Current Outpatient Medications:     albuterol (PROVENTIL HFA,VENTOLIN HFA) 90 mcg/act inhaler, Inhale 2 puffs every 6 (six) hours as needed for wheezing, Disp: 3 Inhaler, Rfl: 3    ALPRAZolam (XANAX) 0 5 mg tablet, take 1 tablet by mouth twice a day if needed for anxiety (Patient taking differently: as needed ), Disp: 60 tablet, Rfl: 0    atorvastatin (LIPITOR) 40 mg tablet, take 1 tablet by mouth once daily, Disp: 90 tablet, Rfl: 3    ergocalciferol (VITAMIN D2) 50,000 units, take 1 capsule by mouth every week, Disp: 12 capsule, Rfl: 0    fluticasone (FLONASE) 50 mcg/act nasal spray, 2 sprays into each nostril daily, Disp: 11 1 mL, Rfl: 1    fluticasone-vilanterol (Breo Ellipta) 100-25 mcg/inh inhaler, Inhale 1 puff daily Rinse mouth after use , Disp: 180 blister, Rfl: 1    gabapentin (NEURONTIN) 300 mg capsule, Take 1 capsule (300 mg total) by mouth 2 (two) times a day This is instead of 100 mg 2 caps b i d , Disp: 180 capsule, Rfl: 1    gabapentin (NEURONTIN) 400 mg capsule, Take 1 capsule (400 mg total) by mouth daily at bedtime, Disp: 90 capsule, Rfl: 1    olopatadine (PATANOL) 0 1 % ophthalmic solution,  , Disp: , Rfl: 0    oxyCODONE-acetaminophen (PERCOCET) 5-325 mg per tablet, Take 1 tablet by mouth every 6 (six) hours as needed for moderate painMax Daily Amount: 4 tablets, Disp: 120 tablet, Rfl: 0    pantoprazole (PROTONIX) 40 mg tablet, take 1 tablet by mouth daily before breakfast, Disp: 30 tablet, Rfl: 5    sodium chloride () 2 % hypertonic ophthalmic solution, Sue 128 2 % eye drops, Disp: , Rfl:     albuterol (2 5 mg/3 mL) 0 083 % nebulizer solution, Take 1 vial (2 5 mg total) by nebulization every 6 (six) hours as needed for wheezing or shortness of breath (Patient not taking: Reported on 12/28/2021 ), Disp: 360 mL, Rfl: 3

## 2022-02-15 NOTE — TELEPHONE ENCOUNTER
Pt saw her vascular dr today, they increased cholesterol medication  Want to make sure its alright with dr Arvie Homans, please advise, call back upon completion

## 2022-02-21 NOTE — TELEPHONE ENCOUNTER
As per lorelei, they reached out to pt and she is not ready for oxygen   Pt stated that she will call Lorelei when she is ready for delivery,

## 2022-03-04 ENCOUNTER — TELEPHONE (OUTPATIENT)
Dept: INTERNAL MEDICINE CLINIC | Facility: CLINIC | Age: 77
End: 2022-03-04

## 2022-03-04 NOTE — TELEPHONE ENCOUNTER
PT IS CONCERNED THAT SINCE HER SON IS BEING DISCHARGED FROM HOSPITAL EITHER TODAY OR TOMORROW ( HE HAS CONGESTIVE HEART FAILURE) AND SHE HAS A SEVERE COUGH AND CONGESTION, IS THERE SOMETHING THAT CAN BE PRESCRIBED FOR HER SO SHE DOES NOT HARM HER SON      PLEASE ADVISE: [phone]

## 2022-03-08 DIAGNOSIS — E55.9 VITAMIN D DEFICIENCY: ICD-10-CM

## 2022-03-08 RX ORDER — ERGOCALCIFEROL 1.25 MG/1
CAPSULE ORAL
Qty: 12 CAPSULE | Refills: 0 | Status: SHIPPED | OUTPATIENT
Start: 2022-03-08 | End: 2022-06-08

## 2022-03-17 ENCOUNTER — HOSPITAL ENCOUNTER (OUTPATIENT)
Dept: MAMMOGRAPHY | Facility: CLINIC | Age: 77
Discharge: HOME | End: 2022-03-17
Payer: MEDICARE

## 2022-03-17 ENCOUNTER — HOSPITAL ENCOUNTER (OUTPATIENT)
Dept: ULTRASOUND IMAGING | Facility: CLINIC | Age: 77
Discharge: HOME | End: 2022-03-17
Payer: MEDICARE

## 2022-03-17 VITALS — WEIGHT: 165 LBS | BODY MASS INDEX: 32.39 KG/M2 | HEIGHT: 60 IN

## 2022-03-17 DIAGNOSIS — R92.8 ABNORMAL SCREENING MAMMOGRAM: ICD-10-CM

## 2022-03-17 PROCEDURE — 76642 ULTRASOUND BREAST LIMITED: CPT

## 2022-03-17 PROCEDURE — G0279 TOMOSYNTHESIS, MAMMO: HCPCS

## 2022-03-17 PROCEDURE — 77065 DX MAMMO INCL CAD UNI: CPT

## 2022-03-17 NOTE — PROGRESS NOTES
Met with patient and Dr Ferro Manual regarding recommendation for;    __X___ RIGHT ______LEFT      __X___Ultrasound guided  ______Stereotactic breast biopsy  __X___Verbalized understanding        Blood thinners:  No: __X___ Yes: ______ What:                 Biopsy teaching sheet given:  Yes: ___X___ No: ________    Pt given contact information and adv to call with any questions/needs

## 2022-03-18 DIAGNOSIS — R10.13 EPIGASTRIC PAIN: ICD-10-CM

## 2022-03-18 RX ORDER — PANTOPRAZOLE SODIUM 40 MG/1
TABLET, DELAYED RELEASE ORAL
Qty: 30 TABLET | Refills: 5 | Status: SHIPPED | OUTPATIENT
Start: 2022-03-18 | End: 2022-06-28

## 2022-03-25 ENCOUNTER — HOSPITAL ENCOUNTER (OUTPATIENT)
Dept: MAMMOGRAPHY | Facility: CLINIC | Age: 77
Discharge: HOME | End: 2022-03-25

## 2022-03-25 ENCOUNTER — HOSPITAL ENCOUNTER (OUTPATIENT)
Dept: ULTRASOUND IMAGING | Facility: CLINIC | Age: 77
Discharge: HOME | End: 2022-03-25
Admitting: NURSE PRACTITIONER
Payer: MEDICARE

## 2022-03-25 VITALS — HEART RATE: 82 BPM | SYSTOLIC BLOOD PRESSURE: 138 MMHG | DIASTOLIC BLOOD PRESSURE: 84 MMHG

## 2022-03-25 DIAGNOSIS — R92.8 ABNORMAL MAMMOGRAM: ICD-10-CM

## 2022-03-25 DIAGNOSIS — R92.8 ABNORMAL FINDINGS ON DIAGNOSTIC IMAGING OF BREAST: ICD-10-CM

## 2022-03-25 PROCEDURE — 88341 IMHCHEM/IMCYTCHM EA ADD ANTB: CPT | Performed by: PATHOLOGY

## 2022-03-25 PROCEDURE — 88361 TUMOR IMMUNOHISTOCHEM/COMPUT: CPT | Performed by: PATHOLOGY

## 2022-03-25 PROCEDURE — 19083 BX BREAST 1ST LESION US IMAG: CPT

## 2022-03-25 PROCEDURE — 88342 IMHCHEM/IMCYTCHM 1ST ANTB: CPT | Performed by: PATHOLOGY

## 2022-03-25 PROCEDURE — 88305 TISSUE EXAM BY PATHOLOGIST: CPT | Performed by: PATHOLOGY

## 2022-03-25 PROCEDURE — 88374 M/PHMTRC ALYS ISHQUANT/SEMIQ: CPT | Performed by: PATHOLOGY

## 2022-03-25 PROCEDURE — 19285 PERQ DEV BREAST 1ST US IMAG: CPT

## 2022-03-25 PROCEDURE — A4648 IMPLANTABLE TISSUE MARKER: HCPCS

## 2022-03-25 RX ORDER — LIDOCAINE HYDROCHLORIDE 10 MG/ML
5 INJECTION, SOLUTION EPIDURAL; INFILTRATION; INTRACAUDAL; PERINEURAL ONCE
Status: CANCELLED | OUTPATIENT
Start: 2022-03-25

## 2022-03-25 RX ORDER — LIDOCAINE HYDROCHLORIDE 10 MG/ML
5 INJECTION, SOLUTION EPIDURAL; INFILTRATION; INTRACAUDAL; PERINEURAL ONCE
Status: COMPLETED | OUTPATIENT
Start: 2022-03-25 | End: 2022-03-25

## 2022-03-25 RX ADMIN — LIDOCAINE HYDROCHLORIDE 5 ML: 10 INJECTION, SOLUTION EPIDURAL; INFILTRATION; INTRACAUDAL; PERINEURAL at 10:37

## 2022-03-25 NOTE — PROGRESS NOTES
Procedure type:    __x___ultrasound guided _____stereotactic    Breast:    _____Left ___x__Right    Location: 12 o'clock 5 cmfn    Needle: 12 gauge william     # of passes: 4    Clip: Maciej    Performed by: Dr Ashley Sutherland held for 5 minutes by: Tawanna Donahue Strips:    __x__yes _____no    Band aid:    ___x__yes_____no    Tape and guaze:    _____yes ___x__no    Tolerated procedure:    __x__yes _____no

## 2022-03-25 NOTE — PROGRESS NOTES
Ice pack given:    ___x__yes _____no    Discharge instructions signed by patient:    ___x__yes _____no    Discharge instructions given to patient:    __x___yes _____no    Discharged via:    __x___ambulatory    _____wheelchair    _____stretcher    Stable on discharge:    __x___yes ____no  Patient would like results over the phone  Ok to leave a message

## 2022-03-25 NOTE — DISCHARGE INSTR - OTHER ORDERS
POST LARGE CORE BREAST BIOPSY PATIENT INFORMATION      Place an ice pack inside your bra over the top of the dressing every hour for 20 minutes (20 minutes on, 60 minutes off)  Do this until bedtime  Do not shower or bathe until the following morning  After bathing, you may remove the bandaid  You may bathe your breast carefully with the steri-strips in place  Be careful not to loosen them  The steri-strips will fall off in 3-5 days  You may have mild discomfort, and you may have some bruising where the needle entered the skin  This should clear within 5-7 days  If you need medicine for discomfort, take acetaminophen products such as Tylenol  You may also take Advil or Motrin products  DO NOT use Aspirin for the first 24 hours  Do not participate in strenuous activities such as-tennis, aerobics, weight lifting, etc  for 24 hours  Refrain from swimming/soaking for 72 hours  Wearing a bra for sleeping may be more comfortable for the first 24-48 hours after your biopsy  Watch for continued bleeding, pain or fever over 101  If any of these symptoms occur, please contact our breast nurse navigator at the location where your biopsy was performed  During normal business hours (7:30am - 4:00pm) please call the nurse navigator at the site     where your procedure was performed:       [facility] [address]: [phone] or [phone] [address] [facility] Floor: [phone] or [phone]     [address]: [address] [facility]: [address]: [phone]        After 4pm - please call your physician or go to the nearest Emergency Department location  The final results of your biopsy are usually available within one week

## 2022-03-28 NOTE — PROGRESS NOTES
Post procedure call completed    Bleeding: _____yes __X___no (pt denies)    Pain: _____yes ___X___no (pt denies, used ice, took Tylenol x1 with relief)    Redness/Swelling: ______yes ___X___no (pt with a little swelling)    Band aid removed: __X___yes _____no    Steri-Strips intact: ___X___yes _____no (discussed with patient to remove steri strips on Wednesday if they have not come off on their own)    Pt with no questions at this time, adv will call when results available, adv to call with any questions or concerns, has name/# for contact

## 2022-03-29 ENCOUNTER — PATIENT OUTREACH (OUTPATIENT)
Dept: HEMATOLOGY ONCOLOGY | Facility: CLINIC | Age: 77
End: 2022-03-29

## 2022-03-29 ENCOUNTER — TELEPHONE (OUTPATIENT)
Dept: MAMMOGRAPHY | Facility: CLINIC | Age: 77
End: 2022-03-29

## 2022-03-29 DIAGNOSIS — C50.911 MALIGNANT NEOPLASM OF RIGHT FEMALE BREAST, UNSPECIFIED ESTROGEN RECEPTOR STATUS, UNSPECIFIED SITE OF BREAST (HCC): Primary | ICD-10-CM

## 2022-03-29 NOTE — PROGRESS NOTES
Breast Oncology Nurse Navigator    Introduced myself and my role as nurse navigator  Offered patient a clinic spot for [facility]) for 22 at 1245  Explained the clinic, co-pays, length of visit, etc   Patient is agreeable and will bring her sister  Patient does not drive, nor does her family  Currently takes the bus without issues  Her daughter and son live with her  Her son has heart failure and pulmonary HTN  This is a source of stress to the patient  Referral to [facility] placed as part of [facility]  Patient has a mass on her pancreas that is monitored annually by Dr Abram Jean Baptiste, along with an annual CT of the pelvis  No prior cancer or autoimmune disorders  Sister  of pancreatic cancer  Brother had skin cancer and a cousin had breast cancer  No genetic testing performed  Patient has my contact info and knows to call with any questions  Will mail out business cards and paper work for [facility]

## 2022-03-29 NOTE — TELEPHONE ENCOUNTER
Call placed to patient after pt given biopsy results from radiologist, Dr Corrinne Cranker, questions answered, support given, adv next step is to set patient up with surgeon, options discussed and patient wanted to have appt made with Dr Ynes Ohara, discussed cancer care clinic, patient is interested in attending the clinic, adv that either [facility] or Vianca from the [address] [place] [address] Team would call her to schedule this appointment, pt states understanding, pt with no further questions at this time, pt given name/# for any additional needs

## 2022-03-30 ENCOUNTER — TELEPHONE (OUTPATIENT)
Dept: GENETICS | Facility: CLINIC | Age: 77
End: 2022-03-30

## 2022-03-30 NOTE — TELEPHONE ENCOUNTER
I called and spoke to this patient  Patient did not have time to speak at the moment  She did state that she is feeling overwhelmed at the moment and was not sure whether or not she wanted to proceed with testing at this time  STAT testing process was explained to the patient  I provided my direct number should she decide to proceed with genetic testing

## 2022-03-31 DIAGNOSIS — C50.911 MALIGNANT NEOPLASM OF RIGHT FEMALE BREAST, UNSPECIFIED ESTROGEN RECEPTOR STATUS, UNSPECIFIED SITE OF BREAST (HCC): Primary | ICD-10-CM

## 2022-04-01 ENCOUNTER — PATIENT OUTREACH (OUTPATIENT)
Dept: HEMATOLOGY ONCOLOGY | Facility: CLINIC | Age: 77
End: 2022-04-01

## 2022-04-01 PROBLEM — Z17.1 MALIGNANT NEOPLASM OF OVERLAPPING SITES OF RIGHT BREAST IN FEMALE, ESTROGEN RECEPTOR NEGATIVE (HCC): Status: ACTIVE | Noted: 2022-03-31

## 2022-04-01 PROBLEM — C50.811 MALIGNANT NEOPLASM OF OVERLAPPING SITES OF RIGHT BREAST IN FEMALE, ESTROGEN RECEPTOR NEGATIVE (HCC): Status: ACTIVE | Noted: 2022-03-31

## 2022-04-01 NOTE — PROGRESS NOTES
Breast  Oncology Nurse Navigator    Called patient to remind her of her appointment Monday 4/4/22 at the [address] [facility]  She knows where to go and when to be there  Knows to arrive 15 minutes early if paperwork does not arrive in the mail by tomorrow  Had some questions about which physicians she would be seeing on Monday  Questions answered to her satisfaction

## 2022-04-04 ENCOUNTER — CONSULT (OUTPATIENT)
Dept: HEMATOLOGY ONCOLOGY | Facility: CLINIC | Age: 77
End: 2022-04-04
Payer: MEDICARE

## 2022-04-04 ENCOUNTER — APPOINTMENT (OUTPATIENT)
Dept: LAB | Facility: HOSPITAL | Age: 77
End: 2022-04-04
Payer: MEDICARE

## 2022-04-04 ENCOUNTER — PATIENT OUTREACH (OUTPATIENT)
Dept: HEMATOLOGY ONCOLOGY | Facility: CLINIC | Age: 77
End: 2022-04-04

## 2022-04-04 ENCOUNTER — PATIENT OUTREACH (OUTPATIENT)
Dept: CASE MANAGEMENT | Facility: HOSPITAL | Age: 77
End: 2022-04-04

## 2022-04-04 ENCOUNTER — DOCUMENTATION (OUTPATIENT)
Dept: HEMATOLOGY ONCOLOGY | Facility: CLINIC | Age: 77
End: 2022-04-04

## 2022-04-04 ENCOUNTER — DOCUMENTATION (OUTPATIENT)
Dept: GENETICS | Facility: CLINIC | Age: 77
End: 2022-04-04

## 2022-04-04 ENCOUNTER — RADIATION ONCOLOGY CONSULT (OUTPATIENT)
Dept: RADIATION ONCOLOGY | Facility: CLINIC | Age: 77
End: 2022-04-04
Attending: RADIOLOGY
Payer: MEDICARE

## 2022-04-04 ENCOUNTER — CONSULT (OUTPATIENT)
Dept: SURGICAL ONCOLOGY | Facility: CLINIC | Age: 77
End: 2022-04-04
Payer: MEDICARE

## 2022-04-04 VITALS
HEART RATE: 104 BPM | OXYGEN SATURATION: 95 % | DIASTOLIC BLOOD PRESSURE: 74 MMHG | TEMPERATURE: 97.5 F | SYSTOLIC BLOOD PRESSURE: 142 MMHG | RESPIRATION RATE: 18 BRPM

## 2022-04-04 VITALS
HEART RATE: 104 BPM | DIASTOLIC BLOOD PRESSURE: 74 MMHG | RESPIRATION RATE: 18 BRPM | SYSTOLIC BLOOD PRESSURE: 142 MMHG | TEMPERATURE: 97.5 F | OXYGEN SATURATION: 95 %

## 2022-04-04 VITALS
SYSTOLIC BLOOD PRESSURE: 142 MMHG | OXYGEN SATURATION: 95 % | DIASTOLIC BLOOD PRESSURE: 74 MMHG | TEMPERATURE: 97.5 F | HEART RATE: 104 BPM | RESPIRATION RATE: 18 BRPM

## 2022-04-04 DIAGNOSIS — Z01.818 PRE-OP EXAMINATION: ICD-10-CM

## 2022-04-04 DIAGNOSIS — Z80.0 FAMILY HISTORY OF MALIGNANT NEOPLASM OF GASTROINTESTINAL TRACT: ICD-10-CM

## 2022-04-04 DIAGNOSIS — C50.811 MALIGNANT NEOPLASM OF OVERLAPPING SITES OF RIGHT BREAST IN FEMALE, ESTROGEN RECEPTOR NEGATIVE (HCC): Primary | ICD-10-CM

## 2022-04-04 DIAGNOSIS — Z17.1 MALIGNANT NEOPLASM OF OVERLAPPING SITES OF RIGHT BREAST IN FEMALE, ESTROGEN RECEPTOR NEGATIVE (HCC): Primary | ICD-10-CM

## 2022-04-04 DIAGNOSIS — C50.811 MALIGNANT NEOPLASM OF OVERLAPPING SITES OF RIGHT FEMALE BREAST, UNSPECIFIED ESTROGEN RECEPTOR STATUS (HCC): ICD-10-CM

## 2022-04-04 DIAGNOSIS — Z80.3 FAMILY HISTORY OF MALIGNANT NEOPLASM OF BREAST: ICD-10-CM

## 2022-04-04 DIAGNOSIS — Z17.1 MALIGNANT NEOPLASM OF OVERLAPPING SITES OF RIGHT BREAST IN FEMALE, ESTROGEN RECEPTOR NEGATIVE (HCC): ICD-10-CM

## 2022-04-04 DIAGNOSIS — C50.811 MALIGNANT NEOPLASM OF OVERLAPPING SITES OF RIGHT BREAST IN FEMALE, ESTROGEN RECEPTOR NEGATIVE (HCC): ICD-10-CM

## 2022-04-04 DIAGNOSIS — Z80.8 FAMILY HISTORY OF OTHER SPECIFIED MALIGNANT NEOPLASM: ICD-10-CM

## 2022-04-04 LAB
ALBUMIN SERPL BCP-MCNC: 3.7 G/DL (ref 3.5–5)
ALP SERPL-CCNC: 97 U/L (ref 46–116)
ALT SERPL W P-5'-P-CCNC: 33 U/L (ref 12–78)
ANION GAP SERPL CALCULATED.3IONS-SCNC: 9 MMOL/L (ref 4–13)
AST SERPL W P-5'-P-CCNC: 20 U/L (ref 5–45)
BASOPHILS # BLD AUTO: 0.06 THOUSANDS/ΜL (ref 0–0.1)
BASOPHILS NFR BLD AUTO: 1 % (ref 0–1)
BILIRUB SERPL-MCNC: 0.26 MG/DL (ref 0.2–1)
BUN SERPL-MCNC: 18 MG/DL (ref 5–25)
CALCIUM SERPL-MCNC: 9.1 MG/DL (ref 8.3–10.1)
CHLORIDE SERPL-SCNC: 104 MMOL/L (ref 100–108)
CO2 SERPL-SCNC: 27 MMOL/L (ref 21–32)
CREAT SERPL-MCNC: 1.29 MG/DL (ref 0.6–1.3)
EOSINOPHIL # BLD AUTO: 0.24 THOUSAND/ΜL (ref 0–0.61)
EOSINOPHIL NFR BLD AUTO: 4 % (ref 0–6)
ERYTHROCYTE [DISTWIDTH] IN BLOOD BY AUTOMATED COUNT: 15 % (ref 11.6–15.1)
GFR SERPL CREATININE-BSD FRML MDRD: 40 ML/MIN/1.73SQ M
GLUCOSE SERPL-MCNC: 91 MG/DL (ref 65–140)
HCT VFR BLD AUTO: 44.6 % (ref 34.8–46.1)
HGB BLD-MCNC: 13.9 G/DL (ref 11.5–15.4)
IMM GRANULOCYTES # BLD AUTO: 0.02 THOUSAND/UL (ref 0–0.2)
IMM GRANULOCYTES NFR BLD AUTO: 0 % (ref 0–2)
LYMPHOCYTES # BLD AUTO: 2.04 THOUSANDS/ΜL (ref 0.6–4.47)
LYMPHOCYTES NFR BLD AUTO: 29 % (ref 14–44)
MCH RBC QN AUTO: 27.9 PG (ref 26.8–34.3)
MCHC RBC AUTO-ENTMCNC: 31.2 G/DL (ref 31.4–37.4)
MCV RBC AUTO: 90 FL (ref 82–98)
MONOCYTES # BLD AUTO: 0.49 THOUSAND/ΜL (ref 0.17–1.22)
MONOCYTES NFR BLD AUTO: 7 % (ref 4–12)
NEUTROPHILS # BLD AUTO: 4.09 THOUSANDS/ΜL (ref 1.85–7.62)
NEUTS SEG NFR BLD AUTO: 59 % (ref 43–75)
NRBC BLD AUTO-RTO: 0 /100 WBCS
PLATELET # BLD AUTO: 357 THOUSANDS/UL (ref 149–390)
PMV BLD AUTO: 9.7 FL (ref 8.9–12.7)
POTASSIUM SERPL-SCNC: 4 MMOL/L (ref 3.5–5.3)
PROT SERPL-MCNC: 7.3 G/DL (ref 6.4–8.2)
RBC # BLD AUTO: 4.98 MILLION/UL (ref 3.81–5.12)
SODIUM SERPL-SCNC: 140 MMOL/L (ref 136–145)
WBC # BLD AUTO: 6.94 THOUSAND/UL (ref 4.31–10.16)

## 2022-04-04 PROCEDURE — 99205 OFFICE O/P NEW HI 60 MIN: CPT | Performed by: INTERNAL MEDICINE

## 2022-04-04 PROCEDURE — 99204 OFFICE O/P NEW MOD 45 MIN: CPT | Performed by: SURGERY

## 2022-04-04 PROCEDURE — 80053 COMPREHEN METABOLIC PANEL: CPT

## 2022-04-04 PROCEDURE — 36415 COLL VENOUS BLD VENIPUNCTURE: CPT

## 2022-04-04 PROCEDURE — 99204 OFFICE O/P NEW MOD 45 MIN: CPT | Performed by: RADIOLOGY

## 2022-04-04 PROCEDURE — 85025 COMPLETE CBC W/AUTO DIFF WBC: CPT

## 2022-04-04 RX ORDER — ACETAMINOPHEN AND CODEINE PHOSPHATE 300; 30 MG/1; MG/1
1 TABLET ORAL EVERY 4 HOURS PRN
Qty: 30 TABLET | Refills: 0 | Status: SHIPPED | OUTPATIENT
Start: 2022-04-04 | End: 2022-04-04 | Stop reason: CLARIF

## 2022-04-04 RX ORDER — NALOXONE HYDROCHLORIDE 4 MG/.1ML
SPRAY NASAL
Qty: 1 EACH | Refills: 1 | Status: SHIPPED | OUTPATIENT
Start: 2022-04-04

## 2022-04-04 RX ORDER — CEFAZOLIN SODIUM 1 G/50ML
1000 SOLUTION INTRAVENOUS ONCE
Status: CANCELLED | OUTPATIENT
Start: 2022-04-04 | End: 2022-04-04

## 2022-04-04 NOTE — PROGRESS NOTES
Consultation - Radiation Oncology      NOK:[id number] : 1945  Encounter: [id number]  Patient Information: Wally Sosa    Cancer Staging  Malignant neoplasm of overlapping sites of right breast in female, estrogen receptor negative ([facility] [address] )  Staging form: Breast, AJCC 8th Edition  - Clinical stage from 2022: Stage IB (cT1c, cN0, cM0, G2, ER-, WI-, HER2-) - Signed by Tahir Salas MD on 2022  Stage prefix: Initial diagnosis  Nuclear grade: G2  Histologic grading system: 3 grade system  HER2-IHC interpretation: Equivocal  HER2-IHC value: Score 2+  HER2-FISH interpretation: Negative         History of Present Illness   Wally Sosa is a 78y o  year old female with multiple comorbidities including peripheral vascular disease, CKD3, COPD (offered supplemental O2, but patient refused), pancreatic mass under observation, and osteoporosis who was found with new high density, irregularly shaped mass at the 11:00 position of the right breast on screening bilateral mammogram on 2/15/22  Left breast was benign  3/17/22 Diagnostic right mammogram and right breast US confirmed 14 mm high density, irregularly shaped mass seen in the right breast at 12 o'clock corresponding with the mass seen on screening mammogram and is new compared to the mammogram from 2019  US breast right limited (diagnostic): At 12 o'clock there is an irregular hypoechoic solid mass which measures 19mm and correlate with the mass seen on the mammogram   This is highly suggestive of malignancy  At 10:00 position was noted a 5mm oval mass with imaging consistent with simple cyst   Right axilla was benign  3/25/22n Ultrasound guided biopsy of the right breast showed grade 2 invasive breast carcinoma of no special type  There was no LVI  Tumor cells were ER/WI/Jai6Bin (-)  Imaging review showed the lesion was unifocal and encompasses 1 4cm on mammogram and 1 9 cm on ultrasound       The patient states that she has a palpable, tender area in the right breast, but ignored this lesion as it was not always symptomatic or palpable  She denies any other palpable nodules  She notes a rash over her right central breath that occurred over the last few months  She has similar rash over her arms and legs  She states she has had this condition since she was 14, but did not involve her breast prior  She denies nipple discharge or other skin changes  She denies upper extremity or shoulder restriction  She denies vaginal discharge or bleeding  Historical Information   Oncology History Overview Note        Malignant neoplasm of overlapping sites of right breast in female, estrogen receptor negative ([facility] [address] )   3/25/2022 Initial Diagnosis    Malignant neoplasm of right female breast ([facility] [address] )     3/25/2022 Biopsy    Right breast ultrasound guided biopsy  12 o'clock 5 cm from nipple  Invasive breast carcinoma of no special type (ductal NST/ invasive ductal carcinoma with apocrine features)  Grade 2  ER 0  SD 0  HER 2 2+   FISH pending  Lymphovascular invasion not identified    Concordant  Malignancy is unifocal  Mass measures 1 4 cm on mammogram and 1 9 cm on ultrasound  Right axilla ultrasound clear  Left brest clear  Amanda  reflector placed  In situ compononent: 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       2022 -  Cancer Staged    Staging form: Breast, AJCC 8th Edition  - Clinical stage from 2022: Stage IB (cT1c, cN0, cM0, G2, ER-, SD-, HER2-) - Signed by Jennifer oLng MD on 2022  Stage prefix: Initial diagnosis  Nuclear grade: G2  Histologic grading system: 3 grade system  HER2-IHC interpretation: Equivocal  HER2-IHC value: Score 2+  HER2-FISH interpretation: Negative         OB/GYN:U    Menstruation at 17  Menopause in 50s  No history of HRT    Past Medical History:   Diagnosis Date    Anxiety     Atherosclerosis of native artery of both lower extremities with intermittent claudication ([facility] [address] ) 10/15/2015    Transitioned From: Cardiovascular Symptoms    Carotid artery plaque, bilateral 3/21/2017    Transitioned From: Atherosclerosis of both carotid arteries    Chronic sinusitis     Last assessed: 13    COPD (chronic obstructive pulmonary disease) ([facility] [address] )     Fall 2021    Fracture, ankle closed, bimalleolar, right, initial encounter 2021    Hyperlipidemia     Lung nodule     PNA (pneumonia)     PVD (peripheral vascular disease) ([facility] [address] )     Restless leg syndrome     Stage [facility] 2019    Tobacco abuse      Past Surgical History:   Procedure Laterality Date    CATARACT EXTRACTION       SECTION      COLONOSCOPY      Complete   Resolved: 13    DESCENDING AORTIC ANEURYSM REPAIR W/ STENT  2019    IR AORTAGRAM WITH RUN-OFF  8/15/2019    SHOULDER SURGERY      For frozen shoulder     TONSILLECTOMY      US BREAST ISAI  NEEDLE LOC RIGHT Right 3/25/2022    US GUIDED BREAST BIOPSY RIGHT COMPLETE Right 3/25/2022       Family History   Problem Relation Age of Onset    No Known Problems Mother     No Known Problems Father     Arthritis Sister     Pancreatic cancer Sister 61    Melanoma Brother         twice    Prostate cancer Brother     Heart attack Family         Acute Myocardial Infarction    Cirrhosis Family     Prostate cancer Family     Skin cancer Family     Stroke Family         Syndrome    No Known Problems Daughter     No Known Problems Maternal Grandmother     No Known Problems Paternal Grandmother     No Known Problems Sister     No Known Problems Maternal Aunt     Breast cancer Other 27       Social History   Social History     Substance and Sexual Activity   Alcohol Use Yes    Comment: occasionally      Social History     Substance and Sexual Activity   Drug Use No     Social History     Tobacco Use   Smoking Status Former Smoker    Packs/day: 2 00    Years: 56 00    Pack years: 112 00    Types: Cigarettes    Start date: 2/21/1962   Guanako Hassan Quit date: 5/24/2018    Years since quitting: 3 8   Smokeless Tobacco Never Used         Meds/Allergies     Current Outpatient Medications:     albuterol (2 5 mg/3 mL) 0 083 % nebulizer solution, Take 1 vial (2 5 mg total) by nebulization every 6 (six) hours as needed for wheezing or shortness of breath, Disp: 360 mL, Rfl: 3    albuterol (PROVENTIL HFA,VENTOLIN HFA) 90 mcg/act inhaler, Inhale 2 puffs every 6 (six) hours as needed for wheezing, Disp: 3 Inhaler, Rfl: 3    ALPRAZolam (XANAX) 0 5 mg tablet, take 1 tablet by mouth twice a day if needed for anxiety (Patient taking differently: as needed ), Disp: 60 tablet, Rfl: 0    ergocalciferol (VITAMIN D2) 50,000 units, take 1 capsule by mouth every week, Disp: 12 capsule, Rfl: 0    fluticasone (FLONASE) 50 mcg/act nasal spray, 2 sprays into each nostril daily, Disp: 11 1 mL, Rfl: 1    fluticasone-vilanterol (Breo Ellipta) 100-25 mcg/inh inhaler, Inhale 1 puff daily Rinse mouth after use , Disp: 180 blister, Rfl: 1    gabapentin (NEURONTIN) 300 mg capsule, Take 1 capsule (300 mg total) by mouth 2 (two) times a day This is instead of 100 mg 2 caps b i d , Disp: 180 capsule, Rfl: 1    gabapentin (NEURONTIN) 400 mg capsule, Take 1 capsule (400 mg total) by mouth daily at bedtime, Disp: 90 capsule, Rfl: 1    olopatadine (PATANOL) 0 1 % ophthalmic solution,  , Disp: , Rfl: 0    oxyCODONE-acetaminophen (PERCOCET) 5-325 mg per tablet, Take 1 tablet by mouth every 6 (six) hours as needed for moderate painMax Daily Amount: 4 tablets, Disp: 120 tablet, Rfl: 0    pantoprazole (PROTONIX) 40 mg tablet, take 1 tablet by mouth daily before breakfast, Disp: 30 tablet, Rfl: 5    rosuvastatin (CRESTOR) 20 MG tablet, Take 1 tablet (20 mg total) by mouth daily, Disp: 90 tablet, Rfl: 6    sodium chloride (MIKO 128) 2 % hypertonic ophthalmic solution, Miko 128 2 % eye drops, Disp: , Rfl:     naloxone (NARCAN) 4 mg/0 1 mL nasal spray, Administer 1 spray into a nostril  If no response after 2-3 minutes, give another dose in the other nostril using a new spray , Disp: 1 each, Rfl: 1  Allergies   Allergen Reactions    Spiriva Handihaler [Tiotropium Bromide Monohydrate] Shortness Of Breath    Augmentin [Amoxicillin-Pot Clavulanate] Abdominal Pain     Pt received ceftriaxone 1 g IV on 5-21-18, gets sharp pains     Tiotropium Abdominal Pain    Tylenol With Codeine #3 [Acetaminophen-Codeine] Abdominal Pain         Review of Systems   Constitutional: Negative for fatigue and unexpected weight change  HENT: Negative for facial swelling, hearing loss, sore throat, tinnitus and trouble swallowing  Eyes: Positive for visual disturbance (decreased vision left eye; congenital corneal dysfunction)  Negative for photophobia, pain, discharge, redness and itching  Respiratory: Positive for cough  Negative for choking, wheezing and stridor  Shortness of breath: MENDOZA; pulmonary following related to COPD and MENDOZA with minimal exertion offered O2 by pulmonary  Cardiovascular: Negative  Gastrointestinal: Negative  Endocrine: Negative  Genitourinary: Negative  Musculoskeletal: Negative  Skin: Negative  Neurological: Negative  Hematological: Negative  Psychiatric/Behavioral: Negative for confusion and hallucinations  The patient is nervous/anxious  OBJECTIVE:   /74 (BP Location: Right arm, Patient Position: Sitting, Cuff Size: Adult)   Pulse 104   Temp 97 5 °F (36 4 °C)   Resp 18   SpO2 95%   Performance Status: Karnofsky: 70 - Cares for self; unable to carry on normal activity or do normal work     Physical Exam  Vitals and nursing note reviewed  Constitutional:       General: She is not in acute distress  Appearance: She is well-developed  Eyes:      General: No scleral icterus  Cardiovascular:      Rate and Rhythm: Normal rate and regular rhythm  Pulmonary:      Effort: No respiratory distress        Breath sounds: No wheezing, rhonchi or rales  Chest:   Breasts:      Right: No axillary adenopathy or supraclavicular adenopathy  Left: No axillary adenopathy or supraclavicular adenopathy  Comments: Breast exam demonstrates breasts symmetric in contour and size  There is a macular, flat, firm rash over the superior central right breast   There is a small palpable mildly tender nodule right breast at 12:00 position  No other palpable nodules or suspicious skin changes bilaterally  Abdominal:      General: There is no distension  Palpations: Abdomen is soft  Tenderness: There is no abdominal tenderness  Musculoskeletal:      Right lower leg: No edema  Left lower leg: No edema  Comments: Full range of motion upper extremities bilaterally  No upper extremity edema bilaterally  Lymphadenopathy:      Cervical: No cervical adenopathy  Upper Body:      Right upper body: No supraclavicular or axillary adenopathy  Left upper body: No supraclavicular or axillary adenopathy  Neurological:      Mental Status: She is alert and oriented to person, place, and time  Gait: Gait normal           RESULTS  Imaging Studies  US guided breast biopsy right complete, US breast lexy  right, Mammo post biopsy right    Addendum Date: 3/29/2022 Addendum:   ADDENDUM: PATHOLOGY RESULTS: A) Mass (Right; 12 o'clock; Anterior; 5 cm from nipple) Malignant finding: the pathology findings indicate invasive ductal carcinoma and ductal carcinoma in situ  The pathology result is concordant with imaging  Please see the dedicated pathology report in the patient's electronic medical record  The pathology is malignant  In review of the patients recent imaging, the right malignancy appears unifocal   The mass measures 14 x 10 x 9 mm on mammogram and 19 x 16 x 10 mm on ultrasound  Ultrasound of the right axilla was performed on 03/17/2022 and showed no suspicious adenopathy   Recent imaging of the contralateral left breast dated 02/15/2022 was reviewed and shows no suspicious findings  The malignant pathology results were discussed with the patient via telephone by Dr Estuardo Hartman at 10:33 a m  on 03/29/2022  RECOMMENDATION:      - Surgical Consultation for the right breast  END ADDENDUM    Result Date: 3/25/2022  Narrative: DIAGNOSIS: Abnormal mammogram INDICATION: Johnna Mccoy is a 78 y o  female presenting for ultrasound-guided core needle biopsy of a right breast mass  FINDINGS: RIGHT A) MASS at 12 o'clock, 5 cm from the nipple: Informed consent was obtained by myself, the performing physician  The right breast was marked  Limited ultrasound was performed  The biopsy site was marked  A time-out was performed  The biopsy area was evaluated with grayscale and color imaging  The skin was prepped in the usual fashion  Anesthesia was administered using 5 mL of lidocaine 1%  A 12 gauge spring-loaded biopsy device was used to obtain 4 samples under direct ultrasound guidance  A ISAI  reflector was placed into the mass under direct ultrasound guidance  The ISAI  probe was held over the procedure site and audible clicking was heard, confirming placement/functioning of the reflector  Hemostasis was obtained with direct pressure on the biopsy site  The patient tolerated the procedure well  There were no immediate complications  Post biopsy mammogram:  Right 2D CC and lateral views were obtained  The ISAI  reflector appears well placed, in the biopsy site at 12 o'clock, 5 cm from the nipple  This does correspond with the previously described mammogram finding  RECOMMENDATION:      - Waiting for pathology for the right breast  Workstation ID: [id number]    Mammo diagnostic right w 3d & cad, US breast right limited (diagnostic)    Result Date: 3/17/2022  Narrative: DIAGNOSIS: Abnormal screening mammogram TECHNIQUE: Digital diagnostic mammography was performed   Computer Aided Detection (CAD) analyzed all applicable images  Ultrasound of the right breast(s) was performed  COMPARISONS: Prior breast imaging dated: 02/15/2022, 09/04/2019, and 03/14/2013 RELEVANT HISTORY: Family Breast Cancer History: History of breast cancer in Other  Family Medical History: Family medical history includes breast cancer in other  Personal History: No known relevant hormone history  No known relevant surgical history  No known relevant medical history  RISK ASSESSMENT: 5 Year Tyrer-Cuzick: 1 07 % 10 Year Tyrer-Cuzick: 2 28 % Lifetime Tyrer-Cuzick: 2 53 % TISSUE DENSITY: There are scattered areas of fibroglandular density  INDICATION: Rajendra Metcalf is a 78 y o  female presenting for Abnormal screening mammogram  FINDINGS: RIGHT A) MASS Mammo diagnostic right w 3d & cad: There is a 14 mm x 10 mm x 9 mm high density, irregularly shaped mass seen in the right breast at 12 o'clock, 5 cm from the nipple, anterior depth  This corresponds with the mass seen on screening mammogram and is new compared to the mammogram from 2019  US breast right limited (diagnostic): At 12 o'clock, 5 cm from nipple there is an irregular hypoechoic solid mass which measures 19 x 16 x 10 mm (see the oblique images at the end of the ultrasound study)  The mass correlates with the mass seen on the mammogram   This is highly suggestive of malignancy  Appropriate action should be taken  Ultrasound-guided core needle biopsy is recommended  B) MASS Mammo diagnostic right w 3d & cad: There is a 5 mm oval mass with circumscribed margins seen in the upper outer quadrant of the right breast in the anterior depth  This is a new finding  US breast right limited (diagnostic): There is a 5 mm x 4 mm x 2 mm oval, parallel, anechoic mass with circumscribed margins in the right breast at 10 o'clock, 5 cm from the nipple  The cyst correlates with the mass seen on the mammogram and is benign in appearance  Axilla: Targeted ultrasound of the right axilla was performed    At least 1 morphologically benign lymph node was visualized  No morphologically abnormal lymph nodes were visualized  Impression: Right breast: 1  Mass at 12 o'clock, 5 cm from the nipple is highly suggestive of malignancy  Appropriate action should be taken  Ultrasound-guided core needle biopsy is recommended  I discussed the imaging findings and my level of concern with the patient  She met with the nurse navigator to schedule the procedure  2  Incidentally seen is a 5 mm simple cyst in the right breast at 10 o'clock, 5 cm from the nipple  This is benign in appearance and does not require dedicated follow-up imaging  3  Ultrasound of the right axilla was performed and no abnormal lymph nodes were seen  ASSESSMENT/BI-RADS CATEGORY: Right: 5 - Highly Suggestive of Malignancy Overall: 5 - Highly Suggestive of Malignancy RECOMMENDATION:      - Ultrasound-guided breast biopsy for the right breast  Workstation ID: [id number]       Pathology:   Collected 3/25/2022 10:51     Status: Edited Result - FINAL     Visible to patient: No (inaccessible in [address])     Dx: Abnormal mammogram; Abnormal findings        0 Result Notes    Component    Case Report   Surgical Pathology Report                         Case: [id number]                                    Authorizing Provider: ABIGAIL Madera          Collected:           03/25/2022 1051               Ordering Location:     [facility] Received:            03/25/2022 [id number]                                                               Pathologist:           Rafia Argueta MD                                                                 Specimen:    Breast, Right, US BX RT BREAST 12:00 5CMFN 4 PASSES 12G MARQUEE                            Addendum 2   Test Description                                       Results  HER-2/flash                                               Negative / Not Amplified Interpretation  Her-2/flash : CEP-17 ratio:                                    1 1:1     Average HER-2 Signal:                                         2 5  Average CEP-17 Signal:                                       2 3          Number of Selected Invasive Cells Scanned:       50     Interpretive Information  HER2/flash FISH results are best interpreted by analyzing the ratio with respect to the HER2/flash copy number  An H&E slide was reviewed and demonstrates lesional tissue  Analysis of HER2/flash gene amplification status was performed using an automated Fluorescence In Situ hybridization (FISH) signal enumeration system ([address] [place] [address])  This system scans up to 5 different specified areas of tumor  A minimum of 20 cells representing > 10% of contiguous and homogenous invasive tumor cells were scanned  * Negative / not amplified:  HER2/CEP17 ratio of <2 0 with an average HER-2 copy number of < 4 0 signals/cell  * Equivocal/Borderline: HER2/CEP17 ratio of <2 0 with an average HER-2 copy number of >/=4 0 and < 6 0 signals/cell  * Positive / Amplified: HER2/CEP17 ratio of >/= 2 0 with an average HER-2 copy number of >/= 4 0 signals/cell, or HER2/CEP17 ratio of >/=2 0 with an average HER-2 copy number of < 4 0 signals/cell, or HER2/CEP17 ratio of <2 0 with an average HER-2 copy number of >/=  6 0 signals/cell  References:  1   2013 ASCO/CAP HER2 Testing Guidelines Update:  Recommendations for Human Epidermal Growth factor Receptor 2 Testing in BREAST Cancer  Yina Chapa et al   Arch Pathol Lab Med  doi: 10 5858/arpa  [id number]  [employer] HER-2/flash DNA probe kit ([employer]) was used  This test was developed and its performance characteristics determined by [employer]  It has been approved by the FDA to detect the over-expression of the HER-2/flash gene in neoplastic breast tissue    It may help identify candidates for Herceptin therapy  This lab has been approved by CLIA 88, designated as a high complexity laboratory    Addendum electronically signed by Catarino Reynoso MD on 4/4/2022 at 0721   Addendum   Test Description                                     Result                            Prognostic Interpretation  Estrogen Receptor (clone SP-1)              0%                                  Negative              Internal control: Positive   External control: Positive                          Trae Score*:  0                                                         Progesterone Receptor (clone 1E2)         0%                                  Negative              Internal control: Positive                         External control: Positive                          Trae Score*:  0     HER2 by IHC (clone 4B5)                         2+                                  Equivocal (FISH pending)     Fixative         Duration of Fixation (hrs)     Cold Ischemia Time (mins)           Sample Adequacy  Formalin       12 hours                              Not stated                                    Adequate  Appropriate positive and negative controls were reviewed  These immunohistochemical tests were performed at [facility] in [place], [place] and interpreted by Dr Daniel Camacho  An electronic copy of this report will be kept on file in the Medical Records Department at [facility]   * The Trae score is a semi quantitative system that takes into consideration the proportion of positive cells (scored on a scale of 0-5) and staining intensity (scored on a scale of 0-3)  The proportion and intensity are summed to produce total scores of 0 or 2 through 8  A score of 0-2 is regarded as negative while 3-8 as positive  Addendum electronically signed by Catarino Reynoso MD on 4/1/2022 at 3265   Final Diagnosis   A   Right breast, 12:00, 5 cm from nipple (ultrasound-guided 12 gauge core biopsy, 4 passes):     - Invasive breast carcinoma of no special type (ductal NST/invasive ductal carcinoma with apocrine features)  -- Tumor present in at least 3 tissue cores with largest measurable size of 6 mm  -- mBR Grade:  Grade 2 of 3         - Duct formation:      Score 3 of 3         - Nuclear grade:       Score 3 of 3         - Mitotic rate:            Score 1 of 3     - In situ component: Favor 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     - Lymphovascular invasion:  Not definitively identified     Comment:  Block A2 forwarded for ER/PA/HER2 analysis with the results to be given in a supplemental report  Electronically signed by Kasie Saunders MD on 3/29/2022 at 4772   Comments: This is an appended report  These results have been appended to a previously preliminary verified report  Preliminary Diagnosis   A  Right breast, 12:00, 5 cm from nipple (ultrasound-guided 12 gauge core biopsy, 4 passes):     - Invasive carcinoma, pending additional studies  Preliminary result electronically signed by Kasie Saunders MD on 3/28/2022 at 0813   Microscopic Description    SOLEDAD-3 positive  P63 and SMM-HC negative in invasive carcinoma  E-cadherin and P120 catenin membranous  Controls stain appropriately  Comment: This is an appended report  These results have been appended to a previously preliminary verified report  Note    - Intradepartmental consultation concurs with the diagnosis  - CHUCHO Clarke from the [address] [place] [address] notified via [address] on 03/29/2022  Comment: This is an appended report  These results have been appended to a previously preliminary verified report     Additional Information    All reported additional testing was performed with appropriately reactive controls   These tests were developed and their performance characteristics determined by [address] [place] [address] or appropriate performing facility, though some tests may be performed on tissues which have not been validated for performance characteristics (such as staining performed on alcohol exposed cell blocks and decalcified tissues)   Results should be interpreted with caution and in the context of the patients clinical condition  These tests may not be cleared or approved by the [place]  Food and Drug Administration, though the FDA has determined that such clearance or approval is not necessary  These tests are used for clinical purposes and they should not be regarded as investigational or for research  This laboratory has been approved by [facility] 88, designated as a high-complexity laboratory and is qualified to perform these tests  Interpretation performed at [place] [id number]  Toy Goldberg Synoptic Checklist     INVASIVE CARCINOMA OF THE BREAST: Biopsy  Protocol posted: 11/10/2021  INVASIVE CARCINOMA OF THE BREAST: BIOPSY - All Specimens  SPECIMEN   Procedure  Needle biopsy    Specimen Laterality  Right    TUMOR   Tumor Site  Clock position      12 o'clock    Tumor Site  Distance from nipple (Centimeters): 5 cm   Histologic Type  Invasive carcinoma of no special type (ductal)    Histologic Grade (Mobile Histologic Score)     Glandular (Acinar) / Tubular Differentiation  Score 3    Nuclear Pleomorphism  Score 3    Mitotic Rate  Score 1    Overall Grade  Grade 2 (scores of 6 or 7)    Tumor Size  Greatest dimension of largest invasive focus (Millimeters): 6 mm   Ductal Carcinoma In Situ (DCIS)  Cannot be excluded    Lymphovascular Invasion  Cannot be determined: Not definitively identified    Microcalcifications  Present in invasive carcinoma      ASSESSMENT  No diagnosis found    Cancer Staging  Malignant neoplasm of overlapping sites of right breast in female, estrogen receptor negative ([facility] [address] )  Staging form: Breast, AJCC 8th Edition  - Clinical stage from 4/4/2022: Stage IB (cT1c, cN0, cM0, G2, ER-, VA-, HER2-) - Signed by Albaro Panchal MD on 4/4/2022  Stage prefix: Initial diagnosis  Nuclear grade: G2  Histologic grading system: 3 grade system  HER2-IHC interpretation: Equivocal  HER2-IHC value: Score 2+  HER2-FISH interpretation: Negative      PLAN/DISCUSSION  Rody Haas is a 78y o  year old female with multiple comorbidities recently diagnosed with clinical stage T2N0 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   Tumor cells are ER/HI/H2N (-)  Her case was presented at Multidisciplinary Breast Tumor Board this morning  Preliminary recommendation was surgery followed by chemotherapy followed by adjuvant radiation  Final recommendations await final pathology  I agree with these present recommendations based on current presentation  I reviewed pathology with the patient and her son at length  We discussed tumor board recommendations  Lumpectomy with sentinel lymph node is currently planned  Chemotherapy is indicated  If she is stage I, then we would recommend adjuvant hypofractionated whole breast radiation  We discussed that this would significantly improve local regional control and possible disease free survival     The rationale and potential benefits, as well as the risks and acute and late side effects and potential toxicities of radiation were discussed with the patient and her son at length  Side effects discussed included, but were not limited to: Fatigue, skin erythema, hyperpigmentation, desquamation, fibrosis, shrinkage of the breast resulting asymmetry, rib weakening, and pulmonary fibrosis  They were given the opportunity to ask questions and all questions were answered to their satisfaction  She agreed with management plan  She notes anxiety and her son has CHF and pulmonary hypertension  He requires surgery which is upcoming and she is concerned about being able to care for him and continue with his care    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 and needs to reschedule corneal transplant for vision in 1 eye  I encouraged her to discuss  I referred her to Samantha Lua, and to [facility] for support and help with care coordination  We will plan to see her again once surgery and systemic treatment is completed  We are available for any questions that may arise  Carson Rosenbaum MD  4/4/2022,3:08 PM      Portions of the record may have been created with voice recognition software  Occasional wrong word or "sound a like" substitutions may have occurred due to the inherent limitations of voice recognition software  Read the chart carefully and recognize, using context, where substitutions have occurred

## 2022-04-04 NOTE — PROGRESS NOTES
Breast Oncology Nurse Navigator    Patient seen at [facility] with Dr Deidre Pickett  Patient agreeable to go for genetic testing  Has my business card and knows to call with any questions

## 2022-04-04 NOTE — PROGRESS NOTES
MSW met with pt in [facility] this afternoon, she is here with her son and admits to feeling overwhelmed today  She shared that she, her son, and her daughter all live together in a small apartment  She says that her daughter is bipolar and their relationship can be tense at times  Her son has a lot of health issues going on, primarily cardiac  He will see his doctor tomorrow to come up with a treatment plan, so pt does not want to commit to any treatment for herself until she knows what the plan will be for him  Her son did share with me as well that he is 2 years sober from heroin and methamphetamine use and is working on quitting smoking  Pt and I agreed to reconnect later this week after she has some answers from her son's appointments, and then discuss how we can best help her through this time  She was appreciative of the time spent talking, and I provided her with my direct contact information as well  No other concerns at this time

## 2022-04-04 NOTE — PROGRESS NOTES
Hematology/Oncology Consult Note    Date of Service: 4/4/2022    [facility]  [facility]   [address] [place] [address]    Reason for Consultation:  Right breast invasive ductal carcinoma, DCIS    AJCC 8th Edition Cancer Stage:   Cancer Staging  Malignant neoplasm of overlapping sites of right breast in female, estrogen receptor negative ([address] )  Staging form: Breast, AJCC 8th Edition  - Clinical stage from 4/4/2022: Stage IB (cT1c, cN0, cM0, G2, ER-, TN-, HER2-) - Signed by Melvin Groves MD on 4/4/2022  Stage prefix: Initial diagnosis  Nuclear grade: G2  Histologic grading system: 3 grade system  HER2-IHC interpretation: Equivocal  HER2-IHC value: Score 2+  HER2-FISH interpretation: Negative    Referral Physician:  Dr Anurag Landrum    Oncology/Hematology History:  · November 24, 2021 CT scan chest abdomen pelvis showed a stable pancreatic head mass lesion no intra-abdominal abdomen are not see identified  No evidence of metastatic disease  Fatty liver  · February 15, 2022 patient had screening mammogram:  RIGHT  A) MASS: There is a high density, irregularly shaped mass seen in the right breast at 11 o'clock in the anterior depth       Left  There are no suspicious masses, grouped microcalcifications or areas of unexplained architectural distortion  The skin and nipple areolar complex are unremarkable  · February 15, 2022 DEXA scan:  Osteoporosis in the left femoral neck  · March 17, 2022 diagnostic mammogram and ultrasound:  RIGHT  A) MASS  Mammo diagnostic right w 3d & cad: There is a 14 mm x 10 mm x 9 mm high density, irregularly shaped mass seen in the right breast at 12 o'clock, 5 cm from the nipple, anterior depth  This corresponds with the mass seen on screening mammogram and is new compared to the mammogram from 2019  US breast right limited (diagnostic):   At 12 o'clock, 5 cm from nipple there is an irregular hypoechoic solid mass which measures 19 x 16 x 10 mm (see the oblique images at the end of the ultrasound study)  The mass correlates with the mass seen on the mammogram   This is highly suggestive of malignancy  Appropriate action should be taken  Ultrasound-guided core needle biopsy is recommended  B) MASS  Mammo diagnostic right w 3d & cad: There is a 5 mm oval mass with circumscribed margins seen in the upper outer quadrant of the right breast in the anterior depth  This is a new finding  US breast right limited (diagnostic): There is a 5 mm x 4 mm x 2 mm oval, parallel, anechoic mass with circumscribed margins in the right breast at 10 o'clock, 5 cm from the nipple  The cyst correlates with the mass seen on the mammogram and is benign in appearance  Axilla: Targeted ultrasound of the right axilla was performed  At least 1 morphologically benign lymph node was visualized  No morphologically abnormal lymph nodes were visualized      IMPRESSION:     Right breast:  1  Mass at 12 o'clock, 5 cm from the nipple is highly suggestive of malignancy  Appropriate action should be taken  Ultrasound-guided core needle biopsy is recommended  I discussed the imaging findings and my level of concern with the patient  She met with the nurse navigator to schedule the procedure  2  Incidentally seen is a 5 mm simple cyst in the right breast at 10 o'clock, 5 cm from the nipple  This is benign in appearance and does not require dedicated follow-up imaging  3  Ultrasound of the right axilla was performed and no abnormal lymph nodes were seen  1 March 25, 2022 ultrasound-guided biopsy of right breast mass:  Final Diagnosis   A  Right breast, 12:00, 5 cm from nipple (ultrasound-guided 12 gauge core biopsy, 4 passes):     - Invasive breast carcinoma of no special type (ductal NST/invasive ductal carcinoma with apocrine features)         -- Tumor present in at least 3 tissue cores with largest measurable size of 6 mm        -- mBR Grade:  Grade 2 of 3         - Duct formation:      Score 3 of 3         - Nuclear grade:       Score 3 of 3         - Mitotic rate:            Score 1 of 3     - In situ component: Favor 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     - Lymphovascular invasion:  Not definitively identified        Addendum   Test Description                                     Result                            Prognostic Interpretation  Estrogen Receptor (clone SP-1)              0%                                  Negative              Internal control: Positive   External control: Positive                          Trae Score*:  0                                                         Progesterone Receptor (clone 1E2)         0%                                  Negative              Internal control: Positive                         External control: Positive                          Trae Score*:  0     HER2 by IHC (clone 4B5)                         2+                                  Equivocal (FISH pending)            Assessment and Recommendations:     I personally reviewed the lab results,  the image studies,  pathology, other specialty/physicians consult notes/recommendations, and outside medical records  I had a lengthy discussion with the patient and shared the work-up findings  We discussed the diagnosis and management plan as below  1  Right breast invasive ductal carcinoma, initially diagnosed in March 25, 2022, ER/KS negative  HER-2 equivocal by IHC but negative by FISH  The tumor measures 1 9 x 1 6 x 1 0 cm on ultrasound  No clinical suspicious lymphadenopathy  [id number], stage T1B  Grade 2  This case was discussed in multidisciplinary breast cancer tumor board with recommendation of breast conservation therapy, followed by adjuvant chemotherapy with TC for 4 cycles, adjuvant radiation therapy  Initially, patient declined genetic testing    After discussion the benefits of genetic testing for her aggressive breast cancer, she agreed oncologic Genetics referral  Dr Marium Montalvo and Dr Isadora Wetzel will consult patient today  I will see patient in 4 weeks to discuss the final pathology result and detailed adjuvant chemotherapy plan  Patient fully understood and agreed for the plan  2  Inherited gene predisposition:  Family history of pancreatic cancer involving her sister and brother  Brother also had melanoma  Her niece was diagnosed breast cancer and tested for BRCA mutation positive  Status post bilateral mastectomy  Patient also has a stable mass in the head of pancreas  Refering patient to oncologic Genetics  3  Bone health/osteoporosis  Last DEXA scan in February 15, 2022  Patient takes calcium, vitamin-D , doing exercise  Patient is on Prolia every 6 months  Patient will continue current management plan  DEXA scan every 2 years  4  Pancreatic mass that has been monitored by Dr Ermias Logan  CT scan in November 24, 2021 showed stable pancreatic head lesion measuring 1 6 x 1 6 cm  Repeat CT scan as per Dr Ermias Logan  5  CKD stage 3  Self hydration is recommended  Follow-up with primary care physician  6  Follow-up, return to clinic in 4-5 weeks with labs prior  Greater than 50% of this 60 minute visit was spent in counseling, as detailed above  Thank you very much for your consultation and making us part of this nice patient's care  I will continue to follow closely with you  Please contact me with any additional questions  Disclaimer: This document was prepared using [employer] Flu[employer] Direct technology  If a word or phrase is confusing, or does not make sense, this is likely due to recognition error which was not discovered during the providers review  If you believe an error has occurred, please contact me through [employer] service for jackson? cation       HPI:   Jess Shields is a 78 y o  female with a past medical history of CKD stage IIIA, stable pancreatic mass, osteoporosis, COPD and recent diagnosis of right breast invasive ductal carcinoma who  presents in our multidisciplinary breast cancer clinic for comanagement of breast cancer  Patient has screening mammogram in February 15, 2022 which showed high density, irregularly shaped mass in the right breast at the 11 o'clock position  Diagnostic mammogram confirmed a 1 4 x 1 0 x 0 9 cm high density, irregular shaped mass in the right breast on March 17, 2022  Right breast ultrasound shows an irregular hypoechoic solid mass measuring 1 9 x 1 6 x 1 0 cm  There is a 5 mm cyst in the right breast   No suspicious axillar adenopathy  Ultrasound-guided biopsy of right breast on March 25, 2022 showed invasive ductal carcinoma with apocrine features  Grade 2, with DCIS component  No lymphovascular invasion  ER and NH negative, HER2 equivocal by IHC  HER2 POSITIVE BY FISH  This case was discussed in our multidisciplinary breast cancer tumor board with recommendation of oncologic genetic counseling, breast conservative surgery if genetic test negative, adjuvant chemotherapy with TC x4 cycles, and adjuvant XRT  Patient also has history of a CKD stage IIIA  Ferritin is stable  DEXA scan showed osteoporosis in February 15, 2022  Patient takes calcium, vitamin-D and doing exercise  Patient feels fine  No fever or chills  No exertional chest pain, diaphoresis or shortness  of breath  No cough and phlegm, no hemoptysis  Patient denied nausea  and vomiting  No abdominal pain  No diarrhea or constipation  No  symptoms  No headache or blurred vision  No seizure activity  Appetite good  No significant weight loss or weight gain  The patient denies bleeding anywhere      Review of system:  12-point review of system was performed, pertinent positive and negative were detailed as above    Past Medical History:   Diagnosis Date    Anxiety     Atherosclerosis of native artery of both lower extremities with intermittent claudication ([facility] ) 10/15/2015    Transitioned From: Cardiovascular Symptoms    Carotid artery plaque, bilateral 3/21/2017    Transitioned From: Atherosclerosis of both carotid arteries    Chronic sinusitis     Last assessed: 13    COPD (chronic obstructive pulmonary disease) ([facility] )     Fall 2021    Fracture, ankle closed, bimalleolar, right, initial encounter 2021    Hyperlipidemia     Lung nodule     PNA (pneumonia)     PVD (peripheral vascular disease) ([facility] )     Restless leg syndrome     Stage 3 chronic kidney disease ([facility] ) 2019    Tobacco abuse        Past Surgical History:   Procedure Laterality Date    CATARACT EXTRACTION       SECTION      COLONOSCOPY      Complete  Resolved: 13    DESCENDING AORTIC ANEURYSM REPAIR W/ STENT  2019    IR AORTAGRAM WITH RUN-OFF  8/15/2019    SHOULDER SURGERY      For frozen shoulder     TONSILLECTOMY      US BREAST ISAI  NEEDLE LOC RIGHT Right 3/25/2022    US GUIDED BREAST BIOPSY RIGHT COMPLETE Right 3/25/2022       Family History   Problem Relation Age of Onset    No Known Problems Mother     No Known Problems Father     Arthritis Sister     Pancreatic cancer Sister 61    Melanoma Brother         twice    Prostate cancer Brother     Heart attack Family         Acute Myocardial Infarction    Cirrhosis Family     Prostate cancer Family     Skin cancer Family     Stroke Family         Syndrome    No Known Problems Daughter     No Known Problems Maternal Grandmother     No Known Problems Paternal Grandmother     No Known Problems Sister     No Known Problems Maternal Aunt     Breast cancer Other 27       Social History     Socioeconomic History    Marital status:       Spouse name: Not on file    Number of children: 2    Years of education: Not on file    Highest education level: Not on file   Occupational History    Occupation: Retired/ Tobacco Use    Smoking status: Former Smoker     Packs/day: 2 00     Years: 56 00     Pack years: 112 00     Types: Cigarettes     Start date: 2/21/1962     Quit date: 5/24/2018     Years since quitting: 3 8    Smokeless tobacco: Never Used   Vaping Use    Vaping Use: Never used   Substance and Sexual Activity    Alcohol use: Yes     Comment: occasionally     Drug use: No    Sexual activity: Not Currently     Partners: Male   Other Topics Concern    Not on file   Social History Narrative    Living w/adult children    No advance directive on file     Social Determinants of Health     Financial Resource Strain: Not on file   Food Insecurity: Not on file   Transportation Needs: Not on file   Physical Activity: Inactive    Days of Exercise per Week: 0 days    Minutes of Exercise per Session: 0 min   Stress: Stress Concern Present    Feeling of Stress : Very much   Social Connections: Not on file   Intimate Partner Violence: Not on file   Housing Stability: Not on file       Allergies   Allergen Reactions    Spiriva Handihaler [Tiotropium Bromide Monohydrate] Shortness Of Breath    Augmentin [Amoxicillin-Pot Clavulanate] Abdominal Pain     Pt received ceftriaxone 1 g IV on 5-21-18, gets sharp pains     Tiotropium Abdominal Pain    Tylenol With Codeine #3 [Acetaminophen-Codeine] Abdominal Pain       Current Outpatient Medications   Medication Sig Dispense Refill    albuterol (2 5 mg/3 mL) 0 083 % nebulizer solution Take 1 vial (2 5 mg total) by nebulization every 6 (six) hours as needed for wheezing or shortness of breath 360 mL 3    albuterol (PROVENTIL HFA,VENTOLIN HFA) 90 mcg/act inhaler Inhale 2 puffs every 6 (six) hours as needed for wheezing 3 Inhaler 3    ALPRAZolam (XANAX) 0 5 mg tablet take 1 tablet by mouth twice a day if needed for anxiety (Patient taking differently: as needed ) 60 tablet 0    ergocalciferol (VITAMIN D2) 50,000 units take 1 capsule by mouth every week 12 capsule 0    fluticasone (FLONASE) 50 mcg/act nasal spray 2 sprays into each nostril daily 11 1 mL 1    fluticasone-vilanterol (Breo Ellipta) 100-25 mcg/inh inhaler Inhale 1 puff daily Rinse mouth after use  180 blister 1    gabapentin (NEURONTIN) 300 mg capsule Take 1 capsule (300 mg total) by mouth 2 (two) times a day This is instead of 100 mg 2 caps b i d  180 capsule 1    gabapentin (NEURONTIN) 400 mg capsule Take 1 capsule (400 mg total) by mouth daily at bedtime 90 capsule 1    naloxone (NARCAN) 4 mg/0 1 mL nasal spray Administer 1 spray into a nostril  If no response after 2-3 minutes, give another dose in the other nostril using a new spray  1 each 1    olopatadine (PATANOL) 0 1 % ophthalmic solution    0    oxyCODONE-acetaminophen (PERCOCET) 5-325 mg per tablet Take 1 tablet by mouth every 6 (six) hours as needed for moderate painMax Daily Amount: 4 tablets 120 tablet 0    pantoprazole (PROTONIX) 40 mg tablet take 1 tablet by mouth daily before breakfast 30 tablet 5    rosuvastatin (CRESTOR) 20 MG tablet Take 1 tablet (20 mg total) by mouth daily 90 tablet 6    sodium chloride () 2 % hypertonic ophthalmic solution Sue 128 2 % eye drops       No current facility-administered medications for this visit  (Not in a hospital admission)      Objective:     24 Hour Vitals Assessment:  Vitals:    04/04/22 1300   BP: 142/74   Pulse: 104   Resp: 18   Temp: 97 5 °F (36 4 °C)   SpO2: 95%       PHYSICIAN EXAM:    General: Appearance: alert, cooperative, no distress  HEENT: Normocephalic, atraumatic  No scleral icterus  conjunctivae clear  PERRL, EOM's intact  No sinus drainage or tenderness  Chest: No tenderness  Breasts:  Bilateral breast symmetrical  No skin change  No nipple retraction or abnormal discharge  No palpable mass  No palpable axillary LN enlargement  Lungs: Clear to auscultation bilaterally, Respirations unlabored    Cardiac: Regular rate and rhythm, S1and S2 are normal, no murmur  Abdomen: Soft, non-tender, non-distended  Bowel sounds are normal  No masses, no organomegaly  Pelvic: deferred  Extremities:  No edema, cyanosis, clubbing  Skin: Skin color, turgor are normal  No rashes  Lymphatics: no palpable adenopathy  Neurologic: Alert and oriented X 3,  no focal neuro deficits  Normal strength and sensation  DATA REVIEW:    Pathology Result:    Final Diagnosis   Date Value Ref Range Status   03/25/2022   Final    A  Right breast, 12:00, 5 cm from nipple (ultrasound-guided 12 gauge core biopsy, 4 passes):     - Invasive breast carcinoma of no special type (ductal NST/invasive ductal carcinoma with apocrine features)  -- Tumor present in at least 3 tissue cores with largest measurable size of 6 mm  -- mBR Grade:  Grade 2 of 3         - Duct formation: Score 3 of 3         - Nuclear grade: Score 3 of 3         - Mitotic rate: Score 1 of 3     - In situ component: Favor 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     - Lymphovascular invasion:  Not definitively identified    Comment:  Block A2 forwarded for ER/PA/HER2 analysis with the results to be given in a supplemental report  Comment: This is an appended report  These results have been appended to a previously preliminary verified report  05/28/2019   Final    A  Esophagus, biopsy:             - Reactive squamous mucosa  - Benign oxyntic mucosa  - No intestinal metaplasia, dysplasia or malignancy is identified  Interpretation performed at [address]           Image Results: They are reviewed and documented in Hematology/Oncology history    US guided breast biopsy right complete, US breast lexy  right, Mammo post biopsy right  Addendum: ADDENDUM:     PATHOLOGY RESULTS:    A) Mass (Right; 12 o'clock;  Anterior; 5 cm from nipple)   Malignant finding: the pathology findings indicate invasive ductal  carcinoma and ductal carcinoma in situ  The pathology result is concordant  with imaging  Please see the dedicated pathology report in the patient's electronic  medical record  The pathology is malignant  In review of the patients recent imaging, the  right malignancy appears unifocal   The mass measures 14 x 10 x 9 mm on  mammogram and 19 x 16 x 10 mm on ultrasound  Ultrasound of the right  axilla was performed on 03/17/2022 and showed no suspicious adenopathy  Recent imaging of the contralateral left breast dated 02/15/2022 was  reviewed and shows no suspicious findings  The malignant pathology results were discussed with the patient via  telephone by Dr Maribell Diaz at 10:33 a m  on 03/29/2022  RECOMMENDATION:        - Surgical Consultation for the right breast      END ADDENDUM  Narrative: DIAGNOSIS: Abnormal mammogram     INDICATION: Fadia Cristina is a 78 y o  female presenting for   ultrasound-guided core needle biopsy of a right breast mass  FINDINGS:   RIGHT  A) MASS at 12 o'clock, 5 cm from the nipple: Informed consent was obtained by myself, the performing physician  The   right breast was marked  Limited ultrasound was performed  The biopsy site was marked  A time-out   was performed  The biopsy area was evaluated with grayscale and color   imaging  The skin was prepped in the usual fashion  Anesthesia was   administered using 5 mL of lidocaine 1%  A 12 gauge spring-loaded biopsy   device was used to obtain 4 samples under direct ultrasound guidance  A   ISAI  reflector was placed into the mass under direct ultrasound   guidance  The ISAI  probe was held over the procedure site and   audible clicking was heard, confirming placement/functioning of the   reflector  Hemostasis was obtained with direct pressure on the biopsy   site  The patient tolerated the procedure well  There were no immediate   complications         Post biopsy mammogram:  Right 2D CC and lateral views were obtained  The   ISAI  reflector appears well placed, in the biopsy site at 12   o'clock, 5 cm from the nipple  This does correspond with the previously   described mammogram finding  RECOMMENDATION:       - Waiting for pathology for the right breast     Workstation ID: [id number]      LABS:  Lab data are reviewed and documented in [facility]       Recent Results (from the past 48 hour(s))   Comprehensive metabolic panel    Collection Time: 04/04/22  4:04 PM   Result Value Ref Range    Sodium 140 136 - 145 mmol/L    Potassium 4 0 3 5 - 5 3 mmol/L    Chloride 104 100 - 108 mmol/L    CO2 27 21 - 32 mmol/L    ANION GAP 9 4 - 13 mmol/L    BUN 18 5 - 25 mg/dL    Creatinine 1 29 0 60 - 1 30 mg/dL    Glucose 91 65 - 140 mg/dL    Calcium 9 1 8 3 - 10 1 mg/dL    AST 20 5 - 45 U/L    ALT 33 12 - 78 U/L    Alkaline Phosphatase 97 46 - 116 U/L    Total Protein 7 3 6 4 - 8 2 g/dL    Albumin 3 7 3 5 - 5 0 g/dL    Total Bilirubin 0 26 0 20 - 1 00 mg/dL    eGFR 40 ml/min/1 73sq m   CBC and differential    Collection Time: 04/04/22  4:04 PM   Result Value Ref Range    WBC 6 94 4 31 - 10 16 Thousand/uL    RBC 4 98 3 81 - 5 12 Million/uL    Hemoglobin 13 9 11 5 - 15 4 g/dL    Hematocrit 44 6 34 8 - 46 1 %    MCV 90 82 - 98 fL    MCH 27 9 26 8 - 34 3 pg    MCHC 31 2 (L) 31 4 - 37 4 g/dL    RDW 15 0 11 6 - 15 1 %    MPV 9 7 8 9 - 12 7 fL    Platelets 253 598 - 791 Thousands/uL    nRBC 0 /100 WBCs    Neutrophils Relative 59 43 - 75 %    Immat GRANS % 0 0 - 2 %    Lymphocytes Relative 29 14 - 44 %    Monocytes Relative 7 4 - 12 %    Eosinophils Relative 4 0 - 6 %    Basophils Relative 1 0 - 1 %    Neutrophils Absolute 4 09 1 85 - 7 62 Thousands/µL    Immature Grans Absolute 0 02 0 00 - 0 20 Thousand/uL    Lymphocytes Absolute 2 04 0 60 - 4 47 Thousands/µL    Monocytes Absolute 0 49 0 17 - 1 22 Thousand/µL    Eosinophils Absolute 0 24 0 00 - 0 61 Thousand/µL    Basophils Absolute 0 06 0 00 - 0 10 Thousands/µL       By: Serene Howe MD, 4/4/2022, 6:19 PM                                  Primary Care Physician:  Thompson Ramsay MD

## 2022-04-04 NOTE — PROGRESS NOTES
BREAST CANCER MULTIDISCIPLINARY CASE REVIEW    DATE:  4/4/22      PRESENTING DOCTOR:  Dr Tiarra Lees      DIAGNOSIS:  Right breast IDC grade 2    STAGING:      Enmanuel Zavala is a 78 y o  female was who presented at the Breast Multidisciplinary Case Conference today  She will be seen this afternoon at the [facility]  Mass was found after screening mammogram     GENETICS:  Referral placed  Patient declined  IMAGING REVIEWED:   3/17/22-mammogram diagnostic right w 3d & cad      PATHOLOGY REVIEWED:  3/25/22-right breast biopsy tissue exam    PHYSICIAN RECOMMENDED PLAN:  · Schedule breast conservation surgery  · Await FISH results          FOLLOW UP APPOINTMENTS:  4/4/22-Patient to be seen in consult  at [id number] [facility] by Juana Guardado and [place]      Team agreed to plan  The final treatment plan will be left to the discretion of the patient and the treating physician  DISCLAIMERS:  TO THE TREATING PHYSICIAN:  This conference is a meeting of clinicians from various specialty areas who evaluate and discuss patients for whom a multidisciplinary treatment approach is being considered  Please note that the above opinion was a consensus of the conference attendees and is intended only to assist in quality care of the discussed patient  The responsibility for follow up on the input given during the conference, along with any final decisions regarding plan of care, is that of the patient and the patient's provider  Accordingly, appointments have only been recommended based on this information and have NOT been scheduled unless otherwise noted  TO THE PATIENT:  This summary is a brief record of major aspects of your cancer treatment  You may choose to share a copy with any of your doctors or nurses  However, this is not a detailed or comprehensive record of your care        CCRN guidelines were readily available for review at this discussion

## 2022-04-04 NOTE — PROGRESS NOTES
Surgical Oncology Consult Note       [facility]  [facility] [address] [address]    Carmen Nuñez  1945  [id number]      No chief complaint on file  Assessment/Plan    1  Malignant neoplasm of overlapping sites of right breast in female, estrogen receptor negative [facility])         Oncology History Overview Note   2/15/22 screening B/L mammogram  RIGHT  A) MASS: There is a high density, irregularly shaped mass seen in the right breast at 11 o'clock in the anterior depth  Left  There are no suspicious masses, grouped microcalcifications or areas of unexplained architectural distortion  The skin and nipple areolar complex are unremarkable  RECOMMENDATION:       - Diagnostic mammogram and ultrasound at the current time for the right breast     3/17/22 Diagnostic right mammogram and right breast US  RIGHT  A) MASS  Mammo diagnostic right w 3d & cad: There is a 14 mm x 10 mm x 9 mm high density, irregularly shaped mass seen in the right breast at 12 o'clock, 5 cm from the nipple, anterior depth  This corresponds with the mass seen on screening mammogram and is new compared to the mammogram from 2019  US breast right limited (diagnostic): At 12 o'clock, 5 cm from nipple there is an irregular hypoechoic solid mass which measures 19 x 16 x 10 mm (see the oblique images at the end of the ultrasound study)  The mass correlates with the mass seen on the mammogram   This is highly suggestive of malignancy  Appropriate action should be taken  Ultrasound-guided core needle biopsy is recommended  B) MASS  Mammo diagnostic right w 3d & cad: There is a 5 mm oval mass with circumscribed margins seen in the upper outer quadrant of the right breast in the anterior depth  This is a new finding  US breast right limited (diagnostic):  There is a 5 mm x 4 mm x 2 mm oval, parallel, anechoic mass with circumscribed margins in the right breast at 10 o'clock, 5 cm from the nipple  The cyst correlates with the mass seen on the mammogram and is benign in appearance  Axilla: Targeted ultrasound of the right axilla was performed  At least 1 morphologically benign lymph node was visualized  No morphologically abnormal lymph nodes were visualized  RECOMMENDATION:       - Ultrasound-guided breast biopsy for the right breast      Malignant neoplasm of overlapping sites of right breast in female, estrogen receptor negative ([facility] [address] )   3/25/2022 Initial Diagnosis    Malignant neoplasm of right female breast ([facility] [address] )     3/25/2022 Biopsy    Right breast ultrasound guided biopsy  12 o'clock 5 cm from nipple  Invasive breast carcinoma of no special type (ductal NST/ invasive ductal carcinoma with apocrine features)  Grade 2  ER 0  LA 0  HER 2 2+   FISH pending  Lymphovascular invasion not identified    Concordant  Malignancy is unifocal  Mass measures 1 4 cm on mammogram and 1 9 cm on ultrasound  Right axilla ultrasound clear  Left brest clear  Amanda  reflector placed  In situ compononent: 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  This is a 72-year-old female with recent diagnosis of right breast cancer at 12:00 p m  5 cm from nipple a invasive ductal carcinoma grade 2 ERPR HER2 negative she has been is referred to us for further management  She denies of any breast pain nipple discharge nipple retraction or skin changes  He is here with her son to discuss further management  Review of Systems   Constitutional: Negative for chills and fever  HENT: Negative for ear pain and sore throat  Eyes: Negative for pain and visual disturbance  Respiratory: Negative for cough and shortness of breath  Cardiovascular: Negative for chest pain and palpitations  Gastrointestinal: Negative for abdominal pain and vomiting  Genitourinary: Negative for dysuria and hematuria     Musculoskeletal: Negative for arthralgias and back pain  Skin: Negative for color change and rash  Neurological: Negative for seizures and syncope  All other systems reviewed and are negative  Past Medical History:      Patient Active Problem List   Diagnosis    Anxiety    Aortoiliac occlusive disease ([address] )    Carotid artery plaque, bilateral    Centrilobular emphysema (HCC)    Hyperlipidemia    Osteoporosis    Restless leg syndrome    Subclavian artery stenosis, left (HCC)    [facility]VD [facility]peripheral vascular [facility]) [facility]    Chronic sinusitis    Pancreatic mass    BMI 34 0-34 9,adult    Seborrheic keratoses    Stage 3 chronic kidney disease (HCC)    Closed avulsion fracture of lateral malleolus of right fibula    Prediabetes    Vitamin D deficiency    Acute right ankle pain    COPD (chronic obstructive pulmonary disease) ([facility]    Malignant neoplasm of overlapping sites of right breast in female, estrogen receptor negative ([address] )        Past Medical History:   Diagnosis Date    Anxiety     Atherosclerosis of native artery of both lower extremities with intermittent claudication ([address] ) 10/15/2015    Transitioned From: Cardiovascular Symptoms    Carotid artery plaque, bilateral 3/21/2017    Transitioned From: Atherosclerosis of both carotid arteries    Chronic sinusitis     Last assessed: 13    COPD (chronic obstructive pulmonary disease) ([address] )     Fall 2021    Fracture, ankle closed, bimalleolar, right, initial encounter 2021    Hyperlipidemia     Lung nodule     PNA (pneumonia)     PVD (peripheral vascular disease) ([address] )     Restless leg syndrome     Stage 3 chronic kidney disease ([address] ) 2019    Tobacco abuse         Past Surgical History:   Procedure Laterality Date    CATARACT EXTRACTION       SECTION      COLONOSCOPY      Complete   Resolved: 13    DESCENDING AORTIC ANEURYSM REPAIR W/ STENT  2019    IR AORTAGRAM WITH RUN-OFF  8/15/2019    SHOULDER SURGERY      For frozen shoulder     TONSILLECTOMY      US BREAST ISAI  NEEDLE LOC RIGHT Right 3/25/2022    US GUIDED BREAST BIOPSY RIGHT COMPLETE Right 3/25/2022        Family History   Problem Relation Age of Onset    No Known Problems Mother     No Known Problems Father     Arthritis Sister     Pancreatic cancer Sister 61    Melanoma Brother         twice    Prostate cancer Brother     Heart attack Family         Acute Myocardial Infarction    Cirrhosis Family     Prostate cancer Family     Skin cancer Family     Stroke Family         Syndrome    No Known Problems Daughter     No Known Problems Maternal Grandmother     No Known Problems Paternal Grandmother     No Known Problems Sister     No Known Problems Maternal Aunt     Breast cancer Other 27        Social History     Socioeconomic History    Marital status:       Spouse name: Not on file    Number of children: 2    Years of education: Not on file    Highest education level: Not on file   Occupational History    Occupation: Retired/   Tobacco Use    Smoking status: Former Smoker     Packs/day: 2 00     Years: 56 00     Pack years: 112 00     Types: Cigarettes     Start date: 2/21/1962     Quit date: 5/24/2018     Years since quitting: 3 8    Smokeless tobacco: Never Used   Vaping Use    Vaping Use: Never used   Substance and Sexual Activity    Alcohol use: Yes     Comment: occasionally     Drug use: No    Sexual activity: Not Currently     Partners: Male   Other Topics Concern    Not on file   Social History Narrative    Living w/adult children    No advance directive on file     Social Determinants of Health     Financial Resource Strain: Not on file   Food Insecurity: Not on file   Transportation Needs: Not on file   Physical Activity: Inactive    Days of Exercise per Week: 0 days    Minutes of Exercise per Session: 0 min   Stress: Stress Concern Present    Feeling of Stress : Very much   Social Connections: Not on file   Intimate Partner Violence: Not on file   Housing Stability: Not on file        Current Outpatient Medications:     albuterol (2 5 mg/3 mL) 0 083 % nebulizer solution, Take 1 vial (2 5 mg total) by nebulization every 6 (six) hours as needed for wheezing or shortness of breath, Disp: 360 mL, Rfl: 3    albuterol (PROVENTIL HFA,VENTOLIN HFA) 90 mcg/act inhaler, Inhale 2 puffs every 6 (six) hours as needed for wheezing, Disp: 3 Inhaler, Rfl: 3    ALPRAZolam (XANAX) 0 5 mg tablet, take 1 tablet by mouth twice a day if needed for anxiety (Patient taking differently: as needed ), Disp: 60 tablet, Rfl: 0    ergocalciferol (VITAMIN D2) 50,000 units, take 1 capsule by mouth every week, Disp: 12 capsule, Rfl: 0    fluticasone (FLONASE) 50 mcg/act nasal spray, 2 sprays into each nostril daily, Disp: 11 1 mL, Rfl: 1    fluticasone-vilanterol (Breo Ellipta) 100-25 mcg/inh inhaler, Inhale 1 puff daily Rinse mouth after use , Disp: 180 blister, Rfl: 1    gabapentin (NEURONTIN) 300 mg capsule, Take 1 capsule (300 mg total) by mouth 2 (two) times a day This is instead of 100 mg 2 caps b i d , Disp: 180 capsule, Rfl: 1    gabapentin (NEURONTIN) 400 mg capsule, Take 1 capsule (400 mg total) by mouth daily at bedtime, Disp: 90 capsule, Rfl: 1    olopatadine (PATANOL) 0 1 % ophthalmic solution,  , Disp: , Rfl: 0    oxyCODONE-acetaminophen (PERCOCET) 5-325 mg per tablet, Take 1 tablet by mouth every 6 (six) hours as needed for moderate painMax Daily Amount: 4 tablets, Disp: 120 tablet, Rfl: 0    pantoprazole (PROTONIX) 40 mg tablet, take 1 tablet by mouth daily before breakfast, Disp: 30 tablet, Rfl: 5    rosuvastatin (CRESTOR) 20 MG tablet, Take 1 tablet (20 mg total) by mouth daily, Disp: 90 tablet, Rfl: 6    sodium chloride () 2 % hypertonic ophthalmic solution, Sue 128 2 % eye drops, Disp: , Rfl:      Allergies   Allergen Reactions    Spiriva Handihaler [Tiotropium Bromide Monohydrate] Shortness Of Breath    Augmentin [Amoxicillin-Pot Clavulanate] Abdominal Pain     Pt received ceftriaxone 1 g IV on 5-21-18, gets sharp pains     Tiotropium Abdominal Pain    Tylenol With Codeine #3 [Acetaminophen-Codeine] Abdominal Pain       Physical Exam:     Vitals:    04/04/22 1308   BP: 142/74   Pulse: 104   Resp: 18   Temp: 97 5 °F (36 4 °C)   SpO2: 95%     Physical Exam  Constitutional:       Appearance: Normal appearance  HENT:      Head: Normocephalic and atraumatic  Nose: Nose normal       Mouth/Throat:      Mouth: Mucous membranes are moist    Eyes:      Pupils: Pupils are equal, round, and reactive to light  Cardiovascular:      Rate and Rhythm: Normal rate  Pulses: Normal pulses  Heart sounds: Normal heart sounds  Pulmonary:      Effort: Pulmonary effort is normal       Breath sounds: Normal breath sounds  Chest:      Comments: The bilateral Breast(s) were examined  There was not any sign of an inverted nipple, mass, nipple discharge, skin changes or tenderness  The bilateral  breast(s) were examined in the sitting and supine position  There are not any worrisome skin changes, tenderness, nipple changes, swelling ,bleeding or evidence of mass/s in all four quadrants  Dayanna survey demonstrated that there is not any evidence of any clinically suspicious axillary, pectoral or supraclavicular lymph nodes  Abdominal:      General: Bowel sounds are normal       Palpations: Abdomen is soft  Musculoskeletal:         General: Normal range of motion  Cervical back: Normal range of motion and neck supple  Skin:     General: Skin is warm  Neurological:      General: No focal deficit present  Mental Status: She is alert and oriented to person, place, and time  Psychiatric:         Mood and Affect: Mood normal          Behavior: Behavior normal          Thought Content: Thought content normal          Judgment: Judgment normal          Results:   A   Right breast, 12:00, 5 cm from nipple (ultrasound-guided 12 gauge core biopsy, 4 passes):     - Invasive breast carcinoma of no special type (ductal NST/invasive ductal carcinoma with apocrine features)  -- Tumor present in at least 3 tissue cores with largest measurable size of 6 mm  -- mBR Grade:  Grade 2 of 3         - Duct formation:      Score 3 of 3         - Nuclear grade:       Score 3 of 3         - Mitotic rate:            Score 1 of 3     - In situ component: Favor 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     - Lymphovascular invasion:  Not definitively identified    Test Description                                     Result                            Prognostic Interpretation  Estrogen Receptor (clone SP-1)              0%                                  Negative              Internal control: Positive   External control: Positive                          Trae Score*:  0                                                         Progesterone Receptor (clone 1E2)         0%                                  Negative              Internal control: Positive                         External control: Positive                          Trae Score*:  0     HER2 by IHC (clone 4B5)                         2+            HER2 negative by fish  Right breast US:RIGHT  A) MASS  Mammo diagnostic right w 3d & cad: There is a 14 mm x 10 mm x 9 mm high density, irregularly shaped mass seen in the right breast at 12 o'clock, 5 cm from the nipple, anterior depth  This corresponds with the mass seen on screening mammogram and is new compared to the mammogram from 2019  US breast right limited (diagnostic): At 12 o'clock, 5 cm from nipple there is an irregular hypoechoic solid mass which measures 19 x 16 x 10 mm (see the oblique images at the end of the ultrasound study)  The mass correlates with the mass seen on the mammogram   This is highly suggestive of malignancy  Appropriate action should be taken  Ultrasound-guided core needle biopsy is recommended  B) MASS  Mammo diagnostic right w 3d & cad: There is a 5 mm oval mass with circumscribed margins seen in the upper outer quadrant of the right breast in the anterior depth  This is a new finding  US breast right limited (diagnostic): There is a 5 mm x 4 mm x 2 mm oval, parallel, anechoic mass with circumscribed margins in the right breast at 10 o'clock, 5 cm from the nipple  The cyst correlates with the mass seen on the mammogram and is benign in appearance  Axilla: Targeted ultrasound of the right axilla was performed  At least 1 morphologically benign lymph node was visualized  No morphologically abnormal lymph nodes were visualized      IMPRESSION:     Right breast:  1  Mass at 12 o'clock, 5 cm from the nipple is highly suggestive of malignancy  Appropriate action should be taken  Ultrasound-guided core needle biopsy is recommended  I discussed the imaging findings and my level of concern with the patient  She met with the nurse navigator to schedule the procedure  2  Incidentally seen is a 5 mm simple cyst in the right breast at 10 o'clock, 5 cm from the nipple  This is benign in appearance and does not require dedicated follow-up imaging  3  Ultrasound of the right axilla was performed and no abnormal lymph nodes were seen        Discussion/Summary: This is a 59-year-old female with triple negative right breast cancer at 12:00 p m  invasive ductal type 10 x 14 x 9 mm  We did discuss her case in our as multidisciplinary breast tumor board this morning consensus was her to undergo breast conservation surgery followed by adjuvant chemotherapy and radiation  Have discussed patient with Medical and Radiation Oncology is this afternoon and agree with the consensus  Probably she will undergo TC adjuvant chemotherapy and whole breast radiation    Surgical consent was obtained for right breast conservation surgery with sentinel lymph node biopsy with is dual tracer technique   Surgical consent was obtained after explaining benefits, alternative, procedure and possible complications with regards above mentioned procedure  All her questions regarding the visit  as well as the  surgery were answered to  the patient's satisfaction  Advance Care Planning/Advance Directives: Aliya Zhao MD discussed the disease status, and treatment plans with [facility] 04/04/22 and will follow-up with the patient

## 2022-04-04 NOTE — H&P (VIEW-ONLY)
Surgical Oncology Consult Note       [facility]  [facility] [place]  [address] [id number]    Alex Mcleod  1945  [id number]      No chief complaint on file  Assessment/Plan    1  Malignant neoplasm of overlapping sites of right breast in female, estrogen receptor negative [facility])         Oncology History Overview Note   2/15/22 screening B/L mammogram  RIGHT  A) MASS: There is a high density, irregularly shaped mass seen in the right breast at 11 o'clock in the anterior depth  Left  There are no suspicious masses, grouped microcalcifications or areas of unexplained architectural distortion  The skin and nipple areolar complex are unremarkable  RECOMMENDATION:       - Diagnostic mammogram and ultrasound at the current time for the right breast     3/17/22 Diagnostic right mammogram and right breast US  RIGHT  A) MASS  Mammo diagnostic right w 3d & cad: There is a 14 mm x 10 mm x 9 mm high density, irregularly shaped mass seen in the right breast at 12 o'clock, 5 cm from the nipple, anterior depth  This corresponds with the mass seen on screening mammogram and is new compared to the mammogram from 2019  US breast right limited (diagnostic): At 12 o'clock, 5 cm from nipple there is an irregular hypoechoic solid mass which measures 19 x 16 x 10 mm (see the oblique images at the end of the ultrasound study)  The mass correlates with the mass seen on the mammogram   This is highly suggestive of malignancy  Appropriate action should be taken  Ultrasound-guided core needle biopsy is recommended  B) MASS  Mammo diagnostic right w 3d & cad: There is a 5 mm oval mass with circumscribed margins seen in the upper outer quadrant of the right breast in the anterior depth  This is a new finding  US breast right limited (diagnostic):  There is a 5 mm x 4 mm x 2 mm oval, parallel, anechoic mass with circumscribed margins in the right breast at 10 o'clock, 5 cm from the nipple  The cyst correlates with the mass seen on the mammogram and is benign in appearance  Axilla: Targeted ultrasound of the right axilla was performed  At least 1 morphologically benign lymph node was visualized  No morphologically abnormal lymph nodes were visualized  RECOMMENDATION:       - Ultrasound-guided breast biopsy for the right breast      Malignant neoplasm of overlapping sites of right breast in female, estrogen receptor negative ([facility] [address] [address] )   3/25/2022 Initial Diagnosis    Malignant neoplasm of right female breast ([facility] [address] )     3/25/2022 Biopsy    Right breast ultrasound guided biopsy  12 o'clock 5 cm from nipple  Invasive breast carcinoma of no special type (ductal NST/ invasive ductal carcinoma with apocrine features)  Grade 2  ER 0  NH 0  HER 2 2+   FISH pending  Lymphovascular invasion not identified    Concordant  Malignancy is unifocal  Mass measures 1 4 cm on mammogram and 1 9 cm on ultrasound  Right axilla ultrasound clear  Left brest clear  Amanda  reflector placed  In situ compononent: 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  This is a 24-year-old female with recent diagnosis of right breast cancer at 12:00 p m  5 cm from nipple a invasive ductal carcinoma grade 2 ERPR HER2 negative she has been is referred to us for further management  She denies of any breast pain nipple discharge nipple retraction or skin changes  He is here with her son to discuss further management  Review of Systems   Constitutional: Negative for chills and fever  HENT: Negative for ear pain and sore throat  Eyes: Negative for pain and visual disturbance  Respiratory: Negative for cough and shortness of breath  Cardiovascular: Negative for chest pain and palpitations  Gastrointestinal: Negative for abdominal pain and vomiting  Genitourinary: Negative for dysuria and hematuria     Musculoskeletal: Negative for arthralgias and back pain  Skin: Negative for color change and rash  Neurological: Negative for seizures and syncope  All other systems reviewed and are negative  Past Medical History:      Patient Active Problem List   Diagnosis    Anxiety    Aortoiliac occlusive disease ([address] )    Carotid artery plaque, bilateral    Centrilobular emphysema (HCC)    Hyperlipidemia    Osteoporosis    Restless leg syndrome    Subclavian artery stenosis, left (HCC)    PVD (peripheral vascular disease) ([facility])    Chronic sinusitis    Pancreatic mass    BMI 34 0-34 9,adult    Seborrheic keratoses    Stage 3 chronic kidney disease (HCC)    Closed avulsion fracture of lateral malleolus of right fibula    Prediabetes    Vitamin D deficiency    Acute right ankle pain    COPD (chronic obstructive pulmonary disease) ([facility])    Malignant neoplasm of overlapping sites of right breast in female, estrogen receptor negative ([address] )        Past Medical History:   Diagnosis Date    Anxiety     Atherosclerosis of native artery of both lower extremities with intermittent claudication ([address] ) 10/15/2015    Transitioned From: Cardiovascular Symptoms    Carotid artery plaque, bilateral 3/21/2017    Transitioned From: Atherosclerosis of both carotid arteries    Chronic sinusitis     Last assessed: 13    COPD (chronic obstructive pulmonary disease) ([address] )     Fall 2021    Fracture, ankle closed, bimalleolar, right, initial encounter 2021    Hyperlipidemia     Lung nodule     PNA (pneumonia)     PVD (peripheral vascular disease) ([address] )     Restless leg syndrome     Stage 3 chronic kidney disease ([address] ) 2019    Tobacco abuse         Past Surgical History:   Procedure Laterality Date    CATARACT EXTRACTION       SECTION      COLONOSCOPY      Complete   Resolved: 13    DESCENDING AORTIC ANEURYSM REPAIR W/ STENT  2019    IR AORTAGRAM WITH RUN-OFF  8/15/2019    SHOULDER SURGERY      For frozen shoulder     TONSILLECTOMY      US BREAST ISAI  NEEDLE LOC RIGHT Right 3/25/2022    US GUIDED BREAST BIOPSY RIGHT COMPLETE Right 3/25/2022        Family History   Problem Relation Age of Onset    No Known Problems Mother     No Known Problems Father     Arthritis Sister     Pancreatic cancer Sister 61    Melanoma Brother         twice    Prostate cancer Brother     Heart attack Family         Acute Myocardial Infarction    Cirrhosis Family     Prostate cancer Family     Skin cancer Family     Stroke Family         Syndrome    No Known Problems Daughter     No Known Problems Maternal Grandmother     No Known Problems Paternal Grandmother     No Known Problems Sister     No Known Problems Maternal Aunt     Breast cancer Other 27        Social History     Socioeconomic History    Marital status:       Spouse name: Not on file    Number of children: 2    Years of education: Not on file    Highest education level: Not on file   Occupational History    Occupation: Retired/   Tobacco Use    Smoking status: Former Smoker     Packs/day: 2 00     Years: 56 00     Pack years: 112 00     Types: Cigarettes     Start date: 2/21/1962     Quit date: 5/24/2018     Years since quitting: 3 8    Smokeless tobacco: Never Used   Vaping Use    Vaping Use: Never used   Substance and Sexual Activity    Alcohol use: Yes     Comment: occasionally     Drug use: No    Sexual activity: Not Currently     Partners: Male   Other Topics Concern    Not on file   Social History Narrative    Living w/adult children    No advance directive on file     Social Determinants of Health     Financial Resource Strain: Not on file   Food Insecurity: Not on file   Transportation Needs: Not on file   Physical Activity: Inactive    Days of Exercise per Week: 0 days    Minutes of Exercise per Session: 0 min   Stress: Stress Concern Present    Feeling of Stress : Very much   Social Connections: Not on file   Intimate Partner Violence: Not on file   Housing Stability: Not on file        Current Outpatient Medications:     albuterol (2 5 mg/3 mL) 0 083 % nebulizer solution, Take 1 vial (2 5 mg total) by nebulization every 6 (six) hours as needed for wheezing or shortness of breath, Disp: 360 mL, Rfl: 3    albuterol (PROVENTIL HFA,VENTOLIN HFA) 90 mcg/act inhaler, Inhale 2 puffs every 6 (six) hours as needed for wheezing, Disp: 3 Inhaler, Rfl: 3    ALPRAZolam (XANAX) 0 5 mg tablet, take 1 tablet by mouth twice a day if needed for anxiety (Patient taking differently: as needed ), Disp: 60 tablet, Rfl: 0    ergocalciferol (VITAMIN D2) 50,000 units, take 1 capsule by mouth every week, Disp: 12 capsule, Rfl: 0    fluticasone (FLONASE) 50 mcg/act nasal spray, 2 sprays into each nostril daily, Disp: 11 1 mL, Rfl: 1    fluticasone-vilanterol (Breo Ellipta) 100-25 mcg/inh inhaler, Inhale 1 puff daily Rinse mouth after use , Disp: 180 blister, Rfl: 1    gabapentin (NEURONTIN) 300 mg capsule, Take 1 capsule (300 mg total) by mouth 2 (two) times a day This is instead of 100 mg 2 caps b i d , Disp: 180 capsule, Rfl: 1    gabapentin (NEURONTIN) 400 mg capsule, Take 1 capsule (400 mg total) by mouth daily at bedtime, Disp: 90 capsule, Rfl: 1    olopatadine (PATANOL) 0 1 % ophthalmic solution,  , Disp: , Rfl: 0    oxyCODONE-acetaminophen (PERCOCET) 5-325 mg per tablet, Take 1 tablet by mouth every 6 (six) hours as needed for moderate painMax Daily Amount: 4 tablets, Disp: 120 tablet, Rfl: 0    pantoprazole (PROTONIX) 40 mg tablet, take 1 tablet by mouth daily before breakfast, Disp: 30 tablet, Rfl: 5    rosuvastatin (CRESTOR) 20 MG tablet, Take 1 tablet (20 mg total) by mouth daily, Disp: 90 tablet, Rfl: 6    sodium chloride () 2 % hypertonic ophthalmic solution, Sue 128 2 % eye drops, Disp: , Rfl:      Allergies   Allergen Reactions    Spiriva Handihaler [Tiotropium Bromide Monohydrate] Shortness Of Breath    Augmentin [Amoxicillin-Pot Clavulanate] Abdominal Pain     Pt received ceftriaxone 1 g IV on 5-21-18, gets sharp pains     Tiotropium Abdominal Pain    Tylenol With Codeine #3 [Acetaminophen-Codeine] Abdominal Pain       Physical Exam:     Vitals:    04/04/22 1308   BP: 142/74   Pulse: 104   Resp: 18   Temp: 97 5 °F (36 4 °C)   SpO2: 95%     Physical Exam  Constitutional:       Appearance: Normal appearance  HENT:      Head: Normocephalic and atraumatic  Nose: Nose normal       Mouth/Throat:      Mouth: Mucous membranes are moist    Eyes:      Pupils: Pupils are equal, round, and reactive to light  Cardiovascular:      Rate and Rhythm: Normal rate  Pulses: Normal pulses  Heart sounds: Normal heart sounds  Pulmonary:      Effort: Pulmonary effort is normal       Breath sounds: Normal breath sounds  Chest:      Comments: The bilateral Breast(s) were examined  There was not any sign of an inverted nipple, mass, nipple discharge, skin changes or tenderness  The bilateral  breast(s) were examined in the sitting and supine position  There are not any worrisome skin changes, tenderness, nipple changes, swelling ,bleeding or evidence of mass/s in all four quadrants  Dayanna survey demonstrated that there is not any evidence of any clinically suspicious axillary, pectoral or supraclavicular lymph nodes  Abdominal:      General: Bowel sounds are normal       Palpations: Abdomen is soft  Musculoskeletal:         General: Normal range of motion  Cervical back: Normal range of motion and neck supple  Skin:     General: Skin is warm  Neurological:      General: No focal deficit present  Mental Status: She is alert and oriented to person, place, and time  Psychiatric:         Mood and Affect: Mood normal          Behavior: Behavior normal          Thought Content: Thought content normal          Judgment: Judgment normal          Results:   A   Right breast, 12:00, 5 cm from nipple (ultrasound-guided 12 gauge core biopsy, 4 passes):     - Invasive breast carcinoma of no special type (ductal NST/invasive ductal carcinoma with apocrine features)  -- Tumor present in at least 3 tissue cores with largest measurable size of 6 mm  -- mBR Grade:  Grade 2 of 3         - Duct formation:      Score 3 of 3         - Nuclear grade:       Score 3 of 3         - Mitotic rate:            Score 1 of 3     - In situ component: Favor 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     - Lymphovascular invasion:  Not definitively identified    Test Description                                     Result                            Prognostic Interpretation  Estrogen Receptor (clone SP-1)              0%                                  Negative              Internal control: Positive   External control: Positive                          Trae Score*:  0                                                         Progesterone Receptor (clone 1E2)         0%                                  Negative              Internal control: Positive                         External control: Positive                          Trae Score*:  0     HER2 by IHC (clone 4B5)                         2+            HER2 negative by fish  Right breast US:RIGHT  A) MASS  Mammo diagnostic right w 3d & cad: There is a 14 mm x 10 mm x 9 mm high density, irregularly shaped mass seen in the right breast at 12 o'clock, 5 cm from the nipple, anterior depth  This corresponds with the mass seen on screening mammogram and is new compared to the mammogram from 2019  US breast right limited (diagnostic): At 12 o'clock, 5 cm from nipple there is an irregular hypoechoic solid mass which measures 19 x 16 x 10 mm (see the oblique images at the end of the ultrasound study)  The mass correlates with the mass seen on the mammogram   This is highly suggestive of malignancy  Appropriate action should be taken  Ultrasound-guided core needle biopsy is recommended  B) MASS  Mammo diagnostic right w 3d & cad: There is a 5 mm oval mass with circumscribed margins seen in the upper outer quadrant of the right breast in the anterior depth  This is a new finding  US breast right limited (diagnostic): There is a 5 mm x 4 mm x 2 mm oval, parallel, anechoic mass with circumscribed margins in the right breast at 10 o'clock, 5 cm from the nipple  The cyst correlates with the mass seen on the mammogram and is benign in appearance  Axilla: Targeted ultrasound of the right axilla was performed  At least 1 morphologically benign lymph node was visualized  No morphologically abnormal lymph nodes were visualized      IMPRESSION:     Right breast:  1  Mass at 12 o'clock, 5 cm from the nipple is highly suggestive of malignancy  Appropriate action should be taken  Ultrasound-guided core needle biopsy is recommended  I discussed the imaging findings and my level of concern with the patient  She met with the nurse navigator to schedule the procedure  2  Incidentally seen is a 5 mm simple cyst in the right breast at 10 o'clock, 5 cm from the nipple  This is benign in appearance and does not require dedicated follow-up imaging  3  Ultrasound of the right axilla was performed and no abnormal lymph nodes were seen        Discussion/Summary: This is a 81-year-old female with triple negative right breast cancer at 12:00 p m  invasive ductal type 10 x 14 x 9 mm  We did discuss her case in our as multidisciplinary breast tumor board this morning consensus was her to undergo breast conservation surgery followed by adjuvant chemotherapy and radiation  Have discussed patient with Medical and Radiation Oncology is this afternoon and agree with the consensus  Probably she will undergo TC adjuvant chemotherapy and whole breast radiation    Surgical consent was obtained for right breast conservation surgery with sentinel lymph node biopsy with is dual tracer technique   Surgical consent was obtained after explaining benefits, alternative, procedure and possible complications with regards above mentioned procedure  All her questions regarding the visit  as well as the  surgery were answered to  the patient's satisfaction  Advance Care Planning/Advance Directives: Ansley Jones MD discussed the disease status, and treatment plans with Johnna Mccoy today 04/04/22 and will follow-up with the patient

## 2022-04-06 ENCOUNTER — TELEPHONE (OUTPATIENT)
Dept: INTERNAL MEDICINE CLINIC | Facility: CLINIC | Age: 77
End: 2022-04-06

## 2022-04-06 LAB — MISCELLANEOUS LAB TEST RESULT: NORMAL

## 2022-04-06 RX ORDER — OXYCODONE HYDROCHLORIDE AND ACETAMINOPHEN 5; 325 MG/1; MG/1
1 TABLET ORAL EVERY 6 HOURS PRN
Qty: 20 TABLET | Refills: 0 | Status: SHIPPED | OUTPATIENT
Start: 2022-04-06

## 2022-04-06 NOTE — TELEPHONE ENCOUNTER
Pt is scheduled for surg (breast cancer) on the 28th at [facility]  The surgeon wants pt to have a pre op exam clearance done by the 14th  No openings for a pre op appt available before the 14th on [place], or Mihir schedules      Please advise: [phone]

## 2022-04-07 ENCOUNTER — RA CDI HCC (OUTPATIENT)
Dept: OTHER | Facility: HOSPITAL | Age: 77
End: 2022-04-07

## 2022-04-07 ENCOUNTER — PATIENT OUTREACH (OUTPATIENT)
Dept: HEMATOLOGY ONCOLOGY | Facility: CLINIC | Age: 77
End: 2022-04-07

## 2022-04-07 NOTE — PROGRESS NOTES
Breast Oncology Nurse Navigator    Patient called late yesterday afternoon  States she forgot the names of the doctors she met on Monday at the [facility]  Listed the names of the doctors along with their specialties  Knows of her surgery date on 4/28/22  Going on Monday 4/11/22 for PAT  Did have blood work performed  Will see family doctor on 4/13/22 for surgical clearance  Patient states she was happy with the [facility]  States she feels relief after meeting all three of her oncologists at one visit  Overall, she states it was a positive experience  States she would love to be an advocate for women dealing with breast cancer

## 2022-04-13 ENCOUNTER — CONSULT (OUTPATIENT)
Dept: INTERNAL MEDICINE CLINIC | Facility: CLINIC | Age: 77
End: 2022-04-13
Payer: MEDICARE

## 2022-04-13 ENCOUNTER — OFFICE VISIT (OUTPATIENT)
Dept: LAB | Facility: HOSPITAL | Age: 77
End: 2022-04-13
Payer: MEDICARE

## 2022-04-13 ENCOUNTER — HOSPITAL ENCOUNTER (OUTPATIENT)
Dept: RADIOLOGY | Facility: HOSPITAL | Age: 77
Discharge: HOME | End: 2022-04-13
Payer: MEDICARE

## 2022-04-13 VITALS
HEIGHT: 60 IN | HEART RATE: 93 BPM | WEIGHT: 165.6 LBS | SYSTOLIC BLOOD PRESSURE: 132 MMHG | BODY MASS INDEX: 32.51 KG/M2 | DIASTOLIC BLOOD PRESSURE: 80 MMHG | OXYGEN SATURATION: 97 % | TEMPERATURE: 98.4 F

## 2022-04-13 DIAGNOSIS — I73.9 PVD (PERIPHERAL VASCULAR DISEASE) (HCC): Primary | ICD-10-CM

## 2022-04-13 DIAGNOSIS — Z17.1 MALIGNANT NEOPLASM OF OVERLAPPING SITES OF RIGHT BREAST IN FEMALE, ESTROGEN RECEPTOR NEGATIVE (HCC): ICD-10-CM

## 2022-04-13 DIAGNOSIS — C50.811 MALIGNANT NEOPLASM OF OVERLAPPING SITES OF RIGHT BREAST IN FEMALE, ESTROGEN RECEPTOR NEGATIVE (HCC): ICD-10-CM

## 2022-04-13 DIAGNOSIS — Z78.0 POSTMENOPAUSAL: ICD-10-CM

## 2022-04-13 DIAGNOSIS — N18.31 STAGE 3A CHRONIC KIDNEY DISEASE (HCC): ICD-10-CM

## 2022-04-13 DIAGNOSIS — I65.23 CAROTID ARTERY PLAQUE, BILATERAL: ICD-10-CM

## 2022-04-13 DIAGNOSIS — Z12.11 ENCOUNTER FOR SCREENING COLONOSCOPY: ICD-10-CM

## 2022-04-13 DIAGNOSIS — F41.1 GENERALIZED ANXIETY DISORDER: ICD-10-CM

## 2022-04-13 DIAGNOSIS — J41.0 SIMPLE CHRONIC BRONCHITIS (HCC): ICD-10-CM

## 2022-04-13 PROBLEM — F11.20 CONTINUOUS OPIOID DEPENDENCE (HCC): Status: ACTIVE | Noted: 2022-04-13

## 2022-04-13 PROCEDURE — G0439 PPPS, SUBSEQ VISIT: HCPCS | Performed by: INTERNAL MEDICINE

## 2022-04-13 PROCEDURE — 1123F ACP DISCUSS/DSCN MKR DOCD: CPT | Performed by: INTERNAL MEDICINE

## 2022-04-13 PROCEDURE — 99214 OFFICE O/P EST MOD 30 MIN: CPT | Performed by: INTERNAL MEDICINE

## 2022-04-13 PROCEDURE — 93005 ELECTROCARDIOGRAM TRACING: CPT

## 2022-04-13 PROCEDURE — 71046 X-RAY EXAM CHEST 2 VIEWS: CPT

## 2022-04-13 RX ORDER — ALPRAZOLAM 0.5 MG/1
0.5 TABLET ORAL 2 TIMES DAILY PRN
Qty: 60 TABLET | Refills: 0 | Status: SHIPPED | OUTPATIENT
Start: 2022-04-13

## 2022-04-13 NOTE — PROGRESS NOTES
Assessment and Plan:     Problem List Items Addressed This Visit     None      Visit Diagnoses     Encounter for immunization    -  Primary    Generalized anxiety disorder              Falls Plan of Care: balance, strength, and gait training instructions were provided  Preventive health issues were discussed with patient, and age appropriate screening tests were ordered as noted in patient's After Visit Summary  Personalized health advice and appropriate referrals for health education or preventive services given if needed, as noted in patient's After Visit Summary       History of Present Illness:     Patient presents for Medicare Annual Wellness visit    Patient Care Team:  Jackie Burgess MD as PCP - MD Lisandro Tafoya MD Cherie Karvonen, MD (Vascular Surgery)  Kasandra Glasgow MD (Surgical Oncology)  Cassandra Pearson MD (Dermatology)  Arpita Cuevas MD as Surgeon (Surgical Oncology)  Hernando Vance as  Care Manager ()     Problem List:     Patient Active Problem List   Diagnosis    Anxiety    [facility]ortoiliac occlusive disease ([facility] )    Carotid artery plaque, bilateral    Centrilobular emphysema ([facility] )    Hyperlipidemia    Osteoporosis    Restless leg syndrome    Subclavian artery stenosis, left ([facility] )    PVD (peripheral vascular disease) ([facility]    Chronic sinusitis    Pancreatic mass    BMI 34 0-34 9,adult    Seborrheic keratoses    Stage 3 chronic kidney disease ([facility] )    Closed avulsion fracture of lateral malleolus of right fibula    Prediabetes    Vitamin D deficiency    Acute right ankle pain    COPD (chronic obstructive pulmonary disease) ([facility]    Malignant neoplasm of overlapping sites of right breast in female, estrogen receptor negative ([facility] )      Past Medical and Surgical History:     Past Medical History:   Diagnosis Date    Anxiety     Atherosclerosis of native artery of both lower extremities with intermittent claudication ([facility] ) 10/15/2015    Transitioned From: Cardiovascular Symptoms    Carotid artery plaque, bilateral 3/21/2017    Transitioned From: Atherosclerosis of both carotid arteries    Chronic sinusitis     Last assessed: 13    COPD (chronic obstructive pulmonary disease) ([facility] )     Fall 2021    Fracture, ankle closed, bimalleolar, right, initial encounter 2021    Hyperlipidemia     Lung nodule     PNA (pneumonia)     PVD (peripheral vascular disease) ([place] [address] )     Restless leg syndrome     Stage 3 chronic kidney disease ([place] [address] ) 2019    Tobacco abuse      Past Surgical History:   Procedure Laterality Date    CATARACT EXTRACTION       SECTION      COLONOSCOPY      Complete  Resolved: 13    DESCENDING AORTIC ANEURYSM REPAIR W/ STENT  2019    IR AORTAGRAM WITH RUN-OFF  8/15/2019    SHOULDER SURGERY      For frozen shoulder     TONSILLECTOMY      US BREAST ISAI  NEEDLE LOC RIGHT Right 3/25/2022    US GUIDED BREAST BIOPSY RIGHT COMPLETE Right 3/25/2022      Family History:     Family History   Problem Relation Age of Onset    No Known Problems Mother     No Known Problems Father     Arthritis Sister     Pancreatic cancer Sister 61    Melanoma Brother         twice    Prostate cancer Brother     Heart attack Family         Acute Myocardial Infarction    Cirrhosis Family     Prostate cancer Family     Skin cancer Family     Stroke Family         Syndrome    No Known Problems Daughter     No Known Problems Maternal Grandmother     No Known Problems Paternal Grandmother     No Known Problems Sister     No Known Problems Maternal Aunt     Breast cancer Other 30      Social History:     Social History     Socioeconomic History    Marital status:       Spouse name: None    Number of children: 2    Years of education: None    Highest education level: None   Occupational History    Occupation: Retired/   Tobacco Use    Smoking status: Former Smoker     Packs/day: 2 00     Years: 56 00     Pack years: 112 00     Types: Cigarettes     Start date: 2/21/1962     Quit date: 5/24/2018     Years since quitting: 3 8    Smokeless tobacco: Never Used   Vaping Use    Vaping Use: Never used   Substance and Sexual Activity    Alcohol use: Yes     Comment: occasionally     Drug use: No    Sexual activity: Not Currently     Partners: Male   Other Topics Concern    None   Social History Narrative    Living w/adult children    No advance directive on file     Social Determinants of Health     Financial Resource Strain: Not on file   Food Insecurity: Not on file   Transportation Needs: Not on file   Physical Activity: Inactive    Days of Exercise per Week: 0 days    Minutes of Exercise per Session: 0 min   Stress: Stress Concern Present    Feeling of Stress : Very much   Social Connections: Not on file   Intimate Partner Violence: Not on file   Housing Stability: Not on file      Medications and Allergies:     Current Outpatient Medications   Medication Sig Dispense Refill    albuterol (2 5 mg/3 mL) 0 083 % nebulizer solution Take 1 vial (2 5 mg total) by nebulization every 6 (six) hours as needed for wheezing or shortness of breath 360 mL 3    albuterol (PROVENTIL HFA,VENTOLIN HFA) 90 mcg/act inhaler Inhale 2 puffs every 6 (six) hours as needed for wheezing 3 Inhaler 3    ALPRAZolam (XANAX) 0 5 mg tablet take 1 tablet by mouth twice a day if needed for anxiety (Patient taking differently: as needed ) 60 tablet 0    ergocalciferol (VITAMIN D2) 50,000 units take 1 capsule by mouth every week 12 capsule 0    fluticasone (FLONASE) 50 mcg/act nasal spray 2 sprays into each nostril daily 11 1 mL 1    fluticasone-vilanterol (Breo Ellipta) 100-25 mcg/inh inhaler Inhale 1 puff daily Rinse mouth after use   180 blister 1    gabapentin (NEURONTIN) 300 mg capsule Take 1 capsule (300 mg total) by mouth 2 (two) times a day This is instead of 100 mg 2 caps b i d  180 capsule 1    gabapentin (NEURONTIN) 400 mg capsule Take 1 capsule (400 mg total) by mouth daily at bedtime 90 capsule 1    naloxone (NARCAN) 4 mg/0 1 mL nasal spray Administer 1 spray into a nostril  If no response after 2-3 minutes, give another dose in the other nostril using a new spray  1 each 1    olopatadine (PATANOL) 0 1 % ophthalmic solution    0    oxyCODONE-acetaminophen (Percocet) 5-325 mg per tablet Take 1 tablet by mouth every 6 (six) hours as needed for moderate pain Max Daily Amount: 4 tablets 20 tablet 0    pantoprazole (PROTONIX) 40 mg tablet take 1 tablet by mouth daily before breakfast 30 tablet 5    rosuvastatin (CRESTOR) 20 MG tablet Take 1 tablet (20 mg total) by mouth daily 90 tablet 6    sodium chloride () 2 % hypertonic ophthalmic solution Sue 128 2 % eye drops      oxyCODONE-acetaminophen (PERCOCET) 5-325 mg per tablet Take 1 tablet by mouth every 6 (six) hours as needed for moderate painMax Daily Amount: 4 tablets (Patient not taking: Reported on 4/13/2022 ) 120 tablet 0     No current facility-administered medications for this visit       Allergies   Allergen Reactions    Spiriva Handihaler [Tiotropium Bromide Monohydrate] Shortness Of Breath    Augmentin [Amoxicillin-Pot Clavulanate] Abdominal Pain     Pt received ceftriaxone 1 g IV on 5-21-18, gets sharp pains     Tiotropium Abdominal Pain    Tylenol With Codeine #3 [Acetaminophen-Codeine] Abdominal Pain      Immunizations:     Immunization History   Administered Date(s) Administered    COVID-19 PFIZER VACCINE 0 3 ML IM 03/03/2021, 04/22/2021    INFLUENZA 11/07/2017, 01/14/2022    Influenza Split High Dose Preservative Free IM 10/15/2015, 12/22/2016    Influenza, high dose seasonal 0 7 mL 10/11/2018, 10/23/2019, 11/20/2020    Pneumococcal Conjugate 13-Valent 08/04/2016    Pneumococcal Polysaccharide PPV23 1945, 11/16/2015    Tdap 1945    influenza, trivalent, adjuvanted 10/11/2018, 10/23/2019, 11/20/2020      Health Maintenance:         Topic Date Due    Colorectal Cancer Screening  08/30/2021    Lung Cancer Screening  11/24/2022    Breast Cancer Screening: Mammogram  03/17/2023    DXA SCAN  02/15/2024    Hepatitis C Screening  Completed         Topic Date Due    DTaP,Tdap,and Td Vaccines (1 - Tdap) 05/21/1966    COVID-19 Vaccine (3 - Booster for Pfizer series) 09/22/2021      Medicare Health Risk Assessment:     /80 (BP Location: Left arm, Patient Position: Sitting, Cuff Size: Standard) Comment: bp  Pulse 93   Temp 98 4 °F (36 9 °C) (Temporal) Comment: no  Ht 5' (1 524 m)   Wt 75 1 kg (165 lb 9 6 oz)   SpO2 97%   BMI 32 34 kg/m²      Fletcher Dixon is here for her Subsequent Wellness visit  Health Risk Assessment:   Patient rates overall health as good  Patient feels that their physical health rating is slightly worse  Patient is satisfied with their life  Eyesight was rated as slightly worse  Hearing was rated as same  Patient feels that their emotional and mental health rating is slightly worse  Patients states they are sometimes angry  Patient states they are often unusually tired/fatigued  Pain experienced in the last 7 days has been none  Patient states that she has experienced no weight loss or gain in last 6 months  Fall Risk Screening: In the past year, patient has experienced: history of falling in past year    Number of falls: 1  Injured during fall?: Yes    Feels unsteady when standing or walking?: No    Worried about falling?: No      Urinary Incontinence Screening:   Patient has not leaked urine accidently in the last six months  Home Safety:  Patient has trouble with stairs inside or outside of their home  Patient has working smoke alarms and has working carbon monoxide detector  Home safety hazards include: none  Nutrition:   Current diet is Regular  Medications:   Patient is currently taking over-the-counter supplements   OTC medications include: see medication list  Patient is able to manage medications  Activities of Daily Living (ADLs)/Instrumental Activities of Daily Living (IADLs):   Walk and transfer into and out of bed and chair?: Yes  Dress and groom yourself?: Yes    Bathe or shower yourself?: Yes    Feed yourself? Yes  Do your laundry/housekeeping?: Yes  Manage your money, pay your bills and track your expenses?: Yes  Make your own meals?: Yes    Do your own shopping?: Yes    Previous Hospitalizations:   Any hospitalizations or ED visits within the last 12 months?: No      Advance Care Planning:   Living will: No    Durable POA for healthcare: No    Advanced directive: Yes      PREVENTIVE SCREENINGS      Cardiovascular Screening:    General: Screening Not Indicated and History Lipid Disorder      Diabetes Screening:     General: Screening Current      Breast Cancer Screening:     General: History Breast Cancer      Cervical Cancer Screening:    General: Screening Not Indicated      Osteoporosis Screening:    General: Screening Not Indicated and History Osteoporosis      Abdominal Aortic Aneurysm (AAA) Screening:        General: Screening Not Indicated and History AAA      Lung Cancer Screening:     General: Screening Current      Hepatitis C Screening:    General: Screening Current    Screening, Brief Intervention, and Referral to Treatment (SBIRT)    Screening  Typical number of drinks in a day: 2  Typical number of drinks in a week: 2  Interpretation: Low risk drinking behavior      Single Item Drug Screening:  How often have you used an illegal drug (including marijuana) or a prescription medication for non-medical reasons in the past year? never    Single Item Drug Screen Score: 0  Interpretation: Negative screen for possible drug use disorder    Review of Current Opioid Use    Opioid Risk Tool (ORT) Interpretation: Complete Opioid Risk Tool (ORT)      Nav Robles MD

## 2022-04-14 ENCOUNTER — TELEPHONE (OUTPATIENT)
Dept: INTERNAL MEDICINE CLINIC | Facility: CLINIC | Age: 77
End: 2022-04-14

## 2022-04-14 NOTE — TELEPHONE ENCOUNTER
Patient was seen yesterday  She is asking if Dr Rosemary Velasco decided to send in a script for gabapentin 100 mg for her to take prior to her surgery

## 2022-04-14 NOTE — PROGRESS NOTES
Assessment/Plan:       Diagnoses and all orders for this visit:    PVD (peripheral vascular disease) ([place] [address] )    Simple chronic bronchitis (HCC)    Stage 3a chronic kidney disease ([place] [address] )    Malignant neoplasm of overlapping sites of right breast in female, estrogen receptor negative ([place] [address] )    Generalized anxiety disorder  -     ALPRAZolam (XANAX) 0 5 mg tablet; Take 1 tablet (0 5 mg total) by mouth 2 (two) times a day as needed for anxiety    Encounter for screening colonoscopy  -     Ambulatory referral for colonoscopy; Future    Postmenopausal  -     DXA bone density spine hip and pelvis; Future    Carotid artery plaque, bilateral                Subjective:      Patient ID: Wally Sosa is a 78 y o  female  pre-surgical visit of this 78year-old found to have right breast malignancy  Care plan is lumpectomy, lymph node dissection RT and chemotherapy     She has multiple problems; a synopsis is atherosclerotic vascular disease of multiple areas ,and COPD  Both of these were a product of cigarette smoking  However, she stopped smoking about 3 years ago and feels significantly better  She also had vascular surgery  From the vascular note:       - Aortoiliac occlusive disease ([place] [address] )  - S/P angioplasty and stenting of abdominal aorta with 14 mm x 40 mm Protege GPS stent and 10mm x 20mm  Balloon (08/2019, josefina)      VAS AOIL 11/25/2020     Aorta and distal stents are patent  Ao 1 8 cm  Increased velocity noted mid aorta, 50% stenosis  Common iliac arteries patent  Greater than 70% celiac artery  SMA is patent  R ISABELLA 1 0/great toe pressure 99; L ISABELLA 0 83 decreased/great toe pressure 95        Carotid artery plaque, bilateral  Subclavian artery stenosis, left (HCC)  -   PVD [facility]peripheral vascular disease) [facility]  -as above      Chronic lung disease symptoms are fairly minimal     She has pulmonary nodules and is followed by CT surgery with surveillance CT scanning        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  She has 2 adult children both of whom are living with her in a 1 bedroom apartment   she gets aching in the thighs after a period of prolonged activity  It is probably vascular but I do not think it needs any further intervention beyond what is already being done  Smoking is a thing of the past   She is taking aspirin and Lipitor  Ortho issues  The patient had a trip and fall with a lot of joint damage including the right ankle, and also some pain continues in the left hip, knee, thigh region  The following portions of the patient's history were reviewed and updated as appropriate:   She has a past medical history of Anxiety, Atherosclerosis of native artery of both lower extremities with intermittent claudication ([facility] ) (10/15/2015), Carotid artery plaque, bilateral (3/21/2017), Chronic sinusitis, COPD (chronic obstructive pulmonary disease) ([facility] ), Fall (2021), Fracture, ankle closed, bimalleolar, right, initial encounter (2021), Hyperlipidemia, Lung nodule, PNA (pneumonia), PVD (peripheral vascular disease) ([facility] ), Restless leg syndrome, Stage 3 chronic kidney disease ([facility] ) (2019), and Tobacco abuse ,  does not have any pertinent problems on file  ,   has a past surgical history that includes  section; Colonoscopy; Tonsillectomy; Cataract extraction; Shoulder surgery; IR aortagram with run-off (8/15/2019); Descending aortic aneurysm repair w/ stent (2019); US guided breast biopsy right complete (Right, 3/25/2022); and US breast lexy  right (Right, 3/25/2022)  ,  family history includes Arthritis in her sister; Breast cancer (age of onset: 27) in her other; Cirrhosis in her family; Heart attack in her family; Melanoma in her brother; No Known Problems in her daughter, father, maternal aunt, maternal grandmother, mother, paternal grandmother, and sister;  Pancreatic cancer (age of onset: 61) in her sister; Prostate cancer in her brother and family; Skin cancer in her family; Stroke in her family  ,   reports that she quit smoking about 3 years ago  Her smoking use included cigarettes  She started smoking about 60 years ago  She has a 112 00 pack-year smoking history  She has never used smokeless tobacco  She reports current alcohol use  She reports that she does not use drugs  ,  is allergic to spiriva handihaler [tiotropium bromide monohydrate], augmentin [amoxicillin-pot clavulanate], tiotropium, and tylenol with codeine #3 [acetaminophen-codeine]     Current Outpatient Medications   Medication Sig Dispense Refill    albuterol (2 5 mg/3 mL) 0 083 % nebulizer solution Take 1 vial (2 5 mg total) by nebulization every 6 (six) hours as needed for wheezing or shortness of breath 360 mL 3    albuterol (PROVENTIL HFA,VENTOLIN HFA) 90 mcg/act inhaler Inhale 2 puffs every 6 (six) hours as needed for wheezing 3 Inhaler 3    ALPRAZolam (XANAX) 0 5 mg tablet Take 1 tablet (0 5 mg total) by mouth 2 (two) times a day as needed for anxiety 60 tablet 0    ergocalciferol (VITAMIN D2) 50,000 units take 1 capsule by mouth every week 12 capsule 0    fluticasone (FLONASE) 50 mcg/act nasal spray 2 sprays into each nostril daily 11 1 mL 1    fluticasone-vilanterol (Breo Ellipta) 100-25 mcg/inh inhaler Inhale 1 puff daily Rinse mouth after use  180 blister 1    gabapentin (NEURONTIN) 300 mg capsule Take 1 capsule (300 mg total) by mouth 2 (two) times a day This is instead of 100 mg 2 caps b i d  180 capsule 1    gabapentin (NEURONTIN) 400 mg capsule Take 1 capsule (400 mg total) by mouth daily at bedtime 90 capsule 1    naloxone (NARCAN) 4 mg/0 1 mL nasal spray Administer 1 spray into a nostril  If no response after 2-3 minutes, give another dose in the other nostril using a new spray   1 each 1    olopatadine (PATANOL) 0 1 % ophthalmic solution    0    oxyCODONE-acetaminophen (Percocet) 5-325 mg per tablet Take 1 tablet by mouth every 6 (six) hours as needed for moderate pain Max Daily Amount: 4 tablets 20 tablet 0    pantoprazole (PROTONIX) 40 mg tablet take 1 tablet by mouth daily before breakfast 30 tablet 5    rosuvastatin (CRESTOR) 20 MG tablet Take 1 tablet (20 mg total) by mouth daily 90 tablet 6    sodium chloride () 2 % hypertonic ophthalmic solution Sue 128 2 % eye drops      oxyCODONE-acetaminophen (PERCOCET) 5-325 mg per tablet Take 1 tablet by mouth every 6 (six) hours as needed for moderate painMax Daily Amount: 4 tablets (Patient not taking: Reported on 4/13/2022 ) 120 tablet 0     No current facility-administered medications for this visit  Review of Systems   Constitutional: Positive for fatigue  Respiratory: Positive for shortness of breath  Musculoskeletal: Positive for arthralgias and back pain  Psychiatric/Behavioral: Positive for dysphoric mood  The patient is nervous/anxious  Objective:  Vitals:    04/13/22 1020   BP: 132/80   Pulse: 93   Temp: 98 4 °F (36 9 °C)   SpO2: 97%      Physical Exam  Constitutional:       Appearance: She is well-developed  Comments:  A moderately overweight female patient who actually looks older than stated age  However, she is not in distress and does not look acutely ill  HENT:      Head: Normocephalic and atraumatic  Eyes:      Pupils: Pupils are equal, round, and reactive to light  Neck:      Thyroid: No thyromegaly  Trachea: No tracheal deviation  Cardiovascular:      Rate and Rhythm: Normal rate and regular rhythm  Heart sounds: Normal heart sounds  No murmur heard  No gallop  Pulmonary:      Effort: Pulmonary effort is normal  No respiratory distress  Breath sounds: No wheezing or rales  Abdominal:      General: Bowel sounds are normal       Palpations: Abdomen is soft  Tenderness: There is no abdominal tenderness  Musculoskeletal:         General: No tenderness or deformity  Normal range of motion        Cervical back: Normal range of motion and neck supple  Skin:     General: Skin is warm  Neurological:      Mental Status: She is alert and oriented to person, place, and time  Coordination: Coordination normal    Psychiatric:         Judgment: Judgment normal            Patient Instructions    Electrocardiogram is reviewed  ECG size low voltage and possible anteroseptal MI but no change from 2018  This patient is ASA category 3  She has no symptoms or signs to suggest on diagnosed decompensated cardiovascular disease   She is considered optimized for the proposed breast surgery which should proceed as planned

## 2022-04-17 LAB
ATRIAL RATE: 90 BPM
P AXIS: 48 DEGREES
PR INTERVAL: 154 MS
QRS AXIS: 40 DEGREES
QRSD INTERVAL: 62 MS
QT INTERVAL: 342 MS
QTC INTERVAL: 418 MS
T WAVE AXIS: 41 DEGREES
VENTRICULAR RATE: 90 BPM

## 2022-04-17 PROCEDURE — 93010 ELECTROCARDIOGRAM REPORT: CPT | Performed by: INTERNAL MEDICINE

## 2022-04-18 NOTE — PATIENT INSTRUCTIONS
Electrocardiogram is reviewed  ECG size low voltage and possible anteroseptal MI but no change from 2018  This patient is ASA category 3  She has no symptoms or signs to suggest on diagnosed decompensated cardiovascular disease   She is considered optimized for the proposed breast surgery which should proceed as planned

## 2022-04-21 DIAGNOSIS — G25.81 RLS (RESTLESS LEGS SYNDROME): ICD-10-CM

## 2022-04-21 RX ORDER — GABAPENTIN 300 MG/1
CAPSULE ORAL
Qty: 180 CAPSULE | Refills: 1 | Status: SHIPPED | OUTPATIENT
Start: 2022-04-21 | End: 2022-07-13 | Stop reason: SDUPTHER

## 2022-04-22 ENCOUNTER — TELEPHONE (OUTPATIENT)
Dept: INTERNAL MEDICINE CLINIC | Facility: CLINIC | Age: 77
End: 2022-04-22

## 2022-04-23 ENCOUNTER — NURSE TRIAGE (OUTPATIENT)
Dept: OTHER | Facility: OTHER | Age: 77
End: 2022-04-23

## 2022-04-23 NOTE — TELEPHONE ENCOUNTER
Patient called stating pharmacy gave her the 400 mg gabapentin instead of the 300 mg  I let the patient know to call the pharmacy and speak with them about this because on 4/21/22 gabapentin 300 mg was prescribed  Advised patient to call back if she has any other issues  Reason for Disposition   [1] Prescription prescribed recently is not at pharmacy AND [2] triager has access to patient's EMR AND [3] prescription is recorded in the EMR    Answer Assessment - Initial Assessment Questions  1  NAME of MEDICATION: "What medicine are you calling about?"      Gabapentin, take 300 mg 2 times a day and 400 mg at night     2  QUESTION: "What is your question?" (e g , medication refill, side effect)      The 400 mg was renewed but the 300 mg was not     3  PRESCRIBING HCP: "Who prescribed it?" Reason: if prescribed by specialist, call should be referred to that group        Dr Erin Salter    Protocols used: MEDICATION QUESTION CALL-ADULT-

## 2022-04-23 NOTE — TELEPHONE ENCOUNTER
Regarding: Medication Refill  ----- Message from Tram Arriaga sent at 4/23/2022 11:01 AM EDT -----  " I need my Gabapentin 300 mg capsules  Dr Medina Velez was supposed to call them in but didn't  He called in the 400 mg, but not the 300   Mg "

## 2022-04-25 ENCOUNTER — TELEPHONE (OUTPATIENT)
Dept: SURGICAL ONCOLOGY | Facility: CLINIC | Age: 77
End: 2022-04-25

## 2022-04-25 NOTE — TELEPHONE ENCOUNTER
I called and left a message for patient to call back  I was calling to make sure patient is aware to stop her Asprin before surgery  If she has not stopped it, she should not take anymore from now until after surgery  Patient can ask for staci or Danis Cornelius when she calls back

## 2022-04-25 NOTE — TELEPHONE ENCOUNTER
Patient called in stating that someone from [employer] called her and left a message  She has been calling the number back for 3 days straight and no one is getting back to her so she had questions about what to do before surgery  I informed her that I also went over everything during my surgery teach  I reiterated not to eat or drink anything after midnight the night before surgery  I verified she was aware to take the 2 showers  Per PCP she was to stop aspirin 5 days before surgery  Paper was just faxed to our office today stating that  Patient said she will stop taking aspirin today as she was unaware and pcp didn't tell her that  Patient appreciative of my phone call  She also had questions regarding arm movement after surgery  I reassured her she will be tender but she can still use her arm as normal  I explained she will be on a weight lifting/pulling/pushing restriction of 5-10 pounds that first week after surgery  Patient appreciative of call and all questions answered at this time

## 2022-04-26 NOTE — PRE-PROCEDURE INSTRUCTIONS
Pre-Surgery Instructions:   Medication Instructions    albuterol (2 5 mg/3 mL) 0 083 % nebulizer solution Take per normal schedule     albuterol (PROVENTIL HFA,VENTOLIN HFA) 90 mcg/act inhaler Instructed to take as needed including DOS    ALPRAZolam (XANAX) 0 5 mg tablet Instructed to take as needed including DOS with sips water    ASPIRIN 81 PO Instructions per MD- pt stopped as instructed    ergocalciferol (VITAMIN D2) 50,000 units Instructed to take per normal schedule except DOS    fluticasone (FLONASE) 50 mcg/act nasal spray Instructed to take per normal schedule including DOS    fluticasone-vilanterol (Breo Ellipta) 100-25 mcg/inh inhaler Instructed to take per normal schedule including DOS    gabapentin (NEURONTIN) 300 mg capsule Instructed to take per normal schedule including DOS    gabapentin (NEURONTIN) 400 mg capsule Instructed to take per normal schedule including DOS    olopatadine (PATANOL) 0 1 % ophthalmic solution Take per normal schedule     pantoprazole (PROTONIX) 40 mg tablet Instructed to take per normal schedule including DOS with sips water    rosuvastatin (CRESTOR) 20 MG tablet Take per normal schedule     sodium chloride (MIKO 128) 2 % hypertonic ophthalmic solution Take per normal schedule     Pre op,medications and showering instructions reviewed-Patient has hibiclens  Instructed to avoid all ASA/NSAIDs and OTC Vit/Supp from now until after surgery per anesthesia guidelines  Tylenol ok prn  ASA instructions given to pt by MD  Pt  Verbalized an understanding of all instructions reviewed and offers no concerns at this time

## 2022-04-27 ENCOUNTER — ANESTHESIA EVENT (OUTPATIENT)
Dept: PERIOP | Facility: HOSPITAL | Age: 77
End: 2022-04-27
Payer: MEDICARE

## 2022-04-28 ENCOUNTER — APPOINTMENT (OUTPATIENT)
Dept: MAMMOGRAPHY | Facility: CLINIC | Age: 77
End: 2022-04-28
Payer: MEDICARE

## 2022-04-28 ENCOUNTER — ANESTHESIA (OUTPATIENT)
Dept: PERIOP | Facility: HOSPITAL | Age: 77
End: 2022-04-28
Payer: MEDICARE

## 2022-04-28 ENCOUNTER — HOSPITAL ENCOUNTER (OUTPATIENT)
Dept: NUCLEAR MEDICINE | Facility: HOSPITAL | Age: 77
Discharge: HOME | End: 2022-04-28
Attending: SURGERY
Payer: MEDICARE

## 2022-04-28 ENCOUNTER — HOSPITAL ENCOUNTER (OUTPATIENT)
Facility: HOSPITAL | Age: 77
Setting detail: OUTPATIENT SURGERY
Discharge: HOME | End: 2022-04-28
Attending: SURGERY | Admitting: SURGERY
Payer: MEDICARE

## 2022-04-28 VITALS
DIASTOLIC BLOOD PRESSURE: 57 MMHG | SYSTOLIC BLOOD PRESSURE: 111 MMHG | TEMPERATURE: 99.3 F | OXYGEN SATURATION: 90 % | WEIGHT: 166.67 LBS | HEIGHT: 60 IN | RESPIRATION RATE: 16 BRPM | BODY MASS INDEX: 32.72 KG/M2 | HEART RATE: 100 BPM

## 2022-04-28 DIAGNOSIS — Z17.1 MALIGNANT NEOPLASM OF OVERLAPPING SITES OF RIGHT BREAST IN FEMALE, ESTROGEN RECEPTOR NEGATIVE (HCC): ICD-10-CM

## 2022-04-28 DIAGNOSIS — C50.811 MALIGNANT NEOPLASM OF OVERLAPPING SITES OF RIGHT BREAST IN FEMALE, ESTROGEN RECEPTOR NEGATIVE (HCC): ICD-10-CM

## 2022-04-28 PROCEDURE — 38792 RA TRACER ID OF SENTINL NODE: CPT

## 2022-04-28 PROCEDURE — 88342 IMHCHEM/IMCYTCHM 1ST ANTB: CPT | Performed by: PATHOLOGY

## 2022-04-28 PROCEDURE — 19301 PARTIAL MASTECTOMY: CPT | Performed by: SURGERY

## 2022-04-28 PROCEDURE — 76098 X-RAY EXAM SURGICAL SPECIMEN: CPT | Performed by: SURGERY

## 2022-04-28 PROCEDURE — 88307 TISSUE EXAM BY PATHOLOGIST: CPT | Performed by: PATHOLOGY

## 2022-04-28 PROCEDURE — 88305 TISSUE EXAM BY PATHOLOGIST: CPT | Performed by: PATHOLOGY

## 2022-04-28 PROCEDURE — 19301 PARTIAL MASTECTOMY: CPT | Performed by: CLINICAL NURSE SPECIALIST

## 2022-04-28 PROCEDURE — 38900 IO MAP OF SENT LYMPH NODE: CPT | Performed by: SURGERY

## 2022-04-28 PROCEDURE — 38792 RA TRACER ID OF SENTINL NODE: CPT | Performed by: SURGERY

## 2022-04-28 PROCEDURE — A9541 TC99M SULFUR COLLOID: HCPCS

## 2022-04-28 PROCEDURE — 38525 BIOPSY/REMOVAL LYMPH NODES: CPT | Performed by: SURGERY

## 2022-04-28 RX ORDER — LIDOCAINE HYDROCHLORIDE 10 MG/ML
INJECTION, SOLUTION EPIDURAL; INFILTRATION; INTRACAUDAL; PERINEURAL AS NEEDED
Status: DISCONTINUED | OUTPATIENT
Start: 2022-04-28 | End: 2022-04-28

## 2022-04-28 RX ORDER — DEXMEDETOMIDINE HYDROCHLORIDE 100 UG/ML
INJECTION, SOLUTION INTRAVENOUS AS NEEDED
Status: DISCONTINUED | OUTPATIENT
Start: 2022-04-28 | End: 2022-04-28

## 2022-04-28 RX ORDER — ONDANSETRON 2 MG/ML
INJECTION INTRAMUSCULAR; INTRAVENOUS AS NEEDED
Status: DISCONTINUED | OUTPATIENT
Start: 2022-04-28 | End: 2022-04-28

## 2022-04-28 RX ORDER — ISOSULFAN BLUE 50 MG/5ML
INJECTION, SOLUTION SUBCUTANEOUS AS NEEDED
Status: DISCONTINUED | OUTPATIENT
Start: 2022-04-28 | End: 2022-04-28 | Stop reason: HOSPADM

## 2022-04-28 RX ORDER — ONDANSETRON 2 MG/ML
4 INJECTION INTRAMUSCULAR; INTRAVENOUS ONCE AS NEEDED
Status: COMPLETED | OUTPATIENT
Start: 2022-04-28 | End: 2022-04-28

## 2022-04-28 RX ORDER — CEFAZOLIN SODIUM 1 G/50ML
1000 SOLUTION INTRAVENOUS ONCE
Status: COMPLETED | OUTPATIENT
Start: 2022-04-28 | End: 2022-04-28

## 2022-04-28 RX ORDER — SODIUM CHLORIDE, SODIUM LACTATE, POTASSIUM CHLORIDE, CALCIUM CHLORIDE 600; 310; 30; 20 MG/100ML; MG/100ML; MG/100ML; MG/100ML
INJECTION, SOLUTION INTRAVENOUS CONTINUOUS PRN
Status: DISCONTINUED | OUTPATIENT
Start: 2022-04-28 | End: 2022-04-28

## 2022-04-28 RX ORDER — METOCLOPRAMIDE HYDROCHLORIDE 5 MG/ML
10 INJECTION INTRAMUSCULAR; INTRAVENOUS ONCE AS NEEDED
Status: COMPLETED | OUTPATIENT
Start: 2022-04-28 | End: 2022-04-28

## 2022-04-28 RX ORDER — SODIUM CHLORIDE, SODIUM LACTATE, POTASSIUM CHLORIDE, CALCIUM CHLORIDE 600; 310; 30; 20 MG/100ML; MG/100ML; MG/100ML; MG/100ML
125 INJECTION, SOLUTION INTRAVENOUS CONTINUOUS
Status: DISCONTINUED | OUTPATIENT
Start: 2022-04-28 | End: 2022-04-28 | Stop reason: HOSPADM

## 2022-04-28 RX ORDER — MAGNESIUM HYDROXIDE 1200 MG/15ML
LIQUID ORAL AS NEEDED
Status: DISCONTINUED | OUTPATIENT
Start: 2022-04-28 | End: 2022-04-28 | Stop reason: HOSPADM

## 2022-04-28 RX ORDER — HYDROMORPHONE HCL/PF 1 MG/ML
0.2 SYRINGE (ML) INJECTION
Status: DISCONTINUED | OUTPATIENT
Start: 2022-04-28 | End: 2022-04-28 | Stop reason: HOSPADM

## 2022-04-28 RX ORDER — DIPHENHYDRAMINE HYDROCHLORIDE 50 MG/ML
12.5 INJECTION INTRAMUSCULAR; INTRAVENOUS ONCE AS NEEDED
Status: DISCONTINUED | OUTPATIENT
Start: 2022-04-28 | End: 2022-04-28 | Stop reason: HOSPADM

## 2022-04-28 RX ORDER — DEXAMETHASONE SODIUM PHOSPHATE 10 MG/ML
INJECTION, SOLUTION INTRAMUSCULAR; INTRAVENOUS AS NEEDED
Status: DISCONTINUED | OUTPATIENT
Start: 2022-04-28 | End: 2022-04-28

## 2022-04-28 RX ORDER — PROPOFOL 10 MG/ML
INJECTION, EMULSION INTRAVENOUS AS NEEDED
Status: DISCONTINUED | OUTPATIENT
Start: 2022-04-28 | End: 2022-04-28

## 2022-04-28 RX ORDER — FENTANYL CITRATE 50 UG/ML
INJECTION, SOLUTION INTRAMUSCULAR; INTRAVENOUS AS NEEDED
Status: DISCONTINUED | OUTPATIENT
Start: 2022-04-28 | End: 2022-04-28

## 2022-04-28 RX ORDER — FENTANYL CITRATE/PF 50 MCG/ML
50 SYRINGE (ML) INJECTION
Status: DISCONTINUED | OUTPATIENT
Start: 2022-04-28 | End: 2022-04-28 | Stop reason: HOSPADM

## 2022-04-28 RX ORDER — LIDOCAINE HYDROCHLORIDE 10 MG/ML
0.5 INJECTION, SOLUTION EPIDURAL; INFILTRATION; INTRACAUDAL; PERINEURAL ONCE AS NEEDED
Status: DISCONTINUED | OUTPATIENT
Start: 2022-04-28 | End: 2022-04-28 | Stop reason: HOSPADM

## 2022-04-28 RX ORDER — IPRATROPIUM BROMIDE AND ALBUTEROL SULFATE 2.5; .5 MG/3ML; MG/3ML
3 SOLUTION RESPIRATORY (INHALATION)
Status: DISCONTINUED | OUTPATIENT
Start: 2022-04-28 | End: 2022-04-28 | Stop reason: HOSPADM

## 2022-04-28 RX ORDER — SCOLOPAMINE TRANSDERMAL SYSTEM 1 MG/1
1 PATCH, EXTENDED RELEASE TRANSDERMAL
Status: DISCONTINUED | OUTPATIENT
Start: 2022-04-28 | End: 2022-04-28 | Stop reason: HOSPADM

## 2022-04-28 RX ORDER — CEFAZOLIN SODIUM 1 G/50ML
SOLUTION INTRAVENOUS AS NEEDED
Status: DISCONTINUED | OUTPATIENT
Start: 2022-04-28 | End: 2022-04-28

## 2022-04-28 RX ORDER — HYDROMORPHONE HCL/PF 1 MG/ML
0.5 SYRINGE (ML) INJECTION
Status: DISCONTINUED | OUTPATIENT
Start: 2022-04-28 | End: 2022-04-28 | Stop reason: HOSPADM

## 2022-04-28 RX ADMIN — SODIUM CHLORIDE, SODIUM LACTATE, POTASSIUM CHLORIDE, AND CALCIUM CHLORIDE 125 ML/HR: .6; .31; .03; .02 INJECTION, SOLUTION INTRAVENOUS at 11:53

## 2022-04-28 RX ADMIN — ONDANSETRON 4 MG: 2 INJECTION INTRAMUSCULAR; INTRAVENOUS at 17:11

## 2022-04-28 RX ADMIN — CEFAZOLIN SODIUM 1000 MG: 1 SOLUTION INTRAVENOUS at 14:22

## 2022-04-28 RX ADMIN — LIDOCAINE HYDROCHLORIDE 50 MG: 10 INJECTION, SOLUTION EPIDURAL; INFILTRATION; INTRACAUDAL; PERINEURAL at 14:24

## 2022-04-28 RX ADMIN — FENTANYL CITRATE 50 MCG: 50 INJECTION, SOLUTION INTRAMUSCULAR; INTRAVENOUS at 14:24

## 2022-04-28 RX ADMIN — DEXMEDETOMIDINE HCL 8 MCG: 100 INJECTION INTRAVENOUS at 14:21

## 2022-04-28 RX ADMIN — PROPOFOL 150 MG: 10 INJECTION, EMULSION INTRAVENOUS at 14:24

## 2022-04-28 RX ADMIN — ONDANSETRON 4 MG: 2 INJECTION INTRAMUSCULAR; INTRAVENOUS at 14:23

## 2022-04-28 RX ADMIN — METOCLOPRAMIDE HYDROCHLORIDE 10 MG: 5 INJECTION INTRAMUSCULAR; INTRAVENOUS at 17:25

## 2022-04-28 RX ADMIN — CEFAZOLIN SODIUM 1000 MG: 1 SOLUTION INTRAVENOUS at 14:04

## 2022-04-28 RX ADMIN — FENTANYL CITRATE 25 MCG: 50 INJECTION, SOLUTION INTRAMUSCULAR; INTRAVENOUS at 15:13

## 2022-04-28 RX ADMIN — FENTANYL CITRATE 25 MCG: 50 INJECTION, SOLUTION INTRAMUSCULAR; INTRAVENOUS at 14:50

## 2022-04-28 RX ADMIN — SCOPALAMINE 1 PATCH: 1 PATCH, EXTENDED RELEASE TRANSDERMAL at 12:00

## 2022-04-28 RX ADMIN — FENTANYL CITRATE 50 MCG: 50 INJECTION, SOLUTION INTRAMUSCULAR; INTRAVENOUS at 17:07

## 2022-04-28 RX ADMIN — PHENYLEPHRINE HYDROCHLORIDE 20 MCG/MIN: 10 INJECTION INTRAVENOUS at 14:33

## 2022-04-28 RX ADMIN — ONDANSETRON 4 MG: 2 INJECTION INTRAMUSCULAR; INTRAVENOUS at 16:04

## 2022-04-28 RX ADMIN — SODIUM CHLORIDE, SODIUM LACTATE, POTASSIUM CHLORIDE, AND CALCIUM CHLORIDE: .6; .31; .03; .02 INJECTION, SOLUTION INTRAVENOUS at 14:20

## 2022-04-28 RX ADMIN — FENTANYL CITRATE 50 MCG: 50 INJECTION, SOLUTION INTRAMUSCULAR; INTRAVENOUS at 16:14

## 2022-04-28 RX ADMIN — DEXAMETHASONE SODIUM PHOSPHATE 7 MG: 10 INJECTION, SOLUTION INTRAMUSCULAR; INTRAVENOUS at 14:23

## 2022-04-28 RX ADMIN — IPRATROPIUM BROMIDE AND ALBUTEROL SULFATE 3 ML: 2.5; .5 SOLUTION RESPIRATORY (INHALATION) at 17:04

## 2022-04-28 RX ADMIN — SODIUM CHLORIDE, SODIUM LACTATE, POTASSIUM CHLORIDE, AND CALCIUM CHLORIDE: .6; .31; .03; .02 INJECTION, SOLUTION INTRAVENOUS at 15:49

## 2022-04-28 NOTE — ANESTHESIA PREPROCEDURE EVALUATION
Procedure:  ISAI  DIRECTED LUMPECTOMY (Right Breast)  LYMPHATIC MAPPING WITH BLUE AND RADIOACTIVE DYES, SENTINEL LYMPH NODE BIOPSY, INJECTION IN OR BY DR RODRÍGUEZ AT 1300 (Right Axilla)  POSSIBLE AXILLARY DISSECTION (Right Axilla)    Relevant Problems   ANESTHESIA (within normal limits)      CARDIO   (+) Aortoiliac occlusive disease (HCC)   (+) Carotid artery plaque, bilateral   (+) Hyperlipidemia   (+) PVD (peripheral vascular disease) (HCC)   (+) Subclavian artery stenosis, left (HCC)      ENDO (within normal limits)      GI/HEPATIC (within normal limits)      /RENAL   (+) Stage 3 chronic kidney disease (HCC)      GYN   (+) Malignant neoplasm of overlapping sites of right breast in female, estrogen receptor negative (HCC)      HEMATOLOGY (within normal limits)      MUSCULOSKELETAL (within normal limits)      NEURO/PSYCH   (+) Anxiety   (+) Continuous opioid dependence (HCC)      PULMONARY   (+) COPD (chronic obstructive pulmonary disease) (HCC)   (+) Centrilobular emphysema (HCC)      Other   (+) BMI 34 0-34 9,adult        Physical Exam    Airway    Mallampati score: II  TM Distance: >3 FB  Neck ROM: full     Dental   Comment: Poor dentition, multiple newly broken lower teeth, she has one lower tooth remaining which she states is weak  I explained that should the tooth become loose or be in jeopardy of breaking I will remove it out of safety  She stated she understands and says to remove the tooth if it poses a risk to her , No notable dental hx     Cardiovascular  Rhythm: regular, Rate: normal, Cardiovascular exam normal    Pulmonary  Pulmonary exam normal Breath sounds clear to auscultation,     Other Findings        Anesthesia Plan  ASA Score- 3     Anesthesia Type- general with ASA Monitors  Additional Monitors:   Airway Plan: LMA  Plan Factors-    Chart reviewed  Existing labs reviewed  Patient summary reviewed  Induction- intravenous      Postoperative Plan- Plan for postoperative opioid use  Informed Consent- Anesthetic plan and risks discussed with patient  I personally reviewed this patient with the CRNA  Discussed and agreed on the Anesthesia Plan with the CRNA  Bretn Aguirre Recent labs personally reviewed:  Lab Results   Component Value Date    WBC 6 94 04/04/2022    HGB 13 9 04/04/2022     04/04/2022     Lab Results   Component Value Date    K 4 0 04/04/2022    BUN 18 04/04/2022    CREATININE 1 29 04/04/2022     Lab Results   Component Value Date    PTT 23 06/21/2020      Lab Results   Component Value Date    INR 1 00 06/21/2020     Lab Results   Component Value Date    HGBA1C 6 5 (H) 09/14/2021       I, Calli Vargas MD, have personally seen and evaluated the patient prior to anesthetic care  I have reviewed the pre-anesthetic record, and other medical records if appropriate to the anesthetic care  If a CRNA is involved in the case, I have reviewed the CRNA assessment, if present, and agree  Risks/benefits and alternatives discussed with patient including possible PONV, sore throat, and possibility of rare anesthetic and surgical emergencies

## 2022-04-28 NOTE — ANESTHESIA POSTPROCEDURE EVALUATION
Post-Op Assessment Note    CV Status:  Stable  Pain Score: 0    Pain management: adequate     Mental Status:  Alert and awake   Hydration Status:  Euvolemic   PONV Controlled:  Controlled   Airway Patency:  Patent      Post Op Vitals Reviewed: Yes      Staff: CRNA         No complications documented      BP   136/66   Temp   97   Pulse  70   Resp   12   SpO2   98

## 2022-04-28 NOTE — OP NOTE
OPERATIVE REPORT  PATIENT NAME: Zbigniew Aguirre    :  1945  MRN: [id number]  Pt Location: [facility] OR ROOM 01    SURGERY DATE: 2022    Surgeon(s) and Role:     * Enrike Ramachandran MD - Primary     * Timothy Cochran - Assisting    Preop Diagnosis:  Malignant neoplasm of overlapping sites of right breast in female, estrogen receptor negative ([facility] [place] [address] ) [C50 811, Z17 1]    Post-Op Diagnosis Codes:     * Malignant neoplasm of overlapping sites of right breast in female, estrogen receptor negative ([facility] [place] [address] ) [C50 811, Z17 1]    Procedure(s) (LRB):  ISAI  DIRECTED LUMPECTOMY (Right)  LYMPHATIC MAPPING WITH BLUE AND RADIOACTIVE DYES, SENTINEL LYMPH NODE BIOPSY, INJECTION IN OR BY DR RODRÍGUEZ AT 1300 (Right)    Specimen(s):  ID Type Source Tests Collected by Time Destination   1 : RIGHT AXILLARY SENTINAL LYMPH NODE HOT NOT BLUE #1 Tissue Axillary TISSUE EXAM P O  Charlene Pereira MD 2022 1506    2 : RIGHT AXILLARY ADIPOSE TISSUE FOR LYMPH NODES Tissue Breast, Right TISSUE EXAM P O  Box MD Randall 2022 1507    3 : RIGHT BREAST POSTERIOR MARGIN INKED MOST DISTAL MARGIN (BLACK) Tissue Breast, Right TISSUE [address], MD 2022 1525    4 : RIGHT BREAST INFERIOR MARGIN INKED MOST DISTAL MARGIN (BLUE) Tissue Breast, Right TISSUE [address], [place] 2022 1527    5 : RIGHT BREAST MEDIAL MARGIN INKED MOST DISTAL MARGIN (YELLOW) Tissue Breast, Right TISSUE [address], MD 2022 1528    6 : RIGHT BREAST SUPERIOR MARGIN INKED MOST DISTAL MARGIN (ORANGE) Tissue Breast, Right TISSUE [address], MD 2022 1529    7 : RIGHT BREAST LATERAL MARGIN INKED MOST DISTAL MARGIN (RED) Tissue Breast, Right TISSUE [address], MD 2022 1530    8 : RIGHT BREAST ANTERIOR MARGIN INKED MOST DISTAL MARGIN (GREEN) Tissue Breast, Right TISSUE [address], MD 2022 1531    9 : RIGHT BREAST LUMP ISAI  DIRECTED PER MARGIN PROTOCOL Tissue Breast, Right TISSUE EXAM [facility] Igor Christiansen MD 4/28/2022 1533        Estimated Blood Loss:   Minimal    Drains:  * No LDAs found *    Anesthesia Type:   General    Operative Indications:  Malignant neoplasm of overlapping sites of right breast in female, estrogen receptor negative ([facility] [address] ) [C50 811, Z17 1]      Operative Findings:  ISAI clip in the specimen  Complications:   None    Procedure and Technique:    The prior post biopsy images were reviewed prior to the procedure  Lumpectomy  The patient was brought to the operation room and placed under general anesthesia  Attention was paid to ensure appropriate padding and correct positioning  The ISAI  detector was then used to localize the [employer]  The breast skin was marked in this area  The right  breast was injected intradermally with technetium sulfur colloid in the joshua-areolar region and 0 3cc of methylene blue was injected intradermally over the tumor  These areas were vigorously massaged to facilitate identification of the sentinel lymph node (SLN)  The breast and axillary region were then prepped and draped in a sterile fashion  I initiated a time-out, identifying the patient, the correct side and the above procedure  All parties agreed with the time out  The planned incision was then injected with 0 25% Marcaine for local anesthesia  The incision was sharply incised encompassing the blue dye  The ISAI detector was used to guide the dissection  Superior, inferior, medial and lateral planes were developed around the ISAI deflector and the specimen truncated beyond the ISAI deflector  The specimen was then inked for orientation purposes  A specimen radiograph was taken in two dimensions  6 Additional margins were removed to optimize complete removal of the tumor  The additional margins were inked on the most distal area    All specimens were sent to pathology for permanent analysis  Superficial bleeding controlled with electrocautery and the wound was extensively irrigated  The wound was closed with two layers, an inner layer of 3-0 vicryl and a subcuticular layer of 4-0 monocryl  Prineo were applied  SLN Biopsy  The previously marked location of the sentinel lymph node was injected with 0 25% Marcaine  A curivilinear incision was made and dissection was taken down into the axillar proper with electrocautery  The hunter counter was used to help localize the SLN  The sentinel lymph node was identified and removed with electrocautery  SLN Findings  The SLN was blue and radioactive  The lymph node was grossly normal and sent for permanent pathologic analysis  This wound also was irrigated extensively: superficial bleeding was controlled with electrocautery and then closed in 2 layers - an inner layer of 3-0 Vicryl and a subcuticular layer of 4-0 Monocryl  Prineo were applied     I was present for the entire procedure and A physician assistant was required during the procedure for retraction tissue handling,dissection and suturing    Patient Disposition:  PACU       SIGNATURE: Jose De Jesus Milligan MD  DATE: April 28, 2022  TIME: 3:38 PM

## 2022-04-28 NOTE — INTERVAL H&P NOTE
H&P reviewed  After examining the patient I find no changes in the patients condition since the H&P had been written      Vitals:    04/28/22 1135   BP: 137/61   Pulse: 97   Resp: 20   Temp: 98 3 °F (36 8 °C)   SpO2: 92%

## 2022-04-29 ENCOUNTER — TELEPHONE (OUTPATIENT)
Dept: INTERNAL MEDICINE CLINIC | Facility: CLINIC | Age: 77
End: 2022-04-29

## 2022-04-29 NOTE — TELEPHONE ENCOUNTER
Just home from the hospital, had breast cancer surgery-she is suffering from lower cracked teeth, is requesting an antibiotc    [facility] # [phone]

## 2022-05-02 ENCOUNTER — TELEPHONE (OUTPATIENT)
Dept: HEMATOLOGY ONCOLOGY | Facility: CLINIC | Age: 77
End: 2022-05-02

## 2022-05-02 ENCOUNTER — TELEPHONE (OUTPATIENT)
Dept: SURGICAL ONCOLOGY | Facility: CLINIC | Age: 77
End: 2022-05-02

## 2022-05-02 NOTE — TELEPHONE ENCOUNTER
Patient is calling because she just missed a call from Dr Que Jara office  I reached out to the office And [facility] would like patient to be transferred to her   Patient was transferred to [facility] at 12:05pm

## 2022-05-02 NOTE — TELEPHONE ENCOUNTER
Patient called in stating she is experiencing low grade fevers  On Sunday it got as high as 100 4  She took tylenol and felt a little better  Stated "I just don't feel right"  She stated her PCP prescribed her some antibiotics for her "very infected teeth" and she's been taking them  I asked her how her incision looked  She informed me she hasn't even looked at it  She was going to eat, shower, and then call me back  She called me back after showering and said there is no drainage or swelling from the incision, her breast is not red or warm to touch  There are no signs or symptoms of infection at the incision sites  She stated she thinks it may be from her teeth  I advised her to call her PCP and let them know how she's feeling  If for any reason they think we need to see her I will get her on Dr Crow Valle schedule immediately  She has my direct office line if she needs

## 2022-05-02 NOTE — TELEPHONE ENCOUNTER
Patient called in to let Dr Diego Lombardi and his office know that she has breast cancer and is seeing Dr Dowd Standing for her diagnosis  Patient had surgery with Dr Dowd Standing on 4/28/22

## 2022-05-03 ENCOUNTER — TELEMEDICINE (OUTPATIENT)
Dept: PULMONOLOGY | Facility: CLINIC | Age: 77
End: 2022-05-03
Payer: MEDICARE

## 2022-05-03 VITALS — WEIGHT: 166 LBS | BODY MASS INDEX: 32.59 KG/M2 | HEIGHT: 60 IN

## 2022-05-03 DIAGNOSIS — J41.0 SIMPLE CHRONIC BRONCHITIS (HCC): Primary | ICD-10-CM

## 2022-05-03 DIAGNOSIS — C50.811 MALIGNANT NEOPLASM OF OVERLAPPING SITES OF RIGHT BREAST IN FEMALE, ESTROGEN RECEPTOR NEGATIVE (HCC): ICD-10-CM

## 2022-05-03 DIAGNOSIS — Z17.1 MALIGNANT NEOPLASM OF OVERLAPPING SITES OF RIGHT BREAST IN FEMALE, ESTROGEN RECEPTOR NEGATIVE (HCC): ICD-10-CM

## 2022-05-03 DIAGNOSIS — J96.11 CHRONIC HYPOXEMIC RESPIRATORY FAILURE (HCC): ICD-10-CM

## 2022-05-03 PROCEDURE — 99441 PR PHYS/QHP TELEPHONE EVALUATION 5-10 MIN: CPT | Performed by: PHYSICIAN ASSISTANT

## 2022-05-03 NOTE — PROGRESS NOTES
Virtual Brief Visit    Patient is located in the following state in which I hold an active license [place]      Assessment/Plan:    Problem List Items Addressed This Visit        Respiratory    COPD (chronic obstructive pulmonary disease) ([facility] [address] ) - Primary       Other    Malignant neoplasm of overlapping sites of right breast in female, estrogen receptor negative ([facility] [address] )      Other Visit Diagnoses     Chronic hypoxemic respiratory failure ([facility] [address] )            Patient is a 82-year-old female former smoker quit over 2 years ago with past medical history of COPD, chronic sinusitis, peripheral vascular disease, recently diagnosed with breast cancer status post surgery  Telephone visit was done today for follow-up  She is overall recovering from the surgery, has a follow-up with the surgeon tomorrow  Her breathing has overall been stable  She continues with her Breo daily, has not been using her nebulizer on a regular basis  She does note some wheezing/mucus in her chest in the evenings  Spoke with her about using the nebulizer at least once per day  At her last visit she did qualify for home O2 but did not follow up with the [employer] company in time and now needs a repeat 6 minute walk  She will stop by the office to have the walk done as she would benefit from the oxygen to use with exertion and night  Otherwise she can follow-up with us in 3 months or sooner if necessary  Recent Visits  No visits were found meeting these conditions  Showing recent visits within past 7 days and meeting all other requirements  Today's Visits  Date Type Provider Dept   05/03/22 Telemedicine Christy Duval PA-C [facility]   Showing today's visits and meeting all other requirements  Future Appointments  No visits were found meeting these conditions    Showing future appointments within next 150 days and meeting all other requirements         I spent 8 minutes directly with the patient during this visit

## 2022-05-04 ENCOUNTER — OFFICE VISIT (OUTPATIENT)
Dept: SURGICAL ONCOLOGY | Facility: CLINIC | Age: 77
End: 2022-05-04

## 2022-05-04 VITALS
OXYGEN SATURATION: 98 % | SYSTOLIC BLOOD PRESSURE: 126 MMHG | BODY MASS INDEX: 32.98 KG/M2 | WEIGHT: 168 LBS | HEIGHT: 60 IN | RESPIRATION RATE: 14 BRPM | HEART RATE: 96 BPM | DIASTOLIC BLOOD PRESSURE: 82 MMHG

## 2022-05-04 DIAGNOSIS — C50.811 MALIGNANT NEOPLASM OF OVERLAPPING SITES OF RIGHT BREAST IN FEMALE, ESTROGEN RECEPTOR NEGATIVE (HCC): ICD-10-CM

## 2022-05-04 DIAGNOSIS — Z17.1 MALIGNANT NEOPLASM OF OVERLAPPING SITES OF RIGHT BREAST IN FEMALE, ESTROGEN RECEPTOR NEGATIVE (HCC): ICD-10-CM

## 2022-05-04 DIAGNOSIS — N64.89 SEROMA OF BREAST: ICD-10-CM

## 2022-05-04 DIAGNOSIS — Z71.89 OTHER SPECIFIED COUNSELING: Primary | ICD-10-CM

## 2022-05-04 DIAGNOSIS — Z98.890 STATUS POST RIGHT BREAST LUMPECTOMY: ICD-10-CM

## 2022-05-04 PROCEDURE — 99024 POSTOP FOLLOW-UP VISIT: CPT | Performed by: SURGERY

## 2022-05-04 NOTE — PROGRESS NOTES
Surgical Oncology Follow Up  [facility]  [facility] [place]  [address]  [place] [address]    Yoni Ellis  1945  [id number]      Chief Complaint   Patient presents with    Follow-up     seroma         Assessment & Plan:   She is overall doing well she was brought in today given her some discomfort in the axillary fossa  Ultrasound was utilized consistent with the seroma  40 cc of seroma was aspirated under strict sterile condition pending final pathology she was written instructed continue warm compression 3-4 times a day  We will see her in 2 weeks time  She was told to call us with any questions or concern in the interim she understand agree to  Cancer History:     Oncology History Overview Note   2/15/22 screening B/L mammogram  RIGHT  A) MASS: There is a high density, irregularly shaped mass seen in the right breast at 11 o'clock in the anterior depth  Left  There are no suspicious masses, grouped microcalcifications or areas of unexplained architectural distortion  The skin and nipple areolar complex are unremarkable  RECOMMENDATION:       - Diagnostic mammogram and ultrasound at the current time for the right breast     3/17/22 Diagnostic right mammogram and right breast US  RIGHT  A) MASS  Mammo diagnostic right w 3d & cad: There is a 14 mm x 10 mm x 9 mm high density, irregularly shaped mass seen in the right breast at 12 o'clock, 5 cm from the nipple, anterior depth  This corresponds with the mass seen on screening mammogram and is new compared to the mammogram from 2019  US breast right limited (diagnostic): At 12 o'clock, 5 cm from nipple there is an irregular hypoechoic solid mass which measures 19 x 16 x 10 mm (see the oblique images at the end of the ultrasound study)  The mass correlates with the mass seen on the mammogram   This is highly suggestive of malignancy  Appropriate action should be taken  Ultrasound-guided core needle biopsy is recommended  B) MASS  Mammo diagnostic right w 3d & cad: There is a 5 mm oval mass with circumscribed margins seen in the upper outer quadrant of the right breast in the anterior depth  This is a new finding  US breast right limited (diagnostic): There is a 5 mm x 4 mm x 2 mm oval, parallel, anechoic mass with circumscribed margins in the right breast at 10 o'clock, 5 cm from the nipple  The cyst correlates with the mass seen on the mammogram and is benign in appearance  Axilla: Targeted ultrasound of the right axilla was performed  At least 1 morphologically benign lymph node was visualized  No morphologically abnormal lymph nodes were visualized  RECOMMENDATION:       - Ultrasound-guided breast biopsy for the right breast      Malignant neoplasm of overlapping sites of right breast in female, estrogen receptor negative ([facility] )   3/25/2022 Initial Diagnosis    Malignant neoplasm of right female breast ([facility] )     3/25/2022 Biopsy    Right breast ultrasound guided biopsy  12 o'clock 5 cm from nipple  Invasive breast carcinoma of no special type (ductal NST/ invasive ductal carcinoma with apocrine features)  Grade 2  ER 0  OK 0  HER 2 2+   FISH pending  Lymphovascular invasion not identified    Concordant  Malignancy is unifocal  Mass measures 1 4 cm on mammogram and 1 9 cm on ultrasound  Right axilla ultrasound clear  Left brest clear  Amanda  reflector placed  In situ compononent: 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       4/4/2022 -  Cancer Staged    Staging form: Breast, AJCC 8th Edition  - Clinical stage from 4/4/2022: Stage IB (cT1c, cN0, cM0, G2, ER-, OK-, HER2-) - Signed by Indu Silva MD on 4/4/2022  Stage prefix: Initial diagnosis  Nuclear grade: G2  Histologic grading system: 3 grade system  HER2-IHC interpretation: Equivocal  HER2-IHC value: Score 2+  HER2-FISH interpretation: Negative       4/4/2022 Genetic Testing [facility]  A total of 36 genes were evaluated, including: GILL, BRCA1, BRCA2, CDH1, CHEK2, PALB2, PTEN, STK11, TP53  Negative result  No pathogenic sequence variants or deletions/duplications identified           Interval History:   Follow-up with right breast cancer    Review of Systems:   Review of Systems   Constitutional: Negative for chills and fever  HENT: Negative for ear pain and sore throat  Eyes: Negative for pain and visual disturbance  Respiratory: Negative for cough and shortness of breath  Cardiovascular: Negative for chest pain and palpitations  Gastrointestinal: Negative for abdominal pain and vomiting  Genitourinary: Negative for dysuria and hematuria  Musculoskeletal: Negative for arthralgias and back pain  Skin: Negative for color change and rash  Neurological: Negative for seizures and syncope  All other systems reviewed and are negative        Past Medical History     Patient Active Problem List   Diagnosis    Anxiety    Aortoiliac occlusive disease ([address] )    Carotid artery plaque, bilateral    Centrilobular emphysema ([address] )    Hyperlipidemia    Osteoporosis    Restless leg syndrome    Subclavian artery stenosis, left (HCC)    PVD (peripheral vascular disease) (HCC)    Chronic sinusitis    Pancreatic mass    BMI 34 0-34 9,adult    Seborrheic keratoses    Stage 3 chronic kidney disease (HCC)    Closed avulsion fracture of lateral malleolus of right fibula    Prediabetes    Vitamin D deficiency    Acute right ankle pain    COPD (chronic obstructive pulmonary disease) (HCC)    Malignant neoplasm of overlapping sites of right breast in female, estrogen receptor negative ([address] )    Continuous opioid dependence ([address] )     Past Medical History:   Diagnosis Date    Anxiety     Atherosclerosis of native artery of both lower extremities with intermittent claudication ([address] ) 10/15/2015    Transitioned From: Cardiovascular Symptoms    Breast cancer ([address] )     Carotid artery plaque, bilateral 2017    Transitioned From: Atherosclerosis of both carotid arteries    Chronic kidney disease     Chronic sinusitis     Last assessed: 13    COPD (chronic obstructive pulmonary disease) ([facility] )     COVID     21 not hopsitalized- flu-like symptoms    Fall 2021    Fracture, ankle closed, bimalleolar, right, initial encounter 2021    GERD (gastroesophageal reflux disease)     Hyperlipidemia     Lung nodule     PNA (pneumonia)     PONV (postoperative nausea and vomiting)     with C-sections    Pulmonary emphysema ([facility] )     PVD (peripheral vascular disease) ([facility] )     Restless leg syndrome     Stage 3 chronic kidney disease ([place] [id number] ) 2019    Tobacco abuse      Past Surgical History:   Procedure Laterality Date    BREAST LUMPECTOMY Right 2022    Procedure: ISAI  DIRECTED LUMPECTOMY;  Surgeon: Kenia Goff MD;  Location: [facility];  Service: Surgical Oncology    CATARACT EXTRACTION       SECTION      COLONOSCOPY      Complete   Resolved: 13    DESCENDING AORTIC ANEURYSM REPAIR W/ STENT  2019    IR AORTAGRAM WITH RUN-OFF  8/15/2019    LYMPH NODE BIOPSY Right 2022    Procedure: LYMPHATIC MAPPING WITH BLUE AND RADIOACTIVE DYES, SENTINEL LYMPH NODE BIOPSY, INJECTION IN OR BY DR RODRÍGUEZ AT 1300;  Surgeon: Kenia Goff MD;  Location: [facility];  Service: Surgical Oncology    SHOULDER SURGERY      For frozen shoulder     TONSILLECTOMY      US BREAST ISAI  NEEDLE LOC RIGHT Right 3/25/2022    US GUIDED BREAST BIOPSY RIGHT COMPLETE Right 3/25/2022     Family History   Problem Relation Age of Onset    No Known Problems Mother     No Known Problems Father     Arthritis Sister     Pancreatic cancer Sister 61    Melanoma Brother         twice    Prostate cancer Brother     Heart attack Family         Acute Myocardial Infarction    Cirrhosis Family     Prostate cancer Family     Skin cancer Family     Stroke Family         Syndrome    No Known Problems Daughter     No Known Problems Maternal Grandmother     No Known Problems Paternal Grandmother     No Known Problems Sister     No Known Problems Maternal Aunt     Breast cancer Other 27     Social History     Socioeconomic History    Marital status:       Spouse name: Not on file    Number of children: 2    Years of education: Not on file    Highest education level: Not on file   Occupational History    Occupation: Retired/   Tobacco Use    Smoking status: Former Smoker     Packs/day: 2 00     Years: 56 00     Pack years: 112 00     Types: Cigarettes     Start date: 2/21/1962     Quit date: 5/24/2018     Years since quitting: 3 9    Smokeless tobacco: Never Used   Vaping Use    Vaping Use: Never used   Substance and Sexual Activity    Alcohol use: Yes     Comment: occasionally     Drug use: No    Sexual activity: Not Currently     Partners: Male   Other Topics Concern    Not on file   Social History Narrative    Living w/adult children    No advance directive on file     Social Determinants of Health     Financial Resource Strain: Not on file   Food Insecurity: Not on file   Transportation Needs: Not on file   Physical Activity: Inactive    Days of Exercise per Week: 0 days    Minutes of Exercise per Session: 0 min   Stress: Stress Concern Present    Feeling of Stress : Very much   Social Connections: Not on file   Intimate Partner Violence: Not on file   Housing Stability: Not on file       Current Outpatient Medications:     albuterol (2 5 mg/3 mL) 0 083 % nebulizer solution, Take 1 vial (2 5 mg total) by nebulization every 6 (six) hours as needed for wheezing or shortness of breath, Disp: 360 mL, Rfl: 3    albuterol (PROVENTIL HFA,VENTOLIN HFA) 90 mcg/act inhaler, Inhale 2 puffs every 6 (six) hours as needed for wheezing, Disp: 3 Inhaler, Rfl: 3    ALPRAZolam (XANAX) 0 5 mg tablet, Take 1 tablet (0 5 mg total) by mouth 2 (two) times a day as needed for anxiety, Disp: 60 tablet, Rfl: 0    amoxicillin (AMOXIL) 500 mg capsule, Take 1 capsule (500 mg total) by mouth every 8 (eight) hours for 21 doses, Disp: 21 capsule, Rfl: 0    ASPIRIN 81 PO, Take by mouth, Disp: , Rfl:     ergocalciferol (VITAMIN D2) 50,000 units, take 1 capsule by mouth every week, Disp: 12 capsule, Rfl: 0    fluticasone (FLONASE) 50 mcg/act nasal spray, 2 sprays into each nostril daily, Disp: 11 1 mL, Rfl: 1    fluticasone-vilanterol (Breo Ellipta) 100-25 mcg/inh inhaler, Inhale 1 puff daily Rinse mouth after use , Disp: 180 blister, Rfl: 1    gabapentin (NEURONTIN) 300 mg capsule, take 1 capsule by mouth twice a day, Disp: 180 capsule, Rfl: 1    gabapentin (NEURONTIN) 400 mg capsule, Take 1 capsule (400 mg total) by mouth daily at bedtime, Disp: 90 capsule, Rfl: 1    naloxone (NARCAN) 4 mg/0 1 mL nasal spray, Administer 1 spray into a nostril   If no response after 2-3 minutes, give another dose in the other nostril using a new spray , Disp: 1 each, Rfl: 1    olopatadine (PATANOL) 0 1 % ophthalmic solution,  , Disp: , Rfl: 0    oxyCODONE-acetaminophen (Percocet) 5-325 mg per tablet, Take 1 tablet by mouth every 6 (six) hours as needed for moderate pain Max Daily Amount: 4 tablets, Disp: 20 tablet, Rfl: 0    pantoprazole (PROTONIX) 40 mg tablet, take 1 tablet by mouth daily before breakfast, Disp: 30 tablet, Rfl: 5    rosuvastatin (CRESTOR) 20 MG tablet, Take 1 tablet (20 mg total) by mouth daily, Disp: 90 tablet, Rfl: 6    sodium chloride () 2 % hypertonic ophthalmic solution, Sue 128 2 % eye drops, Disp: , Rfl:   Allergies   Allergen Reactions    Spiriva Handihaler [Tiotropium Bromide Monohydrate] Shortness Of Breath    Augmentin [Amoxicillin-Pot Clavulanate] Abdominal Pain     Pt received ceftriaxone 1 g IV on 5-21-18, gets sharp pains     Tiotropium Abdominal Pain    Tylenol With Codeine #3 [Acetaminophen-Codeine] Abdominal Pain       Physical Exam:     Vitals:    05/04/22 1308   BP: 126/82   Pulse: 96   Resp: 14   SpO2: 98%     Physical Exam  Constitutional:       Appearance: Normal appearance  HENT:      Head: Normocephalic and atraumatic  Nose: Nose normal       Mouth/Throat:      Mouth: Mucous membranes are moist    Eyes:      Pupils: Pupils are equal, round, and reactive to light  Cardiovascular:      Rate and Rhythm: Normal rate  Pulses: Normal pulses  Heart sounds: Normal heart sounds  Pulmonary:      Effort: Pulmonary effort is normal       Breath sounds: Normal breath sounds  Abdominal:      General: Bowel sounds are normal       Palpations: Abdomen is soft  Musculoskeletal:         General: Normal range of motion  Cervical back: Normal range of motion and neck supple  Skin:     General: Skin is warm  Neurological:      General: No focal deficit present  Mental Status: She is alert and oriented to person, place, and time  Psychiatric:         Mood and Affect: Mood normal          Behavior: Behavior normal          Thought Content: Thought content normal          Judgment: Judgment normal            Results & Discussion:   I did the discuss with her pending pathology  We will follow her in 2 weeks time  she understands and  agrees   All patient questions were answered  Advance Care Planning/Advance Directives: Joshua Kumar MD discussed the disease status with Rajendra Metcalf  today 05/04/22  treatment plans and follow-up with the patient

## 2022-05-05 ENCOUNTER — PATIENT OUTREACH (OUTPATIENT)
Dept: CASE MANAGEMENT | Facility: HOSPITAL | Age: 77
End: 2022-05-05

## 2022-05-17 ENCOUNTER — NURSE TRIAGE (OUTPATIENT)
Dept: OTHER | Facility: OTHER | Age: 77
End: 2022-05-17

## 2022-05-17 NOTE — TELEPHONE ENCOUNTER
Regarding: Dental caries-Medication Request  ----- Message from Tierra Frederick sent at 5/17/2022 11:01 AM EDT -----  "My teeth are hurting me and I was hoping Pearl Hernández MD could help me with another antibiotic "

## 2022-05-17 NOTE — TELEPHONE ENCOUNTER
Reason for Disposition   SEVERE toothache pain    Answer Assessment - Initial Assessment Questions  1  LOCATION: "Which tooth is hurting?"  (e g , right-side/left-side, upper/lower, front/back)      3 teeth that remain in the front  2  ONSET: "When did the toothache start?"  (e g , hours, days)       Went away when on antibiotics but now back in the past few days  3  SEVERITY: "How bad is the toothache?"  (Scale 1-10; mild, moderate or severe)    - MILD (1-3): doesn't interfere with chewing     - MODERATE (4-7): interferes with chewing, interferes with normal activities, awakens from sleep      - SEVERE (8-10): unable to eat, unable to do any normal activities, excruciating pain         Severe but unable to get to the dentist due to lymph node removal  4  SWELLING: "Is there any visible swelling of your face?"      denies  5   OTHER SYMPTOMS: "Do you have any other symptoms?" (e g , fever)      denies    Protocols used: TOOTHACHE-ADULT-[place]

## 2022-05-17 NOTE — TELEPHONE ENCOUNTER
Does not have a dentist in the area, but needs the 3 teeth removed, wondering if Dr Iris Judd will call more amoxicillin in for her, she sees her surgeon for the breast cancer tomorrow to discuss chemo starting so she knows she needs them out  Did recommend she finds a dentist to discuss this with today, but since she has not been seen yet hoping she can get another antibiotic

## 2022-05-18 ENCOUNTER — OFFICE VISIT (OUTPATIENT)
Dept: SURGICAL ONCOLOGY | Facility: CLINIC | Age: 77
End: 2022-05-18

## 2022-05-18 VITALS
DIASTOLIC BLOOD PRESSURE: 70 MMHG | HEART RATE: 100 BPM | SYSTOLIC BLOOD PRESSURE: 110 MMHG | HEIGHT: 60 IN | WEIGHT: 166 LBS | BODY MASS INDEX: 32.59 KG/M2 | OXYGEN SATURATION: 94 % | RESPIRATION RATE: 14 BRPM

## 2022-05-18 DIAGNOSIS — Z17.1 MALIGNANT NEOPLASM OF OVERLAPPING SITES OF RIGHT BREAST IN FEMALE, ESTROGEN RECEPTOR NEGATIVE (HCC): ICD-10-CM

## 2022-05-18 DIAGNOSIS — Z98.890 STATUS POST RIGHT BREAST LUMPECTOMY: ICD-10-CM

## 2022-05-18 DIAGNOSIS — C50.811 MALIGNANT NEOPLASM OF OVERLAPPING SITES OF RIGHT BREAST IN FEMALE, ESTROGEN RECEPTOR NEGATIVE (HCC): ICD-10-CM

## 2022-05-18 DIAGNOSIS — Z71.89 OTHER SPECIFIED COUNSELING: Primary | ICD-10-CM

## 2022-05-18 DIAGNOSIS — K86.89 PANCREATIC MASS: ICD-10-CM

## 2022-05-18 PROCEDURE — 99024 POSTOP FOLLOW-UP VISIT: CPT | Performed by: SURGERY

## 2022-05-19 ENCOUNTER — TELEPHONE (OUTPATIENT)
Dept: HEMATOLOGY ONCOLOGY | Facility: CLINIC | Age: 77
End: 2022-05-19

## 2022-05-19 ENCOUNTER — OFFICE VISIT (OUTPATIENT)
Dept: HEMATOLOGY ONCOLOGY | Facility: CLINIC | Age: 77
End: 2022-05-19
Payer: MEDICARE

## 2022-05-19 VITALS
DIASTOLIC BLOOD PRESSURE: 76 MMHG | OXYGEN SATURATION: 97 % | TEMPERATURE: 98.4 F | WEIGHT: 167 LBS | SYSTOLIC BLOOD PRESSURE: 142 MMHG | BODY MASS INDEX: 32.79 KG/M2 | HEIGHT: 60 IN | HEART RATE: 77 BPM

## 2022-05-19 DIAGNOSIS — C50.811 MALIGNANT NEOPLASM OF OVERLAPPING SITES OF RIGHT BREAST IN FEMALE, ESTROGEN RECEPTOR NEGATIVE (HCC): ICD-10-CM

## 2022-05-19 DIAGNOSIS — C50.811 MALIGNANT NEOPLASM OF OVERLAPPING SITES OF RIGHT BREAST IN FEMALE, ESTROGEN RECEPTOR NEGATIVE (HCC): Primary | ICD-10-CM

## 2022-05-19 DIAGNOSIS — R11.0 NAUSEA: Primary | ICD-10-CM

## 2022-05-19 DIAGNOSIS — Z17.1 MALIGNANT NEOPLASM OF OVERLAPPING SITES OF RIGHT BREAST IN FEMALE, ESTROGEN RECEPTOR NEGATIVE (HCC): Primary | ICD-10-CM

## 2022-05-19 DIAGNOSIS — Z17.1 MALIGNANT NEOPLASM OF OVERLAPPING SITES OF RIGHT BREAST IN FEMALE, ESTROGEN RECEPTOR NEGATIVE (HCC): ICD-10-CM

## 2022-05-19 PROCEDURE — 99214 OFFICE O/P EST MOD 30 MIN: CPT | Performed by: INTERNAL MEDICINE

## 2022-05-19 RX ORDER — SODIUM CHLORIDE 9 MG/ML
20 INJECTION, SOLUTION INTRAVENOUS ONCE
Status: CANCELLED | OUTPATIENT
Start: 2022-06-14

## 2022-05-19 RX ORDER — ONDANSETRON HYDROCHLORIDE 8 MG/1
8 TABLET, FILM COATED ORAL EVERY 8 HOURS PRN
Qty: 20 TABLET | Refills: 2 | Status: SHIPPED | OUTPATIENT
Start: 2022-05-19

## 2022-05-19 RX ORDER — SODIUM CHLORIDE 9 MG/ML
20 INJECTION, SOLUTION INTRAVENOUS ONCE
Status: CANCELLED | OUTPATIENT
Start: 2022-07-05

## 2022-05-19 RX ORDER — DEXAMETHASONE 4 MG/1
TABLET ORAL
Qty: 6 TABLET | Refills: 2 | Status: SHIPPED | OUTPATIENT
Start: 2022-05-19

## 2022-05-19 NOTE — TELEPHONE ENCOUNTER
While we try to accommodate patient requests, our priority is to schedule treatment according to Doctor's orders and site availability  1  Does the Provider use the intake sheet or checkout note? check out  2    3  What would be a preferred day of the week that would work best for your infusion appointment? Any  4    5  Do you prefer mornings or afternoons for your appointments? Late morning  6    7  We are going to try our best to schedule you at the infusion center closest to your home  In the event that we are unable to what would be your next preferred infusion site or sites? [facility]  [facility]  2    3      8  Do you have transportation to take you to all of your appointments?  No she needs [facility]  9    10  Would you like the infusion center to draw labs from your port? (disregard if patient doesn't have a port or need labs for infusion appointment)no

## 2022-05-19 NOTE — PROGRESS NOTES
Hematology/Oncology Progress Note    Date of Service: 5/19/2022    [facility]   [address]  [place] [id number]    Hem/Onc Problem List:     Right breast invasive ductal carcinoma, DCIS, triple negative    Chief Complaint:    Management of breast cancer    Assessment/Plan:     I personally reviewed the lab results, image studies results and other specialties/physicians consult notes and recommendations  I shared the findings with patient, discussed the diagnosis and management plan as below  1  Right breast invasive ductal carcinoma, initially diagnosed in March 25, 2022, ER/ME negative  HER-2 equivocal by IHC but negative by FISH  The tumor measures 1 9 x 1 6 x 1 0 cm on ultrasound  No clinical suspicious lymphadenopathy  [id number], stage T1B   Grade 2    status post right lumpectomy with lymph node dissection on April 28,2022  Overall, the patient tolerated the surgery very well  All margins negative  All lymph nodes negative  Final pathologic stage pT1c pN0  I recommend adjuvant chemotherapy with TC for 4 cycles with Neulasta,  followed by adjuvant radiation therapy  Because of her age, and other medical comorbidities including COPD, I will decrease chemo dose by 25%  Patient will have a formal chemo education today  We expect to start chemotherapy in 2 weeks  2  Inherited gene predisposition:  Family history of pancreatic cancer involving her sister and brother  Brother also had melanoma  Her niece was diagnosed breast cancer and tested for BRCA mutation positive  Status post bilateral mastectomy  Patient also has a stable mass in the head of pancreas  invitae breast cancer stat panel negative  3  Bone health/osteoporosis  Last DEXA scan in February 15, 2022  Patient takes calcium, vitamin-D , doing exercise  Patient continues Prolia every 6 months  DEXA scan every 2 years    4  Pancreatic mass that has been monitored by Dr Krista Oneill  CT scan in November 24, 2021 showed stable pancreatic head lesion measuring 1 6 x 1 6 cm  Repeat CT scan as per Dr Krista Oneill  5  CKD stage 3  Self hydration is recommended  Follow-up with primary care physician  6  Follow-up, return to clinic in 3 weeks with labs prior  Disclaimer: This document was prepared using [employer] technology  If a word or phrase is confusing, or does not make sense, this is likely due to recognition error which was not discovered during the providers review  If you believe an error has occurred, please Contact me through Infectious service for jackson? cation  AJCC 8th Edition Cancer Stage :      Cancer Staging  Malignant neoplasm of overlapping sites of right breast in female, estrogen receptor negative ([facility] [address] )  Staging form: Breast, AJCC 8th Edition  - Clinical stage from 4/4/2022: Stage IB (cT1c, cN0, cM0, G2, ER-, ID-, HER2-) - Signed by Indu Silva MD on 4/4/2022  Stage prefix: Initial diagnosis  Nuclear grade: G2  Histologic grading system: 3 grade system  HER2-IHC interpretation: Equivocal  HER2-IHC value: Score 2+  HER2-FISH interpretation: Negative      Hematology/Oncology History:     · November 24, 2021 CT scan chest abdomen pelvis showed a stable pancreatic head mass lesion no intra-abdominal abdomen are not see identified  No evidence of metastatic disease  Fatty liver  · February 15, 2022 patient had screening mammogram:  RIGHT  A) MASS: There is a high density, irregularly shaped mass seen in the right breast at 11 o'clock in the anterior depth       Left  There are no suspicious masses, grouped microcalcifications or areas of unexplained architectural distortion  The skin and nipple areolar complex are unremarkable     · February 15, 2022 DEXA scan:  Osteoporosis in the left femoral neck  · March 17, 2022 diagnostic mammogram and ultrasound:  RIGHT  A) MASS  Mammo diagnostic right w 3d & cad:  There is a 14 mm x 10 mm x 9 mm high density, irregularly shaped mass seen in the right breast at 12 o'clock, 5 cm from the nipple, anterior depth   This corresponds with the mass seen on screening mammogram and is new compared to the mammogram from 2019  US breast right limited (diagnostic):  At 12 o'clock, 5 cm from nipple there is an irregular hypoechoic solid mass which measures 19 x 16 x 10 mm (see the oblique images at the end of the ultrasound study)   The mass correlates with the mass seen on the mammogram   This is highly suggestive of malignancy   Appropriate action should be taken   Ultrasound-guided core needle biopsy is recommended  B) MASS  Mammo diagnostic right w 3d & cad: There is a 5 mm oval mass with circumscribed margins seen in the upper outer quadrant of the right breast in the anterior depth  This is a new finding  US breast right limited (diagnostic): There is a 5 mm x 4 mm x 2 mm oval, parallel, anechoic mass with circumscribed margins in the right breast at 10 o'clock, 5 cm from the nipple  The cyst correlates with the mass seen on the mammogram and is benign in appearance  Axilla: Targeted ultrasound of the right axilla was performed   At least 1 morphologically benign lymph node was visualized   No morphologically abnormal lymph nodes were visualized      IMPRESSION:     Right breast:  1  Mass at 12 o'clock, 5 cm from the nipple is highly suggestive of malignancy   Appropriate action should be taken   Ultrasound-guided core needle biopsy is recommended   I discussed the imaging findings and my level of concern with the patient   She met with the nurse navigator to schedule the procedure  2  Incidentally seen is a 5 mm simple cyst in the right breast at 10 o'clock, 5 cm from the nipple   This is benign in appearance and does not require dedicated follow-up imaging    3  Ultrasound of the right axilla was performed and no abnormal lymph nodes were seen     · March 25, 2022 ultrasound-guided biopsy of right breast mass:  Final Diagnosis   A  Right breast, 12:00, 5 cm from nipple (ultrasound-guided 12 gauge core biopsy, 4 passes):     - Invasive breast carcinoma of no special type (ductal NST/invasive ductal carcinoma with apocrine features)       -- Tumor present in at least 3 tissue cores with largest measurable size of 6 mm        -- mBR Grade:  Grade 2 of 3         - Duct formation:      Score 3 of 3         - Nuclear grade:       Score 3 of 3         - Mitotic rate:            Score 1 of 3     - In situ component: Favor 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     - Lymphovascular invasion:  Not definitively identified         Addendum   Test Description                                     Result                            Prognostic Interpretation  Estrogen Receptor (clone SP-1)              0%                                  Negative              Internal control: Positive   External control: Positive                          Trae Score*:  0                                                         Progesterone Receptor (clone 1E2)         0%                                  Negative              Internal control: Positive                         External control: Positive                          Trae Score*:  0     HER2 by IHC (clone 4B5)                         2+                                  Equivocal (FISH pending)         ·  April 28, 2022 right breast lumpectomy with lymph node dissection  Final Diagnosis   A  [place] Lymph Node, Right Axillary (#1), Biopsy:  - One lymph node, negative for carcinoma (0/1)  - AE1/AE3 immunohistochemistry supports the diagnosis      B  Lymph Nodes, Right Axillary, Dissection:  - Two lymph nodes, negative for carcinoma (0/2)  - AE1/AE3 immunohistochemistry supports the diagnosis      C  Breast, Right New Posterior Margin, Excision:  - Benign breast tissue with fibrocystic changes      D   Breast, Right New Inferior Margin, Excision:  - Benign breast tissue with fibrocystic changes      E  Breast, Right New Medial Margin, Excision:  - Benign breast tissue with fibrocystic changes      F  Breast, Right New Superior Margin, Excision:  - Benign breast tissue with fibrocystic changes      G  Breast, Right New Lateral Margin, Excision:  - Benign breast tissue with fibrocystic changes      H  Breast, Right New Anterior Margin, Excision:  - Benign breast tissue with fibrocystic changes      I  Breast, Right, Lumpectomy:  - Invasive ductal carcinoma with apocrine features, [place] Grade III (3 + 3 + 3 = 9), 14 mm in greatest dimension  See Synoptic Report and Note  - Background fibrocystic changes and prior procedural site reactive changes  - Benign skin with no specific pathologic change  [id number], triple negative  History of Present Illiness:   Johnna Mccoy is a 78 y o  female with the above-noted HemOnc history who is here  to to discuss pathology result and management plan  Patient had a screening mammogram in February 15, 2022 which showed high density, irregularly shaped mass in the right breast at the 11 o'clock position  Diagnostic mammogram confirmed a 1 4 x 1 0 x 0 9 cm high density, irregular shaped mass in the right breast on March 17, 2022  Right breast ultrasound shows an irregular hypoechoic solid mass measuring 1 9 x 1 6 x 1 0 cm  There is a 5 mm cyst in the right breast   No suspicious axillar adenopathy      Ultrasound-guided biopsy of right breast on March 25, 2022 showed invasive ductal carcinoma with apocrine features  Grade 2, with DCIS component  No lymphovascular invasion  ER and DC negative, HER2 equivocal by IHC  HER2 POSITIVE BY FISH      This case was discussed in our multidisciplinary breast cancer tumor board with recommendation of oncologic genetic counseling, breast conservative surgery if genetic test negative, adjuvant chemotherapy with TC x4 cycles, and adjuvant XRT       Patient underwent lumpectomy with axillary node dissection on April 28, 2022  Patient tolerated the procedure very well  All margins negative, all lymph node negative  Final pathology pT1c pN0  Triple negative breast cancer      Patient also has history of a CKD stage IIIA  ferritin stable      DEXA scan showed osteoporosis in February 15, 2022  Patient takes calcium, vitamin-D and doing exercise  Patient feels fine  She still have a the surgical tape in place  No fever or chills  No exertional chest pain, diaphoresis or shortness  of breath  No cough and phlegm, no hemoptysis  Patient denied nausea  and vomiting  No abdominal pain  No diarrhea or constipation  No  symptoms  No headache or blurred vision  No seizure activity  Appetite good  No significant weight loss or weight gain  The patient denies bleeding anywhere  ROS: A 12-point of review of systems is obtained and other than the above is noncontributory  Objective:   VITALS:   /76 (BP Location: Right arm, Cuff Size: Standard)   Pulse 77   Temp 98 4 °F (36 9 °C)   Ht 5' (1 524 m)   Wt 75 8 kg (167 lb)   SpO2 97%   BMI 32 61 kg/m²     Physical EXAM:  General:  Alert, cooperative, no distress  Head:  Normocephalic, without obvious abnormality, atraumatic  Eyes:  Conjunctivae/corneas clear  PERRL, EOMs intact  No evidence of conjunctivitis     Throat: Lips, mucosa, and tongue normal   No bleeding from mouth  Neck: Supple, symmetrical, trachea midline    Lungs:   Clear to auscultation bilaterally  Respiratory effort easy, nonlabored    Heart:  Regular rate and rhythm, S1, S2 normal, no murmur  Abdomen:   Soft, non-tender,nondistended  Bowel sounds normal  No masses,  No organomegaly  Extremities:  Lymphatics: Extremities normal, atraumatic, no cyanosis or edema  No cervical, axillary or inguinal adenopathy   Skin: Skin color, texture, turgor normal  No rashes  Neurologic: A&Ox4   No focal neuro deficits Allergies   Allergen Reactions    Spiriva Handihaler [Tiotropium Bromide Monohydrate] Shortness Of Breath    Augmentin [Amoxicillin-Pot Clavulanate] Abdominal Pain     Pt received ceftriaxone 1 g IV on 18, gets sharp pains     Tiotropium Abdominal Pain    Tylenol With Codeine #3 [Acetaminophen-Codeine] Abdominal Pain       Past Medical History:   Diagnosis Date    Anxiety     Atherosclerosis of native artery of both lower extremities with intermittent claudication ([facility] [address] ) 10/15/2015    Transitioned From: Cardiovascular Symptoms    Breast cancer ([facility] [address] )     Carotid artery plaque, bilateral 2017    Transitioned From: Atherosclerosis of both carotid arteries    Chronic kidney disease     Chronic sinusitis     Last assessed: 13    COPD (chronic obstructive pulmonary disease) ([facility] [address] )     COVID     21 not hopsitalized- flu-like symptoms    Fall 2021    Fracture, ankle closed, bimalleolar, right, initial encounter 2021    GERD (gastroesophageal reflux disease)     Hyperlipidemia     Lung nodule     PNA (pneumonia)     PONV (postoperative nausea and vomiting)     with C-sections    Pulmonary emphysema ([facility] [address] )     PVD (peripheral vascular disease) ([facility] [address] )     Restless leg syndrome     Stage 3 chronic kidney disease ([facility] [address] ) 2019    Tobacco abuse        Past Surgical History:   Procedure Laterality Date    BREAST LUMPECTOMY Right 2022    Procedure: ISAI  DIRECTED LUMPECTOMY;  Surgeon: Mitul Pickard MD;  Location: [facility] OR;  Service: Surgical Oncology    CATARACT EXTRACTION       SECTION      COLONOSCOPY      Complete   Resolved: 13    DESCENDING AORTIC ANEURYSM REPAIR W/ STENT  2019    IR AORTAGRAM WITH RUN-OFF  8/15/2019    LYMPH NODE BIOPSY Right 2022    Procedure: LYMPHATIC MAPPING WITH BLUE AND RADIOACTIVE DYES, SENTINEL LYMPH NODE BIOPSY, INJECTION IN OR BY DR Liilam Rollins AT 1300;  Surgeon: Mitul Pickard MD; Location: [facility];  Service: Surgical Oncology    SHOULDER SURGERY      For frozen shoulder     TONSILLECTOMY      US BREAST ISAI  NEEDLE LOC RIGHT Right 3/25/2022    US GUIDED BREAST BIOPSY RIGHT COMPLETE Right 3/25/2022       Family History   Problem Relation Age of Onset    No Known Problems Mother     No Known Problems Father     Arthritis Sister     Pancreatic cancer Sister 61    Melanoma Brother         twice    Prostate cancer Brother     Heart attack Family         Acute Myocardial Infarction    Cirrhosis Family     Prostate cancer Family     Skin cancer Family     Stroke Family         Syndrome    No Known Problems Daughter     No Known Problems Maternal Grandmother     No Known Problems Paternal Grandmother     No Known Problems Sister     No Known Problems Maternal Aunt     Breast cancer Other 27       Social History     Socioeconomic History    Marital status:       Spouse name: Not on file    Number of children: 2    Years of education: Not on file    Highest education level: Not on file   Occupational History    Occupation: Retired/   Tobacco Use    Smoking status: Former Smoker     Packs/day: 2 00     Years: 56 00     Pack years: 112 00     Types: Cigarettes     Start date: 2/21/1962     Quit date: 5/24/2018     Years since quitting: 3 9    Smokeless tobacco: Never Used   Vaping Use    Vaping Use: Never used   Substance and Sexual Activity    Alcohol use: Yes     Comment: occasionally     Drug use: No    Sexual activity: Not Currently     Partners: Male   Other Topics Concern    Not on file   Social History Narrative    Living w/adult children    No advance directive on file     Social Determinants of Health     Financial Resource Strain: Not on file   Food Insecurity: Not on file   Transportation Needs: Not on file   Physical Activity: Inactive    Days of Exercise per Week: 0 days    Minutes of Exercise per Session: 0 min   Stress: Stress Concern Present    Feeling of Stress : Very much   Social Connections: Not on file   Intimate Partner Violence: Not on file   Housing Stability: Not on file       Current Outpatient Medications   Medication Sig Dispense Refill    albuterol (2 5 mg/3 mL) 0 083 % nebulizer solution Take 1 vial (2 5 mg total) by nebulization every 6 (six) hours as needed for wheezing or shortness of breath 360 mL 3    albuterol (PROVENTIL HFA,VENTOLIN HFA) 90 mcg/act inhaler Inhale 2 puffs every 6 (six) hours as needed for wheezing 3 Inhaler 3    ALPRAZolam (XANAX) 0 5 mg tablet Take 1 tablet (0 5 mg total) by mouth 2 (two) times a day as needed for anxiety 60 tablet 0    ASPIRIN 81 PO Take by mouth      ergocalciferol (VITAMIN D2) 50,000 units take 1 capsule by mouth every week 12 capsule 0    fluticasone (FLONASE) 50 mcg/act nasal spray 2 sprays into each nostril daily 11 1 mL 1    fluticasone-vilanterol (Breo Ellipta) 100-25 mcg/inh inhaler Inhale 1 puff daily Rinse mouth after use  180 blister 1    gabapentin (NEURONTIN) 300 mg capsule take 1 capsule by mouth twice a day 180 capsule 1    gabapentin (NEURONTIN) 400 mg capsule Take 1 capsule (400 mg total) by mouth daily at bedtime 90 capsule 1    naloxone (NARCAN) 4 mg/0 1 mL nasal spray Administer 1 spray into a nostril  If no response after 2-3 minutes, give another dose in the other nostril using a new spray   1 each 1    olopatadine (PATANOL) 0 1 % ophthalmic solution    0    oxyCODONE-acetaminophen (Percocet) 5-325 mg per tablet Take 1 tablet by mouth every 6 (six) hours as needed for moderate pain Max Daily Amount: 4 tablets 20 tablet 0    pantoprazole (PROTONIX) 40 mg tablet take 1 tablet by mouth daily before breakfast 30 tablet 5    rosuvastatin (CRESTOR) 20 MG tablet Take 1 tablet (20 mg total) by mouth daily 90 tablet 6    sodium chloride () 2 % hypertonic ophthalmic solution Sue 128 2 % eye drops       No current facility-administered medications for this visit  (Not in a hospital admission)      DATA REVIEW:    Pathology Result:    Final Diagnosis   Date Value Ref Range Status   04/28/2022   Final    A  [place] Lymph Node, Right Axillary (#1), Biopsy:  - One lymph node, negative for carcinoma (0/1)  - AE1/AE3 immunohistochemistry supports the diagnosis  B  Lymph Nodes, Right Axillary, Dissection:  - Two lymph nodes, negative for carcinoma (0/2)  - AE1/AE3 immunohistochemistry supports the diagnosis  C  Breast, Right New Posterior Margin, Excision:  - Benign breast tissue with fibrocystic changes  D  Breast, Right New Inferior Margin, Excision:  - Benign breast tissue with fibrocystic changes  E  Breast, Right New Medial Margin, Excision:  - Benign breast tissue with fibrocystic changes  F  Breast, Right New Superior Margin, Excision:  - Benign breast tissue with fibrocystic changes  G  Breast, Right New Lateral Margin, Excision:  - Benign breast tissue with fibrocystic changes  H  Breast, Right New Anterior Margin, Excision:  - Benign breast tissue with fibrocystic changes  I  Breast, Right, Lumpectomy:  - Invasive ductal carcinoma with apocrine features, [place] Grade III (3 + 3 + 3 = 9), 14 mm in greatest dimension  See Synoptic Report and Note  - Background fibrocystic changes and prior procedural site reactive changes  - Benign skin with no specific pathologic change  03/25/2022   Final    A  Right breast, 12:00, 5 cm from nipple (ultrasound-guided 12 gauge core biopsy, 4 passes):     - Invasive breast carcinoma of no special type (ductal NST/invasive ductal carcinoma with apocrine features)  -- Tumor present in at least 3 tissue cores with largest measurable size of 6 mm         -- mBR Grade:  Grade 2 of 3         - Duct formation: Score 3 of 3         - Nuclear grade: Score 3 of 3         - Mitotic rate: Score 1 of 3     - In situ component: Favor 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     - Lymphovascular invasion:  Not definitively identified    Comment:  Block A2 forwarded for ER/AR/HER2 analysis with the results to be given in a supplemental report  Comment: This is an appended report  These results have been appended to a previously preliminary verified report  05/28/2019   Final    A  Esophagus, biopsy:             - Reactive squamous mucosa  - Benign oxyntic mucosa  - No intestinal metaplasia, dysplasia or malignancy is identified  Interpretation performed at [address]           Image Results: They are reviewed and documented in Hematology/Oncology history    Mammo elxy  breast specimen (No Charge)  Narrative: SPECIMEN RADIOGRAPH  Impression:   2 images of the right breast specimen were submitted  A LEXY    reflector projects within the specimen on both views  Specimen radiograph was not discussed with the surgeon while they were in   the operating room  LABS:  Lab data are reviewed and documented in HemOn history  No results found for this or any previous visit (from the past 48 hour(s))            Bernadine Carr MD  5/19/2022, 11:25 AM

## 2022-05-20 DIAGNOSIS — Z17.1 MALIGNANT NEOPLASM OF OVERLAPPING SITES OF RIGHT BREAST IN FEMALE, ESTROGEN RECEPTOR NEGATIVE (HCC): Primary | ICD-10-CM

## 2022-05-20 DIAGNOSIS — C50.811 MALIGNANT NEOPLASM OF OVERLAPPING SITES OF RIGHT BREAST IN FEMALE, ESTROGEN RECEPTOR NEGATIVE (HCC): Primary | ICD-10-CM

## 2022-05-20 NOTE — TELEPHONE ENCOUNTER
Patient may need dental clearance before starting treatment  Awaiting further info  Osmar Franks, please keep me updated

## 2022-05-20 NOTE — TELEPHONE ENCOUNTER
May have patient go to [facility] dental clinic and [address] for evaluation  Patient has an appointment at dental clinic on Monday for evaluation  I did reach out to Oncology Finance and Oncology Social work for recommendations  Will talk to patient Monday afternoon and or Tuesday to assess needs

## 2022-05-20 NOTE — PROGRESS NOTES
Patient asking for assistance with dental care and cost  Patient does utilize cabs and public transportation currently until [address] transportation is set up    Patient beneficial to assistance with resources

## 2022-05-23 ENCOUNTER — TELEPHONE (OUTPATIENT)
Dept: HEMATOLOGY ONCOLOGY | Facility: CLINIC | Age: 77
End: 2022-05-23

## 2022-05-23 ENCOUNTER — PATIENT OUTREACH (OUTPATIENT)
Dept: CASE MANAGEMENT | Facility: HOSPITAL | Age: 77
End: 2022-05-23

## 2022-05-23 NOTE — PROGRESS NOTES
S/w pt this morning by phone, she says that she is doing well following surgery  Her main concern at this point is that she has an infected tooth that will need to be pulled, as antibiotics have not cleared up the infection  She has an appt with [facility] on 6/15 but is worried that this is too far out  She says that she is apprehensive about starting chemo but also worried about delaying it  She says that she has called multiple places and 6/15 is the earliest appt she can get  She says that she would like to s/w the office to see if this is OK or if we can try to get the appt moved up a little sooner  Pt says that her son has started a new medication for pulmonary hypertension and that he has been sick in bed with a migraine for 6 days  They called the cardiologist office and she says that the dr he sees is out of the office  All of this combined is causing her significant anxiety  I advised her that he needs to then call his PCP or call and ask to s/w someone else at cardiology, because the doctor would have coverage if she was out  I also told her I would have Dr Jacquelyn Alex RN give her a call hopefully today to discuss the dental appt  She was very appreciative of the time spent talking and says that she will talk to her son about reaching back out to his physicians  No other needs at this time

## 2022-05-23 NOTE — TELEPHONE ENCOUNTER
Pt called stating she's ready to order the O2 and was told by Brianne Palmer that she could just come in to do a 6MW so she could reorder the O2 through [facility]  Per Corina Mukherjee, told pt to come in tomorrow, 5/24/22, at 12:40pm to do 6MW

## 2022-05-23 NOTE — TELEPHONE ENCOUNTER
Reached out to patient in regards to [facility]  Notified patient that we left message and reviewed unable to start treatment until we obtain dental clearance  Patient appreciative of call and agreeable to plan  Reviewed I will call [phone] tomorrow and will call patient back

## 2022-05-24 ENCOUNTER — TELEPHONE (OUTPATIENT)
Dept: HEMATOLOGY ONCOLOGY | Facility: CLINIC | Age: 77
End: 2022-05-24

## 2022-05-24 DIAGNOSIS — J43.2 CENTRILOBULAR EMPHYSEMA (HCC): Primary | ICD-10-CM

## 2022-05-24 NOTE — TELEPHONE ENCOUNTER
Reached out to [phone] in regards to patient appointment and able to move up appointment  Staff stated did not have patient on schedule  Reached out to patient and reviewed notification from [facility]  Patient stated will call them and then return call to this RN

## 2022-05-25 ENCOUNTER — PATIENT OUTREACH (OUTPATIENT)
Dept: CASE MANAGEMENT | Facility: HOSPITAL | Age: 77
End: 2022-05-25

## 2022-05-27 ENCOUNTER — PATIENT OUTREACH (OUTPATIENT)
Dept: CASE MANAGEMENT | Facility: HOSPITAL | Age: 77
End: 2022-05-27

## 2022-05-27 ENCOUNTER — TELEPHONE (OUTPATIENT)
Dept: HEMATOLOGY ONCOLOGY | Facility: CLINIC | Age: 77
End: 2022-05-27

## 2022-05-27 DIAGNOSIS — C50.811 MALIGNANT NEOPLASM OF OVERLAPPING SITES OF RIGHT BREAST IN FEMALE, ESTROGEN RECEPTOR NEGATIVE (HCC): Primary | ICD-10-CM

## 2022-05-27 DIAGNOSIS — Z17.1 MALIGNANT NEOPLASM OF OVERLAPPING SITES OF RIGHT BREAST IN FEMALE, ESTROGEN RECEPTOR NEGATIVE (HCC): Primary | ICD-10-CM

## 2022-05-27 RX ORDER — DEXAMETHASONE 4 MG/1
4 TABLET ORAL 2 TIMES DAILY WITH MEALS
Qty: 6 TABLET | Refills: 0 | Status: SHIPPED | OUTPATIENT
Start: 2022-05-27

## 2022-05-27 NOTE — PROGRESS NOTES
KRISS rcvd a message from pt that she did make her dental appointment yesterday, they did an exam and Xrays and gave her more antibiotics  She says that she is going back on 6/1 to have the three teeth pulled  She said that while it was a long appointment, she is grateful to have gotten it done so quickly and is hoping that the extraction goes well so she can start her chemo soon  MSW updated CHUCHO Elias with this information as well  No other concerns at this time

## 2022-05-27 NOTE — TELEPHONE ENCOUNTER
Left voicemail in attempt return telephone call to patient  Reviewed that patient reported having a dental appointment on 6/1 for extraction of teeth  Reviewed that patient has an appointment on 6/2 at 0800 at [facility] with Dr León Parker  Reviewed will further discuss all with Dr León Parker during an appointment  Encouraged patient to call back with further questions or concerns

## 2022-05-27 NOTE — TELEPHONE ENCOUNTER
Call out to [address] [place] [address] in regards to dexamethasone prescription sent on 5/27 to be in addition to one previously prescribed  Per Pharmacy staff will have it processed in at least an hour  Return to patient to review questions and concerns  Patient asked about prescription, noted above and patient stated will obtain next week due to the holiday  Reviewed patient question in regards to steri strips that Dr Rocio Cerrato had applied and that they were half on  Patient asked if okay to pull off, I reviewed usually Surgical oncology would be the point place to ask about it, but recommended patient to not pull them off and that they will come off on their own  Patient verbalized understanding  Patient verbalized that patient has a dentist appointment on 6/1/22 for extraction of teeth  Due to that appointment and not being able to obtain public transportation for 0800 am appointment with Dr César Lockwood on 6/2/22 at [facility]  Patient asked for virtual appointment  Patient had chemo education, informed consents signed and uploaded and did not start treatment due to dental clearances needed  Reviewed with patient that this can all be discussed with Dr César Lockwood and when treatment can begin post extraction  Patient agreeable to plan and verbalized understanding  Patient very appreciative of call

## 2022-06-01 ENCOUNTER — TELEPHONE (OUTPATIENT)
Dept: HEMATOLOGY ONCOLOGY | Facility: CLINIC | Age: 77
End: 2022-06-01

## 2022-06-01 ENCOUNTER — DOCUMENTATION (OUTPATIENT)
Dept: HEMATOLOGY ONCOLOGY | Facility: CLINIC | Age: 77
End: 2022-06-01

## 2022-06-01 NOTE — TELEPHONE ENCOUNTER
Call out to patient per Dr Jimmy Bills recommendations, Patient treatment to start next week  Please schedule patient starting next week due to teeth extractions on 6/1/22

## 2022-06-01 NOTE — PROGRESS NOTES
I was calling patient to check in post surgery to see how patient is feeling    A voicemail was left with my contact sesar

## 2022-06-01 NOTE — TELEPHONE ENCOUNTER
Our first available to defer pt to would be 6/14  Please let us know if you would like us to schedule this date   Thanks

## 2022-06-02 ENCOUNTER — HOSPITAL ENCOUNTER (OUTPATIENT)
Dept: INFUSION CENTER | Facility: CLINIC | Age: 77
Discharge: HOME | End: 2022-06-02

## 2022-06-02 ENCOUNTER — TELEMEDICINE (OUTPATIENT)
Dept: HEMATOLOGY ONCOLOGY | Facility: CLINIC | Age: 77
End: 2022-06-02
Payer: MEDICARE

## 2022-06-02 DIAGNOSIS — Z17.1 MALIGNANT NEOPLASM OF OVERLAPPING SITES OF RIGHT BREAST IN FEMALE, ESTROGEN RECEPTOR NEGATIVE (HCC): ICD-10-CM

## 2022-06-02 DIAGNOSIS — L03.90 CELLULITIS, UNSPECIFIED CELLULITIS SITE: Primary | ICD-10-CM

## 2022-06-02 DIAGNOSIS — C50.811 MALIGNANT NEOPLASM OF OVERLAPPING SITES OF RIGHT BREAST IN FEMALE, ESTROGEN RECEPTOR NEGATIVE (HCC): ICD-10-CM

## 2022-06-02 PROCEDURE — G2012 BRIEF CHECK IN BY MD/QHP: HCPCS | Performed by: INTERNAL MEDICINE

## 2022-06-02 RX ORDER — CIPROFLOXACIN 500 MG/1
500 TABLET, FILM COATED ORAL EVERY 12 HOURS SCHEDULED
Qty: 14 TABLET | Refills: 0 | Status: SHIPPED | OUTPATIENT
Start: 2022-06-02 | End: 2022-06-09

## 2022-06-02 NOTE — PROGRESS NOTES
TeleMed REGULAR VISIT       VERIFICATION OF PATIENT LOCATION:  Home    Patient is located in the following state in which I hold an active license [place]     ENCOUNTERED PROVIDER: Kimani Spencer MD      PROVIDER LOCATED AT:    [address] [place] [id number] [phone]    REASON FOR THE VISIT:   Follow-up for management of breast cancer    AJCC 8th Edition Cancer Stage :    Cancer Staging  Malignant neoplasm of overlapping sites of right breast in female, estrogen receptor negative ([facility] [address] )  Staging form: Breast, AJCC 8th Edition  - Clinical stage from 4/4/2022: Stage IB (cT1c, cN0, cM0, G2, ER-, AZ-, HER2-) - Signed by Kmiani Spencer MD on 4/4/2022  Stage prefix: Initial diagnosis  Nuclear grade: G2  Histologic grading system: 3 grade system  HER2-IHC interpretation: Equivocal  HER2-IHC value: Score 2+  HER2-FISH interpretation: Negative      ASSESSMENT AND PLAN:    1  Right breast invasive ductal carcinoma, initially diagnosed in March 25, 2022, ER/AZ negative   HER-2 equivocal by [facility] but negative by FISH  The tumor measures 1 9 x 1 6 x 1 0 cm on ultrasound   No clinical suspicious lymphadenopathy  [id number], stage T1B   Grade 2    status post right lumpectomy with lymph node dissection on April 28,2022  Overall, the patient tolerated the surgery very well  All margins negative  All lymph nodes negative  Final pathologic stage pT1c pN0  I recommend adjuvant chemotherapy with TC for 4 cycles with Neulasta,  followed by adjuvant radiation therapy   Because of her age, and other medical comorbidities including COPD, I will decrease chemo dose by 25%  patient already had a formal chemo medication  I will postpone chemotherapy in 2 weeks because of tooth extraction and cellulitis in surgical site  2  Cellulitis in surgical site and possible UTI    Patient still has a burning sensation  Patient will start Cipro 500 mg b i d  For 7 days  3  Inherited gene predisposition:  Family history of pancreatic cancer involving her sister and brother  Lelo Frederick also Liya Jeter niece was diagnosed breast cancer and tested for BRCA mutation positive   Status post bilateral mastectomy   Patient also has a stable mass in the head of pancreas    invitae breast cancer stat panel negative  4  Bone health/osteoporosis   Last DEXA scan in February 15, 2022   Patient takes calcium, vitamin-D , doing exercise   Patient continues Prolia every 6 months  DEXA scan every 2 years  5  Pancreatic mass that has been monitored by Dr Braxton Mcghee   CT scan in November 24, 2021 showed stable pancreatic head lesion measuring 1 6 x 1 6 cm   Repeat CT scan as per Dr Braxton Mcghee  6  CKD stage 3   Self hydration is recommended   Follow-up with primary care physician  7  Follow-up, return to clinic in 2 weeks with labs prior  Patient is aware that I will leave this practice in 1 month  Her care will be transferred to a new physician  Disclaimer: This document was prepared using expressor software Fluency Direct technology  If a word or phrase is confusing, or does not make sense, this is likely due to recognition error which was not discovered during the providers review  If you believe an error has occurred, please Contact me through [employer] service for jackson? cation  HEMATOLOGY/ONCOLOGY HISTORY:     · November 24, 2021 CT scan chest abdomen pelvis showed a stable pancreatic head mass lesion no intra-abdominal abdomen are not see identified   No evidence of metastatic disease   Fatty liver  · February 15, 2022 patient had screening mammogram:  RIGHT  A) MASS: There is a high density, irregularly shaped mass seen in the right breast at 11 o'clock in the anterior depth       Left  There are no suspicious masses, grouped microcalcifications or areas of unexplained architectural distortion   The skin and nipple areolar complex are unremarkable     · February 15, 2022 DEXA scan:  Osteoporosis in the left femoral neck  · March 17, 2022 diagnostic mammogram and ultrasound:  RIGHT  A) MASS  Mammo diagnostic right w 3d & cad: There is a 14 mm x 10 mm x 9 mm high density, irregularly shaped mass seen in the right breast at 12 o'clock, 5 cm from the nipple, anterior depth   This corresponds with the mass seen on screening mammogram and is new compared to the mammogram from 2019  US breast right limited (diagnostic):  At 12 o'clock, 5 cm from nipple there is an irregular hypoechoic solid mass which measures 19 x 16 x 10 mm (see the oblique images at the end of the ultrasound study)   The mass correlates with the mass seen on the mammogram   This is highly suggestive of malignancy   Appropriate action should be taken   Ultrasound-guided core needle biopsy is recommended  B) MASS  Mammo diagnostic right w 3d & cad: There is a 5 mm oval mass with circumscribed margins seen in the upper outer quadrant of the right breast in the anterior depth  This is a new finding  US breast right limited (diagnostic): There is a 5 mm x 4 mm x 2 mm oval, parallel, anechoic mass with circumscribed margins in the right breast at 10 o'clock, 5 cm from the nipple  The cyst correlates with the mass seen on the mammogram and is benign in appearance  Axilla: Targeted ultrasound of the right axilla was performed   At least 1 morphologically benign lymph node was visualized   No morphologically abnormal lymph nodes were visualized      IMPRESSION:     Right breast:  1  Mass at 12 o'clock, 5 cm from the nipple is highly suggestive of malignancy   Appropriate action should be taken   Ultrasound-guided core needle biopsy is recommended   I discussed the imaging findings and my level of concern with the patient   She met with the nurse navigator to schedule the procedure    2  Incidentally seen is a 5 mm simple cyst in the right breast at 10 o'clock, 5 cm from the nipple   This is benign in appearance and does not require dedicated follow-up imaging  3  Ultrasound of the right axilla was performed and no abnormal lymph nodes were seen     · March 25, 2022 ultrasound-guided biopsy of right breast mass:  Final Diagnosis   A  Right breast, 12:00, 5 cm from nipple (ultrasound-guided 12 gauge core biopsy, 4 passes):     - Invasive breast carcinoma of no special type (ductal NST/invasive ductal carcinoma with apocrine features)       -- Tumor present in at least 3 tissue cores with largest measurable size of 6 mm        -- mBR Grade:  Grade 2 of 3         - Duct formation:      Score 3 of 3         - Nuclear grade:       Score 3 of 3         - Mitotic rate:            Score 1 of 3     - In situ component: Favor 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     - Lymphovascular invasion:  Not definitively identified         Addendum   Test Description                                     Result                            Prognostic Interpretation  Estrogen Receptor (clone SP-1)              0%                                  Negative              Internal control: Positive   External control: Positive                          Trae Score*:  0                                                         Progesterone Receptor (clone 1E2)         0%                                  Negative              Internal control: Positive                         External control: Positive                          Trae Score*:  0     HER2 by IHC (clone 4B5)                         2+                                  Equivocal (FISH pending)         ·  April 28, 2022 right breast lumpectomy with lymph node dissection      Final Diagnosis   A  [place] Lymph Node, Right Axillary (#1), Biopsy:  - One lymph node, negative for carcinoma (0/1)  - AE1/AE3 immunohistochemistry supports the diagnosis      B   Lymph Nodes, Right Axillary, Dissection:  - Two lymph nodes, negative for carcinoma (0/2)  - AE1/AE3 immunohistochemistry supports the diagnosis      C  Breast, Right New Posterior Margin, Excision:  - Benign breast tissue with fibrocystic changes      D  Breast, Right New Inferior Margin, Excision:  - Benign breast tissue with fibrocystic changes      E  Breast, Right New Medial Margin, Excision:  - Benign breast tissue with fibrocystic changes      F  Breast, Right New Superior Margin, Excision:  - Benign breast tissue with fibrocystic changes      G  Breast, Right New Lateral Margin, Excision:  - Benign breast tissue with fibrocystic changes      H  Breast, Right New Anterior Margin, Excision:  - Benign breast tissue with fibrocystic changes      I  Breast, Right, Lumpectomy:  - Invasive ductal carcinoma with apocrine features, [place] Grade III (3 + 3 + 3 = 9), 14 mm in greatest dimension  See Synoptic Report and Note  - Background fibrocystic changes and prior procedural site reactive changes  - Benign skin with no specific pathologic change        [id number], triple negative  · June 2, 2020 to cellulitis and UTI, started Cipro 500 mg b i d  RECENT VISITS:    May 19, 2022    The patient was identified by name and date of birth  Carl Ya was informed that this is a telemedicine visit and that the visit is being conducted through Telephone  My office door was closed  No one else was in the room  She acknowledged consent and understanding of privacy and security of the video platform  The patient has agreed to participate and understands they can discontinue the visit at any time  Patient is aware this is a billable service  HISTORY OF PRESENT ILLNESS:     Carl Ya is a 78 y o  female with the above-noted HemOnc history who is here for management of breast cancer  Patient has right-sided breast invasive ductal carcinoma, initially diagnosed on March 25, 2022, ER and NE negative HER2 negative by fish    Status post right lumpectomy with lymph node dissection on April 28, 2022  Four cycles of adjuvant chemotherapy with TAC was scheduled  Unfortunately, patient had tooth extraction on June 1, 2022  She also complained of burning sensation and urgency when urinating  Patient was taking amoxicillin which did not help  Patient also noticed redness with pain in the surgical sites involved breast and right axillary area  She denies fever or chills  Most likely, it is cellulitis  No exertional chest pain, diaphoresis or shortness  of breath  No cough and phlegm, no hemoptysis  Patient denied nausea  and vomiting  No abdominal pain  No diarrhea or constipation  No hematuria  No headache or blurred vision  No seizure activity  Appetite good  No significant weight loss or weight gain  The patient denies bleeding anywhere  ROS: A 12-point of review of systems is obtained and other than the above is noncontributory  VIDEO EXAM:    NA    Allergies   Allergen Reactions    Spiriva Handihaler [Tiotropium Bromide Monohydrate] Shortness Of Breath    Augmentin [Amoxicillin-Pot Clavulanate] Abdominal Pain     Pt received ceftriaxone 1 g IV on 5-21-18, gets sharp pains     Tiotropium Abdominal Pain    Tylenol With Codeine #3 [Acetaminophen-Codeine] Abdominal Pain       Past Medical History:   Diagnosis Date    Anxiety     Atherosclerosis of native artery of both lower extremities with intermittent claudication ([facility] [address] ) 10/15/2015    Transitioned From: Cardiovascular Symptoms    Breast cancer ([address] )     Carotid artery plaque, bilateral 03/21/2017    Transitioned From:  Atherosclerosis of both carotid arteries    Chronic kidney disease     Chronic sinusitis     Last assessed: 11/11/13    COPD (chronic obstructive pulmonary disease) ([facility] [address] )     COVID     12/25/21 not hopsitalized- flu-like symptoms    Fall 06/16/2021    Fracture, ankle closed, bimalleolar, right, initial encounter 06/2021    GERD (gastroesophageal reflux disease)     Hyperlipidemia     Lung nodule     PNA (pneumonia)     PONV (postoperative nausea and vomiting)     with C-sections    Pulmonary emphysema (HCC)     PVD (peripheral vascular disease) (HCC)     Restless leg syndrome     Stage 3 chronic kidney disease ([facility] [address] ) 2019    Tobacco abuse        Past Surgical History:   Procedure Laterality Date    BREAST LUMPECTOMY Right 2022    Procedure: ISAI  DIRECTED LUMPECTOMY;  Surgeon: Ivonne Norris MD;  Location: [facility];  Service: Surgical Oncology    CATARACT EXTRACTION       SECTION      COLONOSCOPY      Complete  Resolved: 13    DESCENDING AORTIC ANEURYSM REPAIR W/ STENT  2019    IR AORTAGRAM WITH RUN-OFF  8/15/2019    LYMPH NODE BIOPSY Right 2022    Procedure: LYMPHATIC MAPPING WITH BLUE AND RADIOACTIVE DYES, SENTINEL LYMPH NODE BIOPSY, INJECTION IN OR BY DR RODRÍGUEZ AT 1300;  Surgeon: Ivonne Norris MD;  Location: [facility];  Service: Surgical Oncology    SHOULDER SURGERY      For frozen shoulder     TONSILLECTOMY      US BREAST ISAI  NEEDLE LOC RIGHT Right 3/25/2022    US GUIDED BREAST BIOPSY RIGHT COMPLETE Right 3/25/2022       Family History   Problem Relation Age of Onset    No Known Problems Mother     No Known Problems Father     Arthritis Sister     Pancreatic cancer Sister 61    Melanoma Brother         twice    Prostate cancer Brother     Heart attack Family         Acute Myocardial Infarction    Cirrhosis Family     Prostate cancer Family     Skin cancer Family     Stroke Family         Syndrome    No Known Problems Daughter     No Known Problems Maternal Grandmother     No Known Problems Paternal Grandmother     No Known Problems Sister     No Known Problems Maternal Aunt     Breast cancer Other 27       Social History     Socioeconomic History    Marital status:       Spouse name: Not on file    Number of children: 2    Years of education: Not on file    Highest education level: Not on file   Occupational History    Occupation: Retired/   Tobacco Use    Smoking status: Former Smoker     Packs/day: 2 00     Years: 56 00     Pack years: 112 00     Types: Cigarettes     Start date: 1962     Quit date: 2018     Years since quittin 0    Smokeless tobacco: Never Used   Vaping Use    Vaping Use: Never used   Substance and Sexual Activity    Alcohol use: Yes     Comment: occasionally     Drug use: No    Sexual activity: Not Currently     Partners: Male   Other Topics Concern    Not on file   Social History Narrative    Living w/adult children    No advance directive on file     Social Determinants of Health     Financial Resource Strain: Not on file   Food Insecurity: Not on file   Transportation Needs: Not on file   Physical Activity: Inactive    Days of Exercise per Week: 0 days    Minutes of Exercise per Session: 0 min   Stress: Stress Concern Present    Feeling of Stress : Very much   Social Connections: Not on file   Intimate Partner Violence: Not on file   Housing Stability: Not on file       Current Outpatient Medications   Medication Sig Dispense Refill    albuterol (2 5 mg/3 mL) 0 083 % nebulizer solution Take 1 vial (2 5 mg total) by nebulization every 6 (six) hours as needed for wheezing or shortness of breath 360 mL 3    albuterol (PROVENTIL HFA,VENTOLIN HFA) 90 mcg/act inhaler Inhale 2 puffs every 6 (six) hours as needed for wheezing 3 Inhaler 3    ALPRAZolam (XANAX) 0 5 mg tablet Take 1 tablet (0 5 mg total) by mouth 2 (two) times a day as needed for anxiety 60 tablet 0    ASPIRIN 81 PO Take by mouth      dexamethasone (DECADRON) 4 mg tablet Take 2 tablets by mouth (8mg) with meals in the morning  Take 2 tablets (8mg) with meals in the evening  Do this for three days, the day before treatment, the day of treatment and the day after treatment   6 tablet 2    dexamethasone (DECADRON) 4 mg tablet Take 1 tablet (4 mg total) by mouth 2 (two) times a day with meals Take 2 tablets by mouth (8mg) with meals in the morning  Take 2 tablets (8mg) with meals in the evening  Do this for three days, the day before treatment, the day of treatment and the day after treatment  6 tablet 0    ergocalciferol (VITAMIN D2) 50,000 units take 1 capsule by mouth every week 12 capsule 0    fluticasone (FLONASE) 50 mcg/act nasal spray 2 sprays into each nostril daily 11 1 mL 1    fluticasone-vilanterol (Breo Ellipta) 100-25 mcg/inh inhaler Inhale 1 puff daily Rinse mouth after use  180 blister 1    gabapentin (NEURONTIN) 300 mg capsule take 1 capsule by mouth twice a day 180 capsule 1    gabapentin (NEURONTIN) 400 mg capsule Take 1 capsule (400 mg total) by mouth daily at bedtime 90 capsule 1    naloxone (NARCAN) 4 mg/0 1 mL nasal spray Administer 1 spray into a nostril  If no response after 2-3 minutes, give another dose in the other nostril using a new spray  1 each 1    olopatadine (PATANOL) 0 1 % ophthalmic solution    0    ondansetron (ZOFRAN) 8 mg tablet Take 1 tablet (8 mg total) by mouth every 8 (eight) hours as needed for nausea or vomiting 20 tablet 2    oxyCODONE-acetaminophen (Percocet) 5-325 mg per tablet Take 1 tablet by mouth every 6 (six) hours as needed for moderate pain Max Daily Amount: 4 tablets 20 tablet 0    pantoprazole (PROTONIX) 40 mg tablet take 1 tablet by mouth daily before breakfast 30 tablet 5    rosuvastatin (CRESTOR) 20 MG tablet Take 1 tablet (20 mg total) by mouth daily 90 tablet 6    sodium chloride () 2 % hypertonic ophthalmic solution Sue 128 2 % eye drops       No current facility-administered medications for this visit  DATA REVIEW:    Pathology Result:    Final Diagnosis   Date Value Ref Range Status   04/28/2022   Final    A  [place] Lymph Node, Right Axillary (#1), Biopsy:  - One lymph node, negative for carcinoma (0/1)  - AE1/AE3 immunohistochemistry supports the diagnosis      B  Lymph Nodes, Right Axillary, Dissection:  - Two lymph nodes, negative for carcinoma (0/2)  - AE1/AE3 immunohistochemistry supports the diagnosis  C  Breast, Right New Posterior Margin, Excision:  - Benign breast tissue with fibrocystic changes  D  Breast, Right New Inferior Margin, Excision:  - Benign breast tissue with fibrocystic changes  E  Breast, Right New Medial Margin, Excision:  - Benign breast tissue with fibrocystic changes  F  Breast, Right New Superior Margin, Excision:  - Benign breast tissue with fibrocystic changes  G  Breast, Right New Lateral Margin, Excision:  - Benign breast tissue with fibrocystic changes  H  Breast, Right New Anterior Margin, Excision:  - Benign breast tissue with fibrocystic changes  I  Breast, Right, Lumpectomy:  - Invasive ductal carcinoma with apocrine features, [place] Grade III (3 + 3 + 3 = 9), 14 mm in greatest dimension  See Synoptic Report and Note  - Background fibrocystic changes and prior procedural site reactive changes  - Benign skin with no specific pathologic change  03/25/2022   Final    A  Right breast, 12:00, 5 cm from nipple (ultrasound-guided 12 gauge core biopsy, 4 passes):     - Invasive breast carcinoma of no special type (ductal NST/invasive ductal carcinoma with apocrine features)  -- Tumor present in at least 3 tissue cores with largest measurable size of 6 mm  -- mBR Grade:  Grade 2 of 3         - Duct formation: Score 3 of 3         - Nuclear grade: Score 3 of 3         - Mitotic rate: Score 1 of 3     - In situ component: Favor 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     - Lymphovascular invasion:  Not definitively identified    Comment:  Block A2 forwarded for ER/SD/HER2 analysis with the results to be given in a supplemental report  Comment: This is an appended report  These results have been appended to a previously preliminary verified report     05/28/2019   Final A  Esophagus, biopsy:             - Reactive squamous mucosa  - Benign oxyntic mucosa  - No intestinal metaplasia, dysplasia or malignancy is identified  Interpretation performed at [address]           Image Results: They are reviewed and documented in Hematology/Oncology history    Mammo lexy  breast specimen (No Charge)  Narrative: SPECIMEN RADIOGRAPH  Impression:   2 images of the right breast specimen were submitted  A LEXY    reflector projects within the specimen on both views  Specimen radiograph was not discussed with the surgeon while they were in   the operating room  LABS:  Lab data are reviewed and documented in HemOn history  No results found for this or any previous visit (from the past 48 hour(s))  VIRTUAL VISIT DISCLAIMER      Ismael Howell verbally agrees to participate in [employer]  Patient is aware that [employer] could be limited without vital signs or the ability to perform a full hands-on physical exam  Ismael Howell understands she or the provider may request at any time to terminate the video visit and request the patient to seek care or treatment in person

## 2022-06-03 ENCOUNTER — HOSPITAL ENCOUNTER (OUTPATIENT)
Dept: INFUSION CENTER | Facility: CLINIC | Age: 77
End: 2022-06-03

## 2022-06-06 DIAGNOSIS — E55.9 VITAMIN D DEFICIENCY: ICD-10-CM

## 2022-06-08 RX ORDER — ERGOCALCIFEROL 1.25 MG/1
CAPSULE ORAL
Qty: 12 CAPSULE | Refills: 0 | Status: SHIPPED | OUTPATIENT
Start: 2022-06-08

## 2022-06-09 ENCOUNTER — TELEPHONE (OUTPATIENT)
Dept: HEMATOLOGY ONCOLOGY | Facility: CLINIC | Age: 77
End: 2022-06-09

## 2022-06-09 NOTE — TELEPHONE ENCOUNTER
Return telephone call to patient  Stated all went well with extraction of teeth  Incision has subsided with redness, pain and discomfort post antibiotic sent by Dr Archnaa Mclain  Patient stated will obtain labs tomorrow in the morning for treatment on Tuesday 6/14/22  Reviewed direct call back number and [address] [place] [address] number for further questions and concerns  Patient agreeable to plan and appreciative of call  Patient reviewed does not want [facility] transportation for 6/14 and 6/15 due to patient wanting family member to attend first appointment at infusion center  Notified and confirmed with [address]

## 2022-06-09 NOTE — TELEPHONE ENCOUNTER
Left voicemail to follow up post teeth extractions  Reviewed importance of getting labs either Friday or Monday for treatment coming up on Tuesday 6/14/22  Reviewed call back number and hours of operation to further discuss

## 2022-06-10 ENCOUNTER — APPOINTMENT (OUTPATIENT)
Dept: LAB | Facility: CLINIC | Age: 77
End: 2022-06-10
Payer: MEDICARE

## 2022-06-10 ENCOUNTER — TELEPHONE (OUTPATIENT)
Dept: VASCULAR SURGERY | Facility: CLINIC | Age: 77
End: 2022-06-10

## 2022-06-10 DIAGNOSIS — Z17.1 MALIGNANT NEOPLASM OF OVERLAPPING SITES OF RIGHT BREAST IN FEMALE, ESTROGEN RECEPTOR NEGATIVE (HCC): ICD-10-CM

## 2022-06-10 DIAGNOSIS — C50.811 MALIGNANT NEOPLASM OF OVERLAPPING SITES OF RIGHT BREAST IN FEMALE, ESTROGEN RECEPTOR NEGATIVE (HCC): ICD-10-CM

## 2022-06-10 LAB
ALBUMIN SERPL BCP-MCNC: 3.6 G/DL (ref 3.5–5)
ALP SERPL-CCNC: 92 U/L (ref 46–116)
ALT SERPL W P-5'-P-CCNC: 41 U/L (ref 12–78)
ANION GAP SERPL CALCULATED.3IONS-SCNC: 3 MMOL/L (ref 4–13)
AST SERPL W P-5'-P-CCNC: 25 U/L (ref 5–45)
BASOPHILS # BLD AUTO: 0.06 THOUSANDS/ΜL (ref 0–0.1)
BASOPHILS NFR BLD AUTO: 1 % (ref 0–1)
BILIRUB SERPL-MCNC: 0.2 MG/DL (ref 0.2–1)
BUN SERPL-MCNC: 18 MG/DL (ref 5–25)
CALCIUM SERPL-MCNC: 9.1 MG/DL (ref 8.3–10.1)
CHLORIDE SERPL-SCNC: 110 MMOL/L (ref 100–108)
CO2 SERPL-SCNC: 27 MMOL/L (ref 21–32)
CREAT SERPL-MCNC: 1.11 MG/DL (ref 0.6–1.3)
EOSINOPHIL # BLD AUTO: 0.17 THOUSAND/ΜL (ref 0–0.61)
EOSINOPHIL NFR BLD AUTO: 2 % (ref 0–6)
ERYTHROCYTE [DISTWIDTH] IN BLOOD BY AUTOMATED COUNT: 15.7 % (ref 11.6–15.1)
GFR SERPL CREATININE-BSD FRML MDRD: 48 ML/MIN/1.73SQ M
GLUCOSE P FAST SERPL-MCNC: 85 MG/DL (ref 65–99)
HCT VFR BLD AUTO: 45.4 % (ref 34.8–46.1)
HGB BLD-MCNC: 14.1 G/DL (ref 11.5–15.4)
IMM GRANULOCYTES # BLD AUTO: 0.02 THOUSAND/UL (ref 0–0.2)
IMM GRANULOCYTES NFR BLD AUTO: 0 % (ref 0–2)
LYMPHOCYTES # BLD AUTO: 2.04 THOUSANDS/ΜL (ref 0.6–4.47)
LYMPHOCYTES NFR BLD AUTO: 28 % (ref 14–44)
MCH RBC QN AUTO: 28.2 PG (ref 26.8–34.3)
MCHC RBC AUTO-ENTMCNC: 31.1 G/DL (ref 31.4–37.4)
MCV RBC AUTO: 91 FL (ref 82–98)
MONOCYTES # BLD AUTO: 0.54 THOUSAND/ΜL (ref 0.17–1.22)
MONOCYTES NFR BLD AUTO: 8 % (ref 4–12)
NEUTROPHILS # BLD AUTO: 4.38 THOUSANDS/ΜL (ref 1.85–7.62)
NEUTS SEG NFR BLD AUTO: 61 % (ref 43–75)
NRBC BLD AUTO-RTO: 0 /100 WBCS
PLATELET # BLD AUTO: 341 THOUSANDS/UL (ref 149–390)
PMV BLD AUTO: 10.3 FL (ref 8.9–12.7)
POTASSIUM SERPL-SCNC: 4.1 MMOL/L (ref 3.5–5.3)
PROT SERPL-MCNC: 7.4 G/DL (ref 6.4–8.2)
RBC # BLD AUTO: 5 MILLION/UL (ref 3.81–5.12)
SODIUM SERPL-SCNC: 140 MMOL/L (ref 136–145)
WBC # BLD AUTO: 7.21 THOUSAND/UL (ref 4.31–10.16)

## 2022-06-10 PROCEDURE — 80053 COMPREHEN METABOLIC PANEL: CPT

## 2022-06-10 PROCEDURE — 85025 COMPLETE CBC W/AUTO DIFF WBC: CPT

## 2022-06-10 PROCEDURE — 36415 COLL VENOUS BLD VENIPUNCTURE: CPT

## 2022-06-10 NOTE — TELEPHONE ENCOUNTER
Spoke to patient and told her IR will look at vessels before inserting the port, but she should be fine for access  Patient expressed understanding

## 2022-06-10 NOTE — TELEPHONE ENCOUNTER
Reviewed  Left subclavian artery stenosis  Should be ok for access  IR docs that place access will be able to manage and make a decision of where the access should be

## 2022-06-10 NOTE — TELEPHONE ENCOUNTER
Pt called and stated that she has right breast CA  Stopped chemo on Tuesday  Stated that she may eventually need a port implanted, and it would most likely be on her left side  Pt was calling to see if there would be an issue with this, because of her "arterial blockages"  Pt requested to have provider review her chart and advise if she should be concerned about a port with her vascular issues

## 2022-06-13 ENCOUNTER — OFFICE VISIT (OUTPATIENT)
Dept: SURGICAL ONCOLOGY | Facility: CLINIC | Age: 77
End: 2022-06-13
Payer: MEDICARE

## 2022-06-13 VITALS
OXYGEN SATURATION: 95 % | BODY MASS INDEX: 32.39 KG/M2 | DIASTOLIC BLOOD PRESSURE: 68 MMHG | SYSTOLIC BLOOD PRESSURE: 144 MMHG | HEIGHT: 60 IN | TEMPERATURE: 98.2 F | RESPIRATION RATE: 16 BRPM | HEART RATE: 91 BPM | WEIGHT: 165 LBS

## 2022-06-13 DIAGNOSIS — Z17.1 MALIGNANT NEOPLASM OF OVERLAPPING SITES OF RIGHT BREAST IN FEMALE, ESTROGEN RECEPTOR NEGATIVE (HCC): Primary | ICD-10-CM

## 2022-06-13 DIAGNOSIS — N64.89 SEROMA OF BREAST: ICD-10-CM

## 2022-06-13 DIAGNOSIS — C50.811 MALIGNANT NEOPLASM OF OVERLAPPING SITES OF RIGHT BREAST IN FEMALE, ESTROGEN RECEPTOR NEGATIVE (HCC): Primary | ICD-10-CM

## 2022-06-13 PROCEDURE — 99213 OFFICE O/P EST LOW 20 MIN: CPT | Performed by: SURGERY

## 2022-06-13 NOTE — PROGRESS NOTES
Surgical Oncology Follow Up  [facility]  [facility]  [address]  [facility] [address]    Paulie Jones  1945  [id number]      No chief complaint on file  Assessment & Plan:   She is here complaining of discomfort in her right breast this was re-evaluated she had approximately 30 cc of seroma which was aspirated under strict sterile condition  She has had dental work done and she is on antibiotics for tooth infection  She is scheduled to receive chemotherapy starting tomorrow  We will follow her in 3 weeks' time  She was told to call us with any questions or concerns in the interim she understand and agreed to  Cancer History:     Oncology History Overview Note   2/15/22 screening B/L mammogram  RIGHT  A) MASS: There is a high density, irregularly shaped mass seen in the right breast at 11 o'clock in the anterior depth  Left  There are no suspicious masses, grouped microcalcifications or areas of unexplained architectural distortion  The skin and nipple areolar complex are unremarkable  RECOMMENDATION:       - Diagnostic mammogram and ultrasound at the current time for the right breast     3/17/22 Diagnostic right mammogram and right breast US  RIGHT  A) MASS  Mammo diagnostic right w 3d & cad: There is a 14 mm x 10 mm x 9 mm high density, irregularly shaped mass seen in the right breast at 12 o'clock, 5 cm from the nipple, anterior depth  This corresponds with the mass seen on screening mammogram and is new compared to the mammogram from 2019  US breast right limited (diagnostic): At 12 o'clock, 5 cm from nipple there is an irregular hypoechoic solid mass which measures 19 x 16 x 10 mm (see the oblique images at the end of the ultrasound study)  The mass correlates with the mass seen on the mammogram   This is highly suggestive of malignancy  Appropriate action should be taken    Ultrasound-guided core needle biopsy is recommended  B) MASS  Mammo diagnostic right w 3d & cad: There is a 5 mm oval mass with circumscribed margins seen in the upper outer quadrant of the right breast in the anterior depth  This is a new finding  US breast right limited (diagnostic): There is a 5 mm x 4 mm x 2 mm oval, parallel, anechoic mass with circumscribed margins in the right breast at 10 o'clock, 5 cm from the nipple  The cyst correlates with the mass seen on the mammogram and is benign in appearance  Axilla: Targeted ultrasound of the right axilla was performed  At least 1 morphologically benign lymph node was visualized  No morphologically abnormal lymph nodes were visualized  RECOMMENDATION:       - Ultrasound-guided breast biopsy for the right breast      Malignant neoplasm of overlapping sites of right breast in female, estrogen receptor negative ([address] )   3/25/2022 Initial Diagnosis    Malignant neoplasm of right female breast ([facility] )     3/25/2022 Biopsy    Right breast ultrasound guided biopsy  12 o'clock 5 cm from nipple  Invasive breast carcinoma of no special type (ductal NST/ invasive ductal carcinoma with apocrine features)  Grade 2  ER 0  NJ 0  HER 2 2+   FISH negative  Lymphovascular invasion not identified    Concordant  Malignancy is unifocal  Mass measures 1 4 cm on mammogram and 1 9 cm on ultrasound  Right axilla ultrasound clear  Left brest clear  Amanda  reflector placed  In situ compononent: 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       4/4/2022 -  Cancer Staged    Staging form: Breast, AJCC 8th Edition  - Clinical stage from 4/4/2022: Stage IB (cT1c, cN0, cM0, G2, ER-, NJ-, HER2-) - Signed by Cm Saldivar MD on 4/4/2022  Stage prefix: Initial diagnosis  Nuclear grade: G2  Histologic grading system: 3 grade system  HER2-IHC interpretation: Equivocal  HER2-IHC value: Score 2+  HER2-FISH interpretation: Negative       4/4/2022 Genetic Testing    [employer]  A total of 36 genes were evaluated, including: GILL, BRCA1, BRCA2, CDH1, CHEK2, PALB2, PTEN, STK11, TP53  Negative result  No pathogenic sequence variants or deletions/duplications identified     4/28/2022 Surgery    Right breast lexy  directed lumpectomy with sentinel lymph node biopsy and axillary dissection  Invasive ductal carcinoma with apocrine features  Grade 3  1 4 cm  0/4 Lymph nodes       6/14/2022 -  Chemotherapy    Pegfilgrastim-bmez (ZIEXTENZO), 6 mg, Subcutaneous, Once, 0 of 4 cycles  cyclophosphamide (CYTOXAN) IVPB, 450 mg/m2 = 778 mg (75 % of original dose 600 mg/m2), Intravenous, Once, 0 of 4 cycles  Dose modification: 450 mg/m2 (75 % of original dose 600 mg/m2, Cycle 1, Reason: Dose Not Tolerated)  DOCEtaxel (TAXOTERE) chemo infusion, 56 25 mg/m2 = 97 4 mg (75 % of original dose 75 mg/m2), Intravenous, Once, 0 of 4 cycles  Dose modification: 56 25 mg/m2 (75 % of original dose 75 mg/m2, Cycle 1, Reason: Dose Not Tolerated)           Interval History:   Follow-up with right breast cancer  Review of Systems:   Review of Systems   Constitutional: Negative for chills and fever  HENT: Negative for ear pain and sore throat  Eyes: Negative for pain and visual disturbance  Respiratory: Negative for cough and shortness of breath  Cardiovascular: Negative for chest pain and palpitations  Gastrointestinal: Negative for abdominal pain and vomiting  Genitourinary: Negative for dysuria and hematuria  Musculoskeletal: Negative for arthralgias and back pain  Skin: Negative for color change and rash  Neurological: Negative for seizures and syncope  All other systems reviewed and are negative        Past Medical History     Patient Active Problem List   Diagnosis    Anxiety    Aortoiliac occlusive disease ([address] )    Carotid artery plaque, bilateral    Centrilobular emphysema ([address] )    Hyperlipidemia    Osteoporosis    Restless leg syndrome    Subclavian artery stenosis, left (HCC)    PVD (peripheral vascular disease) ([facility] )    Chronic sinusitis    Pancreatic mass    BMI 34 0-34 9,adult    Seborrheic keratoses    Stage 3 chronic kidney disease (HCC)    Closed avulsion fracture of lateral malleolus of right fibula    Prediabetes    Vitamin D deficiency    Acute right ankle pain    COPD (chronic obstructive pulmonary disease) (HCC)    Malignant neoplasm of overlapping sites of right breast in female, estrogen receptor negative ([facility] )    Continuous opioid dependence ([facility] )    Cellulitis     Past Medical History:   Diagnosis Date    Anxiety     Atherosclerosis of native artery of both lower extremities with intermittent claudication ([facility] ) 10/15/2015    Transitioned From: Cardiovascular Symptoms    Breast cancer ([facility] )     Carotid artery plaque, bilateral 2017    Transitioned From: Atherosclerosis of both carotid arteries    Chronic kidney disease     Chronic sinusitis     Last assessed: 13    COPD (chronic obstructive pulmonary disease) ([facility] )     COVID     21 not hopsitalized- flu-like symptoms    Fall 2021    Fracture, ankle closed, bimalleolar, right, initial encounter 2021    GERD (gastroesophageal reflux disease)     Hyperlipidemia     Lung nodule     PNA (pneumonia)     PONV (postoperative nausea and vomiting)     with C-sections    Pulmonary emphysema ([facility]     PVD (peripheral vascular disease) ([facility] )     Restless leg syndrome     Stage 3 chronic kidney disease ([facility] ) 2019    Tobacco abuse      Past Surgical History:   Procedure Laterality Date    BREAST LUMPECTOMY Right 2022    Procedure: ISAI  DIRECTED LUMPECTOMY;  Surgeon: Emerson Reyes MD;  Location: [facility];  Service: Surgical Oncology    CATARACT EXTRACTION       SECTION      COLONOSCOPY      Complete   Resolved: 13    DESCENDING AORTIC ANEURYSM REPAIR W/ STENT  2019    IR AORTAGRAM WITH RUN-OFF  8/15/2019    LYMPH NODE BIOPSY Right 2022    Procedure: LYMPHATIC MAPPING WITH BLUE AND RADIOACTIVE DYES, SENTINEL LYMPH NODE BIOPSY, INJECTION IN OR BY DR RODRÍGUEZ AT 1300;  Surgeon: Harleen Del Angel MD;  Location: [facility];  Service: Surgical Oncology    SHOULDER SURGERY      For frozen shoulder     TONSILLECTOMY      US BREAST ISAI  NEEDLE LOC RIGHT Right 3/25/2022    US GUIDED BREAST BIOPSY RIGHT COMPLETE Right 3/25/2022     Family History   Problem Relation Age of Onset    No Known Problems Mother     No Known Problems Father     Arthritis Sister     Pancreatic cancer Sister 61    Melanoma Brother         twice    Prostate cancer Brother     Heart attack Family         Acute Myocardial Infarction    Cirrhosis Family     Prostate cancer Family     Skin cancer Family     Stroke Family         Syndrome    No Known Problems Daughter     No Known Problems Maternal Grandmother     No Known Problems Paternal Grandmother     No Known Problems Sister     No Known Problems Maternal Aunt     Breast cancer Other 27     Social History     Socioeconomic History    Marital status:       Spouse name: Not on file    Number of children: 2    Years of education: Not on file    Highest education level: Not on file   Occupational History    Occupation: Retired/   Tobacco Use    Smoking status: Former Smoker     Packs/day: 2 00     Years: 56 00     Pack years: 112 00     Types: Cigarettes     Start date: 1962     Quit date: 2018     Years since quittin 0    Smokeless tobacco: Never Used   Vaping Use    Vaping Use: Never used   Substance and Sexual Activity    Alcohol use: Yes     Comment: occasionally     Drug use: No    Sexual activity: Not Currently     Partners: Male   Other Topics Concern    Not on file   Social History Narrative    Living w/adult children    No advance directive on file     Social Determinants of Health     Financial Resource Strain: Not on file   Food Insecurity: Not on file Transportation Needs: Not on file   Physical Activity: Inactive    Days of Exercise per Week: 0 days    Minutes of Exercise per Session: 0 min   Stress: Stress Concern Present    Feeling of Stress : Very much   Social Connections: Not on file   Intimate Partner Violence: Not on file   Housing Stability: Not on file       Current Outpatient Medications:     albuterol (2 5 mg/3 mL) 0 083 % nebulizer solution, Take 1 vial (2 5 mg total) by nebulization every 6 (six) hours as needed for wheezing or shortness of breath, Disp: 360 mL, Rfl: 3    albuterol (PROVENTIL HFA,VENTOLIN HFA) 90 mcg/act inhaler, Inhale 2 puffs every 6 (six) hours as needed for wheezing, Disp: 3 Inhaler, Rfl: 3    ALPRAZolam (XANAX) 0 5 mg tablet, Take 1 tablet (0 5 mg total) by mouth 2 (two) times a day as needed for anxiety, Disp: 60 tablet, Rfl: 0    ASPIRIN 81 PO, Take by mouth, Disp: , Rfl:     dexamethasone (DECADRON) 4 mg tablet, Take 2 tablets by mouth (8mg) with meals in the morning  Take 2 tablets (8mg) with meals in the evening  Do this for three days, the day before treatment, the day of treatment and the day after treatment  , Disp: 6 tablet, Rfl: 2    dexamethasone (DECADRON) 4 mg tablet, Take 1 tablet (4 mg total) by mouth 2 (two) times a day with meals Take 2 tablets by mouth (8mg) with meals in the morning  Take 2 tablets (8mg) with meals in the evening  Do this for three days, the day before treatment, the day of treatment and the day after treatment  , Disp: 6 tablet, Rfl: 0    ergocalciferol (VITAMIN D2) 50,000 units, take 1 capsule by mouth every week, Disp: 12 capsule, Rfl: 0    fluticasone (FLONASE) 50 mcg/act nasal spray, 2 sprays into each nostril daily, Disp: 11 1 mL, Rfl: 1    fluticasone-vilanterol (Breo Ellipta) 100-25 mcg/inh inhaler, Inhale 1 puff daily Rinse mouth after use , Disp: 180 blister, Rfl: 1    gabapentin (NEURONTIN) 300 mg capsule, take 1 capsule by mouth twice a day, Disp: 180 capsule, Rfl: 1    gabapentin (NEURONTIN) 400 mg capsule, Take 1 capsule (400 mg total) by mouth daily at bedtime, Disp: 90 capsule, Rfl: 1    naloxone (NARCAN) 4 mg/0 1 mL nasal spray, Administer 1 spray into a nostril  If no response after 2-3 minutes, give another dose in the other nostril using a new spray , Disp: 1 each, Rfl: 1    olopatadine (PATANOL) 0 1 % ophthalmic solution,  , Disp: , Rfl: 0    ondansetron (ZOFRAN) 8 mg tablet, Take 1 tablet (8 mg total) by mouth every 8 (eight) hours as needed for nausea or vomiting, Disp: 20 tablet, Rfl: 2    oxyCODONE-acetaminophen (Percocet) 5-325 mg per tablet, Take 1 tablet by mouth every 6 (six) hours as needed for moderate pain Max Daily Amount: 4 tablets, Disp: 20 tablet, Rfl: 0    pantoprazole (PROTONIX) 40 mg tablet, take 1 tablet by mouth daily before breakfast, Disp: 30 tablet, Rfl: 5    rosuvastatin (CRESTOR) 20 MG tablet, Take 1 tablet (20 mg total) by mouth daily, Disp: 90 tablet, Rfl: 6    sodium chloride () 2 % hypertonic ophthalmic solution, Sue 128 2 % eye drops, Disp: , Rfl:   Allergies   Allergen Reactions    Spiriva Handihaler [Tiotropium Bromide Monohydrate] Shortness Of Breath    Augmentin [Amoxicillin-Pot Clavulanate] Abdominal Pain     Pt received ceftriaxone 1 g IV on 5-21-18, gets sharp pains     Tiotropium Abdominal Pain    Tylenol With Codeine #3 [Acetaminophen-Codeine] Abdominal Pain       Physical Exam:     Vitals:    06/13/22 1434   BP: 144/68   Pulse: 91   Resp: 16   Temp: 98 2 °F (36 8 °C)   SpO2: 95%     Physical Exam  Constitutional:       Appearance: Normal appearance  HENT:      Head: Normocephalic and atraumatic  Nose: Nose normal       Mouth/Throat:      Mouth: Mucous membranes are moist    Eyes:      Pupils: Pupils are equal, round, and reactive to light  Cardiovascular:      Rate and Rhythm: Normal rate  Pulses: Normal pulses  Heart sounds: Normal heart sounds     Pulmonary:      Effort: Pulmonary effort is normal       Breath sounds: Normal breath sounds  Chest:          Comments: A breast lumpectomy site has a seroma aspirated 40 cc of seroma under strict sterile condition  Abdominal:      General: Bowel sounds are normal       Palpations: Abdomen is soft  Musculoskeletal:         General: Normal range of motion  Cervical back: Normal range of motion and neck supple  Skin:     General: Skin is warm  Neurological:      General: No focal deficit present  Mental Status: She is alert and oriented to person, place, and time  Psychiatric:         Mood and Affect: Mood normal          Behavior: Behavior normal          Thought Content: Thought content normal          Judgment: Judgment normal            Results & Discussion:   She will continue her antibiotics which has been prescribed by dentist for infected teeth  She will continue chemotherapy  We will follow her in 3 weeks' time she understands and  agrees   All patient questions were answered  Advance Care Planning/Advance Directives: Ilana James MD discussed the disease status with Gricelda Yu  today 06/13/22  treatment plans and follow-up with the patient

## 2022-06-14 ENCOUNTER — HOSPITAL ENCOUNTER (OUTPATIENT)
Dept: INFUSION CENTER | Facility: CLINIC | Age: 77
Discharge: HOME | End: 2022-06-14
Payer: MEDICARE

## 2022-06-14 VITALS
WEIGHT: 166 LBS | HEART RATE: 104 BPM | TEMPERATURE: 97.9 F | SYSTOLIC BLOOD PRESSURE: 131 MMHG | RESPIRATION RATE: 20 BRPM | BODY MASS INDEX: 32.59 KG/M2 | HEIGHT: 60 IN | DIASTOLIC BLOOD PRESSURE: 90 MMHG

## 2022-06-14 DIAGNOSIS — Z17.1 MALIGNANT NEOPLASM OF OVERLAPPING SITES OF RIGHT BREAST IN FEMALE, ESTROGEN RECEPTOR NEGATIVE (HCC): Primary | ICD-10-CM

## 2022-06-14 DIAGNOSIS — C50.811 MALIGNANT NEOPLASM OF OVERLAPPING SITES OF RIGHT BREAST IN FEMALE, ESTROGEN RECEPTOR NEGATIVE (HCC): Primary | ICD-10-CM

## 2022-06-14 PROCEDURE — 96367 TX/PROPH/DG ADDL SEQ IV INF: CPT

## 2022-06-14 PROCEDURE — 96417 CHEMO IV INFUS EACH ADDL SEQ: CPT

## 2022-06-14 PROCEDURE — 96413 CHEMO IV INFUSION 1 HR: CPT

## 2022-06-14 RX ORDER — SODIUM CHLORIDE 9 MG/ML
20 INJECTION, SOLUTION INTRAVENOUS ONCE
Status: COMPLETED | OUTPATIENT
Start: 2022-06-14 | End: 2022-06-14

## 2022-06-14 RX ADMIN — DOCETAXEL 97.4 MG: 20 INJECTION, SOLUTION INTRAVENOUS at 09:30

## 2022-06-14 RX ADMIN — CYCLOPHOSPHAMIDE 778 MG: 1 INJECTION, POWDER, FOR SOLUTION INTRAVENOUS; ORAL at 10:48

## 2022-06-14 RX ADMIN — SODIUM CHLORIDE 20 ML/HR: 0.9 INJECTION, SOLUTION INTRAVENOUS at 08:41

## 2022-06-14 RX ADMIN — DEXAMETHASONE SODIUM PHOSPHATE: 10 INJECTION, SOLUTION INTRAMUSCULAR; INTRAVENOUS at 08:41

## 2022-06-15 ENCOUNTER — HOSPITAL ENCOUNTER (OUTPATIENT)
Dept: INFUSION CENTER | Facility: CLINIC | Age: 77
Discharge: HOME | End: 2022-06-15
Payer: MEDICARE

## 2022-06-15 DIAGNOSIS — Z17.1 MALIGNANT NEOPLASM OF OVERLAPPING SITES OF RIGHT BREAST IN FEMALE, ESTROGEN RECEPTOR NEGATIVE (HCC): Primary | ICD-10-CM

## 2022-06-15 DIAGNOSIS — C50.811 MALIGNANT NEOPLASM OF OVERLAPPING SITES OF RIGHT BREAST IN FEMALE, ESTROGEN RECEPTOR NEGATIVE (HCC): Primary | ICD-10-CM

## 2022-06-15 DIAGNOSIS — K12.30 MUCOSITIS: Primary | ICD-10-CM

## 2022-06-15 PROCEDURE — 96372 THER/PROPH/DIAG INJ SC/IM: CPT

## 2022-06-15 RX ADMIN — PEGFILGRASTIM-BMEZ 6 MG: 6 INJECTION SUBCUTANEOUS at 12:29

## 2022-06-15 NOTE — PROGRESS NOTES
Received notification from infusion RN in regards to patient experiencing vaginal yeast infection and mouth sores  Reviewed with Margaret Mckeon PA-C patient will have Magic mouthwash ordered to assist with the sores  Encouraged patient to reach out to pcp in regards to vaginal yeast infection  Prescription pending provider signature

## 2022-06-15 NOTE — PROGRESS NOTES
Pt presents for Ziextenzo injection  Injection placed in left arm, tolerated well  Pt c/o of mouth sores and a vaginal yeast infection  Spoke with Giovanan Franco RN, magic mouthwash was order  Pt to follow up with PCP in regards to vaginal yeast infection  Pt stated understanding  AVS printed  Next appointment reviewed

## 2022-06-16 ENCOUNTER — PATIENT OUTREACH (OUTPATIENT)
Dept: CASE MANAGEMENT | Facility: HOSPITAL | Age: 77
End: 2022-06-16

## 2022-06-20 ENCOUNTER — TELEPHONE (OUTPATIENT)
Dept: OTHER | Facility: OTHER | Age: 77
End: 2022-06-20

## 2022-06-20 ENCOUNTER — APPOINTMENT (EMERGENCY)
Dept: CT IMAGING | Facility: HOSPITAL | Age: 77
DRG: 872 | End: 2022-06-20
Payer: MEDICARE

## 2022-06-20 ENCOUNTER — HOSPITAL ENCOUNTER (INPATIENT)
Facility: HOSPITAL | Age: 77
LOS: 8 days | Discharge: NURSING, REHAB OR LONG-TERM CARE | DRG: 872 | End: 2022-06-28
Attending: EMERGENCY MEDICINE | Admitting: INTERNAL MEDICINE
Payer: MEDICARE

## 2022-06-20 ENCOUNTER — TELEPHONE (OUTPATIENT)
Dept: INFUSION CENTER | Facility: CLINIC | Age: 77
End: 2022-06-20

## 2022-06-20 DIAGNOSIS — K57.92 DIVERTICULITIS: ICD-10-CM

## 2022-06-20 DIAGNOSIS — R19.7 DIARRHEA, UNSPECIFIED TYPE: ICD-10-CM

## 2022-06-20 DIAGNOSIS — B37.0 ORAL CANDIDIASIS: ICD-10-CM

## 2022-06-20 DIAGNOSIS — A41.9 SEPSIS (HCC): Primary | ICD-10-CM

## 2022-06-20 LAB
ALBUMIN SERPL BCP-MCNC: 3.2 G/DL (ref 3.5–5)
ALP SERPL-CCNC: 80 U/L (ref 46–116)
ALT SERPL W P-5'-P-CCNC: 29 U/L (ref 12–78)
ANION GAP SERPL CALCULATED.3IONS-SCNC: 10 MMOL/L (ref 4–13)
AST SERPL W P-5'-P-CCNC: 16 U/L (ref 5–45)
BASOPHILS # BLD MANUAL: 0.03 THOUSAND/UL (ref 0–0.1)
BASOPHILS NFR MAR MANUAL: 1 % (ref 0–1)
BILIRUB SERPL-MCNC: 0.35 MG/DL (ref 0.2–1)
BUN SERPL-MCNC: 16 MG/DL (ref 5–25)
CALCIUM ALBUM COR SERPL-MCNC: 8.9 MG/DL (ref 8.3–10.1)
CALCIUM SERPL-MCNC: 8.3 MG/DL (ref 8.3–10.1)
CHLORIDE SERPL-SCNC: 101 MMOL/L (ref 100–108)
CO2 SERPL-SCNC: 27 MMOL/L (ref 21–32)
CREAT SERPL-MCNC: 1.21 MG/DL (ref 0.6–1.3)
EOSINOPHIL # BLD MANUAL: 0.06 THOUSAND/UL (ref 0–0.4)
EOSINOPHIL NFR BLD MANUAL: 2 % (ref 0–6)
ERYTHROCYTE [DISTWIDTH] IN BLOOD BY AUTOMATED COUNT: 15.2 % (ref 11.6–15.1)
FLUAV RNA RESP QL NAA+PROBE: NEGATIVE
FLUBV RNA RESP QL NAA+PROBE: NEGATIVE
GFR SERPL CREATININE-BSD FRML MDRD: 43 ML/MIN/1.73SQ M
GIANT PLATELETS BLD QL SMEAR: PRESENT
GLUCOSE SERPL-MCNC: 183 MG/DL (ref 65–140)
HCT VFR BLD AUTO: 41.1 % (ref 34.8–46.1)
HGB BLD-MCNC: 13.1 G/DL (ref 11.5–15.4)
LACTATE SERPL-SCNC: 1.6 MMOL/L (ref 0.5–2)
LIPASE SERPL-CCNC: 50 U/L (ref 73–393)
LYMPHOCYTES # BLD AUTO: 1.29 THOUSAND/UL (ref 0.6–4.47)
LYMPHOCYTES # BLD AUTO: 43 % (ref 14–44)
MAGNESIUM SERPL-MCNC: 1.9 MG/DL (ref 1.6–2.6)
MCH RBC QN AUTO: 27.9 PG (ref 26.8–34.3)
MCHC RBC AUTO-ENTMCNC: 31.9 G/DL (ref 31.4–37.4)
MCV RBC AUTO: 87 FL (ref 82–98)
MONOCYTES # BLD AUTO: 0.51 THOUSAND/UL (ref 0–1.22)
MONOCYTES NFR BLD: 17 % (ref 4–12)
NEUTROPHILS # BLD MANUAL: 1.02 THOUSAND/UL (ref 1.85–7.62)
NEUTS BAND NFR BLD MANUAL: 2 % (ref 0–8)
NEUTS SEG NFR BLD AUTO: 32 % (ref 43–75)
PLATELET # BLD AUTO: 164 THOUSANDS/UL (ref 149–390)
PLATELET BLD QL SMEAR: ADEQUATE
PMV BLD AUTO: 11 FL (ref 8.9–12.7)
POTASSIUM SERPL-SCNC: 4 MMOL/L (ref 3.5–5.3)
PROCALCITONIN SERPL-MCNC: 0.23 NG/ML
PROT SERPL-MCNC: 6.5 G/DL (ref 6.4–8.2)
RBC # BLD AUTO: 4.7 MILLION/UL (ref 3.81–5.12)
RSV RNA RESP QL NAA+PROBE: NEGATIVE
SARS-COV-2 RNA RESP QL NAA+PROBE: NEGATIVE
SODIUM SERPL-SCNC: 138 MMOL/L (ref 136–145)
VARIANT LYMPHS # BLD AUTO: 3 %
WBC # BLD AUTO: 3 THOUSAND/UL (ref 4.31–10.16)

## 2022-06-20 PROCEDURE — 96365 THER/PROPH/DIAG IV INF INIT: CPT

## 2022-06-20 PROCEDURE — 84145 PROCALCITONIN (PCT): CPT | Performed by: SURGERY

## 2022-06-20 PROCEDURE — 83605 ASSAY OF LACTIC ACID: CPT | Performed by: SURGERY

## 2022-06-20 PROCEDURE — 93005 ELECTROCARDIOGRAM TRACING: CPT

## 2022-06-20 PROCEDURE — 71250 CT THORAX DX C-: CPT

## 2022-06-20 PROCEDURE — 0241U HB NFCT DS VIR RESP RNA 4 TRGT: CPT | Performed by: SURGERY

## 2022-06-20 PROCEDURE — 83690 ASSAY OF LIPASE: CPT | Performed by: SURGERY

## 2022-06-20 PROCEDURE — 96361 HYDRATE IV INFUSION ADD-ON: CPT

## 2022-06-20 PROCEDURE — 74176 CT ABD & PELVIS W/O CONTRAST: CPT

## 2022-06-20 PROCEDURE — 87040 BLOOD CULTURE FOR BACTERIA: CPT | Performed by: SURGERY

## 2022-06-20 PROCEDURE — 83735 ASSAY OF MAGNESIUM: CPT | Performed by: SURGERY

## 2022-06-20 PROCEDURE — 85007 BL SMEAR W/DIFF WBC COUNT: CPT | Performed by: SURGERY

## 2022-06-20 PROCEDURE — 99285 EMERGENCY DEPT VISIT HI MDM: CPT | Performed by: SURGERY

## 2022-06-20 PROCEDURE — 99285 EMERGENCY DEPT VISIT HI MDM: CPT

## 2022-06-20 PROCEDURE — 80053 COMPREHEN METABOLIC PANEL: CPT | Performed by: SURGERY

## 2022-06-20 PROCEDURE — 70450 CT HEAD/BRAIN W/O DYE: CPT

## 2022-06-20 PROCEDURE — 85027 COMPLETE CBC AUTOMATED: CPT | Performed by: SURGERY

## 2022-06-20 PROCEDURE — 36415 COLL VENOUS BLD VENIPUNCTURE: CPT | Performed by: SURGERY

## 2022-06-20 RX ORDER — ACETAMINOPHEN 325 MG/1
650 TABLET ORAL EVERY 6 HOURS PRN
Status: DISCONTINUED | OUTPATIENT
Start: 2022-06-20 | End: 2022-06-23

## 2022-06-20 RX ORDER — PANTOPRAZOLE SODIUM 40 MG/1
40 TABLET, DELAYED RELEASE ORAL
Status: DISCONTINUED | OUTPATIENT
Start: 2022-06-21 | End: 2022-06-28 | Stop reason: HOSPADM

## 2022-06-20 RX ORDER — GABAPENTIN 300 MG/1
300 CAPSULE ORAL 2 TIMES DAILY
Status: DISCONTINUED | OUTPATIENT
Start: 2022-06-21 | End: 2022-06-28 | Stop reason: HOSPADM

## 2022-06-20 RX ORDER — FLUCONAZOLE 100 MG/1
200 TABLET ORAL ONCE
Status: COMPLETED | OUTPATIENT
Start: 2022-06-20 | End: 2022-06-20

## 2022-06-20 RX ORDER — ALBUTEROL SULFATE 90 UG/1
2 AEROSOL, METERED RESPIRATORY (INHALATION) EVERY 6 HOURS PRN
Status: DISCONTINUED | OUTPATIENT
Start: 2022-06-20 | End: 2022-06-28 | Stop reason: HOSPADM

## 2022-06-20 RX ORDER — ATORVASTATIN CALCIUM 40 MG/1
40 TABLET, FILM COATED ORAL
Refills: 6 | Status: DISCONTINUED | OUTPATIENT
Start: 2022-06-21 | End: 2022-06-28 | Stop reason: HOSPADM

## 2022-06-20 RX ORDER — SODIUM CHLORIDE, SODIUM LACTATE, POTASSIUM CHLORIDE, CALCIUM CHLORIDE 600; 310; 30; 20 MG/100ML; MG/100ML; MG/100ML; MG/100ML
75 INJECTION, SOLUTION INTRAVENOUS CONTINUOUS
Status: DISCONTINUED | OUTPATIENT
Start: 2022-06-21 | End: 2022-06-23

## 2022-06-20 RX ORDER — ASPIRIN 81 MG/1
81 TABLET, CHEWABLE ORAL DAILY
Status: DISCONTINUED | OUTPATIENT
Start: 2022-06-21 | End: 2022-06-28 | Stop reason: HOSPADM

## 2022-06-20 RX ORDER — ENOXAPARIN SODIUM 100 MG/ML
40 INJECTION SUBCUTANEOUS DAILY
Status: DISCONTINUED | OUTPATIENT
Start: 2022-06-21 | End: 2022-06-28 | Stop reason: HOSPADM

## 2022-06-20 RX ORDER — GABAPENTIN 400 MG/1
400 CAPSULE ORAL
Status: DISCONTINUED | OUTPATIENT
Start: 2022-06-21 | End: 2022-06-28 | Stop reason: HOSPADM

## 2022-06-20 RX ORDER — FLUCONAZOLE 2 MG/ML
400 INJECTION, SOLUTION INTRAVENOUS EVERY 24 HOURS
Status: DISCONTINUED | OUTPATIENT
Start: 2022-06-21 | End: 2022-06-21

## 2022-06-20 RX ADMIN — SODIUM CHLORIDE 1000 ML: 9 INJECTION, SOLUTION INTRAVENOUS at 21:02

## 2022-06-20 RX ADMIN — FLUCONAZOLE 200 MG: 100 TABLET ORAL at 21:25

## 2022-06-20 RX ADMIN — PIPERACILLIN AND TAZOBACTAM 3.38 G: 36; 4.5 INJECTION, POWDER, FOR SOLUTION INTRAVENOUS at 22:24

## 2022-06-20 NOTE — TELEPHONE ENCOUNTER
Pt called reported she is experiencing pain down both legs  She is unsure if this is from chemo  Please advise pt

## 2022-06-20 NOTE — TELEPHONE ENCOUNTER
Call back out to patient in regards to s/s that patient is experiencing and reported to infusion RN via phone call  Reviewed since 230am that patient has been experiencing a constant cramping feeling from lower back down bilateral lower extremities  Reviewed with Dr Lennox Nephew recommendation of OTC magnessium 400mg TID  Reviewed with patient of recommendations  Patient stated having some discomfort in mouth and jaw  Recommended to call Dental office that did extraction of teeth  Patient agreeable of plan and appreciative of call  Reviewed call back number and hours of operation to assist with further questions and concerns

## 2022-06-21 LAB
ALBUMIN SERPL BCP-MCNC: 2.6 G/DL (ref 3.5–5)
ALP SERPL-CCNC: 68 U/L (ref 46–116)
ALT SERPL W P-5'-P-CCNC: 23 U/L (ref 12–78)
ANION GAP SERPL CALCULATED.3IONS-SCNC: 7 MMOL/L (ref 4–13)
APTT PPP: 32 SECONDS (ref 23–37)
AST SERPL W P-5'-P-CCNC: 13 U/L (ref 5–45)
BILIRUB SERPL-MCNC: 0.22 MG/DL (ref 0.2–1)
BILIRUB UR QL STRIP: NEGATIVE
BUN SERPL-MCNC: 14 MG/DL (ref 5–25)
CALCIUM ALBUM COR SERPL-MCNC: 9.1 MG/DL (ref 8.3–10.1)
CALCIUM SERPL-MCNC: 8 MG/DL (ref 8.3–10.1)
CHLORIDE SERPL-SCNC: 106 MMOL/L (ref 100–108)
CLARITY UR: CLEAR
CO2 SERPL-SCNC: 29 MMOL/L (ref 21–32)
COLOR UR: YELLOW
CREAT SERPL-MCNC: 1.2 MG/DL (ref 0.6–1.3)
ERYTHROCYTE [DISTWIDTH] IN BLOOD BY AUTOMATED COUNT: 15.4 % (ref 11.6–15.1)
GFR SERPL CREATININE-BSD FRML MDRD: 44 ML/MIN/1.73SQ M
GLUCOSE SERPL-MCNC: 121 MG/DL (ref 65–140)
GLUCOSE UR STRIP-MCNC: NEGATIVE MG/DL
HCT VFR BLD AUTO: 35.4 % (ref 34.8–46.1)
HGB BLD-MCNC: 11.3 G/DL (ref 11.5–15.4)
HGB UR QL STRIP.AUTO: NEGATIVE
INR PPP: 1.13 (ref 0.84–1.19)
KETONES UR STRIP-MCNC: NEGATIVE MG/DL
LEUKOCYTE ESTERASE UR QL STRIP: NEGATIVE
MCH RBC QN AUTO: 28 PG (ref 26.8–34.3)
MCHC RBC AUTO-ENTMCNC: 31.9 G/DL (ref 31.4–37.4)
MCV RBC AUTO: 88 FL (ref 82–98)
NITRITE UR QL STRIP: NEGATIVE
PH UR STRIP.AUTO: 6 [PH]
PLATELET # BLD AUTO: 141 THOUSANDS/UL (ref 149–390)
PMV BLD AUTO: 10.7 FL (ref 8.9–12.7)
POTASSIUM SERPL-SCNC: 4.7 MMOL/L (ref 3.5–5.3)
PROT SERPL-MCNC: 5.4 G/DL (ref 6.4–8.2)
PROT UR STRIP-MCNC: NEGATIVE MG/DL
PROTHROMBIN TIME: 14.1 SECONDS (ref 11.6–14.5)
RBC # BLD AUTO: 4.03 MILLION/UL (ref 3.81–5.12)
SODIUM SERPL-SCNC: 142 MMOL/L (ref 136–145)
SP GR UR STRIP.AUTO: 1.02 (ref 1–1.03)
UROBILINOGEN UR QL STRIP.AUTO: 0.2 E.U./DL
WBC # BLD AUTO: 6.04 THOUSAND/UL (ref 4.31–10.16)

## 2022-06-21 PROCEDURE — 81003 URINALYSIS AUTO W/O SCOPE: CPT | Performed by: INTERNAL MEDICINE

## 2022-06-21 PROCEDURE — 97166 OT EVAL MOD COMPLEX 45 MIN: CPT

## 2022-06-21 PROCEDURE — 99285 EMERGENCY DEPT VISIT HI MDM: CPT | Performed by: EMERGENCY MEDICINE

## 2022-06-21 PROCEDURE — 97163 PT EVAL HIGH COMPLEX 45 MIN: CPT

## 2022-06-21 PROCEDURE — 97110 THERAPEUTIC EXERCISES: CPT

## 2022-06-21 PROCEDURE — 85730 THROMBOPLASTIN TIME PARTIAL: CPT | Performed by: SURGERY

## 2022-06-21 PROCEDURE — 85027 COMPLETE CBC AUTOMATED: CPT | Performed by: INTERNAL MEDICINE

## 2022-06-21 PROCEDURE — 99223 1ST HOSP IP/OBS HIGH 75: CPT | Performed by: INTERNAL MEDICINE

## 2022-06-21 PROCEDURE — NC001 PR NO CHARGE: Performed by: PHYSICIAN ASSISTANT

## 2022-06-21 PROCEDURE — 80053 COMPREHEN METABOLIC PANEL: CPT | Performed by: INTERNAL MEDICINE

## 2022-06-21 PROCEDURE — 85610 PROTHROMBIN TIME: CPT | Performed by: SURGERY

## 2022-06-21 RX ORDER — ONDANSETRON 2 MG/ML
4 INJECTION INTRAMUSCULAR; INTRAVENOUS EVERY 6 HOURS PRN
Status: DISCONTINUED | OUTPATIENT
Start: 2022-06-21 | End: 2022-06-28 | Stop reason: HOSPADM

## 2022-06-21 RX ORDER — FLUCONAZOLE 100 MG/1
200 TABLET ORAL DAILY
Status: DISCONTINUED | OUTPATIENT
Start: 2022-06-22 | End: 2022-06-28 | Stop reason: HOSPADM

## 2022-06-21 RX ADMIN — ASPIRIN 81 MG: 81 TABLET, CHEWABLE ORAL at 08:09

## 2022-06-21 RX ADMIN — SODIUM CHLORIDE, POTASSIUM CHLORIDE, SODIUM LACTATE AND CALCIUM CHLORIDE 75 ML/HR: 600; 310; 30; 20 INJECTION, SOLUTION INTRAVENOUS at 02:01

## 2022-06-21 RX ADMIN — ONDANSETRON 4 MG: 2 INJECTION INTRAMUSCULAR; INTRAVENOUS at 08:09

## 2022-06-21 RX ADMIN — PIPERACILLIN AND TAZOBACTAM 3.38 G: 36; 4.5 INJECTION, POWDER, FOR SOLUTION INTRAVENOUS at 23:00

## 2022-06-21 RX ADMIN — ATORVASTATIN CALCIUM 40 MG: 40 TABLET, FILM COATED ORAL at 17:16

## 2022-06-21 RX ADMIN — PIPERACILLIN AND TAZOBACTAM 3.38 G: 36; 4.5 INJECTION, POWDER, FOR SOLUTION INTRAVENOUS at 06:00

## 2022-06-21 RX ADMIN — SODIUM CHLORIDE, POTASSIUM CHLORIDE, SODIUM LACTATE AND CALCIUM CHLORIDE 75 ML/HR: 600; 310; 30; 20 INJECTION, SOLUTION INTRAVENOUS at 19:58

## 2022-06-21 RX ADMIN — ENOXAPARIN SODIUM 40 MG: 40 INJECTION SUBCUTANEOUS at 08:09

## 2022-06-21 RX ADMIN — GABAPENTIN 400 MG: 400 CAPSULE ORAL at 02:08

## 2022-06-21 RX ADMIN — GABAPENTIN 400 MG: 400 CAPSULE ORAL at 22:53

## 2022-06-21 RX ADMIN — FLUCONAZOLE 400 MG: 2 INJECTION, SOLUTION INTRAVENOUS at 09:06

## 2022-06-21 RX ADMIN — PIPERACILLIN AND TAZOBACTAM 3.38 G: 36; 4.5 INJECTION, POWDER, FOR SOLUTION INTRAVENOUS at 12:45

## 2022-06-21 RX ADMIN — GABAPENTIN 300 MG: 300 CAPSULE ORAL at 17:16

## 2022-06-21 RX ADMIN — PIPERACILLIN AND TAZOBACTAM 3.38 G: 36; 4.5 INJECTION, POWDER, FOR SOLUTION INTRAVENOUS at 18:41

## 2022-06-21 RX ADMIN — GABAPENTIN 300 MG: 300 CAPSULE ORAL at 08:09

## 2022-06-21 RX ADMIN — NYSTATIN 500000 UNITS: 100000 SUSPENSION ORAL at 12:45

## 2022-06-21 RX ADMIN — ACETAMINOPHEN 650 MG: 325 TABLET, FILM COATED ORAL at 18:44

## 2022-06-21 RX ADMIN — NYSTATIN 500000 UNITS: 100000 SUSPENSION ORAL at 22:53

## 2022-06-21 RX ADMIN — NYSTATIN 500000 UNITS: 100000 SUSPENSION ORAL at 17:16

## 2022-06-21 RX ADMIN — FLUTICASONE FUROATE AND VILANTEROL TRIFENATATE 1 PUFF: 100; 25 POWDER RESPIRATORY (INHALATION) at 08:09

## 2022-06-21 RX ADMIN — PANTOPRAZOLE SODIUM 40 MG: 40 TABLET, DELAYED RELEASE ORAL at 06:04

## 2022-06-21 NOTE — ED NOTES
Patient transported to [place] [address], [address] [place] [address]  06/20/22 1619

## 2022-06-21 NOTE — ASSESSMENT & PLAN NOTE
· S/p right lumpectomy  · Follow outpatient with Oncology  · Holding decadron in the setting of candidiasis

## 2022-06-21 NOTE — ED PROVIDER NOTES
History  Chief Complaint   Patient presents with    Fever - 75 years or older     Started chemo this week, reports new onset body pains, fever starting tonight 100 6  referred to ED by oncologist      Msiael Brent is a 78 y o  female with a pertinent past medical history of breast cancer underwent resection in April last year, began chemotherapy on 06/14/2022  The patient reports that since she had chemotherapy treatment she has been experiencing sore throat, headache, upper diffuse abdominal pain beginning 2 days ago has been getting progressively worse prompting the ED visit  Patient reports that she had a fever of 101 F at home  She does have a pertinent past medical history of COPD and was hypoxic upon arrival at 87% on room air, but she does not have O2 at home  Patient denies any nausea, vomiting, diarrhea, chest pain, shortness of breath, lightheadedness, dizziness  No further complaints at this time  Patient discussed with her oncologist who advised her to come to the emergency department for evaluation  Prior to Admission Medications   Prescriptions Last Dose Informant Patient Reported? Taking?    ALPRAZolam (XANAX) 0 5 mg tablet 6/20/2022 at Unknown time  No Yes   Sig: Take 1 tablet (0 5 mg total) by mouth 2 (two) times a day as needed for anxiety   ASPIRIN 81 PO 6/21/2022 at Unknown time  Yes Yes   Sig: Take by mouth   al mag oxide-diphenhydramine-lidocaine viscous (MAGIC MOUTHWASH) 1:1:1 suspension 6/21/2022 at Unknown time  No Yes   Sig: Swish and spit 10 mL every 4 (four) hours as needed for mouth pain or discomfort for up to 21 days   albuterol (2 5 mg/3 mL) 0 083 % nebulizer solution  Self No No   Sig: Take 1 vial (2 5 mg total) by nebulization every 6 (six) hours as needed for wheezing or shortness of breath   albuterol (PROVENTIL HFA,VENTOLIN HFA) 90 mcg/act inhaler More than a month at Unknown time Self No No   Sig: Inhale 2 puffs every 6 (six) hours as needed for wheezing dexamethasone (DECADRON) 4 mg tablet Past Month at Unknown time  No Yes   Sig: Take 2 tablets by mouth (8mg) with meals in the morning  Take 2 tablets (8mg) with meals in the evening  Do this for three days, the day before treatment, the day of treatment and the day after treatment  dexamethasone (DECADRON) 4 mg tablet Unknown at Unknown time  No No   Sig: Take 1 tablet (4 mg total) by mouth 2 (two) times a day with meals Take 2 tablets by mouth (8mg) with meals in the morning  Take 2 tablets (8mg) with meals in the evening  Do this for three days, the day before treatment, the day of treatment and the day after treatment    ergocalciferol (VITAMIN D2) 50,000 units Past Week at Unknown time  No Yes   Sig: take 1 capsule by mouth every week   fluticasone (FLONASE) 50 mcg/act nasal spray 2022 at Unknown time Self No Yes   Si sprays into each nostril daily   fluticasone-vilanterol (Breo Ellipta) 100-25 mcg/inh inhaler 2022 at Unknown time Self No Yes   Sig: Inhale 1 puff daily Rinse mouth after use    gabapentin (NEURONTIN) 300 mg capsule 2022 at Unknown time  No Yes   Sig: take 1 capsule by mouth twice a day   gabapentin (NEURONTIN) 400 mg capsule 2022 at Unknown time Self No Yes   Sig: Take 1 capsule (400 mg total) by mouth daily at bedtime   naloxone (NARCAN) 4 mg/0 1 mL nasal spray Unknown at Unknown time Self No No   Sig: Administer 1 spray into a nostril  If no response after 2-3 minutes, give another dose in the other nostril using a new spray     olopatadine (PATANOL) 0 1 % ophthalmic solution 2022 at Unknown time Self Yes Yes   Sig:     ondansetron (ZOFRAN) 8 mg tablet   No No   Sig: Take 1 tablet (8 mg total) by mouth every 8 (eight) hours as needed for nausea or vomiting   oxyCODONE-acetaminophen (Percocet) 5-325 mg per tablet 2022 at Unknown time Self No Yes   Sig: Take 1 tablet by mouth every 6 (six) hours as needed for moderate pain Max Daily Amount: 4 tablets pantoprazole (PROTONIX) 40 mg tablet 2022 at Unknown time Self No Yes   Sig: take 1 tablet by mouth daily before breakfast   rosuvastatin (CRESTOR) 20 MG tablet 2022 at Unknown time Self No Yes   Sig: Take 1 tablet (20 mg total) by mouth daily   sodium chloride () 2 % hypertonic ophthalmic solution 2022 at Unknown time Self Yes Yes   Sig: Sue 128 2 % eye drops      Facility-Administered Medications: None       Past Medical History:   Diagnosis Date    Anxiety     Atherosclerosis of native artery of both lower extremities with intermittent claudication ([facility] [address] ) 10/15/2015    Transitioned From: Cardiovascular Symptoms    Breast cancer ([facility] [address] )     Carotid artery plaque, bilateral 2017    Transitioned From: Atherosclerosis of both carotid arteries    Chronic kidney disease     Chronic sinusitis     Last assessed: 13    COPD (chronic obstructive pulmonary disease) ([facility] [address] )     COVID     21 not hopsitalized- flu-like symptoms    Fall 2021    Fracture, ankle closed, bimalleolar, right, initial encounter 2021    GERD (gastroesophageal reflux disease)     Hyperlipidemia     Lung nodule     PNA (pneumonia)     PONV (postoperative nausea and vomiting)     with C-sections    Pulmonary emphysema ([facility] [address] )     PVD (peripheral vascular disease) ([facility] [address] )     Restless leg syndrome     Stage 3 chronic kidney disease ([facility] [address] ) 2019    Tobacco abuse        Past Surgical History:   Procedure Laterality Date    BREAST LUMPECTOMY Right 2022    Procedure: ISAI  DIRECTED LUMPECTOMY;  Surgeon: Roseline Vega MD;  Location: [facility];  Service: Surgical Oncology    CATARACT EXTRACTION       SECTION      COLONOSCOPY      Complete   Resolved: 13    DESCENDING AORTIC ANEURYSM REPAIR W/ STENT  2019    IR AORTAGRAM WITH RUN-OFF  8/15/2019    LYMPH NODE BIOPSY Right 2022    Procedure: LYMPHATIC MAPPING WITH BLUE AND RADIOACTIVE DYES, SENTINEL LYMPH NODE BIOPSY, INJECTION IN OR BY DR RODRÍGUEZ AT 1300;  Surgeon: eRno Red MD;  Location: [facility];  Service: Surgical Oncology    SHOULDER SURGERY      For frozen shoulder     TONSILLECTOMY      US BREAST ISAI  NEEDLE LOC RIGHT Right 3/25/2022    US GUIDED BREAST BIOPSY RIGHT COMPLETE Right 3/25/2022       Family History   Problem Relation Age of Onset    No Known Problems Mother     No Known Problems Father     Arthritis Sister     Pancreatic cancer Sister 61    Melanoma Brother         twice    Prostate cancer Brother     Heart attack Family         Acute Myocardial Infarction    Cirrhosis Family     Prostate cancer Family     Skin cancer Family     Stroke Family         Syndrome    No Known Problems Daughter     No Known Problems Maternal Grandmother     No Known Problems Paternal Grandmother     No Known Problems Sister     No Known Problems Maternal Aunt     Breast cancer Other 30     I have reviewed and agree with the history as documented  E-Cigarette/Vaping    E-Cigarette Use Never User      E-Cigarette/Vaping Substances    Nicotine No     THC No     CBD No     Flavoring No     Other No     Unknown No      Social History     Tobacco Use    Smoking status: Former Smoker     Packs/day: 2 00     Years: 56 00     Pack years: 112 00     Types: Cigarettes     Start date: 1962     Quit date: 2018     Years since quittin 0    Smokeless tobacco: Never Used   Vaping Use    Vaping Use: Never used   Substance Use Topics    Alcohol use: Yes     Comment: occasionally     Drug use: No       Review of Systems   Constitutional: Positive for fever  Negative for activity change, chills and diaphoresis  HENT: Positive for sore throat  Negative for congestion, ear discharge, ear pain, rhinorrhea and trouble swallowing  Eyes: Negative for pain, discharge, redness and visual disturbance     Respiratory: Negative for cough, chest tightness, shortness of breath and wheezing  Cardiovascular: Negative for chest pain, palpitations and leg swelling  Gastrointestinal: Positive for abdominal pain  Negative for abdominal distention, blood in stool, constipation, diarrhea, nausea and vomiting  Genitourinary: Negative for difficulty urinating, dysuria, flank pain, frequency, hematuria and urgency  Musculoskeletal: Negative for arthralgias, myalgias, neck pain and neck stiffness  Skin: Negative for color change and rash  Neurological: Negative for dizziness, syncope, facial asymmetry, weakness, light-headedness, numbness and headaches  Physical Exam  Physical Exam  Vitals reviewed  Constitutional:       General: She is not in acute distress  Appearance: Normal appearance  She is not ill-appearing  HENT:      Head: Normocephalic and atraumatic  Right Ear: Tympanic membrane normal       Left Ear: Tympanic membrane normal       Nose: Nose normal  No congestion or rhinorrhea  Mouth/Throat:      Mouth: Mucous membranes are moist       Pharynx: Oropharynx is clear  Posterior oropharyngeal erythema present  No oropharyngeal exudate  Comments: White patchiness to oropharynx consistent with oral candidiasis  Eyes:      Extraocular Movements: Extraocular movements intact  Conjunctiva/sclera: Conjunctivae normal       Pupils: Pupils are equal, round, and reactive to light  Cardiovascular:      Rate and Rhythm: Normal rate and regular rhythm  Pulses: Normal pulses  Heart sounds: Normal heart sounds  Pulmonary:      Effort: Pulmonary effort is normal  No respiratory distress  Breath sounds: Normal breath sounds  No wheezing  Abdominal:      General: Abdomen is flat  Bowel sounds are normal  There is no distension  Palpations: Abdomen is soft  There is no mass  Tenderness: There is abdominal tenderness  There is no right CVA tenderness, left CVA tenderness or guarding        Hernia: No hernia is present  Musculoskeletal:         General: No swelling, tenderness or deformity  Normal range of motion  Cervical back: Normal range of motion and neck supple  No rigidity or tenderness  Right lower leg: No edema  Left lower leg: No edema  Skin:     General: Skin is warm and dry  Capillary Refill: Capillary refill takes less than 2 seconds  Coloration: Skin is not jaundiced  Findings: No erythema or rash  Neurological:      General: No focal deficit present  Mental Status: She is alert and oriented to person, place, and time  Cranial Nerves: No cranial nerve deficit  Sensory: No sensory deficit  Motor: No weakness        Coordination: Coordination normal       Gait: Gait normal       Deep Tendon Reflexes: Reflexes normal          Vital Signs  ED Triage Vitals   Temperature Pulse Respirations Blood Pressure SpO2   06/20/22 2005 06/20/22 2008 06/20/22 2008 06/20/22 2008 06/20/22 2008   99 3 °F (37 4 °C) (!) 108 22 132/63 94 %      Temp src Heart Rate Source Patient Position - Orthostatic VS BP Location FiO2 (%)   -- 06/20/22 2030 06/20/22 2200 06/20/22 2200 --    Monitor Lying Right arm       Pain Score       06/20/22 2008       7           Vitals:    06/20/22 2200 06/20/22 2300 06/21/22 0148 06/21/22 0149   BP: 114/56 111/63 115/60 115/60   Pulse: 92 86 85 84   Patient Position - Orthostatic VS: Lying Lying           Visual Acuity      ED Medications  Medications   albuterol (PROVENTIL HFA,VENTOLIN HFA) inhaler 2 puff (has no administration in time range)   aspirin chewable tablet 81 mg (has no administration in time range)   fluticasone-vilanterol (BREO ELLIPTA) 100-25 mcg/inh inhaler 1 puff (has no administration in time range)   gabapentin (NEURONTIN) capsule 300 mg (has no administration in time range)   gabapentin (NEURONTIN) capsule 400 mg (400 mg Oral Given 6/21/22 0208)   pantoprazole (PROTONIX) EC tablet 40 mg (40 mg Oral Given 6/21/22 0604) atorvastatin (LIPITOR) tablet 40 mg (has no administration in time range)   acetaminophen (TYLENOL) tablet 650 mg (has no administration in time range)   lactated ringers infusion (75 mL/hr Intravenous New Bag 6/21/22 0201)   enoxaparin (LOVENOX) subcutaneous injection 40 mg (has no administration in time range)   piperacillin-tazobactam (ZOSYN) 3 375 g in sodium chloride 0 9 % 100 mL IVPB (0 g Intravenous Stopped 6/21/22 0611)   fluconazole (DIFLUCAN) IVPB (premix) 400 mg 200 mL (has no administration in time range)   ondansetron (ZOFRAN) injection 4 mg (4 mg Intravenous Not Given 6/21/22 0603)   fluconazole (DIFLUCAN) tablet 200 mg (200 mg Oral Given 6/20/22 2125)   sodium chloride 0 9 % bolus 1,000 mL (0 mL Intravenous Stopped 6/20/22 2224)   piperacillin-tazobactam (ZOSYN) 3 375 g in sodium chloride 0 9 % 100 mL IVPB (0 g Intravenous Stopped 6/20/22 2314)       Diagnostic Studies  Results Reviewed     Procedure Component Value Units Date/Time    Comprehensive metabolic panel [[id number]]  (Abnormal) Collected: 06/21/22 0626    Lab Status: Final result Specimen: Blood from Arm, Right Updated: 06/21/22 0654     Sodium 142 mmol/L      Potassium 4 7 mmol/L      Chloride 106 mmol/L      CO2 29 mmol/L      ANION GAP 7 mmol/L      BUN 14 mg/dL      Creatinine 1 20 mg/dL      Glucose 121 mg/dL      Calcium 8 0 mg/dL      Corrected Calcium 9 1 mg/dL      AST 13 U/L      ALT 23 U/L      Alkaline Phosphatase 68 U/L      Total Protein 5 4 g/dL      Albumin 2 6 g/dL      Total Bilirubin 0 22 mg/dL      eGFR 44 ml/min/1 73sq m     Narrative:      [place] guidelines for Chronic Kidney Disease (CKD):     Stage 1 with normal or high GFR (GFR > 90 mL/min/1 73 square meters)    Stage 2 Mild CKD (GFR = 60-89 mL/min/1 73 square meters)    Stage 3A Moderate CKD (GFR = 45-59 mL/min/1 73 square meters)    Stage 3B Moderate CKD (GFR = 30-44 mL/min/1 73 square meters)    Stage 4 Severe CKD (GFR = 15-29 mL/min/1 73 square meters)    Stage 5 End Stage CKD (GFR <15 mL/min/1 73 square meters)  Note: GFR calculation is accurate only with a steady state creatinine    Protime-INR [[id number]]  (Normal) Collected: 06/21/22 0626    Lab Status: Final result Specimen: Blood from Arm, Right Updated: 06/21/22 0652     Protime 14 1 seconds      INR 1 13    APTT [[id number]]  (Normal) Collected: 06/21/22 0626    Lab Status: Final result Specimen: Blood from Arm, Right Updated: 06/21/22 0652     PTT 32 seconds     CBC (With Platelets) [[id number]]  (Abnormal) Collected: 06/21/22 0626    Lab Status: Final result Specimen: Blood from Arm, Right Updated: 06/21/22 0637     WBC 6 04 Thousand/uL      RBC 4 03 Million/uL      Hemoglobin 11 3 g/dL      Hematocrit 35 4 %      MCV 88 fL      MCH 28 0 pg      MCHC 31 9 g/dL      RDW 15 4 %      Platelets 648 Thousands/uL      MPV 10 7 fL     UA w Reflex to Microscopic w Reflex to Culture [[id number]] Collected: 06/21/22 0147    Lab Status: Final result Specimen: Urine, Clean Catch Updated: 06/21/22 0151     Color, UA Yellow     Clarity, UA Clear     Specific [place], UA 1 020     pH, UA 6 0     Leukocytes, UA Negative     Nitrite, UA Negative     Protein, UA Negative mg/dl      Glucose, UA Negative mg/dl      Ketones, UA Negative mg/dl      Urobilinogen, UA 0 2 E U /dl      Bilirubin, UA Negative     Blood, UA Negative    Blood culture #1 [[id number]] Collected: 06/20/22 2101    Lab Status: Preliminary result Specimen: Blood from Arm, Right Updated: 06/21/22 0104     Blood Culture Received in Microbiology Lab  Culture in Progress  Blood culture #2 [[id number]] Collected: 06/20/22 2101    Lab Status: Preliminary result Specimen: Blood from Arm, Left Updated: 06/21/22 0104     Blood Culture Received in Microbiology Lab  Culture in Progress      CBC and differential [[id number]]  (Abnormal) Collected: 06/20/22 2101    Lab Status: Final result Specimen: Blood from Arm, Left Updated: 06/20/22 0799 WBC 3 00 Thousand/uL      RBC 4 70 Million/uL      Hemoglobin 13 1 g/dL      Hematocrit 41 1 %      MCV 87 fL      MCH 27 9 pg      MCHC 31 9 g/dL      RDW 15 2 %      MPV 11 0 fL      Platelets 942 Thousands/uL     Manual Differential(PHLEBS Do Not Order) [[id number]]  (Abnormal) Collected: 06/20/22 2101    Lab Status: Final result Specimen: Blood from Arm, Left Updated: 06/20/22 2309     Segmented % 32 %      Bands % 2 %      Lymphocytes % 43 %      Monocytes % 17 %      Eosinophils, % 2 %      Basophils % 1 %      Atypical Lymphocytes % 3 %      Absolute Neutrophils 1 02 Thousand/uL      Lymphocytes Absolute 1 29 Thousand/uL      Monocytes Absolute 0 51 Thousand/uL      Eosinophils Absolute 0 06 Thousand/uL      Basophils Absolute 0 03 Thousand/uL      Total Counted --     Platelet Estimate Adequate     Giant PLTs Present    Procalcitonin [[id number]]  (Normal) Collected: 06/20/22 2101    Lab Status: Final result Specimen: Blood from Arm, Left Updated: 06/20/22 2242     Procalcitonin 0 23 ng/ml     COVID/FLU/RSV - 2 hour TAT [[id number]]  (Normal) Collected: 06/20/22 2101    Lab Status: Final result Specimen: Nasopharyngeal Swab Updated: 06/20/22 2148     SARS-CoV-2 Negative     INFLUENZA A PCR Negative     INFLUENZA B PCR Negative     RSV PCR Negative    Narrative:      FOR PEDIATRIC PATIENTS - copy/paste COVID Guidelines URL to browser: [email or web address]    SARS-CoV-2 assay is a Nucleic Acid Amplification assay intended for the  qualitative detection of nucleic acid from SARS-CoV-2 in nasopharyngeal  swabs  Results are for the presumptive identification of SARS-CoV-2 RNA  Positive results are indicative of infection with SARS-CoV-2, the virus  causing COVID-19, but do not rule out bacterial infection or co-infection  with other viruses   Laboratories within the [place] and its  territories are required to report all positive results to the appropriate  public health authorities  Negative results do not preclude SARS-CoV-2  infection and should not be used as the sole basis for treatment or other  patient management decisions  Negative results must be combined with  clinical observations, patient history, and epidemiological information  This test has not been FDA cleared or approved  This test has been authorized by FDA under an Emergency Use Authorization  (EUA)  This test is only authorized for the duration of time the  declaration that circumstances exist justifying the authorization of the  emergency use of an in vitro diagnostic tests for detection of SARS-CoV-2  virus and/or diagnosis of COVID-19 infection under section 564(b)(1) of  the Act, 21 U  S C  170IYB-0(E)(6), unless the authorization is terminated  or revoked sooner  The test has been validated but independent review by FDA  and CLIA is pending  Test performed using [employer] GeneXpert: This RT-PCR assay targets N2,  a region unique to SARS-CoV-2  A conserved region in the E-gene was chosen  for pan-Sarbecovirus detection which includes SARS-CoV-2      Comprehensive metabolic panel [[id number]]  (Abnormal) Collected: 06/20/22 2101    Lab Status: Final result Specimen: Blood from Arm, Left Updated: 06/20/22 2144     Sodium 138 mmol/L      Potassium 4 0 mmol/L      Chloride 101 mmol/L      CO2 27 mmol/L      ANION GAP 10 mmol/L      BUN 16 mg/dL      Creatinine 1 21 mg/dL      Glucose 183 mg/dL      Calcium 8 3 mg/dL      Corrected Calcium 8 9 mg/dL      AST 16 U/L      ALT 29 U/L      Alkaline Phosphatase 80 U/L      Total Protein 6 5 g/dL      Albumin 3 2 g/dL      Total Bilirubin 0 35 mg/dL      eGFR 43 ml/min/1 73sq m     Narrative:      [place] guidelines for Chronic Kidney Disease (CKD):     Stage 1 with normal or high GFR (GFR > 90 mL/min/1 73 square meters)    Stage 2 Mild CKD (GFR = 60-89 mL/min/1 73 square meters)    Stage 3A Moderate CKD (GFR = 45-59 mL/min/1 73 square meters)    Stage 3B Moderate CKD (GFR = 30-44 mL/min/1 73 square meters)    Stage 4 Severe CKD (GFR = 15-29 mL/min/1 73 square meters)    Stage 5 End Stage CKD (GFR <15 mL/min/1 73 square meters)  Note: GFR calculation is accurate only with a steady state creatinine    Lipase [[id number]]  (Abnormal) Collected: 06/20/22 2101    Lab Status: Final result Specimen: Blood from Arm, Left Updated: 06/20/22 2144     Lipase 50 u/L     Magnesium [[id number]]  (Normal) Collected: 06/20/22 2101    Lab Status: Final result Specimen: Blood from Arm, Left Updated: 06/20/22 2144     Magnesium 1 9 mg/dL     Lactic acid [[id number]]  (Normal) Collected: 06/20/22 2101    Lab Status: Final result Specimen: Blood from Arm, Left Updated: 06/20/22 2134     LACTIC ACID 1 6 mmol/L     Narrative:      Result may be elevated if tourniquet was used during collection  CT head without contrast   Final Result by Gustabo Goltz, MD (06/20 2141)      No acute intracranial abnormality  Workstation performed: [id number]         CT chest abdomen pelvis wo contrast   Final Result by Gustabo Goltz, MD (06/20 2156)      No evidence of metastatic disease throughout the chest, abdomen or pelvis  Stable pancreatic head lesion  Soft tissue postsurgical changes from right breast lumpectomy and axillary node dissection  Findings consistent with mild acute uncomplicated sigmoid diverticulitis  Workstation performed: [id number]                    Procedures  Procedures         ED Course  ED Course as of 06/21/22 0722   Mon Jun 20, 2022   2100 EKG reviewed sinus tachycardia at a rate of 103  Nonspecific ST segment changes  [phone] Call to lab to inquire about status of remainder of blood work  Reports that blood work will be up shortly  [address] text to [facility] for admission                 Identification of Seniors at [facility] Most Recent Value (ISAR) Identification of Seniors at Risk    Before the illness or injury that brought you to the Emergency, did you need someone to help you on a regular basis? 0 Filed at: 06/20/2022 2007   In the last 24 hours, have you needed more help than usual? 0 Filed at: 06/20/2022 2007   Have you been hospitalized for one or more nights during the past 6 months? 0 Filed at: 06/20/2022 2007   In general, do you see well? 0 Filed at: 06/20/2022 2007   In general, do you have serious problems with your memory? 0 Filed at: 06/20/2022 2007   Do you take more than three different medications every day? 1 Filed at: 06/20/2022 2007   ISAR Score 1 Filed at: 06/20/2022 2007                      SBIRT 20yo+    Flowsheet Row Most Recent Value   SBIRT (23 yo +)    In order to provide better care to our patients, we are screening all of our patients for alcohol and drug use  Would it be okay to ask you these screening questions? Yes Filed at: 06/20/2022 2020   Initial Alcohol Screen: US AUDIT-C     1  How often do you have a drink containing alcohol? 0 Filed at: 06/20/2022 2020   2  How many drinks containing alcohol do you have on a typical day you are drinking? 0 Filed at: 06/20/2022 2020   3b  FEMALE Any Age, or MALE 65+: How often do you have 4 or more drinks on one occassion? 0 Filed at: 06/20/2022 2020   Audit-C Score 0 Filed at: 06/20/2022 2020   CARROLL: How many times in the past year have you    Used an illegal drug or used a prescription medication for non-medical reasons? Never Filed at: 06/20/2022 2020                    MDM  Number of Diagnoses or Management Options  Diverticulitis: new and requires workup  Oral candidiasis: new and requires workup  Sepsis [facility]): new and requires workup  Diagnosis management comments: Patient meeting sepsis criteria  Will treat with IV abx/fluconazole and admit  Discussed with [facility] who will accept the patient for admission          Amount and/or Complexity of Data Reviewed  Clinical lab tests: ordered and reviewed  Tests in the radiology section of CPT®: ordered and reviewed  Tests in the medicine section of CPT®: ordered and reviewed  Review and summarize past medical records: yes  Discuss the patient with other providers: yes  Independent visualization of images, tracings, or specimens: yes    Risk of Complications, Morbidity, and/or Mortality  Presenting problems: high  Diagnostic procedures: moderate  Management options: moderate    Patient Progress  Patient progress: stable      Disposition  Final diagnoses:   Sepsis ([facility] [address] )   Diverticulitis   Oral candidiasis     Time reflects when diagnosis was documented in both MDM as applicable and the Disposition within this note     Time User Action Codes Description Comment    6/20/2022 11:21 PM Tellis Laundry Add [A41 9] Sepsis ([facility] [address] )     6/20/2022 11:21 PM Tellis Laundry Add [K57 [id number]] Diverticulitis     6/20/2022 11:21 PM [place] Laundry Add [B37 0] Oral candidiasis       ED Disposition     ED Disposition   Admit    Condition   Stable    Date/Time   Mon Jun 20, 2022 11:21 PM    Comment   Case was discussed with Silvana Mcpherson PA-C and the patient's admission status was agreed to be Admission Status: inpatient status to the service of Dr Yannick Paz              Follow-up Information    None         Current Discharge Medication List      CONTINUE these medications which have NOT CHANGED    Details   al mag oxide-diphenhydramine-lidocaine viscous (MAGIC MOUTHWASH) 1:1:1 suspension Swish and spit 10 mL every 4 (four) hours as needed for mouth pain or discomfort for up to 21 days  Qty: 240 mL, Refills: 0    Associated Diagnoses: Mucositis      ALPRAZolam (XANAX) 0 5 mg tablet Take 1 tablet (0 5 mg total) by mouth 2 (two) times a day as needed for anxiety  Qty: 60 tablet, Refills: 0    Associated Diagnoses: Generalized anxiety disorder      ASPIRIN 81 PO Take by mouth      !! dexamethasone (DECADRON) 4 mg tablet Take 2 tablets by mouth (8mg) with meals in the morning  Take 2 tablets (8mg) with meals in the evening  Do this for three days, the day before treatment, the day of treatment and the day after treatment  Qty: 6 tablet, Refills: 2    Associated Diagnoses: Malignant neoplasm of overlapping sites of right breast in female, estrogen receptor negative ([facility] )      ergocalciferol (VITAMIN D2) 50,000 units take 1 capsule by mouth every week  Qty: 12 capsule, Refills: 0    Associated Diagnoses: Vitamin D deficiency      fluticasone (FLONASE) 50 mcg/act nasal spray 2 sprays into each nostril daily  Qty: 11 1 mL, Refills: 1    Associated Diagnoses: Chronic sinusitis, unspecified location      fluticasone-vilanterol (Breo Ellipta) 100-25 mcg/inh inhaler Inhale 1 puff daily Rinse mouth after use  Qty: 180 blister, Refills: 1    Associated Diagnoses: Chronic obstructive pulmonary disease, unspecified COPD type ([place] [id number] )      ! ! gabapentin (NEURONTIN) 300 mg capsule take 1 capsule by mouth twice a day  Qty: 180 capsule, Refills: 1    Associated Diagnoses: RLS (restless legs syndrome)      !! gabapentin (NEURONTIN) 400 mg capsule Take 1 capsule (400 mg total) by mouth daily at bedtime  Qty: 90 capsule, Refills: 1    Associated Diagnoses: RLS (restless legs syndrome)      olopatadine (PATANOL) 0 1 % ophthalmic solution    Refills: 0      oxyCODONE-acetaminophen (Percocet) 5-325 mg per tablet Take 1 tablet by mouth every 6 (six) hours as needed for moderate pain Max Daily Amount: 4 tablets  Qty: 20 tablet, Refills: 0    Associated Diagnoses: Malignant neoplasm of overlapping sites of right breast in female, estrogen receptor negative (HCC)      pantoprazole (PROTONIX) 40 mg tablet take 1 tablet by mouth daily before breakfast  Qty: 30 tablet, Refills: 5    Associated Diagnoses: Epigastric pain      rosuvastatin (CRESTOR) 20 MG tablet Take 1 tablet (20 mg total) by mouth daily  Qty: 90 tablet, Refills: 6    Associated Diagnoses:  Aortoiliac occlusive disease (HCC)      sodium chloride () 2 % hypertonic ophthalmic solution Sue 128 2 % eye drops      albuterol (2 5 mg/3 mL) 0 083 % nebulizer solution Take 1 vial (2 5 mg total) by nebulization every 6 (six) hours as needed for wheezing or shortness of breath  Qty: 360 mL, Refills: 3    Associated Diagnoses: Centrilobular emphysema (HCC)      albuterol (PROVENTIL HFA,VENTOLIN HFA) 90 mcg/act inhaler Inhale 2 puffs every 6 (six) hours as needed for wheezing  Qty: 3 Inhaler, Refills: 3    Comments: Substitution to a formulary equivalent within the same pharmaceutical class is authorized  Associated Diagnoses: Centrilobular emphysema ([address] )      ! ! dexamethasone (DECADRON) 4 mg tablet Take 1 tablet (4 mg total) by mouth 2 (two) times a day with meals Take 2 tablets by mouth (8mg) with meals in the morning  Take 2 tablets (8mg) with meals in the evening  Do this for three days, the day before treatment, the day of treatment and the day after treatment  Qty: 6 tablet, Refills: 0    Associated Diagnoses: Malignant neoplasm of overlapping sites of right breast in female, estrogen receptor negative (HCC)      naloxone (NARCAN) 4 mg/0 1 mL nasal spray Administer 1 spray into a nostril  If no response after 2-3 minutes, give another dose in the other nostril using a new spray  Qty: 1 each, Refills: 1    Associated Diagnoses: Malignant neoplasm of overlapping sites of right breast in female, estrogen receptor negative (HCC)      ondansetron (ZOFRAN) 8 mg tablet Take 1 tablet (8 mg total) by mouth every 8 (eight) hours as needed for nausea or vomiting  Qty: 20 tablet, Refills: 2    Associated Diagnoses: Nausea       !! - Potential duplicate medications found  Please discuss with provider  No discharge procedures on file      PDMP Review       Value Time User    PDMP Reviewed  Yes 4/13/2022  2:39 PM Robert Reyes MD          ED Provider  Electronically Signed by           Nicolás Dorado PA-C  06/21/22 0938

## 2022-06-21 NOTE — ED PROVIDER NOTES
History  Chief Complaint   Patient presents with    Fever - 75 years or older     Started chemo this week, reports new onset body pains, fever starting tonight 100 6  referred to ED by oncologist      HPI    Prior to Admission Medications   Prescriptions Last Dose Informant Patient Reported? Taking? ALPRAZolam (XANAX) 0 5 mg tablet 6/20/2022 at Unknown time  No Yes   Sig: Take 1 tablet (0 5 mg total) by mouth 2 (two) times a day as needed for anxiety   ASPIRIN 81 PO 6/21/2022 at Unknown time  Yes Yes   Sig: Take by mouth   al mag oxide-diphenhydramine-lidocaine viscous (MAGIC MOUTHWASH) 1:1:1 suspension 6/21/2022 at Unknown time  No Yes   Sig: Swish and spit 10 mL every 4 (four) hours as needed for mouth pain or discomfort for up to 21 days   albuterol (2 5 mg/3 mL) 0 083 % nebulizer solution  Self No No   Sig: Take 1 vial (2 5 mg total) by nebulization every 6 (six) hours as needed for wheezing or shortness of breath   albuterol (PROVENTIL HFA,VENTOLIN HFA) 90 mcg/act inhaler More than a month at Unknown time Self No No   Sig: Inhale 2 puffs every 6 (six) hours as needed for wheezing   dexamethasone (DECADRON) 4 mg tablet Past Month at Unknown time  No Yes   Sig: Take 2 tablets by mouth (8mg) with meals in the morning  Take 2 tablets (8mg) with meals in the evening  Do this for three days, the day before treatment, the day of treatment and the day after treatment  dexamethasone (DECADRON) 4 mg tablet Unknown at Unknown time  No No   Sig: Take 1 tablet (4 mg total) by mouth 2 (two) times a day with meals Take 2 tablets by mouth (8mg) with meals in the morning  Take 2 tablets (8mg) with meals in the evening   Do this for three days, the day before treatment, the day of treatment and the day after treatment    ergocalciferol (VITAMIN D2) 50,000 units Past Week at Unknown time  No Yes   Sig: take 1 capsule by mouth every week   fluticasone (FLONASE) 50 mcg/act nasal spray 6/20/2022 at Unknown time Self No Yes Si sprays into each nostril daily   fluticasone-vilanterol ([employer]o Ellipta) 100-25 mcg/inh inhaler 2022 at Unknown time Self No Yes   Sig: Inhale 1 puff daily Rinse mouth after use    gabapentin (NEURONTIN) 300 mg capsule 2022 at Unknown time  No Yes   Sig: take 1 capsule by mouth twice a day   gabapentin (NEURONTIN) 400 mg capsule 2022 at Unknown time Self No Yes   Sig: Take 1 capsule (400 mg total) by mouth daily at bedtime   naloxone (NARCAN) 4 mg/0 1 mL nasal spray Unknown at Unknown time Self No No   Sig: Administer 1 spray into a nostril  If no response after 2-3 minutes, give another dose in the other nostril using a new spray  olopatadine (PATANOL) 0 1 % ophthalmic solution 2022 at Unknown time Self Yes Yes   Sig:     ondansetron (ZOFRAN) 8 mg tablet   No No   Sig: Take 1 tablet (8 mg total) by mouth every 8 (eight) hours as needed for nausea or vomiting   oxyCODONE-acetaminophen (Percocet) 5-325 mg per tablet 2022 at Unknown time Self No Yes   Sig: Take 1 tablet by mouth every 6 (six) hours as needed for moderate pain Max Daily Amount: 4 tablets   pantoprazole (PROTONIX) 40 mg tablet 2022 at Unknown time Self No Yes   Sig: take 1 tablet by mouth daily before breakfast   rosuvastatin (CRESTOR) 20 MG tablet 2022 at Unknown time Self No Yes   Sig: Take 1 tablet (20 mg total) by mouth daily   sodium chloride () 2 % hypertonic ophthalmic solution 2022 at Unknown time Self Yes Yes   Sig: Sue 128 2 % eye drops      Facility-Administered Medications: None       Past Medical History:   Diagnosis Date    Anxiety     Atherosclerosis of native artery of both lower extremities with intermittent claudication ([facility] ) 10/15/2015    Transitioned From: Cardiovascular Symptoms    Breast cancer ([facility] )     Carotid artery plaque, bilateral 2017    Transitioned From:  Atherosclerosis of both carotid arteries    Chronic kidney disease     Chronic sinusitis     Last assessed: 13    COPD (chronic obstructive pulmonary disease) ([facility] )     COVID     21 not hopsitalized- flu-like symptoms    Fall 2021    Fracture, ankle closed, bimalleolar, right, initial encounter 2021    GERD (gastroesophageal reflux disease)     Hyperlipidemia     Lung nodule     PNA (pneumonia)     PONV (postoperative nausea and vomiting)     with C-sections    [facility] emph[facility]     PVD (peripheral vascular disease) ([facility] )     Restless leg syndrome     Stage 3 chronic kidney disease ([facility] ) 2019    Tobacco abuse        Past Surgical History:   Procedure Laterality Date    BREAST LUMPECTOMY Right 2022    Procedure: ISAI  DIRECTED LUMPECTOMY;  Surgeon: Sharleen Boeck, MD;  Location: [facility];  Service: Surgical Oncology    CATARACT EXTRACTION       SECTION      COLONOSCOPY      Complete   Resolved: 13    DESCENDING AORTIC ANEURYSM REPAIR W/ STENT  2019    IR AORTAGRAM WITH RUN-OFF  8/15/2019    LYMPH NODE BIOPSY Right 2022    Procedure: LYMPHATIC MAPPING WITH BLUE AND RADIOACTIVE DYES, SENTINEL LYMPH NODE BIOPSY, INJECTION IN OR BY DR RODRÍGUEZ AT 1300;  Surgeon: Sharleen Boeck, MD;  Location: [facility];  Service: Surgical Oncology    SHOULDER SURGERY      For frozen shoulder     TONSILLECTOMY      US BREAST ISAI  NEEDLE LOC RIGHT Right 3/25/2022    US GUIDED BREAST BIOPSY RIGHT COMPLETE Right 3/25/2022       Family History   Problem Relation Age of Onset    No Known Problems Mother     No Known Problems Father     Arthritis Sister     Pancreatic cancer Sister 61    Melanoma Brother         twice    Prostate cancer Brother     Heart attack Family         Acute Myocardial Infarction    Cirrhosis Family     Prostate cancer Family     Skin cancer Family     Stroke Family         Syndrome    No Known Problems Daughter     No Known Problems Maternal Grandmother     No Known Problems Paternal Grandmother     No Known Problems Sister     No Known Problems Maternal Aunt     Breast cancer Other 30     I have reviewed and agree with the history as documented      E-Cigarette/Vaping    E-Cigarette Use Never User      E-Cigarette/Vaping Substances    Nicotine No     THC No     CBD No     Flavoring No     Other No     Unknown No      Social History     Tobacco Use    Smoking status: Former Smoker     Packs/day: 2 00     Years: 56 00     Pack years: 112 00     Types: Cigarettes     Start date: 1962     Quit date: 2018     Years since quittin 0    Smokeless tobacco: Never Used   Vaping Use    Vaping Use: Never used   Substance Use Topics    Alcohol use: Yes     Comment: occasionally     Drug use: No       Review of Systems    Physical Exam  Physical Exam    Vital Signs  ED Triage Vitals   Temperature Pulse Respirations Blood Pressure SpO2   22   99 3 °F (37 4 °C) (!) 108 22 132/63 94 %      Temp src Heart Rate Source Patient Position - Orthostatic VS BP Location FiO2 (%)   -- 22 --    Monitor Lying Right arm       Pain Score       22       7           Vitals:    22 2200 22 2300 22 0148 22 0149   BP: 114/56 111/63 115/60 115/60   Pulse: 92 86 85 84   Patient Position - Orthostatic VS: Lying Lying           Visual Acuity      ED Medications  Medications   albuterol (PROVENTIL HFA,VENTOLIN HFA) inhaler 2 puff (has no administration in time range)   aspirin chewable tablet 81 mg (has no administration in time range)   fluticasone-vilanterol (BREO ELLIPTA) 100-25 mcg/inh inhaler 1 puff (has no administration in time range)   gabapentin (NEURONTIN) capsule 300 mg (has no administration in time range)   gabapentin (NEURONTIN) capsule 400 mg (has no administration in time range)   pantoprazole (PROTONIX) EC tablet 40 mg (has no administration in time range)   atorvastatin (LIPITOR) tablet 40 mg (has no administration in time range)   acetaminophen (TYLENOL) tablet 650 mg (has no administration in time range)   lactated ringers infusion (75 mL/hr Intravenous New Bag 6/21/22 0201)   enoxaparin (LOVENOX) subcutaneous injection 40 mg (has no administration in time range)   piperacillin-tazobactam (ZOSYN) 3 375 g in sodium chloride 0 9 % 100 mL IVPB (has no administration in time range)   fluconazole (DIFLUCAN) IVPB (premix) 400 mg 200 mL (has no administration in time range)   fluconazole (DIFLUCAN) tablet 200 mg (200 mg Oral Given 6/20/22 2125)   sodium chloride 0 9 % bolus 1,000 mL (0 mL Intravenous Stopped 6/20/22 2224)   piperacillin-tazobactam (ZOSYN) 3 375 g in sodium chloride 0 9 % 100 mL IVPB (0 g Intravenous Stopped 6/20/22 2314)       Diagnostic Studies  Results Reviewed     Procedure Component Value Units Date/Time    UA w Reflex to Microscopic w Reflex to Culture [[id number]] Collected: 06/21/22 0147    Lab Status: Final result Specimen: Urine, Clean Catch Updated: 06/21/22 0151     Color, UA Yellow     Clarity, UA Clear     Specific [place], UA 1 020     pH, UA 6 0     Leukocytes, UA Negative     Nitrite, UA Negative     Protein, UA Negative mg/dl      Glucose, UA Negative mg/dl      Ketones, UA Negative mg/dl      Urobilinogen, UA 0 2 E U /dl      Bilirubin, UA Negative     Blood, UA Negative    Blood culture #1 [[id number]] Collected: 06/20/22 2101    Lab Status: Preliminary result Specimen: Blood from Arm, Right Updated: 06/21/22 0104     Blood Culture Received in Microbiology Lab  Culture in Progress  Blood culture #2 [[id number]] Collected: 06/20/22 2101    Lab Status: Preliminary result Specimen: Blood from Arm, Left Updated: 06/21/22 0104     Blood Culture Received in Microbiology Lab  Culture in Progress      CBC and differential [[id number]]  (Abnormal) Collected: 06/20/22 2101    Lab Status: Final result Specimen: Blood from Arm, Left Updated: 06/20/22 2309     WBC 3 00 Thousand/uL      RBC 4 70 Million/uL      Hemoglobin 13 1 g/dL      Hematocrit 41 1 %      MCV 87 fL      MCH 27 9 pg      MCHC 31 9 g/dL      RDW 15 2 %      MPV 11 0 fL      Platelets 263 Thousands/uL     Manual Differential(PHLEBS Do Not Order) [[id number]]  (Abnormal) Collected: 06/20/22 2101    Lab Status: Final result Specimen: Blood from Arm, Left Updated: 06/20/22 2309     Segmented % 32 %      Bands % 2 %      Lymphocytes % 43 %      Monocytes % 17 %      Eosinophils, % 2 %      Basophils % 1 %      Atypical Lymphocytes % 3 %      Absolute Neutrophils 1 02 Thousand/uL      Lymphocytes Absolute 1 29 Thousand/uL      Monocytes Absolute 0 51 Thousand/uL      Eosinophils Absolute 0 06 Thousand/uL      Basophils Absolute 0 03 Thousand/uL      Total Counted --     Platelet Estimate Adequate     Giant PLTs Present    Procalcitonin [[id number]]  (Normal) Collected: 06/20/22 2101    Lab Status: Final result Specimen: Blood from Arm, Left Updated: 06/20/22 2242     Procalcitonin 0 23 ng/ml     COVID/FLU/RSV - 2 hour TAT [[id number]]  (Normal) Collected: 06/20/22 2101    Lab Status: Final result Specimen: Nasopharyngeal Swab Updated: 06/20/22 2148     SARS-CoV-2 Negative     INFLUENZA A PCR Negative     INFLUENZA B PCR Negative     RSV PCR Negative    Narrative:      FOR PEDIATRIC PATIENTS - copy/paste COVID Guidelines URL to browser: [email or web address]    SARS-CoV-2 assay is a Nucleic Acid Amplification assay intended for the  qualitative detection of nucleic acid from SARS-CoV-2 in nasopharyngeal  swabs  Results are for the presumptive identification of SARS-CoV-2 RNA  Positive results are indicative of infection with SARS-CoV-2, the virus  causing COVID-19, but do not rule out bacterial infection or co-infection  with other viruses   Laboratories within the [place] and its  territories are required to report all positive results to the appropriate  public health authorities  Negative results do not preclude SARS-CoV-2  infection and should not be used as the sole basis for treatment or other  patient management decisions  Negative results must be combined with  clinical observations, patient history, and epidemiological information  This test has not been FDA cleared or approved  This test has been authorized by FDA under an Emergency Use Authorization  (EUA)  This test is only authorized for the duration of time the  declaration that circumstances exist justifying the authorization of the  emergency use of an in vitro diagnostic tests for detection of SARS-CoV-2  virus and/or diagnosis of COVID-19 infection under section 564(b)(1) of  the Act, 21 U  S C  156FJD-6(A)(4), unless the authorization is terminated  or revoked sooner  The test has been validated but independent review by FDA  and CLIA is pending  Test performed using [employer] GeneXpert: This RT-PCR assay targets N2,  a region unique to SARS-CoV-2  A conserved region in the E-gene was chosen  for pan-Sarbecovirus detection which includes SARS-CoV-2      Comprehensive metabolic panel [[id number]]  (Abnormal) Collected: 06/20/22 2101    Lab Status: Final result Specimen: Blood from Arm, Left Updated: 06/20/22 2144     Sodium 138 mmol/L      Potassium 4 0 mmol/L      Chloride 101 mmol/L      CO2 27 mmol/L      ANION GAP 10 mmol/L      BUN 16 mg/dL      Creatinine 1 21 mg/dL      Glucose 183 mg/dL      Calcium 8 3 mg/dL      Corrected Calcium 8 9 mg/dL      AST 16 U/L      ALT 29 U/L      Alkaline Phosphatase 80 U/L      Total Protein 6 5 g/dL      Albumin 3 2 g/dL      Total Bilirubin 0 35 mg/dL      eGFR 43 ml/min/1 73sq m     Narrative:      [place] guidelines for Chronic Kidney Disease (CKD):     Stage 1 with normal or high GFR (GFR > 90 mL/min/1 73 square meters)    Stage 2 Mild CKD (GFR = 60-89 mL/min/1 73 square meters)    Stage 3A Moderate CKD (GFR = 45-59 mL/min/1 73 square meters)    Stage 3B Moderate CKD (GFR = 30-44 mL/min/1 73 square meters)    Stage 4 Severe CKD (GFR = 15-29 mL/min/1 73 square meters)    Stage 5 End Stage CKD (GFR <15 mL/min/1 73 square meters)  Note: GFR calculation is accurate only with a steady state creatinine    Lipase [[id number]]  (Abnormal) Collected: 06/20/22 2101    Lab Status: Final result Specimen: Blood from Arm, Left Updated: 06/20/22 2144     Lipase 50 u/L     Magnesium [[id number]]  (Normal) Collected: 06/20/22 2101    Lab Status: Final result Specimen: Blood from Arm, Left Updated: 06/20/22 2144     Magnesium 1 9 mg/dL     Lactic acid [[id number]]  (Normal) Collected: 06/20/22 2101    Lab Status: Final result Specimen: Blood from Arm, Left Updated: 06/20/22 2134     LACTIC ACID 1 6 mmol/L     Narrative:      Result may be elevated if tourniquet was used during collection  Ruby Manner [[id number]] Collected: 06/20/22 2101    Lab Status: No result Specimen: Blood from Arm, Left     APTT [[id number]] Collected: 06/20/22 2101    Lab Status: No result Specimen: Blood from Arm, Left                  CT head without contrast   Final Result by Bernardo Walter MD (06/20 2141)      No acute intracranial abnormality  Workstation performed: [id number]         CT chest abdomen pelvis wo contrast   Final Result by Bernardo Walter MD (06/20 2156)      No evidence of metastatic disease throughout the chest, abdomen or pelvis  Stable pancreatic head lesion  Soft tissue postsurgical changes from right breast lumpectomy and axillary node dissection  Findings consistent with mild acute uncomplicated sigmoid diverticulitis              Workstation performed: [id number]                    Procedures  Procedures         ED Course               Identification of Seniors at [facility] Most Recent Value   (ISAR) Identification of Seniors at Risk    Before the illness or injury that brought you to the Emergency, did you need someone to help you on a regular basis? 0 Filed at: 06/20/2022 2007   In the last 24 hours, have you needed more help than usual? 0 Filed at: 06/20/2022 2007   Have you been hospitalized for one or more nights during the past 6 months? 0 Filed at: 06/20/2022 2007   In general, do you see well? 0 Filed at: 06/20/2022 2007   In general, do you have serious problems with your memory? 0 Filed at: 06/20/2022 2007   Do you take more than three different medications every day? 1 Filed at: 06/20/2022 2007   ISAR Score 1 Filed at: 06/20/2022 2007                      SBIRT 22yo+    Flowsheet Row Most Recent Value   SBIRT (25 yo +)    In order to provide better care to our patients, we are screening all of our patients for alcohol and drug use  Would it be okay to ask you these screening questions? Yes Filed at: 06/20/2022 2020   Initial Alcohol Screen: US AUDIT-C     1  How often do you have a drink containing alcohol? 0 Filed at: 06/20/2022 2020   2  How many drinks containing alcohol do you have on a typical day you are drinking? 0 Filed at: 06/20/2022 2020   3b  FEMALE Any Age, or MALE 65+: How often do you have 4 or more drinks on one occassion? 0 Filed at: 06/20/2022 2020   Audit-C Score 0 Filed at: 06/20/2022 2020   CARROLL: How many times in the past year have you    Used an illegal drug or used a prescription medication for non-medical reasons? Never Filed at: 06/20/2022 2020                    [facility]  Number of Diagnoses or Management Options  Diverticulitis  Oral candidiasis  Sepsis ([address] )  Diagnosis management comments: Case was discussed with the midlevel provider who initially saw the patient  I was in the department reviewed pertinent history and documentation related to the care of this patient  I agree with above        Disposition  Final diagnoses:   Sepsis ([address] )   Diverticulitis   Oral candidiasis     Time reflects when diagnosis was documented in both MDM as applicable and the Disposition within this note     Time User Action Codes Description Comment    6/20/2022 11:21 PM Isadora Tishomingo Add [A41 9] Sepsis ([facility] )     6/20/2022 11:21 PM Isadora Tishomingo Add [K57 92] Diverticulitis     6/20/2022 11:21 PM Isadora Tishomingo Add [B37 0] Oral candidiasis       ED Disposition     ED Disposition   Admit    Condition   Stable    Date/Time   Mon Jun 20, 2022 11:21 PM    Comment   Case was discussed with Phil Hilton PA-C and the patient's admission status was agreed to be Admission Status: inpatient status to the service of Dr Anat Morris   Follow-up Information    None         Current Discharge Medication List      CONTINUE these medications which have NOT CHANGED    Details   al mag oxide-diphenhydramine-lidocaine viscous (MAGIC MOUTHWASH) 1:1:1 suspension Swish and spit 10 mL every 4 (four) hours as needed for mouth pain or discomfort for up to 21 days  Qty: 240 mL, Refills: 0    Associated Diagnoses: Mucositis      ALPRAZolam (XANAX) 0 5 mg tablet Take 1 tablet (0 5 mg total) by mouth 2 (two) times a day as needed for anxiety  Qty: 60 tablet, Refills: 0    Associated Diagnoses: Generalized anxiety disorder      ASPIRIN 81 PO Take by mouth      !! dexamethasone (DECADRON) 4 mg tablet Take 2 tablets by mouth (8mg) with meals in the morning  Take 2 tablets (8mg) with meals in the evening  Do this for three days, the day before treatment, the day of treatment and the day after treatment    Qty: 6 tablet, Refills: 2    Associated Diagnoses: Malignant neoplasm of overlapping sites of right breast in female, estrogen receptor negative ([facility] )      ergocalciferol (VITAMIN D2) 50,000 units take 1 capsule by mouth every week  Qty: 12 capsule, Refills: 0    Associated Diagnoses: Vitamin D deficiency      fluticasone (FLONASE) 50 mcg/act nasal spray 2 sprays into each nostril daily  Qty: 11 1 mL, Refills: 1    Associated Diagnoses: Chronic sinusitis, unspecified location      fluticasone-vilanterol [employer] Ellipta) 100-25 mcg/inh inhaler Inhale 1 puff daily Rinse mouth after use  Qty: 180 blister, Refills: 1    Associated Diagnoses: Chronic obstructive pulmonary disease, unspecified COPD type ([facility] )      ! ! gabapentin (NEURONTIN) 300 mg capsule take 1 capsule by mouth twice a day  Qty: 180 capsule, Refills: 1    Associated Diagnoses: RLS (restless legs syndrome)      !! gabapentin (NEURONTIN) 400 mg capsule Take 1 capsule (400 mg total) by mouth daily at bedtime  Qty: 90 capsule, Refills: 1    Associated Diagnoses: RLS (restless legs syndrome)      olopatadine (PATANOL) 0 1 % ophthalmic solution    Refills: 0      oxyCODONE-acetaminophen (Percocet) 5-325 mg per tablet Take 1 tablet by mouth every 6 (six) hours as needed for moderate pain Max Daily Amount: 4 tablets  Qty: 20 tablet, Refills: 0    Associated Diagnoses: Malignant neoplasm of overlapping sites of right breast in female, estrogen receptor negative (HCC)      pantoprazole (PROTONIX) 40 mg tablet take 1 tablet by mouth daily before breakfast  Qty: 30 tablet, Refills: 5    Associated Diagnoses: Epigastric pain      rosuvastatin (CRESTOR) 20 MG tablet Take 1 tablet (20 mg total) by mouth daily  Qty: 90 tablet, Refills: 6    Associated Diagnoses: Aortoiliac occlusive disease (HCC)      sodium chloride () 2 % hypertonic ophthalmic solution Sue 128 2 % eye drops      albuterol (2 5 mg/3 mL) 0 083 % nebulizer solution Take 1 vial (2 5 mg total) by nebulization every 6 (six) hours as needed for wheezing or shortness of breath  Qty: 360 mL, Refills: 3    Associated Diagnoses: Centrilobular emphysema (HCC)      albuterol (PROVENTIL HFA,VENTOLIN HFA) 90 mcg/act inhaler Inhale 2 puffs every 6 (six) hours as needed for wheezing  Qty: 3 Inhaler, Refills: 3    Comments: Substitution to a formulary equivalent within the same pharmaceutical class is authorized  Associated Diagnoses: Centrilobular emphysema ([facility] )      ! ! dexamethasone (DECADRON) 4 mg tablet Take 1 tablet (4 mg total) by mouth 2 (two) times a day with meals Take 2 tablets by mouth (8mg) with meals in the morning  Take 2 tablets (8mg) with meals in the evening  Do this for three days, the day before treatment, the day of treatment and the day after treatment  Qty: 6 tablet, Refills: 0    Associated Diagnoses: Malignant neoplasm of overlapping sites of right breast in female, estrogen receptor negative (HCC)      naloxone (NARCAN) 4 mg/0 1 mL nasal spray Administer 1 spray into a nostril  If no response after 2-3 minutes, give another dose in the other nostril using a new spray  Qty: 1 each, Refills: 1    Associated Diagnoses: Malignant neoplasm of overlapping sites of right breast in female, estrogen receptor negative (HCC)      ondansetron (ZOFRAN) 8 mg tablet Take 1 tablet (8 mg total) by mouth every 8 (eight) hours as needed for nausea or vomiting  Qty: 20 tablet, Refills: 2    Associated Diagnoses: Nausea       !! - Potential duplicate medications found  Please discuss with provider  No discharge procedures on file      PDMP Review       Value Time User    PDMP Reviewed  Yes 4/13/2022  2:39 PM Dionna Pompa MD          ED Provider  Electronically Signed by           Darrell Leahy MD  06/21/22 [id number]

## 2022-06-21 NOTE — H&P
[address] [facility]  [facility] 1945[facility] 78 [facility]  [facility] MRN: [id number]  Unit/Bed#: -[facility] Encounter: [id number]  Primary Care Provider: Jyothi Urbina MD   Date and time admitted to hospital: 6/20/2022  8:07 PM    * Diverticulitis  Assessment & Plan  Patient presenting to the ED with lower abdominal pain, constipation, fevers, body aches for 5 days  · CT A/P: Findings consistent with mild acute uncomplicated sigmoid diverticulitis  · Does not meet sepsis criteria  Negative procalcitonin and lactic  · Started on IV zosyn  · Follow up BC x2  · Keep NPO  · Continuous IVF  · Consult to GI, recommendations are appreciated    Oral candidiasis  Assessment & Plan  · Patient complaining of sore throat and difficulty swallowing  · Physical exam evident for candidal infection  · Started on IV fluconazole  · Keep NPO  · GI input appreciated    Malignant neoplasm of overlapping sites of right breast in female, estrogen receptor negative ([facility] )  Assessment & Plan  · S/p right lumpectomy  · Follow outpatient with Oncology  · Holding decadron in the setting of candidiasis    COPD (chronic obstructive pulmonary disease) ([facility] )  Assessment & Plan  · Denies any SOB at this time  · Reports that her outpatient provider recommended that she be on oxygen as an outpatient, but patient declined   States that she is agreeable to home oxygen at this time  · Currently on 2L NC for comfort  · Ordered home O2 study  · Continue pre-hospital inhalers    Stage 3 chronic kidney disease [facility])  Assessment & Plan  Lab Results   Component Value Date    EGFR 43 06/20/2022    EGFR 48 06/10/2022    EGFR 40 04/04/2022    CREATININE 1 21 06/20/2022    CREATININE 1 11 06/10/2022    CREATININE 1 29 04/04/2022     · Creatinine stable at baseline  · Monitor bmp prn    VTE Prophylaxis: Enoxaparin (Lovenox)  / sequential compression device   Code Status: Level 1 code  Discussion with family:  All patient questions and to the best my ability    Anticipated Length of Stay:  Patient will be admitted on an Inpatient basis with an anticipated length of stay of  More than 2 midnights  Justification for Hospital Stay:  Diverticulitis    Total Time for Visit, including Counseling / Coordination of Care: 60 minutes  Greater than 50% of this total time spent on direct patient counseling and coordination of care  Chief Complaint:   Abdominal pain    History of Present Illness:    Flor Hernandez is a 78 y o  female who has a past medical history significant for COPD, chronic kidney disease, malignant neoplasm of the right breast   She presents to the ED today for abdominal pain, constipation, fever, body aches for the last 5 days  She is also complaining of a sore throat and difficulty swallowing  Patient currently undergoing chemotherapy for breast cancer  Denies other complaints such as chest pain, shortness of breath, nausea/vomiting/diarrhea, urinary complaints  CT imaging showing evidence of acute diverticulitis  Patient requiring medical admission for IV antibiotics and GI consultation  Review of Systems:    Review of Systems   Constitutional: Positive for appetite change, fatigue and fever  Negative for chills  HENT: Positive for sore throat and trouble swallowing  Negative for ear pain  Eyes: Negative for pain and visual disturbance  Respiratory: Negative for cough and shortness of breath  Cardiovascular: Negative for chest pain and palpitations  Gastrointestinal: Positive for abdominal pain and constipation  Negative for nausea and vomiting  Genitourinary: Negative for dysuria and hematuria  Musculoskeletal: Negative for arthralgias and back pain  Skin: Negative for color change and rash  Neurological: Negative for dizziness, seizures, syncope and headaches  All other systems reviewed and are negative        Past Medical and Surgical History:     Past Medical History:   Diagnosis Date    Anxiety     Atherosclerosis of native artery of both lower extremities with intermittent claudication ([facility] ) 10/15/2015    Transitioned From: Cardiovascular Symptoms    Breast cancer ([facility] )     Carotid artery plaque, bilateral 2017    Transitioned From: Atherosclerosis of both carotid arteries    Chronic kidney disease     Chronic sinusitis     Last assessed: 13    COPD [facility]chronic obstructive pulmonary disease) [facility] )     COVID     21 not hopsitalized- flu-like symptoms    Fall 2021    Fracture, ankle closed, bimalleolar, right, initial encounter 2021    GERD (gastroesophageal reflux disease)     Hyperlipidemia     Lung nodule     PNA (pneumonia)     PONV (postoperative nausea and vomiting)     with C-sections    Pulmonary emphysema ([place] [address] )     PVD (peripheral vascular disease) ([place] [address] )     Restless leg syndrome     Stage 3 chronic kidney disease ([facility] ) 2019    Tobacco abuse        Past Surgical History:   Procedure Laterality Date    BREAST LUMPECTOMY Right 2022    Procedure: ISAI  DIRECTED LUMPECTOMY;  Surgeon: Luther Claros MD;  Location: [facility];  Service: Surgical Oncology    CATARACT EXTRACTION       SECTION      COLONOSCOPY      Complete  Resolved: 13    DESCENDING AORTIC ANEURYSM REPAIR W/ STENT  2019    IR AORTAGRAM WITH RUN-OFF  8/15/2019    LYMPH NODE BIOPSY Right 2022    Procedure: LYMPHATIC MAPPING WITH BLUE AND RADIOACTIVE DYES, SENTINEL LYMPH NODE BIOPSY, INJECTION IN OR BY DR Gustavo Kuo AT 1300;  Surgeon: Luther Claros MD;  Location: [facility];  Service: Surgical Oncology    SHOULDER SURGERY      For frozen shoulder     TONSILLECTOMY      US BREAST ISAI  NEEDLE LOC RIGHT Right 3/25/2022    US GUIDED BREAST BIOPSY RIGHT COMPLETE Right 3/25/2022       Meds/Allergies:    Prior to Admission medications    Medication Sig Start Date End Date Taking?  Authorizing Provider   susanne Perales oxide-diphenhydramine-lidocaine viscous (MAGIC MOUTHWASH) 1:1:1 suspension Swish and spit 10 mL every 4 (four) hours as needed for mouth pain or discomfort for up to 21 days 6/15/22 7/6/22 Yes Mariana Plasencia PA-C   ALPRAZolam Ajith Murray) 0 5 mg tablet Take 1 tablet (0 5 mg total) by mouth 2 (two) times a day as needed for anxiety 4/13/22  Yes Sobia Forbes MD   ASPIRIN 81 PO Take by mouth   Yes Historical Provider, MD   dexamethasone (DECADRON) 4 mg tablet Take 2 tablets by mouth (8mg) with meals in the morning  Take 2 tablets (8mg) with meals in the evening  Do this for three days, the day before treatment, the day of treatment and the day after treatment  5/19/22  Yes Caitlyn Louis MD   ergocalciferol (VITAMIN D2) 50,000 units take 1 capsule by mouth every week 6/8/22  Yes Sobia Forbes MD   fluticasone Piedad Locks) 50 mcg/act nasal spray 2 sprays into each nostril daily 9/14/21  Yes ABIGAIL Bond   fluticasone-vilanterol (Breo Ellipta) 100-25 mcg/inh inhaler Inhale 1 puff daily Rinse mouth after use   1/13/22  Yes Tara Ray PA-C   gabapentin (NEURONTIN) 300 mg capsule take 1 capsule by mouth twice a day 4/21/22  Yes Sobia Forbes MD   gabapentin (NEURONTIN) 400 mg capsule Take 1 capsule (400 mg total) by mouth daily at bedtime 10/19/21  Yes Sobia Forbes MD   olopatadine (PATANOL) 0 1 % ophthalmic solution   9/11/18  Yes Historical Provider, MD   oxyCODONE-acetaminophen (Percocet) 5-325 mg per tablet Take 1 tablet by mouth every 6 (six) hours as needed for moderate pain Max Daily Amount: 4 tablets 4/6/22  Yes Saray Villanueva MD   pantoprazole (PROTONIX) 40 mg tablet take 1 tablet by mouth daily before breakfast 3/18/22  Yes Sobia Forbes MD   rosuvastatin (CRESTOR) 20 MG tablet Take 1 tablet (20 mg total) by mouth daily 2/15/22  Yes Gracie Leblanc PA-C   sodium chloride () 2 % hypertonic ophthalmic solution Sue 128 2 % eye drops   Yes Historical Provider, MD   albuterol (2 5 mg/3 mL) 0 083 % nebulizer solution Take 1 vial (2 5 mg total) by nebulization every 6 (six) hours as needed for wheezing or shortness of breath 10/11/18   Crescencio Vasquez PA-C   albuterol (PROVENTIL HFA,VENTOLIN HFA) 90 mcg/act inhaler Inhale 2 puffs every 6 (six) hours as needed for wheezing 7/20/20   Kanu Rodrigez PA-C   dexamethasone (DECADRON) 4 mg tablet Take 1 tablet (4 mg total) by mouth 2 (two) times a day with meals Take 2 tablets by mouth (8mg) with meals in the morning  Take 2 tablets (8mg) with meals in the evening  Do this for three days, the day before treatment, the day of treatment and the day after treatment  5/27/22   Manuela Restrepo PA-C   naloxone [facility]) 4 mg/0 1 mL nasal spray Administer 1 spray into a nostril  If no response after 2-3 minutes, give another dose in the other nostril using a new spray  4/4/22   Shona Schmidt MD   ondansetron [facility]) 8 mg tablet Take 1 tablet (8 mg total) by mouth every 8 (eight) hours as needed for nausea or vomiting 5/19/22   Maximo Toussaint MD     I have reviewed home medications using allscripts  Allergies: Allergies   Allergen Reactions    Spiriva Handihaler [Tiotropium Bromide Monohydrate] Shortness Of Breath    Augmentin [Amoxicillin-Pot Clavulanate] Abdominal Pain     Pt received ceftriaxone 1 g IV on 5-21-18, gets sharp pains     Tiotropium Abdominal Pain    Tylenol With Codeine #3 [Acetaminophen-Codeine] Abdominal Pain       Social History:     Marital Status:     Occupation: NA  Patient Pre-hospital Living Situation: Home  Patient Pre-hospital Level of Mobility: Independent  Patient Pre-hospital Diet Restrictions: None  Substance Use History:   Social History     Substance and Sexual Activity   Alcohol Use Yes    Comment: occasionally      Social History     Tobacco Use   Smoking Status Former Smoker    Packs/day: 2 00    Years: 56 00    Pack years: 112 00    Types: Cigarettes    Start date: 2/21/1962   Ingrid Dolan Quit date: 2018    Years since quittin 0   Smokeless Tobacco Never Used     Social History     Substance and Sexual Activity   Drug Use No       Family History:    Family History   Problem Relation Age of Onset    No Known Problems Mother     No Known Problems Father     Arthritis Sister     Pancreatic cancer Sister 61    Melanoma Brother         twice    Prostate cancer Brother     Heart attack Family         Acute Myocardial Infarction    Cirrhosis Family     Prostate cancer Family     Skin cancer Family     Stroke Family         Syndrome    No Known Problems Daughter     No Known Problems Maternal Grandmother     No Known Problems Paternal Grandmother     No Known Problems Sister     No Known Problems Maternal Aunt     Breast cancer Other 30       Physical Exam:     Vitals:   Blood Pressure: 115/60 (22 0149)  Pulse: 84 (22 0149)  Temperature: 99 4 °F (37 4 °C) (22 0149)  Respirations: 22 (22 2300)  SpO2: 96 % (22 014)    Physical Exam  Vitals and nursing note reviewed  Constitutional:       General: She is not in acute distress  Appearance: She is well-developed  HENT:      Head: Normocephalic and atraumatic  Mouth/Throat:      Comments: Candidal infection of oropharynx  Eyes:      General: No scleral icterus  Conjunctiva/sclera: Conjunctivae normal    Cardiovascular:      Rate and Rhythm: Normal rate and regular rhythm  Pulses: Normal pulses  Heart sounds: No murmur heard  Pulmonary:      Effort: Pulmonary effort is normal  No respiratory distress  Breath sounds: Normal breath sounds  Comments: 2L NC  Abdominal:      General: Bowel sounds are normal       Palpations: Abdomen is soft  Tenderness: There is abdominal tenderness  Musculoskeletal:         General: Normal range of motion  Cervical back: Neck supple  Right lower leg: No edema  Left lower leg: No edema  Skin:     General: Skin is warm and dry  Neurological:      Mental Status: She is alert and oriented to person, place, and time  Additional Data:     Lab Results: I have personally reviewed pertinent reports  Results from last 7 days   Lab Units 06/20/22  2101   WBC Thousand/uL 3 00*   HEMOGLOBIN g/dL 13 1   HEMATOCRIT % 41 1   PLATELETS Thousands/uL 164   BANDS PCT % 2   LYMPHO PCT % 43   MONO PCT % 17*   EOS PCT % 2     Results from last 7 days   Lab Units 06/20/22  2101   SODIUM mmol/L 138   POTASSIUM mmol/L 4 0   CHLORIDE mmol/L 101   CO2 mmol/L 27   BUN mg/dL 16   CREATININE mg/dL 1 21   ANION GAP mmol/L 10   CALCIUM mg/dL 8 3   ALBUMIN g/dL 3 2*   TOTAL BILIRUBIN mg/dL 0 35   ALK PHOS U/L 80   ALT U/L 29   AST U/L 16   GLUCOSE RANDOM mg/dL 183*                 Results from last 7 days   Lab Units 06/20/22  2101   LACTIC ACID mmol/L 1 6   PROCALCITONIN ng/ml 0 23       Imaging: I have personally reviewed pertinent reports  CT head without contrast   Final Result by Fili Dumont MD (06/20 2141)      No acute intracranial abnormality  Workstation performed: [id number]         CT chest abdomen pelvis wo contrast   Final Result by Fili Dumont MD (06/20 2156)      No evidence of metastatic disease throughout the chest, abdomen or pelvis  Stable pancreatic head lesion  Soft tissue postsurgical changes from right breast lumpectomy and axillary node dissection  Findings consistent with mild acute uncomplicated sigmoid diverticulitis  Workstation performed: [id number]             EKG, Pathology, and Other Studies Reviewed on Admission:   · EKG: Reviewed    Allscripts / Epic Records Reviewed: Yes     ** Please Note: This note has been constructed using a voice recognition system   **

## 2022-06-21 NOTE — PLAN OF CARE
Problem: OCCUPATIONAL THERAPY ADULT  Goal: Performs self-care activities at highest level of function for planned discharge setting  See evaluation for individualized goals  Description: Treatment Interventions: ADL retraining, Visual perceptual retraining, Functional transfer training, UE strengthening/ROM, Endurance training, Patient/family training, Compensatory technique education, Energy conservation, Activityengagement          See flowsheet documentation for full assessment, interventions and recommendations  Note: Limitation: Decreased ADL status, Decreased UE strength, Decreased endurance, Visual deficit, Decreased self-care trans, Decreased high-level ADLs  Prognosis: Good  Assessment: Patient is a 78 y o  female seen for OT evaluation s/p admit to [address] [place] [address]  on 6/20/2022 w/Diverticulitis  Commorbidities affecting patient's functional performance at time of assessment include: COPD, malignant neoplasm of R breast, oral candidiasis, and CKD  Orders placed for OT evaluation and treatment and up with assistance  Performed at least two patient identifiers during session including name and wristband  Prior to admission, Patient reporting being independent with ADLs/IADLs, ambulatory with no AD and lives with family in a 2nd floor apartment  Personal factors affecting patient at time of initial evaluation include: steps to enter, difficulty performing ADLs and difficulty performing IADLs  Upon evaluation, patient requires supervision assist for UB ADLs, moderate assist for LB ADLs, transfers and functional ambulation in room and bathroom with minimal  assist of 1-2, with the use of Rolling Walker  Patient is oriented x 4    Occupational performance is affected by the following deficits: decreased muscle strength, dynamic sit/ stand balance deficit with poor standing tolerance time for self care and functional mobility, decreased activity tolerance, increased pain and delayed righting and equilibrium reactions  Patient to benefit from continued Occupational Therapy treatment while in the hospital to address deficits as defined above and maximize level of functional independence with ADLs and functional mobility  Occupational Performance areas to address include: grooming , bathing/ shower, dressing, toilet hygiene, transfer to all surfaces, functional ambulation, medication routine/ management, IADLS: Household maintenance, IADLs: safety procedures and IADLs: meal prep/ clean up  From OT standpoint, recommendation at time of d/c would be Post acute rehabilitation services       OT Discharge Recommendation: Post acute rehabilitation services  OT - OK to Discharge: Yes (Once medically cleared)     Colin Candelaria, OT

## 2022-06-21 NOTE — OCCUPATIONAL THERAPY NOTE
Occupational Therapy Evaluation      Oxana Pelletier    6/21/2022    Patient Active Problem List   Diagnosis    Anxiety    Aortoiliac occlusive disease ([address] )    Carotid artery plaque, bilateral    Centrilobular emphysema (HCC)    Hyperlipidemia    Osteoporosis    Restless leg syndrome    Subclavian artery stenosis, left (HCC)    PVD (peripheral vascular disease) (HCC)    Chronic sinusitis    Pancreatic mass    BMI 34 0-34 9,adult    Seborrheic keratoses    Stage 3 chronic kidney disease (HCC)    Closed avulsion fracture of lateral malleolus of right fibula    Prediabetes    Vitamin D deficiency    Acute right ankle pain    COPD (chronic obstructive pulmonary disease) (HCC)    Malignant neoplasm of overlapping sites of right breast in female, estrogen receptor negative ([address] )    Continuous opioid dependence ([address] )    Cellulitis    Diverticulitis    Oral candidiasis       Past Medical History:   Diagnosis Date    Anxiety     Atherosclerosis of native artery of both lower extremities with intermittent claudication ([address] ) 10/15/2015    Transitioned From: Cardiovascular Symptoms    Breast cancer ([address] )     Carotid artery plaque, bilateral 03/21/2017    Transitioned From:  Atherosclerosis of both carotid arteries    Chronic kidney disease     Chronic sinusitis     Last assessed: 11/11/13    COPD (chronic obstructive pulmonary disease) ([address] )     COVID     12/25/21 not hopsitalized- flu-like symptoms    Fall 06/16/2021    Fracture, ankle closed, bimalleolar, right, initial encounter 06/2021    GERD (gastroesophageal reflux disease)     Hyperlipidemia     Lung nodule     PNA (pneumonia)     PONV (postoperative nausea and vomiting)     with C-sections    Pulmonary emphysema (HCC)     PVD (peripheral vascular disease) ([address] )     Restless leg syndrome     Stage 3 chronic kidney disease ([facility] ) 04/22/2019    Tobacco abuse        Past Surgical History:   Procedure Laterality Date    BREAST LUMPECTOMY Right 4/28/2022    Procedure: ISAI  DIRECTED LUMPECTOMY;  Surgeon: Sylvain Caldwell MD;  [facility];  Service: Surgical Oncology    CATARACT EXTRACTION       SECTION      COLONOSCOPY      Complete  Resolved: 13    DESCENDING AORTIC ANEURYSM REPAIR W/ STENT  2019    IR AORTAGRAM WITH RUN-OFF  8/15/2019    LYMPH NODE BIOPSY Right 2022    Procedure: LYMPHATIC MAPPING WITH BLUE AND RADIOACTIVE DYES, SENTINEL LYMPH NODE BIOPSY, INJECTION IN OR BY DR Jalyn Oden AT 1300;  Surgeon: Sylvain Caldwell MD;  [facility];  Service: Surgical Oncology    SHOULDER SURGERY      For frozen shoulder     TONSILLECTOMY      US BREAST ISAI  NEEDLE LOC RIGHT Right 3/25/2022    US GUIDED BREAST BIOPSY RIGHT COMPLETE Right 3/25/2022        06/21/22 0816   OT Last Visit   OT Visit Date 22   Note Type   Note type Evaluation   Additional Comments Pt agreeable to OT eval  Upon arrival pt supine in bed with HOB elevated  Restrictions/Precautions   Weight Bearing Precautions Per Order No   Braces or Orthoses   (denies prior usage)   Other Precautions Multiple lines;O2;Fall Risk;Pain; Chair Alarm; Bed Alarm  (2 L O2 via NC- not used at baseline)   Pain Assessment   Pain Assessment Tool 0-10   Pain Score 6   Pain Location/Orientation Orientation: Lower; Location: Abdomen   Pain Onset/Description Frequency: Intermittent; Descriptor: Discomfort   Hospital Pain Intervention(s) Ambulation/increased activity;Repositioned; Emotional support   Home Living   Type of Home Apartment  (2nd floor apartment)   Home Layout One level;Performs ADLs on one level; Able to live on main level with bedroom/bathroom;Stairs to enter with rails  (4 ZOILA; 14 steps to apartment level)   Bathroom Shower/Tub Tub/shower unit   Bathroom Toilet Standard   Bathroom Equipment Shower chair;Grab bars in shower    [address]   (no AD used at baseline)   Prior Function   Level of [place] Independent with ADLs and functional mobility   Lives With [employer] Help From Family   ADL Assistance Independent   IADLs Independent   Falls in the last 6 months 0   Vocational Retired   Lifestyle   Autonomy Patient reporting being independent with ADLs/IADLs, ambulatory with no AD and lives with family in a 2nd floor apartment   Reciprocal Relationships Supportive children   Service to Others Retired   ADL   Eating Assistance 6  Modified independent   [facility] 6  Modified Independent   [address]  [address] [place] [address]  3  Moderate Assistance   [address] 5  Supervision/Setup    [facility] 3  Moderate [address]  3  Moderate Assistance   Additional Comments ADL levels based on functional performance during OT eval    Bed Mobility   Supine to Sit 5  Supervision   Additional items Assist x 1;HOB elevated; Bedrails; Increased time required   Sit to Supine   (DNT: pt seated OOB in recliner at end of session)   Additional Comments Pt denied lightheaded/dizziness with transitional movements   Transfers   Sit to Stand 4  Minimal assistance   Additional items Assist x 1;Bedrails; Increased time required;Verbal cues   Stand to Sit 4  Minimal assistance   Additional items Assist x 1; Increased time required;Verbal cues;Armrests   Additional Comments Verbal cues provided throughout session for proper body mechanics, safe hand placement during transitional movements, and energy conservation techniques  Functional Mobility   Functional Mobility 4  Minimal assistance   Additional Comments Pt ambulated in room with assist of 2 for safety  Pt noted to have moderate s/s of SOB  SpO2 decreased to 87% with mobility on 2 L O2  Once seated in recliner, pt completed PLB and SpO2 returned to >90%     Additional items Rolling walker   Balance   Static Sitting Fair +   Dynamic Sitting Fair   Static Standing Fair   Dynamic Standing Fair -   Activity Tolerance Activity Tolerance Patient limited by fatigue;Patient limited by pain;Treatment limited secondary to medical complications (Comment)  (SOB/MENDOZA)   Medical Staff Made Aware Pt seen as a co-eval with PT Mimi due to the patient's co-morbidities, clinically unstable presentation, and present impairments which are a regression from the patient's baseline  Nurse Made Aware RN made aware of outcomes   RUE Assessment   RUE Assessment WFL  (full AROM, 4-/5 MMT)   LUE Assessment   LUE Assessment WFL  (full AROM, 4-/5 MMT)   Hand Function   Gross Motor Coordination Functional   Fine Motor Coordination Functional   Sensation   Light Touch No apparent deficits   Vision-Basic Assessment   Current Vision Other (Comment)  (Wears glasses for reading and distance)   Visual History   (L eye visual impairment reported)   Patient Visual Report   ("Foggy in L eye" d/t cornea issue per pt)   Vision - Complex Assessment   Acuity Able to read employee name badge without difficulty; Able to read clock/calendar on wall without difficulty   Additional Comments Pt reports occasional difficulty navigating new environments d/t visual deficit  No noted dificulty c ambulating in room- will continue to assess  Cognition   Overall Cognitive Status WFL   Arousal/Participation Alert; Responsive; Cooperative   Attention Within functional limits   Orientation Level Oriented X4   Memory Within functional limits   Following Commands Follows one step commands without difficulty   Assessment   Limitation Decreased ADL status; Decreased UE strength;Decreased endurance;Visual deficit; Decreased self-care trans;Decreased high-level ADLs   Prognosis Good   Assessment Patient is a 78 y o  female seen for OT evaluation s/p admit to [address] [place] [address]  on 6/20/2022 w/Diverticulitis  Commorbidities affecting patient's functional performance at time of assessment include: COPD, malignant neoplasm of R breast, oral candidiasis, and CKD   Orders placed for OT evaluation and treatment and up with assistance  Performed at least two patient identifiers during session including name and wristband  Prior to admission, Patient reporting being independent with ADLs/IADLs, ambulatory with no AD and lives with family in a 2nd floor apartment  Personal factors affecting patient at time of initial evaluation include: steps to enter, difficulty performing ADLs and difficulty performing IADLs  Upon evaluation, patient requires supervision assist for UB ADLs, moderate assist for LB ADLs, transfers and functional ambulation in room and bathroom with minimal  assist of 1-2, with the use of Rolling Walker  Patient is oriented x 4  Occupational performance is affected by the following deficits: visual impairment, decreased muscle strength, dynamic sit/ stand balance deficit with poor standing tolerance time for self care and functional mobility, decreased activity tolerance, increased pain and delayed righting and equilibrium reactions  Patient to benefit from continued Occupational Therapy treatment while in the hospital to address deficits as defined above and maximize level of functional independence with ADLs and functional mobility  Occupational Performance areas to address include: grooming , bathing/ shower, dressing, toilet hygiene, transfer to all surfaces, functional ambulation, medication routine/ management, IADLS: Household maintenance, IADLs: safety procedures and IADLs: meal prep/ clean up  From OT standpoint, recommendation at time of d/c would be Post acute rehabilitation services  Goals   Patient Goals to get better and go home   Plan   Treatment Interventions ADL retraining;Visual perceptual retraining;Functional transfer training;UE strengthening/ROM; Endurance training;Patient/family training; Compensatory technique education; Energy conservation; Activityengagement   Goal Expiration Date 07/01/22   OT Treatment Day 0   OT Frequency 3-5x/wk   Recommendation   OT Discharge Recommendation Post acute rehabilitation services   OT - OK to Discharge Yes  (Once medically cleared)   Additional Comments  At end of session, pt seated OOB in recliner with PT Mimi present   Additional Comments 2 The patient's raw score on the AM-PAC Daily Activity inpatient short form is 17, standardized score is 37 26, less than 39 4  Patients at this level are likely to benefit from discharge to post-acute rehabilitation services  Please refer to the recommendation of the Occupational Therapist for safe discharge planning  AM-PAC Daily Activity Inpatient   Lower Body Dressing 2   Bathing 2   Toileting 2   Upper Body Dressing 3   Grooming 4   Eating 4   Daily Activity Raw Score 17   Daily Activity Standardized Score (Calc for Raw Score >=11) 37 26   AM-PAC Applied Cognition Inpatient   Following a Speech/Presentation 4   Understanding Ordinary Conversation 4   Taking Medications 3   Remembering Where Things Are Placed or Put Away 3   Remembering List of 4-5 Errands 3   Taking Care of Complicated Tasks 3   Applied Cognition Raw Score 20   Applied Cognition Standardized Score 41 76     GOALS:    *ADL transfers with (I) for inc'd independence with ADLs/purposeful tasks    *UB ADL with (I) for inc'd independence with self cares    *LB ADL with (I) using AE prn for inc'd independence with self cares    *Toileting with (I) for clothing management and hygiene for return to PLOF with personal care    *Increase stand tolerance x 5  m for inc'd tolerance with standing purposeful tasks    *Participate in 10m UE therex to increase overall stamina/activity tolerance for purposeful tasks    *Bed mobility- (I) for inc'd independence to manage own comfort and initiate EOB & OOB purposeful tasks    *Patient will verbalize 3 safety awareness/ principles to prevent falls in the home setting       *Patient will verbalize and demonstrate use of energy conservation/deep breathing techniques and work simplification skills during functional activities with no verbal cues  *Patient will increase OOB/sitting tolerance to 2-4 hours per day to increase participation in self-care and leisure tasks with no s/s of exertion        *Pt will demonstrate use of long handled AE during 100% of tx sessions for increased ADL safety and independence following D/C     TONYA Crabtree, OTR/L

## 2022-06-21 NOTE — PHYSICAL THERAPY NOTE
Physical Therapy Evaluation     Patient's Name: Andrzej Vaz    Admitting Diagnosis  Oral candidiasis [B37 0]  Diverticulitis [K57 92]  Fever [R50 9]  Sepsis ([address] ) [A41 9]    Problem List  Patient Active Problem List   Diagnosis    Anxiety    Aortoiliac occlusive disease ([address] )    Carotid artery plaque, bilateral    Centrilobular emphysema ([address] )    Hyperlipidemia    Osteoporosis    Restless leg syndrome    Subclavian artery stenosis, left (HCC)    [facility])    Chronic sinusitis    Pancreatic mass    BMI 34 0-34 9,adult    Seborrheic keratoses    Stage 3 chronic kidney disease ([facility])    Closed avulsion fracture of lateral malleolus of right fibula    Prediabetes    Vitamin D deficiency    Acute right ankle pain    COPD (chronic obstructive pulmonary disease) ([address] )    Malignant neoplasm of overlapping sites of right breast in female, estrogen receptor negative ([address] )    Continuous opioid dependence [facility] )    Cellulitis    Diverticulitis    Oral candidiasis     Past Medical History  Past Medical History:   Diagnosis Date    Anxiety     Atherosclerosis of native artery of both lower extremities with intermittent claudication ([facility] ) 10/15/2015    Transitioned From: Cardiovascular Symptoms    Breast cancer ([address] )     Carotid artery plaque, bilateral 03/21/2017    Transitioned From:  Atherosclerosis of both carotid arteries    Chronic kidney disease     Chronic sinusitis     Last assessed: 11/11/13    COPD (chronic obstructive pulmonary disease) ([address] )     COVID     12/25/21 not hopsitalized- flu-like symptoms    Fall 06/16/2021    Fracture, ankle closed, bimalleolar, right, initial encounter 06/2021    GERD (gastroesophageal reflux disease)     Hyperlipidemia     Lung nodule     PNA (pneumonia)     PONV (postoperative nausea and vomiting)     with C-sections    Pulmonary emphysema ([address] )     PVD [facility]peripheral vascular disease) ([facility])     Restless leg syndrome  Stage 3 chronic kidney disease ([facility] ) 2019    Tobacco abuse      Past Surgical History  Past Surgical History:   Procedure Laterality Date    BREAST LUMPECTOMY Right 2022    Procedure: ISAI  DIRECTED LUMPECTOMY;  Surgeon: Kimberlyn العراقي MD;  [facility]: [facility];  Service: Surgical Oncology    CATARACT EXTRACTION       SECTION      COLONOSCOPY      Complete  Resolved: 13    DESCENDING AORTIC ANEURYSM REPAIR W/ STENT  2019    IR AORTAGRAM WITH RUN-OFF  8/15/2019    LYMPH NODE BIOPSY Right 2022    Procedure: LYMPHATIC MAPPING WITH BLUE AND RADIOACTIVE DYES, SENTINEL LYMPH NODE BIOPSY, INJECTION IN OR BY DR RODRÍGUEZ AT 1300;  Surgeon: Kimberlyn العراقي MD;  [facility]: [facility];  Service: Surgical Oncology    SHOULDER SURGERY      For frozen shoulder     TONSILLECTOMY      US BREAST ISAI  NEEDLE LOC RIGHT Right 3/25/2022    US GUIDED BREAST BIOPSY RIGHT COMPLETE Right 3/25/2022      06/21/22 0815   PT Last Visit   PT Visit Date 22   Note Type   Note type Evaluation and Treatment   Pain Assessment   Pain Assessment Tool 0-10   Pain Score 6   Pain Location/Orientation Orientation: Lower; Location: Abdomen   Pain Onset/Description Descriptor: Discomfort;Frequency: Intermittent   Hospital Pain Intervention(s) Repositioned; Ambulation/increased activity   Restrictions/Precautions   Weight Bearing Precautions Per Order No   Braces or Orthoses Other (Comment)  (none per patient)   Other Precautions Chair Alarm; Bed Alarm;Multiple lines;O2;Fall Risk;Pain  (+2L O2 via NC)   Home Living   Type of Home Apartment  (2nd floor apartment)   Home Layout One level  (4 ZOILA outside + 14 steps to reach apartment door)   Bathroom Shower/Tub Tub/shower unit   [facility] in shower; Shower chair   [facility] Other (Comment)  (none per patient)   Additional Comments Pt ambulates without an AD  Prior Function   Level of [place] Independent with ADLs and functional mobility   Lives With Son;Daughter   Receives Help From Family   ADL Assistance Independent   IADLs Independent   Falls in the last 6 months 0   Vocational Retired   General   Family/Caregiver Present No   Cognition   Overall Cognitive Status WFL   Arousal/Participation Alert   Attention Within functional limits   Orientation Level Oriented X4   Memory Within functional limits   Following Commands Follows all commands and directions without difficulty   Comments Pt agreeable to PT  Subjective   Subjective "I feel weak, but was able to walk to the bathroom this morning "   RUE Assessment   RUE Assessment   (defer to OT assessment)   LUE Assessment   LUE Assessment   (defer to OT assessment)   RLE Assessment   RLE Assessment X   Strength RLE   RLE Overall Strength 4-/5   LLE Assessment   LLE Assessment X   Strength LLE   LLE Overall Strength 4-/5   Light Touch   RLE Light Touch Grossly intact   LLE Light Touch Grossly intact   Bed Mobility   Supine to Sit 5  Supervision   Additional items Assist x 1;HOB elevated; Bedrails; Increased time required;Verbal cues   Transfers   Sit to Stand 4  Minimal assistance   Additional items Assist x 1; Increased time required;Verbal cues   Stand to Sit 4  Minimal assistance   Additional items Assist x 1; Armrests; Increased time required;Verbal cues   Ambulation/Elevation   Gait pattern Decreased toe off;Decreased hip extension;Decreased heel strike; Excessively slow; Step to;Short stride; Shuffling   Gait Assistance 4  Minimal assist   Additional items Assist x 1;Verbal cues   Assistive Device Rolling walker   Distance 15 feet   Stair Management Assistance Not tested   Balance   Static Sitting Fair +   Dynamic Sitting Fair   Static Standing Fair -   Dynamic Standing Poor +   Ambulatory Poor +   Endurance Deficit   Endurance Deficit Yes   Endurance Deficit Description decreased activity tolerance; pt requiring supplemental oxygen; pt was received on 2L O2 via NC; SpO2 decreased to 88% with functional mobility, increased to >90% with seated rest breaks and cues for proper breathing techniques   Activity Tolerance   Activity Tolerance Patient tolerated treatment well   Medical Staff Made Aware DEYA Muniz   Nurse Made Aware Discussed case with RN   Assessment   Prognosis Good   Problem List Decreased strength;Decreased endurance; Impaired balance;Decreased mobility;Pain   Assessment Pt is 78year old female seen for PT evaluation s/p admit to [facility] on 6/20/2022 with Diverticulitis  PT consulted to assess pt's functional mobility and d/c needs  Order placed for PT eval and tx, with up with assist order  Comorbidities affecting pt's physical performance at time of assessment include stage 3 CKD, COPD, malignant neoplasm of overlapping sites of right breast in female, oral candidiasis, and osteoporosis  PTA, pt was independent with all functional mobility without an AD  Pt ambulates household and community distances  Pt resides with her son and daughter in a one level apartment with 4+14 steps to reach her apartment  Personal factors affecting pt at time of IE include inaccessible home environment, ambulating with an assistive device, stairs to enter home, inability to ambulate household distances, inability to navigate community distances, inability to navigate level surfaces without external assistance, unable to perform dynamic tasks in community, and inability to perform IADLs  Please find objective findings from PT assessment regarding body systems outlined above with impairments and limitations including weakness, impaired balance, decreased endurance, gait deviations, pain, decreased activity tolerance, decreased functional mobility tolerance, fall risk, and SOB upon exertion   The following objective measures performed on IE also reveal limitations: Barthel Index: 55/100, Modified [place]: 4 (moderate/severe disability) and AM-PAC 6-Clicks: 91/88  Pt's clinical presentation is currently unstable/unpredictable seen in pt's presentation of need for ongoing medical management/monitoring, pt is a fall risk, pt is requiring use of RW for safe ambulation, pt is currently requiring supplemental oxygen, and pt requires cues and assist for safety with functional mobility  Pt to benefit from continued PT tx to address deficits as defined above and maximize level of functional independent mobility and consistency  From PT/mobility standpoint, recommendation at time of d/c would be STR pending progress in order to facilitate return to PLOF  Barriers to Discharge Inaccessible home environment   Goals   Patient Goals "go home "   STG Expiration Date 07/01/22   Short Term Goal #1 In 10 days: Increase bilateral LE strength 1/2 grade to facilitate independent mobility, Perform all bed mobility tasks modified independent to decrease caregiver burden, Perform all transfers modified independent to improve independence, Ambulate > 150 ft  with least restrictive assistive device modified independent w/o LOB and w/ normalized gait pattern 100% of the time, Navigate 14 stairs modified independent with unilateral handrail to facilitate return to previous living environment and Increase all balance 1/2 grade to decrease risk for falls   Plan   Treatment/Interventions Functional transfer training;LE strengthening/ROM; Elevations; Therapeutic exercise; Endurance training;Patient/family training;Bed mobility;Gait training;Spoke to nursing;OT   PT Frequency 3-5x/wk   Recommendation   PT Discharge Recommendation Post acute rehabilitation services   AM-PAC Basic Mobility Inpatient   Turning in Bed Without Bedrails 4   Lying on Back to Sitting on Edge of Flat Bed 3   Moving Bed to Chair 3   Standing Up From Chair 3   Walk in Room 3   Climb 3-5 Stairs 1   Basic Mobility Inpatient Raw Score 17   Basic Mobility Standardized Score 39 67 Highest Level Of Mobility   -HL Goal 5: Stand one or more mins   -HLM Achieved 6: Walk 10 steps or more   Modified Jennyfer Scale   Modified [place] Scale 4   Barthel Index   Feeding 10   Bathing 0   Grooming Score 5   Dressing Score 5   Bladder Score 10   Bowels Score 10   Toilet Use Score 5   Transfers (Bed/Chair) Score 10   Mobility (Level Surface) Score 0   Stairs Score 0   Barthel Index Score 55   Additional Treatment Session   Start Time 0831   End Time 5847   Treatment Assessment Pt agreeable to PT treatment session following PT evaluation  Pt performed seated therapeutic exercise as indicated below  Pt required verbal cues and occasional tactile cues for correct technique and form  Pt tolerated therapeutic exercise well without complaints of pain, but reported fatigue  Pt continues to exhibit decreased lower extremity strength, impaired balance, decreased endurance, gait deviations, and decreased functional mobility  PT to continue to recommend STR  PT to continue to follow and treat as appropriate  Exercises   Glute Sets Sitting;10 reps;AROM; Bilateral   Hip Flexion Sitting;10 reps;AROM; Bilateral   Hip Adduction Sitting;10 reps;AROM; Bilateral   Knee AROM Long Arc Quad Sitting;20 reps;AROM; Bilateral   Ankle Pumps Sitting;20 reps;AROM; Bilateral   End of Consult   Patient Position at End of Consult Bedside chair;Bed/Chair alarm activated; All needs within reach  (RN aware)     PT Evaluation Time: 6503-7887    PT Treatment Time: 6819-4683  11 minutes  Kay Gill, PT, DPT

## 2022-06-21 NOTE — PLAN OF CARE
Problem: PHYSICAL THERAPY ADULT  Goal: Performs mobility at highest level of function for planned discharge setting  See evaluation for individualized goals  Description: Treatment/Interventions: Functional transfer training, LE strengthening/ROM, Elevations, Therapeutic exercise, Endurance training, Patient/family training, Bed mobility, Gait training, Spoke to nursing, OT          See flowsheet documentation for full assessment, interventions and recommendations  Note: Prognosis: Good  Problem List: Decreased strength, Decreased endurance, Impaired balance, Decreased mobility, Pain  Assessment: Pt is 78year old female seen for PT evaluation s/p admit to [facility] on 6/20/2022 with Diverticulitis  PT consulted to assess pt's functional mobility and d/c needs  Order placed for PT eval and tx, with up with assist order  Comorbidities affecting pt's physical performance at time of assessment include stage 3 CKD, COPD, malignant neoplasm of overlapping sites of right breast in female, oral candidiasis, and osteoporosis  PTA, pt was independent with all functional mobility without an AD  Pt ambulates household and community distances  Pt resides with her son and daughter in a one level apartment with 4+14 steps to reach her apartment  Personal factors affecting pt at time of IE include inaccessible home environment, ambulating with an assistive device, stairs to enter home, inability to ambulate household distances, inability to navigate community distances, inability to navigate level surfaces without external assistance, unable to perform dynamic tasks in community, and inability to perform IADLs  Please find objective findings from PT assessment regarding body systems outlined above with impairments and limitations including weakness, impaired balance, decreased endurance, gait deviations, pain, decreased activity tolerance, decreased functional mobility tolerance, fall risk, and SOB upon exertion   The following objective measures performed on IE also reveal limitations: Barthel Index: 55/100, Modified Jennyfer: 4 (moderate/severe disability) and AM-PAC 6-Clicks: 68/28  Pt's clinical presentation is currently unstable/unpredictable seen in pt's presentation of need for ongoing medical management/monitoring, pt is a fall risk, pt is requiring use of RW for safe ambulation, pt is currently requiring supplemental oxygen, and pt requires cues and assist for safety with functional mobility  Pt to benefit from continued PT tx to address deficits as defined above and maximize level of functional independent mobility and consistency  From PT/mobility standpoint, recommendation at time of d/c would be STR pending progress in order to facilitate return to PLOF  Barriers to Discharge: Inaccessible home environment     PT Discharge Recommendation: Post acute rehabilitation services    See flowsheet documentation for full assessment

## 2022-06-21 NOTE — ASSESSMENT & PLAN NOTE
Lab Results   Component Value Date    EGFR 43 06/20/2022    EGFR 48 06/10/2022    EGFR 40 04/04/2022    CREATININE 1 21 06/20/2022    CREATININE 1 11 06/10/2022    CREATININE 1 29 04/04/2022     · Creatinine stable at baseline  · Monitor bmp prn

## 2022-06-21 NOTE — ASSESSMENT & PLAN NOTE
Patient presenting to the ED with lower abdominal pain, constipation, fevers, body aches for 5 days  · CT A/P: Findings consistent with mild acute uncomplicated sigmoid diverticulitis  · Does not meet sepsis criteria   Negative procalcitonin and lactic  · Started on IV zosyn  · Follow up BC x2  · Keep NPO  · Continuous IVF  · Consult to GI, recommendations are appreciated

## 2022-06-21 NOTE — ASSESSMENT & PLAN NOTE
· Denies any SOB at this time  · Reports that her outpatient provider recommended that she be on oxygen as an outpatient, but patient declined   States that she is agreeable to home oxygen at this time  · Currently on 2L NC for comfort  · Ordered home O2 study  · Continue pre-hospital inhalers

## 2022-06-21 NOTE — ASSESSMENT & PLAN NOTE
· Patient complaining of sore throat and difficulty swallowing  · Physical exam evident for candidal infection  · Started on IV fluconazole  · Keep NPO  · GI input appreciated

## 2022-06-22 ENCOUNTER — TELEPHONE (OUTPATIENT)
Dept: HEMATOLOGY ONCOLOGY | Facility: CLINIC | Age: 77
End: 2022-06-22

## 2022-06-22 ENCOUNTER — TELEPHONE (OUTPATIENT)
Dept: GASTROENTEROLOGY | Facility: CLINIC | Age: 77
End: 2022-06-22

## 2022-06-22 LAB
ALBUMIN SERPL BCP-MCNC: 2.6 G/DL (ref 3.5–5)
ALP SERPL-CCNC: 102 U/L (ref 46–116)
ALT SERPL W P-5'-P-CCNC: 27 U/L (ref 12–78)
ANION GAP SERPL CALCULATED.3IONS-SCNC: 9 MMOL/L (ref 4–13)
AST SERPL W P-5'-P-CCNC: 16 U/L (ref 5–45)
BILIRUB SERPL-MCNC: 0.31 MG/DL (ref 0.2–1)
BUN SERPL-MCNC: 11 MG/DL (ref 5–25)
CALCIUM ALBUM COR SERPL-MCNC: 9.4 MG/DL (ref 8.3–10.1)
CALCIUM SERPL-MCNC: 8.3 MG/DL (ref 8.3–10.1)
CHLORIDE SERPL-SCNC: 106 MMOL/L (ref 100–108)
CO2 SERPL-SCNC: 29 MMOL/L (ref 21–32)
CREAT SERPL-MCNC: 1.28 MG/DL (ref 0.6–1.3)
ERYTHROCYTE [DISTWIDTH] IN BLOOD BY AUTOMATED COUNT: 15.8 % (ref 11.6–15.1)
GFR SERPL CREATININE-BSD FRML MDRD: 40 ML/MIN/1.73SQ M
GLUCOSE SERPL-MCNC: 116 MG/DL (ref 65–140)
HCT VFR BLD AUTO: 36 % (ref 34.8–46.1)
HGB BLD-MCNC: 11.2 G/DL (ref 11.5–15.4)
MCH RBC QN AUTO: 27.5 PG (ref 26.8–34.3)
MCHC RBC AUTO-ENTMCNC: 31.1 G/DL (ref 31.4–37.4)
MCV RBC AUTO: 88 FL (ref 82–98)
NRBC BLD AUTO-RTO: 1 /100 WBCS
PLATELET # BLD AUTO: 165 THOUSANDS/UL (ref 149–390)
PMV BLD AUTO: 10.5 FL (ref 8.9–12.7)
POTASSIUM SERPL-SCNC: 4.4 MMOL/L (ref 3.5–5.3)
PROT SERPL-MCNC: 5.7 G/DL (ref 6.4–8.2)
RBC # BLD AUTO: 4.08 MILLION/UL (ref 3.81–5.12)
SODIUM SERPL-SCNC: 144 MMOL/L (ref 136–145)
WBC # BLD AUTO: 14.02 THOUSAND/UL (ref 4.31–10.16)

## 2022-06-22 PROCEDURE — 99233 SBSQ HOSP IP/OBS HIGH 50: CPT | Performed by: INTERNAL MEDICINE

## 2022-06-22 PROCEDURE — 99232 SBSQ HOSP IP/OBS MODERATE 35: CPT | Performed by: INTERNAL MEDICINE

## 2022-06-22 PROCEDURE — 85027 COMPLETE CBC AUTOMATED: CPT | Performed by: INTERNAL MEDICINE

## 2022-06-22 PROCEDURE — 80053 COMPREHEN METABOLIC PANEL: CPT | Performed by: INTERNAL MEDICINE

## 2022-06-22 RX ORDER — HYDROXYZINE HYDROCHLORIDE 25 MG/1
25 TABLET, FILM COATED ORAL ONCE
Status: COMPLETED | OUTPATIENT
Start: 2022-06-22 | End: 2022-06-22

## 2022-06-22 RX ORDER — ACETAMINOPHEN 325 MG/1
325 TABLET ORAL ONCE
Status: COMPLETED | OUTPATIENT
Start: 2022-06-22 | End: 2022-06-22

## 2022-06-22 RX ADMIN — NYSTATIN 500000 UNITS: 100000 SUSPENSION ORAL at 08:40

## 2022-06-22 RX ADMIN — PANTOPRAZOLE SODIUM 40 MG: 40 TABLET, DELAYED RELEASE ORAL at 06:38

## 2022-06-22 RX ADMIN — ATORVASTATIN CALCIUM 40 MG: 40 TABLET, FILM COATED ORAL at 19:52

## 2022-06-22 RX ADMIN — ALBUTEROL SULFATE 2 PUFF: 90 AEROSOL, METERED RESPIRATORY (INHALATION) at 18:45

## 2022-06-22 RX ADMIN — ASPIRIN 81 MG: 81 TABLET, CHEWABLE ORAL at 08:40

## 2022-06-22 RX ADMIN — ACETAMINOPHEN 325 MG: 325 TABLET, FILM COATED ORAL at 21:29

## 2022-06-22 RX ADMIN — FLUCONAZOLE 200 MG: 100 TABLET ORAL at 08:40

## 2022-06-22 RX ADMIN — PIPERACILLIN AND TAZOBACTAM 3.38 G: 36; 4.5 INJECTION, POWDER, FOR SOLUTION INTRAVENOUS at 19:54

## 2022-06-22 RX ADMIN — GABAPENTIN 400 MG: 400 CAPSULE ORAL at 21:30

## 2022-06-22 RX ADMIN — HYDROXYZINE HYDROCHLORIDE 25 MG: 25 TABLET ORAL at 22:48

## 2022-06-22 RX ADMIN — ACETAMINOPHEN 650 MG: 325 TABLET, FILM COATED ORAL at 20:00

## 2022-06-22 RX ADMIN — GABAPENTIN 300 MG: 300 CAPSULE ORAL at 19:52

## 2022-06-22 RX ADMIN — GABAPENTIN 300 MG: 300 CAPSULE ORAL at 08:40

## 2022-06-22 RX ADMIN — SODIUM CHLORIDE, POTASSIUM CHLORIDE, SODIUM LACTATE AND CALCIUM CHLORIDE 75 ML/HR: 600; 310; 30; 20 INJECTION, SOLUTION INTRAVENOUS at 13:38

## 2022-06-22 RX ADMIN — PIPERACILLIN AND TAZOBACTAM 3.38 G: 36; 4.5 INJECTION, POWDER, FOR SOLUTION INTRAVENOUS at 05:29

## 2022-06-22 RX ADMIN — ACETAMINOPHEN 650 MG: 325 TABLET, FILM COATED ORAL at 08:40

## 2022-06-22 RX ADMIN — FLUTICASONE FUROATE AND VILANTEROL TRIFENATATE 1 PUFF: 100; 25 POWDER RESPIRATORY (INHALATION) at 09:00

## 2022-06-22 RX ADMIN — NYSTATIN 500000 UNITS: 100000 SUSPENSION ORAL at 13:39

## 2022-06-22 RX ADMIN — PIPERACILLIN AND TAZOBACTAM 3.38 G: 36; 4.5 INJECTION, POWDER, FOR SOLUTION INTRAVENOUS at 13:39

## 2022-06-22 RX ADMIN — ENOXAPARIN SODIUM 40 MG: 40 INJECTION SUBCUTANEOUS at 08:41

## 2022-06-22 RX ADMIN — NYSTATIN 500000 UNITS: 100000 SUSPENSION ORAL at 20:00

## 2022-06-22 NOTE — ASSESSMENT & PLAN NOTE
· Denies any SOB at this time  · Reports that her outpatient provider recommended that she be on oxygen as an outpatient, but patient declined   States that she is agreeable to home oxygen at this time  · Requiring 3L O2 NC to maintain O2 sat in low-90s, continue to monitor  · Ordered home O2 study  · Continue pre-hospital inhalers

## 2022-06-22 NOTE — TELEPHONE ENCOUNTER
Patient calling to inform office she is in hospital   She had an appointment with Dr Laura Scott 6/23/22 @ 2:20pm   She would also like to make sure [employer] is notified  She can be at [phone]

## 2022-06-22 NOTE — ASSESSMENT & PLAN NOTE
· Patient complaining of sore throat and difficulty swallowing  · Physical exam evident for candidal infection  · Started on IV fluconazole, transitioned to PO  · Advance diet as tolerated  · GI following, recs include continuing fluconazole

## 2022-06-22 NOTE — ASSESSMENT & PLAN NOTE
· S/p right lumpectomy in April 2021  · Recently started chemotherapy (6/14/22)  · Follow outpatient with Oncology  · Holding decadron in the setting of candidiasis

## 2022-06-22 NOTE — ASSESSMENT & PLAN NOTE
Lab Results   Component Value Date    EGFR 40 06/22/2022    EGFR 44 06/21/2022    EGFR 43 06/20/2022    CREATININE 1 28 06/22/2022    CREATININE 1 20 06/21/2022    CREATININE 1 21 06/20/2022     · Creatinine stable at baseline  · Monitor bmp prn

## 2022-06-22 NOTE — PROGRESS NOTES
Progress Note - Harrison Butterfield 78 y o  female MRN: [id number]    Unit/Bed#: -01 Encounter: [id number]        Subjective:     Patient with a T-max of a 100 7° last night  She reports her abdominal pain is much improved  She is eager to advance her diet  She was able to have a large bowel movement yesterday after not having 1 for 9 days  ROS: As noted in the HPI, otherwise all others negative  Objective:     Vitals: Blood pressure (!) 107/48, pulse 99, temperature (!) 100 7 °F (38 2 °C), resp  rate 16, SpO2 93 %, not currently breastfeeding  ,There is no height or weight on file to calculate BMI  Intake/Output Summary (Last 24 hours) at 6/22/2022 0941  Last data filed at 6/21/2022 1258  Gross per 24 hour   Intake 240 ml   Output --   Net 240 ml       Physical Exam:     General Appearance: Alert and oriented x 3  In no respiratory distress  Lungs: Clear to auscultation bilaterally, no rales or rhonchi  Heart: Regular rate and rhythm, S1, S2 normal, no murmur, click, rub or gallop  Abdomen: Soft, non-tender, non-distended; bowel sounds normal; no masses or no organomegaly  Extremities: No cyanosis, edema    Invasive Devices  Report    Peripheral Intravenous Line  Duration           Peripheral IV 06/20/22 Left Antecubital 2 days                Lab Results:  Results from last 7 days   Lab Units 06/22/22  0514 06/21/22  0626 06/20/22  2101   WBC Thousand/uL 14 02*   < > 3 00*   HEMOGLOBIN g/dL 11 2*   < > 13 1   HEMATOCRIT % 36 0   < > 41 1   PLATELETS Thousands/uL 165   < > 164   LYMPHO PCT %  --   --  43   MONO PCT %  --   --  17*   EOS PCT %  --   --  2    < > = values in this interval not displayed       Results from last 7 days   Lab Units 06/22/22  0514   POTASSIUM mmol/L 4 4   CHLORIDE mmol/L 106   CO2 mmol/L 29   BUN mg/dL 11   CREATININE mg/dL 1 28   CALCIUM mg/dL 8 3   ALK PHOS U/L 102   ALT U/L 27   AST U/L 16     Results from last 7 days   Lab Units 06/21/22  0626   INR  1 13     Results from last 7 days   Lab Units 06/20/22  2101   LIPASE u/L 50*       Imaging Studies: I have personally reviewed pertinent imaging studies  CT chest abdomen pelvis wo contrast    Result Date: 6/20/2022  Impression: No evidence of metastatic disease throughout the chest, abdomen or pelvis  Stable pancreatic head lesion  Soft tissue postsurgical changes from right breast lumpectomy and axillary node dissection  Findings consistent with mild acute uncomplicated sigmoid diverticulitis  Workstation performed: [id number]     CT head without contrast    Result Date: 6/20/2022  Impression: No acute intracranial abnormality  Workstation performed: [id number]         Assessment and Plan:     1) Acute diverticulitis, uncomplicated - Patient presented with a CT scan revealing mild sigmoid diverticulitis with lower abdominal pain initially  She is on Zosyn  Her abdominal pain is much improved  She is tolerating clear liquids  Her last colonoscopy was in 2016 revealing several polyps that were removed, as well as diverticulosis in the sigmoid colon  - Work-up for fever per [facility]  - If patient were to have worsening abdominal pain or leukocytosis, would recommend repeat CT imaging inpatient to rule out complicated diverticulitis  On exam currently, she is improving     - Continue IV Zosyn with eventual transition to Cipro/Flagyl or Augmentin course as an outpatient   - Patient's initial blood cultures were negative at 24 hours  - Will advance to full liquid diet with toast and crackers  - When she is stable for discharge, she should be on a low-fiber/low residue diet for 2-4 weeks before transitioning back to high fiber  - She is agreeable to have outpatient colonoscopy in 6-8 weeks   - Patient agrees with above plan       GI will sign off, please call with questions  Thank you!

## 2022-06-22 NOTE — PROGRESS NOTES
[facility]  Progress Note Maryse Landon 1945, 78 y o  female MRN: [id number]  Unit/Bed#: -[place] Encounter: [id number]  Primary Care Provider: Bee Hanna MD   Date and time admitted to hospital: 6/20/2022  8:07 PM    * Diverticulitis  Assessment & Plan  Patient presenting to the ED with lower abdominal pain, constipation, fevers, body aches for 5 days  · CT A/P: Findings consistent with mild acute uncomplicated sigmoid diverticulitis  · Does not meet sepsis criteria  Negative procalcitonin and lactic  · Started on IV zosyn  · BC taken 6/20 negative at 24 hours  · Was NPO, advanced to clear liquids by GI, advanced to surgical diet, monitor for toleration  · Continuous IVF  · GI following, recs include continuing antibiotics and outpatient colonoscopy in 8 weeks    Oral candidiasis  Assessment & Plan  · Patient complaining of sore throat and difficulty swallowing  · Physical exam evident for candidal infection  · Started on IV fluconazole, transitioned to PO  · Advance diet as tolerated  · GI following, recs include continuing fluconazole    COPD (chronic obstructive pulmonary disease) ([facility] [address] )  Assessment & Plan  · Denies any SOB at this time  · Reports that her outpatient provider recommended that she be on oxygen as an outpatient, but patient declined   States that she is agreeable to home oxygen at this time  · Requiring 3L O2 NC to maintain O2 sat in low-90s, continue to monitor  · Ordered home O2 study  · Continue pre-hospital inhalers    Malignant neoplasm of overlapping sites of right breast in female, estrogen receptor negative [facility])  Assessment & Plan  · S/p right lumpectomy in April 2021  · Recently started chemotherapy (6/14/22)  · Follow outpatient with Oncology  · Holding decadron in the setting of candidiasis    Stage 3 chronic kidney disease [facility])  Assessment & Plan  Lab Results   Component Value Date    EGFR 40 06/22/2022    EGFR 44 06/21/2022    EGFR 43 06/20/2022 CREATININE 1 28 2022    CREATININE 1 20 2022    CREATININE 1 21 2022     · Creatinine stable at baseline  · Monitor bmp prn          VTE Pharmacologic Prophylaxis:   VTE Score: 8 High Risk (Score >/= 5) - Pharmacological DVT Prophylaxis Ordered: Enoxaparin (Lovenox)  Sequential Compression Devices Ordered  Mechanical VTE Prophylaxis in Place: Yes    Patient Centered Rounds: Treatment team aware of changes    Discussions with Specialists or Other Care Team Provider: GI    Education and Discussions with Family / Patient: Attempted to contact son and daughter twice each by phone with no answer    Current Length of Stay: 2 day(s)    Current Patient Status: Inpatient     Discharge Plan / Estimated Discharge Date: Anticipate discharge in 48 hrs to home  Code Status: Level 1 - Full Code      Subjective:   Patient had a fever to 100 7 F overnight which was controlled with Tylenol  She says that she is sweaty because the "fever is breaking " Otherwise, patient reports that her abdominal pain has significantly improved  She is concerned that all her symptoms are due to chemotherapy and wants to talk to her outpatient oncologist  She feels ready to eat a regular diet  Patient denies nausea, vomiting, chest pain, SOB, difficulty urinating or having bowel movements, and extremity weakness  Objective:     Vitals:   Temp (24hrs), Av 6 °F (37 6 °C), Min:99 1 °F (37 3 °C), Max:100 7 °F (38 2 °C)    Temp:  [99 1 °F (37 3 °C)-100 7 °F (38 2 °C)] 100 7 °F (38 2 °C)  HR:  [] 99  Resp:  [16-19] 16  BP: (107-116)/(46-59) 107/48  SpO2:  [91 %-96 %] 93 %  There is no height or weight on file to calculate BMI  Input and Output Summary (last 24 hours): Intake/Output Summary (Last 24 hours) at 2022 1131  Last data filed at 2022 1258  Gross per 24 hour   Intake 240 ml   Output --   Net 240 ml       Physical Exam:     Physical Exam  Vitals reviewed     Constitutional:       General: She is not in acute distress  Appearance: Normal appearance  HENT:      Head: Normocephalic and atraumatic  Eyes:      Conjunctiva/sclera: Conjunctivae normal    Cardiovascular:      Rate and Rhythm: Normal rate and regular rhythm  Heart sounds: Normal heart sounds  No murmur heard  No friction rub  No gallop  Pulmonary:      Effort: Pulmonary effort is normal       Breath sounds: Normal breath sounds  Abdominal:      General: Abdomen is flat  Bowel sounds are normal       Palpations: Abdomen is soft  Skin:     General: Skin is warm and dry  Neurological:      General: No focal deficit present  Mental Status: She is alert  Mental status is at baseline  Psychiatric:         Mood and Affect: Mood normal          Behavior: Behavior normal          Thought Content: Thought content normal          Judgment: Judgment normal           Additional Data:     Labs:  Results from last 7 days   Lab Units 06/22/22  0514 06/21/22  0626 06/20/22  2101   WBC Thousand/uL 14 02*   < > 3 00*   HEMOGLOBIN g/dL 11 2*   < > 13 1   HEMATOCRIT % 36 0   < > 41 1   PLATELETS Thousands/uL 165   < > 164   BANDS PCT %  --   --  2   LYMPHO PCT %  --   --  43   MONO PCT %  --   --  17*   EOS PCT %  --   --  2    < > = values in this interval not displayed       Results from last 7 days   Lab Units 06/22/22  0514   SODIUM mmol/L 144   POTASSIUM mmol/L 4 4   CHLORIDE mmol/L 106   CO2 mmol/L 29   BUN mg/dL 11   CREATININE mg/dL 1 28   ANION GAP mmol/L 9   CALCIUM mg/dL 8 3   ALBUMIN g/dL 2 6*   TOTAL BILIRUBIN mg/dL 0 31   ALK PHOS U/L 102   ALT U/L 27   AST U/L 16   GLUCOSE RANDOM mg/dL 116     Results from last 7 days   Lab Units 06/21/22  0626   INR  1 13             Results from last 7 days   Lab Units 06/20/22  2101   LACTIC ACID mmol/L 1 6   PROCALCITONIN ng/ml 0 23       Imaging: Personally reviewed the following imaging: abdominal/pelvic CT scan    Recent Cultures (last 7 days):     Results from last 7 days   Lab Units 06/20/22 2101   BLOOD CULTURE  No Growth at 24 hrs  No Growth at 24 hrs         Lines/Drains:  Invasive Devices  Report    Peripheral Intravenous Line  Duration           Peripheral IV 06/20/22 Left Antecubital 2 days                Telemetry:        Last 24 Hours Medication List:   Current Facility-Administered Medications   Medication Dose Route Frequency Provider Last Rate    acetaminophen  650 mg Oral Q6H PRN Roxana Fuchs PA-C      albuterol  2 puff Inhalation Q6H PRN Roxana Fuchs PA-C      aspirin  81 mg Oral Daily Emilie Rao PA-C      atorvastatin  40 mg Oral Daily With [employer]      enoxaparin  40 mg Subcutaneous Daily Emilie Rao PA-C      fluconazole  200 mg Oral Daily Charisse Martinez MD      fluticasone-vilanterol  1 puff Inhalation Daily Emilie Onofre PA-C      gabapentin  300 mg Oral BID Roxana Fuchs PA-C      gabapentin  400 mg Oral HS Emilie Rao PA-C      lactated ringers  75 mL/hr Intravenous Continuous Emilie Rao PA-C 75 mL/hr (06/21/22 1958)    nystatin  500,000 Units Swish & Spit 4x Daily Mariela Castillo MD      ondansetron  4 mg Intravenous Q6H PRN Mindi Wilder [place]      pantoprazole  40 mg Oral Daily Before Breakfast Emilie Rao PA-C      piperacillin-tazobactam  3 375 g Intravenous Q6H Emilie Rao PA-C 3 375 g (06/22/22 6007)        Today, Patient Was Seen By: Florencia Shanks

## 2022-06-22 NOTE — TELEPHONE ENCOUNTER
----- Message from Twin Guillaume PA-C sent at 6/22/2022  9:45 AM EDT -----  Patient needs colonoscopy in 6-8 weeks with Mykel Parker for history of diverticulitis and polyps  Thanks!

## 2022-06-22 NOTE — ASSESSMENT & PLAN NOTE
Patient presenting to the ED with lower abdominal pain, constipation, fevers, body aches for 5 days  · CT A/P: Findings consistent with mild acute uncomplicated sigmoid diverticulitis  · Does not meet sepsis criteria   Negative procalcitonin and lactic  · Started on IV zosyn  · BC taken 6/20 negative at 24 hours  · Was NPO, advanced to clear liquids by GI, advanced to surgical diet, monitor for toleration  · Continuous IVF  · GI following, recs include continuing antibiotics and outpatient colonoscopy in 8 weeks

## 2022-06-23 LAB
ANION GAP SERPL CALCULATED.3IONS-SCNC: 9 MMOL/L (ref 4–13)
ATRIAL RATE: 103 BPM
BUN SERPL-MCNC: 9 MG/DL (ref 5–25)
CALCIUM SERPL-MCNC: 8.2 MG/DL (ref 8.3–10.1)
CHLORIDE SERPL-SCNC: 110 MMOL/L (ref 100–108)
CO2 SERPL-SCNC: 29 MMOL/L (ref 21–32)
CREAT SERPL-MCNC: 1.22 MG/DL (ref 0.6–1.3)
ERYTHROCYTE [DISTWIDTH] IN BLOOD BY AUTOMATED COUNT: 16 % (ref 11.6–15.1)
GFR SERPL CREATININE-BSD FRML MDRD: 43 ML/MIN/1.73SQ M
GLUCOSE SERPL-MCNC: 102 MG/DL (ref 65–140)
HCT VFR BLD AUTO: 33.7 % (ref 34.8–46.1)
HGB BLD-MCNC: 10.7 G/DL (ref 11.5–15.4)
MCH RBC QN AUTO: 28.4 PG (ref 26.8–34.3)
MCHC RBC AUTO-ENTMCNC: 31.8 G/DL (ref 31.4–37.4)
MCV RBC AUTO: 89 FL (ref 82–98)
P AXIS: 51 DEGREES
PLATELET # BLD AUTO: 161 THOUSANDS/UL (ref 149–390)
PMV BLD AUTO: 10.3 FL (ref 8.9–12.7)
POTASSIUM SERPL-SCNC: 4.1 MMOL/L (ref 3.5–5.3)
PR INTERVAL: 150 MS
QRS AXIS: 61 DEGREES
QRSD INTERVAL: 70 MS
QT INTERVAL: 316 MS
QTC INTERVAL: 413 MS
RBC # BLD AUTO: 3.77 MILLION/UL (ref 3.81–5.12)
SODIUM SERPL-SCNC: 148 MMOL/L (ref 136–145)
T WAVE AXIS: 33 DEGREES
VENTRICULAR RATE: 103 BPM
WBC # BLD AUTO: 9.45 THOUSAND/UL (ref 4.31–10.16)

## 2022-06-23 PROCEDURE — 85027 COMPLETE CBC AUTOMATED: CPT | Performed by: INTERNAL MEDICINE

## 2022-06-23 PROCEDURE — 80048 BASIC METABOLIC PNL TOTAL CA: CPT | Performed by: INTERNAL MEDICINE

## 2022-06-23 PROCEDURE — 93010 ELECTROCARDIOGRAM REPORT: CPT | Performed by: INTERNAL MEDICINE

## 2022-06-23 PROCEDURE — 99233 SBSQ HOSP IP/OBS HIGH 50: CPT | Performed by: INTERNAL MEDICINE

## 2022-06-23 RX ORDER — ACETAMINOPHEN 325 MG/1
650 TABLET ORAL EVERY 6 HOURS SCHEDULED
Status: DISCONTINUED | OUTPATIENT
Start: 2022-06-23 | End: 2022-06-24

## 2022-06-23 RX ADMIN — ACETAMINOPHEN 650 MG: 325 TABLET, FILM COATED ORAL at 18:01

## 2022-06-23 RX ADMIN — ASPIRIN 81 MG: 81 TABLET, CHEWABLE ORAL at 10:05

## 2022-06-23 RX ADMIN — NYSTATIN 500000 UNITS: 100000 SUSPENSION ORAL at 22:07

## 2022-06-23 RX ADMIN — NYSTATIN 500000 UNITS: 100000 SUSPENSION ORAL at 17:58

## 2022-06-23 RX ADMIN — SODIUM CHLORIDE, POTASSIUM CHLORIDE, SODIUM LACTATE AND CALCIUM CHLORIDE 75 ML/HR: 600; 310; 30; 20 INJECTION, SOLUTION INTRAVENOUS at 04:25

## 2022-06-23 RX ADMIN — ACETAMINOPHEN 650 MG: 325 TABLET, FILM COATED ORAL at 11:03

## 2022-06-23 RX ADMIN — NYSTATIN 500000 UNITS: 100000 SUSPENSION ORAL at 10:07

## 2022-06-23 RX ADMIN — PIPERACILLIN AND TAZOBACTAM 3.38 G: 36; 4.5 INJECTION, POWDER, FOR SOLUTION INTRAVENOUS at 06:42

## 2022-06-23 RX ADMIN — PIPERACILLIN AND TAZOBACTAM 3.38 G: 36; 4.5 INJECTION, POWDER, FOR SOLUTION INTRAVENOUS at 01:23

## 2022-06-23 RX ADMIN — NYSTATIN 500000 UNITS: 100000 SUSPENSION ORAL at 14:37

## 2022-06-23 RX ADMIN — GABAPENTIN 300 MG: 300 CAPSULE ORAL at 10:05

## 2022-06-23 RX ADMIN — FLUTICASONE FUROATE AND VILANTEROL TRIFENATATE 1 PUFF: 100; 25 POWDER RESPIRATORY (INHALATION) at 08:30

## 2022-06-23 RX ADMIN — GABAPENTIN 400 MG: 400 CAPSULE ORAL at 22:07

## 2022-06-23 RX ADMIN — ENOXAPARIN SODIUM 40 MG: 40 INJECTION SUBCUTANEOUS at 10:06

## 2022-06-23 RX ADMIN — PANTOPRAZOLE SODIUM 40 MG: 40 TABLET, DELAYED RELEASE ORAL at 06:41

## 2022-06-23 RX ADMIN — ATORVASTATIN CALCIUM 40 MG: 40 TABLET, FILM COATED ORAL at 17:58

## 2022-06-23 RX ADMIN — PIPERACILLIN AND TAZOBACTAM 3.38 G: 36; 4.5 INJECTION, POWDER, FOR SOLUTION INTRAVENOUS at 14:43

## 2022-06-23 RX ADMIN — FLUCONAZOLE 200 MG: 100 TABLET ORAL at 10:05

## 2022-06-23 RX ADMIN — PIPERACILLIN AND TAZOBACTAM 3.38 G: 36; 4.5 INJECTION, POWDER, FOR SOLUTION INTRAVENOUS at 17:58

## 2022-06-23 RX ADMIN — GABAPENTIN 300 MG: 300 CAPSULE ORAL at 17:58

## 2022-06-23 NOTE — ASSESSMENT & PLAN NOTE
Patient presenting to the ED with lower abdominal pain, constipation, fevers, body aches for 5 days  · CT A/P: Findings consistent with mild acute uncomplicated sigmoid diverticulitis  · Does not meet sepsis criteria   Negative procalcitonin and lactic  · Started on IV zosyn  · BC taken 6/20 negative at 48 hours  · Was NPO, advanced to clear liquids by GI, advanced to surgical diet, monitor for toleration  · Continuous IVF  · GI following, recs include continuing antibiotics and outpatient colonoscopy in 8 weeks  · Patient has new-onset loose stools, likely antibiotic-induced, continue to monitor

## 2022-06-23 NOTE — NURSING NOTE
Entered patient's room and noted half a tablet of xanax on patient's table  Patient stated her daughter placed it there in case she needed it for anxiety  She was informed she could not use her home medications--especially narcotics  She was also aware this writer had already reached out to [facility] to ask for prn order of xanax  At that time the patient stated I could place the half tab of xanax in the bottle that is in her purse  This writer bought purse to bedside and discovered there were also two more bottles of narcotics  Two were labeled xanax and one bottle of percocet however one of the bottles of xanax was a mix of xanax and pain medication  I informed the patient that the medications would be packed sealed and taken to pharmacy and she her medication would be returned to her at time of discharge--as per our policy  Patient consented  Medications packed, sealed and labeled in her presence  She signed both package and tag  Tag place in chart

## 2022-06-23 NOTE — QUICK NOTE
Spoke to AP at patient's oncology via [employer]  Patient needs follow-up appointment prior to next round of chemo  AP said some SNFs bring patient to appointment while others do not, so they will have to work accordingly  CM made aware and will try to get patient in a SNF that will take her to appointment  Onc AP will tentatively plan for follow-up in 2 weeks

## 2022-06-23 NOTE — CASE MANAGEMENT
Case Management Progress Note    Patient name Karyna Murphy  Location /-01 MRN [id number]  : 1945 Date 2022       LOS (days): 3  Geometric Mean LOS (GMLOS) (days): 3 50  Days to GMLOS:0 7        OBJECTIVE:        Current admission status: Inpatient  Preferred Pharmacy:   [address] [facility], [address]  [facility] [place] [address]  Phone: [phone] Fax: [phone]    Primary Care Provider: Aminata Steven MD    Primary Insurance: MEDICARE  Secondary Insurance: [employer]    PROGRESS NOTE:  CM met with patient and her son Mac Phalen at bedside to provide POA/AD paperwork  Mac Phalen reports he's in support of STR at dc and would prefer as close as possible, as he does have to rely on public transportation to go and visit patient  CM asked patient if she would be receptive to the COVID booster, if [facility] approved same  She reports she would love to get the booster if approved  [facility] is in support of administration prior to dc but not until she's fever free  CM spoke with patient and her son re: [facility]'s discussion with Oncology and plans to hold off on chemo until her next OP visit and they are receptive to this as well  CM to f/u with them re: STR options tomorrow  [facility], a top choice facility, is reviewing

## 2022-06-23 NOTE — ASSESSMENT & PLAN NOTE
Lab Results   Component Value Date    EGFR 43 06/23/2022    EGFR 40 06/22/2022    EGFR 44 06/21/2022    CREATININE 1 22 06/23/2022    CREATININE 1 28 06/22/2022    CREATININE 1 20 06/21/2022     · Creatinine stable at baseline  · Monitor bmp prn

## 2022-06-23 NOTE — ASSESSMENT & PLAN NOTE
· Patient complaining of sore throat and difficulty swallowing  · Physical exam evident for candidal infection  · Started on IV fluconazole, transitioned to PO  · Continue nystatin oral suspension  · Advance diet as tolerated  · GI following, recs include continuing fluconazole

## 2022-06-23 NOTE — PROGRESS NOTES
[address] [facility]  Progress Note Ponce Reyes 1945, 78 y o  female MRN: [id number]  Unit/Bed#: -Braden Encounter: [id number]  Primary Care Provider: Con Adams MD   Date and time admitted to hospital: 6/20/2022  8:07 PM    * Diverticulitis  Assessment & Plan  Patient presenting to the ED with lower abdominal pain, constipation, fevers, body aches for 5 days  · CT A/P: Findings consistent with mild acute uncomplicated sigmoid diverticulitis  · Does not meet sepsis criteria  Negative procalcitonin and lactic  · Started on IV zosyn  · BC taken 6/20 negative at 48 hours  · Was NPO, advanced to clear liquids by GI, advanced to surgical diet, monitor for toleration  · Continuous IVF  · GI following, recs include continuing antibiotics and outpatient colonoscopy in 8 weeks  · Patient has new-onset loose stools, likely antibiotic-induced, continue to monitor    Oral candidiasis  Assessment & Plan  · Patient complaining of sore throat and difficulty swallowing  · Physical exam evident for candidal infection  · Started on IV fluconazole, transitioned to PO  · Continue nystatin oral suspension  · Advance diet as tolerated  · GI following, recs include continuing fluconazole    COPD (chronic obstructive pulmonary disease) ([facility] [address] )  Assessment & Plan  · Denies any SOB at this time  · Reports that her outpatient provider recommended that she be on oxygen as an outpatient, but patient declined   States that she is agreeable to home oxygen at this time  · Requiring 3L O2 NC to maintain O2 sat in low-90s, continue to monitor  · Ordered home O2 study  · Continue pre-hospital inhalers    Malignant neoplasm of overlapping sites of right breast in female, estrogen receptor negative [facility])  Assessment & Plan  · S/p right lumpectomy in April 2021  · Recently started chemotherapy (6/14/22)  · Follow outpatient with Oncology  · Holding decadron in the setting of candidiasis    Stage 3 chronic kidney disease [facility])  Assessment & Plan  Lab Results   Component Value Date    EGFR 43 2022    EGFR 40 2022    EGFR 44 2022    CREATININE 1 22 2022    CREATININE 1 28 2022    CREATININE 1 20 2022     · Creatinine stable at baseline  · Monitor bmp prn        VTE Pharmacologic Prophylaxis:   VTE Score: 8 High Risk (Score >/= 5) - Pharmacological DVT Prophylaxis Ordered: Enoxaparin (Lovenox)  Sequential Compression Devices Ordered  Mechanical VTE Prophylaxis in Place: Yes    Patient Centered Rounds: Treatment team aware of changes    Discussions with Specialists or Other Care Team Provider: GI    Education and Discussions with Family / Patient: Updated  (son) via phone  Answered all questions and addressed all concerns  Current Length of Stay: 3 day(s)    Current Patient Status: Inpatient     Discharge Plan / Estimated Discharge Date: Anticipate discharge in 24-48 hours to short-term rehab    Code Status: Level 1 - Full Code      Subjective:   Patient was found to have Xanax and Percocet with her in her purse yesterday evening as she was not aware of hospital policy about home medications  She reports that her son did get upset but now everyone is on the same page and what happened is no longer an issue  She did have a fever to 102 4 overnight that was controlled with ice packs and Tylenol  She reports diarrhea this morning with urges to have bowel movements  She denies any other symptoms  Objective:     Vitals:   Temp (24hrs), Av 7 °F (37 6 °C), Min:98 5 °F (36 9 °C), Max:102 4 °F (39 1 °C)    Temp:  [98 5 °F (36 9 °C)-102 4 °F (39 1 °C)] 100 1 °F (37 8 °C)  HR:  [85-99] 85  Resp:  [18] 18  BP: (100-124)/(50-65) 123/63  SpO2:  [84 %-92 %] 84 %  There is no height or weight on file to calculate BMI  Input and Output Summary (last 24 hours):        Intake/Output Summary (Last 24 hours) at 2022 1034  Last data filed at 2022 1725  Gross per 24 hour   Intake 1360 ml   Output --   Net 1360 ml       Physical Exam:     Physical Exam  Constitutional:       Appearance: Normal appearance  HENT:      Head: Normocephalic and atraumatic  Eyes:      Conjunctiva/sclera: Conjunctivae normal    Cardiovascular:      Rate and Rhythm: Normal rate and regular rhythm  Heart sounds: Normal heart sounds  No murmur heard  No friction rub  No gallop  Pulmonary:      Effort: Pulmonary effort is normal       Breath sounds: Normal breath sounds  Abdominal:      General: Abdomen is flat  Bowel sounds are normal  There is no distension  Palpations: Abdomen is soft  Tenderness: There is no abdominal tenderness  There is no guarding  Musculoskeletal:      Right lower leg: No edema  Left lower leg: No edema  Skin:     General: Skin is warm and dry  Neurological:      General: No focal deficit present  Mental Status: She is alert  Mental status is at baseline  Psychiatric:         Mood and Affect: Mood normal          Behavior: Behavior normal          Thought Content: Thought content normal          Judgment: Judgment normal           Additional Data:     Labs:  Results from last 7 days   Lab Units 06/23/22  0556 06/21/22  0626 06/20/22  2101   WBC Thousand/uL 9 45   < > 3 00*   HEMOGLOBIN g/dL 10 7*   < > 13 1   HEMATOCRIT % 33 7*   < > 41 1   PLATELETS Thousands/uL 161   < > 164   BANDS PCT %  --   --  2   LYMPHO PCT %  --   --  43   MONO PCT %  --   --  17*   EOS PCT %  --   --  2    < > = values in this interval not displayed       Results from last 7 days   Lab Units 06/23/22  0556 06/22/22  0514   SODIUM mmol/L 148* 144   POTASSIUM mmol/L 4 1 4 4   CHLORIDE mmol/L 110* 106   CO2 mmol/L 29 29   BUN mg/dL 9 11   CREATININE mg/dL 1 22 1 28   ANION GAP mmol/L 9 9   CALCIUM mg/dL 8 2* 8 3   ALBUMIN g/dL  --  2 6*   TOTAL BILIRUBIN mg/dL  --  0 31   ALK PHOS U/L  --  102   ALT U/L  --  27   AST U/L  --  16   GLUCOSE RANDOM mg/dL 102 116     Results from last 7 days   Lab Units 06/21/22  0626   INR  1 13             Results from last 7 days   Lab Units 06/20/22  2101   LACTIC ACID mmol/L 1 6   PROCALCITONIN ng/ml 0 23       Imaging: No pertinent imaging reviewed  Recent Cultures (last 7 days):     Results from last 7 days   Lab Units 06/20/22 2101   BLOOD CULTURE  No Growth at 48 hrs  No Growth at 48 hrs         Lines/Drains:  Invasive Devices  Report    Peripheral Intravenous Line  Duration           Peripheral IV 06/20/22 Left Antecubital 3 days                Telemetry:        Last 24 Hours Medication List:   Current Facility-Administered Medications   Medication Dose Route Frequency Provider Last Rate    acetaminophen  650 mg Oral Q6H PRN [employer]      albuterol  2 puff Inhalation Q6H PRN [employer]ude [employer]HILARY      aspirin  81 mg Oral Daily Emilie Rao PA-C      atorvastatin  40 mg Oral Daily With [employer]ILARY      enoxaparin  40 mg Subcutaneous Daily Emilie Rao PA-C      fluconazole  200 mg Oral Daily Zara Gaucher, MD      fluticasone-vilanterol  1 puff Inhalation Daily Emilie Onofre PA-C      gabapentin  300 mg Oral BID [employer]      gabapentin  400 mg Oral HS [employer]      nystatin  500,000 Units Swish & Spit 4x Daily Cody Soulier [place], MD      ondansetron  4 mg Intravenous Q6H PRN [facility]      pantoprazole  40 mg Oral Daily Before Breakfast Emilie Rao PA-C      piperacillin-tazobactam  3 375 g Intravenous Q6H Emilie Rao PA-C 3 375 g (06/23/22 5463)        Today, Patient Was Seen By: Dolores Yu

## 2022-06-23 NOTE — QUICK NOTE
Patient complaining of anxiety, requesting to take her home Xanax  Given tenuous respiratory status and need for oxygen will defer use of benzodiazepines given an Rx x1  Patient is agreeable at this time

## 2022-06-23 NOTE — CASE MANAGEMENT
Case Management Assessment & Discharge Planning Note    Patient name Fuad Diaz  Location [address] [id number] MRN [id number]  : 1945 Date 2022       Current Admission Date: 2022  Current Admission Diagnosis:Diverticulitis   Patient Active Problem List    Diagnosis Date Noted    Diverticulitis 2022    Oral candidiasis 2022    Cellulitis 2022    Continuous opioid dependence ([facility] ) 2022    Malignant neoplasm of overlapping sites of right breast in female, estrogen receptor negative ([facility] ) 2022    COPD (chronic obstructive pulmonary disease) ([facility] )     Prediabetes 2021    Vitamin D deficiency 2021    Acute right ankle pain 2021    Closed avulsion fracture of lateral malleolus of right fibula 2021    Seborrheic keratoses 2020    BMI 34 0-34 9,adult 2019    Stage 3 chronic [facility] 2019    Pancreatic mass 2019    Chronic sinusitis 2018    PVD (peripheral vascular disease) ([facility] ) 2018    Carotid artery plaque, bilateral 2017    Restless leg syndrome 2016    Aortoiliac occlusive disease ([facility] ) 2015    Subclavian artery stenosis, left ([facility] ) 2015    Centrilobular emphysema ([facility] ) 2015    Anxiety 2013    Osteoporosis 2013    Hyperlipidemia 2013      LOS (days): 3  Geometric Mean LOS (GMLOS) (days): 3 50  Days to GMLOS:0 9     OBJECTIVE:    Risk of Unplanned Readmission Score: 23 79      Current admission status: Inpatient     Preferred Pharmacy:   [address] [facility], [address]  [address]  [facility] [address]  Phone: [phone] Fax: [phone]    Primary Care Provider: Sarita Mahoney MD    Primary Insurance: MEDICARE  Secondary Insurance: [employer]    ASSESSMENT:  [facility], [facility] Representative - Son   Primary Phone: [phone] (Mobile)               Advance Directives  Does patient have a [address]?: No  Was patient offered paperwork?: Yes (POA/AD paperwork to be provided prior to dc)  Does patient currently have a Health Care decision maker?: Yes, please see Health Care Proxy section  Does patient have Advance Directives?: No  Was patient offered paperwork?: Yes (see above)  Primary Contact: suman aTm     Readmission Root Cause  30 Day Readmission: No    Patient Information  Admitted from[de-identified] Home  Mental Status: Alert  During Assessment patient was accompanied by: Not accompanied during assessment  Assessment information provided by[de-identified] Patient  Primary Caregiver: Self  Support Systems: Son, Daughter  [place] of Residence: [place] [address] do you live in?:  [employer] entry access options   Select all that apply : Other access (Comment) (Park in the back lot, walk around the home, 6 steps up into the house)  Type of Current Residence: [address] [place] [address] home  Upon entering residence, is there a bedroom on the main floor (no further steps)?: No  A bedroom is located on the following floor levels of residence (select all that apply):: 2nd Floor  Number of steps to 2nd floor from main floor: One Flight (14 steps)  In the last 12 months, was there a time when you were not able to pay the mortgage or rent on time?: No  In the last 12 months, how many places have you lived?: 1  In the last 12 months, was there a time when you did not have a steady place to sleep or slept in a shelter (including now)?: No  Living Arrangements: Lives w/ Daughter, Lives w/ Son    Activities of Daily Living Prior to Admission  Functional Status: Independent (Patient is very independent and active at baseline )  Completes ADLs independently?: Yes  Ambulates independently?: Yes  Does patient use assisted devices?: No  Does patient currently own DME?: No  Does patient have a history of Outpatient Therapy (PT/OT)?: No  Does the patient have a history of Short-Term Rehab?: Yes ([facility] in 2018)  Does patient have a history of HHC?: No     Patient Information Continued  Does patient have prescription coverage?: Yes  Does patient receive dialysis treatments?: No  Does patient have a history of substance abuse?: No  Does patient have a history of Mental Health Diagnosis?: No     Means of Transportation  Means of Transport to Appts[de-identified] Public Transportation - Bus  In the past 12 months, has lack of transportation kept you from medical appointments or from getting medications?: No  In the past 12 months, has lack of transportation kept you from meetings, work, or from getting things needed for daily living?: No    DISCHARGE DETAILS:    Discharge planning discussed with[de-identified] patient at bedside  Freedom of Choice: Yes  Comments - Freedom of Choice: CM met with patient at bedside to introduce self/role  Patient is alert and oriented and lying in bed  She's on 3L acute O2 and anticipates she'll need O2 at discharge  She is already aware of and on board with the recommendation for STR  She reports it helped immensely 4 years ago  She did not receive her COVID booste as she got COVID in December 2021 and then was diagnosed with cancer  She just started chemo treatments but doesn't know about continuing, as she got so sick from the first round  She would like to talk to her Oncology team re: next steps  CM reviewed same with SLIM, as chemo can be a barrier for STR admission  SLIM spoke with the Oncology AP and reported any future treatments are on hold until patient has follow up with their team, which they'll likely arrange for 2 weeks post dc  CM included this information in the STR referral and will f/u with patient re: her options for dc    CM contacted family/caregiver?: No- see comments (Patient reported she would message her son in the afternoon and updated him re: the plan for STR )  Were Treatment Team discharge recommendations reviewed with patient/caregiver?: Yes  Did patient/caregiver verbalize understanding of patient care needs?: N/A- going to facility  Were patient/caregiver advised of the risks associated with not following Treatment Team discharge recommendations?: Yes     Requested [address]         Is the patient interested in [facility] at discharge?: No    DME Referral Provided  Referral made for DME?: No    Other Referral/Resources/Interventions Provided:  Interventions: Short Term Rehab  Referral Comments: 20 mile radius blank referral sent via [facility]      Would you like to participate in our [address] service program?  : No - Declined    Treatment Team Recommendation: Short Term Rehab  Discharge Destination Plan[de-identified] Short Term Rehab  Transport at Discharge : [facility] Ambulance

## 2022-06-24 ENCOUNTER — TELEPHONE (OUTPATIENT)
Dept: HEMATOLOGY ONCOLOGY | Facility: CLINIC | Age: 77
End: 2022-06-24

## 2022-06-24 PROBLEM — D70.1 CHEMOTHERAPY INDUCED NEUTROPENIA (HCC): Status: ACTIVE | Noted: 2022-06-24

## 2022-06-24 PROBLEM — T45.1X5A CHEMOTHERAPY INDUCED NEUTROPENIA (HCC): Status: ACTIVE | Noted: 2022-06-24

## 2022-06-24 LAB
ANION GAP SERPL CALCULATED.3IONS-SCNC: 12 MMOL/L (ref 4–13)
BASOPHILS # BLD AUTO: 0.04 THOUSANDS/ΜL (ref 0–0.1)
BASOPHILS NFR BLD AUTO: 1 % (ref 0–1)
BUN SERPL-MCNC: 7 MG/DL (ref 5–25)
CALCIUM SERPL-MCNC: 8.4 MG/DL (ref 8.3–10.1)
CHLORIDE SERPL-SCNC: 107 MMOL/L (ref 100–108)
CO2 SERPL-SCNC: 27 MMOL/L (ref 21–32)
CREAT SERPL-MCNC: 1.26 MG/DL (ref 0.6–1.3)
EOSINOPHIL # BLD AUTO: 0.06 THOUSAND/ΜL (ref 0–0.61)
EOSINOPHIL NFR BLD AUTO: 1 % (ref 0–6)
ERYTHROCYTE [DISTWIDTH] IN BLOOD BY AUTOMATED COUNT: 16 % (ref 11.6–15.1)
GFR SERPL CREATININE-BSD FRML MDRD: 41 ML/MIN/1.73SQ M
GLUCOSE SERPL-MCNC: 96 MG/DL (ref 65–140)
HCT VFR BLD AUTO: 35.8 % (ref 34.8–46.1)
HGB BLD-MCNC: 11.1 G/DL (ref 11.5–15.4)
IMM GRANULOCYTES # BLD AUTO: 0.12 THOUSAND/UL (ref 0–0.2)
IMM GRANULOCYTES NFR BLD AUTO: 2 % (ref 0–2)
LYMPHOCYTES # BLD AUTO: 1.27 THOUSANDS/ΜL (ref 0.6–4.47)
LYMPHOCYTES NFR BLD AUTO: 16 % (ref 14–44)
MCH RBC QN AUTO: 27.7 PG (ref 26.8–34.3)
MCHC RBC AUTO-ENTMCNC: 31 G/DL (ref 31.4–37.4)
MCV RBC AUTO: 89 FL (ref 82–98)
MONOCYTES # BLD AUTO: 0.53 THOUSAND/ΜL (ref 0.17–1.22)
MONOCYTES NFR BLD AUTO: 7 % (ref 4–12)
NEUTROPHILS # BLD AUTO: 6 THOUSANDS/ΜL (ref 1.85–7.62)
NEUTS SEG NFR BLD AUTO: 73 % (ref 43–75)
NRBC BLD AUTO-RTO: 0 /100 WBCS
PLATELET # BLD AUTO: 151 THOUSANDS/UL (ref 149–390)
PMV BLD AUTO: 10 FL (ref 8.9–12.7)
POTASSIUM SERPL-SCNC: 3.9 MMOL/L (ref 3.5–5.3)
RBC # BLD AUTO: 4.01 MILLION/UL (ref 3.81–5.12)
SODIUM SERPL-SCNC: 146 MMOL/L (ref 136–145)
WBC # BLD AUTO: 8.02 THOUSAND/UL (ref 4.31–10.16)

## 2022-06-24 PROCEDURE — 85025 COMPLETE CBC W/AUTO DIFF WBC: CPT | Performed by: INTERNAL MEDICINE

## 2022-06-24 PROCEDURE — 80048 BASIC METABOLIC PNL TOTAL CA: CPT | Performed by: INTERNAL MEDICINE

## 2022-06-24 PROCEDURE — 99233 SBSQ HOSP IP/OBS HIGH 50: CPT | Performed by: INTERNAL MEDICINE

## 2022-06-24 RX ORDER — LOPERAMIDE HYDROCHLORIDE 2 MG/1
2 CAPSULE ORAL 4 TIMES DAILY PRN
Status: DISCONTINUED | OUTPATIENT
Start: 2022-06-24 | End: 2022-06-27

## 2022-06-24 RX ORDER — SACCHAROMYCES BOULARDII 250 MG
250 CAPSULE ORAL 2 TIMES DAILY
Status: DISCONTINUED | OUTPATIENT
Start: 2022-06-24 | End: 2022-06-28 | Stop reason: HOSPADM

## 2022-06-24 RX ORDER — ACETAMINOPHEN 325 MG/1
650 TABLET ORAL EVERY 6 HOURS PRN
Status: DISCONTINUED | OUTPATIENT
Start: 2022-06-24 | End: 2022-06-28 | Stop reason: HOSPADM

## 2022-06-24 RX ADMIN — NYSTATIN 500000 UNITS: 100000 SUSPENSION ORAL at 09:19

## 2022-06-24 RX ADMIN — PIPERACILLIN AND TAZOBACTAM 3.38 G: 36; 4.5 INJECTION, POWDER, FOR SOLUTION INTRAVENOUS at 00:46

## 2022-06-24 RX ADMIN — GABAPENTIN 300 MG: 300 CAPSULE ORAL at 17:16

## 2022-06-24 RX ADMIN — ATORVASTATIN CALCIUM 40 MG: 40 TABLET, FILM COATED ORAL at 17:16

## 2022-06-24 RX ADMIN — PIPERACILLIN AND TAZOBACTAM 3.38 G: 36; 4.5 INJECTION, POWDER, FOR SOLUTION INTRAVENOUS at 11:49

## 2022-06-24 RX ADMIN — ASPIRIN 81 MG: 81 TABLET, CHEWABLE ORAL at 09:19

## 2022-06-24 RX ADMIN — NYSTATIN 500000 UNITS: 100000 SUSPENSION ORAL at 17:16

## 2022-06-24 RX ADMIN — FLUCONAZOLE 200 MG: 100 TABLET ORAL at 09:19

## 2022-06-24 RX ADMIN — GABAPENTIN 300 MG: 300 CAPSULE ORAL at 09:19

## 2022-06-24 RX ADMIN — ACETAMINOPHEN 650 MG: 325 TABLET, FILM COATED ORAL at 00:46

## 2022-06-24 RX ADMIN — PANTOPRAZOLE SODIUM 40 MG: 40 TABLET, DELAYED RELEASE ORAL at 06:04

## 2022-06-24 RX ADMIN — ACETAMINOPHEN 650 MG: 325 TABLET, FILM COATED ORAL at 06:05

## 2022-06-24 RX ADMIN — Medication 250 MG: at 17:16

## 2022-06-24 RX ADMIN — ENOXAPARIN SODIUM 40 MG: 40 INJECTION SUBCUTANEOUS at 09:19

## 2022-06-24 RX ADMIN — ACETAMINOPHEN 650 MG: 325 TABLET, FILM COATED ORAL at 17:23

## 2022-06-24 RX ADMIN — PIPERACILLIN AND TAZOBACTAM 3.38 G: 36; 4.5 INJECTION, POWDER, FOR SOLUTION INTRAVENOUS at 05:59

## 2022-06-24 RX ADMIN — NYSTATIN 500000 UNITS: 100000 SUSPENSION ORAL at 22:22

## 2022-06-24 RX ADMIN — LOPERAMIDE HYDROCHLORIDE 2 MG: 2 CAPSULE ORAL at 10:32

## 2022-06-24 RX ADMIN — Medication 250 MG: at 10:32

## 2022-06-24 RX ADMIN — PIPERACILLIN AND TAZOBACTAM 3.38 G: 36; 4.5 INJECTION, POWDER, FOR SOLUTION INTRAVENOUS at 17:23

## 2022-06-24 RX ADMIN — GABAPENTIN 400 MG: 400 CAPSULE ORAL at 22:22

## 2022-06-24 RX ADMIN — FLUTICASONE FUROATE AND VILANTEROL TRIFENATATE 1 PUFF: 100; 25 POWDER RESPIRATORY (INHALATION) at 09:19

## 2022-06-24 RX ADMIN — NYSTATIN 500000 UNITS: 100000 SUSPENSION ORAL at 11:49

## 2022-06-24 NOTE — ASSESSMENT & PLAN NOTE
Lab Results   Component Value Date    EGFR 41 06/24/2022    EGFR 43 06/23/2022    EGFR 40 06/22/2022    CREATININE 1 26 06/24/2022    CREATININE 1 22 06/23/2022    CREATININE 1 28 06/22/2022     · Creatinine stable at baseline  · Monitor bmp prn

## 2022-06-24 NOTE — PROGRESS NOTES
[facility]  Progress Note Gaby Puentes 1945, 78 y o  female MRN: [id number]  Unit/Bed#: -Braden Encounter: [id number]  Primary Care Provider: Gregory Robles MD   Date and time admitted to hospital: 6/20/2022  8:07 PM    * Diverticulitis  Assessment & Plan  Patient presenting to the ED with lower abdominal pain, constipation, fevers, body aches for 5 days  · CT A/P: Findings consistent with mild acute uncomplicated sigmoid diverticulitis  · Does not meet sepsis criteria  Negative procalcitonin and lactic  · Started on IV zosyn  · BC taken 6/20 negative at 72 hours  · Was NPO, advanced to clear liquids by GI, advanced to surgical diet, now on regular diet with high fiber, continue to monitor for toleration  · GI following, recs include continuing antibiotics and outpatient colonoscopy in 8 weeks  · Patient has new-onset loose stools, likely antibiotic-induced, started scheduled probiotic and imodium prn  If doesn't improve in a couple of days, begin C diff prophylaxis and test stool for C diff toxin    Oral candidiasis  Assessment & Plan  · Patient complaining of sore throat and difficulty swallowing  · Physical exam evident for candidal infection  · Started on IV fluconazole, transitioned to PO  · Continue nystatin oral suspension  · Advance diet as tolerated  · GI following, recs include continuing fluconazole    COPD (chronic obstructive pulmonary disease) ([facility] [address] )  Assessment & Plan  · Denies any SOB at this time  · Reports that her outpatient provider recommended that she be on oxygen as an outpatient, but patient declined   States that she is agreeable to home oxygen at this time  · Requiring 3L O2 NC to maintain O2 sat in low-90s, continue to monitor  · Ordered home O2 study  · Continue pre-hospital inhalers    Malignant neoplasm of overlapping sites of right breast in female, estrogen receptor negative [facility])  Assessment & Plan  · S/p right lumpectomy in April 2021  · Recently started chemotherapy (22)  · Follow outpatient with Oncology, ideally patient will have follow-up appointment while in rehab and rehab will be able to take her  · Holding decadron in the setting of candidiasis    Stage 3 chronic kidney disease [facility])  Assessment & Plan  Lab Results   Component Value Date    EGFR 41 2022    EGFR 43 2022    EGFR 40 2022    CREATININE 1 26 2022    CREATININE 1 22 2022    CREATININE 1 28 2022     · Creatinine stable at baseline  · Monitor bmp prn          VTE Pharmacologic Prophylaxis:   VTE Score: 8 High Risk (Score >/= 5) - Pharmacological DVT Prophylaxis Ordered: Enoxaparin (Lovenox)  Sequential Compression Devices Ordered  Mechanical VTE Prophylaxis in Place: Yes    Patient Centered Rounds: Treatment team aware of changes    Discussions with Specialists or Other Care Team Provider:    Education and Discussions with Family / Patient: Attempted to update  (son and daughter) via phone  Unable to contact  Tried to contact both twice  Current Length of Stay: 4 day(s)    Current Patient Status: Inpatient     Discharge Plan / Estimated Discharge Date: Anticipate discharge in 48 hrs to rehab facility  Code Status: Level 1 - Full Code      Subjective: The patient did not have overnight fevers  Patient has been getting scheduled Tylenol which she feels is helping her  She continues to have diarrhea, with 2-3 episodes overnight  She states that sometimes the stool is loose and other times it is liquid  Patient denies abdominal pain  She feels back to baseline other than the diarrhea  Patient was reassured that hopefully the rehab she will go to will be able to take her to her follow-up appointment with oncology  The patient had no other concerns       Objective:     Vitals:   Temp (24hrs), Av 7 °F (37 1 °C), Min:97 8 °F (36 6 °C), Max:99 6 °F (37 6 °C)    Temp:  [97 8 °F (36 6 °C)-99 6 °F (37 6 °C)] 97 8 °F (36 6 °C)  HR: [72-96] 74  Resp:  [18] 18  BP: (100-107)/(54-56) 107/54  SpO2:  [90 %-95 %] 91 %  There is no height or weight on file to calculate BMI  Input and Output Summary (last 24 hours): Intake/Output Summary (Last 24 hours) at 6/24/2022 1044  Last data filed at 6/24/2022 0900  Gross per 24 hour   Intake 1000 ml   Output 200 ml   Net 800 ml       Physical Exam:     Physical Exam  Constitutional:       Appearance: Normal appearance  HENT:      Head: Normocephalic and atraumatic  Eyes:      Extraocular Movements: Extraocular movements intact  Cardiovascular:      Rate and Rhythm: Normal rate and regular rhythm  Heart sounds: Normal heart sounds  No murmur heard  No friction rub  No gallop  Pulmonary:      Effort: Pulmonary effort is normal       Breath sounds: Normal breath sounds  Abdominal:      General: Abdomen is flat  Bowel sounds are normal       Palpations: Abdomen is soft  Skin:     General: Skin is warm and dry  Neurological:      General: No focal deficit present  Mental Status: She is alert  Mental status is at baseline  Psychiatric:         Mood and Affect: Mood normal          Behavior: Behavior normal          Thought Content: Thought content normal          Judgment: Judgment normal           Additional Data:     Labs:  Results from last 7 days   Lab Units 06/24/22  0810 06/21/22  0626 06/20/22  2101   WBC Thousand/uL 8 02   < > 3 00*   HEMOGLOBIN g/dL 11 1*   < > 13 1   HEMATOCRIT % 35 8   < > 41 1   PLATELETS Thousands/uL 151   < > 164   BANDS PCT %  --   --  2   NEUTROS PCT % 73  --   --    LYMPHS PCT % 16  --   --    LYMPHO PCT %  --   --  43   MONOS PCT % 7  --   --    MONO PCT %  --   --  17*   EOS PCT % 1  --  2    < > = values in this interval not displayed       Results from last 7 days   Lab Units 06/24/22  0810 06/23/22  0556 06/22/22  0514   SODIUM mmol/L 146*   < > 144   POTASSIUM mmol/L 3 9   < > 4 4   CHLORIDE mmol/L 107   < > 106   CO2 mmol/L 27   < > 29 BUN mg/dL 7   < > 11   CREATININE mg/dL 1 26   < > 1 28   ANION GAP mmol/L 12   < > 9   CALCIUM mg/dL 8 4   < > 8 3   ALBUMIN g/dL  --   --  2 6*   TOTAL BILIRUBIN mg/dL  --   --  0 31   ALK PHOS U/L  --   --  102   ALT U/L  --   --  27   AST U/L  --   --  16   GLUCOSE RANDOM mg/dL 96   < > 116    < > = values in this interval not displayed  Results from last 7 days   Lab Units 06/21/22  0626   INR  1 13             Results from last 7 days   Lab Units 06/20/22  2101   LACTIC ACID mmol/L 1 6   PROCALCITONIN ng/ml 0 23       Imaging: No pertinent imaging reviewed  Recent Cultures (last 7 days):     Results from last 7 days   Lab Units 06/20/22 2101   BLOOD CULTURE  No Growth at 72 hrs  No Growth at 72 hrs  Lines/Drains:  Invasive Devices  Report    Peripheral Intravenous Line  Duration           Peripheral IV Dorsal (posterior); Right Hand -- days                Telemetry:        Last 24 Hours Medication List:   Current Facility-Administered Medications   Medication Dose Route Frequency Provider Last Rate    acetaminophen  650 mg Oral Q6H PRN Coleman Culp MD      albuterol  2 puff Inhalation Q6H PRN Santy Powell PA-C      aspirin  81 mg Oral Daily Emilie Rao PA-C      atorvastatin  40 mg Oral Daily With [employer]ILARY      enoxaparin  40 mg Subcutaneous Daily Emilie Rao PA-C      fluconazole  200 mg Oral Daily Gricelda Webb MD      fluticasone-vilanterol  1 puff Inhalation Daily Emilie Rao PA-C      gabapentin  300 mg Oral BID Santy Powell PA-C      gabapentin  400 mg Oral HS Emilie Rao PA-C      loperamide  2 mg Oral 4x Daily PRN Coleman Culp MD      nystatin  500,000 Units Swish & Spit 4x Daily Gricelda Webb MD      ondansetron  4 mg Intravenous Q6H PRN Candido Greene PA-C      pantoprazole  40 mg Oral Daily Before Breakfast Emilie Rao PA-C      piperacillin-tazobactam  3 375 g Intravenous Q6H Emilie Rao PA-C 3 375 g (06/24/22 80)    saccharomyces boulardii  250 mg Oral BID Reza Mukherjee MD          Today, Patient Was Seen By: Milagro Winchester

## 2022-06-24 NOTE — TELEPHONE ENCOUNTER
Patient is going to be discharged soon and needs an appointment for [facility] [place] [facility] follow up per Ciera CHAPINC recommendation  Please cancel 7/5/22 and 7/6/22 infusion appointment if patient is not seen by provider by then  Patient utilizes [facility]

## 2022-06-24 NOTE — CASE MANAGEMENT
Case Management Discharge Planning Note    Patient name Luisa Brennan  Location [address] [facility] [address] 323/-[id number] MRN [id number]  : 1945 Date 2022       Current Admission Date: 2022  Current Admission Diagnosis:Diverticulitis   Patient Active Problem List    Diagnosis Date Noted    Chemotherapy induced neutropenia ([place] [address] ) 2022    Diverticulitis 2022    Oral candidiasis 2022    Cellulitis 2022    Continuous opioid dependence ([place] [address] ) 2022    Malignant neoplasm of overlapping sites of right breast in female, estrogen receptor negative ([place] [address] ) 2022    COPD (chronic obstructive pulmonary disease) ([place] [address] )     Prediabetes 2021    Vitamin D deficiency 2021    Acute right ankle pain 2021    Closed avulsion fracture of lateral malleolus of right fibula 2021    Seborrheic keratoses 2020    BMI 34 0-34 9,adult 2019    Stage 3 chronic kidney disease ([place] [address] ) 2019    Pancreatic mass 2019    Chronic sinusitis 2018    PVD (peripheral vascular disease) ([place] [address] ) 2018    Carotid artery plaque, bilateral 2017    Restless leg syndrome 2016    Aortoiliac occlusive disease ([place] [address] ) 2015    Subclavian artery stenosis, left ([place] [address] ) 2015    Centrilobular emphysema ([place] [address] ) 2015    Anxiety 2013    Osteoporosis 2013    Hyperlipidemia 2013      LOS (days): 4  Geometric Mean LOS (GMLOS) (days): 3 50  Days to GMLOS:-0 2     OBJECTIVE:  Risk of Unplanned Readmission Score: 21 62         Current admission status: Inpatient   Preferred Pharmacy:   [address] [place] [address] [address] [facility] [address]  [facility] [address]  Phone: [phone] Fax: [phone]    Primary Care Provider: Josephine Bobo MD    Primary Insurance: MEDICARE  Secondary Insurance: [employer]    DISCHARGE DETAILS:    Discharge planning discussed with[de-identified] patient and friend at bedside  Freedom of Choice: Yes  Comments - Freedom of Choice: INDIO spoke with Robin LundbergSintia at [facility], patient's top choice facility  She reports that they can offer a bed on Monday as long as patient receives her COVID booster  She is wary about patient's Oncology appt on 6/30, as she doesn't want her to resume chemo while at [facility] d/t cost  CM did outreach the on call Oncologist at [facility]  He reports that since he's not patient's direct care provider, he would like her to keep the appt on Thursday but reports that their general practice is to hold chemo while at [facility] as they know it's a barrier  If patient is to resume, this will happen after dc home  Robin Patricio is aware of same and reports patient can still admit on Monday and they can provide transportation to her appt  CM met with patient and her friend at bedside and patient would love to accept the bed at [facility]  She's aware of all plans related to OP oncology f/u and likely holding of chemo until she returns home  She'll update her son re: dispo    CM contacted family/caregiver?: No- see comments (Patient to update son herself )  Were Treatment Team discharge recommendations reviewed with patient/caregiver?: Yes  Did patient/caregiver verbalize understanding of patient care needs?: N/A- going to facility  Were patient/caregiver advised of the risks associated with not following Treatment Team discharge recommendations?: Yes    [address]         Is the [place] interested [place] [address] at discharge?: No    Treatment Team Recommendation: Short Term Rehab  Discharge Destination Plan[de-identified] [address] ([facility])  Transport at Discharge : BLS Ambulance

## 2022-06-24 NOTE — PLAN OF CARE
Problem: MOBILITY - ADULT  Goal: Maintain or return to baseline ADL function  Description: INTERVENTIONS:  -  Assess patient's ability to carry out ADLs; assess patient's baseline for ADL function and identify physical deficits which impact ability to perform ADLs (bathing, care of mouth/teeth, toileting, grooming, dressing, etc )  - Assess/evaluate cause of self-care deficits   - Assess range of motion  - Assess patient's mobility; develop plan if impaired  - Assess patient's need for assistive devices and provide as appropriate  - Encourage maximum independence but intervene and supervise when necessary  - Involve family in performance of ADLs  - Assess for home care needs following discharge   - Consider OT consult to assist with ADL evaluation and planning for discharge  - Provide patient education as appropriate  Outcome: Progressing  Goal: Maintains/Returns to pre admission functional level  Description: INTERVENTIONS:  - Perform BMAT or MOVE assessment daily    - Set and communicate daily mobility goal to care team and patient/family/caregiver  - Collaborate with rehabilitation services on mobility goals if consulted  - Perform Range of Motion  times a day  - Reposition patient every  hours    - Dangle patient  times a day  - Stand patient times a day  - Ambulate patient  times a day  - Out of bed to chair  times a day   - Out of bed for meals  times a day  - Out of bed for toileting  - Record patient progress and toleration of activity level   Outcome: Progressing     Problem: Potential for Falls  Goal: Patient will remain free of falls  Description: INTERVENTIONS:  - Educate patient/family on patient safety including physical limitations  - Instruct patient to call for assistance with activity   - Consult OT/PT to assist with strengthening/mobility   - Keep Call bell within reach  - Keep bed low and locked with side rails adjusted as appropriate  - Keep care items and personal belongings within reach  - Initiate and maintain comfort rounds  - Make Fall Risk Sign visible to staff  - Offer Toileting every  Hours, in advance of need  - Initiate/Maintainalarm  - Obtain necessary fall risk management equipment:  - Apply yellow socks and bracelet for high fall risk patients  - Consider moving patient to room near nurses station  Outcome: Progressing

## 2022-06-24 NOTE — ASSESSMENT & PLAN NOTE
· S/p right lumpectomy in April 2021  · Recently started chemotherapy (6/14/22)  · Follow outpatient with Oncology, ideally patient will have follow-up appointment while in rehab and rehab will be able to take her  · Holding decadron in the setting of candidiasis

## 2022-06-24 NOTE — TELEPHONE ENCOUNTER
Spoke with patient  She is scheduled with Dr Isis Dumas on 6/30  Patient said that she will be getting transferred to a rehab facility  She is not sure which one because the hospital is still searching for one that has availability  I told patient that most facilities will provide transport to and from her office appts  Patient has all appt info and will pass it along to the facility  She will call us if she needs any assistance

## 2022-06-25 PROCEDURE — XW023W7 INTRODUCTION OF COVID-19 VACCINE BOOSTER INTO MUSCLE, PERCUTANEOUS APPROACH, NEW TECHNOLOGY GROUP 7: ICD-10-PCS | Performed by: INTERNAL MEDICINE

## 2022-06-25 PROCEDURE — 99233 SBSQ HOSP IP/OBS HIGH 50: CPT | Performed by: INTERNAL MEDICINE

## 2022-06-25 PROCEDURE — 91305 COVID-19 PFIZER VAC (TRIS SUCROSE, GRAY CAP FORM) 30 MCG/0.3ML SUSP: CPT | Performed by: INTERNAL MEDICINE

## 2022-06-25 RX ADMIN — ACETAMINOPHEN 650 MG: 325 TABLET, FILM COATED ORAL at 22:31

## 2022-06-25 RX ADMIN — PIPERACILLIN AND TAZOBACTAM 3.38 G: 36; 4.5 INJECTION, POWDER, FOR SOLUTION INTRAVENOUS at 00:11

## 2022-06-25 RX ADMIN — LOPERAMIDE HYDROCHLORIDE 2 MG: 2 CAPSULE ORAL at 10:06

## 2022-06-25 RX ADMIN — LOPERAMIDE HYDROCHLORIDE 2 MG: 2 CAPSULE ORAL at 22:26

## 2022-06-25 RX ADMIN — Medication 250 MG: at 10:06

## 2022-06-25 RX ADMIN — PANTOPRAZOLE SODIUM 40 MG: 40 TABLET, DELAYED RELEASE ORAL at 06:27

## 2022-06-25 RX ADMIN — ENOXAPARIN SODIUM 40 MG: 40 INJECTION SUBCUTANEOUS at 10:08

## 2022-06-25 RX ADMIN — ATORVASTATIN CALCIUM 40 MG: 40 TABLET, FILM COATED ORAL at 18:03

## 2022-06-25 RX ADMIN — GABAPENTIN 400 MG: 400 CAPSULE ORAL at 22:26

## 2022-06-25 RX ADMIN — BNT162B2 0.3 ML: 0.23 INJECTION, SUSPENSION INTRAMUSCULAR at 12:00

## 2022-06-25 RX ADMIN — GABAPENTIN 300 MG: 300 CAPSULE ORAL at 18:03

## 2022-06-25 RX ADMIN — Medication 250 MG: at 18:03

## 2022-06-25 RX ADMIN — ASPIRIN 81 MG: 81 TABLET, CHEWABLE ORAL at 10:06

## 2022-06-25 RX ADMIN — FLUTICASONE FUROATE AND VILANTEROL TRIFENATATE 1 PUFF: 100; 25 POWDER RESPIRATORY (INHALATION) at 10:09

## 2022-06-25 RX ADMIN — FLUCONAZOLE 200 MG: 100 TABLET ORAL at 10:06

## 2022-06-25 RX ADMIN — GABAPENTIN 300 MG: 300 CAPSULE ORAL at 10:06

## 2022-06-25 RX ADMIN — PIPERACILLIN AND TAZOBACTAM 3.38 G: 36; 4.5 INJECTION, POWDER, FOR SOLUTION INTRAVENOUS at 18:04

## 2022-06-25 RX ADMIN — PIPERACILLIN AND TAZOBACTAM 3.38 G: 36; 4.5 INJECTION, POWDER, FOR SOLUTION INTRAVENOUS at 12:00

## 2022-06-25 RX ADMIN — PIPERACILLIN AND TAZOBACTAM 3.38 G: 36; 4.5 INJECTION, POWDER, FOR SOLUTION INTRAVENOUS at 06:27

## 2022-06-25 NOTE — PROGRESS NOTES
[address] [facility]  Progress Note Pita Rangel 1945, 78 y o  female MRN: [id number]  Unit/Bed#: -01 Encounter: [id number]  Primary Care Provider: Gracia Franks MD   Date and time admitted to hospital: 6/20/2022  8:07 PM    * Diverticulitis  Assessment & Plan  Patient presenting to the ED with lower abdominal pain, constipation, fevers, body aches for 5 days  · CT A/P: Findings consistent with mild acute uncomplicated sigmoid diverticulitis  · Does not meet sepsis criteria  Negative procalcitonin and lactic  · Started on IV zosyn-day 5   · BC taken 6/20 negative so far   · Was NPO, advanced to clear liquids by GI, advanced to surgical diet, now on regular diet with high fiber, continue to monitor for toleration  · GI following, recs include continuing antibiotics and outpatient colonoscopy in 8 weeks  · Now improving, diarrhea also resolving with prn imodium    Oral candidiasis  Assessment & Plan  · Patient complaining of sore throat and difficulty swallowing  · Physical exam evident for candidal infection  · Started on IV fluconazole, transitioned to PO      Malignant neoplasm of overlapping sites of right breast in female, estrogen receptor negative ([facility] )  Assessment & Plan  · S/p right lumpectomy in April 2021  · Recently started chemotherapy (6/14/22)  · Follow outpatient with Oncology, ideally patient will have follow-up appointment while in rehab and rehab will be able to take her  · Holding decadron in the setting of candidiasis    COPD (chronic obstructive pulmonary disease) ([facility] )  Assessment & Plan  · Denies any SOB at this time  · Reports that her outpatient provider recommended that she be on oxygen as an outpatient, but patient declined   States that she is agreeable to home oxygen at this time  · Requiring 3L O2 NC to maintain O2 sat in low-90s, continue to monitor  · Ordered home O2 study  · Continue pre-hospital inhalers    Stage 3 chronic kidney disease [facility])  Assessment & Plan  Lab Results   Component Value Date    EGFR 41 2022    EGFR 43 2022    EGFR 40 2022    CREATININE 1 26 2022    CREATININE 1 22 2022    CREATININE 1 28 2022     · Creatinine stable at baseline  · Monitor bmp           VTE Pharmacologic Prophylaxis:   VTE Score: 8 Moderate Risk (Score 3-4) - Pharmacological DVT Prophylaxis Ordered: Enoxaparin (Lovenox)  Mechanical VTE Prophylaxis in Place: Yes    Patient Centered Rounds: I have performed bedside rounds with nursing staff today  Discussions with Specialists or Other Care Team Provider: yes    Education and Discussions with Family / Patient: yes    Current Length of Stay: 5 day(s)    Current Patient Status: Inpatient     Discharge Plan / Estimated Discharge Date: Anticipate discharge in 48 hrs to rehab facility  Code Status: Level 1 - Full Code      Subjective:   Seen and examined bedside  Comfortable on her baseline oxygen   Denies chest pain, abdominal pain or shortness of breath  States bad taste in her mouth, due to current antibiotics has decreased her appetite  No overnight events reported  Agreeable to get covid 19 booster shot    Objective:     Vitals:   Temp (24hrs), Av 4 °F (36 9 °C), Min:98 3 °F (36 8 °C), Max:98 5 °F (36 9 °C)    Temp:  [98 3 °F (36 8 °C)-98 5 °F (36 9 °C)] 98 5 °F (36 9 °C)  HR:  [81-94] 88  Resp:  [18] 18  BP: (102-112)/(78-82) 111/82  SpO2:  [89 %-91 %] 89 %  Body mass index is 32 42 kg/m²  Input and Output Summary (last 24 hours): Intake/Output Summary (Last 24 hours) at 2022 0947  Last data filed at 2022 0855  Gross per 24 hour   Intake 240 ml   Output --   Net 240 ml       Physical Exam:     Physical Exam  Constitutional:       Appearance: Normal appearance  HENT:      Head: Normocephalic and atraumatic  Eyes:      Extraocular Movements: Extraocular movements intact     Cardiovascular:      Rate and Rhythm: Normal rate and regular rhythm  Heart sounds: Normal heart sounds  No murmur heard  No friction rub  No gallop  Pulmonary:      Effort: Pulmonary effort is normal       Breath sounds: Normal breath sounds  Comments: On supplemental oxygen   Abdominal:      General: Abdomen is flat  Bowel sounds are normal       Palpations: Abdomen is soft  Skin:     General: Skin is warm and dry  Neurological:      General: No focal deficit present  Mental Status: She is alert  Mental status is at baseline  Psychiatric:         Mood and Affect: Mood normal          Behavior: Behavior normal          Thought Content: Thought content normal          Judgment: Judgment normal           Additional Data:     Labs:  Results from last 7 days   Lab Units 06/24/22  0810 06/21/22  0626 06/20/22  2101   WBC Thousand/uL 8 02   < > 3 00*   HEMOGLOBIN g/dL 11 1*   < > 13 1   HEMATOCRIT % 35 8   < > 41 1   PLATELETS Thousands/uL 151   < > 164   BANDS PCT %  --   --  2   NEUTROS PCT % 73  --   --    LYMPHS PCT % 16  --   --    LYMPHO PCT %  --   --  43   MONOS PCT % 7  --   --    MONO PCT %  --   --  17*   EOS PCT % 1  --  2    < > = values in this interval not displayed  Results from last 7 days   Lab Units 06/24/22  0810 06/23/22  0556 06/22/22  0514   SODIUM mmol/L 146*   < > 144   POTASSIUM mmol/L 3 9   < > 4 4   CHLORIDE mmol/L 107   < > 106   CO2 mmol/L 27   < > 29   BUN mg/dL 7   < > 11   CREATININE mg/dL 1 26   < > 1 28   ANION GAP mmol/L 12   < > 9   CALCIUM mg/dL 8 4   < > 8 3   ALBUMIN g/dL  --   --  2 6*   TOTAL BILIRUBIN mg/dL  --   --  0 31   ALK PHOS U/L  --   --  102   ALT U/L  --   --  27   AST U/L  --   --  16   GLUCOSE RANDOM mg/dL 96   < > 116    < > = values in this interval not displayed       Results from last 7 days   Lab Units 06/21/22  0626   INR  1 13             Results from last 7 days   Lab Units 06/20/22  2101   LACTIC ACID mmol/L 1 6   PROCALCITONIN ng/ml 0 23       Imaging: No pertinent imaging reviewed  Recent Cultures (last 7 days):     Results from last 7 days   Lab Units 06/20/22  2101   BLOOD CULTURE  No Growth After 4 Days  No Growth After 4 Days  Lines/Drains:  Invasive Devices  Report    Peripheral Intravenous Line  Duration           Peripheral IV 06/24/22 Dorsal (posterior); Right Hand 1 day                Telemetry:        Last 24 Hours Medication List:   Current Facility-Administered Medications   Medication Dose Route Frequency Provider Last Rate    acetaminophen  650 mg Oral Q6H PRN Erwin Wallace MD      al mag oxide-diphenhydramine-lidocaine viscous  10 mL Swish & Swallow Q4H PRN Erwin Wallace MD      albuterol  2 puff Inhalation Q6H PRN Saintclair Levee, PA-C      aspirin  81 mg Oral Daily Emilie Rao PA-C      atorvastatin  40 mg Oral Daily With [employer]      COVID-19 Pfizer vac (maylin sucrose, gray cap form)  0 3 mL Intramuscular Once Erwin Wallace MD      enoxaparin  40 mg Subcutaneous Daily Emilie Rao PA-C      fluconazole  200 mg Oral Daily Kalin Feldman MD      fluticasone-vilanterol  1 puff Inhalation Daily Emilie Rao PA-C      gabapentin  300 mg Oral BID Saintclair Levee, PA-C      gabapentin  400 mg Oral HS Emilie Rao PA-C      loperamide  2 mg Oral 4x Daily PRN Erwin Wallace MD      ondansetron  4 mg Intravenous Q6H PRN Ellyonofre Greene PA-C      pantoprazole  40 mg Oral Daily Before Breakfast Emilie Rao PA-C      piperacillin-tazobactam  3 375 g Intravenous Q6H Emilie Rao PA-C 3 375 g (06/25/22 0713)    saccharomyces boulardii  250 mg Oral BID Erwin Wallace MD          Today, Patient Was Seen By: Erwin Wallace MD    ** Please Note: This note has been constructed using a voice recognition system   **

## 2022-06-25 NOTE — ASSESSMENT & PLAN NOTE
Lab Results   Component Value Date    EGFR 41 06/24/2022    EGFR 43 06/23/2022    EGFR 40 06/22/2022    CREATININE 1 26 06/24/2022    CREATININE 1 22 06/23/2022    CREATININE 1 28 06/22/2022     · Creatinine stable at baseline  · Monitor bmp

## 2022-06-25 NOTE — ASSESSMENT & PLAN NOTE
· Patient complaining of sore throat and difficulty swallowing  · Physical exam evident for candidal infection  · Started on IV fluconazole, transitioned to PO

## 2022-06-25 NOTE — ASSESSMENT & PLAN NOTE
Patient presenting to the ED with lower abdominal pain, constipation, fevers, body aches for 5 days  · CT A/P: Findings consistent with mild acute uncomplicated sigmoid diverticulitis  · Does not meet sepsis criteria   Negative procalcitonin and lactic  · Started on IV zosyn-day 5   · BC taken 6/20 negative so far   · Was NPO, advanced to clear liquids by GI, advanced to surgical diet, now on regular diet with high fiber, continue to monitor for toleration  · GI following, recs include continuing antibiotics and outpatient colonoscopy in 8 weeks  · Now improving, diarrhea also resolving with prn imodium

## 2022-06-26 PROBLEM — R19.7 DIARRHEA: Status: ACTIVE | Noted: 2022-06-26

## 2022-06-26 LAB
ANION GAP SERPL CALCULATED.3IONS-SCNC: 10 MMOL/L (ref 4–13)
BACTERIA BLD CULT: NORMAL
BACTERIA BLD CULT: NORMAL
BASOPHILS # BLD AUTO: 0.04 THOUSANDS/ΜL (ref 0–0.1)
BASOPHILS NFR BLD AUTO: 1 % (ref 0–1)
BUN SERPL-MCNC: 7 MG/DL (ref 5–25)
CALCIUM SERPL-MCNC: 8.6 MG/DL (ref 8.3–10.1)
CHLORIDE SERPL-SCNC: 107 MMOL/L (ref 100–108)
CO2 SERPL-SCNC: 27 MMOL/L (ref 21–32)
CREAT SERPL-MCNC: 1.15 MG/DL (ref 0.6–1.3)
EOSINOPHIL # BLD AUTO: 0.07 THOUSAND/ΜL (ref 0–0.61)
EOSINOPHIL NFR BLD AUTO: 1 % (ref 0–6)
ERYTHROCYTE [DISTWIDTH] IN BLOOD BY AUTOMATED COUNT: 16.1 % (ref 11.6–15.1)
GFR SERPL CREATININE-BSD FRML MDRD: 46 ML/MIN/1.73SQ M
GLUCOSE SERPL-MCNC: 91 MG/DL (ref 65–140)
HCT VFR BLD AUTO: 37.8 % (ref 34.8–46.1)
HGB BLD-MCNC: 11.6 G/DL (ref 11.5–15.4)
IMM GRANULOCYTES # BLD AUTO: 0.12 THOUSAND/UL (ref 0–0.2)
IMM GRANULOCYTES NFR BLD AUTO: 2 % (ref 0–2)
LYMPHOCYTES # BLD AUTO: 1.25 THOUSANDS/ΜL (ref 0.6–4.47)
LYMPHOCYTES NFR BLD AUTO: 18 % (ref 14–44)
MCH RBC QN AUTO: 27.6 PG (ref 26.8–34.3)
MCHC RBC AUTO-ENTMCNC: 30.7 G/DL (ref 31.4–37.4)
MCV RBC AUTO: 90 FL (ref 82–98)
MONOCYTES # BLD AUTO: 0.45 THOUSAND/ΜL (ref 0.17–1.22)
MONOCYTES NFR BLD AUTO: 7 % (ref 4–12)
NEUTROPHILS # BLD AUTO: 4.96 THOUSANDS/ΜL (ref 1.85–7.62)
NEUTS SEG NFR BLD AUTO: 71 % (ref 43–75)
NRBC BLD AUTO-RTO: 0 /100 WBCS
PLATELET # BLD AUTO: 192 THOUSANDS/UL (ref 149–390)
PMV BLD AUTO: 10.4 FL (ref 8.9–12.7)
POTASSIUM SERPL-SCNC: 3.3 MMOL/L (ref 3.5–5.3)
RBC # BLD AUTO: 4.2 MILLION/UL (ref 3.81–5.12)
SODIUM SERPL-SCNC: 144 MMOL/L (ref 136–145)
WBC # BLD AUTO: 6.89 THOUSAND/UL (ref 4.31–10.16)

## 2022-06-26 PROCEDURE — 99233 SBSQ HOSP IP/OBS HIGH 50: CPT | Performed by: INTERNAL MEDICINE

## 2022-06-26 PROCEDURE — 80048 BASIC METABOLIC PNL TOTAL CA: CPT | Performed by: INTERNAL MEDICINE

## 2022-06-26 PROCEDURE — 85025 COMPLETE CBC W/AUTO DIFF WBC: CPT | Performed by: INTERNAL MEDICINE

## 2022-06-26 RX ORDER — POTASSIUM CHLORIDE 20 MEQ/1
40 TABLET, EXTENDED RELEASE ORAL ONCE
Status: COMPLETED | OUTPATIENT
Start: 2022-06-26 | End: 2022-06-26

## 2022-06-26 RX ADMIN — GABAPENTIN 300 MG: 300 CAPSULE ORAL at 09:04

## 2022-06-26 RX ADMIN — PIPERACILLIN AND TAZOBACTAM 3.38 G: 36; 4.5 INJECTION, POWDER, FOR SOLUTION INTRAVENOUS at 14:00

## 2022-06-26 RX ADMIN — Medication 250 MG: at 09:06

## 2022-06-26 RX ADMIN — Medication 125 MG: at 14:31

## 2022-06-26 RX ADMIN — POTASSIUM CHLORIDE 40 MEQ: 1500 TABLET, EXTENDED RELEASE ORAL at 12:10

## 2022-06-26 RX ADMIN — Medication 250 MG: at 18:36

## 2022-06-26 RX ADMIN — POTASSIUM CHLORIDE 40 MEQ: 1500 TABLET, EXTENDED RELEASE ORAL at 09:01

## 2022-06-26 RX ADMIN — GABAPENTIN 400 MG: 400 CAPSULE ORAL at 22:51

## 2022-06-26 RX ADMIN — ASPIRIN 81 MG: 81 TABLET, CHEWABLE ORAL at 09:05

## 2022-06-26 RX ADMIN — LOPERAMIDE HYDROCHLORIDE 2 MG: 2 CAPSULE ORAL at 22:47

## 2022-06-26 RX ADMIN — LOPERAMIDE HYDROCHLORIDE 2 MG: 2 CAPSULE ORAL at 09:29

## 2022-06-26 RX ADMIN — PIPERACILLIN AND TAZOBACTAM 3.38 G: 36; 4.5 INJECTION, POWDER, FOR SOLUTION INTRAVENOUS at 20:18

## 2022-06-26 RX ADMIN — Medication 125 MG: at 18:36

## 2022-06-26 RX ADMIN — ATORVASTATIN CALCIUM 40 MG: 40 TABLET, FILM COATED ORAL at 18:41

## 2022-06-26 RX ADMIN — FLUTICASONE FUROATE AND VILANTEROL TRIFENATATE 1 PUFF: 100; 25 POWDER RESPIRATORY (INHALATION) at 09:06

## 2022-06-26 RX ADMIN — FLUCONAZOLE 200 MG: 100 TABLET ORAL at 09:02

## 2022-06-26 RX ADMIN — PANTOPRAZOLE SODIUM 40 MG: 40 TABLET, DELAYED RELEASE ORAL at 06:09

## 2022-06-26 RX ADMIN — PIPERACILLIN AND TAZOBACTAM 3.38 G: 36; 4.5 INJECTION, POWDER, FOR SOLUTION INTRAVENOUS at 06:09

## 2022-06-26 RX ADMIN — ACETAMINOPHEN 650 MG: 325 TABLET, FILM COATED ORAL at 12:09

## 2022-06-26 RX ADMIN — PIPERACILLIN AND TAZOBACTAM 3.38 G: 36; 4.5 INJECTION, POWDER, FOR SOLUTION INTRAVENOUS at 00:06

## 2022-06-26 RX ADMIN — ENOXAPARIN SODIUM 40 MG: 40 INJECTION SUBCUTANEOUS at 09:05

## 2022-06-26 RX ADMIN — GABAPENTIN 300 MG: 300 CAPSULE ORAL at 18:36

## 2022-06-26 NOTE — ASSESSMENT & PLAN NOTE
Patient presenting to the ED with lower abdominal pain, constipation, fevers, body aches for 5 days  · CT A/P: Findings consistent with mild acute uncomplicated sigmoid diverticulitis  · Does not meet sepsis criteria  Negative procalcitonin and lactic  · Currently on Zosyn  · Tolerating diet without any issues    Only current active issue seems to be diarrhea and still some degree of low-grade fever  See plan under diarrhea  If patient develops worsening abdominal pain or high-grade fever may have to consider reimaging      She requires continued inpatient stay

## 2022-06-26 NOTE — ASSESSMENT & PLAN NOTE
· Denies any SOB at this time  · Reports that her outpatient provider recommended that she be on oxygen as an outpatient, but patient declined  States that she is agreeable to home oxygen at this time  · Requiring 3L O2 [place] to maintain O2 sat in low-90s, continue to monitor  · Continue pre-hospital inhalers    Breathing seems to be well controlled at this point  Monitor ventilatory status

## 2022-06-26 NOTE — ASSESSMENT & PLAN NOTE
· Patient complaining of sore throat and difficulty swallowing  · Physical exam evident for candidal infection  Continue course of fluconazole

## 2022-06-26 NOTE — ASSESSMENT & PLAN NOTE
Lab Results   Component Value Date    EGFR 46 06/26/2022    EGFR 41 06/24/2022    EGFR 43 06/23/2022    CREATININE 1 15 06/26/2022    CREATININE 1 26 06/24/2022    CREATININE 1 22 06/23/2022     · Creatinine stable at baseline  · Monitor bmp   · Avoid nephrotoxins

## 2022-06-26 NOTE — ASSESSMENT & PLAN NOTE
Patient does have significant loose bowel movements  I do suspect that this is antibiotic associated, patient is nontoxic, abdominal exam benign, low suspicion for C diff, in any case given the fact that she is immunocompromised I would put her on p o  Vancomycin     Check stool studies  Monitor abdominal exam

## 2022-06-26 NOTE — PROGRESS NOTES
[address] [place] [address]  Progress Note Massiel Azar 1945, 78 y o  female MRN: [id number]  Unit/Bed#: -[facility] Encounter: [id number]  Primary Care Provider: Tamica Lancaster MD   Date and time admitted to hospital: 6/20/2022  8:07 PM    * Diverticulitis  Assessment & Plan  Patient presenting to the ED with lower abdominal pain, constipation, fevers, body aches for 5 days  · CT A/P: Findings consistent with mild acute uncomplicated sigmoid diverticulitis  · Does not meet sepsis criteria  Negative procalcitonin and lactic  · Currently on Zosyn  · Tolerating diet without any issues    Only current active issue seems to be diarrhea and still some degree of low-grade fever  See plan under diarrhea  If patient develops worsening abdominal pain or high-grade fever may have to consider reimaging  She requires continued inpatient stay    Diarrhea  Assessment & Plan    Patient does have significant loose bowel movements  I do suspect that this is antibiotic associated, patient is nontoxic, abdominal exam benign, low suspicion for C diff, in any case given the fact that she is immunocompromised I would put her on p o  Vancomycin     Check stool studies  Monitor abdominal exam     Oral candidiasis  Assessment & Plan  · Patient complaining of sore throat and difficulty swallowing  · Physical exam evident for candidal infection  Continue course of fluconazole    Malignant neoplasm of overlapping sites of right breast in female, estrogen receptor negative ([facility] [address] )  Assessment & Plan  · S/p right lumpectomy in April 2021  · Recently started chemotherapy (6/14/22)  · Follow outpatient with Oncology, ideally patient will have follow-up appointment while in rehab and rehab will be able to take her  · Holding decadron in the setting of candidiasis    COPD (chronic obstructive pulmonary disease) ([facility] [address] )  Assessment & Plan  · Denies any SOB at this time  · Reports that her outpatient provider recommended that she be on oxygen as an outpatient, but patient declined  States that she is agreeable to home oxygen at this time  · Requiring 3L O2 NC to maintain O2 sat in low-90s, continue to monitor  · Continue pre-hospital inhalers    Breathing seems to be well controlled at this point  Monitor ventilatory status  Stage 3 chronic kidney disease [facility])  Assessment & Plan  Lab Results   Component Value Date    EGFR 46 2022    EGFR 41 2022    EGFR 43 2022    CREATININE 1 15 2022    CREATININE 1 26 2022    CREATININE 1 22 2022     · Creatinine stable at baseline  · Monitor bmp   · Avoid nephrotoxins    VTE Pharmacologic Prophylaxis:   Pharmacologic: Enoxaparin (Lovenox)  Mechanical VTE Prophylaxis in Place: Yes    Patient Centered Rounds: I have performed bedside rounds with nursing staff today  Discussions with Specialists or Other Care Team Provider:  Discussed with care management team    Education and Discussions with Family / Patient:  Patient    Time Spent for Care: 45 minutes  More than 50% of total time spent on counseling and coordination of care as described above  Current Length of Stay: 6 day(s)    Current Patient Status: Inpatient   Certification Statement: The patient will continue to require additional inpatient hospital stay due to Need for IV antibiotics, need for improvement in diarrhea    Discharge Plan:  Once stable, once cleared plans for rehab discharge    Code Status: Level 1 - Full Code      Subjective:     Patient evaluated this morning  She states that abdominal pain is better, she did have a low-grade temperatures still this morning 100 5  She does mention still having loose bowel movements 3-4 overnight  Otherwise denies any nausea vomiting    No other events reported    Objective:     Vitals:   Temp (24hrs), Av 3 °F (37 4 °C), Min:98 4 °F (36 9 °C), Max:100 5 °F (38 1 °C)    Temp:  [98 4 °F (36 9 °C)-100 5 °F (38 1 °C)] 99 5 °F (37 5 °C)  HR:  [77-84] 83  Resp: [18] 18  BP: (112-153)/() 153/81  SpO2:  [90 %-92 %] 91 %  Body mass index is 32 42 kg/m²  Input and Output Summary (last 24 hours): Intake/Output Summary (Last 24 hours) at 6/26/2022 1049  Last data filed at 6/26/2022 3342  Gross per 24 hour   Intake 480 ml   Output --   Net 480 ml       Physical Exam:     Physical Exam  Vitals and nursing note reviewed  Constitutional:       Appearance: Normal appearance  She is normal weight  Comments: Female in bed, awake   HENT:      Head: Normocephalic and atraumatic  Right Ear: External ear normal       Left Ear: External ear normal       Nose: Nose normal  No congestion  Mouth/Throat:      Mouth: Mucous membranes are moist       Pharynx: Oropharynx is clear  No oropharyngeal exudate or posterior oropharyngeal erythema  Eyes:      General: No scleral icterus  Right eye: No discharge  Left eye: No discharge  Extraocular Movements: Extraocular movements intact  Conjunctiva/sclera: Conjunctivae normal       Pupils: Pupils are equal, round, and reactive to light  Cardiovascular:      Rate and Rhythm: Normal rate and regular rhythm  Pulses: Normal pulses  Heart sounds: Normal heart sounds  No murmur heard  No friction rub  No gallop  Pulmonary:      Effort: Pulmonary effort is normal  No respiratory distress  Breath sounds: Normal breath sounds  No stridor  No wheezing, rhonchi or rales  Chest:      Chest wall: No tenderness  Abdominal:      General: Abdomen is flat  Bowel sounds are normal  There is no distension  Palpations: Abdomen is soft  There is no mass  Tenderness: There is no abdominal tenderness  There is no guarding or rebound  Musculoskeletal:         General: No swelling, tenderness, deformity or signs of injury  Normal range of motion  Cervical back: Normal range of motion and neck supple  No rigidity  No muscular tenderness     Skin:     General: Skin is warm and dry       Capillary Refill: Capillary refill takes less than 2 seconds  Coloration: Skin is not jaundiced or pale  Findings: No bruising, erythema, lesion or rash  Neurological:      General: No focal deficit present  Mental Status: She is alert and oriented to person, place, and time  Mental status is at baseline  Cranial Nerves: No cranial nerve deficit  Sensory: No sensory deficit  Motor: No weakness  Coordination: Coordination normal    Psychiatric:         Mood and Affect: Mood normal          Behavior: Behavior normal          Thought Content: Thought content normal          Judgment: Judgment normal          Additional Data:     Labs:    Results from last 7 days   Lab Units 06/26/22  0617 06/21/22  0626 06/20/22  2101   WBC Thousand/uL 6 89   < > 3 00*   HEMOGLOBIN g/dL 11 6   < > 13 1   HEMATOCRIT % 37 8   < > 41 1   PLATELETS Thousands/uL 192   < > 164   BANDS PCT %  --   --  2   NEUTROS PCT % 71   < >  --    LYMPHS PCT % 18   < >  --    LYMPHO PCT %  --   --  43   MONOS PCT % 7   < >  --    MONO PCT %  --   --  17*   EOS PCT % 1   < > 2    < > = values in this interval not displayed  Results from last 7 days   Lab Units 06/26/22  0617 06/23/22  0556 06/22/22  0514   SODIUM mmol/L 144   < > 144   POTASSIUM mmol/L 3 3*   < > 4 4   CHLORIDE mmol/L 107   < > 106   CO2 mmol/L 27   < > 29   BUN mg/dL 7   < > 11   CREATININE mg/dL 1 15   < > 1 28   ANION GAP mmol/L 10   < > 9   CALCIUM mg/dL 8 6   < > 8 3   ALBUMIN g/dL  --   --  2 6*   TOTAL BILIRUBIN mg/dL  --   --  0 31   ALK PHOS U/L  --   --  102   ALT U/L  --   --  27   AST U/L  --   --  16   GLUCOSE RANDOM mg/dL 91   < > 116    < > = values in this interval not displayed  Results from last 7 days   Lab Units 06/21/22  0626   INR  1 13             Results from last 7 days   Lab Units 06/20/22  2101   LACTIC ACID mmol/L 1 6   PROCALCITONIN ng/ml 0 23           * I Have Reviewed All Lab Data Listed Above    * Additional Pertinent Lab Tests Reviewed: [facility] Admission Reviewed      Recent Cultures (last 7 days):     Results from last 7 days   Lab Units 06/20/22  2101   BLOOD CULTURE  No Growth After 5 Days  No Growth After 5 Days  Last 24 Hours Medication List:   Current Facility-Administered Medications   Medication Dose Route Frequency Provider Last Rate    acetaminophen  650 mg Oral Q6H PRN Timmy Mcgee MD      al mag oxide-diphenhydramine-lidocaine viscous  10 mL Swish & Swallow Q4H PRN Timmy Mcgee MD      albuterol  2 puff Inhalation Q6H PRN Kyle Solorzano PA-C      aspirin  81 mg Oral Daily Emilie Rao PA-C      atorvastatin  40 mg Oral Daily With [employer]      enoxaparin  40 mg Subcutaneous Daily Emilie Rao PA-C      fluconazole  200 mg Oral Daily Sheila Andrade MD      fluticasone-vilanterol  1 puff Inhalation Daily Kyle Solorzano PA-C      gabapentin  300 mg Oral BID Kyle Solorzano PA-C      gabapentin  400 mg Oral HS Emilie Rao PA-C      loperamide  2 mg Oral 4x Daily PRN Timmy Mcgee MD      ondansetron  4 mg Intravenous Q6H PRN Azra Greene PA-C      pantoprazole  40 mg Oral Daily Before Breakfast Emilie Rao PA-C      piperacillin-tazobactam  3 375 g Intravenous Q6H Eimlie Rao PA-C 3 375 g (06/26/22 1245)    potassium chloride  40 mEq Oral Once Charlette Farrar MD      saccharomyces boulardii  250 mg Oral BID Timmy Mcgee MD      vancomycin  125 mg Oral Q6H Gadiel Brock MD          Today, Patient Was Seen By: Rose Dorado MD    ** Please Note: Dictation voice to text software may have been used in the creation of this document   **

## 2022-06-27 LAB
ANION GAP SERPL CALCULATED.3IONS-SCNC: 12 MMOL/L (ref 4–13)
BASOPHILS # BLD AUTO: 0.06 THOUSANDS/ΜL (ref 0–0.1)
BASOPHILS NFR BLD AUTO: 1 % (ref 0–1)
BUN SERPL-MCNC: 8 MG/DL (ref 5–25)
CALCIUM SERPL-MCNC: 8.4 MG/DL (ref 8.3–10.1)
CHLORIDE SERPL-SCNC: 107 MMOL/L (ref 100–108)
CO2 SERPL-SCNC: 23 MMOL/L (ref 21–32)
CREAT SERPL-MCNC: 1.19 MG/DL (ref 0.6–1.3)
EOSINOPHIL # BLD AUTO: 0.06 THOUSAND/ΜL (ref 0–0.61)
EOSINOPHIL NFR BLD AUTO: 1 % (ref 0–6)
ERYTHROCYTE [DISTWIDTH] IN BLOOD BY AUTOMATED COUNT: 16.3 % (ref 11.6–15.1)
GFR SERPL CREATININE-BSD FRML MDRD: 44 ML/MIN/1.73SQ M
GLUCOSE SERPL-MCNC: 96 MG/DL (ref 65–140)
HCT VFR BLD AUTO: 34.2 % (ref 34.8–46.1)
HGB BLD-MCNC: 10.7 G/DL (ref 11.5–15.4)
IMM GRANULOCYTES # BLD AUTO: 0.14 THOUSAND/UL (ref 0–0.2)
IMM GRANULOCYTES NFR BLD AUTO: 2 % (ref 0–2)
LYMPHOCYTES # BLD AUTO: 1.31 THOUSANDS/ΜL (ref 0.6–4.47)
LYMPHOCYTES NFR BLD AUTO: 18 % (ref 14–44)
MCH RBC QN AUTO: 27.6 PG (ref 26.8–34.3)
MCHC RBC AUTO-ENTMCNC: 31.3 G/DL (ref 31.4–37.4)
MCV RBC AUTO: 88 FL (ref 82–98)
MONOCYTES # BLD AUTO: 0.57 THOUSAND/ΜL (ref 0.17–1.22)
MONOCYTES NFR BLD AUTO: 8 % (ref 4–12)
NEUTROPHILS # BLD AUTO: 5.29 THOUSANDS/ΜL (ref 1.85–7.62)
NEUTS SEG NFR BLD AUTO: 70 % (ref 43–75)
NRBC BLD AUTO-RTO: 0 /100 WBCS
PLATELET # BLD AUTO: 202 THOUSANDS/UL (ref 149–390)
PMV BLD AUTO: 10 FL (ref 8.9–12.7)
POTASSIUM SERPL-SCNC: 4 MMOL/L (ref 3.5–5.3)
RBC # BLD AUTO: 3.88 MILLION/UL (ref 3.81–5.12)
SODIUM SERPL-SCNC: 142 MMOL/L (ref 136–145)
WBC # BLD AUTO: 7.43 THOUSAND/UL (ref 4.31–10.16)

## 2022-06-27 PROCEDURE — 85025 COMPLETE CBC W/AUTO DIFF WBC: CPT | Performed by: INTERNAL MEDICINE

## 2022-06-27 PROCEDURE — 99233 SBSQ HOSP IP/OBS HIGH 50: CPT | Performed by: INTERNAL MEDICINE

## 2022-06-27 PROCEDURE — 97116 GAIT TRAINING THERAPY: CPT

## 2022-06-27 PROCEDURE — 80048 BASIC METABOLIC PNL TOTAL CA: CPT | Performed by: INTERNAL MEDICINE

## 2022-06-27 PROCEDURE — NC001 PR NO CHARGE: Performed by: PHYSICIAN ASSISTANT

## 2022-06-27 PROCEDURE — 97110 THERAPEUTIC EXERCISES: CPT

## 2022-06-27 PROCEDURE — 99232 SBSQ HOSP IP/OBS MODERATE 35: CPT | Performed by: INTERNAL MEDICINE

## 2022-06-27 RX ADMIN — FLUCONAZOLE 200 MG: 100 TABLET ORAL at 10:24

## 2022-06-27 RX ADMIN — Medication 250 MG: at 10:24

## 2022-06-27 RX ADMIN — GABAPENTIN 400 MG: 400 CAPSULE ORAL at 22:03

## 2022-06-27 RX ADMIN — ENOXAPARIN SODIUM 40 MG: 40 INJECTION SUBCUTANEOUS at 10:25

## 2022-06-27 RX ADMIN — GABAPENTIN 300 MG: 300 CAPSULE ORAL at 17:48

## 2022-06-27 RX ADMIN — PIPERACILLIN AND TAZOBACTAM 3.38 G: 36; 4.5 INJECTION, POWDER, FOR SOLUTION INTRAVENOUS at 10:28

## 2022-06-27 RX ADMIN — FLUTICASONE FUROATE AND VILANTEROL TRIFENATATE 1 PUFF: 100; 25 POWDER RESPIRATORY (INHALATION) at 10:26

## 2022-06-27 RX ADMIN — ASPIRIN 81 MG: 81 TABLET, CHEWABLE ORAL at 10:24

## 2022-06-27 RX ADMIN — Medication 125 MG: at 00:22

## 2022-06-27 RX ADMIN — ATORVASTATIN CALCIUM 40 MG: 40 TABLET, FILM COATED ORAL at 17:48

## 2022-06-27 RX ADMIN — PIPERACILLIN AND TAZOBACTAM 3.38 G: 36; 4.5 INJECTION, POWDER, FOR SOLUTION INTRAVENOUS at 17:56

## 2022-06-27 RX ADMIN — Medication 250 MG: at 17:48

## 2022-06-27 RX ADMIN — GABAPENTIN 300 MG: 300 CAPSULE ORAL at 10:24

## 2022-06-27 RX ADMIN — Medication 125 MG: at 05:49

## 2022-06-27 RX ADMIN — Medication 125 MG: at 13:00

## 2022-06-27 RX ADMIN — Medication 125 MG: at 17:53

## 2022-06-27 RX ADMIN — PIPERACILLIN AND TAZOBACTAM 3.38 G: 36; 4.5 INJECTION, POWDER, FOR SOLUTION INTRAVENOUS at 03:36

## 2022-06-27 RX ADMIN — PANTOPRAZOLE SODIUM 40 MG: 40 TABLET, DELAYED RELEASE ORAL at 06:02

## 2022-06-27 NOTE — ASSESSMENT & PLAN NOTE
Patient presenting to the ED with lower abdominal pain, constipation, fevers, body aches for 5 days  · CT A/P: Findings consistent with mild acute uncomplicated sigmoid diverticulitis  · Does not meet sepsis criteria  Negative procalcitonin and lactic  · Continue IV zosyn - day 7 ( complete course of 10 days )  · Tolerating diet without any issues    Only current active issue seems to be diarrhea and still some degree of low-grade fever      She requires continued inpatient stay

## 2022-06-27 NOTE — PROGRESS NOTES
[address] [place] [address]  Progress Note Jessie Avalos 1945, 78 y o  female MRN: [id number]  Unit/Bed#: -01 Encounter: [id number]  Primary Care Provider: Fabiola Betts MD   Date and time admitted to hospital: 6/20/2022  8:07 PM    * Diverticulitis  Assessment & Plan  Patient presenting to the ED with lower abdominal pain, constipation, fevers, body aches for 5 days  · CT A/P: Findings consistent with mild acute uncomplicated sigmoid diverticulitis  · Does not meet sepsis criteria  Negative procalcitonin and lactic  · Continue IV zosyn - day 7 ( complete course of 10 days )  · Tolerating diet without any issues    Only current active issue seems to be diarrhea and still some degree of low-grade fever  She requires continued inpatient stay    Diarrhea  Assessment & Plan    Patient does have significant loose bowel movements  patient is nontoxic, abdominal exam benign, no leucocytosis    GI re-consulted for input   Will check for C diff, Stool bacterial panel, ova and parasites   Check stool studies  serial abdominal exam  abdominal exam     Oral candidiasis  Assessment & Plan  · Patient complaining of sore throat and difficulty swallowing  · Physical exam evident for candidal infection  Continue course of fluconazole    Malignant neoplasm of overlapping sites of right breast in female, estrogen receptor negative ([facility] [address] )  Assessment & Plan  · S/p right lumpectomy in April 2021  · Recently started chemotherapy (6/14/22)  · Follow outpatient with Oncology, ideally patient will have follow-up appointment while in rehab and rehab will be able to take her  · Holding decadron in the setting of candidiasis    COPD (chronic obstructive pulmonary disease) ([facility] [address] )  Assessment & Plan  · Denies any SOB at this time  · Reports that her outpatient provider recommended that she be on oxygen as an outpatient, but patient declined   States that she is agreeable to home oxygen at this time  · Requiring 3L O2 NC to maintain O2 sat in low-90s, continue to monitor  · Continue pre-hospital inhalers      Stage 3 chronic kidney disease [facility])  Assessment & Plan  Lab Results   Component Value Date    EGFR 44 2022    EGFR 46 2022    EGFR 41 2022    CREATININE 1 19 2022    CREATININE 1 15 2022    CREATININE 1 26 2022     · Creatinine stable at baseline  · Monitor bmp           VTE Pharmacologic Prophylaxis:   VTE Score: 8 Moderate Risk (Score 3-4) - Pharmacological DVT Prophylaxis Ordered: Enoxaparin (Lovenox)  Mechanical VTE Prophylaxis in Place: Yes    Patient Centered Rounds: I have performed bedside rounds with nursing staff today  Discussions with Specialists or Other Care Team Provider: yes    Education and Discussions with Family / Patient: yes    Current Length of Stay: 7 day(s)    Current Patient Status: Inpatient     Discharge Plan / Estimated Discharge Date: until stabilized     Code Status: Level 1 - Full Code      Subjective:   Appears comfortable, reports having loose stools    Objective:     Vitals:   Temp (24hrs), Av 5 °F (37 5 °C), Min:99 1 °F (37 3 °C), Max:99 7 °F (37 6 °C)    Temp:  [99 1 °F (37 3 °C)-99 7 °F (37 6 °C)] 99 7 °F (37 6 °C)  HR:  [72-90] 75  Resp:  [17-18] 18  BP: (112-135)/(60-67) 112/63  SpO2:  [87 %-94 %] 89 %  Body mass index is 32 42 kg/m²  Input and Output Summary (last 24 hours): Intake/Output Summary (Last 24 hours) at 2022 1244  Last data filed at 2022 2318  Gross per 24 hour   Intake 400 ml   Output --   Net 400 ml       Physical Exam:     Physical Exam  Constitutional:       Appearance: Normal appearance  HENT:      Head: Normocephalic and atraumatic  Eyes:      Extraocular Movements: Extraocular movements intact  Cardiovascular:      Rate and Rhythm: Normal rate and regular rhythm  Heart sounds: Normal heart sounds  No murmur heard  No friction rub  No gallop     Pulmonary:      Effort: Pulmonary effort is normal       Breath sounds: Normal breath sounds  Comments: On supplemental oxygen   Abdominal:      General: Abdomen is flat  Bowel sounds are normal       Palpations: Abdomen is soft  Skin:     General: Skin is warm and dry  Neurological:      General: No focal deficit present  Mental Status: She is alert  Mental status is at baseline  Psychiatric:         Mood and Affect: Mood normal          Behavior: Behavior normal          Thought Content: Thought content normal          Judgment: Judgment normal           Additional Data:     Labs:  Results from last 7 days   Lab Units 06/27/22  0523 06/21/22  0626 06/20/22 2101   WBC Thousand/uL 7 43   < > 3 00*   HEMOGLOBIN g/dL 10 7*   < > 13 1   HEMATOCRIT % 34 2*   < > 41 1   PLATELETS Thousands/uL 202   < > 164   BANDS PCT %  --   --  2   NEUTROS PCT % 70   < >  --    LYMPHS PCT % 18   < >  --    LYMPHO PCT %  --   --  43   MONOS PCT % 8   < >  --    MONO PCT %  --   --  17*   EOS PCT % 1   < > 2    < > = values in this interval not displayed  Results from last 7 days   Lab Units 06/27/22  0523 06/23/22  0556 06/22/22  0514   SODIUM mmol/L 142   < > 144   POTASSIUM mmol/L 4 0   < > 4 4   CHLORIDE mmol/L 107   < > 106   CO2 mmol/L 23   < > 29   BUN mg/dL 8   < > 11   CREATININE mg/dL 1 19   < > 1 28   ANION GAP mmol/L 12   < > 9   CALCIUM mg/dL 8 4   < > 8 3   ALBUMIN g/dL  --   --  2 6*   TOTAL BILIRUBIN mg/dL  --   --  0 31   ALK PHOS U/L  --   --  102   ALT U/L  --   --  27   AST U/L  --   --  16   GLUCOSE RANDOM mg/dL 96   < > 116    < > = values in this interval not displayed  Results from last 7 days   Lab Units 06/21/22  0626   INR  1 13             Results from last 7 days   Lab Units 06/20/22 2101   LACTIC ACID mmol/L 1 6   PROCALCITONIN ng/ml 0 23       Imaging: No pertinent imaging reviewed  Recent Cultures (last 7 days):     Results from last 7 days   Lab Units 06/20/22 2101   BLOOD CULTURE  No Growth After 5 Days    No Growth After 5 Days  Lines/Drains:  Invasive Devices  Report    Peripheral Intravenous Line  Duration           Peripheral IV 06/26/22 Left Hand 1 day                Telemetry:        Last 24 Hours Medication List:   Current Facility-Administered Medications   Medication Dose Route Frequency Provider Last Rate    acetaminophen  650 mg Oral Q6H PRN Ronaldo Peterson MD      al mag oxide-diphenhydramine-lidocaine viscous  10 mL Swish & Swallow Q4H PRN Ronaldo Peterson MD      albuterol  2 puff Inhalation Q6H PRN Alvino Bay PA-C      aspirin  81 mg Oral Daily Emilie Rao PA-C      atorvastatin  40 mg Oral Daily With [employer]      enoxaparin  40 mg Subcutaneous Daily Emilie Rao PA-C      fluconazole  200 mg Oral Daily Lili Glynn MD      fluticasone-vilanterol  1 puff Inhalation Daily Emilie Onofre PA-C      gabapentin  300 mg Oral BID Alvino Bay PA-C      gabapentin  400 mg Oral HS Emilie Rao PA-C      ondansetron  4 mg Intravenous Q6H PRN Kiet Greene PA-C      pantoprazole  40 mg Oral Daily Before Breakfast Emilie Rao PA-C      piperacillin-tazobactam  3 375 g Intravenous Q6H Emilie Rao PA-C 3 375 g (06/27/22 1028)    saccharomyces boulardii  250 mg Oral BID Ronaldo Peterson MD      vancomycin  125 mg Oral Q6H Kanwal Delgadillo MD          Today, Patient Was Seen By: Ronaldo Peterson MD    ** Please Note: This note has been constructed using a voice recognition system   **

## 2022-06-27 NOTE — PHYSICAL THERAPY NOTE
Physical Therapy Treatment Note    Patient Name: Angelica Ackerman    Diagnosis: Oral candidiasis [B37 0]  Diverticulitis [K57 92]  Fever [R50 9]  Sepsis ([address] ) [A41 9]     06/27/22 1305   PT Last Visit   PT Visit Date 06/27/22   Note Type   Note Type Treatment   Pain Assessment   Pain Assessment Tool 0-10   Pain Score No Pain   Restrictions/Precautions   Weight Bearing Precautions Per Order No   Other Precautions Chair Alarm; Bed Alarm;O2;Fall Risk;Limb alert   General   Chart Reviewed Yes   Response to Previous Treatment Patient with no complaints from previous session  Family/Caregiver Present No   Cognition   Overall Cognitive Status WFL   Arousal/Participation Alert; Responsive; Cooperative   Attention Within functional limits   Orientation Level Oriented X4   Memory Within functional limits   Following Commands Follows all commands and directions without difficulty   Comments Pt agreeable to PT  Subjective   Subjective "I've been up and moving "   Bed Mobility   Supine to Sit 5  Supervision   Additional items Assist x 1;HOB elevated; Bedrails; Increased time required;Verbal cues   Sit to Supine 5  Supervision   Additional items Assist x 1;HOB elevated; Bedrails; Increased time required;Verbal cues   Transfers   Sit to Stand   (CG assist)   Additional items Assist x 1; Increased time required;Verbal cues   Stand to Sit   (CG assist)   Additional items Assist x 1; Increased time required;Verbal cues   Ambulation/Elevation   Gait pattern Decreased toe off;Decreased heel strike;Decreased hip extension; Excessively slow; Short stride; Shuffling   Gait Assistance 4  Minimal assist  (CG assist)   Additional items Assist x 1;Verbal cues   Assistive Device Rolling walker   Distance 60 feet; 1 standing rest break   Stair Management Assistance Not tested   Balance   Static Sitting Fair +   Dynamic Sitting Fair   Static Standing Fair   Dynamic Standing Fair -   Ambulatory Fair -   Endurance Deficit   Endurance Deficit Yes Endurance Deficit Description decreased activity tolerance; pt received on 4L O2 via NC; SpO2 decreased to 85-86% with functional mobility; increased to 93% with seated rest break and cues for proper breathing techniques   Activity Tolerance   Activity Tolerance Patient limited by fatigue   Exercises   Quad Sets Supine;20 reps;AROM; Bilateral   Glute Sets Supine;10 reps;AROM; Bilateral   Hip Flexion Sitting;10 reps;AROM; Bilateral   Hip Abduction Supine;20 reps;AROM; Bilateral   Knee AROM Long Arc Quad Sitting;20 reps;AROM; Bilateral   Ankle Pumps Sitting;20 reps;AROM; Bilateral   Assessment   Prognosis Good   Problem List Decreased strength;Decreased endurance; Impaired balance;Decreased mobility   Assessment Chart reviewed  Patient was received supine in bed in NAD and agreeable to PT session  Today's PT treatment session consisted of therapeutic activity for facilitation of transitional movements and safe performance of correct technique for bed mobility and sit to stand transfers, therapeutic exercise to increase lower extremity muscle strength, and gait training to promote safe and functional ambulation on level surfaces  In comparison to the previous session the patient has made progress as evident by requiring decreased assistance with sit to stand transfers and ambulation  Pt was able to ambulate an increased distance with use of RW  When ambulating pt exhibits decreased jimmy, decreased stride length, and decreased heel strike  Pt required one standing rest break secondary to decreased SpO2  Pt was received on 4L O2 via NC; SpO2 decreased to 85-86% with functional mobility; increased to 93% with seated rest breaks and cues for proper breathing techniques  Pt tolerated all therapeutic exercise well without complaints, but required rest breaks  Overall, patient tolerated today's session well and continues to be making progress towards achieving her STG's   Patient's prognosis for achieving their STG's is good as evident by pt's motivation  PT intervention continues to be appropriate as the patient continues to be limited by decreased lower extremity strength, impaired balance, decreased endurance, gait deviations, and decreased functional mobility  Continue to recommend STR  PT to continue to see patient in order to address the deficits listed above and provide interventions consistent with the POC in order to achieve STG's and optimize the patient's independence with functional mobility  Barriers to Discharge Inaccessible home environment   Goals   STG Expiration Date 07/01/22   Plan   Treatment/Interventions Functional transfer training;LE strengthening/ROM; Elevations; Therapeutic exercise; Endurance training;Patient/family training;Bed mobility;Gait training;OT   Progress Progressing toward goals   PT Frequency 3-5x/wk   Recommendation   PT Discharge Recommendation Post acute rehabilitation services   AM-PAC Basic Mobility Inpatient   Turning in Bed Without Bedrails 4   Lying on Back to Sitting on Edge of Flat Bed 3   Moving Bed to Chair 3   Standing Up From Chair 3   Walk in Room 3   Climb 3-5 Stairs 1   Basic Mobility Inpatient Raw Score 17   Basic Mobility Standardized Score 39 67   Highest Level Of Mobility   -[facility] Goal 5: Stand one or more mins     Sandi Augustin PT, DPT    Time of PT treatment session: 4575-4202  26 minutes

## 2022-06-27 NOTE — QUICK NOTE
Attempted to contact patient's son, Jenny Stewart, by phone twice to provide an update  Son did not answer phone either time

## 2022-06-27 NOTE — ASSESSMENT & PLAN NOTE
· Denies any SOB at this time  · Reports that her outpatient provider recommended that she be on oxygen as an outpatient, but patient declined   States that she is agreeable to home oxygen at this time  · Requiring 3L O2 NC to maintain O2 sat in low-90s, continue to monitor  · Continue pre-hospital inhalers

## 2022-06-27 NOTE — ASSESSMENT & PLAN NOTE
Patient does have significant loose bowel movements      patient is nontoxic, abdominal exam benign, no leucocytosis    GI re-consulted for input   Will check for C diff, Stool bacterial panel, ova and parasites   Check stool studies  serial abdominal exam  abdominal exam

## 2022-06-27 NOTE — PROGRESS NOTES
Progress Note  Miriam Wagner 78 y o  female MRN: [id number]  Unit/Bed#: -01 Encounter: [id number]    Subjective:  GI reconsulted due to patient's development of diarrhea  Patient reports over the past couple days she has developed diarrhea with about 5 episodes a day  Her abdominal pain has resolved  No vomiting  Objective:     Vitals:   Vitals:    06/27/22 0744   BP: 112/63   Pulse: 75   Resp:    Temp: 99 7 °F (37 6 °C)   SpO2: (!) 89%   ,Body mass index is 32 42 kg/m²  Intake/Output Summary (Last 24 hours) at 6/27/2022 1155  Last data filed at 6/26/2022 2318  Gross per 24 hour   Intake 400 ml   Output --   Net 400 ml       Physical Exam:     General Appearance: Alert, appears stated age and cooperative  Lungs: Clear to auscultation bilaterally, no rales or rhonchi, no labored breathing/accessory muscle use  Heart: Regular rate and rhythm, S1, S2 normal, no murmur, click, rub or gallop  Abdomen: Soft, non-tender, non-distended; bowel sounds normal; no masses or no organomegaly  Extremities: No cyanosis, edema    Invasive Devices  Report    Peripheral Intravenous Line  Duration           Peripheral IV 06/26/22 Left Hand 1 day                Lab Results:  No results displayed because visit has over 200 results  Imaging Studies:   I have personally reviewed pertinent imaging studies  CT chest abdomen pelvis wo contrast    Result Date: 6/20/2022  Narrative: CT CHEST, ABDOMEN AND PELVIS WITHOUT IV CONTRAST INDICATION:   Diffuse upper abdominal pain    COMPARISON:  None  TECHNIQUE: CT examination of the chest, abdomen and pelvis was performed without intravenous contrast  This examination was performed without intravenous contrast in the context of the critical nationwide Omnipaque shortage  Axial, sagittal, and coronal 2D reformatted images were created from the source data and submitted for interpretation  Radiation dose length product (DLP) for this visit:  964 mGy-cm     This examination, like all CT scans performed in the [facility], was performed utilizing techniques to minimize radiation dose exposure, including the use of iterative reconstruction and automated exposure control  Enteric contrast was administered  FINDINGS: CHEST LUNGS:  Moderate upper lobe predominant emphysematous changes     There is no tracheal or endobronchial lesion  PLEURA:  Unremarkable  HEART/GREAT VESSELS: Heavy atherosclerotic coronary artery calcification is noted  Heart is otherwise unremarkable  No thoracic aortic aneurysm  MEDIASTINUM AND GINNY:  Unremarkable  CHEST WALL AND LOWER NECK:  Soft tissue density within the right breast and at the right axilla compatible with recent lumpectomy and axillary lymph node dissection  A focus of fat at the right axilla with a rind of soft tissue density likely represent a  small focus of fat necrosis    ABDOMEN LIVER/BILIARY TREE:  Liver is diffusely decreased in density consistent with fatty change  No CT evidence of suspicious hepatic mass  Normal hepatic contours  No biliary dilatation  GALLBLADDER:  No calcified gallstones  No pericholecystic inflammatory change  SPLEEN:  Unremarkable  PANCREAS:  1 8 cm pancreatic head lesion (series 5, image 58) stable in size however difficult to evaluate in absence of IV contrast  ADRENAL GLANDS:  Unremarkable  KIDNEYS/URETERS:  One or more simple renal cyst(s) is noted  Otherwise unremarkable kidneys  No hydronephrosis  STOMACH AND BOWEL:  Minimal pericolonic inflammatory change involving a segment of sigmoid colon in association with sigmoid diverticula  APPENDIX:  No findings to suggest appendicitis  ABDOMINOPELVIC CAVITY:  No ascites  No pneumoperitoneum  No lymphadenopathy  VESSELS:  Atherosclerotic changes are present  No evidence of aneurysm  PELVIS REPRODUCTIVE ORGANS:  Unremarkable for patient's age  URINARY BLADDER:  Unremarkable  ABDOMINAL WALL/INGUINAL REGIONS:  Unremarkable   OSSEOUS STRUCTURES:  No acute fracture or destructive osseous lesion  Impression: No evidence of metastatic disease throughout the chest, abdomen or pelvis  Stable pancreatic head lesion  Soft tissue postsurgical changes from right breast lumpectomy and axillary node dissection  Findings consistent with mild acute uncomplicated sigmoid diverticulitis  Workstation performed: [id number]     CT head without contrast    Result Date: 6/20/2022  Narrative: CT BRAIN - WITHOUT CONTRAST INDICATION:   headache  COMPARISON:  None  TECHNIQUE:  CT examination of the brain was performed  In addition to axial images, sagittal and coronal 2D reformatted images were created and submitted for interpretation  Radiation dose length product (DLP) for this visit:  787 mGy-cm   This examination, like all CT scans performed in the [facility], was performed utilizing techniques to minimize radiation dose exposure, including the use of iterative reconstruction and automated exposure control  IMAGE QUALITY:  Diagnostic  FINDINGS: PARENCHYMA: Decreased attenuation is noted in periventricular and subcortical white matter demonstrating an appearance that is statistically most likely to represent moderate microangiopathic change  No CT signs of acute infarction  No intracranial mass, mass effect or midline shift  No acute parenchymal hemorrhage  VENTRICLES AND EXTRA-AXIAL SPACES:  Normal for the patient's age  VISUALIZED ORBITS AND PARANASAL SINUSES:  Unremarkable  CALVARIUM AND EXTRACRANIAL SOFT TISSUES:  Normal      Impression: No acute intracranial abnormality  Workstation performed: [id number]         Assessment & Plan    Acute diverticulitis  Acute diarrhea  Breast cancer on chemo    - Patient originally presented with lower abdominal pain and constipation on 6/20  CT Scan A/P 6/20 showed findings consistent with mild acute diverticulitis    - She was also recently started on chemotherapy for her breast cancer   - She was started on Zosyn IV for the acute diverticulitis and her abdominal pain has resolved  - However, over the past couple of days she has developed diarrhea with about 5 episodes daily  - Continue Zosyn course for her acute diverticulitis (she will need to complete a 10 day course of antibiotics)  - Will check stool for c diff, enteric pathogens, giardia, O&P   - Continue Florastor BID  - Serial abdominal exams, supportive care  - If stool cultures are negative and diarrhea persists, we can add Questran  - Patient will need a colonoscopy as an outpatient in 6-8 weeks  The patient will be seen by Dr Jhon Amezquita PA-C  6/27/2022,11:55 AM

## 2022-06-27 NOTE — PLAN OF CARE
Problem: PHYSICAL THERAPY ADULT  Goal: Performs mobility at highest level of function for planned discharge setting  See evaluation for individualized goals  Description: Treatment/Interventions: Functional transfer training, LE strengthening/ROM, Elevations, Therapeutic exercise, Endurance training, Patient/family training, Bed mobility, Gait training, Spoke to nursing, OT          See flowsheet documentation for full assessment, interventions and recommendations  Outcome: Progressing  Note: Prognosis: Good  Problem List: Decreased strength, Decreased endurance, Impaired balance, Decreased mobility  Assessment: Chart reviewed  Patient was received supine in bed in NAD and agreeable to PT session  Today's PT treatment session consisted of therapeutic activity for facilitation of transitional movements and safe performance of correct technique for bed mobility and sit to stand transfers, therapeutic exercise to increase lower extremity muscle strength, and gait training to promote safe and functional ambulation on level surfaces  In comparison to the previous session the patient has made progress as evident by requiring decreased assistance with sit to stand transfers and ambulation  Pt was able to ambulate an increased distance with use of RW  When ambulating pt exhibits decreased jimmy, decreased stride length, and decreased heel strike  Pt required one standing rest break secondary to decreased SpO2  Pt was received on 4L O2 via NC; SpO2 decreased to 85-86% with functional mobility; increased to 93% with seated rest breaks and cues for proper breathing techniques  Pt tolerated all therapeutic exercise well without complaints, but required rest breaks  Overall, patient tolerated today's session well and continues to be making progress towards achieving her STG's  Patient's prognosis for achieving their STG's is good as evident by pt's motivation   PT intervention continues to be appropriate as the patient continues to be limited by decreased lower extremity strength, impaired balance, decreased endurance, gait deviations, and decreased functional mobility  Continue to recommend STR  PT to continue to see patient in order to address the deficits listed above and provide interventions consistent with the POC in order to achieve STG's and optimize the patient's independence with functional mobility  Barriers to Discharge: Inaccessible home environment     PT Discharge Recommendation: Post acute rehabilitation services     See flowsheet documentation for full assessment

## 2022-06-27 NOTE — ASSESSMENT & PLAN NOTE
Lab Results   Component Value Date    EGFR 44 06/27/2022    EGFR 46 06/26/2022    EGFR 41 06/24/2022    CREATININE 1 19 06/27/2022    CREATININE 1 15 06/26/2022    CREATININE 1 26 06/24/2022     · Creatinine stable at baseline  · Monitor bmp

## 2022-06-28 VITALS
RESPIRATION RATE: 16 BRPM | OXYGEN SATURATION: 93 % | BODY MASS INDEX: 32.59 KG/M2 | DIASTOLIC BLOOD PRESSURE: 74 MMHG | SYSTOLIC BLOOD PRESSURE: 133 MMHG | WEIGHT: 166 LBS | HEIGHT: 60 IN | TEMPERATURE: 98 F | HEART RATE: 85 BPM

## 2022-06-28 LAB
FLUAV RNA RESP QL NAA+PROBE: NEGATIVE
FLUBV RNA RESP QL NAA+PROBE: NEGATIVE
RSV RNA RESP QL NAA+PROBE: NEGATIVE
SARS-COV-2 RNA RESP QL NAA+PROBE: NEGATIVE

## 2022-06-28 PROCEDURE — 87493 C DIFF AMPLIFIED PROBE: CPT | Performed by: PHYSICIAN ASSISTANT

## 2022-06-28 PROCEDURE — 87505 NFCT AGENT DETECTION GI: CPT | Performed by: PHYSICIAN ASSISTANT

## 2022-06-28 PROCEDURE — 87177 OVA AND PARASITES SMEARS: CPT | Performed by: PHYSICIAN ASSISTANT

## 2022-06-28 PROCEDURE — 99239 HOSP IP/OBS DSCHRG MGMT >30: CPT | Performed by: INTERNAL MEDICINE

## 2022-06-28 PROCEDURE — 87209 SMEAR COMPLEX STAIN: CPT | Performed by: PHYSICIAN ASSISTANT

## 2022-06-28 PROCEDURE — 97116 GAIT TRAINING THERAPY: CPT

## 2022-06-28 PROCEDURE — 87329 GIARDIA AG IA: CPT | Performed by: PHYSICIAN ASSISTANT

## 2022-06-28 PROCEDURE — NC001 PR NO CHARGE: Performed by: PHYSICIAN ASSISTANT

## 2022-06-28 PROCEDURE — 97110 THERAPEUTIC EXERCISES: CPT

## 2022-06-28 PROCEDURE — 99232 SBSQ HOSP IP/OBS MODERATE 35: CPT | Performed by: INTERNAL MEDICINE

## 2022-06-28 PROCEDURE — 0241U HB NFCT DS VIR RESP RNA 4 TRGT: CPT | Performed by: INTERNAL MEDICINE

## 2022-06-28 RX ORDER — SACCHAROMYCES BOULARDII 250 MG
250 CAPSULE ORAL 2 TIMES DAILY
Refills: 0
Start: 2022-06-28

## 2022-06-28 RX ORDER — FLUCONAZOLE 200 MG/1
200 TABLET ORAL DAILY
Qty: 14 TABLET | Refills: 0
Start: 2022-06-22 | End: 2022-07-06

## 2022-06-28 RX ADMIN — ATORVASTATIN CALCIUM 40 MG: 40 TABLET, FILM COATED ORAL at 17:21

## 2022-06-28 RX ADMIN — Medication 125 MG: at 17:21

## 2022-06-28 RX ADMIN — Medication 250 MG: at 17:22

## 2022-06-28 RX ADMIN — PANTOPRAZOLE SODIUM 40 MG: 40 TABLET, DELAYED RELEASE ORAL at 06:32

## 2022-06-28 RX ADMIN — PIPERACILLIN AND TAZOBACTAM 3.38 G: 36; 4.5 INJECTION, POWDER, FOR SOLUTION INTRAVENOUS at 17:35

## 2022-06-28 RX ADMIN — PIPERACILLIN AND TAZOBACTAM 3.38 G: 36; 4.5 INJECTION, POWDER, FOR SOLUTION INTRAVENOUS at 06:32

## 2022-06-28 RX ADMIN — FLUCONAZOLE 200 MG: 100 TABLET ORAL at 09:29

## 2022-06-28 RX ADMIN — ASPIRIN 81 MG: 81 TABLET, CHEWABLE ORAL at 09:28

## 2022-06-28 RX ADMIN — ENOXAPARIN SODIUM 40 MG: 40 INJECTION SUBCUTANEOUS at 09:29

## 2022-06-28 RX ADMIN — FLUTICASONE FUROATE AND VILANTEROL TRIFENATATE 1 PUFF: 100; 25 POWDER RESPIRATORY (INHALATION) at 09:29

## 2022-06-28 RX ADMIN — Medication 250 MG: at 09:29

## 2022-06-28 RX ADMIN — Medication 125 MG: at 06:38

## 2022-06-28 RX ADMIN — GABAPENTIN 300 MG: 300 CAPSULE ORAL at 09:29

## 2022-06-28 RX ADMIN — Medication 125 MG: at 00:49

## 2022-06-28 RX ADMIN — Medication 125 MG: at 12:48

## 2022-06-28 RX ADMIN — PIPERACILLIN AND TAZOBACTAM 3.38 G: 36; 4.5 INJECTION, POWDER, FOR SOLUTION INTRAVENOUS at 00:46

## 2022-06-28 RX ADMIN — GABAPENTIN 300 MG: 300 CAPSULE ORAL at 17:21

## 2022-06-28 RX ADMIN — PIPERACILLIN AND TAZOBACTAM 3.38 G: 36; 4.5 INJECTION, POWDER, FOR SOLUTION INTRAVENOUS at 12:18

## 2022-06-28 NOTE — PLAN OF CARE
Problem: PHYSICAL THERAPY ADULT  Goal: Performs mobility at highest level of function for planned discharge setting  See evaluation for individualized goals  Description: Treatment/Interventions: Functional transfer training, LE strengthening/ROM, Elevations, Therapeutic exercise, Endurance training, Patient/family training, Bed mobility, Gait training, Spoke to nursing, OT          See flowsheet documentation for full assessment, interventions and recommendations  Outcome: Progressing  Note: Prognosis: Good  Problem List: Decreased strength, Decreased endurance, Impaired balance, Decreased mobility  Assessment: Chart reviewed  Patient was received supine in bed in NAD and agreeable to PT session  Today's PT treatment session consisted of therapeutic activity for facilitation of transitional movements and safe performance of correct technique for bed mobility and sit to stand transfers, therapeutic exercise to increase lower extremity muscle strength, and gait training to promote safe and functional ambulation on level surfaces  In comparison to the previous session the patient required increased assistance when performing supine to sit transfer  Pt ambulated a slightly decreased distance secondary to fatigue  Pt required one standing rest break during ambulation  When ambulating pt exhibits decreased jimmy, decreased stride length, and decreased heel strike  Pt tolerated all therapeutic exercise well without complaints, but required rest breaks  Pt was received on 3L O2 via NC; SpO2 at rest 90-91%; SpO2 with therapeutic exercise ranged from 88-90%; SpO2 decreased to 86% with functional mobility; increased to 90-91% with seated rest breaks  Pt educated on proper breathing techniques with good carryover  Overall, patient tolerated today's session well and continues to be improving as expected towards achieving their STG's  Patient's prognosis for achieving their STG's is good as evident by pt's motivation   PT intervention continues to be appropriate as the patient continues to be limited by decreased lower extremity strength, impaired balance, decreased endurance, gait deviations, and decreased functional mobility  Continue to recommend STR  PT to continue to see patient in order to address the deficits listed above and provide interventions consistent with the POC in order to achieve STG's and optimize the patient's independence with functional mobility  Barriers to Discharge: Inaccessible home environment     PT Discharge Recommendation: Post acute rehabilitation services    See flowsheet documentation for full assessment

## 2022-06-28 NOTE — DISCHARGE SUMMARY
[facility]  Discharge- Rosa Davenport 1945, 78 y o  female MRN: [id number]  Unit/Bed#: -[place] Encounter: [id number]  Primary Care Provider: Sobia Forbes MD   Date and time admitted to hospital: 6/20/2022  8:07 PM    * Diverticulitis  Assessment & Plan  Patient presenting to the ED with lower abdominal pain, constipation, fevers, body aches for 5 days  · CT A/P: Findings consistent with mild acute uncomplicated sigmoid diverticulitis  · Does not meet sepsis criteria  Negative procalcitonin and lactic  · Continue IV zosyn - day 7 (complete course of 10 days )  · Tolerating diet with some decreased appetite    Only current active issue seems to be diarrhea  Oral candidiasis  Assessment & Plan  · Patient complaining of sore throat and difficulty swallowing  · Physical exam evident for candidal infection  Continue course of fluconazole    COPD (chronic obstructive pulmonary disease) ([facility] [address] )  Assessment & Plan  · Denies any SOB at this time  · Reports that her outpatient provider recommended that she be on oxygen as an outpatient, but patient declined  States that she is agreeable to home oxygen at this time  · Requiring 3L O2 NC to maintain O2 sat in low-90s, continue to monitor  · Continue pre-hospital inhalers      Malignant neoplasm of overlapping sites of right breast in female, estrogen receptor negative [facility])  Assessment & Plan  · S/p right lumpectomy in April 2021  · Recently started chemotherapy (6/14/22)  · Follow outpatient with Oncology, ideally patient will have follow-up appointment while in rehab and rehab will be able to take her  · Holding decadron in the setting of candidiasis    Diarrhea  Assessment & Plan  Patient has significant loose bowel movements      patient is nontoxic, abdominal exam benign, no leucocytosis    GI re-consulted for input  Patient on probiotic and C diff prophylaxis with vancomycin  Will check for C diff, Stool bacterial panel, ova and parasites per GI recs  Check stool studies  serial abdominal exams    Stage 3 chronic kidney disease [facility])  Assessment & Plan  Lab Results   Component Value Date    EGFR 44 06/27/2022    EGFR 46 06/26/2022    EGFR 41 06/24/2022    CREATININE 1 19 06/27/2022    CREATININE 1 15 06/26/2022    CREATININE 1 26 06/24/2022     · Creatinine stable at baseline  · Monitor bmp               Admission Date:   Admission Orders (From admission, onward)     Ordered        06/20/22 2322  Inpatient Admission  Once                        Admitting Diagnosis: Oral candidiasis [B37 0]  Diverticulitis [K57 92]  Fever [R50 9]  Sepsis ([address] ) [A41 9]    HPI: Ms Jailyn Limon is a pleasant 78year old female with history of metastatic breast cancer s/p resection in April 2021 and first round of chemotherapy on 6/14/22, diverticulosis, COPD, HLD, and CKD who presented with fever to 101 F, lower abdominal pain, nausea, and myalgia for 5 days  She also had weakness, fatigue, sore throat, and difficulty swallowing  The patient did have a bowel movement the morning of admission  She denied nausea, vomiting, chest pain, SOB, diarrhea, and urinary concerns  Procedures Performed: No orders of the defined types were placed in this encounter  Summary of Hospital Course: The patient was found to have acute diverticulitis as supported by CTAP, which showed mild acute uncomplicated sigmoid diverticulitis  Procalcitonin and lactic acid were both normal; she did not meet sepsis criteria  GI was consulted and recommendations included antibiotics and an outpatient colonoscopy in 8 weeks  Blood cultures were negative at 5 days  She was started on a 10 day course of IV Zosyn  She was made NPO on admission and her diet was slowly advanced  By the time of discharge, she was on a regular diet with high fiber  During her hospital stay, she had fevers that were well-controlled by Tylenol  At the time of discharge, she was no longer having fevers   She also had persistent diarrhea during her hospital stay, which did not improve with probiotics or loperamide  This was most likely antibiotic-induced  She was put on PO vancomycin for C diff prophylaxis  GI recommendations included checking C diff, enteric panel, and ova + parasites in her stool  Results are pending  The patient was also admitted with oral candidiasis, which was the likely cause of her sore throat and difficulty swallowing  Her decadron was held and she was started on fluconazole as well as nystatin oral suspension  However, she did not tolerate the nystatin oral suspension well due to the poor taste  Regarding her COPD, she required 3 L O2 nasal cannula to maintain an O2 sat in the low 90s  Pre-hospital inhalers were continued  Her CKD was stable  She was stable at discharge  Significant Findings, Care, Treatment and Services Provided: acute sigmoid diverticulitis, COPD requiring 3 L O2, oral candidiasis    Complications: diarrhea most likely secondary to antibiotics    Condition at Discharge: stable         Discharge instructions/Information to patient and family:   See after visit summary for information provided to patient and family  Provisions for Follow-Up Care:  See after visit summary for information related to follow-up care and any pertinent home health orders  PCP: Juan Carlos Almeida MD    Disposition: Short-term [facility] at [facility] Readmission: No    Discharge Statement   I spent 30 minutes discharging the patient  This time was spent on the day of discharge  I had direct contact with the patient on the day of discharge  Additional documentation is required if more than 30 minutes were spent on discharge  Discharge Medications:  See after visit summary for reconciled discharge medications provided to patient and family

## 2022-06-28 NOTE — PHYSICAL THERAPY NOTE
Physical Therapy Treatment Note    Patient Name: Miriam Wagner    Diagnosis: Oral candidiasis [B37 0]  Diverticulitis [K57 92]  Fever [R50 9]  Sepsis ([address] ) [A41 9]     06/28/22 1042   PT Last Visit   PT Visit Date 06/28/22   Note Type   Note Type Treatment   Pain Assessment   Pain Assessment Tool 0-10   Pain Score No Pain   Restrictions/Precautions   Weight Bearing Precautions Per Order No   Other Precautions Contact/isolation; Chair Alarm; Bed Alarm;O2;Fall Risk;Limb alert  (+3L O2 via NC)   General   Chart Reviewed Yes   Response to Previous Treatment Patient with no complaints from previous session  Family/Caregiver Present No   Cognition   Overall Cognitive Status WFL   Arousal/Participation Alert; Responsive; Cooperative   Attention Within functional limits   Orientation Level Oriented X4   Memory Within functional limits   Following Commands Follows all commands and directions without difficulty   Comments Pt agreeable to PT  Subjective   Subjective "I think I'm going to rehab today "   Bed Mobility   Rolling L 5  Supervision   Additional items Assist x 1;HOB elevated; Bedrails; Increased time required;Verbal cues   Supine to Sit 4  Minimal assistance   Additional items Assist x 1;HOB elevated; Bedrails; Increased time required;Verbal cues;LE management   Sit to Supine 5  Supervision   Additional items Assist x 1;HOB elevated; Bedrails; Increased time required;Verbal cues   Transfers   Sit to Stand   (CG assist)   Additional items Assist x 1; Increased time required;Verbal cues   Stand to Sit   (CG assist)   Additional items Assist x 1; Increased time required;Verbal cues   Ambulation/Elevation   Gait pattern Decreased toe off;Decreased heel strike;Decreased hip extension; Excessively slow; Short stride; Shuffling   Gait Assistance   (CG assist)   Additional items Assist x 1;Verbal cues   Assistive Device Rolling walker   Distance 45 feet; 1 standing rest break   Stair Management Assistance Not tested   Balance   Static Sitting Fair +   Dynamic Sitting Fair   Static Standing Fair   Dynamic Standing Fair -   Ambulatory Fair -   Endurance Deficit   Endurance Deficit Yes   Endurance Deficit Description decreased activity tolerance; pt requiring 3L O2 via NC; SpO2 at rest 90-91%; SpO2 with therapeutic exercise ranged from 88-90%; SpO2 decreased to 86% with functional mobility; increased to 90-91% with seated rest break  Activity Tolerance   Activity Tolerance Patient tolerated treatment well   Nurse Made Aware Discussed case with RN [facility]   Exercises   Quad Sets Supine;20 reps;AROM; Bilateral   Heelslides Supine;10 reps;AROM; Bilateral   Hip Abduction Supine;20 reps;AROM; Bilateral   Knee AROM Long Arc Quad Sitting;20 reps;AROM; Bilateral   Ankle Pumps Supine;20 reps;AROM; Bilateral   Assessment   Prognosis Good   Problem List Decreased strength;Decreased endurance; Impaired balance;Decreased mobility   Assessment Chart reviewed  Patient was received supine in bed in NAD and agreeable to PT session  Today's PT treatment session consisted of therapeutic activity for facilitation of transitional movements and safe performance of correct technique for bed mobility and sit to stand transfers, therapeutic exercise to increase lower extremity muscle strength, and gait training to promote safe and functional ambulation on level surfaces  In comparison to the previous session the patient required increased assistance when performing supine to sit transfer  Pt ambulated a slightly decreased distance secondary to fatigue  Pt required one standing rest break during ambulation  When ambulating pt exhibits decreased jimmy, decreased stride length, and decreased heel strike  Pt tolerated all therapeutic exercise well without complaints, but required rest breaks  Pt was received on 3L O2 via NC; SpO2 at rest 90-91%;  SpO2 with therapeutic exercise ranged from 88-90%; SpO2 decreased to 86% with functional mobility; increased to 90-91% with seated rest breaks  Pt educated on proper breathing techniques with good carryover  Overall, patient tolerated today's session well and continues to be improving as expected towards achieving their STG's  Patient's prognosis for achieving their STG's is good as evident by pt's motivation  PT intervention continues to be appropriate as the patient continues to be limited by decreased lower extremity strength, impaired balance, decreased endurance, gait deviations, and decreased functional mobility  Continue to recommend STR  PT to continue to see patient in order to address the deficits listed above and provide interventions consistent with the POC in order to achieve STG's and optimize the patient's independence with functional mobility  Barriers to Discharge Inaccessible home environment   Goals   STG Expiration Date 07/01/22   Plan   Treatment/Interventions Functional transfer training;LE strengthening/ROM; Elevations; Therapeutic exercise; Endurance training;Patient/family training;Bed mobility;Gait training;Spoke to nursing;OT   Progress Improving as expected   PT Frequency 3-5x/wk   Recommendation   PT Discharge Recommendation Post acute rehabilitation services   AM-PAC Basic Mobility Inpatient   Turning in Bed Without Bedrails 3   Lying on Back to Sitting on Edge of Flat Bed 3   Moving Bed to Chair 3   Standing Up From Chair 3   Walk in Room 3   Climb 3-5 Stairs 1   Basic Mobility Inpatient Raw Score 16   Basic Mobility Standardized Score 38 32   Highest Level Of Mobility   JH-HLM Goal 5: Stand one or more mins   JH-HLM Achieved 7: Walk 25 feet or more   Education   Education Provided Mobility training;Home exercise program;Assistive device   Patient Reinforcement needed;Demonstrates acceptance/verbal understanding     Nic Abdi, PT, DPT    Time of PT treatment session: [id number]  25 minutes

## 2022-06-28 NOTE — ASSESSMENT & PLAN NOTE
Patient has significant loose bowel movements      patient is nontoxic, abdominal exam benign, no leucocytosis    GI re-consulted for input  Patient on probiotic and C diff prophylaxis with vancomycin  Will check for C diff, Stool bacterial panel, ova and parasites per GI recs  Check stool studies  serial abdominal exams

## 2022-06-28 NOTE — CASE MANAGEMENT
Case Management Discharge Planning Note    Patient name Susanna Phoenix  Location [address] [id number]/-[id number] MRN [id number]  : 1945 Date 2022       Current Admission Date: 2022  Current Admission Diagnosis:Diverticulitis   Patient Active Problem List    Diagnosis Date Noted    Diarrhea 2022    Chemotherapy induced neutropenia ([facility] ) 2022    Diverticulitis 2022    Oral candidiasis 2022    Cellulitis 2022    Continuous opioid dependence ([place] [address] ) 2022    Malignant neoplasm of overlapping sites of right breast in female, estrogen receptor negative ([place] [address] ) 2022    COPD (chronic obstructive pulmonary disease) ([place] [address] )     Prediabetes 2021    Vitamin D deficiency 2021    Acute right ankle pain 2021    Closed avulsion fracture of lateral malleolus of right fibula 2021    Seborrheic keratoses 2020    BMI 34 0-34 9,adult 2019    Stage 3 chronic kidney disease ([facility] ) 2019    Pancreatic mass 2019    Chronic sinusitis 2018    PVD (peripheral vascular disease) ([place] [address] ) 2018    Carotid artery plaque, bilateral 2017    Restless leg syndrome 2016    Aortoiliac occlusive disease ([place] [address] ) 2015    Subclavian artery stenosis, left ([place] [address] ) 2015    Centrilobular emphysema ([place] [address] ) 2015    Anxiety 2013    Osteoporosis 2013    Hyperlipidemia 2013      LOS (days): 8  Geometric Mean LOS (GMLOS) (days): 3 50  Days to GMLOS:-4     OBJECTIVE:  Risk of Unplanned Readmission Score: 23 72         Current admission status: Inpatient   Preferred Pharmacy:   [address] [place] [address], [address]  [facility] [address]  Phone: [phone] Fax: [phone]    Primary Care Provider: Nelly Astudillo MD    Primary Insurance: MEDICARE  Secondary Insurance: [employer]    DISCHARGE DETAILS:    Discharge planning discussed with[de-identified] patient at bedside  Freedom of Choice: Yes  Comments - Freedom of Choice: CM informed by [facility]IM that patient is medically cleared for dc if [facility] can accept with C Diff pending  [facility] who confirmed with their DON they CAN accept with C Diff pending  Patient has been assigned room 108-A and they will have her O2 ready to provide for her  CM met with patient at bedside to review the dc plan and she is happy to dc today  IMM reviewed and copy provided  Patient reports she'll update family herself re: dc today once transportation is arranged  CM contacted family/caregiver?: No- see comments (Patient to update son herself )  Were Treatment Team discharge recommendations reviewed with patient/caregiver?: Yes  Did patient/caregiver verbalize understanding of patient care needs?: N/A- going to facility  Were patient/caregiver advised of the risks associated with not following Treatment Team discharge recommendations?: Yes    Other Referral/Resources/Interventions Provided:  Interventions: Transportation  Referral Comments: BLS request sent to [facility] via [facility]  CMN completed and placed on chart  Treatment Team Recommendation: Short Term Rehab  Discharge Destination Plan[de-identified] Short Term Rehab [facility])  Transport at Discharge : [facility]  Dispatcher Contacted: Yes  Number/Name of Dispatcher: Myesha at [employer] [facility] [place] and Unit #): [address]  ETA of Transport (Date): 06/28/22  ETA of Transport (Time): 0292 ([address] updated via phone  LORE and RN updated via TT; RN to inform patient of same )     Transfer Mode: Stretcher  Accompanied by: EMS personnel  Transfer Equipment: BLS devices  IMM Given (Date):: 06/28/22  IMM Given to[de-identified] Patient (reviewed at bedside, copy provided  She is happy to [employer] today )       Accepting Facility Name, [address] :  [facility]  Receiving Facility/Agency Phone Number: [phone]  Facility/Agency Fax Number: [phone]

## 2022-06-28 NOTE — ASSESSMENT & PLAN NOTE
Patient presenting to the ED with lower abdominal pain, constipation, fevers, body aches for 5 days  · CT A/P: Findings consistent with mild acute uncomplicated sigmoid diverticulitis  · Does not meet sepsis criteria  Negative procalcitonin and lactic  · Continue IV zosyn - day 7 (complete course of 10 days )  · Tolerating diet with some decreased appetite    Only current active issue seems to be diarrhea

## 2022-06-28 NOTE — PLAN OF CARE
Problem: MOBILITY - ADULT  Goal: Maintain or return to baseline ADL function  Description: INTERVENTIONS:  -  Assess patient's ability to carry out ADLs; assess patient's baseline for ADL function and identify physical deficits which impact ability to perform ADLs (bathing, care of mouth/teeth, toileting, grooming, dressing, etc )  - Assess/evaluate cause of self-care deficits   - Assess range of motion  - Assess patient's mobility; develop plan if impaired  - Assess patient's need for assistive devices and provide as appropriate  - Encourage maximum independence but intervene and supervise when necessary  - Involve family in performance of ADLs  - Assess for home care needs following discharge   - Consider OT consult to assist with ADL evaluation and planning for discharge  - Provide patient education as appropriate  6/28/2022 1640 by Vickie Akhtar RN  Outcome: Progressing  6/28/2022 1640 by Vickie Akhtar RN  Outcome: Progressing  Goal: Maintains/Returns to pre admission functional level  Description: INTERVENTIONS:  - Perform BMAT or MOVE assessment daily    - Set and communicate daily mobility goal to care team and patient/family/caregiver     - Collaborate with rehabilitation services on mobility goals if consulted  - Out of bed for toileting  - Record patient progress and toleration of activity level   6/28/2022 1640 by Vickie Akhtar RN  Outcome: Progressing  6/28/2022 1640 by Vickie Akhtar RN  Outcome: Progressing     Problem: Potential for Falls  Goal: Patient will remain free of falls  Description: INTERVENTIONS:  - Educate patient/family on patient safety including physical limitations  - Instruct patient to call for assistance with activity   - Consult OT/PT to assist with strengthening/mobility   - Keep Call bell within reach  - Keep bed low and locked with side rails adjusted as appropriate  - Keep care items and personal belongings within reach  - Initiate and maintain comfort rounds  - Make Fall Risk Sign visible to staff  - Apply yellow socks and bracelet for high fall risk patients  - Consider moving patient to room near nurses station  6/28/2022 1640 by Angelica Tolbert RN  Outcome: Progressing  6/28/2022 1640 by Angelica Tolbert RN  Outcome: Progressing     Problem: RESPIRATORY - ADULT  Goal: Achieves optimal ventilation and oxygenation  Description: INTERVENTIONS:  - Assess for changes in respiratory status  - Assess for changes in mentation and behavior  - Position to facilitate oxygenation and minimize respiratory effort  - Oxygen administered by appropriate delivery if ordered  - Initiate smoking cessation education as indicated  - Encourage broncho-pulmonary hygiene including cough, deep breathe, Incentive Spirometry  - Assess the need for suctioning and aspirate as needed  - Assess and instruct to report SOB or any respiratory difficulty  - Respiratory Therapy support as indicated  Outcome: Progressing     Problem: GASTROINTESTINAL - ADULT  Goal: Maintains or returns to baseline bowel function  Description: INTERVENTIONS:  - Assess bowel function  - Encourage oral fluids to ensure adequate hydration  - Administer IV fluids if ordered to ensure adequate hydration  - Administer ordered medications as needed  - Encourage mobilization and activity  - Consider nutritional services referral to assist patient with adequate nutrition and appropriate food choices  Outcome: Progressing  Goal: Maintains adequate nutritional intake  Description: INTERVENTIONS:  - Monitor percentage of each meal consumed  - Identify factors contributing to decreased intake, treat as appropriate  - Assist with meals as needed  - Monitor I&O, weight, and lab values if indicated  - Obtain nutrition services referral as needed  Outcome: Progressing

## 2022-06-28 NOTE — PROGRESS NOTES
Progress Note  Yasmin Ralph 78 y o  female MRN: [id number]  Unit/Bed#: -01 Encounter: [id number]    Subjective:  Patient denies any abdominal pain  She had an episode of diarrhea this am   No nausea or vomiting  No fevers  Objective:     Vitals:   Vitals:    06/28/22 0921   BP:    Pulse:    Resp:    Temp:    SpO2: 91%   ,Body mass index is 32 42 kg/m²  Intake/Output Summary (Last 24 hours) at 6/28/2022 1106  Last data filed at 6/28/2022 0935  Gross per 24 hour   Intake 300 ml   Output --   Net 300 ml       Physical Exam:    General Appearance: Alert, appears stated age and cooperative  Lungs: Clear to auscultation bilaterally, no rales or rhonchi, no labored breathing/accessory muscle use  Heart: Regular rate and rhythm, S1, S2 normal, no murmur, click, rub or gallop  Abdomen: Soft, non-tender, non-distended; bowel sounds normal; no masses or no organomegaly  Extremities: No cyanosis, edema    Invasive Devices  Report    Peripheral Intravenous Line  Duration           Peripheral IV 06/26/22 Left Hand 2 days                Lab Results:  No results displayed because visit has over 200 results  Imaging Studies:   I have personally reviewed pertinent imaging studies  CT chest abdomen pelvis wo contrast    Result Date: 6/20/2022  Narrative: CT CHEST, ABDOMEN AND PELVIS WITHOUT IV CONTRAST INDICATION:   Diffuse upper abdominal pain    COMPARISON:  None  TECHNIQUE: CT examination of the chest, abdomen and pelvis was performed without intravenous contrast  This examination was performed without intravenous contrast in the context of the critical nationwide Omnipaque shortage  Axial, sagittal, and coronal 2D reformatted images were created from the source data and submitted for interpretation  Radiation dose length product (DLP) for this visit:  964 mGy-cm     This examination, like all CT scans performed in the [facility], was performed utilizing techniques to minimize radiation dose exposure, including the use of iterative reconstruction and automated exposure control  Enteric contrast was administered  FINDINGS: CHEST LUNGS:  Moderate upper lobe predominant emphysematous changes     There is no tracheal or endobronchial lesion  PLEURA:  Unremarkable  HEART/GREAT VESSELS: Heavy atherosclerotic coronary artery calcification is noted  Heart is otherwise unremarkable  No thoracic aortic aneurysm  MEDIASTINUM AND GINNY:  Unremarkable  CHEST WALL AND LOWER NECK:  Soft tissue density within the right breast and at the right axilla compatible with recent lumpectomy and axillary lymph node dissection  A focus of fat at the right axilla with a rind of soft tissue density likely represent a  small focus of fat necrosis    ABDOMEN LIVER/BILIARY TREE:  Liver is diffusely decreased in density consistent with fatty change  No CT evidence of suspicious hepatic mass  Normal hepatic contours  No biliary dilatation  GALLBLADDER:  No calcified gallstones  No pericholecystic inflammatory change  SPLEEN:  Unremarkable  PANCREAS:  1 8 cm pancreatic head lesion (series 5, image 58) stable in size however difficult to evaluate in absence of IV contrast  ADRENAL GLANDS:  Unremarkable  KIDNEYS/URETERS:  One or more simple renal cyst(s) is noted  Otherwise unremarkable kidneys  No hydronephrosis  STOMACH AND BOWEL:  Minimal pericolonic inflammatory change involving a segment of sigmoid colon in association with sigmoid diverticula  APPENDIX:  No findings to suggest appendicitis  ABDOMINOPELVIC CAVITY:  No ascites  No pneumoperitoneum  No lymphadenopathy  VESSELS:  Atherosclerotic changes are present  No evidence of aneurysm  PELVIS REPRODUCTIVE ORGANS:  Unremarkable for patient's age  URINARY BLADDER:  Unremarkable  ABDOMINAL WALL/INGUINAL REGIONS:  Unremarkable  OSSEOUS STRUCTURES:  No acute fracture or destructive osseous lesion       Impression: No evidence of metastatic disease throughout the chest, abdomen or pelvis  Stable pancreatic head lesion  Soft tissue postsurgical changes from right breast lumpectomy and axillary node dissection  Findings consistent with mild acute uncomplicated sigmoid diverticulitis  Workstation performed: [id number]     CT head without contrast    Result Date: 6/20/2022  Narrative: CT BRAIN - WITHOUT CONTRAST INDICATION:   headache  COMPARISON:  None  TECHNIQUE:  CT examination of the brain was performed  In addition to axial images, sagittal and coronal 2D reformatted images were created and submitted for interpretation  Radiation dose length product (DLP) for this visit:  787 mGy-cm   This examination, like all CT scans performed in the [facility], was performed utilizing techniques to minimize radiation dose exposure, including the use of iterative reconstruction and automated exposure control  IMAGE QUALITY:  Diagnostic  FINDINGS: PARENCHYMA: Decreased attenuation is noted in periventricular and subcortical white matter demonstrating an appearance that is statistically most likely to represent moderate microangiopathic change  No CT signs of acute infarction  No intracranial mass, mass effect or midline shift  No acute parenchymal hemorrhage  VENTRICLES AND EXTRA-AXIAL SPACES:  Normal for the patient's age  VISUALIZED ORBITS AND PARANASAL SINUSES:  Unremarkable  CALVARIUM AND EXTRACRANIAL SOFT TISSUES:  Normal      Impression: No acute intracranial abnormality  Workstation performed: [id number]         Assessment & Plan    Acute diverticulitis  Acute diarrhea  Breast cancer on chemo     - Patient originally presented with lower abdominal pain and constipation on 6/20  CT Scan A/P 6/20 showed findings consistent with mild acute diverticulitis  - She was also recently started on chemotherapy for her breast cancer   - She was started on Zosyn IV for the acute diverticulitis and her abdominal pain has resolved    - However, during the admission she developed diarrhea with about 5 episodes daily  - Continue Zosyn course for her acute diverticulitis (she will need to complete a 10 day course of antibiotics)  - Follow up pending stool for c diff, enteric pathogens, giardia, O&P   - Continue Florastor BID  - Serial abdominal exams, supportive care  - If stool cultures are negative and diarrhea persists, we can add Questran  - Patient will need a colonoscopy as an outpatient in 6-8 weeks      The patient will be seen by Dr Noe Troncoso PA-C  6/28/2022,11:06 AM

## 2022-06-29 ENCOUNTER — TRANSITIONAL CARE MANAGEMENT (OUTPATIENT)
Dept: INTERNAL MEDICINE CLINIC | Facility: CLINIC | Age: 77
End: 2022-06-29

## 2022-06-29 LAB
C DIFF TOX GENS STL QL NAA+PROBE: NEGATIVE
CAMPYLOBACTER DNA SPEC NAA+PROBE: NORMAL
SALMONELLA DNA SPEC QL NAA+PROBE: NORMAL
SHIGA TOXIN STX GENE SPEC NAA+PROBE: NORMAL
SHIGELLA DNA SPEC QL NAA+PROBE: NORMAL

## 2022-06-29 NOTE — PROGRESS NOTES
ENCOUNTERED PROVIDER: Lyly Atkins MD      PROVIDER LOCATED AT:    [address] [place] [address] THE VISIT:   Follow-up for management of breast cancer, on active chemotherapy    Son: George    ECOG PS: 3 (sits down more than 1/2 a day, uses a walker)  AJCC 8th Edition Cancer Stage :    Cancer Staging  Malignant neoplasm of overlapping sites of right breast in female, estrogen receptor negative ([facility] [address] )  Staging form: Breast, AJCC 8th Edition  - Clinical stage from 4/4/2022: Stage IB (cT1c, cN0, cM0, G2, ER-, SD-, HER2-) - Signed by Prema John MD on 4/4/2022  Stage prefix: Initial diagnosis  Nuclear grade: G2  Histologic grading system: 3 grade system  HER2-IHC interpretation: Equivocal  HER2-IHC value: Score 2+  HER2-FISH interpretation: Negative      ASSESSMENT AND PLAN:    1  Right breast invasive ductal carcinoma, initially diagnosed in March 25, 2022, ER/SD negative   HER-2 equivocal by [facility] but negative by FISH  The tumor measures 1 9 x 1 6 x 1 0 cm on ultrasound   No clinical suspicious lymphadenopathy  [id number], Grade 2    status post right lumpectomy with lymph node dissection on April 28,2022  Overall, the patient tolerated the surgery very well  All margins negative  All lymph nodes negative  Final pathologic stage pT1c pN0  Dr Minh Vidal recommended adjuvant chemotherapy with TC for 4 cycles with Neulasta,  followed by adjuvant radiation therapy   Because of her age, and other medical comorbidities including COPD, her chemo dose was reduced by 25%, she was then admitted for a week for diverticulitis, sepsis  She had decided to d/c all further chemo and proceed to RT which I don't think is unreasonable  I will continue to follow along and defer to Surg -Onc to get mammograms ordered  Cancelling infusion chairs  2  Cellulitis in surgical site: improved s/p Cipro  3  Diarrhea: 2/2 diverticulitis and Abx  Ongoing, and watching closely  Has imodium she started taking today  4   Inherited gene predisposition:  Family history of pancreatic cancer involving her sister and brother  Mary Arceo also Kamila Carlos niece was diagnosed breast cancer and tested for BRCA mutation positive   Status post bilateral mastectomy   Patient also has a stable mass in the head of pancreas    invitae breast cancer stat panel negative  5  Bone health/osteoporosis   Last DEXA scan in February 15, 2022   Patient takes calcium, vitamin-D , doing exercise   Patient was previously on Prolia every 6 months  DEXA scan every 2 years (next due 2/2024) then can reassess if she needs more  6  Pancreatic mass that has been monitored by Dr Nery Broussard   CT scan in November 24, 2021 showed stable pancreatic head lesion measuring 1 6 x 1 6 cm   Repeat CT scan as per Dr Nery Broussard  7  CKD stage 3   Self hydration is recommended   Follow-up with primary care physician  8  Follow-up: 6 months    Discussion of decision making    I personally reviewed the following lab results, the image studies, pathology, other specialty/physicians consult notes and recommendations, and outside medical records from [facility]  I had a lengthy discussion with the patient and shared the work-up findings  We discussed the diagnosis and management plan as below  I spent 32 minutes reviewing the records (labs, clinician notes, outside records, medical history, ordering medicine/tests/procedures, interpreting the imaging/labs previously done) and coordination of care as well as direct time with the patient today, of which greater than 50% of the time was spent in counseling and coordination of care with the patient/family  HEMATOLOGY/ONCOLOGY HISTORY:     · November 24, 2021 CT scan chest abdomen pelvis showed a stable pancreatic head mass lesion no intra-abdominal abdomen are not see identified   No evidence of metastatic disease   Fatty liver    · February 15, 2022 patient had screening mammogram:  RIGHT  A) MASS: There is a high density, irregularly shaped mass seen in the right breast at 11 o'clock in the anterior depth       Left  There are no suspicious masses, grouped microcalcifications or areas of unexplained architectural distortion  The skin and nipple areolar complex are unremarkable     · February 15, 2022 DEXA scan:  Osteoporosis in the left femoral neck  · March 17, 2022 diagnostic mammogram and ultrasound:  RIGHT  A) MASS  Mammo diagnostic right w 3d & cad: There is a 14 mm x 10 mm x 9 mm high density, irregularly shaped mass seen in the right breast at 12 o'clock, 5 cm from the nipple, anterior depth   This corresponds with the mass seen on screening mammogram and is new compared to the mammogram from 2019  US breast right limited (diagnostic):  At 12 o'clock, 5 cm from nipple there is an irregular hypoechoic solid mass which measures 19 x 16 x 10 mm (see the oblique images at the end of the ultrasound study)   The mass correlates with the mass seen on the mammogram   This is highly suggestive of malignancy   Appropriate action should be taken   Ultrasound-guided core needle biopsy is recommended  B) MASS  Mammo diagnostic right w 3d & cad: There is a 5 mm oval mass with circumscribed margins seen in the upper outer quadrant of the right breast in the anterior depth  This is a new finding  US breast right limited (diagnostic): There is a 5 mm x 4 mm x 2 mm oval, parallel, anechoic mass with circumscribed margins in the right breast at 10 o'clock, 5 cm from the nipple  The cyst correlates with the mass seen on the mammogram and is benign in appearance  Axilla: Targeted ultrasound of the right axilla was performed   At least 1 morphologically benign lymph node was visualized   No morphologically abnormal lymph nodes were visualized      IMPRESSION:     Right breast:  1   Mass at 12 o'clock, 5 cm from the nipple is highly suggestive of malignancy   Appropriate action should be taken   Ultrasound-guided core needle biopsy is recommended   I discussed the imaging findings and my level of concern with the patient   She met with the nurse navigator to schedule the procedure  2  Incidentally seen is a 5 mm simple cyst in the right breast at 10 o'clock, 5 cm from the nipple   This is benign in appearance and does not require dedicated follow-up imaging  3  Ultrasound of the right axilla was performed and no abnormal lymph nodes were seen     · March 25, 2022 ultrasound-guided biopsy of right breast mass:  Final Diagnosis   A  Right breast, 12:00, 5 cm from nipple (ultrasound-guided 12 gauge core biopsy, 4 passes):     - Invasive breast carcinoma of no special type (ductal NST/invasive ductal carcinoma with apocrine features)       -- Tumor present in at least 3 tissue cores with largest measurable size of 6 mm        -- mBR Grade:  Grade 2 of 3         - Duct formation:      Score 3 of 3         - Nuclear grade:       Score 3 of 3         - Mitotic rate:            Score 1 of 3     - In situ component: Favor 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     - Lymphovascular invasion:  Not definitively identified         Addendum   Test Description                                     Result                            Prognostic Interpretation  Estrogen Receptor (clone SP-1)              0%                                  Negative              Internal control: Positive   External control: Positive                          Trae Score*:  0                                                         Progesterone Receptor (clone 1E2)         0%                                  Negative              Internal control: Positive                         External control: Positive                          Trae Score*:  0     HER2 by IHC (clone 4B5)                         2+                                  Equivocal (FISH pending)         ·  April 28, 2022 right breast lumpectomy with lymph node dissection      Final Diagnosis   A   [place] Lymph Node, Right Axillary (#1), Biopsy:  - One lymph node, negative for carcinoma (0/1)  - AE1/AE3 immunohistochemistry supports the diagnosis      B  Lymph Nodes, Right Axillary, Dissection:  - Two lymph nodes, negative for carcinoma (0/2)  - AE1/AE3 immunohistochemistry supports the diagnosis      C  Breast, Right New Posterior Margin, Excision:  - Benign breast tissue with fibrocystic changes      D  Breast, Right New Inferior Margin, Excision:  - Benign breast tissue with fibrocystic changes      E  Breast, Right New Medial Margin, Excision:  - Benign breast tissue with fibrocystic changes      F  Breast, Right New Superior Margin, Excision:  - Benign breast tissue with fibrocystic changes      G  Breast, Right New Lateral Margin, Excision:  - Benign breast tissue with fibrocystic changes      H  Breast, Right New Anterior Margin, Excision:  - Benign breast tissue with fibrocystic changes      I  Breast, Right, Lumpectomy:  - Invasive ductal carcinoma with apocrine features, Medardo Grade III (3 + 3 + 3 = 9), 14 mm in greatest dimension  See Synoptic Report and Note  - Background fibrocystic changes and prior procedural site reactive changes  - Benign skin with no specific pathologic change        [id number], triple negative  · 6/2/2020 to cellulitis and UTI, started Cipro 500 mg b i d   · 6/14/2022 TC Chemo q3 weeks x4 cycles initiated  · 6/20/2022 sepsis, diverticulitis admission to the hospital for 7 days    HISTORY OF PRESENT ILLNESS:     Ariadna Pratt is a 78 y o  female with the above-noted HemOnc history who is here for management of breast cancer  Patient has right-sided breast invasive ductal carcinoma, initially diagnosed on March 25, 2022, ER and [place] negative HER2 negative by fish  Status post right lumpectomy with lymph node dissection on April 28, 2022  Four cycles of adjuvant chemotherapy with TAC was scheduled  Unfortunately, patient had tooth extraction on June 1, 2022   She also complained of burning sensation and urgency when urinating  Patient was taking amoxicillin which did not help  Interval events: She has 5x daily watery stools  She is using a walker to ambulate at the rehab and is still regaining strength  She is using 3L O2 via [place] all the time now since hospital discharge  ROS: A 10-point of review of systems is obtained and other than the above is noncontributory  Objective  Vitals:    06/30/22 1350   Pulse: 86   Resp: (!) 89   Temp: 97 9 °F (36 6 °C)     Resp rate is actually approximately 13     PHYSICIAN EXAM:    General: Appearance: alert, cooperative, no distress  HEENT: Normocephalic, atraumatic  No scleral icterus  conjunctivae clear  EOMI  Chest: No tenderness to palpation  No open wound noted  Lungs: Significantly diminished B/S in the upper lobes b/l, no crackles/rales/wheezing, Respirations unlabored  +O2 via [place], no accessory muscle use  Cardiac: Regular rate, +S1and S2  Abdomen: Soft, non-tender, non-distended  Bowel sounds are normal   Extremities:  No edema, cyanosis, clubbing  Skin: Skin color, turgor are normal  No rashes  Lymphatics: no palpable supra-cervical, axillary, or inguinal adenopathy  Neurologic: Awake, Alert, and oriented, no gross focal deficits noted b/l  Allergies   Allergen Reactions    Spiriva Handihaler [Tiotropium Bromide Monohydrate] Shortness Of Breath    Augmentin [Amoxicillin-Pot Clavulanate] Abdominal Pain     Pt received ceftriaxone 1 g IV on 5-21-18, gets sharp pains     Tiotropium Abdominal Pain    Tylenol With Codeine #3 [Acetaminophen-Codeine] Abdominal Pain       Past Medical History:   Diagnosis Date    Anxiety     Atherosclerosis of native artery of both lower extremities with intermittent claudication ([facility] [address] ) 10/15/2015    Transitioned From: Cardiovascular Symptoms    Breast cancer ([facility] [address] )     Carotid artery plaque, bilateral 03/21/2017    Transitioned From:  Atherosclerosis of both carotid arteries    Chronic kidney disease     Chronic sinusitis     Last assessed: 13    COPD (chronic obstructive pulmonary disease) ([facility] )     COVID     21 not hopsitalized- flu-like symptoms    Fall 2021    Fracture, ankle closed, bimalleolar, right, initial encounter 2021    GERD (gastroesophageal reflux disease)     Hyperlipidemia     Lung nodule     PNA (pneumonia)     PONV (postoperative nausea and vomiting)     with C-sections    Pulmonary emphysema ([facility] )     PVD (peripheral vascular disease) ([place] [id number] )     Restless leg syndrome     Stage 3 chronic kidney disease ([facility] ) 2019    Tobacco abuse        Past Surgical History:   Procedure Laterality Date    BREAST LUMPECTOMY Right 2022    Procedure: ISAI  DIRECTED LUMPECTOMY;  Surgeon: Anika Tong MD;  Location: [facility];  Service: Surgical Oncology    CATARACT EXTRACTION       SECTION      COLONOSCOPY      Complete   Resolved: 13    DESCENDING AORTIC ANEURYSM REPAIR W/ STENT  2019    IR AORTAGRAM WITH RUN-OFF  8/15/2019    LYMPH NODE BIOPSY Right 2022    Procedure: LYMPHATIC MAPPING WITH BLUE AND RADIOACTIVE DYES, SENTINEL LYMPH NODE BIOPSY, INJECTION IN OR BY DR RODRÍGUEZ AT 1300;  Surgeon: Anika Tong MD;  Location: [facility];  Service: Surgical Oncology    SHOULDER SURGERY      For frozen shoulder     TONSILLECTOMY      US BREAST ISAI  NEEDLE LOC RIGHT Right 3/25/2022    US GUIDED BREAST BIOPSY RIGHT COMPLETE Right 3/25/2022       Family History   Problem Relation Age of Onset    No Known Problems Mother     No Known Problems Father     Arthritis Sister     Pancreatic cancer Sister 61    Melanoma Brother         twice    Prostate cancer Brother     Heart attack Family         Acute Myocardial Infarction    Cirrhosis Family     Prostate cancer Family     Skin cancer Family     Stroke Family         Syndrome    No Known Problems Daughter     No Known Problems Maternal Grandmother     No Known Problems Paternal Grandmother     No Known Problems Sister     No Known Problems Maternal Aunt     Breast cancer Other 27       Social History     Socioeconomic History    Marital status:      Spouse name: Not on file    Number of children: 2    Years of education: Not on file    Highest education level: Not on file   Occupational History    Occupation: Retired/   Tobacco Use    Smoking status: Former Smoker     Packs/day: 2 00     Years: 56 00     Pack years: 112 00     Types: Cigarettes     Start date: 1962     Quit date: 2018     Years since quittin 1    Smokeless tobacco: Never Used   Vaping Use    Vaping Use: Never used   Substance and Sexual Activity    Alcohol use: Yes     Comment: occasionally     Drug use: No    Sexual activity: Not Currently     Partners: Male   Other Topics Concern    Not on file   Social History Narrative    Living w/adult children    No advance directive on file     Social Determinants of Health     Financial Resource Strain: Not on file   Food Insecurity: Not on file   Transportation Needs: No Transportation Needs    Lack of Transportation (Medical): No    Lack of Transportation (Non-Medical):  No   Physical Activity: Inactive    Days of Exercise per Week: 0 days    Minutes of Exercise per Session: 0 min   Stress: Stress Concern Present    Feeling of Stress : Very much   Social Connections: Not on file   Intimate Partner Violence: Not on file   Housing Stability: Low Risk     Unable to Pay for Housing in the Last Year: No    Number of Places Lived in the Last Year: 1    Unstable Housing in the Last Year: No       Current Outpatient Medications   Medication Sig Dispense Refill    al mag oxide-diphenhydramine-lidocaine viscous (MAGIC MOUTHWASH) 1:1:1 suspension Swish and spit 10 mL every 4 (four) hours as needed for mouth pain or discomfort for up to 21 days 240 mL 0    albuterol (2 5 mg/3 mL) 0 083 % nebulizer solution Take 1 vial (2 5 mg total) by nebulization every 6 (six) hours as needed for wheezing or shortness of breath 360 mL 3    albuterol (PROVENTIL HFA,VENTOLIN HFA) 90 mcg/act inhaler Inhale 2 puffs every 6 (six) hours as needed for wheezing 3 Inhaler 3    ALPRAZolam (XANAX) 0 5 mg tablet Take 1 tablet (0 5 mg total) by mouth 2 (two) times a day as needed for anxiety 60 tablet 0    ASPIRIN 81 PO Take by mouth      dexamethasone (DECADRON) 4 mg tablet Take 2 tablets by mouth (8mg) with meals in the morning  Take 2 tablets (8mg) with meals in the evening  Do this for three days, the day before treatment, the day of treatment and the day after treatment  6 tablet 2    dexamethasone (DECADRON) 4 mg tablet Take 1 tablet (4 mg total) by mouth 2 (two) times a day with meals Take 2 tablets by mouth (8mg) with meals in the morning  Take 2 tablets (8mg) with meals in the evening  Do this for three days, the day before treatment, the day of treatment and the day after treatment  6 tablet 0    ergocalciferol (VITAMIN D2) 50,000 units take 1 capsule by mouth every week 12 capsule 0    fluconazole (DIFLUCAN) 200 mg tablet Take 1 tablet (200 mg total) by mouth daily for 14 days 14 tablet 0    fluticasone (FLONASE) 50 mcg/act nasal spray 2 sprays into each nostril daily 11 1 mL 1    fluticasone-vilanterol (Breo Ellipta) 100-25 mcg/inh inhaler Inhale 1 puff daily Rinse mouth after use  180 blister 1    gabapentin (NEURONTIN) 300 mg capsule take 1 capsule by mouth twice a day 180 capsule 1    gabapentin (NEURONTIN) 400 mg capsule Take 1 capsule (400 mg total) by mouth daily at bedtime 90 capsule 1    Lidocaine Viscous HCl (XYLOCAINE) 2 % mucosal solution       naloxone (NARCAN) 4 mg/0 1 mL nasal spray Administer 1 spray into a nostril  If no response after 2-3 minutes, give another dose in the other nostril using a new spray   1 each 1    olopatadine (PATANOL) 0 1 % ophthalmic solution    0    ondansetron (ZOFRAN) 8 mg tablet Take 1 tablet (8 mg total) by mouth every 8 (eight) hours as needed for nausea or vomiting 20 tablet 2    oxyCODONE-acetaminophen (Percocet) 5-325 mg per tablet Take 1 tablet by mouth every 6 (six) hours as needed for moderate pain Max Daily Amount: 4 tablets 20 tablet 0    piperacillin-tazobactam 3 375 g in sodium chloride 0 9 % 100 mL IVPB Infuse 3 375 g into a venous catheter every 6 (six) hours for 10 days  0    rosuvastatin (CRESTOR) 20 MG tablet Take 1 tablet (20 mg total) by mouth daily 90 tablet 6    saccharomyces boulardii (FLORASTOR) 250 mg capsule Take 1 capsule (250 mg total) by mouth 2 (two) times a day  0    sodium chloride () 2 % hypertonic ophthalmic solution Sue 128 2 % eye drops      vancomycin (VANCOCIN) 50mg/mL SOLN Take 2 5 mL (125 mg total) by mouth every 6 (six) hours for 10 days 100 mL 0     No current facility-administered medications for this visit  DATA REVIEW:    Pathology Result:    Final Diagnosis   Date Value Ref Range Status   04/28/2022   Final    A  [place] Lymph Node, Right Axillary (#1), Biopsy:  - One lymph node, negative for carcinoma (0/1)  - AE1/AE3 immunohistochemistry supports the diagnosis  B  Lymph Nodes, Right Axillary, Dissection:  - Two lymph nodes, negative for carcinoma (0/2)  - AE1/AE3 immunohistochemistry supports the diagnosis  C  Breast, Right New Posterior Margin, Excision:  - Benign breast tissue with fibrocystic changes  D  Breast, Right New Inferior Margin, Excision:  - Benign breast tissue with fibrocystic changes  E  Breast, Right New Medial Margin, Excision:  - Benign breast tissue with fibrocystic changes  F  Breast, Right New Superior Margin, Excision:  - Benign breast tissue with fibrocystic changes  G  Breast, Right New Lateral Margin, Excision:  - Benign breast tissue with fibrocystic changes      H  Breast, Right New Anterior Margin, Excision:  - Benign breast tissue with fibrocystic changes  I  Breast, Right, Lumpectomy:  - Invasive ductal carcinoma with apocrine features, Medardo Grade III (3 + 3 + 3 = 9), 14 mm in greatest dimension  See Synoptic Report and Note  - Background fibrocystic changes and prior procedural site reactive changes  - Benign skin with no specific pathologic change  03/25/2022   Final    A  Right breast, 12:00, 5 cm from nipple (ultrasound-guided 12 gauge core biopsy, 4 passes):     - Invasive breast carcinoma of no special type (ductal NST/invasive ductal carcinoma with apocrine features)  -- Tumor present in at least 3 tissue cores with largest measurable size of 6 mm  -- mBR Grade:  Grade 2 of 3         - Duct formation: Score 3 of 3         - Nuclear grade: Score 3 of 3         - Mitotic rate: Score 1 of 3     - In situ component: Favor 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     - Lymphovascular invasion:  Not definitively identified    Comment:  Block A2 forwarded for ER/VT/HER2 analysis with the results to be given in a supplemental report  Comment: This is an appended report  These results have been appended to a previously preliminary verified report  05/28/2019   Final    A  Esophagus, biopsy:             - Reactive squamous mucosa  - Benign oxyntic mucosa  - No intestinal metaplasia, dysplasia or malignancy is identified  Interpretation performed at [address]           Image Results: They are reviewed and documented in Hematology/Oncology history    CT chest abdomen pelvis wo contrast  Narrative: CT CHEST, ABDOMEN AND PELVIS WITHOUT IV CONTRAST    INDICATION:   Diffuse upper abdominal pain       COMPARISON:  None      TECHNIQUE: CT examination of the chest, abdomen and pelvis was performed without intravenous contrast  This examination was performed without intravenous contrast in the context of the critical nationwide Omnipaque shortage  Axial, sagittal, and coronal   2D reformatted images were created from the source data and submitted for interpretation  Radiation dose length product (DLP) for this visit:  964 mGy-cm   This examination, like all CT scans performed in the [facility], was performed utilizing techniques to minimize radiation dose exposure, including the use of iterative   reconstruction and automated exposure control  Enteric contrast was administered  FINDINGS:    CHEST    LUNGS:  Moderate upper lobe predominant emphysematous changes     There is no tracheal or endobronchial lesion  PLEURA:  Unremarkable  HEART/GREAT VESSELS: Heavy atherosclerotic coronary artery calcification is noted  Heart is otherwise unremarkable  No thoracic aortic aneurysm  MEDIASTINUM AND GINNY:  Unremarkable  CHEST WALL AND LOWER NECK:  Soft tissue density within the right breast and at the right axilla compatible with recent lumpectomy and axillary lymph node dissection  A focus of fat at the right axilla with a rind of soft tissue density likely represent a   small focus of fat necrosis       ABDOMEN    LIVER/BILIARY TREE:  Liver is diffusely decreased in density consistent with fatty change  No CT evidence of suspicious hepatic mass  Normal hepatic contours  No biliary dilatation  GALLBLADDER:  No calcified gallstones  No pericholecystic inflammatory change  SPLEEN:  Unremarkable  PANCREAS:  1 8 cm pancreatic head lesion (series 5, image 58) stable in size however difficult to evaluate in absence of IV contrast     ADRENAL GLANDS:  Unremarkable  KIDNEYS/URETERS:  One or more simple renal cyst(s) is noted  Otherwise unremarkable kidneys  No hydronephrosis  STOMACH AND BOWEL:  Minimal pericolonic inflammatory change involving a segment of sigmoid colon in association with sigmoid diverticula  APPENDIX:  No findings to suggest appendicitis  ABDOMINOPELVIC CAVITY:  No ascites    No pneumoperitoneum  No lymphadenopathy  VESSELS:  Atherosclerotic changes are present  No evidence of aneurysm  PELVIS    REPRODUCTIVE ORGANS:  Unremarkable for patient's age  URINARY BLADDER:  Unremarkable  ABDOMINAL WALL/INGUINAL REGIONS:  Unremarkable  OSSEOUS STRUCTURES:  No acute fracture or destructive osseous lesion  Impression: No evidence of metastatic disease throughout the chest, abdomen or pelvis  Stable pancreatic head lesion  Soft tissue postsurgical changes from right breast lumpectomy and axillary node dissection  Findings consistent with mild acute uncomplicated sigmoid diverticulitis  Workstation performed: [id number]  CT head without contrast  Narrative: CT BRAIN - WITHOUT CONTRAST    INDICATION:   headache  COMPARISON:  None  TECHNIQUE:  CT examination of the brain was performed  In addition to axial images, sagittal and coronal 2D reformatted images were created and submitted for interpretation  Radiation dose length product (DLP) for this visit:  787 mGy-cm   This examination, like all CT scans performed in the [facility], was performed utilizing techniques to minimize radiation dose exposure, including the use of iterative   reconstruction and automated exposure control  IMAGE QUALITY:  Diagnostic  FINDINGS:    PARENCHYMA: Decreased attenuation is noted in periventricular and subcortical white matter demonstrating an appearance that is statistically most likely to represent moderate microangiopathic change  No CT signs of acute infarction  No intracranial mass, mass effect or midline shift  No acute parenchymal hemorrhage  VENTRICLES AND EXTRA-AXIAL SPACES:  Normal for the patient's age  VISUALIZED ORBITS AND PARANASAL SINUSES:  Unremarkable  CALVARIUM AND EXTRACRANIAL SOFT TISSUES:  Normal   Impression: No acute intracranial abnormality      Workstation performed: [id number]        LABS:  Lab data are reviewed and documented in HemOnc history  No results found for this or any previous visit (from the past 48 hour(s))

## 2022-06-30 ENCOUNTER — OFFICE VISIT (OUTPATIENT)
Dept: HEMATOLOGY ONCOLOGY | Facility: CLINIC | Age: 77
End: 2022-06-30
Payer: MEDICARE

## 2022-06-30 ENCOUNTER — TELEPHONE (OUTPATIENT)
Dept: HEMATOLOGY ONCOLOGY | Facility: CLINIC | Age: 77
End: 2022-06-30

## 2022-06-30 VITALS — RESPIRATION RATE: 89 BRPM | TEMPERATURE: 97.9 F | HEART RATE: 86 BPM

## 2022-06-30 DIAGNOSIS — Z17.1 MALIGNANT NEOPLASM OF OVERLAPPING SITES OF RIGHT BREAST IN FEMALE, ESTROGEN RECEPTOR NEGATIVE (HCC): Primary | ICD-10-CM

## 2022-06-30 DIAGNOSIS — C50.811 MALIGNANT NEOPLASM OF OVERLAPPING SITES OF RIGHT BREAST IN FEMALE, ESTROGEN RECEPTOR NEGATIVE (HCC): Primary | ICD-10-CM

## 2022-06-30 PROCEDURE — 99214 OFFICE O/P EST MOD 30 MIN: CPT | Performed by: INTERNAL MEDICINE

## 2022-06-30 RX ORDER — LIDOCAINE HYDROCHLORIDE 20 MG/ML
SOLUTION OROPHARYNGEAL
COMMUNITY
Start: 2022-06-15

## 2022-06-30 NOTE — TELEPHONE ENCOUNTER
Spoke with the patient and she wants to wait on scheduling this as she has too many other things going on right now  She knows she needs it and will call us back to schedule this

## 2022-07-01 NOTE — TELEPHONE ENCOUNTER
Left message for patient that all treatments have been canceled  Advised patient to call back if she has any questions

## 2022-07-03 LAB
G LAMBLIA AG STL QL IA: NEGATIVE
O+P STL CONC: NORMAL

## 2022-07-08 ENCOUNTER — OFFICE VISIT (OUTPATIENT)
Dept: SURGICAL ONCOLOGY | Facility: CLINIC | Age: 77
End: 2022-07-08

## 2022-07-08 VITALS
HEIGHT: 60 IN | HEART RATE: 92 BPM | WEIGHT: 162 LBS | SYSTOLIC BLOOD PRESSURE: 92 MMHG | TEMPERATURE: 97.5 F | BODY MASS INDEX: 31.8 KG/M2 | RESPIRATION RATE: 18 BRPM | DIASTOLIC BLOOD PRESSURE: 64 MMHG | OXYGEN SATURATION: 97 %

## 2022-07-08 DIAGNOSIS — G25.81 RLS (RESTLESS LEGS SYNDROME): ICD-10-CM

## 2022-07-08 DIAGNOSIS — C50.811 MALIGNANT NEOPLASM OF OVERLAPPING SITES OF RIGHT BREAST IN FEMALE, ESTROGEN RECEPTOR NEGATIVE (HCC): Primary | ICD-10-CM

## 2022-07-08 DIAGNOSIS — Z17.1 MALIGNANT NEOPLASM OF OVERLAPPING SITES OF RIGHT BREAST IN FEMALE, ESTROGEN RECEPTOR NEGATIVE (HCC): Primary | ICD-10-CM

## 2022-07-08 PROCEDURE — 99024 POSTOP FOLLOW-UP VISIT: CPT | Performed by: SURGERY

## 2022-07-08 RX ORDER — GABAPENTIN 400 MG/1
CAPSULE ORAL
Qty: 90 CAPSULE | Refills: 1 | Status: SHIPPED | OUTPATIENT
Start: 2022-07-08

## 2022-07-08 NOTE — PROGRESS NOTES
Surgical Oncology Follow Up  [facility]  [facility] [place]  [address]  [place] [address] [place] [id number]    Rakehs Noyola  1945  [phone]      Chief Complaint   Patient presents with    Follow-up        Assessment & Plan:   Is here for follow-up with right breast cancer she is undergoing rehab recovery from recent diagnosis of diverticulitis and on IV antibiotics  She has seen by medical oncologist and patient has decided not to have any adjuvant chemotherapy  She states that she will continue follow-up with me  She we did discuss that she needs for at adjuvant radiation she agrees about a  We will refer her to Radiation Oncology  We will also follow her in 3 months    Cancer History:     Oncology History Overview Note   with multiple comorbidities recently diagnosed with clinical stage T2N0 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   Tumor cells are ER/IA/H2N (-)  Her case was presented at Multidisciplinary Breast Tumor Board on 4/4/22  Preliminary recommendation was surgery followed by chemotherapy followed by adjuvant radiation pending final pathology  She was seen in consult at the breast [place] [address] clinic on 4/4/22  She underwent right breast lumpectomy on 4/28/22 and then started adjuvant chemotherapy  Her chemo dose was reduced by 25% because of age and other comorbidities  She was then admitted to the hospital for diverticulitis  Plan was made to discontinue chemotherapy and proceed with Radiation therapy  She presents today for follow up      4/28/22 ISAI  DIRECTED LUMPECTOMY (Right Breast) with sentinel lymph node biopsy    5/4/22 Dr Gregory Washburn  40 cc of seroma was aspirated     5/18/22 Dr Gregory Wasbhurn  Reviewed path  Plan for adjuvant chemotherapy and radiation  Follow up 6 months    5/19/22 Dr Ayleen Grayson  Final pathologic stage pT1c pN0  I recommend adjuvant chemotherapy with TC for 4 cycles with Neulasta,  followed by adjuvant radiation therapy  Because of her age, and other medical comorbidities including COPD, I will decrease chemo dose by 25%  22 Dr Candance Gala  postpone chemotherapy in 2 weeks because of tooth extraction and cellulitis in surgical site  22 Dr Pat Arias  discomfort in her right breast    30 cc of seroma was aspirated     22 Infusion    22 Hospitalization for acute diverticulitis   · Does not meet sepsis criteria      22 CT C/A/P wo contrast  No evidence of metastatic disease throughout the chest, abdomen or pelvis  Stable pancreatic head lesion  Soft tissue postsurgical changes from right breast lumpectomy and axillary node dissection  Findings consistent with mild acute uncomplicated sigmoid diverticulitis  22 Dr Paras Lee  She had decided to d/c all further chemo and proceed to RT which  is not unreasonable  will continue to follow along and defer to Surg -Onc to get mammograms ordered  Cancelling infusion chairs  Upcomin22 Dr Pat Arias  22 Medical Oncology     Malignant neoplasm of overlapping sites of right breast in female, estrogen receptor negative ([facility] [address] )   3/25/2022 Initial Diagnosis    Malignant neoplasm of right female breast ([facility] [address] )     3/25/2022 Biopsy    Right breast ultrasound guided biopsy  12 o'clock 5 cm from nipple  Invasive breast carcinoma of no special type (ductal NST/ invasive ductal carcinoma with apocrine features)  Grade 2  ER 0  OK 0  HER 2 2+   FISH negative  Lymphovascular invasion not identified    Concordant  Malignancy is unifocal  Mass measures 1 4 cm on mammogram and 1 9 cm on ultrasound  Right axilla ultrasound clear  Left brest clear  Amanda  reflector placed  In situ compononent: 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       2022 -  Cancer Staged    Staging form: Breast, AJCC 8th Edition  - Clinical stage from 2022: Stage IB (cT1c, cN0, cM0, G2, ER-, OK-, HER2-) - Signed by Fidel Louis MD on 2022  Stage prefix: Initial diagnosis  Nuclear grade: G2  Histologic grading system: 3 grade system  HER2-IHC interpretation: Equivocal  HER2-IHC value: Score 2+  HER2-FISH interpretation: Negative       4/4/2022 Genetic Testing    [facility]  A total of 36 genes were evaluated, including: GILL, BRCA1, BRCA2, CDH1, CHEK2, PALB2, PTEN, STK11, TP53  Negative result  No pathogenic sequence variants or deletions/duplications identified     4/28/2022 Surgery    Right breast lexy  directed lumpectomy with sentinel lymph node biopsy and axillary dissection  Invasive ductal carcinoma with apocrine features  Grade 3  1 4 cm  0/4 Lymph nodes       6/14/2022 - 6/15/2022 Chemotherapy    Pegfilgrastim-bmez (ZIEXTENZO), 6 mg, Subcutaneous, Once, 1 of 4 cycles  Administration: 6 mg (6/15/2022)  cyclophosphamide (CYTOXAN) IVPB, 450 mg/m2 = 778 mg (75 % of original dose 600 mg/m2), Intravenous, Once, 1 of 4 cycles  Dose modification: 450 mg/m2 (75 % of original dose 600 mg/m2, Cycle 1, Reason: Dose Not Tolerated)  Administration: 778 mg (6/14/2022)  DOCEtaxel (TAXOTERE) chemo infusion, 56 25 mg/m2 = 97 4 mg (75 % of original dose 75 mg/m2), Intravenous, Once, 1 of 4 cycles  Dose modification: 56 25 mg/m2 (75 % of original dose 75 mg/m2, Cycle 1, Reason: Dose Not Tolerated)  Administration: 97 4 mg (6/14/2022)           Interval History:   Follow-up with breast cancer  Review of Systems:   Review of Systems   Constitutional: Negative for chills and fever  HENT: Negative for ear pain and sore throat  Eyes: Negative for pain and visual disturbance  Respiratory: Negative for cough and shortness of breath  Cardiovascular: Negative for chest pain and palpitations  Gastrointestinal: Negative for abdominal pain and vomiting  Genitourinary: Negative for dysuria and hematuria  Musculoskeletal: Negative for arthralgias and back pain  Skin: Negative for color change and rash  Neurological: Negative for seizures and syncope     All other systems reviewed and are negative  Past Medical History     Patient Active Problem List   Diagnosis    Anxiety    Aortoiliac occlusive disease ([address] )    Carotid artery plaque, bilateral    Centrilobular emphysema (HCC)    Hyperlipidemia    Osteoporosis    Restless leg syndrome    Subclavian artery stenosis, left (HCC)    PVD (peripheral vascular disease) (HCC)    Chronic sinusitis    Pancreatic mass    BMI 34 0-34 9,adult    Seborrheic keratoses    Stage 3 chronic kidney disease (HCC)    Closed avulsion fracture of lateral malleolus of right fibula    Prediabetes    Vitamin D deficiency    Acute right ankle pain    COPD (chronic obstructive pulmonary disease) (HCC)    Malignant neoplasm of overlapping sites of right breast in female, estrogen receptor negative ([address] )    Continuous opioid dependence ([address] )    Cellulitis    Diverticulitis    Oral candidiasis    Chemotherapy induced neutropenia ([address] )    Diarrhea     Past Medical History:   Diagnosis Date    Anxiety     Atherosclerosis of native artery of both lower extremities with intermittent claudication ([address] ) 10/15/2015    Transitioned From: Cardiovascular Symptoms    Breast cancer ([address] )     Carotid artery plaque, bilateral 03/21/2017    Transitioned From:  Atherosclerosis of both carotid arteries    Chronic kidney disease     Chronic sinusitis     Last assessed: 11/11/13    COPD (chronic obstructive pulmonary disease) ([address] )     COVID     12/25/21 not hopsitalized- flu-like symptoms    Fall 06/16/2021    Fracture, ankle closed, bimalleolar, right, initial encounter 06/2021    GERD (gastroesophageal reflux disease)     Hyperlipidemia     Lung nodule     PNA (pneumonia)     PONV (postoperative nausea and vomiting)     with C-sections    Pulmonary emphysema ([address] )     PVD (peripheral vascular disease) ([address] )     Restless leg syndrome     Stage 3 chronic kidney disease ([address] ) 04/22/2019    Tobacco abuse      Past Surgical History:   Procedure Laterality Date    BREAST LUMPECTOMY Right 2022    Procedure: ISAI  DIRECTED LUMPECTOMY;  Surgeon: Daly Kaur MD;  Location: [facility];  Service: Surgical Oncology    CATARACT EXTRACTION       SECTION      COLONOSCOPY      Complete  Resolved: 13    DESCENDING AORTIC ANEURYSM REPAIR W/ STENT  2019    IR AORTAGRAM WITH RUN-OFF  8/15/2019    LYMPH NODE BIOPSY Right 2022    Procedure: LYMPHATIC MAPPING WITH BLUE AND RADIOACTIVE DYES, SENTINEL LYMPH NODE BIOPSY, INJECTION IN OR BY DR RODRÍGUEZ AT 1300;  Surgeon: Daly Kaur MD;  Location: [facility];  Service: Surgical Oncology    SHOULDER SURGERY      For frozen shoulder     TONSILLECTOMY      US BREAST ISAI  NEEDLE LOC RIGHT Right 3/25/2022    US GUIDED BREAST BIOPSY RIGHT COMPLETE Right 3/25/2022     Family History   Problem Relation Age of Onset    No Known Problems Mother     No Known Problems Father     Arthritis Sister     Pancreatic cancer Sister 61    Melanoma Brother         twice    Prostate cancer Brother     Heart attack Family         Acute Myocardial Infarction    Cirrhosis Family     Prostate cancer Family     Skin cancer Family     Stroke Family         Syndrome    No Known Problems Daughter     No Known Problems Maternal Grandmother     No Known Problems Paternal Grandmother     No Known Problems Sister     No Known Problems Maternal Aunt     Breast cancer Other 27     Social History     Socioeconomic History    Marital status:       Spouse name: Not on file    Number of children: 2    Years of education: Not on file    Highest education level: Not on file   Occupational History    Occupation: Retired/   Tobacco Use    Smoking status: Former Smoker     Packs/day: 2 00     Years: 56 00     Pack years: 112 00     Types: Cigarettes     Start date: 1962     Quit date: 2018     Years since quittin 1  Smokeless tobacco: Never Used   Vaping Use    Vaping Use: Never used   Substance and Sexual Activity    Alcohol use: Yes     Comment: occasionally     Drug use: No    Sexual activity: Not Currently     Partners: Male   Other Topics Concern    Not on file   Social History Narrative    Living w/adult children    No advance directive on file     Social Determinants of Health     Financial Resource Strain: Not on file   Food Insecurity: Not on file   Transportation Needs: No Transportation Needs    Lack of Transportation (Medical): No    Lack of Transportation (Non-Medical): No   Physical Activity: Inactive    Days of Exercise per Week: 0 days    Minutes of Exercise per Session: 0 min   Stress: Stress Concern Present    Feeling of Stress : Very much   Social Connections: Not on file   Intimate Partner Violence: Not on file   Housing Stability: Low Risk     Unable to Pay for Housing in the Last Year: No    Number of Places Lived in the Last Year: 1    Unstable Housing in the Last Year: No       Current Outpatient Medications:     albuterol (2 5 mg/3 mL) 0 083 % nebulizer solution, Take 1 vial (2 5 mg total) by nebulization every 6 (six) hours as needed for wheezing or shortness of breath, Disp: 360 mL, Rfl: 3    albuterol (PROVENTIL HFA,VENTOLIN HFA) 90 mcg/act inhaler, Inhale 2 puffs every 6 (six) hours as needed for wheezing, Disp: 3 Inhaler, Rfl: 3    ALPRAZolam (XANAX) 0 5 mg tablet, Take 1 tablet (0 5 mg total) by mouth 2 (two) times a day as needed for anxiety, Disp: 60 tablet, Rfl: 0    ASPIRIN 81 PO, Take by mouth, Disp: , Rfl:     dexamethasone (DECADRON) 4 mg tablet, Take 2 tablets by mouth (8mg) with meals in the morning  Take 2 tablets (8mg) with meals in the evening  Do this for three days, the day before treatment, the day of treatment and the day after treatment  , Disp: 6 tablet, Rfl: 2    dexamethasone (DECADRON) 4 mg tablet, Take 1 tablet (4 mg total) by mouth 2 (two) times a day with meals Take 2 tablets by mouth (8mg) with meals in the morning  Take 2 tablets (8mg) with meals in the evening  Do this for three days, the day before treatment, the day of treatment and the day after treatment  , Disp: 6 tablet, Rfl: 0    ergocalciferol (VITAMIN D2) 50,000 units, take 1 capsule by mouth every week, Disp: 12 capsule, Rfl: 0    fluticasone (FLONASE) 50 mcg/act nasal spray, 2 sprays into each nostril daily, Disp: 11 1 mL, Rfl: 1    fluticasone-vilanterol (Breo Ellipta) 100-25 mcg/inh inhaler, Inhale 1 puff daily Rinse mouth after use , Disp: 180 blister, Rfl: 1    gabapentin (NEURONTIN) 300 mg capsule, take 1 capsule by mouth twice a day, Disp: 180 capsule, Rfl: 1    gabapentin (NEURONTIN) 400 mg capsule, take 1 capsule by mouth at bedtime, Disp: 90 capsule, Rfl: 1    Lidocaine Viscous HCl (XYLOCAINE) 2 % mucosal solution, , Disp: , Rfl:     naloxone (NARCAN) 4 mg/0 1 mL nasal spray, Administer 1 spray into a nostril   If no response after 2-3 minutes, give another dose in the other nostril using a new spray , Disp: 1 each, Rfl: 1    olopatadine (PATANOL) 0 1 % ophthalmic solution,  , Disp: , Rfl: 0    ondansetron (ZOFRAN) 8 mg tablet, Take 1 tablet (8 mg total) by mouth every 8 (eight) hours as needed for nausea or vomiting, Disp: 20 tablet, Rfl: 2    oxyCODONE-acetaminophen (Percocet) 5-325 mg per tablet, Take 1 tablet by mouth every 6 (six) hours as needed for moderate pain Max Daily Amount: 4 tablets, Disp: 20 tablet, Rfl: 0    rosuvastatin (CRESTOR) 20 MG tablet, Take 1 tablet (20 mg total) by mouth daily, Disp: 90 tablet, Rfl: 6    saccharomyces boulardii (FLORASTOR) 250 mg capsule, Take 1 capsule (250 mg total) by mouth 2 (two) times a day, Disp: , Rfl: 0    sodium chloride () 2 % hypertonic ophthalmic solution, Sue 128 2 % eye drops, Disp: , Rfl:   Allergies   Allergen Reactions    Spiriva Handihaler [Tiotropium Bromide Monohydrate] Shortness Of Breath    Augmentin [Amoxicillin-Pot Clavulanate] Abdominal Pain     Pt received ceftriaxone 1 g IV on 5-21-18, gets sharp pains     Tiotropium Abdominal Pain    Tylenol With Codeine #3 [Acetaminophen-Codeine] Abdominal Pain       Physical Exam:     Vitals:    07/08/22 1412   BP: 92/64   Pulse: 92   Resp: 18   Temp: 97 5 °F (36 4 °C)   SpO2: 97%     Physical Exam  Constitutional:       Appearance: Normal appearance  HENT:      Head: Normocephalic and atraumatic  Nose: Nose normal       Mouth/Throat:      Mouth: Mucous membranes are moist    Eyes:      Pupils: Pupils are equal, round, and reactive to light  Cardiovascular:      Rate and Rhythm: Normal rate  Pulses: Normal pulses  Heart sounds: Normal heart sounds  Pulmonary:      Effort: Pulmonary effort is normal       Breath sounds: Normal breath sounds  Chest:      Comments: Well-healed right breast incision no evidence of seroma  No evidence of surgical site infection  No suspicious mass or masses  Left breast examination is unremarkable no palpable mass or masses  Bilateral axillary and supraclavicular examination no palpable adenopathy  Abdominal:      General: Bowel sounds are normal       Palpations: Abdomen is soft  Musculoskeletal:         General: Normal range of motion  Cervical back: Normal range of motion and neck supple  Skin:     General: Skin is warm  Neurological:      General: No focal deficit present  Mental Status: She is alert and oriented to person, place, and time  Psychiatric:         Mood and Affect: Mood normal          Behavior: Behavior normal          Thought Content: Thought content normal          Judgment: Judgment normal            Results & Discussion: This is a 65-year-old female with right breast cancer status post breast conservation surgery  She recent diagnosis COPD  And on home oxygen  She is at recovery at rehab for recent diagnosis of diverticulitis as well    We will refer her to Radiation oncologist   We will follow her in 3 months  she understands and  agrees   All patient questions were answered  Advance Care Planning/Advance Directives: Adán Plascencia MD discussed the disease status with Yemi Chavez  today 07/08/22  treatment plans and follow-up with the patient

## 2022-07-09 ENCOUNTER — TELEPHONE (OUTPATIENT)
Dept: OTHER | Facility: OTHER | Age: 77
End: 2022-07-09

## 2022-07-09 NOTE — TELEPHONE ENCOUNTER
Patient called in stating she had a lvm from Dr Giovanna Baumgarten and just received the message now as she does not get good service at the rehab she is in, she states that if it is non urgent to please wait until Monday  Left a message for Dr Giovanna Baumgarten via TC to make aware of her receipt

## 2022-07-11 ENCOUNTER — TELEPHONE (OUTPATIENT)
Dept: OTHER | Facility: HOSPITAL | Age: 77
End: 2022-07-11

## 2022-07-11 NOTE — TELEPHONE ENCOUNTER
I called Luis Alcaraz as another referral was placed in my [address]  As she told the  Greg Posey in Radiation Oncology last week, she will call when she gets out of Rehab to see Dr Feliz Negron

## 2022-07-11 NOTE — TELEPHONE ENCOUNTER
Called patient mad her aware the call Dr Emmanuelle Hoyos made to her on Friday was just to see if she could come earlier for her appointment  There were no concerns  Patient stated understanding and stated that she was anxious so she was appreciative of the call  All questions answered at this time

## 2022-07-13 DIAGNOSIS — G25.81 RLS (RESTLESS LEGS SYNDROME): ICD-10-CM

## 2022-07-13 DIAGNOSIS — J44.9 CHRONIC OBSTRUCTIVE PULMONARY DISEASE, UNSPECIFIED COPD TYPE (HCC): ICD-10-CM

## 2022-07-14 ENCOUNTER — TELEPHONE (OUTPATIENT)
Dept: INTERNAL MEDICINE CLINIC | Facility: CLINIC | Age: 77
End: 2022-07-14

## 2022-07-14 RX ORDER — GABAPENTIN 300 MG/1
300 CAPSULE ORAL 2 TIMES DAILY
Qty: 180 CAPSULE | Refills: 0 | Status: SHIPPED | OUTPATIENT
Start: 2022-07-14 | End: 2022-10-08

## 2022-07-15 ENCOUNTER — RA CDI HCC (OUTPATIENT)
Dept: OTHER | Facility: HOSPITAL | Age: 77
End: 2022-07-15

## 2022-07-15 NOTE — PROGRESS NOTES
[address]  coding opportunities       Chart reviewed, no opportunity found: CHART REVIEWED, NO OPPORTUNITY FOUND        Patients Insurance     Medicare Insurance: Medicare

## 2022-07-22 ENCOUNTER — TELEPHONE (OUTPATIENT)
Dept: INTERNAL MEDICINE CLINIC | Facility: CLINIC | Age: 77
End: 2022-07-22

## 2022-07-22 NOTE — TELEPHONE ENCOUNTER
Pt is scheduled for TCM appt on 07/29/22    Missed appt last week because it was too hot    She was told by [facility] that they can't see her for rehab if she's not seen earlier, no spots available, please advise, call back

## 2022-07-27 ENCOUNTER — OFFICE VISIT (OUTPATIENT)
Dept: SURGICAL ONCOLOGY | Facility: CLINIC | Age: 77
End: 2022-07-27

## 2022-07-27 ENCOUNTER — APPOINTMENT (OUTPATIENT)
Dept: LAB | Facility: HOSPITAL | Age: 77
End: 2022-07-27
Payer: MEDICARE

## 2022-07-27 VITALS
HEART RATE: 88 BPM | WEIGHT: 158.5 LBS | TEMPERATURE: 98 F | BODY MASS INDEX: 31.12 KG/M2 | RESPIRATION RATE: 18 BRPM | OXYGEN SATURATION: 98 % | DIASTOLIC BLOOD PRESSURE: 60 MMHG | SYSTOLIC BLOOD PRESSURE: 94 MMHG | HEIGHT: 60 IN

## 2022-07-27 DIAGNOSIS — C50.811 MALIGNANT NEOPLASM OF OVERLAPPING SITES OF RIGHT BREAST IN FEMALE, ESTROGEN RECEPTOR NEGATIVE (HCC): Primary | ICD-10-CM

## 2022-07-27 DIAGNOSIS — C50.811 MALIGNANT NEOPLASM OF OVERLAPPING SITES OF RIGHT BREAST IN FEMALE, ESTROGEN RECEPTOR NEGATIVE (HCC): ICD-10-CM

## 2022-07-27 DIAGNOSIS — Z17.1 MALIGNANT NEOPLASM OF OVERLAPPING SITES OF RIGHT BREAST IN FEMALE, ESTROGEN RECEPTOR NEGATIVE (HCC): ICD-10-CM

## 2022-07-27 DIAGNOSIS — K86.89 PANCREATIC MASS: ICD-10-CM

## 2022-07-27 DIAGNOSIS — Z17.1 MALIGNANT NEOPLASM OF OVERLAPPING SITES OF RIGHT BREAST IN FEMALE, ESTROGEN RECEPTOR NEGATIVE (HCC): Primary | ICD-10-CM

## 2022-07-27 LAB
ALBUMIN SERPL BCP-MCNC: 3.6 G/DL (ref 3.5–5)
ALP SERPL-CCNC: 103 U/L (ref 46–116)
ALT SERPL W P-5'-P-CCNC: 46 U/L (ref 12–78)
ANION GAP SERPL CALCULATED.3IONS-SCNC: 8 MMOL/L (ref 4–13)
AST SERPL W P-5'-P-CCNC: 20 U/L (ref 5–45)
BASOPHILS # BLD AUTO: 0.06 THOUSANDS/ΜL (ref 0–0.1)
BASOPHILS NFR BLD AUTO: 1 % (ref 0–1)
BILIRUB SERPL-MCNC: 0.24 MG/DL (ref 0.2–1)
BUN SERPL-MCNC: 18 MG/DL (ref 5–25)
CALCIUM SERPL-MCNC: 9.4 MG/DL (ref 8.3–10.1)
CHLORIDE SERPL-SCNC: 106 MMOL/L (ref 96–108)
CO2 SERPL-SCNC: 28 MMOL/L (ref 21–32)
CREAT SERPL-MCNC: 1.17 MG/DL (ref 0.6–1.3)
EOSINOPHIL # BLD AUTO: 0.25 THOUSAND/ΜL (ref 0–0.61)
EOSINOPHIL NFR BLD AUTO: 3 % (ref 0–6)
ERYTHROCYTE [DISTWIDTH] IN BLOOD BY AUTOMATED COUNT: 15.2 % (ref 11.6–15.1)
GFR SERPL CREATININE-BSD FRML MDRD: 45 ML/MIN/1.73SQ M
GLUCOSE P FAST SERPL-MCNC: 106 MG/DL (ref 65–99)
HCT VFR BLD AUTO: 42.3 % (ref 34.8–46.1)
HGB BLD-MCNC: 13.3 G/DL (ref 11.5–15.4)
IMM GRANULOCYTES # BLD AUTO: 0.04 THOUSAND/UL (ref 0–0.2)
IMM GRANULOCYTES NFR BLD AUTO: 1 % (ref 0–2)
LYMPHOCYTES # BLD AUTO: 1.39 THOUSANDS/ΜL (ref 0.6–4.47)
LYMPHOCYTES NFR BLD AUTO: 17 % (ref 14–44)
MCH RBC QN AUTO: 28.2 PG (ref 26.8–34.3)
MCHC RBC AUTO-ENTMCNC: 31.4 G/DL (ref 31.4–37.4)
MCV RBC AUTO: 90 FL (ref 82–98)
MONOCYTES # BLD AUTO: 0.52 THOUSAND/ΜL (ref 0.17–1.22)
MONOCYTES NFR BLD AUTO: 6 % (ref 4–12)
NEUTROPHILS # BLD AUTO: 6 THOUSANDS/ΜL (ref 1.85–7.62)
NEUTS SEG NFR BLD AUTO: 72 % (ref 43–75)
NRBC BLD AUTO-RTO: 0 /100 WBCS
PLATELET # BLD AUTO: 272 THOUSANDS/UL (ref 149–390)
PMV BLD AUTO: 10.1 FL (ref 8.9–12.7)
POTASSIUM SERPL-SCNC: 4.6 MMOL/L (ref 3.5–5.3)
PROT SERPL-MCNC: 7.5 G/DL (ref 6.4–8.4)
RBC # BLD AUTO: 4.71 MILLION/UL (ref 3.81–5.12)
SODIUM SERPL-SCNC: 142 MMOL/L (ref 135–147)
WBC # BLD AUTO: 8.26 THOUSAND/UL (ref 4.31–10.16)

## 2022-07-27 PROCEDURE — 85025 COMPLETE CBC W/AUTO DIFF WBC: CPT

## 2022-07-27 PROCEDURE — 86316 IMMUNOASSAY TUMOR OTHER: CPT

## 2022-07-27 PROCEDURE — 80053 COMPREHEN METABOLIC PANEL: CPT

## 2022-07-27 PROCEDURE — 36415 COLL VENOUS BLD VENIPUNCTURE: CPT

## 2022-07-27 PROCEDURE — 99024 POSTOP FOLLOW-UP VISIT: CPT | Performed by: SURGERY

## 2022-07-27 NOTE — PROGRESS NOTES
Surgical Oncology Follow Up  [facility]  [facility] [address]    Carolina Stains  1945  [id number]      Chief Complaint   Patient presents with    Follow-up        Assessment & Plan:   Is here for follow-up with right breast cancer  She has started her chemotherapy and had significant side effects and requiring hospital admission  She is overall doing well compared to she is on home oxygen  Right axillary and right breast incisions are well healed there is no seroma  Right axilla has no evidence of seroma  She is still to see radiation oncologist and she will see radiation oncologist   She is concerned about oxygen requirement  Cancer History:     Oncology History Overview Note   with multiple comorbidities recently diagnosed with clinical stage T2N0 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   Tumor cells are ER/AZ/H2N (-)  Her case was presented at Multidisciplinary Breast Tumor Board on 4/4/22  Preliminary recommendation was surgery followed by chemotherapy followed by adjuvant radiation pending final pathology  She was seen in consult at the breast [facility] clinic on 4/4/22  She underwent right breast lumpectomy on 4/28/22 and then started adjuvant chemotherapy  Her chemo dose was reduced by 25% because of age and other comorbidities  She was then admitted to the hospital for diverticulitis  Plan was made to discontinue chemotherapy and proceed with Radiation therapy  She presents today for follow up      4/28/22 ISAI  DIRECTED LUMPECTOMY (Right Breast) with sentinel lymph node biopsy    5/4/22 Dr Charu Wellington  40 cc of seroma was aspirated     5/18/22 Dr Charu Wellington  Reviewed path  Plan for adjuvant chemotherapy and radiation  Follow up 6 months    5/19/22 Dr Zak Alvares  Final pathologic stage pT1c pN0    I recommend adjuvant chemotherapy with TC for 4 cycles with Neulasta,  followed by adjuvant radiation therapy  Because of her age, and other medical comorbidities including COPD, I will decrease chemo dose by 25%  22 Dr Luis Carlos Lozano  postpone chemotherapy in 2 weeks because of tooth extraction and cellulitis in surgical site  22 Dr Indiana Perez  discomfort in her right breast    30 cc of seroma was aspirated     22 Infusion    22 Hospitalization for acute diverticulitis   · Does not meet sepsis criteria      22 CT C/A/P wo contrast  No evidence of metastatic disease throughout the chest, abdomen or pelvis  Stable pancreatic head lesion  Soft tissue postsurgical changes from right breast lumpectomy and axillary node dissection  Findings consistent with mild acute uncomplicated sigmoid diverticulitis  22 Dr Angie Motta  She had decided to d/c all further chemo and proceed to RT which  is not unreasonable  will continue to follow along and defer to Surg -Onc to get mammograms ordered  Cancelling infusion chairs  Upcomin22 Dr Indiana Perez  22 Medical Oncology     Malignant neoplasm of overlapping sites of right breast in female, estrogen receptor negative ([facility] [address] )   3/25/2022 Initial Diagnosis    Malignant neoplasm of right female breast ([facility] [address] )     3/25/2022 Biopsy    Right breast ultrasound guided biopsy  12 o'clock 5 cm from nipple  Invasive breast carcinoma of no special type (ductal NST/ invasive ductal carcinoma with apocrine features)  Grade 2  ER 0  KS 0  HER 2 2+   FISH negative  Lymphovascular invasion not identified    Concordant  Malignancy is unifocal  Mass measures 1 4 cm on mammogram and 1 9 cm on ultrasound  Right axilla ultrasound clear  Left brest clear  Amanda  reflector placed  In situ compononent: 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       2022 -  Cancer Staged    Staging form: Breast, AJCC 8th Edition  - Clinical stage from 2022: Stage IB (cT1c, cN0, cM0, G2, ER-, KS-, HER2-) - Signed by Laura Leroy MD on 4/4/2022  Stage prefix: Initial diagnosis  Nuclear grade: G2  Histologic grading system: 3 grade system  HER2-IHC interpretation: Equivocal  HER2-IHC value: Score 2+  HER2-FISH interpretation: Negative       4/4/2022 Genetic Testing    [employer]  A total of 36 genes were evaluated, including: GILL, BRCA1, BRCA2, CDH1, CHEK2, PALB2, PTEN, STK11, TP53  Negative result  No pathogenic sequence variants or deletions/duplications identified     4/28/2022 Surgery    Right breast lexy  directed lumpectomy with sentinel lymph node biopsy and axillary dissection  Invasive ductal carcinoma with apocrine features  Grade 3  1 4 cm  0/4 Lymph nodes       6/14/2022 - 6/15/2022 Chemotherapy    Pegfilgrastim-bmez (ZIEXTENZO), 6 mg, Subcutaneous, Once, 1 of 4 cycles  Administration: 6 mg (6/15/2022)  cyclophosphamide (CYTOXAN) IVPB, 450 mg/m2 = 778 mg (75 % of original dose 600 mg/m2), Intravenous, Once, 1 of 4 cycles  Dose modification: 450 mg/m2 (75 % of original dose 600 mg/m2, Cycle 1, Reason: Dose Not Tolerated)  Administration: 778 mg (6/14/2022)  DOCEtaxel (TAXOTERE) chemo infusion, 56 25 mg/m2 = 97 4 mg (75 % of original dose 75 mg/m2), Intravenous, Once, 1 of 4 cycles  Dose modification: 56 25 mg/m2 (75 % of original dose 75 mg/m2, Cycle 1, Reason: Dose Not Tolerated)  Administration: 97 4 mg (6/14/2022)           Interval History:   Follow-up with right breast cancer  Review of Systems:   Review of Systems   Constitutional: Negative for chills and fever  HENT: Negative for ear pain and sore throat  Eyes: Negative for pain and visual disturbance  Respiratory: Negative for cough and shortness of breath  Cardiovascular: Negative for chest pain and palpitations  Gastrointestinal: Negative for abdominal pain and vomiting  Genitourinary: Negative for dysuria and hematuria  Musculoskeletal: Negative for arthralgias and back pain  Skin: Negative for color change and rash     Neurological: Negative for seizures and syncope  All other systems reviewed and are negative  Past Medical History     Patient Active Problem List   Diagnosis    Anxiety    Aortoiliac occlusive disease ([address] )    Carotid artery plaque, bilateral    Centrilobular emphysema (HCC)    Hyperlipidemia    Osteoporosis    Restless leg syndrome    Subclavian artery stenosis, left (HCC)    PVD (peripheral vascular disease) (HCC)    Chronic sinusitis    Pancreatic mass    BMI 34 0-34 9,adult    Seborrheic keratoses    Stage 3 chronic kidney disease (HCC)    Closed avulsion fracture of lateral malleolus of right fibula    Prediabetes    Vitamin D deficiency    Acute right ankle pain    COPD (chronic obstructive pulmonary disease) (HCC)    Malignant neoplasm of overlapping sites of right breast in female, estrogen receptor negative ([address] )    Continuous opioid dependence ([address] )    Cellulitis    Diverticulitis    Oral candidiasis    Chemotherapy induced neutropenia ([address] )    Diarrhea     Past Medical History:   Diagnosis Date    Anxiety     Atherosclerosis of native artery of both lower extremities with intermittent claudication ([address] ) 10/15/2015    Transitioned From: Cardiovascular Symptoms    Breast cancer ([address] )     Carotid artery plaque, bilateral 03/21/2017    Transitioned From:  Atherosclerosis of both carotid arteries    Chronic kidney disease     Chronic sinusitis     Last assessed: 11/11/13    COPD (chronic obstructive pulmonary disease) ([address] )     COVID     12/25/21 not hopsitalized- flu-like symptoms    Fall 06/16/2021    Fracture, ankle closed, bimalleolar, right, initial encounter 06/2021    GERD (gastroesophageal reflux disease)     Hyperlipidemia     Lung nodule     PNA (pneumonia)     PONV (postoperative nausea and vomiting)     with C-sections    Pulmonary emphysema ([address] )     PVD (peripheral vascular disease) (HCC)     Restless leg syndrome     Stage 3 chronic kidney disease ([address] ) 04/22/2019    Tobacco abuse      Past Surgical History:   Procedure Laterality Date    BREAST LUMPECTOMY Right 2022    Procedure: ISAI  DIRECTED LUMPECTOMY;  Surgeon: Marla Meeks MD;  Location: [facility];  Service: Surgical Oncology    CATARACT EXTRACTION       SECTION      COLONOSCOPY      Complete  Resolved: 13    DESCENDING AORTIC ANEURYSM REPAIR W/ STENT  2019    IR AORTAGRAM WITH RUN-OFF  8/15/2019    LYMPH NODE BIOPSY Right 2022    Procedure: LYMPHATIC MAPPING WITH BLUE AND RADIOACTIVE DYES, SENTINEL LYMPH NODE BIOPSY, INJECTION IN OR BY DR RODRÍGUEZ AT 1300;  Surgeon: Marla Meeks MD;  Location: [facility];  Service: Surgical Oncology    SHOULDER SURGERY      For frozen shoulder     TONSILLECTOMY      US BREAST ISAI  NEEDLE LOC RIGHT Right 3/25/2022    US GUIDED BREAST BIOPSY RIGHT COMPLETE Right 3/25/2022     Family History   Problem Relation Age of Onset    No Known Problems Mother     No Known Problems Father     Arthritis Sister     Pancreatic cancer Sister 61    Melanoma Brother         twice    Prostate cancer Brother     Heart attack Family         Acute Myocardial Infarction    Cirrhosis Family     Prostate cancer Family     Skin cancer Family     Stroke Family         Syndrome    No Known Problems Daughter     No Known Problems Maternal Grandmother     No Known Problems Paternal Grandmother     No Known Problems Sister     No Known Problems Maternal Aunt     Breast cancer Other 27     Social History     Socioeconomic History    Marital status:       Spouse name: Not on file    Number of children: 2    Years of education: Not on file    Highest education level: Not on file   Occupational History    Occupation: Retired/   Tobacco Use    Smoking status: Former Smoker     Packs/day: 2 00     Years: 56 00     Pack years: 112 00     Types: Cigarettes     Start date: 1962     Quit date: 2018     Years since quittin 1    Smokeless tobacco: Never Used   Vaping Use    Vaping Use: Never used   Substance and Sexual Activity    Alcohol use: Yes     Comment: occasionally     Drug use: No    Sexual activity: Not Currently     Partners: Male   Other Topics Concern    Not on file   Social History Narrative    Living w/adult children    No advance directive on file     Social Determinants of Health     Financial Resource Strain: Not on file   Food Insecurity: Not on file   Transportation Needs: No Transportation Needs    Lack of Transportation (Medical): No    Lack of Transportation (Non-Medical): No   Physical Activity: Inactive    Days of Exercise per Week: 0 days    Minutes of Exercise per Session: 0 min   Stress: Stress Concern Present    Feeling of Stress : Very much   Social Connections: Not on file   Intimate Partner Violence: Not on file   Housing Stability: Low Risk     Unable to Pay for Housing in the Last Year: No    Number of Places Lived in the Last Year: 1    Unstable Housing in the Last Year: No       Current Outpatient Medications:     albuterol (2 5 mg/3 mL) 0 083 % nebulizer solution, Take 1 vial (2 5 mg total) by nebulization every 6 (six) hours as needed for wheezing or shortness of breath, Disp: 360 mL, Rfl: 3    albuterol (PROVENTIL HFA,VENTOLIN HFA) 90 mcg/act inhaler, Inhale 2 puffs every 6 (six) hours as needed for wheezing, Disp: 3 Inhaler, Rfl: 3    ALPRAZolam (XANAX) 0 5 mg tablet, Take 1 tablet (0 5 mg total) by mouth 2 (two) times a day as needed for anxiety, Disp: 60 tablet, Rfl: 0    ASPIRIN 81 PO, Take by mouth, Disp: , Rfl:     Breo Ellipta 100-25 MCG/INH inhaler, inhale 1 puff daily Rinse mouth after use, Disp: 180 blister, Rfl: 1    dexamethasone (DECADRON) 4 mg tablet, Take 2 tablets by mouth (8mg) with meals in the morning  Take 2 tablets (8mg) with meals in the evening   Do this for three days, the day before treatment, the day of treatment and the day after treatment  , Disp: 6 tablet, Rfl: 2    dexamethasone (DECADRON) 4 mg tablet, Take 1 tablet (4 mg total) by mouth 2 (two) times a day with meals Take 2 tablets by mouth (8mg) with meals in the morning  Take 2 tablets (8mg) with meals in the evening  Do this for three days, the day before treatment, the day of treatment and the day after treatment  , Disp: 6 tablet, Rfl: 0    ergocalciferol (VITAMIN D2) 50,000 units, take 1 capsule by mouth every week, Disp: 12 capsule, Rfl: 0    fluticasone (FLONASE) 50 mcg/act nasal spray, 2 sprays into each nostril daily, Disp: 11 1 mL, Rfl: 1    gabapentin (NEURONTIN) 300 mg capsule, Take 1 capsule (300 mg total) by mouth 2 (two) times a day, Disp: 180 capsule, Rfl: 0    gabapentin (NEURONTIN) 400 mg capsule, take 1 capsule by mouth at bedtime, Disp: 90 capsule, Rfl: 1    Lidocaine Viscous HCl (XYLOCAINE) 2 % mucosal solution, , Disp: , Rfl:     naloxone (NARCAN) 4 mg/0 1 mL nasal spray, Administer 1 spray into a nostril   If no response after 2-3 minutes, give another dose in the other nostril using a new spray , Disp: 1 each, Rfl: 1    olopatadine (PATANOL) 0 1 % ophthalmic solution,  , Disp: , Rfl: 0    ondansetron (ZOFRAN) 8 mg tablet, Take 1 tablet (8 mg total) by mouth every 8 (eight) hours as needed for nausea or vomiting, Disp: 20 tablet, Rfl: 2    oxyCODONE-acetaminophen (Percocet) 5-325 mg per tablet, Take 1 tablet by mouth every 6 (six) hours as needed for moderate pain Max Daily Amount: 4 tablets, Disp: 20 tablet, Rfl: 0    rosuvastatin (CRESTOR) 20 MG tablet, Take 1 tablet (20 mg total) by mouth daily, Disp: 90 tablet, Rfl: 6    saccharomyces boulardii (FLORASTOR) 250 mg capsule, Take 1 capsule (250 mg total) by mouth 2 (two) times a day, Disp: , Rfl: 0    sodium chloride () 2 % hypertonic ophthalmic solution, Sue 128 2 % eye drops, Disp: , Rfl:   Allergies   Allergen Reactions    Spiriva Handihaler [Tiotropium Bromide Monohydrate] Shortness Of Breath    Augmentin [Amoxicillin-Pot Clavulanate] Abdominal Pain     Pt received ceftriaxone 1 g IV on 5-21-18, gets sharp pains     Tiotropium Abdominal Pain    Tylenol With Codeine #3 [Acetaminophen-Codeine] Abdominal Pain       Physical Exam:     Vitals:    07/27/22 1119   BP: 94/60   Pulse: 88   Resp: 18   Temp: 98 °F (36 7 °C)   SpO2: 98%     Physical Exam  Constitutional:       Appearance: Normal appearance  HENT:      Head: Normocephalic and atraumatic  Nose: Nose normal       Mouth/Throat:      Mouth: Mucous membranes are moist    Eyes:      Pupils: Pupils are equal, round, and reactive to light  Cardiovascular:      Rate and Rhythm: Normal rate  Pulses: Normal pulses  Heart sounds: Normal heart sounds  Pulmonary:      Effort: Pulmonary effort is normal       Breath sounds: Normal breath sounds  Chest:      Comments: The bilateral Breast(s) were examined  There was not any sign of an inverted nipple, mass, nipple discharge, skin changes or tenderness  The bilateral  breast(s) were examined in the sitting and supine position  There are not any worrisome skin changes, tenderness, nipple changes, swelling ,bleeding or evidence of mass/s in all four quadrants  Dayanna survey demonstrated that there is not any evidence of any clinically suspicious axillary, pectoral or supraclavicular lymph nodes  Abdominal:      General: Bowel sounds are normal       Palpations: Abdomen is soft  Musculoskeletal:         General: Normal range of motion  Cervical back: Normal range of motion and neck supple  Skin:     General: Skin is warm  Neurological:      General: No focal deficit present  Mental Status: She is alert and oriented to person, place, and time  Psychiatric:         Mood and Affect: Mood normal          Behavior: Behavior normal          Thought Content:  Thought content normal          Judgment: Judgment normal            Results & Discussion:   I did discuss with the follow-up  She I also encouraged her to see the radiation oncologist to discuss adjuvant radiation  She is slowly getting back to her normal life ever since her hospital admission following adjuvant  Chemo  she understands and  agrees   All patient questions were answered  Advance Care Planning/Advance Directives: Sri Schafer MD discussed the disease status with Naa Davila  today 07/27/22  treatment plans and follow-up with the patient

## 2022-07-28 ENCOUNTER — TELEPHONE (OUTPATIENT)
Dept: INTERNAL MEDICINE CLINIC | Facility: CLINIC | Age: 77
End: 2022-07-28

## 2022-07-28 NOTE — TELEPHONE ENCOUNTER
PT IS SCHEDULED W/ GOVIND TOMORROW 7/28 FOR TRANS OF CARE AT 11:00 - NEEDS TO BE RESCHEDULED - Timothy Filter  NOTHING AVAILABLE FOR THE TIME NEEDED ON SCHEDULE  PLEASE RESCHEDULE   [phone]

## 2022-07-28 NOTE — TELEPHONE ENCOUNTER
Spoke with: patient  Re:  Cancel appt   Scheduled with Hussein Benitez, pt will call back to reschedule  Provider's message/resuts/instructions/inquiries relayed in full detal   Comments:

## 2022-07-29 LAB — CGA SERPL-MCNC: 86.1 NG/ML (ref 0–101.8)

## 2022-08-02 ENCOUNTER — TELEPHONE (OUTPATIENT)
Dept: INTERNAL MEDICINE CLINIC | Facility: CLINIC | Age: 77
End: 2022-08-02

## 2022-08-03 NOTE — TELEPHONE ENCOUNTER
called [facility] at [phone] and spoke to  and all nurses are in meeting now and she will have them call me back when out of meeting

## 2022-08-04 NOTE — TELEPHONE ENCOUNTER
CALLED [facility] CARE BACK AND SPOKE TO AMARA AND SHE IS GOOD TILL SEPT  AND SHE WILL SEND MSG  TO PHYSICAL THERAPIST THAT DR COWAN WANTS THERAPY TO CONTINUE AND NURSING

## 2022-08-15 ENCOUNTER — NURSE TRIAGE (OUTPATIENT)
Dept: OTHER | Facility: OTHER | Age: 77
End: 2022-08-15

## 2022-08-15 DIAGNOSIS — K57.92 DIVERTICULITIS: Primary | ICD-10-CM

## 2022-08-15 RX ORDER — METRONIDAZOLE 500 MG/1
500 TABLET ORAL EVERY 8 HOURS SCHEDULED
Qty: 30 TABLET | Refills: 0 | Status: SHIPPED | OUTPATIENT
Start: 2022-08-15 | End: 2022-08-25

## 2022-08-15 RX ORDER — CIPROFLOXACIN 500 MG/1
500 TABLET, FILM COATED ORAL EVERY 12 HOURS SCHEDULED
Qty: 20 TABLET | Refills: 0 | Status: SHIPPED | OUTPATIENT
Start: 2022-08-15 | End: 2022-08-25

## 2022-08-15 NOTE — TELEPHONE ENCOUNTER
Called son  Pt is sleeping  Pt took a pain med  Son is asking if his mother can have an antibiotic since she was diagnosed with diverticulitis so he doesn't have to take her to the ER  Son said if it is not the pain it is the diarrhea      Routing to PA  Thank you

## 2022-08-15 NOTE — TELEPHONE ENCOUNTER
Regarding: Abd pain, diarrhea  ----- Message from Kai Whitney sent at 8/15/2022 11:47 AM EDT -----  "My mother has been having abdominal pain and fever this past weekend   Today, her fever has gone down but she has diarrhea and anything she eats immediately goes right through her "

## 2022-08-15 NOTE — TELEPHONE ENCOUNTER
Patient hospitalized 6/20-28/22 and diagnosed with diverticulitis  Patient was unable to schedule follow up for colonoscopy after discharge  Patient's son, Serenity Ferrari called in to report new severe lower abdominal pain on 8/13/22 with no bowel movement  Pain improved on 8/14/22  Today the pain is severe and patient has had 3-4 occurrences of diarrhea; denies blood in stool  If pain continues, son will take patient back to ED  Please follow up with patient  Reason for Disposition   MODERATE pain (e g , interferes with normal activities that comes and goes (cramps) lasts > 24 hours (Exception: pain with Vomiting or Diarrhea - see that Protocol)    Answer Assessment - Initial Assessment Questions  1  LOCATION: "Where does it hurt?"       Bilateral lower abdomen    2  RADIATION: "Does the pain shoot anywhere else?" (e g , chest, back)      Towards her back    3  ONSET: "When did the pain begin?" (e g , minutes, hours or days ago)      8/13/22    4  SUDDEN: "Gradual or sudden onset?"      Sudden onset    5  PATTERN "Does the pain come and go, or is it constant?"     - If constant: "Is it getting better, staying the same, or worsening?"       (Note: Constant means the pain never goes away completely; most serious pain is constant and it progresses)      - If intermittent: "How long does it last?" "Do you have pain now?"      (Note: Intermittent means the pain goes away completely between bouts)      Constant    6  SEVERITY: "How bad is the pain?"  (e g , Scale 1-10; mild, moderate, or severe)    - MILD (1-3): doesn't interfere with normal activities, abdomen soft and not tender to touch     - MODERATE (4-7): interferes with normal activities or awakens from sleep, tender to touch     - SEVERE (8-10): excruciating pain, doubled over, unable to do any normal activities       Severe    7   RECURRENT SYMPTOM: "Have you ever had this type of stomach pain before?" If Yes, ask: "When was the last time?" and "What happened that time?"       Yes    8  CAUSE: "What do you think is causing the stomach pain?"      Recent diagnosis and hospitalization for diverticulitis    9  RELIEVING/AGGRAVATING FACTORS: "What makes it better or worse?" (e g , movement, antacids, bowel movement)      Moving around aggravates the pain    10  OTHER SYMPTOMS: "Has there been any vomiting, diarrhea, constipation, or urine problems?"        Diarrhea with loose movement started 8/15/22; 3-4 movements today; denies blood in stool    11   PREGNANCY: "Is there any chance you are pregnant?" "When was your last menstrual period?"        Post menopausal    Protocols used: ABDOMINAL PAIN - [facility] - MARIA C LUGO

## 2022-08-15 NOTE — TELEPHONE ENCOUNTER
Called son May Gabriel  Got  LMOM with message from sofi that antibiotic was called in    Left office phone # as well

## 2022-08-31 DIAGNOSIS — E55.9 VITAMIN D DEFICIENCY: ICD-10-CM

## 2022-09-01 RX ORDER — ERGOCALCIFEROL 1.25 MG/1
CAPSULE ORAL
Qty: 12 CAPSULE | Refills: 0 | Status: SHIPPED | OUTPATIENT
Start: 2022-09-01

## 2022-09-06 ENCOUNTER — HOSPITAL ENCOUNTER (EMERGENCY)
Facility: HOSPITAL | Age: 77
Discharge: HOME | End: 2022-09-06
Attending: EMERGENCY MEDICINE
Payer: MEDICARE

## 2022-09-06 ENCOUNTER — APPOINTMENT (EMERGENCY)
Dept: CT IMAGING | Facility: HOSPITAL | Age: 77
End: 2022-09-06
Payer: MEDICARE

## 2022-09-06 VITALS
BODY MASS INDEX: 31.94 KG/M2 | TEMPERATURE: 98.7 F | DIASTOLIC BLOOD PRESSURE: 58 MMHG | HEART RATE: 90 BPM | SYSTOLIC BLOOD PRESSURE: 110 MMHG | HEIGHT: 60 IN | OXYGEN SATURATION: 98 % | WEIGHT: 162.7 LBS | RESPIRATION RATE: 20 BRPM

## 2022-09-06 DIAGNOSIS — K52.9 COLITIS: ICD-10-CM

## 2022-09-06 DIAGNOSIS — R10.9 ABDOMINAL PAIN: Primary | ICD-10-CM

## 2022-09-06 DIAGNOSIS — R11.2 NAUSEA AND VOMITING: ICD-10-CM

## 2022-09-06 LAB
ALBUMIN SERPL BCP-MCNC: 2.8 G/DL (ref 3.5–5)
ALP SERPL-CCNC: 93 U/L (ref 46–116)
ALT SERPL W P-5'-P-CCNC: 25 U/L (ref 12–78)
ANION GAP SERPL CALCULATED.3IONS-SCNC: 8 MMOL/L (ref 4–13)
AST SERPL W P-5'-P-CCNC: 13 U/L (ref 5–45)
BASOPHILS # BLD AUTO: 0.05 THOUSANDS/ΜL (ref 0–0.1)
BASOPHILS NFR BLD AUTO: 0 % (ref 0–1)
BILIRUB SERPL-MCNC: 0.29 MG/DL (ref 0.2–1)
BILIRUB UR QL STRIP: NEGATIVE
BUN SERPL-MCNC: 14 MG/DL (ref 5–25)
CALCIUM ALBUM COR SERPL-MCNC: 9.6 MG/DL (ref 8.3–10.1)
CALCIUM SERPL-MCNC: 8.6 MG/DL (ref 8.3–10.1)
CHLORIDE SERPL-SCNC: 99 MMOL/L (ref 96–108)
CLARITY UR: CLEAR
CO2 SERPL-SCNC: 30 MMOL/L (ref 21–32)
COLOR UR: YELLOW
CREAT SERPL-MCNC: 1.34 MG/DL (ref 0.6–1.3)
EOSINOPHIL # BLD AUTO: 0.03 THOUSAND/ΜL (ref 0–0.61)
EOSINOPHIL NFR BLD AUTO: 0 % (ref 0–6)
ERYTHROCYTE [DISTWIDTH] IN BLOOD BY AUTOMATED COUNT: 14.9 % (ref 11.6–15.1)
GFR SERPL CREATININE-BSD FRML MDRD: 38 ML/MIN/1.73SQ M
GLUCOSE SERPL-MCNC: 133 MG/DL (ref 65–140)
GLUCOSE UR STRIP-MCNC: NEGATIVE MG/DL
HCT VFR BLD AUTO: 37.6 % (ref 34.8–46.1)
HGB BLD-MCNC: 12 G/DL (ref 11.5–15.4)
HGB UR QL STRIP.AUTO: NEGATIVE
IMM GRANULOCYTES # BLD AUTO: 0.04 THOUSAND/UL (ref 0–0.2)
IMM GRANULOCYTES NFR BLD AUTO: 0 % (ref 0–2)
KETONES UR STRIP-MCNC: NEGATIVE MG/DL
LEUKOCYTE ESTERASE UR QL STRIP: NEGATIVE
LIPASE SERPL-CCNC: 30 U/L (ref 73–393)
LYMPHOCYTES # BLD AUTO: 1.28 THOUSANDS/ΜL (ref 0.6–4.47)
LYMPHOCYTES NFR BLD AUTO: 10 % (ref 14–44)
MCH RBC QN AUTO: 28.4 PG (ref 26.8–34.3)
MCHC RBC AUTO-ENTMCNC: 31.9 G/DL (ref 31.4–37.4)
MCV RBC AUTO: 89 FL (ref 82–98)
MONOCYTES # BLD AUTO: 0.87 THOUSAND/ΜL (ref 0.17–1.22)
MONOCYTES NFR BLD AUTO: 7 % (ref 4–12)
NEUTROPHILS # BLD AUTO: 10.05 THOUSANDS/ΜL (ref 1.85–7.62)
NEUTS SEG NFR BLD AUTO: 83 % (ref 43–75)
NITRITE UR QL STRIP: NEGATIVE
NRBC BLD AUTO-RTO: 0 /100 WBCS
PH UR STRIP.AUTO: 5.5 [PH]
PLATELET # BLD AUTO: 334 THOUSANDS/UL (ref 149–390)
PMV BLD AUTO: 9.8 FL (ref 8.9–12.7)
POTASSIUM SERPL-SCNC: 3.8 MMOL/L (ref 3.5–5.3)
PROT SERPL-MCNC: 7.3 G/DL (ref 6.4–8.4)
PROT UR STRIP-MCNC: NEGATIVE MG/DL
RBC # BLD AUTO: 4.22 MILLION/UL (ref 3.81–5.12)
SODIUM SERPL-SCNC: 137 MMOL/L (ref 135–147)
SP GR UR STRIP.AUTO: <=1.005 (ref 1–1.03)
UROBILINOGEN UR QL STRIP.AUTO: 0.2 E.U./DL
WBC # BLD AUTO: 12.32 THOUSAND/UL (ref 4.31–10.16)

## 2022-09-06 PROCEDURE — 96375 TX/PRO/DX INJ NEW DRUG ADDON: CPT

## 2022-09-06 PROCEDURE — 74177 CT ABD & PELVIS W/CONTRAST: CPT

## 2022-09-06 PROCEDURE — 96361 HYDRATE IV INFUSION ADD-ON: CPT

## 2022-09-06 PROCEDURE — 36415 COLL VENOUS BLD VENIPUNCTURE: CPT | Performed by: EMERGENCY MEDICINE

## 2022-09-06 PROCEDURE — 99285 EMERGENCY DEPT VISIT HI MDM: CPT | Performed by: EMERGENCY MEDICINE

## 2022-09-06 PROCEDURE — 83690 ASSAY OF LIPASE: CPT | Performed by: EMERGENCY MEDICINE

## 2022-09-06 PROCEDURE — 99284 EMERGENCY DEPT VISIT MOD MDM: CPT

## 2022-09-06 PROCEDURE — 96374 THER/PROPH/DIAG INJ IV PUSH: CPT

## 2022-09-06 PROCEDURE — 81003 URINALYSIS AUTO W/O SCOPE: CPT | Performed by: EMERGENCY MEDICINE

## 2022-09-06 PROCEDURE — 80053 COMPREHEN METABOLIC PANEL: CPT | Performed by: EMERGENCY MEDICINE

## 2022-09-06 PROCEDURE — 85025 COMPLETE CBC W/AUTO DIFF WBC: CPT | Performed by: EMERGENCY MEDICINE

## 2022-09-06 RX ORDER — ONDANSETRON 2 MG/ML
4 INJECTION INTRAMUSCULAR; INTRAVENOUS ONCE
Status: COMPLETED | OUTPATIENT
Start: 2022-09-06 | End: 2022-09-06

## 2022-09-06 RX ORDER — MOXIFLOXACIN HYDROCHLORIDE 400 MG/1
400 TABLET ORAL DAILY
Qty: 10 TABLET | Refills: 0 | Status: SHIPPED | OUTPATIENT
Start: 2022-09-06 | End: 2022-09-19

## 2022-09-06 RX ORDER — CIPROFLOXACIN 500 MG/1
500 TABLET, FILM COATED ORAL ONCE
Status: DISCONTINUED | OUTPATIENT
Start: 2022-09-06 | End: 2022-09-07 | Stop reason: HOSPADM

## 2022-09-06 RX ORDER — METRONIDAZOLE 500 MG/1
500 TABLET ORAL EVERY 8 HOURS SCHEDULED
Qty: 30 TABLET | Refills: 0 | Status: SHIPPED | OUTPATIENT
Start: 2022-09-06 | End: 2022-09-19

## 2022-09-06 RX ORDER — METRONIDAZOLE 500 MG/1
500 TABLET ORAL ONCE
Status: DISCONTINUED | OUTPATIENT
Start: 2022-09-06 | End: 2022-09-07 | Stop reason: HOSPADM

## 2022-09-06 RX ORDER — CIPROFLOXACIN 500 MG/1
500 TABLET, FILM COATED ORAL 2 TIMES DAILY
Qty: 20 TABLET | Refills: 0 | Status: SHIPPED | OUTPATIENT
Start: 2022-09-06 | End: 2022-09-06

## 2022-09-06 RX ORDER — MORPHINE SULFATE 15 MG/1
15 TABLET ORAL EVERY 6 HOURS PRN
Qty: 10 TABLET | Refills: 0 | Status: SHIPPED | OUTPATIENT
Start: 2022-09-06 | End: 2022-09-11

## 2022-09-06 RX ORDER — ONDANSETRON 4 MG/1
4 TABLET, ORALLY DISINTEGRATING ORAL EVERY 6 HOURS PRN
Qty: 20 TABLET | Refills: 0 | Status: SHIPPED | OUTPATIENT
Start: 2022-09-06 | End: 2022-10-10

## 2022-09-06 RX ADMIN — SODIUM CHLORIDE 1000 ML: 0.9 INJECTION, SOLUTION INTRAVENOUS at 19:39

## 2022-09-06 RX ADMIN — MORPHINE SULFATE 2 MG: 2 INJECTION, SOLUTION INTRAMUSCULAR; INTRAVENOUS at 19:41

## 2022-09-06 RX ADMIN — IOHEXOL 80 ML: 350 INJECTION, SOLUTION INTRAVENOUS at 20:32

## 2022-09-06 RX ADMIN — ONDANSETRON 4 MG: 2 INJECTION INTRAMUSCULAR; INTRAVENOUS at 19:41

## 2022-09-14 ENCOUNTER — APPOINTMENT (EMERGENCY)
Dept: CT IMAGING | Facility: HOSPITAL | Age: 77
DRG: 372 | End: 2022-09-14
Payer: MEDICARE

## 2022-09-14 ENCOUNTER — HOSPITAL ENCOUNTER (INPATIENT)
Facility: HOSPITAL | Age: 77
LOS: 5 days | DRG: 372 | End: 2022-09-19
Attending: EMERGENCY MEDICINE | Admitting: INTERNAL MEDICINE
Payer: MEDICARE

## 2022-09-14 DIAGNOSIS — Z17.1 MALIGNANT NEOPLASM OF OVERLAPPING SITES OF RIGHT BREAST IN FEMALE, ESTROGEN RECEPTOR NEGATIVE (HCC): ICD-10-CM

## 2022-09-14 DIAGNOSIS — F41.1 GENERALIZED ANXIETY DISORDER: ICD-10-CM

## 2022-09-14 DIAGNOSIS — F11.20 CONTINUOUS OPIOID DEPENDENCE (HCC): ICD-10-CM

## 2022-09-14 DIAGNOSIS — A04.72 C. DIFFICILE COLITIS: ICD-10-CM

## 2022-09-14 DIAGNOSIS — K52.9 PROCTOCOLITIS: Primary | ICD-10-CM

## 2022-09-14 DIAGNOSIS — C50.811 MALIGNANT NEOPLASM OF OVERLAPPING SITES OF RIGHT BREAST IN FEMALE, ESTROGEN RECEPTOR NEGATIVE (HCC): ICD-10-CM

## 2022-09-14 PROBLEM — R74.01 TRANSAMINITIS: Status: ACTIVE | Noted: 2022-09-14

## 2022-09-14 LAB
2HR DELTA HS TROPONIN: 0 NG/L
ALBUMIN SERPL BCP-MCNC: 2.2 G/DL (ref 3.5–5)
ALP SERPL-CCNC: 241 U/L (ref 46–116)
ALT SERPL W P-5'-P-CCNC: 122 U/L (ref 12–78)
ANION GAP SERPL CALCULATED.3IONS-SCNC: 13 MMOL/L (ref 4–13)
APTT PPP: 32 SECONDS (ref 23–37)
AST SERPL W P-5'-P-CCNC: 89 U/L (ref 5–45)
ATRIAL RATE: 107 BPM
BASOPHILS # BLD AUTO: 0.08 THOUSANDS/ΜL (ref 0–0.1)
BASOPHILS NFR BLD AUTO: 0 % (ref 0–1)
BILIRUB SERPL-MCNC: 0.38 MG/DL (ref 0.2–1)
BUN SERPL-MCNC: 17 MG/DL (ref 5–25)
CALCIUM ALBUM COR SERPL-MCNC: 9.8 MG/DL (ref 8.3–10.1)
CALCIUM SERPL-MCNC: 8.4 MG/DL (ref 8.3–10.1)
CARDIAC TROPONIN I PNL SERPL HS: 8 NG/L
CARDIAC TROPONIN I PNL SERPL HS: 8 NG/L
CHLORIDE SERPL-SCNC: 98 MMOL/L (ref 96–108)
CO2 SERPL-SCNC: 25 MMOL/L (ref 21–32)
CREAT SERPL-MCNC: 1.49 MG/DL (ref 0.6–1.3)
CRP SERPL QL: 227.9 MG/L
EOSINOPHIL # BLD AUTO: 0.02 THOUSAND/ΜL (ref 0–0.61)
EOSINOPHIL NFR BLD AUTO: 0 % (ref 0–6)
ERYTHROCYTE [DISTWIDTH] IN BLOOD BY AUTOMATED COUNT: 16 % (ref 11.6–15.1)
ERYTHROCYTE [SEDIMENTATION RATE] IN BLOOD: 60 MM/HOUR (ref 0–29)
GFR SERPL CREATININE-BSD FRML MDRD: 34 ML/MIN/1.73SQ M
GLUCOSE SERPL-MCNC: 163 MG/DL (ref 65–140)
HCT VFR BLD AUTO: 39.4 % (ref 34.8–46.1)
HGB BLD-MCNC: 12.4 G/DL (ref 11.5–15.4)
IMM GRANULOCYTES # BLD AUTO: 0.31 THOUSAND/UL (ref 0–0.2)
IMM GRANULOCYTES NFR BLD AUTO: 1 % (ref 0–2)
INR PPP: 1.55 (ref 0.84–1.19)
LACTATE SERPL-SCNC: 1 MMOL/L (ref 0.5–2)
LACTATE SERPL-SCNC: 2.3 MMOL/L (ref 0.5–2)
LIPASE SERPL-CCNC: 28 U/L (ref 73–393)
LYMPHOCYTES # BLD AUTO: 0.7 THOUSANDS/ΜL (ref 0.6–4.47)
LYMPHOCYTES NFR BLD AUTO: 3 % (ref 14–44)
MCH RBC QN AUTO: 27.6 PG (ref 26.8–34.3)
MCHC RBC AUTO-ENTMCNC: 31.5 G/DL (ref 31.4–37.4)
MCV RBC AUTO: 88 FL (ref 82–98)
MONOCYTES # BLD AUTO: 0.88 THOUSAND/ΜL (ref 0.17–1.22)
MONOCYTES NFR BLD AUTO: 4 % (ref 4–12)
NEUTROPHILS # BLD AUTO: 20.51 THOUSANDS/ΜL (ref 1.85–7.62)
NEUTS SEG NFR BLD AUTO: 92 % (ref 43–75)
NRBC BLD AUTO-RTO: 0 /100 WBCS
P AXIS: 34 DEGREES
PLATELET # BLD AUTO: 724 THOUSANDS/UL (ref 149–390)
PMV BLD AUTO: 9.5 FL (ref 8.9–12.7)
POTASSIUM SERPL-SCNC: 3.6 MMOL/L (ref 3.5–5.3)
PR INTERVAL: 138 MS
PROT SERPL-MCNC: 6.6 G/DL (ref 6.4–8.4)
PROTHROMBIN TIME: 18.3 SECONDS (ref 11.6–14.5)
QRS AXIS: 53 DEGREES
QRSD INTERVAL: 68 MS
QT INTERVAL: 336 MS
QTC INTERVAL: 448 MS
RBC # BLD AUTO: 4.49 MILLION/UL (ref 3.81–5.12)
SODIUM SERPL-SCNC: 136 MMOL/L (ref 135–147)
T WAVE AXIS: 15 DEGREES
VENTRICULAR RATE: 107 BPM
WBC # BLD AUTO: 22.5 THOUSAND/UL (ref 4.31–10.16)

## 2022-09-14 PROCEDURE — 85652 RBC SED RATE AUTOMATED: CPT | Performed by: PHYSICIAN ASSISTANT

## 2022-09-14 PROCEDURE — 36415 COLL VENOUS BLD VENIPUNCTURE: CPT | Performed by: EMERGENCY MEDICINE

## 2022-09-14 PROCEDURE — 85610 PROTHROMBIN TIME: CPT | Performed by: EMERGENCY MEDICINE

## 2022-09-14 PROCEDURE — 87493 C DIFF AMPLIFIED PROBE: CPT | Performed by: EMERGENCY MEDICINE

## 2022-09-14 PROCEDURE — 83690 ASSAY OF LIPASE: CPT | Performed by: EMERGENCY MEDICINE

## 2022-09-14 PROCEDURE — 87205 SMEAR GRAM STAIN: CPT | Performed by: INTERNAL MEDICINE

## 2022-09-14 PROCEDURE — 80053 COMPREHEN METABOLIC PANEL: CPT | Performed by: EMERGENCY MEDICINE

## 2022-09-14 PROCEDURE — 85025 COMPLETE CBC W/AUTO DIFF WBC: CPT | Performed by: EMERGENCY MEDICINE

## 2022-09-14 PROCEDURE — 99223 1ST HOSP IP/OBS HIGH 75: CPT | Performed by: INTERNAL MEDICINE

## 2022-09-14 PROCEDURE — 99285 EMERGENCY DEPT VISIT HI MDM: CPT

## 2022-09-14 PROCEDURE — 96361 HYDRATE IV INFUSION ADD-ON: CPT

## 2022-09-14 PROCEDURE — 96374 THER/PROPH/DIAG INJ IV PUSH: CPT

## 2022-09-14 PROCEDURE — 99222 1ST HOSP IP/OBS MODERATE 55: CPT | Performed by: INTERNAL MEDICINE

## 2022-09-14 PROCEDURE — 74177 CT ABD & PELVIS W/CONTRAST: CPT

## 2022-09-14 PROCEDURE — 86140 C-REACTIVE PROTEIN: CPT | Performed by: PHYSICIAN ASSISTANT

## 2022-09-14 PROCEDURE — 99285 EMERGENCY DEPT VISIT HI MDM: CPT | Performed by: EMERGENCY MEDICINE

## 2022-09-14 PROCEDURE — 84484 ASSAY OF TROPONIN QUANT: CPT | Performed by: EMERGENCY MEDICINE

## 2022-09-14 PROCEDURE — 93010 ELECTROCARDIOGRAM REPORT: CPT | Performed by: INTERNAL MEDICINE

## 2022-09-14 PROCEDURE — 85730 THROMBOPLASTIN TIME PARTIAL: CPT | Performed by: EMERGENCY MEDICINE

## 2022-09-14 PROCEDURE — 83993 ASSAY FOR CALPROTECTIN FECAL: CPT | Performed by: PHYSICIAN ASSISTANT

## 2022-09-14 PROCEDURE — 83605 ASSAY OF LACTIC ACID: CPT | Performed by: EMERGENCY MEDICINE

## 2022-09-14 PROCEDURE — 93005 ELECTROCARDIOGRAM TRACING: CPT

## 2022-09-14 PROCEDURE — 87040 BLOOD CULTURE FOR BACTERIA: CPT | Performed by: EMERGENCY MEDICINE

## 2022-09-14 PROCEDURE — 87505 NFCT AGENT DETECTION GI: CPT | Performed by: INTERNAL MEDICINE

## 2022-09-14 RX ORDER — OXYCODONE HYDROCHLORIDE AND ACETAMINOPHEN 5; 325 MG/1; MG/1
1 TABLET ORAL EVERY 6 HOURS PRN
Status: DISCONTINUED | OUTPATIENT
Start: 2022-09-14 | End: 2022-09-19 | Stop reason: HOSPADM

## 2022-09-14 RX ORDER — ONDANSETRON 2 MG/ML
4 INJECTION INTRAMUSCULAR; INTRAVENOUS EVERY 6 HOURS PRN
Status: DISCONTINUED | OUTPATIENT
Start: 2022-09-14 | End: 2022-09-19 | Stop reason: HOSPADM

## 2022-09-14 RX ORDER — DICYCLOMINE HYDROCHLORIDE 10 MG/1
10 CAPSULE ORAL
Status: DISCONTINUED | OUTPATIENT
Start: 2022-09-14 | End: 2022-09-19 | Stop reason: HOSPADM

## 2022-09-14 RX ORDER — GABAPENTIN 300 MG/1
300 CAPSULE ORAL 2 TIMES DAILY
Status: DISCONTINUED | OUTPATIENT
Start: 2022-09-14 | End: 2022-09-19 | Stop reason: HOSPADM

## 2022-09-14 RX ORDER — HEPARIN SODIUM 5000 [USP'U]/ML
5000 INJECTION, SOLUTION INTRAVENOUS; SUBCUTANEOUS EVERY 8 HOURS SCHEDULED
Status: DISCONTINUED | OUTPATIENT
Start: 2022-09-14 | End: 2022-09-17

## 2022-09-14 RX ORDER — ASPIRIN 81 MG/1
81 TABLET, CHEWABLE ORAL DAILY
Status: DISCONTINUED | OUTPATIENT
Start: 2022-09-14 | End: 2022-09-19 | Stop reason: HOSPADM

## 2022-09-14 RX ORDER — ACETAMINOPHEN 325 MG/1
650 TABLET ORAL EVERY 6 HOURS PRN
Status: DISCONTINUED | OUTPATIENT
Start: 2022-09-14 | End: 2022-09-19 | Stop reason: HOSPADM

## 2022-09-14 RX ORDER — GABAPENTIN 400 MG/1
400 CAPSULE ORAL
Status: DISCONTINUED | OUTPATIENT
Start: 2022-09-14 | End: 2022-09-19 | Stop reason: HOSPADM

## 2022-09-14 RX ORDER — ALBUTEROL SULFATE 90 UG/1
2 AEROSOL, METERED RESPIRATORY (INHALATION) EVERY 6 HOURS PRN
Status: DISCONTINUED | OUTPATIENT
Start: 2022-09-14 | End: 2022-09-19 | Stop reason: HOSPADM

## 2022-09-14 RX ORDER — FENTANYL CITRATE 50 UG/ML
25 INJECTION, SOLUTION INTRAMUSCULAR; INTRAVENOUS ONCE
Status: COMPLETED | OUTPATIENT
Start: 2022-09-14 | End: 2022-09-14

## 2022-09-14 RX ORDER — SACCHAROMYCES BOULARDII 250 MG
250 CAPSULE ORAL 2 TIMES DAILY
Status: DISCONTINUED | OUTPATIENT
Start: 2022-09-14 | End: 2022-09-19 | Stop reason: HOSPADM

## 2022-09-14 RX ORDER — ATORVASTATIN CALCIUM 40 MG/1
80 TABLET, FILM COATED ORAL
Status: DISCONTINUED | OUTPATIENT
Start: 2022-09-14 | End: 2022-09-19 | Stop reason: HOSPADM

## 2022-09-14 RX ORDER — HYDROMORPHONE HCL IN WATER/PF 6 MG/30 ML
0.2 PATIENT CONTROLLED ANALGESIA SYRINGE INTRAVENOUS EVERY 4 HOURS PRN
Status: DISCONTINUED | OUTPATIENT
Start: 2022-09-14 | End: 2022-09-19 | Stop reason: HOSPADM

## 2022-09-14 RX ORDER — SODIUM CHLORIDE, SODIUM GLUCONATE, SODIUM ACETATE, POTASSIUM CHLORIDE, MAGNESIUM CHLORIDE, SODIUM PHOSPHATE, DIBASIC, AND POTASSIUM PHOSPHATE .53; .5; .37; .037; .03; .012; .00082 G/100ML; G/100ML; G/100ML; G/100ML; G/100ML; G/100ML; G/100ML
75 INJECTION, SOLUTION INTRAVENOUS CONTINUOUS
Status: DISCONTINUED | OUTPATIENT
Start: 2022-09-14 | End: 2022-09-17

## 2022-09-14 RX ORDER — ALPRAZOLAM 0.5 MG/1
0.5 TABLET ORAL 2 TIMES DAILY PRN
Status: DISCONTINUED | OUTPATIENT
Start: 2022-09-14 | End: 2022-09-19 | Stop reason: HOSPADM

## 2022-09-14 RX ADMIN — HEPARIN SODIUM 5000 UNITS: 5000 INJECTION INTRAVENOUS; SUBCUTANEOUS at 07:58

## 2022-09-14 RX ADMIN — FLUTICASONE FUROATE AND VILANTEROL TRIFENATATE 1 PUFF: 100; 25 POWDER RESPIRATORY (INHALATION) at 08:34

## 2022-09-14 RX ADMIN — DICYCLOMINE HYDROCHLORIDE 10 MG: 10 CAPSULE ORAL at 16:28

## 2022-09-14 RX ADMIN — FENTANYL CITRATE 25 MCG: 50 INJECTION, SOLUTION INTRAMUSCULAR; INTRAVENOUS at 06:22

## 2022-09-14 RX ADMIN — Medication 125 MG: at 23:49

## 2022-09-14 RX ADMIN — ASPIRIN 81 MG 81 MG: 81 TABLET ORAL at 08:21

## 2022-09-14 RX ADMIN — GABAPENTIN 400 MG: 400 CAPSULE ORAL at 23:49

## 2022-09-14 RX ADMIN — SODIUM CHLORIDE, SODIUM GLUCONATE, SODIUM ACETATE, POTASSIUM CHLORIDE, MAGNESIUM CHLORIDE, SODIUM PHOSPHATE, DIBASIC, AND POTASSIUM PHOSPHATE 75 ML/HR: .53; .5; .37; .037; .03; .012; .00082 INJECTION, SOLUTION INTRAVENOUS at 11:04

## 2022-09-14 RX ADMIN — HEPARIN SODIUM 5000 UNITS: 5000 INJECTION INTRAVENOUS; SUBCUTANEOUS at 13:48

## 2022-09-14 RX ADMIN — CEFTRIAXONE 1000 MG: 2 INJECTION, POWDER, FOR SOLUTION INTRAMUSCULAR; INTRAVENOUS at 08:00

## 2022-09-14 RX ADMIN — SODIUM CHLORIDE 1000 ML: 0.9 INJECTION, SOLUTION INTRAVENOUS at 06:02

## 2022-09-14 RX ADMIN — GABAPENTIN 300 MG: 300 CAPSULE ORAL at 08:21

## 2022-09-14 RX ADMIN — HEPARIN SODIUM 5000 UNITS: 5000 INJECTION INTRAVENOUS; SUBCUTANEOUS at 23:50

## 2022-09-14 RX ADMIN — IOHEXOL 100 ML: 350 INJECTION, SOLUTION INTRAVENOUS at 05:48

## 2022-09-14 RX ADMIN — GABAPENTIN 300 MG: 300 CAPSULE ORAL at 17:53

## 2022-09-14 RX ADMIN — Medication 125 MG: at 18:49

## 2022-09-14 RX ADMIN — Medication 250 MG: at 17:53

## 2022-09-14 RX ADMIN — ATORVASTATIN CALCIUM 80 MG: 40 TABLET, FILM COATED ORAL at 16:28

## 2022-09-14 NOTE — ASSESSMENT & PLAN NOTE
Lab Results   Component Value Date    EGFR 34 09/14/2022    EGFR 38 09/06/2022    EGFR 45 07/27/2022    CREATININE 1 49 (H) 09/14/2022    CREATININE 1 34 (H) 09/06/2022    CREATININE 1 17 07/27/2022     · Patient has GFR 34, consistent with CKD3  ·  Patient reported she is unaware of this diagnosis which has never been discussed by any of her previous doctors  · Monitor kidney function, avoid nephrotoxins

## 2022-09-14 NOTE — H&P
[facility]  H&P- Cole Bring 1945, 78 y o  female MRN: [id number]  Unit/Bed#: ED 20 Encounter: [id number]  Primary Care Provider: Angelic Nogueira MD   Date and time admitted to hospital: 9/14/2022  4:51 AM    * Proctocolitis  Assessment & Plan  - Patient presents with generalized abdominal pain with non-bloody diarrhea, nausea, vomiting, and fever   - Patient previously presented 9/6 for similar symptoms and was sent home with ciprofloxacin and metronidazole for likely colitis  - Patient reports that the pills did not help and her symptoms continued  Presentation could be related to colitis, potentially infectious although there may be also an element of ischemic colitis given her diffuse atherosclerosis    · Currently endorses lower abdominal pain that radiates up the sides of the abdomen, occasionally into the back  ·  CT abdomen/pelvis shows "Diffuse progressive colorectal thickening of entire colon  Diffuse prominent pericolonic vascularity of descending colon through rectum," consistent with proctocolitis  · Start Unasyn 1 5g q12hr at 100 mL/hr over 30 min  · Follow stool studies including C diff  · GI consult will be appreciated    Transaminitis  Assessment & Plan  · Patient has elevated transaminases, AST 89, ,   · Etiology unclear at this time, ALP may be related to patient's ongoing cancer    AST and ALT could be related to infectious process  · Monitor CMP    Continuous opioid dependence ([facility] [address] )  Assessment & Plan  ·  Patient currently on dilaudid 0 2 mg q4hrs p r n and percocet q6hr prn for pain given acute worsening due to colitis    COPD (chronic obstructive pulmonary disease) ([facility] [address] )  Assessment & Plan  · Patient currently no longer smokes  · COPD appears well controlled  · Continue chronic inhaler regimen    Stage 3 chronic kidney disease [facility])  Assessment & Plan  Lab Results   Component Value Date    EGFR 34 09/14/2022    EGFR 38 09/06/2022    EGFR 45 07/27/2022    CREATININE 1 49 (H) 09/14/2022    CREATININE 1 34 (H) 09/06/2022    CREATININE 1 17 07/27/2022     · Patient has GFR 34, consistent with CKD3  ·  Patient reported she is unaware of this diagnosis which has never been discussed by any of her previous doctors  · Monitor kidney function, avoid nephrotoxins    Hyperlipidemia  Assessment & Plan  ·  Home medications include rosuvastatin 20 mg  · Continue atorvastatin which is formulary substitute    Carotid artery plaque, bilateral  Assessment & Plan  · No acute concerns at this time  Aortoiliac occlusive disease ([address] )  Assessment & Plan  · No acute concerns at this time  · Continue statin    Anxiety  Assessment & Plan  · Currently normal mood, behavior, and thought content  · Monitor    VTE Prophylaxis: Heparin  / sequential compression device   Code Status:DNR/DNI  POLST: POLST form is not discussed and not completed at this time  Discussion with family:  Discussed with patient    Anticipated Length of Stay:  Patient will be admitted on an Inpatient basis with an anticipated length of stay of  at least 2 midnights  Justification for Hospital Stay:  Proctocolitis    Total Time for Visit, including Counseling / Coordination of Care: 45 minutes  Greater than 50% of this total time spent on direct patient counseling and coordination of care  Chief Complaint:   Abdominal pain, diarrhea    History of Present Illness:    Monique Saxena is a 78 y o  female who presents with abdominal pain and diarrhea  Patient does have a history of breast cancer previously undergoing treatment with Oncology, her last session of chemotherapy was on June 2022 but currently she is off any chemotherapy  For the past several days to weeks she does mention worsening abdominal pain with episodes of loose bowel movements  She denies any bleeding    She does mention that she was previously in the emergency department and she was diagnosed with diverticulitis, at that point in time she was prescribed antibiotic therapy  Despite that she mentions that her abdominal pain and loose bowel movements are currently worsening  She does mention some fever at home as well  She otherwise denies any vomiting  Review of Systems:    Review of Systems   Constitutional: Positive for fatigue  Gastrointestinal: Positive for abdominal pain and diarrhea  All other systems reviewed and are negative  Past Medical and Surgical History:     Past Medical History:   Diagnosis Date    Anxiety     Atherosclerosis of native artery of both lower extremities with intermittent claudication ([facility] [address] ) 10/15/2015    Transitioned From: Cardiovascular Symptoms    [facility]     Carotid artery plaque, bilateral 2017    Transitioned From: Atherosclerosis of both carotid arteries    Chronic kidney disease     Chronic sinusitis     Last assessed: 13    COPD [facility]chronic obstructive pulmonary disease) [facility] )     COVID     21 not hopsitalized- flu-like symptoms    Fall 2021    Fracture, ankle closed, bimalleolar, right, initial encounter 2021    GERD (gastroesophageal reflux disease)     Hyperlipidemia     Lung nodule     PNA (pneumonia)     PONV (postoperative nausea and vomiting)     with C-sections    Pulmonary emphysema ([facility]     PVD [facility]peripheral vascular disease) ([facility]     Restless leg syndrome     Stage 3 chronic kidney disease ([facility] [address] ) 2019    Tobacco abuse        Past Surgical History:   Procedure Laterality Date    BREAST LUMPECTOMY Right 2022    Procedure: ISAI  DIRECTED LUMPECTOMY;  Surgeon: Gee Castillo MD;  Location: [facility];  Service: Surgical Oncology    CATARACT EXTRACTION       SECTION      COLONOSCOPY      Complete   Resolved: 13    DESCENDING AORTIC ANEURYSM REPAIR W/ STENT  2019    IR AORTAGRAM WITH RUN-OFF  8/15/2019    LYMPH NODE BIOPSY Right 2022    Procedure: LYMPHATIC MAPPING WITH BLUE AND RADIOACTIVE DYES, SENTINEL LYMPH NODE BIOPSY, INJECTION IN OR BY DR Ryan Pierre AT 1300;  Surgeon: Daryn Khalil MD;  Location: [facility];  Service: Surgical Oncology    SHOULDER SURGERY      For frozen shoulder     TONSILLECTOMY      US BREAST ISAI  NEEDLE LOC RIGHT Right 3/25/2022    US GUIDED BREAST BIOPSY RIGHT COMPLETE Right 3/25/2022       Meds/Allergies:    Prior to Admission medications    Medication Sig Start Date End Date Taking? Authorizing Provider   albuterol (2 5 mg/3 mL) 0 083 % nebulizer solution Take 1 vial (2 5 mg total) by nebulization every 6 (six) hours as needed for wheezing or shortness of breath 10/11/18   Carmelo Cain PA-C   albuterol (PROVENTIL HFA,VENTOLIN HFA) 90 mcg/act inhaler Inhale 2 puffs every 6 (six) hours as needed for wheezing 7/20/20   Carrington Del Toro PA-C   ALPRAZolam Ardelle Fritter) 0 5 mg tablet Take 1 tablet (0 5 mg total) by mouth 2 (two) times a day as needed for anxiety 4/13/22   Akosua Pryor MD   ASPIRIN 81 PO Take by mouth    Historical Provider, MD Diana Wilson 100-25 MCG/INH inhaler inhale 1 puff daily Rinse mouth after use 7/13/22   Carmelo Cain PA-C   dexamethasone (DECADRON) 4 mg tablet Take 2 tablets by mouth (8mg) with meals in the morning  Take 2 tablets (8mg) with meals in the evening  Do this for three days, the day before treatment, the day of treatment and the day after treatment  5/19/22   Fidel Louis MD   dexamethasone (DECADRON) 4 mg tablet Take 1 tablet (4 mg total) by mouth 2 (two) times a day with meals Take 2 tablets by mouth (8mg) with meals in the morning  Take 2 tablets (8mg) with meals in the evening  Do this for three days, the day before treatment, the day of treatment and the day after treatment   5/27/22   Chelsey Espinosa PA-C   ergocalciferol (VITAMIN D2) 50,000 units take 1 capsule by mouth every week 9/1/22   Akosua Pryor MD   fluticasone [facility]) 50 mcg/act nasal spray 2 sprays into each nostril daily 9/14/21   ABIGAIL Duong   gabapentin (NEURONTIN) 300 mg capsule Take 1 capsule (300 mg total) by mouth 2 (two) times a day 7/14/22   Janak Marino MD   gabapentin (NEURONTIN) 400 mg capsule take 1 capsule by mouth at bedtime 7/8/22   Janak Marino MD   Lidocaine Viscous HCl (XYLOCAINE) 2 % mucosal solution  6/15/22   Historical Provider, MD   metroNIDAZOLE (FLAGYL) 500 mg tablet Take 1 tablet (500 mg total) by mouth every 8 (eight) hours for 10 days 9/6/22 9/16/22  Ashwin Sampson DO   moxifloxacin (AVELOX) 400 MG tablet Take 1 tablet (400 mg total) by mouth daily for 10 days 9/6/22 9/16/22  Ashwin Sampson DO   naloxone (NARCAN) 4 mg/0 1 mL nasal spray Administer 1 spray into a nostril  If no response after 2-3 minutes, give another dose in the other nostril using a new spray  4/4/22   Beto Kramer MD   olopatadine (PATANOL) 0 1 % ophthalmic solution   9/11/18   Historical Provider, MD   ondansetron (Zofran ODT) 4 mg disintegrating tablet Take 1 tablet (4 mg total) by mouth every 6 (six) hours as needed for nausea or vomiting 9/6/22   Ashwin Sampson DO   ondansetron (ZOFRAN) 8 mg tablet Take 1 tablet (8 mg total) by mouth every 8 (eight) hours as needed for nausea or vomiting 5/19/22   Linh Alston MD   oxyCODONE-acetaminophen (Percocet) 5-325 mg per tablet Take 1 tablet by mouth every 6 (six) hours as needed for moderate pain Max Daily Amount: 4 tablets 4/6/22   Beto Kramer MD   rosuvastatin (CRESTOR) 20 MG tablet Take 1 tablet (20 mg total) by mouth daily 2/15/22   Michelle Garcia PA-C   saccharomyces boulardii (FLORASTOR) 250 mg capsule Take 1 capsule (250 mg total) by mouth 2 (two) times a day 6/28/22   Omar Dillard MD   sodium chloride () 2 % hypertonic ophthalmic solution Sue 128 2 % eye drops    Historical Provider, MD     I have reviewed home medications with patient personally  Allergies:    Allergies   Allergen Reactions    Spiriva Handihaler [Tiotropium Bromide Monohydrate] Shortness Of Breath    Augmentin [Amoxicillin-Pot Clavulanate] Abdominal Pain     Pt received ceftriaxone 1 g IV on 18, gets sharp pains     Tiotropium Abdominal Pain    Tylenol With Codeine #3 [Acetaminophen-Codeine] Abdominal Pain       Social History:     Marital Status:    Substance Use History:   Social History     Substance and Sexual Activity   Alcohol Use Yes    Comment: occasionally      Social History     Tobacco Use   Smoking Status Former Smoker    Packs/day: 2 00    Years: 56 00    Pack years: 112 00    Types: Cigarettes    Start date: 1962   Donal Polio Quit date: 2018    Years since quittin 3   Smokeless Tobacco Never Used     Social History     Substance and Sexual Activity   Drug Use No       Family History:    non-contributory    Physical Exam:     Vitals:   Blood Pressure: 103/51 (22 0759)  Pulse: 87 (22 0759)  Temperature: 99 5 °F (37 5 °C) (22 0500)  Temp Source: Oral (22 0500)  Respirations: 18 (22 0759)  Height: 5' (152 4 cm) (22 0500)  Weight - Scale: 71 3 kg (157 lb 3 oz) (22 0500)  SpO2: 97 % (22 075)    Physical Exam  Vitals and nursing note reviewed  Constitutional:       Appearance: Normal appearance  She is normal weight  Comments: Female in bed, awake   HENT:      Head: Normocephalic and atraumatic  Right Ear: External ear normal       Left Ear: External ear normal       Nose: Nose normal  No congestion  Mouth/Throat:      Mouth: Mucous membranes are dry  Pharynx: Oropharynx is clear  No oropharyngeal exudate or posterior oropharyngeal erythema  Eyes:      General: No scleral icterus  Right eye: No discharge  Left eye: No discharge  Extraocular Movements: Extraocular movements intact  Conjunctiva/sclera: Conjunctivae normal       Pupils: Pupils are equal, round, and reactive to light     Cardiovascular:      Rate and Rhythm: Normal rate and regular rhythm  Pulses: Normal pulses  Heart sounds: Normal heart sounds  No murmur heard  No friction rub  No gallop  Pulmonary:      Effort: Pulmonary effort is normal  No respiratory distress  Breath sounds: Normal breath sounds  No stridor  No wheezing, rhonchi or rales  Chest:      Chest wall: No tenderness  Abdominal:      General: Abdomen is flat  Bowel sounds are normal  There is no distension  Palpations: Abdomen is soft  There is no mass  Tenderness: There is abdominal tenderness  There is no guarding or rebound  Comments: Tenderness to palpation of left lower quadrant as well as right lower quadrant but no rebound or guarding   Musculoskeletal:         General: No swelling, tenderness, deformity or signs of injury  Normal range of motion  Cervical back: Normal range of motion and neck supple  No rigidity  No muscular tenderness  Skin:     General: Skin is warm and dry  Capillary Refill: Capillary refill takes less than 2 seconds  Coloration: Skin is not jaundiced or pale  Findings: No bruising, erythema, lesion or rash  Neurological:      General: No focal deficit present  Mental Status: She is alert and oriented to person, place, and time  Mental status is at baseline  Cranial Nerves: No cranial nerve deficit  Sensory: No sensory deficit  Motor: No weakness  Coordination: Coordination normal    Psychiatric:         Mood and Affect: Mood normal          Behavior: Behavior normal          Thought Content: Thought content normal          Judgment: Judgment normal            Additional Data:     Lab Results: I have personally reviewed pertinent reports        Results from last 7 days   Lab Units 09/14/22  0513   WBC Thousand/uL 22 50*   HEMOGLOBIN g/dL 12 4   HEMATOCRIT % 39 4   PLATELETS Thousands/uL 724*   NEUTROS PCT % 92*   LYMPHS PCT % 3*   MONOS PCT % 4   EOS PCT % 0     Results from last 7 days   Lab Units 09/14/22  0513   SODIUM mmol/L 136   POTASSIUM mmol/L 3 6   CHLORIDE mmol/L 98   CO2 mmol/L 25   BUN mg/dL 17   CREATININE mg/dL 1 49*   ANION GAP mmol/L 13   CALCIUM mg/dL 8 4   ALBUMIN g/dL 2 2*   TOTAL BILIRUBIN mg/dL 0 38   ALK PHOS U/L 241*   ALT U/L 122*   AST U/L 89*   GLUCOSE RANDOM mg/dL 163*     Results from last 7 days   Lab Units 09/14/22  0600   INR  1 55*             Results from last 7 days   Lab Units 09/14/22  0755 09/14/22  0513   LACTIC ACID mmol/L 1 0 2 3*       Imaging: I have personally reviewed pertinent reports  CT abdomen pelvis with contrast   Final Result by Aniket Ray MD (09/14 0700)      Diffuse progressive nonspecific uncomplicated proctocolitis  Incidental finding of new trace fluid within the uterine endometrial cavity likely within normal limits  This could be followed up with nonemergent pelvic ultrasound if clinically warranted  Additional stable chronic findings as above  This study demonstrates a significant  finding and was documented as such in Epic for liaison and referring practitioner notification  Workstation performed: [id number]                 AllscriMYTRND / Share Practice Records Reviewed: Yes     ** Please Note: This note has been constructed using a voice recognition system   **

## 2022-09-14 NOTE — H&P
[address] [facility]  H&P- Natasha Bhakta 1945, 78 y o  female MRN: [id number]  Unit/Bed#: ED 20 Encounter: [id number]  Primary Care Provider: Stephen Berrios MD   Date and time admitted to hospital: 9/14/2022  4:51 AM    Transaminitis  Assessment & Plan  - Patient has elevated transaminases, AST 89, ,   - AST, ALT, ALP wnl at last admission 9/6/22  - Etiology unclear at this time, ALP may be related to patient's ongoing cancer   - Continue to trend for any acute change    Continuous opioid dependence ([place] [address] )  Assessment & Plan  - Patient currently on dilaudid 0 2 mg q4hrs p r n and percocet q6hr prn for pain    Malignant neoplasm of overlapping sites of right breast in female, estrogen receptor negative ([place] [address] )  Assessment & Plan  - Patient reports stage 1 breast cancer, with one dose of chemotherapy in June 2022   - Has denied further chemotherapeutic regimens to focus on quality of life  -     COPD (chronic obstructive pulmonary disease) ([place] [address] )  Assessment & Plan  - Patient was previous smoker of 57 years, quit 4 years ago  - Home medications include albuterol inhaler  - Patient reports no SOB currently    Stage 3 chronic kidney disease ([place] [address] )  Assessment & Plan  Lab Results   Component Value Date    EGFR 34 09/14/2022    EGFR 38 09/06/2022    EGFR 45 07/27/2022    CREATININE 1 49 (H) 09/14/2022    CREATININE 1 34 (H) 09/06/2022    CREATININE 1 17 07/27/2022     - Patient has GFR 34, consistent with CKD3  - Patient reports she is unaware of this diagnosis which has never been discussed by any of her previous doctors  - Cr upon admission is 1 49  - Patient denies any flank pain or dysuria at this time    Hyperlipidemia  Assessment & Plan  - Home medications include rosuvastatin 20 mg    Carotid artery plaque, bilateral  Assessment & Plan  - No acute concerns at this time  Aortoiliac occlusive disease ([facility] )  Assessment & Plan  - No acute concerns at this time      Anxiety  Assessment & Plan  - Currently normal mood, behavior, and thought content  * Proctocolitis  Assessment & Plan  - Patient presents with generalized abdominal pain with non-bloody diarrhea, nausea, vomiting, and fever   - Patient previously presented 9/6 for similar symptoms and was sent home with ciprofloxacin and metronidazole for likely colitis  - Patient reports that the pills did not help and her symptoms continued  - Currently endorses lower abdominal pain that radiates up the sides of the abdomen, occasionally into the back  - Denies epigastric, periumbilical, or flank pain  - Patient is tachycardic with low fever on admission  - CT abdomen/pelvis shows "Diffuse progressive colorectal thickening of entire colon  Diffuse prominent pericolonic vascularity of descending colon through rectum," consistent with proctocolitis  - Start Unasyn 1 5g q12hr at 100 mL/hr over 30 min  - Blood and stool cultures ordered  - Continuous IV plasmalyte for volume repletion  - Dilaudid 0 2 mg q4hrs p r n and percocet q6hr prn for pain  - Arrange for outpatient colonoscopy follow-up      VTE Prophylaxis: Heparin  / sequential compression device   Code Status: DNR  POLST: POLST form is not discussed and not completed at this time  Discussion with family:     Anticipated Length of Stay:  Patient will be admitted on an Inpatient basis  Justification for Hospital Stay: proctocolitis    Total Time for Visit, including Counseling / Coordination of Care: 20 minutes  Greater than 50% of this total time spent on direct patient counseling and coordination of care  Chief Complaint:   Abdominal pain    History of Present Illness:    Zaynab Acharya is a 78 y o  female with a history of right breast cancer stage IB, CKD3, GERD, HLD, and COPD presented 9/14/22 for generalized abdominal pain  Patient reports ongoing lower abdominal pain over the last 2-3 weeks that spreads up the sides of her belly and sometimes into the back   The pain is accompanied by diarrhea, nausea, and vomiting  Patient also endorses near-daily fevers of ~100 F  She was recently seen 9/6/22 in the ED for similar complaints and was sent home on a 10-day course of ciprofloxacin and metronidazole for likely colitis  Despite "trying" to take the pills, patient states that she had no symptomatic relief  She denies any headache, chest pain, SOB, dysuria, or flank pain  Patient underwent one chemotherapy session in June 2022 for her breast cancer  Review of Systems:    Review of Systems   Constitutional: Positive for fatigue and fever  Respiratory: Positive for shortness of breath  Cardiovascular: Negative for chest pain and palpitations  Gastrointestinal: Positive for abdominal pain, diarrhea, nausea and vomiting  Negative for blood in stool  Musculoskeletal: Positive for back pain  Neurological: Negative for headaches  Psychiatric/Behavioral: Negative for dysphoric mood  Past Medical and Surgical History:     Past Medical History:   Diagnosis Date    Anxiety     Atherosclerosis of native artery of both lower extremities with intermittent claudication ([facility] ) 10/15/2015    Transitioned From: Cardiovascular Symptoms    Breast cancer ([facility] )     Carotid artery plaque, bilateral 03/21/2017    Transitioned From:  Atherosclerosis of both carotid arteries    Chronic kidney disease     Chronic sinusitis     Last assessed: 11/11/13    COPD (chronic obstructive pulmonary disease) ([facility] )     COVID     12/25/21 not hopsitalized- flu-like symptoms    Fall 06/16/2021    Fracture, ankle closed, bimalleolar, right, initial encounter 06/2021    GERD (gastroesophageal reflux disease)     Hyperlipidemia     Lung nodule     PNA (pneumonia)     PONV (postoperative nausea and vomiting)     with C-sections    Pulmonary emphysema ([facility] )     PVD (peripheral vascular disease) (HCC)     Restless leg syndrome     Stage 3 chronic kidney disease ([facility] ) 04/22/2019    Tobacco abuse        Past Surgical History:   Procedure Laterality Date    BREAST LUMPECTOMY Right 2022    Procedure: ISAI  DIRECTED LUMPECTOMY;  Surgeon: Sharleen Boeck, MD;  Location: [facility];  Service: Surgical Oncology    CATARACT EXTRACTION       SECTION      COLONOSCOPY      Complete  Resolved: 13    DESCENDING AORTIC ANEURYSM REPAIR W/ STENT  2019    IR AORTAGRAM WITH RUN-OFF  8/15/2019    LYMPH NODE BIOPSY Right 2022    Procedure: LYMPHATIC MAPPING WITH BLUE AND RADIOACTIVE DYES, SENTINEL LYMPH NODE BIOPSY, INJECTION IN OR BY DR Maryam Dc AT 1300;  Surgeon: Sharleen Boeck, MD;  Location: [facility];  Service: Surgical Oncology    SHOULDER SURGERY      For frozen shoulder     TONSILLECTOMY      US BREAST ISAI  NEEDLE LOC RIGHT Right 3/25/2022    US GUIDED BREAST BIOPSY RIGHT COMPLETE Right 3/25/2022       Meds/Allergies:    Prior to Admission medications    Medication Sig Start Date End Date Taking? Authorizing Provider   albuterol (2 5 mg/3 mL) 0 083 % nebulizer solution Take 1 vial (2 5 mg total) by nebulization every 6 (six) hours as needed for wheezing or shortness of breath 10/11/18   [facility]   albuterol (PROVENTIL HFA,VENTOLIN HFA) 90 mcg/act inhaler Inhale 2 puffs every 6 (six) hours as needed for wheezing 20   Julieth Del Toro PA-C   ALPRAZolam Dorthey Halim) 0 5 mg tablet Take 1 tablet (0 5 mg total) by mouth 2 (two) times a day as needed for anxiety 22   Maxx Rubio MD   ASPIRIN 81 PO Take by mouth    Historical Provider, MD   [facility] 100-25 MCG/INH inhaler inhale 1 puff daily Rinse mouth after use 22   [facility]   dexamethasone (DECADRON) 4 mg tablet Take 2 tablets by mouth (8mg) with meals in the morning  Take 2 tablets (8mg) with meals in the evening  Do this for three days, the day before treatment, the day of treatment and the day after treatment   22   Patric Jesus MD   dexamethasone (DECADRON) 4 mg tablet Take 1 tablet (4 mg total) by mouth 2 (two) times a day with meals Take 2 tablets by mouth (8mg) with meals in the morning  Take 2 tablets (8mg) with meals in the evening  Do this for three days, the day before treatment, the day of treatment and the day after treatment  5/27/22   Santo Lee PA-C   ergocalciferol (VITAMIN D2) 50,000 units take 1 capsule by mouth every week 9/1/22   Duong López MD   fluticasone [facility]) 50 mcg/act nasal spray 2 sprays into each nostril daily 9/14/21   ABIGAIL Isabel   gabapentin (NEURONTIN) 300 mg capsule Take 1 capsule (300 mg total) by mouth 2 (two) times a day 7/14/22   Duong López MD   gabapentin (NEURONTIN) 400 mg capsule take 1 capsule by mouth at bedtime 7/8/22   Duong López MD   Lidocaine Viscous HCl (XYLOCAINE) 2 % mucosal solution  6/15/22   Historical Provider, MD   metroNIDAZOLE (FLAGYL) 500 mg tablet Take 1 tablet (500 mg total) by mouth every 8 (eight) hours for 10 days 9/6/22 9/16/22  Romie Sampson DO   moxifloxacin (AVELOX) 400 MG tablet Take 1 tablet (400 mg total) by mouth daily for 10 days 9/6/22 9/16/22  Romie Sampson DO   naloxone (NARCAN) 4 mg/0 1 mL nasal spray Administer 1 spray into a nostril  If no response after 2-3 minutes, give another dose in the other nostril using a new spray   4/4/22   Pat Gonsalez MD   olopatadine (PATANOL) 0 1 % ophthalmic solution   9/11/18   Historical Provider, MD   ondansetron (Zofran ODT) 4 mg disintegrating tablet Take 1 tablet (4 mg total) by mouth every 6 (six) hours as needed for nausea or vomiting 9/6/22   Margarita Sampson DO   ondansetron (ZOFRAN) 8 mg tablet Take 1 tablet (8 mg total) by mouth every 8 (eight) hours as needed for nausea or vomiting 5/19/22   Musa Son MD   oxyCODONE-acetaminophen (Percocet) 5-325 mg per tablet Take 1 tablet by mouth every 6 (six) hours as needed for moderate pain Max Daily Amount: 4 tablets 4/6/22 Chase Velasquez MD   rosuvastatin (CRESTOR) 20 MG tablet Take 1 tablet (20 mg total) by mouth daily 2/15/22   Shyam Sarkar PA-C   saccharomyces boulardii (FLORASTOR) 250 mg capsule Take 1 capsule (250 mg total) by mouth 2 (two) times a day 22   Richa Vidales MD   sodium chloride () 2 % hypertonic ophthalmic solution Sue 128 2 % eye drops    Historical Provider, MD     I have reviewed home medications with patient personally  Allergies: Allergies   Allergen Reactions    Spiriva Handihaler [Tiotropium Bromide Monohydrate] Shortness Of Breath    Augmentin [Amoxicillin-Pot Clavulanate] Abdominal Pain     Pt received ceftriaxone 1 g IV on 18, gets sharp pains     Tiotropium Abdominal Pain    Tylenol With Codeine #3 [Acetaminophen-Codeine] Abdominal Pain       Social History:     Marital Status:     Occupation: Retired  Patient Pre-hospital Living Situation: Home  Patient Pre-hospital Level of Mobility: Independent  Patient Pre-hospital Diet Restrictions: None  Substance Use History:   Social History     Substance and Sexual Activity   Alcohol Use Yes    Comment: occasionally      Social History     Tobacco Use   Smoking Status Former Smoker    Packs/day: 2 00    Years: 56 00    Pack years: 112 00    Types: Cigarettes    Start date: 1962   [place] Quit date: 2018    Years since quittin 3   Smokeless Tobacco Never Used     Social History     Substance and Sexual Activity   Drug Use No       Family History:    Family History   Problem Relation Age of Onset    No Known Problems Mother     No Known Problems Father     Arthritis Sister     Pancreatic cancer Sister 61    Melanoma Brother         twice    Prostate cancer Brother     Heart attack Family         Acute Myocardial Infarction    Cirrhosis Family     Prostate cancer Family     Skin cancer Family     Stroke Family         Syndrome    No Known Problems Daughter     No Known Problems Maternal Grandmother     No Known Problems Paternal Grandmother     No Known Problems Sister     No Known Problems Maternal Aunt     Breast cancer Other 30       Physical Exam:     Vitals:   Blood Pressure: 103/51 (09/14/22 0759)  Pulse: 87 (09/14/22 0759)  Temperature: 99 5 °F (37 5 °C) (09/14/22 0500)  Temp Source: Oral (09/14/22 0500)  Respirations: 18 (09/14/22 0759)  Height: 5' (152 4 cm) (09/14/22 0500)  Weight - Scale: 71 3 kg (157 lb 3 oz) (09/14/22 0500)  SpO2: 97 % (09/14/22 0759)    Physical Exam  Vitals reviewed  Constitutional:       General: She is not in acute distress  Appearance: Normal appearance  HENT:      Head: Normocephalic and atraumatic  Cardiovascular:      Rate and Rhythm: Normal rate and regular rhythm  Heart sounds: No murmur heard  Pulmonary:      Effort: Pulmonary effort is normal  No respiratory distress  Breath sounds: Normal breath sounds  Abdominal:      Palpations: Abdomen is soft  There is no mass  Comments: Tenderness to palpation over lower abdomen and suprapubic region, as well as LLQ and RLQ  Skin:     General: Skin is warm and dry  Neurological:      Mental Status: She is alert and oriented to person, place, and time  Psychiatric:         Mood and Affect: Mood normal          Behavior: Behavior normal          Thought Content: Thought content normal            Additional Data:     Lab Results: I have personally reviewed pertinent reports        Results from last 7 days   Lab Units 09/14/22  0513   WBC Thousand/uL 22 50*   HEMOGLOBIN g/dL 12 4   HEMATOCRIT % 39 4   PLATELETS Thousands/uL 724*   NEUTROS PCT % 92*   LYMPHS PCT % 3*   MONOS PCT % 4   EOS PCT % 0     Results from last 7 days   Lab Units 09/14/22  0513   SODIUM mmol/L 136   POTASSIUM mmol/L 3 6   CHLORIDE mmol/L 98   CO2 mmol/L 25   BUN mg/dL 17   CREATININE mg/dL 1 49*   ANION GAP mmol/L 13   CALCIUM mg/dL 8 4   ALBUMIN g/dL 2 2*   TOTAL BILIRUBIN mg/dL 0 38   ALK PHOS U/L 241*   ALT U/L 122*   AST U/L 89*   GLUCOSE RANDOM mg/dL 163*     Results from last 7 days   Lab Units 09/14/22  0600   INR  1 55*             Results from last 7 days   Lab Units 09/14/22  0755 09/14/22  0513   LACTIC ACID mmol/L 1 0 2 3*       Imaging: I have personally reviewed pertinent reports  CT abdomen pelvis with contrast   Final Result by Ibrahima Berumen MD (09/14 0700)      Diffuse progressive nonspecific uncomplicated proctocolitis  Incidental finding of new trace fluid within the uterine endometrial cavity likely within normal limits  This could be followed up with nonemergent pelvic ultrasound if clinically warranted  Additional stable chronic findings as above  This study demonstrates a significant  finding and was documented as such in [facility] for liaison and referring practitioner notification  Workstation performed: [id number]             EKG, Pathology, and Other Studies Reviewed on Admission:   · EKG: sinus tachycardia, low voltage QRS    Allscripts / Epic Records Reviewed: Yes     ** Please Note: This note has been constructed using a voice recognition system   **

## 2022-09-14 NOTE — ASSESSMENT & PLAN NOTE
·  Home medications include rosuvastatin 20 mg  · Continue atorvastatin which is formulary substitute

## 2022-09-14 NOTE — ASSESSMENT & PLAN NOTE
- Patient was previous smoker of 57 years, quit 4 years ago  - Home medications include albuterol inhaler  - Patient reports no SOB currently

## 2022-09-14 NOTE — ASSESSMENT & PLAN NOTE
·  Patient currently on dilaudid 0 2 mg q4hrs p r n and percocet q6hr prn for pain given acute worsening due to colitis

## 2022-09-14 NOTE — CONSULTS
Consultation - 126 [facility]  Zaynab Acharya 78 y o  female MRN: [id number]  Unit/Bed#: ED 20 Encounter: [id number]      Inpatient consult to gastroenterology  Consult performed by: Dillon Mustafa PA-C  Consult ordered by: Filipe Erickson MD          Reason for Consult / Principal Problem:  Proctocolitis    HPI: Zaynab Acharya is a 78y o  year old female who presents with past medical history significant for COPD, CKD, hyperlipidemia, breast CA, anxiety  Patient presents the [facility] Emergency Department early this morning with a chief complaint of abdominal pain and diarrhea  Patient reports that she was actually in the emergency department last week and she was diagnosed with colitis and sent home with antibiotics  It is unclear to me whether she took both antibiotics that were prescribed  She presents back to the emergency room today with worsening diarrhea and abdominal pain and associated vomiting  Of note since she has been here in the ER her abdominal pain has improved she has not had any bowel movements  She denied any blood in her stool or black tarry stools  She reports that home that she was having episodes of fevers and chills  Patient is currently following with Oncology for her breast cancer  It appears that her last chemotherapy session was in June of 2022  Patient's last colonoscopy was in 2016  She did have several polyps removed from the rectum removed as well as the ascending colon  Patient reports she has been suffering on and off with diarrhea for several months  Patient's son at the bedside reports that her diarrhea now smell significantly worse than it has in the past   Patient was discharged after her last hospital stay to [facility] rehabilitation facility  Patient denies any recent antibiotic use  Patient denies any other travel  Patient denies any other significant new medications      CT AP from admission shows diffuse progressive nonspecific uncomplicated proctocolitis  Patient's WBC from 09/06 was 12 32  Patient's white blood cell count today is 22 5  Patient was also seen by the GI service back in June for acute diverticulitis  It was recommended for patient to have a follow-up colonoscopy in 6-8 weeks  REVIEW OF SYSTEMS:     CONSTITUTIONAL: Denies any fever, chills, or rigors  Good appetite, and no recent weight loss  HEENT: No earache or tinnitus  Denies hearing loss or visual disturbances  CARDIOVASCULAR: No chest pain or palpitations  RESPIRATORY: Denies any cough, hemoptysis, shortness of breath or dyspnea on exertion  GASTROINTESTINAL: As noted in the History of Present Illness  GENITOURINARY: No problems with urination  Denies any hematuria or dysuria  NEUROLOGIC: No dizziness or vertigo, denies headaches  MUSCULOSKELETAL: Denies any muscle or joint pain  SKIN: Denies skin rashes or itching  ENDOCRINE: Denies excessive thirst  Denies intolerance to heat or cold  PSYCHOSOCIAL: Denies depression or anxiety  Denies any recent memory loss  Historical Information   Past Medical History:   Diagnosis Date    Anxiety     Atherosclerosis of native artery of both lower extremities with intermittent claudication ([address] ) 10/15/2015    Transitioned From: Cardiovascular Symptoms    Breast cancer ([address] )     Carotid artery plaque, bilateral 03/21/2017    Transitioned From:  Atherosclerosis of both carotid arteries    Chronic kidney disease     Chronic sinusitis     Last assessed: 11/11/13    COPD (chronic obstructive pulmonary disease) ([address] )     COVID     12/25/21 not hopsitalized- flu-like symptoms    Fall 06/16/2021    Fracture, ankle closed, bimalleolar, right, initial encounter 06/2021    GERD (gastroesophageal reflux disease)     Hyperlipidemia     Lung nodule     PNA (pneumonia)     PONV (postoperative nausea and vomiting)     with C-sections    Pulmonary emphysema ([address] )     PVD [facility]peripheral vascular disease) [facility]     Restless leg syndrome     Stage 3 chronic kidney disease ([facility] 2019    Tobacco abuse      Past Surgical History:   Procedure Laterality Date    BREAST LUMPECTOMY Right 2022    Procedure: ISAI  DIRECTED LUMPECTOMY;  Surgeon: Rosmery Cabrera MD;  Location: [facility];  Service: Surgical Oncology    CATARACT EXTRACTION       SECTION      COLONOSCOPY      Complete   Resolved: 13    DESCENDING AORTIC ANEURYSM REPAIR W/ STENT  2019    IR AORTAGRAM WITH RUN-OFF  8/15/2019    LYMPH NODE BIOPSY Right 2022    Procedure: LYMPHATIC MAPPING WITH BLUE AND RADIOACTIVE DYES, SENTINEL LYMPH NODE BIOPSY, INJECTION IN OR BY DR RODRÍGUEZ AT 1300;  Surgeon: Rosmery Cabrera MD;  Location: [facility];  Service: Surgical Oncology    SHOULDER SURGERY      For frozen shoulder     TONSILLECTOMY      US BREAST ISAI  NEEDLE LOC RIGHT Right 3/25/2022    US GUIDED BREAST BIOPSY RIGHT COMPLETE Right 3/25/2022     Social History   Social History     Substance and Sexual Activity   Alcohol Use Yes    Comment: occasionally      Social History     Substance and Sexual Activity   Drug Use No     Social History     Tobacco Use   Smoking Status Former Smoker    Packs/day: 2 00    Years: 56 00    Pack years: 112 00    Types: Cigarettes    Start date: 1962   Heena Nine Quit date: 2018    Years since quittin 3   Smokeless Tobacco Never Used     Family History   Problem Relation Age of Onset    No Known Problems Mother     No Known Problems Father     Arthritis Sister     Pancreatic cancer Sister 61    Melanoma Brother         twice    Prostate cancer Brother     Heart attack Family         Acute Myocardial Infarction    Cirrhosis Family     Prostate cancer Family     Skin cancer Family     Stroke Family         Syndrome    No Known Problems Daughter     No Known Problems Maternal Grandmother     No Known Problems Paternal Grandmother     No Known Problems Sister     No Known Problems Maternal Aunt     Breast cancer Other 27       Meds/Allergies     (Not in a hospital admission)    Current Facility-Administered Medications   Medication Dose Route Frequency    acetaminophen (TYLENOL) tablet 650 mg  650 mg Oral Q6H PRN    albuterol (PROVENTIL HFA,VENTOLIN HFA) inhaler 2 puff  2 puff Inhalation Q6H PRN    ALPRAZolam (XANAX) tablet 0 5 mg  0 5 mg Oral BID PRN    [START ON 9/15/2022] ampicillin-sulbactam (UNASYN) 1 5 g in sodium chloride 0 9 % 50 mL IVPB  1 5 g Intravenous Q12H    aspirin chewable tablet 81 mg  81 mg Oral Daily    atorvastatin (LIPITOR) tablet 80 mg  80 mg Oral Daily With Dinner    fluticasone-vilanterol (BREO ELLIPTA) 100-25 mcg/inh inhaler 1 puff  1 puff Inhalation Daily    gabapentin (NEURONTIN) capsule 300 mg  300 mg Oral BID    gabapentin (NEURONTIN) capsule 400 mg  400 mg Oral HS    heparin (porcine) subcutaneous injection 5,000 Units  5,000 Units Subcutaneous Q8H [address]    HYDROmorphone HCl (DILAUDID) injection 0 2 mg  0 2 mg Intravenous Q4H PRN    multi-electrolyte (PLASMALYTE-A/ISOLYTE-S PH 7 4) IV solution  75 mL/hr Intravenous Continuous    ondansetron (ZOFRAN) injection 4 mg  4 mg Intravenous Q6H PRN    oxyCODONE-acetaminophen (PERCOCET) 5-325 mg per tablet 1 tablet  1 tablet Oral Q6H PRN       Allergies   Allergen Reactions    Spiriva Handihaler [Tiotropium Bromide Monohydrate] Shortness Of Breath    Augmentin [Amoxicillin-Pot Clavulanate] Abdominal Pain     Pt received ceftriaxone 1 g IV on 5-21-18, gets sharp pains     Tiotropium Abdominal Pain    Tylenol With Codeine #3 [Acetaminophen-Codeine] Abdominal Pain       Objective   Blood pressure 101/52, pulse 79, temperature 99 5 °F (37 5 °C), temperature source Oral, resp  rate 18, height 5' (1 524 m), weight 71 3 kg (157 lb 3 oz), SpO2 96 %, not currently breastfeeding      Intake/Output Summary (Last 24 hours) at 9/14/2022 1307  Last data filed at 9/14/2022 0830  Gross per 24 hour   Intake 1050 ml   Output --   Net 1050 ml         PHYSICAL EXAM:      General Appearance:   Alert, cooperative, no distress, appears stated age    HEENT:   Normocephalic, atraumatic, anicteric      Neck:  Supple, symmetrical, trachea midline, no adenopathy;    thyroid: no enlargement/tenderness/nodules; no carotid  bruit or JVD    Lungs:   Clear to auscultation bilaterally; no rales, rhonchi or wheezing; respirations unlabored    Heart[de-identified]   S1 and S2 normal; regular rate and rhythm; no murmur, rub, or gallop     Abdomen:   Soft, non-tender, non-distended; normal bowel sounds; no masses, no organomegaly    Genitalia:   Deferred    Rectal:   Deferred    Extremities:  No cyanosis, clubbing or edema    Pulses:  2+ and symmetric all extremities    Skin:  Skin color, texture, turgor normal, no rashes or lesions    Lymph nodes:  No palpable cervical, axillary or inguinal lymphadenopathy        Lab Results:   Admission on 09/14/2022   Component Date Value    WBC 09/14/2022 22 50 (A)    RBC 09/14/2022 4 49     Hemoglobin 09/14/2022 12 4     Hematocrit 09/14/2022 39 4     MCV 09/14/2022 88     MCH 09/14/2022 27 6     MCHC 09/14/2022 31 5     RDW 09/14/2022 16 0 (A)    MPV 09/14/2022 9 5     Platelets 65/86/4599 724 (A)    nRBC 09/14/2022 0     Neutrophils Relative 09/14/2022 92 (A)    Immat GRANS % 09/14/2022 1     Lymphocytes Relative 09/14/2022 3 (A)    Monocytes Relative 09/14/2022 4     Eosinophils Relative 09/14/2022 0     Basophils Relative 09/14/2022 0     Neutrophils Absolute 09/14/2022 20 51 (A)    Immature Grans Absolute 09/14/2022 0 31 (A)    Lymphocytes Absolute 09/14/2022 0 70     Monocytes Absolute 09/14/2022 0 88     Eosinophils Absolute 09/14/2022 0 02     Basophils Absolute 09/14/2022 0 08     Sodium 09/14/2022 136     Potassium 09/14/2022 3 6     Chloride 09/14/2022 98     CO2 09/14/2022 25     ANION GAP 09/14/2022 13     BUN 09/14/2022 17     Creatinine 09/14/2022 1 49 (A)    Glucose 09/14/2022 163 (A)    Calcium 09/14/2022 8 4     Corrected Calcium 09/14/2022 9 8     AST 09/14/2022 89 (A)    ALT 09/14/2022 122 (A)    Alkaline Phosphatase 09/14/2022 241 (A)    Total Protein 09/14/2022 6 6     Albumin 09/14/2022 2 2 (A)    Total Bilirubin 09/14/2022 0 38     eGFR 09/14/2022 34     Lipase 09/14/2022 28 (A)    LACTIC ACID 09/14/2022 2 3 (A)    Ventricular Rate 09/14/2022 107     Atrial Rate 09/14/2022 107     [place] Interval 09/14/2022 138     QRSD Interval 09/14/2022 68     QT Interval 09/14/2022 336     QTC Interval 09/14/2022 448     P Axis 09/14/2022 34     QRS Axis 09/14/2022 53     T Wave Axis 09/14/2022 15     hs TnI 0hr 09/14/2022 8     Protime 09/14/2022 18 3 (A)    INR 09/14/2022 1 55 (A)    PTT 09/14/2022 32     Blood Culture 09/14/2022 Received in Microbiology Lab  Culture in Progress   Blood Culture 09/14/2022 Received in Microbiology Lab  Culture in Progress   LACTIC ACID 09/14/2022 1 0     hs TnI 2hr 09/14/2022 8     Delta 2hr hsTnI 09/14/2022 0        Imaging Studies: I have personally reviewed pertinent imaging studies  ASSESSMENT & PLAN:  Abdominal pain  Diarrhea  Nausea vomiting  Proctocolitis  Leukocytosis  -Patient presents back to the [facility] Emergency Department with a chief complaint of worsening abdominal pain and diarrhea; since admission patient's abdominal pain has improved and she has not had a bowel movement   -Patient denies any rectal bleeding or melena   -Stool for enteric pathogens, wbc's, C diff pending  -WBC 22 5; continue to trend   -Patient is currently receiving Unasyn 1 5 g every 12 hours  -Will check fecal calprotectin   -Will check ESR and CRP  -Hemoglobin is stable    -Diet as tolerated  -Serial abdominal exams  -Antiemetics as needed   -Will start Bentyl for abdominal pain   -Will start Florastor probiotic   -Patient will need a colonoscopy; would prefer to plan in the outpatient setting once acute illness has subsided      Patient will be seen examined by Dr Jos Denise PA-C  9/14/2022,1:07 PM

## 2022-09-14 NOTE — ED NOTES
Patient provided with new sheets, socks, warm blankets  Patient log rolled, per anal care completed, after taking patient off bed pan  Patient repositioned in stretcher for comfort  Pillow provided for patient and placed under patients head        Cierra Shelley RN  09/14/22 [id number]

## 2022-09-14 NOTE — ASSESSMENT & PLAN NOTE
Lab Results   Component Value Date    EGFR 34 09/14/2022    EGFR 38 09/06/2022    EGFR 45 07/27/2022    CREATININE 1 49 (H) 09/14/2022    CREATININE 1 34 (H) 09/06/2022    CREATININE 1 17 07/27/2022     - Patient has GFR 34, consistent with CKD3  - Patient reports she is unaware of this diagnosis which has never been discussed by any of her previous doctors  - Cr upon admission is 1 49  - Patient denies any flank pain or dysuria at this time

## 2022-09-14 NOTE — ASSESSMENT & PLAN NOTE
- Patient reports stage 1 breast cancer, with one dose of chemotherapy in June 2022   - Has denied further chemotherapeutic regimens to focus on quality of life  -

## 2022-09-14 NOTE — ASSESSMENT & PLAN NOTE
- Patient presents with generalized abdominal pain with non-bloody diarrhea, nausea, vomiting, and fever   - Patient previously presented 9/6 for similar symptoms and was sent home with ciprofloxacin and metronidazole for likely colitis  - Patient reports that the pills did not help and her symptoms continued  - Currently endorses lower abdominal pain that radiates up the sides of the abdomen, occasionally into the back  - Denies epigastric, periumbilical, or flank pain  - Patient is tachycardic with low fever on admission  - CT abdomen/pelvis shows "Diffuse progressive colorectal thickening of entire colon   Diffuse prominent pericolonic vascularity of descending colon through rectum," consistent with proctocolitis  - Start Unasyn 1 5g q12hr at 100 mL/hr over 30 min  - Blood and stool cultures ordered  - Continuous IV plasmalyte for volume repletion  - Dilaudid 0 2 mg q4hrs p r n and percocet q6hr prn for pain  - Arrange for outpatient colonoscopy follow-up

## 2022-09-14 NOTE — ASSESSMENT & PLAN NOTE
· Patient currently no longer smokes  · COPD appears well controlled  · Continue chronic inhaler regimen

## 2022-09-14 NOTE — ASSESSMENT & PLAN NOTE
- Patient has elevated transaminases, AST 89, ,   - AST, ALT, ALP wnl at last admission 9/6/22  - Etiology unclear at this time, ALP may be related to patient's ongoing cancer   - Continue to trend for any acute change

## 2022-09-14 NOTE — ED PROVIDER NOTES
History  Chief Complaint   Patient presents with    Abdominal Pain     Pt present to ED via ems from home c/o intermittent lower abd pain assoc w/n/v/d x5 weeks and was dx w/diverticulitis,  pt states her symptoms have not subsided  78 y o  female presents with a few weeks of generalized abdominal pain without radiation  Patient describes severe pain that has been ongoing for some time and continues in the ER  Patient states everything aggravates the pain and nothing alleviates it  Patient affirms daily vomiting  Patient affirms diarrhea, often at the same time as her vomiting  Patient denies any urinary symptoms  Patient denies vaginal bleeding or discharge  Patient affirms fever at home though she is afebrile on initial evaluation  Patient denies contacts with similar symptoms  Patient denies any international travel or trauma  Patient denies any antibiotics since her episode of diverticulitis  Patient states the symptoms started over a month ago when she had diverticulitis  Patient states the symptoms subsequently improved after she went to rehabilitation but then returned  Patient states since that time, the pain has been persistent, ongoing, and worsening  Patient states she has been to the emergency room multiple times for this and was diagnosed with colitis  Patient states they have attempted to contact Gastroenterology to obtain closer follow-up but were unable to get a hold of anyone  Note that this is significantly discrepant from the medical record which states patient was admitted in June for diverticulitis  Since that time, patient returned once on 9/6 and was placed on antibiotics for possible diverticulitis after CT imaging represented possible diverticulitis  There are notes contacting GI regarding the patient's abdominal pain in mid August though no GI follow-up progress notes      Focused Objective Exam:  Abdomen:  Inspection of an obese abdomen without previous abdominal surgical incisions noted without erythema, rashes or ecchymosis noted  No abdominal pulsations noted  Normal bowel sounds with no bruit auscultated  Soft abdomen  Palpitation noted diffuse tenderness to palpation; tenderness noted but not maximally over McBurney's point  No masses or pulsatile aorta noted on examination  No rebound or guarding noted on examination, non-peritoneal exam     Back:  No rash or signs of herpes zoster  Skin:  No ecchymosis of the umbilicus (negative Monster's sign) or flank (negative Grey Valdivia's sign)  Warm and dry  Medical Decision Making  Abdominal pain with a broad differential   Will establish IV access and make patient NPO considering possibility of surgical intervention required  Will initiate symptomatic management including intravenous fluids  As patient has history of risk factors for ACS, will obtain EKG and troponin to evaluate for potential referred pain  Differential includes significant likelihood of intra-abdominal pathology, including concerns for potential diverticulitis  Will obtain CBC to evaluate for anemia and leukocytosis  Will obtain CMP to evaluate electrolytes, renal, biliary, and hepatic function  Will obtain lipase to evaluate for potential pancreatitis  Will obtain lactate to evaluate for mesenteric ischemia and sepsis  Will initiate sepsis evaluation considering reported fever with tachycardia  Based on patient's age, history, physical exam and presenting complaint, will obtain contrast enhanced CT imaging of patient's abdomen and pelvis to further evaluate for possible intraabdominal pathology          History provided by:  Patient  Abdominal Pain  Pain location:  Generalized  Pain quality: cramping    Pain radiates to:  Does not radiate  Pain severity:  Severe  Onset quality:  Sudden  Timing:  Constant  Progression:  Waxing and waning  Chronicity:  Recurrent  Relieved by:  Nothing  Worsened by: Nothing  Ineffective treatments:  None tried  Associated symptoms: chills, fever and vomiting    Associated symptoms: no chest pain, no constipation, no cough, no diarrhea, no dysuria, no fatigue, no melena, no nausea, no shortness of breath, no sore throat, no vaginal bleeding and no vaginal discharge        Prior to Admission Medications   Prescriptions Last Dose Informant Patient Reported? Taking? ALPRAZolam (XANAX) 0 5 mg tablet   No No   Sig: Take 1 tablet (0 5 mg total) by mouth 2 (two) times a day as needed for anxiety   ASPIRIN 81 PO   Yes No   Sig: Take by mouth   Breo Ellipta 100-25 MCG/INH inhaler   No No   Sig: inhale 1 puff daily Rinse mouth after use   Lidocaine Viscous HCl (XYLOCAINE) 2 % mucosal solution Not Taking at Unknown time  Yes No   Patient not taking: Reported on 9/14/2022   albuterol (2 5 mg/3 mL) 0 083 % nebulizer solution  Self No No   Sig: Take 1 vial (2 5 mg total) by nebulization every 6 (six) hours as needed for wheezing or shortness of breath   albuterol (PROVENTIL HFA,VENTOLIN HFA) 90 mcg/act inhaler  Self No No   Sig: Inhale 2 puffs every 6 (six) hours as needed for wheezing   dexamethasone (DECADRON) 4 mg tablet Not Taking at Unknown time  No No   Sig: Take 2 tablets by mouth (8mg) with meals in the morning  Take 2 tablets (8mg) with meals in the evening  Do this for three days, the day before treatment, the day of treatment and the day after treatment  Patient not taking: Reported on 9/14/2022   dexamethasone (DECADRON) 4 mg tablet Not Taking at Unknown time  No No   Sig: Take 1 tablet (4 mg total) by mouth 2 (two) times a day with meals Take 2 tablets by mouth (8mg) with meals in the morning  Take 2 tablets (8mg) with meals in the evening  Do this for three days, the day before treatment, the day of treatment and the day after treatment     Patient not taking: Reported on 9/14/2022   ergocalciferol (VITAMIN D2) 50,000 units   No No   Sig: take 1 capsule by mouth every week fluticasone (FLONASE) 50 mcg/act nasal spray  Self No No   Si sprays into each nostril daily   gabapentin (NEURONTIN) 300 mg capsule   No No   Sig: Take 1 capsule (300 mg total) by mouth 2 (two) times a day   gabapentin (NEURONTIN) 400 mg capsule   No No   Sig: take 1 capsule by mouth at bedtime   metroNIDAZOLE (FLAGYL) 500 mg tablet Not Taking at Unknown time  No No   Sig: Take 1 tablet (500 mg total) by mouth every 8 (eight) hours for 10 days   Patient not taking: Reported on 2022   moxifloxacin (AVELOX) 400 MG tablet Not Taking at Unknown time  No No   Sig: Take 1 tablet (400 mg total) by mouth daily for 10 days   Patient not taking: Reported on 2022   naloxone (NARCAN) 4 mg/0 1 mL nasal spray  Self No No   Sig: Administer 1 spray into a nostril  If no response after 2-3 minutes, give another dose in the other nostril using a new spray     olopatadine (PATANOL) 0 1 % ophthalmic solution  Self Yes No   Sig:     ondansetron (ZOFRAN) 8 mg tablet   No No   Sig: Take 1 tablet (8 mg total) by mouth every 8 (eight) hours as needed for nausea or vomiting   ondansetron (Zofran ODT) 4 mg disintegrating tablet   No No   Sig: Take 1 tablet (4 mg total) by mouth every 6 (six) hours as needed for nausea or vomiting   oxyCODONE-acetaminophen (Percocet) 5-325 mg per tablet  Self No No   Sig: Take 1 tablet by mouth every 6 (six) hours as needed for moderate pain Max Daily Amount: 4 tablets   rosuvastatin (CRESTOR) 20 MG tablet  Self No No   Sig: Take 1 tablet (20 mg total) by mouth daily   saccharomyces boulardii (FLORASTOR) 250 mg capsule Not Taking at Unknown time  No No   Sig: Take 1 capsule (250 mg total) by mouth 2 (two) times a day   Patient not taking: Reported on 2022   sodium chloride () 2 % hypertonic ophthalmic solution  Self Yes No   Sig: Sue 128 2 % eye drops      Facility-Administered Medications: None       Past Medical History:   Diagnosis Date    Anxiety     Atherosclerosis of native artery of both lower extremities with intermittent claudication [facility] ) 10/15/2015    Transitioned From: Cardiovascular Symptoms    Breast cancer ([facility] )     Carotid artery plaque, bilateral 2017    Transitioned From: Atherosclerosis of both carotid arteries    Chronic kidney disease     Chronic sinusitis     Last assessed: 13    COPD (chronic obstructive pulmonary disease) ([facility] )     COVID     21 not hopsitalized- flu-like symptoms    Fall 2021    Fracture, ankle closed, bimalleolar, right, initial encounter 2021    GERD (gastroesophageal reflux disease)     Hyperlipidemia     Lung nodule     PNA (pneumonia)     PONV (postoperative nausea and vomiting)     with C-sections    Pulmonary emphysema ([facility] )     PVD (peripheral vascular disease) ([place] [address] )     Restless leg syndrome     Stage 3 chronic kidney disease ([facility] ) 2019    Tobacco abuse        Past Surgical History:   Procedure Laterality Date    BREAST LUMPECTOMY Right 2022    Procedure: ISAI  DIRECTED LUMPECTOMY;  Surgeon: Isaura Glass MD;  Location: [facility];  Service: Surgical Oncology    CATARACT EXTRACTION       SECTION      COLONOSCOPY      Complete   Resolved: 13    DESCENDING AORTIC ANEURYSM REPAIR W/ STENT  2019    IR AORTAGRAM WITH RUN-OFF  8/15/2019    LYMPH NODE BIOPSY Right 2022    Procedure: LYMPHATIC MAPPING WITH BLUE AND RADIOACTIVE DYES, SENTINEL LYMPH NODE BIOPSY, INJECTION IN OR BY DR RODRÍGUEZ AT 1300;  Surgeon: Isaura Glass MD;  Location: [facility];  Service: Surgical Oncology    SHOULDER SURGERY      For frozen shoulder     TONSILLECTOMY      US BREAST ISAI  NEEDLE LOC RIGHT Right 3/25/2022    US GUIDED BREAST BIOPSY RIGHT COMPLETE Right 3/25/2022       Family History   Problem Relation Age of Onset    No Known Problems Mother     No Known Problems Father     Arthritis Sister     Pancreatic cancer Sister 61    Melanoma Brother         twice    Prostate cancer Brother     Heart attack Family         Acute Myocardial Infarction    Cirrhosis Family     Prostate cancer Family     Skin cancer Family     Stroke Family         Syndrome    No Known Problems Daughter     No Known Problems Maternal Grandmother     No Known Problems Paternal Grandmother     No Known Problems Sister     No Known Problems Maternal Aunt     Breast cancer Other 30     I have reviewed and agree with the history as documented  E-Cigarette/Vaping    E-Cigarette Use Never User      E-Cigarette/Vaping Substances    Nicotine No     THC No     CBD No     Flavoring No     Other No     Unknown No      Social History     Tobacco Use    Smoking status: Former Smoker     Packs/day: 2 00     Years: 56 00     Pack years: 112 00     Types: Cigarettes     Start date: 1962     Quit date: 2018     Years since quittin 3    Smokeless tobacco: Never Used   Vaping Use    Vaping Use: Never used   Substance Use Topics    Alcohol use: Yes     Comment: occasionally     Drug use: No       Review of Systems   Constitutional: Positive for chills and fever  Negative for fatigue  HENT: Negative for sore throat  Respiratory: Negative for cough and shortness of breath  Cardiovascular: Negative for chest pain  Gastrointestinal: Positive for abdominal pain and vomiting  Negative for constipation, diarrhea, melena and nausea  Genitourinary: Negative for dysuria, vaginal bleeding and vaginal discharge  All other systems reviewed and are negative  Physical Exam  Physical Exam  Vitals reviewed  HENT:      Head: Atraumatic  Eyes:      Pupils: Pupils are equal, round, and reactive to light  Cardiovascular:      Rate and Rhythm: Regular rhythm  Tachycardia present  Pulmonary:      Effort: Pulmonary effort is normal       Breath sounds: Normal breath sounds  Abdominal:      General: There is no distension        Palpations: Abdomen is soft       Tenderness: There is generalized abdominal tenderness  There is no guarding or rebound  Musculoskeletal:         General: No deformity  Cervical back: Neck supple  Skin:     General: Skin is warm and dry  Neurological:      General: No focal deficit present  Mental Status: She is alert     Psychiatric:         Mood and Affect: Mood normal          Vital Signs  ED Triage Vitals [09/14/22 0500]   Temperature Pulse Respirations Blood Pressure SpO2   99 5 °F (37 5 °C) (!) 109 18 100/55 97 %      Temp Source Heart Rate Source Patient Position - Orthostatic VS BP Location FiO2 (%)   Oral Monitor Lying Right arm --      Pain Score       4           Vitals:    09/14/22 1155 09/14/22 1537 09/14/22 2221 09/14/22 2222   BP: 101/52 93/61 90/59 90/59   Pulse: 79 84 96 96   Patient Position - Orthostatic VS: Lying            Visual Acuity      ED Medications  Medications   atorvastatin (LIPITOR) tablet 80 mg (80 mg Oral Given 9/14/22 1628)   oxyCODONE-acetaminophen (PERCOCET) 5-325 mg per tablet 1 tablet (has no administration in time range)   gabapentin (NEURONTIN) capsule 400 mg (400 mg Oral Given 9/14/22 2349)   gabapentin (NEURONTIN) capsule 300 mg (300 mg Oral Given 9/14/22 1753)   aspirin chewable tablet 81 mg (81 mg Oral Given 9/14/22 0821)   fluticasone-vilanterol (BREO ELLIPTA) 100-25 mcg/inh inhaler 1 puff (1 puff Inhalation Given 9/14/22 0834)   ALPRAZolam (XANAX) tablet 0 5 mg (has no administration in time range)   albuterol (PROVENTIL HFA,VENTOLIN HFA) inhaler 2 puff (has no administration in time range)   acetaminophen (TYLENOL) tablet 650 mg (has no administration in time range)   ondansetron (ZOFRAN) injection 4 mg (4 mg Intravenous Given 9/15/22 0222)   heparin (porcine) subcutaneous injection 5,000 Units (5,000 Units Subcutaneous Given 9/14/22 2350)   ampicillin-sulbactam (UNASYN) 1 5 g in sodium chloride 0 9 % 50 mL IVPB (has no administration in time range)   HYDROmorphone HCl (DILAUDID) injection 0 2 mg (0 2 mg Intravenous Given 9/15/22 0222)   multi-electrolyte (PLASMALYTE-A/ISOLYTE-S PH 7 4) IV solution (75 mL/hr Intravenous New Bag 9/14/22 1104)   dicyclomine (BENTYL) capsule 10 mg (10 mg Oral Given 9/14/22 1628)   saccharomyces boulardii (FLORASTOR) capsule 250 mg (250 mg Oral Given 9/14/22 1753)   vancomycin (VANCOCIN) oral solution 125 mg (125 mg Oral Given 9/14/22 2349)   sodium chloride 0 9 % bolus 1,000 mL (0 mL Intravenous Stopped 9/14/22 0702)   iohexol (OMNIPAQUE) 350 MG/ML injection (MULTI-DOSE) 100 mL (100 mL Intravenous Given 9/14/22 0548)   fentanyl citrate (PF) 100 MCG/2ML 25 mcg (25 mcg Intravenous Given 9/14/22 0622)   ceftriaxone (ROCEPHIN) 1 g/50 mL in dextrose IVPB (0 mg Intravenous Stopped 9/14/22 0830)       Diagnostic Studies  Results Reviewed     Procedure Component Value Units Date/Time    CBC and differential [[id number]] Collected: 09/15/22 0437    Lab Status: No result Specimen: Blood from Arm, Right     Comprehensive metabolic panel [[id number]] Collected: 09/15/22 0437    Lab Status: No result Specimen: Blood from Arm, Right     White Blood Cells, Stool by Gram Stain [[id number]] Collected: 09/14/22 1546    Lab Status: Final result Specimen: Stool from Rectum Updated: 09/15/22 0053     Fecal Leukocytes 2+ WBC's (3-10/hpf)    Stool Enteric Bacterial Panel by PCR [[id number]] Collected: 09/14/22 1546    Lab Status: In process Specimen: Stool from Rectum Updated: 09/14/22 1553    Clostridium difficile toxin by PCR with EIA [[id number]] Collected: 09/14/22 1546    Lab Status: In process Specimen: Stool from Per Rectum Updated: 09/14/22 1553    Calprotectin,Fecal [[id number]] Collected: 09/14/22 1546    Lab Status:  In process Specimen: Stool from Rectum Updated: 09/14/22 1553    C-reactive protein [[id number]]  (Abnormal) Collected: 09/14/22 1346    Lab Status: Final result Specimen: Blood from Arm, Right Updated: 09/14/22 1533      9 mg/L     Sedimentation rate, automated [[id number]]  (Abnormal) Collected: 09/14/22 1346    Lab Status: Final result Specimen: Blood from Arm, Right Updated: 09/14/22 1428     Sed Rate 60 mm/hour     Blood culture #1 [[id number]] Collected: 09/14/22 0600    Lab Status: Preliminary result Specimen: Blood from Arm, Right Updated: 09/14/22 0904     Blood Culture Received in Microbiology Lab  Culture in Progress  Blood culture #2 [[id number]] Collected: 09/14/22 0600    Lab Status: Preliminary result Specimen: Blood from Hand, Right Updated: 09/14/22 0904     Blood Culture Received in Microbiology Lab  Culture in Progress  Lactic acid 2 Hours [[id number]]  (Normal) Collected: 09/14/22 0755    Lab Status: Final result Specimen: Blood from Arm, Left Updated: 09/14/22 0844     LACTIC ACID 1 0 mmol/L     Narrative:      Result may be elevated if tourniquet was used during collection      HS Troponin I 2hr [[id number]]  (Normal) Collected: 09/14/22 0755    Lab Status: Final result Specimen: Blood from Arm, Left Updated: 09/14/22 0835     hs TnI 2hr 8 ng/L      Delta 2hr hsTnI 0 ng/L     HS Troponin I 4hr [[id number]]     Lab Status: No result Specimen: Blood     Stool culture [[id number]]     Lab Status: No result Specimen: Stool     Protime-INR [[id number]]  (Abnormal) Collected: 09/14/22 0600    Lab Status: Final result Specimen: Blood from Arm, Right Updated: 09/14/22 0646     Protime 18 3 seconds      INR 1 55    APTT [[id number]]  (Normal) Collected: 09/14/22 0600    Lab Status: Final result Specimen: Blood from Arm, Right Updated: 09/14/22 0646     PTT 32 seconds     CBC and differential [[id number]]  (Abnormal) Collected: 09/14/22 0513    Lab Status: Final result Specimen: Blood from Arm, Left Updated: 09/14/22 0611     WBC 22 50 Thousand/uL      RBC 4 49 Million/uL      Hemoglobin 12 4 g/dL      Hematocrit 39 4 %      MCV 88 fL      MCH 27 6 pg      MCHC 31 5 g/dL      RDW 16 0 %      MPV 9 5 fL      Platelets 004 Thousands/uL      nRBC 0 /100 WBCs      Neutrophils Relative 92 %      Immat GRANS % 1 %      Lymphocytes Relative 3 %      Monocytes Relative 4 %      Eosinophils Relative 0 %      Basophils Relative 0 %      Neutrophils Absolute 20 51 Thousands/µL      Immature Grans Absolute 0 31 Thousand/uL      Lymphocytes Absolute 0 70 Thousands/µL      Monocytes Absolute 0 88 Thousand/µL      Eosinophils Absolute 0 02 Thousand/µL      Basophils Absolute 0 08 Thousands/µL     Narrative: This is an appended report  These results have been appended to a previously verified report  HS Troponin 0hr (reflex protocol) [[id number]]  (Normal) Collected: 09/14/22 0532    Lab Status: Final result Specimen: Blood from Arm, Right Updated: 09/14/22 0606     hs TnI 0hr 8 ng/L     Lactic acid, plasma [[id number]]  (Abnormal) Collected: 09/14/22 0513    Lab Status: Final result Specimen: Blood from Arm, Left Updated: 09/14/22 0602     LACTIC ACID 2 3 mmol/L     Narrative:      Result may be elevated if tourniquet was used during collection      CMP [[id number]]  (Abnormal) Collected: 09/14/22 0513    Lab Status: Final result Specimen: Blood from Arm, Left Updated: 09/14/22 0545     Sodium 136 mmol/L      Potassium 3 6 mmol/L      Chloride 98 mmol/L      CO2 25 mmol/L      ANION GAP 13 mmol/L      BUN 17 mg/dL      Creatinine 1 49 mg/dL      Glucose 163 mg/dL      Calcium 8 4 mg/dL      Corrected Calcium 9 8 mg/dL      AST 89 U/L       U/L      Alkaline Phosphatase 241 U/L      Total Protein 6 6 g/dL      Albumin 2 2 g/dL      Total Bilirubin 0 38 mg/dL      eGFR 34 ml/min/1 73sq m     Narrative:      [place] guidelines for Chronic Kidney Disease (CKD):     Stage 1 with normal or high GFR (GFR > 90 mL/min/1 73 square meters)    Stage 2 Mild CKD (GFR = 60-89 mL/min/1 73 square meters)    Stage 3A Moderate CKD (GFR = 45-59 mL/min/1 73 square meters)    Stage 3B Moderate CKD (GFR = 30-44 mL/min/1 73 square meters)    Stage 4 Severe CKD (GFR = 15-29 mL/min/1 73 square meters)    Stage 5 End Stage CKD (GFR <15 mL/min/1 73 square meters)  Note: GFR calculation is accurate only with a steady state creatinine    Lipase [[id number]]  (Abnormal) Collected: 09/14/22 0513    Lab Status: Final result Specimen: Blood from Arm, Left Updated: 09/14/22 0545     Lipase 28 u/L                  CT abdomen pelvis with contrast   Final Result by Omega Juarez MD (09/14 0700)      Diffuse progressive nonspecific uncomplicated proctocolitis  Incidental finding of new trace fluid within the uterine endometrial cavity likely within normal limits  This could be followed up with nonemergent pelvic ultrasound if clinically warranted  Additional stable chronic findings as above  This study demonstrates a significant  finding and was documented as such in [facility] for liaison and referring practitioner notification  Workstation performed: [id number]                    Procedures  Procedures         ED Course  ED Course as of 09/15/22 0452   Wed Sep 14, 2022   0548 EKG demonstrates sinus tachycardia at a rate of 107  No acute ST segment changes       0554 On reviewing the record with the patient, she states that she did take the antibiotics that were prescribed to her during her prior visit other than the Flagyl which she states she cannot tolerate  Patient states that they substituted in other medication for this but she is unsure what it is    0556 Creatinine(!): 1 49  Progressively worsening from baseline   0719 Patient meets SIRS criteria with elevated white blood cell count and tachycardia  Patient notes a fever at home but is afebrile here  Concerns for infectious source of proctocolitis though atypical in a 78year-old  Will start patient on antibiotics and admit as she has failed outpatient treatment    Patient notes prior history inability to tolerate Augmentin but denies problems with other antibiotics, specifically ceftriaxone  SBIRT 20yo+    Flowsheet Row Most Recent Value   SBIRT (25 yo +)    In order to provide better care to our patients, we are screening all of our patients for alcohol and drug use  Would it be okay to ask you these screening questions? Yes Filed at: 09/14/2022 3830   Initial Alcohol Screen: US AUDIT-C     1  How often do you have a drink containing alcohol? 0 Filed at: 09/14/2022 0610   2  How many drinks containing alcohol do you have on a typical day you are drinking? 0 Filed at: 09/14/2022 0610   3a  Male UNDER 65: How often do you have five or more drinks on one occasion? 0 Filed at: 09/14/2022 0610   3b  FEMALE Any Age, or MALE 65+: How often do you have 4 or more drinks on one occassion? 0 Filed at: 09/14/2022 0610   Audit-C Score 0 Filed at: 09/14/2022 5246   CARROLL: How many times in the past year have you    Used an illegal drug or used a prescription medication for non-medical reasons? Never Filed at: 09/14/2022 0610                    MDM    Disposition  Final diagnoses:   Proctocolitis     Time reflects when diagnosis was documented in both MDM as applicable and the Disposition within this note     Time User Action Codes Description Comment    9/14/2022  7:18 AM Brittani Pro Add [K52 9] Proctocolitis       ED Disposition     ED Disposition   Admit    Condition   Stable    Date/Time   Wed Sep 14, 2022  7:22 AM    Comment   Case was discussed with LORE and the patient's admission status was agreed to be Admission Status: inpatient status to the service of Dr Pascale Tee              Follow-up Information    None         Current Discharge Medication List      CONTINUE these medications which have NOT CHANGED    Details   albuterol (2 5 mg/3 mL) 0 083 % nebulizer solution Take 1 vial (2 5 mg total) by nebulization every 6 (six) hours as needed for wheezing or shortness of breath  Qty: 360 mL, Refills: 3    Associated Diagnoses: Centrilobular emphysema ([facility] ) albuterol (PROVENTIL HFA,VENTOLIN HFA) 90 mcg/act inhaler Inhale 2 puffs every 6 (six) hours as needed for wheezing  Qty: 3 Inhaler, Refills: 3    Comments: Substitution to a formulary equivalent within the same pharmaceutical class is authorized  Associated Diagnoses: Centrilobular emphysema (HCC)      ALPRAZolam (XANAX) 0 5 mg tablet Take 1 tablet (0 5 mg total) by mouth 2 (two) times a day as needed for anxiety  Qty: 60 tablet, Refills: 0    Associated Diagnoses: Generalized anxiety disorder      ASPIRIN 81 PO Take by mouth      Breo Ellipta 100-25 MCG/INH inhaler inhale 1 puff daily Rinse mouth after use  Qty: 180 blister, Refills: 1    Associated Diagnoses: Chronic obstructive pulmonary disease, unspecified COPD type ([place] [id number] )      ! ! dexamethasone (DECADRON) 4 mg tablet Take 2 tablets by mouth (8mg) with meals in the morning  Take 2 tablets (8mg) with meals in the evening  Do this for three days, the day before treatment, the day of treatment and the day after treatment  Qty: 6 tablet, Refills: 2    Associated Diagnoses: Malignant neoplasm of overlapping sites of right breast in female, estrogen receptor negative ([facility] )      ! ! dexamethasone (DECADRON) 4 mg tablet Take 1 tablet (4 mg total) by mouth 2 (two) times a day with meals Take 2 tablets by mouth (8mg) with meals in the morning  Take 2 tablets (8mg) with meals in the evening  Do this for three days, the day before treatment, the day of treatment and the day after treatment    Qty: 6 tablet, Refills: 0    Associated Diagnoses: Malignant neoplasm of overlapping sites of right breast in female, estrogen receptor negative ([facility] )      ergocalciferol (VITAMIN D2) 50,000 units take 1 capsule by mouth every week  Qty: 12 capsule, Refills: 0    Associated Diagnoses: Vitamin D deficiency      fluticasone (FLONASE) 50 mcg/act nasal spray 2 sprays into each nostril daily  Qty: 11 1 mL, Refills: 1    Associated Diagnoses: Chronic sinusitis, unspecified location !! gabapentin (NEURONTIN) 300 mg capsule Take 1 capsule (300 mg total) by mouth 2 (two) times a day  Qty: 180 capsule, Refills: 0    Associated Diagnoses: RLS (restless legs syndrome)      !! gabapentin (NEURONTIN) 400 mg capsule take 1 capsule by mouth at bedtime  Qty: 90 capsule, Refills: 1    Associated Diagnoses: RLS (restless legs syndrome)      Lidocaine Viscous HCl (XYLOCAINE) 2 % mucosal solution       metroNIDAZOLE (FLAGYL) 500 mg tablet Take 1 tablet (500 mg total) by mouth every 8 (eight) hours for 10 days  Qty: 30 tablet, Refills: 0    Associated Diagnoses: Colitis      moxifloxacin (AVELOX) 400 MG tablet Take 1 tablet (400 mg total) by mouth daily for 10 days  Qty: 10 tablet, Refills: 0    Associated Diagnoses: Colitis      naloxone (NARCAN) 4 mg/0 1 mL nasal spray Administer 1 spray into a nostril  If no response after 2-3 minutes, give another dose in the other nostril using a new spray    Qty: 1 each, Refills: 1    Associated Diagnoses: Malignant neoplasm of overlapping sites of right breast in female, estrogen receptor negative (HCC)      olopatadine (PATANOL) 0 1 % ophthalmic solution    Refills: 0      ondansetron (Zofran ODT) 4 mg disintegrating tablet Take 1 tablet (4 mg total) by mouth every 6 (six) hours as needed for nausea or vomiting  Qty: 20 tablet, Refills: 0    Associated Diagnoses: Nausea and vomiting      ondansetron (ZOFRAN) 8 mg tablet Take 1 tablet (8 mg total) by mouth every 8 (eight) hours as needed for nausea or vomiting  Qty: 20 tablet, Refills: 2    Associated Diagnoses: Nausea      oxyCODONE-acetaminophen (Percocet) 5-325 mg per tablet Take 1 tablet by mouth every 6 (six) hours as needed for moderate pain Max Daily Amount: 4 tablets  Qty: 20 tablet, Refills: 0    Associated Diagnoses: Malignant neoplasm of overlapping sites of right breast in female, estrogen receptor negative (HCC)      rosuvastatin (CRESTOR) 20 MG tablet Take 1 tablet (20 mg total) by mouth daily  Qty: 90 tablet, Refills: 6    Associated Diagnoses: Aortoiliac occlusive disease (HCC)      saccharomyces boulardii (FLORASTOR) 250 mg capsule Take 1 capsule (250 mg total) by mouth 2 (two) times a day  Refills: 0    Associated Diagnoses: Diarrhea, unspecified type      sodium chloride () 2 % hypertonic ophthalmic solution Sue 128 2 % eye drops       ! ! - Potential duplicate medications found  Please discuss with provider  No discharge procedures on file      PDMP Review       Value Time User    PDMP Reviewed  Yes 6/28/2022 11:15 AM Francesca Soliman MD          ED Provider  Electronically Signed by           Maninder Santos MD  09/15/22 7258

## 2022-09-14 NOTE — ASSESSMENT & PLAN NOTE
· Patient has elevated transaminases, AST 89, ,   · Etiology unclear at this time, ALP may be related to patient's ongoing cancer    AST and ALT could be related to infectious process  · Monitor CMP

## 2022-09-15 LAB
ALBUMIN SERPL BCP-MCNC: 1.5 G/DL (ref 3.5–5)
ALP SERPL-CCNC: 148 U/L (ref 46–116)
ALT SERPL W P-5'-P-CCNC: 70 U/L (ref 12–78)
ANION GAP SERPL CALCULATED.3IONS-SCNC: 9 MMOL/L (ref 4–13)
AST SERPL W P-5'-P-CCNC: 38 U/L (ref 5–45)
BASOPHILS # BLD AUTO: 0.04 THOUSANDS/ΜL (ref 0–0.1)
BASOPHILS NFR BLD AUTO: 0 % (ref 0–1)
BILIRUB SERPL-MCNC: 0.19 MG/DL (ref 0.2–1)
BUN SERPL-MCNC: 11 MG/DL (ref 5–25)
C DIFF TOX A+B STL QL IA: NEGATIVE
C DIFF TOX GENS STL QL NAA+PROBE: POSITIVE
CALCIUM ALBUM COR SERPL-MCNC: 9.4 MG/DL (ref 8.3–10.1)
CALCIUM SERPL-MCNC: 7.4 MG/DL (ref 8.3–10.1)
CAMPYLOBACTER DNA SPEC NAA+PROBE: NORMAL
CHLORIDE SERPL-SCNC: 102 MMOL/L (ref 96–108)
CO2 SERPL-SCNC: 29 MMOL/L (ref 21–32)
CREAT SERPL-MCNC: 1.02 MG/DL (ref 0.6–1.3)
EOSINOPHIL # BLD AUTO: 0.02 THOUSAND/ΜL (ref 0–0.61)
EOSINOPHIL NFR BLD AUTO: 0 % (ref 0–6)
ERYTHROCYTE [DISTWIDTH] IN BLOOD BY AUTOMATED COUNT: 16.2 % (ref 11.6–15.1)
GFR SERPL CREATININE-BSD FRML MDRD: 53 ML/MIN/1.73SQ M
GLUCOSE SERPL-MCNC: 107 MG/DL (ref 65–140)
HCT VFR BLD AUTO: 30 % (ref 34.8–46.1)
HGB BLD-MCNC: 9.7 G/DL (ref 11.5–15.4)
IMM GRANULOCYTES # BLD AUTO: 0.2 THOUSAND/UL (ref 0–0.2)
IMM GRANULOCYTES NFR BLD AUTO: 1 % (ref 0–2)
LYMPHOCYTES # BLD AUTO: 1.29 THOUSANDS/ΜL (ref 0.6–4.47)
LYMPHOCYTES NFR BLD AUTO: 7 % (ref 14–44)
MAGNESIUM SERPL-MCNC: 2.3 MG/DL (ref 1.6–2.6)
MCH RBC QN AUTO: 28.4 PG (ref 26.8–34.3)
MCHC RBC AUTO-ENTMCNC: 32.3 G/DL (ref 31.4–37.4)
MCV RBC AUTO: 88 FL (ref 82–98)
MONOCYTES # BLD AUTO: 1.2 THOUSAND/ΜL (ref 0.17–1.22)
MONOCYTES NFR BLD AUTO: 6 % (ref 4–12)
NEUTROPHILS # BLD AUTO: 15.97 THOUSANDS/ΜL (ref 1.85–7.62)
NEUTS SEG NFR BLD AUTO: 86 % (ref 43–75)
NRBC BLD AUTO-RTO: 0 /100 WBCS
PLATELET # BLD AUTO: 651 THOUSANDS/UL (ref 149–390)
PMV BLD AUTO: 9.2 FL (ref 8.9–12.7)
POTASSIUM SERPL-SCNC: 3.1 MMOL/L (ref 3.5–5.3)
PROT SERPL-MCNC: 5.2 G/DL (ref 6.4–8.4)
RBC # BLD AUTO: 3.42 MILLION/UL (ref 3.81–5.12)
SALMONELLA DNA SPEC QL NAA+PROBE: NORMAL
SHIGA TOXIN STX GENE SPEC NAA+PROBE: NORMAL
SHIGELLA DNA SPEC QL NAA+PROBE: NORMAL
SODIUM SERPL-SCNC: 140 MMOL/L (ref 135–147)
WBC # BLD AUTO: 18.72 THOUSAND/UL (ref 4.31–10.16)
WBC STL QL MICRO: NORMAL

## 2022-09-15 PROCEDURE — 99233 SBSQ HOSP IP/OBS HIGH 50: CPT | Performed by: INTERNAL MEDICINE

## 2022-09-15 PROCEDURE — 99232 SBSQ HOSP IP/OBS MODERATE 35: CPT | Performed by: INTERNAL MEDICINE

## 2022-09-15 PROCEDURE — 97167 OT EVAL HIGH COMPLEX 60 MIN: CPT

## 2022-09-15 PROCEDURE — 83735 ASSAY OF MAGNESIUM: CPT | Performed by: INTERNAL MEDICINE

## 2022-09-15 PROCEDURE — 85025 COMPLETE CBC W/AUTO DIFF WBC: CPT | Performed by: INTERNAL MEDICINE

## 2022-09-15 PROCEDURE — 97163 PT EVAL HIGH COMPLEX 45 MIN: CPT

## 2022-09-15 PROCEDURE — 80053 COMPREHEN METABOLIC PANEL: CPT | Performed by: INTERNAL MEDICINE

## 2022-09-15 PROCEDURE — 97110 THERAPEUTIC EXERCISES: CPT

## 2022-09-15 RX ORDER — POTASSIUM CHLORIDE 20 MEQ/1
40 TABLET, EXTENDED RELEASE ORAL ONCE
Status: COMPLETED | OUTPATIENT
Start: 2022-09-15 | End: 2022-09-15

## 2022-09-15 RX ORDER — METRONIDAZOLE 500 MG/100ML
500 INJECTION, SOLUTION INTRAVENOUS EVERY 8 HOURS
Status: DISCONTINUED | OUTPATIENT
Start: 2022-09-15 | End: 2022-09-15

## 2022-09-15 RX ADMIN — Medication 250 MG: at 08:47

## 2022-09-15 RX ADMIN — ATORVASTATIN CALCIUM 80 MG: 40 TABLET, FILM COATED ORAL at 17:10

## 2022-09-15 RX ADMIN — DICYCLOMINE HYDROCHLORIDE 10 MG: 10 CAPSULE ORAL at 17:10

## 2022-09-15 RX ADMIN — SODIUM CHLORIDE, SODIUM GLUCONATE, SODIUM ACETATE, POTASSIUM CHLORIDE, MAGNESIUM CHLORIDE, SODIUM PHOSPHATE, DIBASIC, AND POTASSIUM PHOSPHATE 75 ML/HR: .53; .5; .37; .037; .03; .012; .00082 INJECTION, SOLUTION INTRAVENOUS at 17:12

## 2022-09-15 RX ADMIN — Medication 125 MG: at 17:10

## 2022-09-15 RX ADMIN — DICYCLOMINE HYDROCHLORIDE 10 MG: 10 CAPSULE ORAL at 11:26

## 2022-09-15 RX ADMIN — HYDROMORPHONE HYDROCHLORIDE 0.2 MG: 0.2 INJECTION, SOLUTION INTRAMUSCULAR; INTRAVENOUS; SUBCUTANEOUS at 02:22

## 2022-09-15 RX ADMIN — POTASSIUM CHLORIDE 40 MEQ: 1500 TABLET, EXTENDED RELEASE ORAL at 08:47

## 2022-09-15 RX ADMIN — HEPARIN SODIUM 5000 UNITS: 5000 INJECTION INTRAVENOUS; SUBCUTANEOUS at 23:26

## 2022-09-15 RX ADMIN — ONDANSETRON 4 MG: 2 INJECTION INTRAMUSCULAR; INTRAVENOUS at 02:22

## 2022-09-15 RX ADMIN — SODIUM CHLORIDE 1.5 G: 9 INJECTION, SOLUTION INTRAVENOUS at 06:22

## 2022-09-15 RX ADMIN — Medication 125 MG: at 11:26

## 2022-09-15 RX ADMIN — HEPARIN SODIUM 5000 UNITS: 5000 INJECTION INTRAVENOUS; SUBCUTANEOUS at 06:21

## 2022-09-15 RX ADMIN — GABAPENTIN 400 MG: 400 CAPSULE ORAL at 23:25

## 2022-09-15 RX ADMIN — FLUTICASONE FUROATE AND VILANTEROL TRIFENATATE 1 PUFF: 100; 25 POWDER RESPIRATORY (INHALATION) at 09:09

## 2022-09-15 RX ADMIN — SODIUM CHLORIDE 1.5 G: 9 INJECTION, SOLUTION INTRAVENOUS at 18:13

## 2022-09-15 RX ADMIN — ASPIRIN 81 MG 81 MG: 81 TABLET ORAL at 08:47

## 2022-09-15 RX ADMIN — DICYCLOMINE HYDROCHLORIDE 10 MG: 10 CAPSULE ORAL at 06:22

## 2022-09-15 RX ADMIN — Medication 250 MG: at 17:10

## 2022-09-15 RX ADMIN — HYDROMORPHONE HYDROCHLORIDE 0.2 MG: 0.2 INJECTION, SOLUTION INTRAMUSCULAR; INTRAVENOUS; SUBCUTANEOUS at 17:08

## 2022-09-15 RX ADMIN — GABAPENTIN 300 MG: 300 CAPSULE ORAL at 08:47

## 2022-09-15 RX ADMIN — Medication 125 MG: at 23:25

## 2022-09-15 RX ADMIN — ONDANSETRON 4 MG: 2 INJECTION INTRAMUSCULAR; INTRAVENOUS at 14:21

## 2022-09-15 RX ADMIN — HEPARIN SODIUM 5000 UNITS: 5000 INJECTION INTRAVENOUS; SUBCUTANEOUS at 14:18

## 2022-09-15 RX ADMIN — GABAPENTIN 300 MG: 300 CAPSULE ORAL at 17:10

## 2022-09-15 RX ADMIN — Medication 125 MG: at 06:22

## 2022-09-15 NOTE — PHYSICAL THERAPY NOTE
Physical Therapy Evaluation     Patient's Name: Jamel Barnard    Admitting Diagnosis  Proctocolitis [K52 9]    Problem List  Patient Active Problem List   Diagnosis    Anxiety    Aortoiliac occlusive disease ([address] )    Carotid artery plaque, bilateral    Centrilobular emphysema (HCC)    Hyperlipidemia    Osteoporosis    Restless leg syndrome    Subclavian artery stenosis, left (HCC)    PVD (peripheral vascular disease) (HCC)    Chronic sinusitis    Pancreatic mass    BMI 34 0-34 9,adult    Seborrheic keratoses    Stage 3 chronic kidney disease (HCC)    Closed avulsion fracture of lateral malleolus of right fibula    Prediabetes    Vitamin D deficiency    Acute right ankle pain    COPD (chronic obstructive pulmonary disease) (HCC)    Malignant neoplasm of overlapping sites of right breast in female, estrogen receptor negative ([address] )    Continuous opioid dependence ([address] )    Cellulitis    Diverticulitis    Oral candidiasis    Chemotherapy induced neutropenia ([address] )    Diarrhea    Proctocolitis    Transaminitis     Past Medical History  Past Medical History:   Diagnosis Date    Anxiety     Atherosclerosis of native artery of both lower extremities with intermittent claudication ([facility] ) 10/15/2015    Transitioned From: Cardiovascular Symptoms    Breast cancer ([address] )     Carotid artery plaque, bilateral 03/21/2017    Transitioned From:  Atherosclerosis of both carotid arteries    Chronic kidney disease     Chronic sinusitis     Last assessed: 11/11/13    COPD (chronic obstructive pulmonary disease) ([address] )     COVID     12/25/21 not hopsitalized- flu-like symptoms    Fall 06/16/2021    Fracture, ankle closed, bimalleolar, right, initial encounter 06/2021    GERD (gastroesophageal reflux disease)     Hyperlipidemia     Lung nodule     PNA (pneumonia)     PONV (postoperative nausea and vomiting)     with C-sections    Pulmonary emphysema (HCC)     PVD (peripheral vascular disease) (HCC)     Restless leg syndrome     Stage 3 chronic kidney disease ([facility] ) 2019    Tobacco abuse      Past Surgical History  Past Surgical History:   Procedure Laterality Date    BREAST LUMPECTOMY Right 2022    Procedure: ISAI  DIRECTED LUMPECTOMY;  Surgeon: Anusha Jones MD;  Location: [facility];  Service: Surgical Oncology    CATARACT EXTRACTION       SECTION      COLONOSCOPY      Complete  Resolved: 13    DESCENDING AORTIC ANEURYSM REPAIR W/ STENT  2019    IR AORTAGRAM WITH RUN-OFF  8/15/2019    LYMPH NODE BIOPSY Right 2022    Procedure: LYMPHATIC MAPPING WITH BLUE AND RADIOACTIVE DYES, SENTINEL LYMPH NODE BIOPSY, INJECTION IN OR BY DR RODRÍGUEZ AT 1300;  Surgeon: Anusha Jones MD;  Location: [facility];  Service: Surgical Oncology    SHOULDER SURGERY      For frozen shoulder     TONSILLECTOMY      US BREAST ISAI  NEEDLE LOC RIGHT Right 3/25/2022    US GUIDED BREAST BIOPSY RIGHT COMPLETE Right 3/25/2022      09/15/22 0938   PT Last Visit   PT Visit Date 09/15/22   Note Type   Note type Evaluation and Treatment   Pain Assessment   Pain Assessment Tool 0-10   Pain Score No Pain   Restrictions/Precautions   Weight Bearing Precautions Per Order No   Braces or Orthoses Other (Comment)  (none per patient)   Other Precautions Contact/isolation; Chair Alarm; Bed Alarm;Multiple lines;O2;Fall Risk;Limb alert   Home Living   Type of Home Apartment  (2nd floor)   Home Layout One level;Stairs to enter with rails  (4 ZOILA outside + 14 steps to reach apartment door)   Bathroom Shower/Tub Tub/shower unit   [address] Grab bars in shower; Shower chair;Commode   Bathroom Accessibility Accessible   Home Equipment Walker;Cane;Other (Comment)  (home O2)   Additional Comments Pt ambulates with a cane for household distances and uses a walker for community distances  Prior Function   Level of [place] Needs assistance with ADLs and functional mobility; Needs assistance with IADLs   Lives With Son;Daughter   Receives Help From Family   ADL Assistance Needs assistance   IADLs Needs assistance   Falls in the last 6 months 1 to 4  ("I slid")   Vocational Retired   General   Family/Caregiver Present No   Cognition   Overall Cognitive Status WFL   Arousal/Participation Alert   Orientation Level Oriented to person;Oriented to place;Oriented to situation;Disoriented to time  (oriented to year)   Memory Within functional limits   Following Commands Follows all commands and directions without difficulty   Comments Pt agreeable to PT  Subjective   Subjective "I haven't tried to get out of bed "   RUE Assessment   RUE Assessment   (defer to OT assessment)   LUE Assessment   LUE Assessment   (defer to OT assessment)   RLE Assessment   RLE Assessment X   Strength RLE   RLE Overall Strength 3+/5   LLE Assessment   LLE Assessment X   Strength LLE   LLE Overall Strength 3+/5   Light Touch   RLE Light Touch Grossly intact   LLE Light Touch Grossly intact   Bed Mobility   Supine to Sit 3  Moderate assistance   Additional items Assist x 2;HOB elevated; Bedrails; Increased time required;Verbal cues;LE management   Transfers   Sit to Stand 4  Minimal assistance   Additional items Assist x 2; Increased time required;Verbal cues   Stand to Sit 4  Minimal assistance   Additional items Assist x 2; Increased time required;Verbal cues;Armrests   Additional Comments inital bilateral knee buckling upon standing   Ambulation/Elevation   Gait pattern Decreased toe off;Decreased heel strike;Decreased hip extension; Excessively slow; Step to;Short stride; Shuffling   Gait Assistance 4  Minimal assist   Additional items Assist x 2;Verbal cues   Assistive Device Rolling walker   Distance 3 feet, bed to chair   Stair Management Assistance Not tested   Balance   Static Sitting Fair   Dynamic Sitting Fair -   Static Standing Poor +   Dynamic Standing Poor   Ambulatory Poor   Endurance Deficit   Endurance Deficit Yes   Endurance Deficit Description decreased activity tolerance; pt requiring supplemental oxygen   Activity Tolerance   Activity Tolerance Patient tolerated treatment well   Medical Staff Made Aware DEYA Costa Discussed case with CHUCHO Coughlin    Assessment   Prognosis Good   Problem List Decreased strength;Decreased endurance; Impaired balance;Decreased mobility   Assessment Pt is 78year old female seen for PT evaluation s/p admit to [facility] on 9/14/2022 with Proctocolitis  PT consulted to assess pt's functional mobility and d/c needs  Order placed for PT eval and tx, with up and OOB as tolerated order  Comorbidities affecting pt's physical performance at time of assessment include anxiety, aortoiliac occlusive disease, carotid artery plaque, hyperlipidemia, stage 3 CKD, COPD, malignant neoplasm of overlapping sites of right breast, continuous opoid dependence, and transaminitis  PTA, pt was requiring assist for mobility  Pt ambulates with a cane for household distances and uses a walker for community distances  Pt resides with her son and family in a one level apartment with four outside steps to enter and 14 steps to reach the 2nd floor apartment  Personal factors affecting pt at time of IE include inaccessible home environment, stairs to enter home, inability to ambulate household distances, inability to navigate community distances, inability to navigate level surfaces without external assistance, unable to perform dynamic tasks in community, positive fall history, inability to perform IADLs, inability to perform ADLs, and inability to live alone  Please find objective findings from PT assessment regarding body systems outlined above with impairments and limitations including weakness, impaired balance, decreased endurance, gait deviations, decreased activity tolerance, decreased functional mobility tolerance, and fall risk   The following objective measures performed on IE also reveal limitations: Barthel Index: 45/100, Modified Pollock: 4 (moderate/severe disability) and AM-PAC 6-Clicks: 06/75  Pt's clinical presentation is currently unstable/unpredictable seen in pt's presentation of need for ongoing medical management/monitoring, pt is a fall risk, and pt requires cues and assist of two for safety with functional mobility  Pt to benefit from continued PT tx to address deficits as defined above and maximize level of functional independent mobility and consistency  From PT/mobility standpoint, recommendation at time of d/c would be STR pending progress in order to facilitate return to PLOF  Barriers to Discharge Inaccessible home environment;Decreased caregiver support   Goals   STG Expiration Date 09/25/22   Short Term Goal #1 In 10 days: Increase bilateral LE strength 1/2 grade to facilitate independent mobility, Perform all bed mobility tasks with CG assist to decrease caregiver burden, Perform all transfers with CG assist to improve independence, Ambulate > 100 ft  with RW with CG assist w/o LOB and w/ normalized gait pattern 100% of the time, Increase all balance 1/2 grade to decrease risk for falls and PT to see and establish goals for stairs when appropriate   Plan   Treatment/Interventions Functional transfer training;LE strengthening/ROM; Endurance training;Patient/family training; Therapeutic exercise; Bed mobility;Gait training;Spoke to nursing;OT   PT Frequency 3-5x/wk   Recommendation   PT Discharge Recommendation Post acute rehabilitation services   AM-[facility] Basic Mobility Inpatient   Turning in Bed Without Bedrails 2   Lying on Back to Sitting on Edge of Flat Bed 1   Moving Bed to Chair 2   Standing Up From Chair 2   Walk in Room 2   Climb 3-5 Stairs 1   Basic Mobility Inpatient Raw Score 10   Turning Head Towards Sound 4   Follow Simple Instructions 4   Low Function Basic Mobility Raw Score 18   Low Function Basic Mobility Standardized Score 29 25   Highest Level Of Mobility   -[facility] Goal 4: Move to chair/commode   -[facility] Achieved 4: Move to chair/commode   Modified [place] Scale   Modified [place] Scale 4   Barthel Index   Feeding 10   Bathing 0   Grooming Score 0   Dressing Score 5   Bladder Score 10   Bowels Score 10   Toilet Use Score 5   Transfers (Bed/Chair) Score 5   Mobility (Level Surface) Score 0   Stairs Score 0   Barthel Index Score 45   Additional Treatment Session   Start Time 0951   End Time 1004   Treatment Assessment Pt agreeable to PT treatment session following PT evaluation  Pt performed seated therapeutic exercise as indicated below  Pt required verbal cues and occasional tactile cues for correct technique and form  Pt tolerated therapeutic exercise well without complaints of pain  Pt continues to exhibit decreased lower extremity strength, impaired balance, decreased endurance, gait deviations, and decreased functional mobility  PT to continue to recommend STR  PT to continue to follow and treat as appropriate  Exercises   Quad Sets Sitting;10 reps;AROM; Bilateral  (long sitting)   Hip Flexion Sitting;10 reps;AROM; Bilateral   Hip Abduction Sitting;10 reps;AROM; Bilateral  (long sitting)   Hip Adduction Sitting;10 reps;AROM; Bilateral   Knee AROM Long Arc Quad Sitting;10 reps;AROM; Bilateral   Ankle Pumps Sitting;20 reps;AROM; Bilateral   End of Consult   Patient Position at End of Consult Bedside chair;Bed/Chair alarm activated; All needs within reach  (RN aware)     PT Evaluation Time: 3719-8125    PT Treatment Time: 4914-1691  13 minutes  Margarette Bumpers, PT, DPT

## 2022-09-15 NOTE — ASSESSMENT & PLAN NOTE
Lab Results   Component Value Date    EGFR 53 09/15/2022    EGFR 34 09/14/2022    EGFR 38 09/06/2022    CREATININE 1 02 09/15/2022    CREATININE 1 49 (H) 09/14/2022    CREATININE 1 34 (H) 09/06/2022     · Patient has GFR 34, consistent with CKD3  ·  Patient reported she is unaware of this diagnosis which has never been discussed by any of her previous doctors  · Monitor kidney function, avoid nephrotoxins

## 2022-09-15 NOTE — ASSESSMENT & PLAN NOTE
· Patient smoked for 57 years and quit at 71 y/o  · COPD appears well controlled  · Continue chronic inhaler regimen

## 2022-09-15 NOTE — PROGRESS NOTES
Progress Note  Kendrick Maki 78 y o  female MRN: [id number]  Unit/Bed#: -01 Encounter: [id number]    Subjective:  Patient is sitting up in the chair reporting minimal improvement since yesterday  Patient reports she had a large diarrhea stool at 2:30 a m  This morning  Patient wishes to advance her diet  Patient refusing to take metronidazole  Objective:     Vitals:   Vitals:    09/15/22 0814   BP: 96/56   Pulse: 92   Resp: 16   Temp: 100 2 °F (37 9 °C)   SpO2: 94%   ,Body mass index is 29 15 kg/m²  Intake/Output Summary (Last 24 hours) at 9/15/2022 1025  Last data filed at 9/14/2022 1724  Gross per 24 hour   Intake 240 ml   Output --   Net 240 ml       Physical Exam:     General Appearance: Alert, appears stated age and cooperative  Lungs: Clear to auscultation bilaterally, no rales or rhonchi, no labored breathing/accessory muscle use  Heart: Regular rate and rhythm, S1, S2 normal, no murmur, click, rub or gallop  Abdomen: Soft, mildly tender in the right left lower quadrants    Extremities: No cyanosis, edema    Invasive Devices  Report    Peripheral Intravenous Line  Duration           Peripheral IV 09/06/22 Right Antecubital 8 days    Peripheral IV 09/14/22 Right Antecubital 1 day                Lab Results:  Admission on 09/14/2022   Component Date Value    WBC 09/14/2022 22 50 (A)    RBC 09/14/2022 4 49     Hemoglobin 09/14/2022 12 4     Hematocrit 09/14/2022 39 4     MCV 09/14/2022 88     MCH 09/14/2022 27 6     MCHC 09/14/2022 31 5     RDW 09/14/2022 16 0 (A)    MPV 09/14/2022 9 5     Platelets 68/02/8145 724 (A)    nRBC 09/14/2022 0     Neutrophils Relative 09/14/2022 92 (A)    Immat GRANS % 09/14/2022 1     Lymphocytes Relative 09/14/2022 3 (A)    Monocytes Relative 09/14/2022 4     Eosinophils Relative 09/14/2022 0     Basophils Relative 09/14/2022 0     Neutrophils Absolute 09/14/2022 20 51 (A)    Immature Grans Absolute 09/14/2022 0 31 (A)    Lymphocytes Absolute 09/14/2022 0 70     Monocytes Absolute 09/14/2022 0 88     Eosinophils Absolute 09/14/2022 0 02     Basophils Absolute 09/14/2022 0 08     Sodium 09/14/2022 136     Potassium 09/14/2022 3 6     Chloride 09/14/2022 98     CO2 09/14/2022 25     ANION GAP 09/14/2022 13     BUN 09/14/2022 17     Creatinine 09/14/2022 1 49 (A)    Glucose 09/14/2022 163 (A)    Calcium 09/14/2022 8 4     Corrected Calcium 09/14/2022 9 8     AST 09/14/2022 89 (A)    ALT 09/14/2022 122 (A)    Alkaline Phosphatase 09/14/2022 241 (A)    Total Protein 09/14/2022 6 6     Albumin 09/14/2022 2 2 (A)    Total Bilirubin 09/14/2022 0 38     eGFR 09/14/2022 34     Lipase 09/14/2022 28 (A)    LACTIC ACID 09/14/2022 2 3 (A)    Ventricular Rate 09/14/2022 107     Atrial Rate 09/14/2022 107     FL Interval 09/14/2022 138     QRSD Interval 09/14/2022 68     QT Interval 09/14/2022 336     QTC Interval 09/14/2022 448     P Axis 09/14/2022 34     QRS Axis 09/14/2022 53     T Wave Axis 09/14/2022 15     C difficile toxin by PCR 09/14/2022 Positive (A)    C difficile Toxins A+B, * 09/14/2022 Negative     hs TnI 0hr 09/14/2022 8     Protime 09/14/2022 18 3 (A)    INR 09/14/2022 1 55 (A)    PTT 09/14/2022 32     Blood Culture 09/14/2022 No Growth at 24 hrs   Blood Culture 09/14/2022 No Growth at 24 hrs       LACTIC ACID 09/14/2022 1 0     hs TnI 2hr 09/14/2022 8     Delta 2hr hsTnI 09/14/2022 0     Fecal Leukocytes 09/14/2022 2+ WBC's (3-10/hpf)     Sed Rate 09/14/2022 60 (A)    CRP 09/14/2022 227 9 (A)    WBC 09/15/2022 18 72 (A)    RBC 09/15/2022 3 42 (A)    Hemoglobin 09/15/2022 9 7 (A)    Hematocrit 09/15/2022 30 0 (A)    MCV 09/15/2022 88     MCH 09/15/2022 28 4     MCHC 09/15/2022 32 3     RDW 09/15/2022 16 2 (A)    MPV 09/15/2022 9 2     Platelets 75/53/0064 651 (A)    nRBC 09/15/2022 0     Neutrophils Relative 09/15/2022 86 (A)    Immat GRANS % 09/15/2022 1     Lymphocytes Relative 09/15/2022 7 (A)    Monocytes Relative 09/15/2022 6     Eosinophils Relative 09/15/2022 0     Basophils Relative 09/15/2022 0     Neutrophils Absolute 09/15/2022 15 97 (A)    Immature Grans Absolute 09/15/2022 0 20     Lymphocytes Absolute 09/15/2022 1 29     Monocytes Absolute 09/15/2022 1 20     Eosinophils Absolute 09/15/2022 0 02     Basophils Absolute 09/15/2022 0 04     Sodium 09/15/2022 140     Potassium 09/15/2022 3 1 (A)    Chloride 09/15/2022 102     CO2 09/15/2022 29     ANION GAP 09/15/2022 9     BUN 09/15/2022 11     Creatinine 09/15/2022 1 02     Glucose 09/15/2022 107     Calcium 09/15/2022 7 4 (A)    Corrected Calcium 09/15/2022 9 4     AST 09/15/2022 38     ALT 09/15/2022 70     Alkaline Phosphatase 09/15/2022 148 (A)    Total Protein 09/15/2022 5 2 (A)    Albumin 09/15/2022 1 5 (A)    Total Bilirubin 09/15/2022 0 19 (A)    eGFR 09/15/2022 53     Magnesium 09/15/2022 2 3        Imaging Studies:   I have personally reviewed pertinent imaging studies  CT abdomen pelvis with contrast    Result Date: 9/14/2022  Narrative: CT ABDOMEN AND PELVIS WITH IV CONTRAST INDICATION:   lower abd pain  COMPARISON:  CT abdomen and pelvis 9/6/2022 and CT abdomenn 9/13/2019 TECHNIQUE:  CT examination of the abdomen and pelvis was performed  Axial, sagittal, and coronal 2D reformatted images were created from the source data and submitted for interpretation  Radiation dose length product (DLP) for this visit:  638 mGy-cm   This examination, like all CT scans performed in the [facility], was performed utilizing techniques to minimize radiation dose exposure, including the use of iterative reconstruction and automated exposure control  IV Contrast:  100 mL of iohexol (OMNIPAQUE)  350 Enteric Contrast:  Enteric contrast was not administered  FINDINGS: ABDOMEN LOWER CHEST:  COPD  Coronary artery calcification   LIVER/BILIARY TREE:  One or more subcentimeter sharply circumscribed low-density hepatic lesion(s) are noted, too small to accurately characterize, but statistically most likely to represent subcentimeter hepatic cysts  No suspicious solid hepatic lesion is identified  Hepatic contours are normal  Diffuse fatty infiltration of the liver  No biliary dilatation  GALLBLADDER:  No calcified gallstones  No pericholecystic inflammatory change  SPLEEN:  Unremarkable  PANCREAS:  Stable 1 8 x 1 6 cm soft tissue nodule at the posterior inferior pancreatic head image 38 series 2 unchanged since September 2019 when measured in the same fashion  ADRENAL GLANDS:  Unremarkable  KIDNEYS/URETERS: One or more sharply circumscribed subcentimeter renal hypodensities are noted  These lesions are too small to accurately characterize, but are statistically most likely to represent benign cortical renal cyst(s)  According to the guidelines published in the [facility] Paper of the ACR Incidental Findings Committee (Radiology 2010), no further workup of these lesions is recommended  Left renal simple cysts, the larger of which measures 3 cm  No perinephric collection  STOMACH AND BOWEL:  Diffuse progressive colorectal thickening now involving the entire colon  Diffuse prominent pericolonic vascularity more prominently involving the descending colon through rectum  Minimal fluid is present in the descending colon through rectum  No pneumatosis coli  Colonic diverticulosis without diverticulitis  Nondistended stomach limits its evaluation  Grossly unremarkable small bowel  No bowel obstruction  APPENDIX:  No findings to suggest appendicitis  ABDOMINOPELVIC CAVITY:  Trace free fluid in the left paracolic gutter and dependent pelvis  No loculated fluid collection  No pneumoperitoneum  No lymphadenopathy  VESSELS:  Aortoiliac calcification  No aneurysm  PELVIS REPRODUCTIVE ORGANS:  New trace fluid within the uterine endometrial cavity likely within normal limits  Otherwise unremarkable for patient's age   URINARY BLADDER: Unremarkable  ABDOMINAL WALL/INGUINAL REGIONS:  Stable small fat-containing umbilical hernia  OSSEOUS STRUCTURES:  No acute fracture or osseous destructive lesion identified  Degenerative changes of the spine  Impression: Diffuse progressive nonspecific uncomplicated proctocolitis  Incidental finding of new trace fluid within the uterine endometrial cavity likely within normal limits  This could be followed up with nonemergent pelvic ultrasound if clinically warranted  Additional stable chronic findings as above  This study demonstrates a significant  finding and was documented as such in [facility] for liaison and referring practitioner notification  Workstation performed: [id number]     CT abdomen pelvis with contrast    Result Date: 9/6/2022  Narrative: CT ABDOMEN AND PELVIS WITH IV CONTRAST INDICATION:   diverticulitis, rule out abscess  COMPARISON:  None  TECHNIQUE:  CT examination of the abdomen and pelvis was performed  Axial, sagittal, and coronal 2D reformatted images were created from the source data and submitted for interpretation  Radiation dose length product (DLP) for this visit:  679 mGy-cm   This examination, like all CT scans performed in the [facility], was performed utilizing techniques to minimize radiation dose exposure, including the use of iterative reconstruction and automated exposure control  IV Contrast:  80 mL of iohexol (OMNIPAQUE) Enteric Contrast:  Enteric contrast was not administered  FINDINGS: ABDOMEN LOWER CHEST:  No clinically significant abnormality identified in the visualized lower chest  LIVER/BILIARY TREE:  Liver is diffusely decreased in density consistent with fatty change  No CT evidence of suspicious hepatic mass  Subcentimeter hypodensities too small to characterize but likely represent cysts  Normal hepatic contours  No biliary dilatation  GALLBLADDER:  No calcified gallstones  No pericholecystic inflammatory change  SPLEEN:  Unremarkable   PANCREAS: Unremarkable  ADRENAL GLANDS:  Unremarkable  KIDNEYS/URETERS:  One or more simple renal cyst(s) is noted  Otherwise unremarkable kidneys  No hydronephrosis  STOMACH AND BOWEL:  Wall thickening and edema with pericolonic inflammatory change involving the rectosigmoid colon  Several associated diverticulosis noted  APPENDIX:  No findings to suggest appendicitis  ABDOMINOPELVIC CAVITY:  No ascites  No pneumoperitoneum  No lymphadenopathy  VESSELS:  Unremarkable for patient's age  PELVIS REPRODUCTIVE ORGANS:  Unremarkable for patient's age  URINARY BLADDER:  Unremarkable  ABDOMINAL WALL/INGUINAL REGIONS:  Unremarkable  OSSEOUS STRUCTURES:  No acute fracture or destructive osseous lesion  Impression: Wall thickening and edema throughout the rectosigmoid colon which may either be secondary to uncomplicated diverticulitis or reflect proctocolitis of infectious or inflammatory etiology  Workstation performed: [id number]         Assessment & Plan  C diff colitis  Abdominal pain  -Patient of Aviva Vincent presents the [facility] Emergency Department yesterday with a chief complaint of abdominal pain and diarrhea   -C diff toxin by PCR positive, C diff toxin EIA negative   -Continue vancomycin 125 mg q 6 hours   -Patient currently refusing to take metronidazole  -Continue Florastor   -Continue to monitor stool output   -Will advance diet to a low-fiber diet   -Serial abdominal exams  -IV fluids  -Bentyl for pain  -Stool for enteric pathogens and fecal calprotectin pending   -Patient did have an MARTINA on admission; now improved  -Patient hgb dropped today will continue to monitor CBC and stool color  Transaminitis  -Patient's liver enzymes initially elevated on admission   -9/15/22 AST 38, ALT 70, total bilirubin 0 19  -Will continue to trend   -No plans for any further liver workup at this time  Patient will need close outpatient GI follow-up    Patient will be seen examined by Dr Lucian Davila, HILARY  9/15/2022,10:25 AM

## 2022-09-15 NOTE — OCCUPATIONAL THERAPY NOTE
Occupational Therapy Evaluation        Patient Name: Monique Saxena  PGQIJ'N Date: 9/15/2022       09/15/22 0950   OT Last Visit   OT Visit Date 09/15/22   Note Type   Note type Evaluation   Restrictions/Precautions   Weight Bearing Precautions Per Order No   Braces or Orthoses Other (Comment)  (none per patient)   Other Precautions Contact/isolation; Chair Alarm; Bed Alarm;Multiple lines;O2;Fall Risk;Limb alert   Pain Assessment   Pain Assessment Tool 0-10   Pain Score No Pain   Home Living   Type of Home Apartment  (2nd floor)   Home Layout One level;Stairs to enter with rails  (4 [address] outside + 14 steps to reach apartment door)   Bathroom Shower/Tub Tub/shower unit   [place] Grab bars in shower; Shower chair;Commode   Bathroom Accessibility Accessible   Home Equipment Walker;Cane;Other (Comment)  (home O2)   Additional Comments Pt ambulates with a cane for household distances and uses a walker for community distances  Prior Function   Level of [place] Needs assistance with ADLs and functional mobility; Needs assistance with IADLs   Lives With Son;Daughter   Receives Help From Family   ADL Assistance Needs assistance   IADLs Needs assistance   Falls in the last 6 months 1 to 4  ("I slid")   Vocational Retired   Lifestyle   Autonomy Patient reported requiring assistance with ADLs/ IADLs  Patient lives with family in in a one level house, 4 [address] outside + 14 steps to reach apartment door  Pt ambulates with a cane for household distances and uses a walker for community distances     Reciprocal Relationships Supportive Family   Service to Others Retired   Psychosocial   Psychosocial (WDL) [address]  5  [address]  [address] 3  Moderate [address] 3  Moderate [address] 3  Moderate Assistance Toileting Assistance  3  Moderate Assistance   Functional Assistance 3  Moderate Assistance   Bed Mobility   Supine to Sit 3  Moderate assistance   Additional items Assist x 2;HOB elevated; Bedrails; Increased time required;Verbal cues;LE management   Transfers   Sit to Stand 4  Minimal assistance   Additional items Assist x 2; Increased time required;Verbal cues   Stand to Sit 4  Minimal assistance   Additional items Assist x 2; Increased time required;Verbal cues;Armrests   Balance   Static Sitting Fair   Dynamic Sitting Fair -   Static Standing Poor +   Dynamic Standing Poor   Activity Tolerance   Activity Tolerance Patient tolerated treatment well   RUE Assessment   RUE Assessment X  (limited overhead movements)   RUE Strength   RUE Overall Strength Deficits  (3+/5)   LUE Assessment   LUE Assessment X  (limited overhead movements)   LUE Strength   LUE Overall Strength Deficits  (3+/5)   Hand Function   Gross Motor Coordination Impaired   Fine Motor Coordination Functional   Sensation   Light Touch No apparent deficits  (BUEs)   Vision-Basic Assessment   Current Vision Does not wear glasses   Cognition   Overall Cognitive Status WFL   Arousal/Participation Alert; Responsive; Cooperative   Attention Within functional limits   Orientation Level Oriented to person;Oriented to place;Oriented to situation;Disoriented to time  (oriented to year)   Memory Within functional limits   Following Commands Follows all commands and directions without difficulty   Assessment   Limitation Decreased ADL status; Decreased UE ROM; Decreased UE strength;Decreased endurance;Decreased self-care trans;Decreased high-level ADLs   Prognosis Good   Assessment Patient is a 78 y o  female seen for OT evaluation s/p admit to [id number] [place] [address] on 9/14/2022 w/Proctocolitis   Commorbidities affecting patient's functional performance at time of assessment include: anxiety, aortoiliac occlusive disease, carotid artery plaque, hyperlipidemia, stage 3 CKD, COPD, malignant neoplasm of overlapping sites of right breast, continuous opoid dependence, and transaminitis  Orders placed for OT evaluation and treatment  Performed at least two patient identifiers during session including name and wristband  Prior to admission, Patient reported requiring assistance with ADLs/ IADLs  Patient lives with family in in a one level house, 4 [address] outside + 14 steps to reach apartment door  Pt ambulates with a cane for household distances and uses a walker for community distances    Personal factors affecting patient at time of initial evaluation include: limited caregiver support, steps to enter, limited insight into deficits, decreased initiation and engagement, difficulty performing ADLs and difficulty performing IADLs  Upon evaluation, patient requires minimal  assist for UB ADLs, moderate and maximal assist for LB ADLs  Occupational performance is affected by the following deficits: decreased functional use of BUEs, decreased muscle strength, degenerative arthritic joint changes, impaired gross motor coordination, dynamic sit/ stand balance deficit with poor standing tolerance time for self care and functional mobility, decreased activity tolerance, decreased safety awareness and postural control and postural alignment deficit, requiring external assistance to complete transitional movements  Patient to benefit from continued Occupational Therapy treatment while in the hospital to address deficits as defined above and maximize level of functional independence with ADLs and functional mobility  Occupational Performance areas to address include: bathing/ shower, dressing, toilet hygiene, transfer to all surfaces, functional ambulation, functional mobility, emergency response, IADLs: safety procedures, IADLs: meal prep/ clean up and Leisure Participation  From OT standpoint, recommendation at time of d/c would be Short Term Rehab     Plan   Treatment Interventions ADL retraining;Functional transfer training;UE strengthening/ROM; Endurance training;Patient/family training;Fine motor coordination activities; Compensatory technique education;Continued evaluation; Energy conservation; Activityengagement   Goal Expiration Date 09/29/22   OT Frequency 3-5x/wk   Recommendation   OT Discharge Recommendation Post acute rehabilitation services   AM-PAC Daily Activity Inpatient   Lower Body Dressing 2   Bathing 2   Toileting 2   Upper Body Dressing 2   Grooming 3   Eating 3   Daily Activity Raw Score 14   Daily Activity Standardized Score (Calc for Raw Score >=11) 33 39   AM-PAC Applied Cognition Inpatient   Following a Speech/Presentation 4   Understanding Ordinary Conversation 4   Taking Medications 3   Remembering Where Things Are Placed or Put Away 3   Remembering List of 4-5 Errands 3   Taking Care of Complicated Tasks 3   Applied Cognition Raw Score 20   Applied Cognition Standardized Score 41 76   Barthel Index   Feeding 10   Bathing 0   Grooming Score 0   Dressing Score 5   Bladder Score 10   Bowels Score 10   Toilet Use Score 5   Transfers (Bed/Chair) Score 5   Mobility (Level Surface) Score 0   Stairs Score 0   Barthel Index Score 45         1 - Patient will verbalize and demonstrate use of energy conservation/ deep breathing technique and work simplification skills during functional activity with no verbal cues  2 - Patient will verbalize and demonstrate good body mechanics and joint protection techniques during  ADLs/ IADLs with no verbal cues  3 - Patient will increase OOB/ sitting tolerance to 2-4 hours per day for increased participation in self care and leisure tasks with no s/s of exertion  4 - Patient will increase standing tolerance time to 5  minutes with unilateral UE support to complete sink level ADLs@ mod I level  5 - Patient will increase sitting tolerance at edge of bed to 20 minutes to complete UB ADLs @ set up assist level      6 - Patient will transfer bed to Chair / toilet at Set up assist level with AD as indicated  7 - Patient will complete UB ADLs with set up assist      8 - Patient will complete LB ADLs with min assist with the use of adaptive equipment       9 - Patient will complete toileting hygiene with set up assist/ supervision for thoroughness    10 - Patient/ Family  will demonstrate competency with UE Home Exercise Program

## 2022-09-15 NOTE — PROGRESS NOTES
[address] [facility]  Progress Note Janine Arredondo 1945, 78 y o  female MRN: [id number]  Unit/Bed#: -[place] Encounter: [id number]  Primary Care Provider: Sadaf Ricks MD   Date and time admitted to hospital: 9/14/2022  4:51 AM    * Proctocolitis  Assessment & Plan  - Patient presents with generalized abdominal pain with non-bloody diarrhea, nausea, vomiting, and fever   - Patient previously presented 9/6 for similar symptoms and was sent home with ciprofloxacin and metronidazole for likely colitis  ·  CT abdomen/pelvis shows "Diffuse progressive colorectal thickening of entire colon  Diffuse prominent pericolonic vascularity of descending colon through rectum," consistent with proctocolitis  · Continue Unasyn 1 5g q12hr   · C diff PCR positive however toxin A + B negative  Likely colonization  However will treat with oral vancomycin given immunocompromised and ongoing diarrhea  · Awaiting blood cultures, stool enteric baterial panel  · Awaiting stool fecal calprotectin   · Advanced diet to low-fiber diet  · Monitor stool count  · Continue Florastor and bentyl  · Appreciate GI consult recommendations    Transaminitis  Assessment & Plan  · Patient has elevated transaminases, AST 89, ,   · Etiology unclear at this time, ALP may be related to patient's ongoing cancer    AST and ALT could be related to infectious process  · Monitor CMP    Continuous opioid dependence ([facility] [address] )  Assessment & Plan  ·  Patient currently on dilaudid 0 2 mg q4hrs p r n and percocet q6hr prn for pain given acute worsening due to colitis    Malignant neoplasm of overlapping sites of right breast in female, estrogen receptor negative ([facility] [address] )  Assessment & Plan  - Patient reports stage 1 breast cancer, with one dose of chemotherapy in June 2022   - Has denied further chemotherapeutic regimens to focus on quality of life    COPD (chronic obstructive pulmonary disease) ([facility] [address] )  Assessment & Plan  · Patient smoked for 57 years and quit at 71 y/o  · COPD appears well controlled  · Continue chronic inhaler regimen    Stage 3 chronic kidney disease [facility])  Assessment & Plan  Lab Results   Component Value Date    EGFR 53 09/15/2022    EGFR 34 09/14/2022    EGFR 38 09/06/2022    CREATININE 1 02 09/15/2022    CREATININE 1 49 (H) 09/14/2022    CREATININE 1 34 (H) 09/06/2022     · Patient has GFR 34, consistent with CKD3  ·  Patient reported she is unaware of this diagnosis which has never been discussed by any of her previous doctors  · Monitor kidney function, avoid nephrotoxins    Hyperlipidemia  Assessment & Plan  ·  Home medications include rosuvastatin 20 mg  · Continue atorvastatin which is formulary substitute    Carotid artery plaque, bilateral  Assessment & Plan  · No acute concerns at this time  · Continue aspirin and statin  Aortoiliac occlusive disease ([address] )  Assessment & Plan  · No acute concerns at this time  · Continue statin    Anxiety  Assessment & Plan  · Currently normal mood, behavior, and thought content  · Monitor      VTE Pharmacologic Prophylaxis:    Heparin/sequential compression device    Mechanical VTE Prophylaxis in Place: Yes    Patient Centered Rounds: I have performed bedside rounds with nursing staff today  Discussions with Specialists or Other Care Team Provider: Gastroenterology    Education and Discussions with Family / Patient:  Discussed with the patient  Current Length of Stay: 1 day(s)    Current Patient Status: Inpatient     Discharge Plan / Estimated Discharge Date: Anticipate discharge in 48-72 hrs to home  Code Status: Level 3 - DNAR and DNI      Subjective:   Overnight, patient had one episode of diarrhea in the early morning  Today, she is resting and denies any nausea or vomiting since yesterday  She reports that her abdominal pain is well controlled but does endorse some bloating   She is accepting of the news that she may not be able to go home right away considering her C  diff infection  Denies any fever, headache, chest pain, SOB, or other pain  Objective:     Vitals:   Temp (24hrs), Av 2 °F (37 3 °C), Min:98 3 °F (36 8 °C), Max:100 2 °F (37 9 °C)    Temp:  [98 3 °F (36 8 °C)-100 2 °F (37 9 °C)] 100 2 °F (37 9 °C)  HR:  [84-96] 92  Resp:  [16-18] 16  BP: (90-96)/(56-61) 96/56  SpO2:  [94 %-100 %] 94 %  Body mass index is 29 15 kg/m²  Input and Output Summary (last 24 hours): Intake/Output Summary (Last 24 hours) at 9/15/2022 1331  Last data filed at 2022 1724  Gross per 24 hour   Intake 240 ml   Output --   Net 240 ml       Physical Exam:     Physical Exam  Vitals reviewed  Constitutional:       General: She is not in acute distress  Appearance: Normal appearance  She is ill-appearing (Chronically)  HENT:      Head: Normocephalic and atraumatic  Eyes:      General: No scleral icterus  Conjunctiva/sclera: Conjunctivae normal    Cardiovascular:      Rate and Rhythm: Normal rate and regular rhythm  Pulses: Normal pulses  Heart sounds: Normal heart sounds  No murmur heard  No gallop  Pulmonary:      Effort: Pulmonary effort is normal  No respiratory distress  Breath sounds: Normal breath sounds  No wheezing or rales  Abdominal:      General: Bowel sounds are normal  There is no distension  Palpations: Abdomen is soft  Tenderness: There is no abdominal tenderness  There is no guarding or rebound  Musculoskeletal:      Right lower leg: No edema  Left lower leg: No edema  Skin:     General: Skin is warm and dry  Neurological:      Mental Status: She is alert and oriented to person, place, and time  Psychiatric:         Mood and Affect: Mood normal          Behavior: Behavior normal          Thought Content:  Thought content normal           Additional Data:     Labs:  Results from last 7 days   Lab Units 09/15/22  0437   WBC Thousand/uL 18 72*   HEMOGLOBIN g/dL 9 7*   HEMATOCRIT % 30 0*   PLATELETS Thousands/uL 651*   NEUTROS PCT % 86*   LYMPHS PCT % 7*   MONOS PCT % 6   EOS PCT % 0     Results from last 7 days   Lab Units 09/15/22  0437   SODIUM mmol/L 140   POTASSIUM mmol/L 3 1*   CHLORIDE mmol/L 102   CO2 mmol/L 29   BUN mg/dL 11   CREATININE mg/dL 1 02   ANION GAP mmol/L 9   CALCIUM mg/dL 7 4*   ALBUMIN g/dL 1 5*   TOTAL BILIRUBIN mg/dL 0 19*   ALK PHOS U/L 148*   ALT U/L 70   AST U/L 38   GLUCOSE RANDOM mg/dL 107     Results from last 7 days   Lab Units 09/14/22  0600   INR  1 55*             Results from last 7 days   Lab Units 09/14/22  0755 09/14/22  0513   LACTIC ACID mmol/L 1 0 2 3*       Imaging: Reviewed radiology reports from this admission including: abdominal/pelvic CT scan    Recent Cultures (last 7 days):     Results from last 7 days   Lab Units 09/14/22  1546 09/14/22  0600   BLOOD CULTURE   --  No Growth at 24 hrs  No Growth at 24 hrs     C DIFF TOXIN B BY PCR  Positive*  --        Lines/Drains:  Invasive Devices  Report    Peripheral Intravenous Line  Duration           Peripheral IV 09/06/22 Right Antecubital 8 days    Peripheral IV 09/14/22 Right Antecubital 1 day                Telemetry:        Last 24 Hours Medication List:   Current Facility-Administered Medications   Medication Dose Route Frequency Provider Last Rate    acetaminophen  650 mg Oral Q6H PRN Yaquelin Hermosillo MD      albuterol  2 puff Inhalation Q6H PRN Yaquelin Hermosillo MD      ALPRAZolam  0 5 mg Oral BID PRN Yaquelin Hermosillo MD      ampicillin-sulbactam  1 5 g Intravenous Q12H Yaquelin Hermosillo MD 1 5 g (09/15/22 0622)    aspirin  81 mg Oral Daily Yaquelin Hermosillo MD      atorvastatin  80 mg Oral Daily With [id number] MD Zeeshan      dicyclomine  10 mg Oral TID AC Stephon Butcher PA-C      fluticasone-vilanterol  1 puff Inhalation Daily Yaquelin Hermosillo MD      gabapentin  300 mg Oral BID Yaquelin Hermosillo MD      gabapentin  400 mg Oral HS Yaquelin Hermosillo MD      heparin (porcine)  5,000 Units Subcutaneous [facility] Yaquelin Hermosillo MD      HYDROmorphone  0 2 mg Intravenous Q4H PRN Yaquelin Hermosillo MD      multi-electrolyte  75 mL/hr Intravenous Continuous Yaquelin Hermosillo MD 75 mL/hr (09/14/22 1104)    ondansetron  4 mg Intravenous Q6H PRN Yaquelin Hermosillo MD      oxyCODONE-acetaminophen  1 tablet Oral Q6H PRN Yaquelin Hermosillo MD      saccharomyces boulardii  250 mg Oral BID Stephon Butcher PA-C      vancomycin  125 mg Oral Q6H [address] Abel Kyle MD          Today, Patient Was Seen By: Des Echeverria MD    ** Please Note: This note has been constructed using a voice recognition system   **

## 2022-09-15 NOTE — ASSESSMENT & PLAN NOTE
- Patient reports stage 1 breast cancer, with one dose of chemotherapy in June 2022   - Has denied further chemotherapeutic regimens to focus on quality of life

## 2022-09-15 NOTE — ASSESSMENT & PLAN NOTE
- Patient presents with generalized abdominal pain with non-bloody diarrhea, nausea, vomiting, and fever   - Patient previously presented 9/6 for similar symptoms and was sent home with ciprofloxacin and metronidazole for likely colitis  ·  CT abdomen/pelvis shows "Diffuse progressive colorectal thickening of entire colon  Diffuse prominent pericolonic vascularity of descending colon through rectum," consistent with proctocolitis  · Continue Unasyn 1 5g q12hr   · C diff PCR positive however toxin A + B negative  Likely colonization  However will treat with oral vancomycin given immunocompromised and ongoing diarrhea    · Awaiting blood cultures, stool enteric baterial panel  · Awaiting stool fecal calprotectin   · Advanced diet to low-fiber diet  · Monitor stool count  · Continue Florastor and bentyl  · Appreciate GI consult recommendations

## 2022-09-15 NOTE — PLAN OF CARE
Problem: PHYSICAL THERAPY ADULT  Goal: Performs mobility at highest level of function for planned discharge setting  See evaluation for individualized goals  Description: Treatment/Interventions: Functional transfer training, LE strengthening/ROM, Endurance training, Patient/family training, Therapeutic exercise, Bed mobility, Gait training, Spoke to nursing, OT          See flowsheet documentation for full assessment, interventions and recommendations  Note: Prognosis: Good  Problem List: Decreased strength, Decreased endurance, Impaired balance, Decreased mobility  Assessment: Pt is 78year old female seen for PT evaluation s/p admit to [facility] on 9/14/2022 with Proctocolitis  PT consulted to assess pt's functional mobility and d/c needs  Order placed for PT eval and tx, with up and OOB as tolerated order  Comorbidities affecting pt's physical performance at time of assessment include anxiety, aortoiliac occlusive disease, carotid artery plaque, hyperlipidemia, stage 3 CKD, COPD, malignant neoplasm of overlapping sites of right breast, continuous opoid dependence, and transaminitis  PTA, pt was requiring assist for mobility  Pt ambulates with a cane for household distances and uses a walker for community distances  Pt resides with her son and family in a one level apartment with four outside steps to enter and 14 steps to reach the 2nd floor apartment  Personal factors affecting pt at time of IE include inaccessible home environment, stairs to enter home, inability to ambulate household distances, inability to navigate community distances, inability to navigate level surfaces without external assistance, unable to perform dynamic tasks in community, positive fall history, inability to perform IADLs, inability to perform ADLs, and inability to live alone   Please find objective findings from PT assessment regarding body systems outlined above with impairments and limitations including weakness, impaired balance, decreased endurance, gait deviations, decreased activity tolerance, decreased functional mobility tolerance, and fall risk  The following objective measures performed on IE also reveal limitations: Barthel Index: 45/100, Modified [place]: 4 (moderate/severe disability) and AM-PAC 6-Clicks: 72/95  Pt's clinical presentation is currently unstable/unpredictable seen in pt's presentation of need for ongoing medical management/monitoring, pt is a fall risk, and pt requires cues and assist of two for safety with functional mobility  Pt to benefit from continued PT tx to address deficits as defined above and maximize level of functional independent mobility and consistency  From PT/mobility standpoint, recommendation at time of d/c would be STR pending progress in order to facilitate return to PLOF  Barriers to Discharge: Inaccessible home environment, Decreased caregiver support     PT Discharge Recommendation: Post acute rehabilitation services    See flowsheet documentation for full assessment

## 2022-09-15 NOTE — PLAN OF CARE
Problem: Potential for Falls  Goal: Patient will remain free of falls  Description: INTERVENTIONS:  - Educate patient/family on patient safety including physical limitations  - Instruct patient to call for assistance with activity   - Consult OT/PT to assist with strengthening/mobility   - Keep Call bell within reach  - Keep bed low and locked with side rails adjusted as appropriate  - Keep care items and personal belongings within reach  - Initiate and maintain comfort rounds  - Make Fall Risk Sign visible to staff  - Offer Toileting every 2 Hours, in advance of need  - Initiate/Maintain bed alarm  - Obtain necessary fall risk management equipment: bed alarm  - Apply yellow socks and bracelet for high fall risk patients  - Consider moving patient to room near nurses station  Outcome: Progressing     Problem: PAIN - ADULT  Goal: Verbalizes/displays adequate comfort level or baseline comfort level  Description: Interventions:  - Encourage patient to monitor pain and request assistance  - Assess pain using appropriate pain scale  - Administer analgesics based on type and severity of pain and evaluate response  - Implement non-pharmacological measures as appropriate and evaluate response  - Consider cultural and social influences on pain and pain management  - Notify physician/advanced practitioner if interventions unsuccessful or patient reports new pain  Outcome: Progressing     Problem: INFECTION - ADULT  Goal: Absence or prevention of progression during hospitalization  Description: INTERVENTIONS:  - Assess and monitor for signs and symptoms of infection  - Monitor lab/diagnostic results  - Monitor all insertion sites, i e  indwelling lines, tubes, and drains  - Monitor endotracheal if appropriate and nasal secretions for changes in amount and color  - [place] appropriate cooling/warming therapies per order  - Administer medications as ordered  - Instruct and encourage patient and family to use good hand hygiene technique  - Identify and instruct in appropriate isolation precautions for identified infection/condition  Outcome: Progressing     Problem: SAFETY ADULT  Goal: Patient will remain free of falls  Description: INTERVENTIONS:  - Educate patient/family on patient safety including physical limitations  - Instruct patient to call for assistance with activity   - Consult OT/PT to assist with strengthening/mobility   - Keep Call bell within reach  - Keep bed low and locked with side rails adjusted as appropriate  - Keep care items and personal belongings within reach  - Initiate and maintain comfort rounds  - Make Fall Risk Sign visible to staff  - Offer Toileting every 2 Hours, in advance of need  - Initiate/Maintain bed alarm  - Obtain necessary fall risk management equipment: bed alarm  - Apply yellow socks and bracelet for high fall risk patients  - Consider moving patient to room near nurses station  Outcome: Progressing  Goal: Maintain or return to baseline ADL function  Description: INTERVENTIONS:  -  Assess patient's ability to carry out ADLs; assess patient's baseline for ADL function and identify physical deficits which impact ability to perform ADLs (bathing, care of mouth/teeth, toileting, grooming, dressing, etc )  - Assess/evaluate cause of self-care deficits   - Assess range of motion  - Assess patient's mobility; develop plan if impaired  - Assess patient's need for assistive devices and provide as appropriate  - Encourage maximum independence but intervene and supervise when necessary  - Involve family in performance of ADLs  - Assess for home care needs following discharge   - Consider OT consult to assist with ADL evaluation and planning for discharge  - Provide patient education as appropriate  Outcome: Progressing  Goal: Maintains/Returns to pre admission functional level  Description: INTERVENTIONS:  - Perform BMAT or MOVE assessment daily    - Set and communicate daily mobility goal to care team and patient/family/caregiver  - Collaborate with rehabilitation services on mobility goals if consulted  - Perform Range of Motion 4 times a day  - Reposition patient every 2 hours  - Dangle patient 3 times a day  - Stand patient 3 times a day  - Ambulate patient 3 times a day  - Out of bed to chair 3 times a day   - Out of bed for meals 3 times a day  - Out of bed for toileting  - Record patient progress and toleration of activity level   Outcome: Progressing     Problem: DISCHARGE PLANNING  Goal: Discharge to home or other facility with appropriate resources  Description: INTERVENTIONS:  - Identify barriers to discharge w/patient and caregiver  - Arrange for needed discharge resources and transportation as appropriate  - Identify discharge learning needs (meds, wound care, etc )  - Arrange for interpretive services to assist at discharge as needed  - Refer to Case Management Department for coordinating discharge planning if the patient needs post-hospital services based on physician/advanced practitioner order or complex needs related to functional status, cognitive ability, or social support system  Outcome: Progressing     Problem: Knowledge Deficit  Goal: Patient/family/caregiver demonstrates understanding of disease process, treatment plan, medications, and discharge instructions  Description: Complete learning assessment and assess knowledge base    Interventions:  - Provide teaching at level of understanding  - Provide teaching via preferred learning methods  Outcome: Progressing     Problem: MOBILITY - ADULT  Goal: Maintain or return to baseline ADL function  Description: INTERVENTIONS:  -  Assess patient's ability to carry out ADLs; assess patient's baseline for ADL function and identify physical deficits which impact ability to perform ADLs (bathing, care of mouth/teeth, toileting, grooming, dressing, etc )  - Assess/evaluate cause of self-care deficits   - Assess range of motion  - Assess patient's mobility; develop plan if impaired  - Assess patient's need for assistive devices and provide as appropriate  - Encourage maximum independence but intervene and supervise when necessary  - Involve family in performance of ADLs  - Assess for home care needs following discharge   - Consider OT consult to assist with ADL evaluation and planning for discharge  - Provide patient education as appropriate  Outcome: Progressing  Goal: Maintains/Returns to pre admission functional level  Description: INTERVENTIONS:  - Perform BMAT or MOVE assessment daily    - Set and communicate daily mobility goal to care team and patient/family/caregiver  - Collaborate with rehabilitation services on mobility goals if consulted  - Perform Range of Motion 4 times a day  - Reposition patient every 2 hours  - Dangle patient 3 times a day  - Stand patient 3 times a day  - Ambulate patient 3 times a day  - Out of bed to chair 3 times a day   - Out of bed for meals 3 times a day  - Out of bed for toileting  - Record patient progress and toleration of activity level   Outcome: Progressing     Problem: Nutrition/Hydration-ADULT  Goal: Nutrient/Hydration intake appropriate for improving, restoring or maintaining nutritional needs  Description: Monitor and assess patient's nutrition/hydration status for malnutrition  Collaborate with interdisciplinary team and initiate plan and interventions as ordered  Monitor patient's weight and dietary intake as ordered or per policy  Utilize nutrition screening tool and intervene as necessary  Determine patient's food preferences and provide high-protein, high-caloric foods as appropriate       INTERVENTIONS:  - Monitor oral intake, urinary output, labs, and treatment plans  - Assess nutrition and hydration status and recommend course of action  - Evaluate amount of meals eaten  - Assist patient with eating if necessary   - Allow adequate time for meals  - Recommend/ encourage appropriate diets, oral nutritional supplements, and vitamin/mineral supplements  - Order, calculate, and assess calorie counts as needed  - Recommend, monitor, and adjust tube feedings and TPN/PPN based on assessed needs  - Assess need for intravenous fluids  - Provide specific nutrition/hydration education as appropriate  - Include patient/family/caregiver in decisions related to nutrition  Outcome: Progressing

## 2022-09-15 NOTE — PLAN OF CARE
Problem: OCCUPATIONAL THERAPY ADULT  Goal: Performs self-care activities at highest level of function for planned discharge setting  See evaluation for individualized goals  Description: Treatment Interventions: ADL retraining, Functional transfer training, UE strengthening/ROM, Endurance training, Patient/family training, Fine motor coordination activities, Compensatory technique education, Continued evaluation, Energy conservation, Activityengagement          See flowsheet documentation for full assessment, interventions and recommendations  Note: Limitation: Decreased ADL status, Decreased UE ROM, Decreased UE strength, Decreased endurance, Decreased self-care trans, Decreased high-level ADLs  Prognosis: Good  Assessment: Patient is a 78 y o  female seen for OT evaluation s/p admit to [id number] [facility] on 9/14/2022 w/Proctocolitis  Commorbidities affecting patient's functional performance at time of assessment include: anxiety, aortoiliac occlusive disease, carotid artery plaque, hyperlipidemia, stage 3 CKD, COPD, malignant neoplasm of overlapping sites of right breast, continuous opoid dependence, and transaminitis  Orders placed for OT evaluation and treatment  Performed at least two patient identifiers during session including name and wristband  Prior to admission, Patient reported requiring assistance with ADLs/ IADLs  Patient lives with family in in a one level house, 4 ZOILA outside + 14 steps to reach apartment door  Pt ambulates with a cane for household distances and uses a walker for community distances    Personal factors affecting patient at time of initial evaluation include: limited caregiver support, steps to enter, limited insight into deficits, decreased initiation and engagement, difficulty performing ADLs and difficulty performing IADLs  Upon evaluation, patient requires minimal  assist for UB ADLs, moderate and maximal assist for LB ADLs      Occupational performance is affected by the following deficits: decreased functional use of BUEs, decreased muscle strength, degenerative arthritic joint changes, impaired gross motor coordination, dynamic sit/ stand balance deficit with poor standing tolerance time for self care and functional mobility, decreased activity tolerance, decreased safety awareness and postural control and postural alignment deficit, requiring external assistance to complete transitional movements  Patient to benefit from continued Occupational Therapy treatment while in the hospital to address deficits as defined above and maximize level of functional independence with ADLs and functional mobility  Occupational Performance areas to address include: bathing/ shower, dressing, toilet hygiene, transfer to all surfaces, functional ambulation, functional mobility, emergency response, IADLs: safety procedures, IADLs: meal prep/ clean up and Leisure Participation  From OT standpoint, recommendation at time of d/c would be Short Term Rehab       OT Discharge Recommendation: Post acute rehabilitation services

## 2022-09-16 LAB
ALBUMIN SERPL BCP-MCNC: 1.7 G/DL (ref 3.5–5)
ALP SERPL-CCNC: 144 U/L (ref 46–116)
ALT SERPL W P-5'-P-CCNC: 82 U/L (ref 12–78)
ANION GAP SERPL CALCULATED.3IONS-SCNC: 7 MMOL/L (ref 4–13)
AST SERPL W P-5'-P-CCNC: 60 U/L (ref 5–45)
BASOPHILS # BLD AUTO: 0.03 THOUSANDS/ΜL (ref 0–0.1)
BASOPHILS NFR BLD AUTO: 0 % (ref 0–1)
BILIRUB SERPL-MCNC: 0.27 MG/DL (ref 0.2–1)
BUN SERPL-MCNC: 7 MG/DL (ref 5–25)
CALCIUM ALBUM COR SERPL-MCNC: 9.3 MG/DL (ref 8.3–10.1)
CALCIUM SERPL-MCNC: 7.5 MG/DL (ref 8.3–10.1)
CHLORIDE SERPL-SCNC: 100 MMOL/L (ref 96–108)
CO2 SERPL-SCNC: 28 MMOL/L (ref 21–32)
CREAT SERPL-MCNC: 0.92 MG/DL (ref 0.6–1.3)
EOSINOPHIL # BLD AUTO: 0.11 THOUSAND/ΜL (ref 0–0.61)
EOSINOPHIL NFR BLD AUTO: 1 % (ref 0–6)
ERYTHROCYTE [DISTWIDTH] IN BLOOD BY AUTOMATED COUNT: 16.2 % (ref 11.6–15.1)
GFR SERPL CREATININE-BSD FRML MDRD: 61 ML/MIN/1.73SQ M
GLUCOSE SERPL-MCNC: 92 MG/DL (ref 65–140)
HCT VFR BLD AUTO: 33.1 % (ref 34.8–46.1)
HGB BLD-MCNC: 10.3 G/DL (ref 11.5–15.4)
IMM GRANULOCYTES # BLD AUTO: 0.09 THOUSAND/UL (ref 0–0.2)
IMM GRANULOCYTES NFR BLD AUTO: 1 % (ref 0–2)
LYMPHOCYTES # BLD AUTO: 1.52 THOUSANDS/ΜL (ref 0.6–4.47)
LYMPHOCYTES NFR BLD AUTO: 13 % (ref 14–44)
MCH RBC QN AUTO: 27.3 PG (ref 26.8–34.3)
MCHC RBC AUTO-ENTMCNC: 31.1 G/DL (ref 31.4–37.4)
MCV RBC AUTO: 88 FL (ref 82–98)
MONOCYTES # BLD AUTO: 0.62 THOUSAND/ΜL (ref 0.17–1.22)
MONOCYTES NFR BLD AUTO: 5 % (ref 4–12)
NEUTROPHILS # BLD AUTO: 9.43 THOUSANDS/ΜL (ref 1.85–7.62)
NEUTS SEG NFR BLD AUTO: 80 % (ref 43–75)
NRBC BLD AUTO-RTO: 0 /100 WBCS
PLATELET # BLD AUTO: 737 THOUSANDS/UL (ref 149–390)
PMV BLD AUTO: 9.1 FL (ref 8.9–12.7)
POTASSIUM SERPL-SCNC: 3.1 MMOL/L (ref 3.5–5.3)
PROT SERPL-MCNC: 5.3 G/DL (ref 6.4–8.4)
RBC # BLD AUTO: 3.77 MILLION/UL (ref 3.81–5.12)
SODIUM SERPL-SCNC: 135 MMOL/L (ref 135–147)
WBC # BLD AUTO: 11.8 THOUSAND/UL (ref 4.31–10.16)

## 2022-09-16 PROCEDURE — 85025 COMPLETE CBC W/AUTO DIFF WBC: CPT | Performed by: INTERNAL MEDICINE

## 2022-09-16 PROCEDURE — 80053 COMPREHEN METABOLIC PANEL: CPT | Performed by: INTERNAL MEDICINE

## 2022-09-16 PROCEDURE — 99232 SBSQ HOSP IP/OBS MODERATE 35: CPT | Performed by: INTERNAL MEDICINE

## 2022-09-16 PROCEDURE — 99233 SBSQ HOSP IP/OBS HIGH 50: CPT | Performed by: INTERNAL MEDICINE

## 2022-09-16 RX ORDER — SIMETHICONE 20 MG/.3ML
40 EMULSION ORAL EVERY 6 HOURS PRN
Status: DISCONTINUED | OUTPATIENT
Start: 2022-09-16 | End: 2022-09-19 | Stop reason: HOSPADM

## 2022-09-16 RX ORDER — POTASSIUM CHLORIDE 20 MEQ/1
40 TABLET, EXTENDED RELEASE ORAL ONCE
Status: COMPLETED | OUTPATIENT
Start: 2022-09-16 | End: 2022-09-16

## 2022-09-16 RX ORDER — FAMOTIDINE 20 MG/1
20 TABLET, FILM COATED ORAL DAILY
Status: DISCONTINUED | OUTPATIENT
Start: 2022-09-16 | End: 2022-09-19 | Stop reason: HOSPADM

## 2022-09-16 RX ADMIN — SODIUM CHLORIDE 1.5 G: 9 INJECTION, SOLUTION INTRAVENOUS at 07:20

## 2022-09-16 RX ADMIN — POTASSIUM CHLORIDE 40 MEQ: 1500 TABLET, EXTENDED RELEASE ORAL at 08:42

## 2022-09-16 RX ADMIN — FAMOTIDINE 20 MG: 20 TABLET ORAL at 11:49

## 2022-09-16 RX ADMIN — ATORVASTATIN CALCIUM 80 MG: 40 TABLET, FILM COATED ORAL at 17:13

## 2022-09-16 RX ADMIN — Medication 250 MG: at 08:42

## 2022-09-16 RX ADMIN — GABAPENTIN 300 MG: 300 CAPSULE ORAL at 17:13

## 2022-09-16 RX ADMIN — DICYCLOMINE HYDROCHLORIDE 10 MG: 10 CAPSULE ORAL at 06:55

## 2022-09-16 RX ADMIN — HEPARIN SODIUM 5000 UNITS: 5000 INJECTION INTRAVENOUS; SUBCUTANEOUS at 06:54

## 2022-09-16 RX ADMIN — HEPARIN SODIUM 5000 UNITS: 5000 INJECTION INTRAVENOUS; SUBCUTANEOUS at 22:42

## 2022-09-16 RX ADMIN — Medication 250 MG: at 17:12

## 2022-09-16 RX ADMIN — GABAPENTIN 300 MG: 300 CAPSULE ORAL at 08:42

## 2022-09-16 RX ADMIN — DICYCLOMINE HYDROCHLORIDE 10 MG: 10 CAPSULE ORAL at 11:51

## 2022-09-16 RX ADMIN — Medication 125 MG: at 13:00

## 2022-09-16 RX ADMIN — ALPRAZOLAM 0.5 MG: 0.5 TABLET ORAL at 11:49

## 2022-09-16 RX ADMIN — GABAPENTIN 400 MG: 400 CAPSULE ORAL at 22:42

## 2022-09-16 RX ADMIN — HEPARIN SODIUM 5000 UNITS: 5000 INJECTION INTRAVENOUS; SUBCUTANEOUS at 13:00

## 2022-09-16 RX ADMIN — FLUTICASONE FUROATE AND VILANTEROL TRIFENATATE 1 PUFF: 100; 25 POWDER RESPIRATORY (INHALATION) at 08:43

## 2022-09-16 RX ADMIN — Medication 125 MG: at 06:55

## 2022-09-16 RX ADMIN — Medication 40 MG: at 11:51

## 2022-09-16 RX ADMIN — DICYCLOMINE HYDROCHLORIDE 10 MG: 10 CAPSULE ORAL at 17:13

## 2022-09-16 RX ADMIN — SODIUM CHLORIDE, SODIUM GLUCONATE, SODIUM ACETATE, POTASSIUM CHLORIDE, MAGNESIUM CHLORIDE, SODIUM PHOSPHATE, DIBASIC, AND POTASSIUM PHOSPHATE 75 ML/HR: .53; .5; .37; .037; .03; .012; .00082 INJECTION, SOLUTION INTRAVENOUS at 09:00

## 2022-09-16 RX ADMIN — Medication 125 MG: at 18:37

## 2022-09-16 RX ADMIN — ASPIRIN 81 MG 81 MG: 81 TABLET ORAL at 08:42

## 2022-09-16 NOTE — ASSESSMENT & PLAN NOTE
· 9/14/22 AST 89, , total bili 0 38  · 9/15/22 AST 38, ALT 70, total bili 0 19  · 9/16/22 AST 60, ALT 82, total bili 0 27  · Continue to trend   · Mild transaminitis   · No further work up at this time per GI

## 2022-09-16 NOTE — PLAN OF CARE
Problem: INFECTION - ADULT  Goal: Absence or prevention of progression during hospitalization  Description: INTERVENTIONS:  - Assess and monitor for signs and symptoms of infection  - Monitor lab/diagnostic results  - Monitor all insertion sites, i e  indwelling lines, tubes, and drains  - Monitor endotracheal if appropriate and nasal secretions for changes in amount and color  - [place] appropriate cooling/warming therapies per order  - Administer medications as ordered  - Instruct and encourage patient and family to use good hand hygiene technique  - Identify and instruct in appropriate isolation precautions for identified infection/condition  Outcome: Progressing

## 2022-09-16 NOTE — PROGRESS NOTES
[address] [facility]  Progress Note Lelo Ewing 1945, 78 y o  female MRN: [id number]  Unit/Bed#: -[facility] Encounter: [id number]  Primary Care Provider: Earl Oliver MD   Date and time admitted to hospital: 9/14/2022  4:51 AM    * Proctocolitis  Assessment & Plan  - Patient presents with generalized abdominal pain with non-bloody diarrhea, nausea, vomiting, and fever   - Patient previously presented 9/6 for similar symptoms and was sent home with ciprofloxacin and metronidazole for likely colitis  Patient reports no symptomatic relief  · CT abdomen/pelvis: "Diffuse progressive colorectal thickening of entire colon  Diffuse prominent pericolonic vascularity of descending colon through rectum," consistent with proctocolitis  · C  diff PCR positive however toxin A + B negative  Likely colonization given clinical correlation of symptoms  · Blood cultures negative so far  · Stool enteric baterial panel negative  · Awaiting stool fecal calprotectin     · Discontinue Unasyn 1 5g q12hr given bacteria panel negative  · Continue p o  vancomycin 125 mg b i d  Planning to give 2 weeks  · Patient refused Flagyl  · Advanced diet to low-fiber diet  · Monitor stool count  · Continue probiotics Florastor and bentyl  · Appreciate GI consult recommendations  · Encourage oral hydration    Transaminitis  Assessment & Plan  · 9/14/22 AST 89, , total bili 0 38  · 9/15/22 AST 38, ALT 70, total bili 0 19  · 9/16/22 AST 60, ALT 82, total bili 0 27  · Continue to trend   · Mild transaminitis   · No further work up at this time per GI       Continuous opioid dependence ([facility] [address] )  Assessment & Plan  · Patient currently on dilaudid 0 2 mg q4hrs p r n and percocet q6hr prn for pain given acute worsening due to colitis    Malignant neoplasm of overlapping sites of right breast in female, estrogen receptor negative ([address] )  Assessment & Plan  - Patient reports stage 1 breast cancer, with one dose of chemotherapy in June 2022   - Has denied further chemotherapeutic regimens to focus on quality of life    COPD (chronic obstructive pulmonary disease) (HCC)  Assessment & Plan  · Patient smoked for 57 years and quit at 71 y/o  · COPD appears well controlled  · Continue chronic inhaler regimen    Stage 3 chronic kidney disease [facility])  Assessment & Plan  Lab Results   Component Value Date    EGFR 61 09/16/2022    EGFR 53 09/15/2022    EGFR 34 09/14/2022    CREATININE 0 92 09/16/2022    CREATININE 1 02 09/15/2022    CREATININE 1 49 (H) 09/14/2022     · Baseline Cr seems to be around 1 1 -1 2   · On admission, creatinine 1 49  Improved with IV fluid hydration  · Continue gentle IV hydration given diarrhea and poor oral intake  · Monitor kidney function, avoid nephrotoxins    Hyperlipidemia  Assessment & Plan  ·  Home medications include rosuvastatin 20 mg  · Continue atorvastatin which is formulary substitute    Carotid artery plaque, bilateral  Assessment & Plan  · No acute concerns at this time  · Continue aspirin and statin  Aortoiliac occlusive disease ([address] )  Assessment & Plan  · No acute concerns at this time  · Continue statin    Anxiety  Assessment & Plan  · Stable      TE Pharmacologic Prophylaxis: Heparin    Patient Centered Rounds: I have performed bedside rounds with nursing staff today  Discussions with Specialists or Other Care Team Provider: yes  Education and Discussions with Family / Patient:  Discussed the patient and her son Carol Sylvester  They are agreeable with the plans  Current Length of Stay: 2 day(s)  Current Patient Status: Inpatient     Certification Statement: The patient will continue to require additional inpatient hospital stay due to Proctocolitis  Discharge Plan / Estimated Discharge Date: 24-48 hours    Code Status: Level 3 - DNAR and DNI  ______________________________________________________________________________    Subjective:   Patient seen and examined by me   Reports she is feeling a lot better  One large BM reported overnight  Appears to be improved  Objective:   Vitals: Blood pressure (!) 105/49, pulse 82, temperature 97 8 °F (36 6 °C), resp  rate 16, height 5' (1 524 m), weight 67 7 kg (149 lb 4 oz), SpO2 96 %, not currently breastfeeding  Physical Exam  Vitals reviewed  Constitutional:       General: She is not in acute distress  Appearance: Normal appearance  She is ill-appearing (Chronically)  HENT:      Head: Normocephalic and atraumatic  Eyes:      General: No scleral icterus  Conjunctiva/sclera: Conjunctivae normal    Cardiovascular:      Rate and Rhythm: Normal rate and regular rhythm  Pulses: Normal pulses  Heart sounds: Normal heart sounds  No murmur heard  No gallop  Pulmonary:      Effort: Pulmonary effort is normal  No respiratory distress  Breath sounds: Normal breath sounds  No wheezing or rales  Abdominal:      General: Bowel sounds are normal  There is no distension  Palpations: Abdomen is soft  Tenderness: There is no abdominal tenderness  There is no guarding or rebound  Musculoskeletal:      Right lower leg: No edema  Left lower leg: No edema  Skin:     General: Skin is warm and dry  Capillary Refill: Capillary refill takes less than 2 seconds  Neurological:      Mental Status: She is alert and oriented to person, place, and time  Psychiatric:         Mood and Affect: Mood normal          Behavior: Behavior normal          Thought Content:  Thought content normal            Additional Data:   Labs:  Results from last 7 days   Lab Units 09/16/22  0521 09/15/22  0437 09/14/22  0600 09/14/22  0513   WBC Thousand/uL 11 80* 18 72*  --  22 50*   HEMOGLOBIN g/dL 10 3* 9 7*  --  12 4   HEMATOCRIT % 33 1* 30 0*  --  39 4   MCV fL 88 88  --  88   PLATELETS Thousands/uL 737* 651*  --  724*   INR   --   --  1 55*  --      Results from last 7 days   Lab Units 09/16/22  0521 09/15/22  0437 09/14/22  0513   SODIUM mmol/L 135 140 136   POTASSIUM mmol/L 3 1* 3 1* 3 6   CHLORIDE mmol/L 100 102 98   CO2 mmol/L 28 29 25   ANION GAP mmol/L 7 9 13   BUN mg/dL 7 11 17   CREATININE mg/dL 0 92 1 02 1 49*   CALCIUM mg/dL 7 5* 7 4* 8 4   ALBUMIN g/dL 1 7* 1 5* 2 2*   TOTAL BILIRUBIN mg/dL 0 27 0 19* 0 38   ALK PHOS U/L 144* 148* 241*   ALT U/L 82* 70 122*   AST U/L 60* 38 89*   EGFR ml/min/1 73sq m 61 53 34   GLUCOSE RANDOM mg/dL 92 107 163*     Results from last 7 days   Lab Units 09/15/22  0437   MAGNESIUM mg/dL 2 3              Results from last 7 days   Lab Units 09/14/22  0755 09/14/22  0513   LACTIC ACID mmol/L 1 0 2 3*                 * I Have Reviewed All Lab Data Listed Above  Cultures:   Results from last 7 days   Lab Units 09/14/22  1546 09/14/22  0600   BLOOD CULTURE   --  No Growth at 48 hrs  No Growth at 48 hrs  C DIFF TOXIN B BY PCR  Positive*  --              Imaging:  Imaging Reports Reviewed Today Include:   CT abdomen pelvis with contrast    Result Date: 9/14/2022  Impression: Diffuse progressive nonspecific uncomplicated proctocolitis  Incidental finding of new trace fluid within the uterine endometrial cavity likely within normal limits  This could be followed up with nonemergent pelvic ultrasound if clinically warranted  Additional stable chronic findings as above  This study demonstrates a significant  finding and was documented as such in [facility] for liaison and referring practitioner notification   Workstation performed: [id number]     Scheduled Meds:  Current Facility-Administered Medications   Medication Dose Route Frequency Provider Last Rate    acetaminophen  650 mg Oral Q6H PRN Jocelyn Davenport MD      albuterol  2 puff Inhalation Q6H PRN Jocelyn Davenport MD      ALPRAZolam  0 5 mg Oral BID PRN Jocelyn Davenport MD      aspirin  81 mg Oral Daily Jocelyn Davenport MD      atorvastatin  80 mg Oral Daily With [id number] MD Zeeshan      dicyclomine  10 mg Oral TID [facility] Stefani Christina PA-C      famotidine  20 mg Oral Daily Stefani Christina PA-C      fluticasone-vilanterol  1 puff Inhalation Daily Jordon Qiu MD      gabapentin  300 mg Oral BID Jordon Qiu MD      gabapentin  400 mg Oral HS Jordon Qiu MD      heparin (porcine)  5,000 Units Subcutaneous [facility] Jordon Qiu MD      HYDROmorphone  0 2 mg Intravenous Q4H PRN Jordon Qiu MD      multi-electrolyte  75 mL/hr Intravenous Continuous Jordon Qiu MD 75 mL/hr (09/16/22 0900)    ondansetron  4 mg Intravenous Q6H PRN Jordon Qiu MD      oxyCODONE-acetaminophen  1 tablet Oral Q6H PRN Jordon Qiu MD      saccharomyces boulardii  250 mg Oral BID Stefani Christina PA-C      simethicone  40 mg Oral Q6H PRN Stefani Christina PA-C      vancomycin  125 mg Oral Q6H [facility] MD Alberta Starkey, MD [address] [facility]    ** Please Note: This note has been constructed using a voice recognition system   **

## 2022-09-16 NOTE — ASSESSMENT & PLAN NOTE
Lab Results   Component Value Date    EGFR 61 09/16/2022    EGFR 53 09/15/2022    EGFR 34 09/14/2022    CREATININE 0 92 09/16/2022    CREATININE 1 02 09/15/2022    CREATININE 1 49 (H) 09/14/2022     · Patient has GFR 34, consistent with CKD3  ·  Patient reported she is unaware of this diagnosis which has never been discussed by any of her previous doctors  · Monitor kidney function, avoid nephrotoxins

## 2022-09-16 NOTE — ASSESSMENT & PLAN NOTE
- Patient presents with generalized abdominal pain with non-bloody diarrhea, nausea, vomiting, and fever   - Patient previously presented 9/6 for similar symptoms and was sent home with ciprofloxacin and metronidazole for likely colitis  Patient reports no symptomatic relief  · CT abdomen/pelvis: "Diffuse progressive colorectal thickening of entire colon  Diffuse prominent pericolonic vascularity of descending colon through rectum," consistent with proctocolitis  · C  diff PCR positive however toxin A + B negative  Likely colonization given clinical correlation of symptoms  · Blood cultures negative at 24 hrs  · Stool enteric baterial panel negative  · Awaiting stool fecal calprotectin     · Discontinue Unasyn 1 5g q12hr   · Continue p o  vancomycin 125 mg b i d  Larisa Guadarrama   · Patient refused Flagyl  · Advanced diet to low-fiber diet  · Monitor stool count  · Continue probiotics Florastor and bentyl  · OT recommends short-term rehab  · Appreciate GI consult recommendations

## 2022-09-16 NOTE — ASSESSMENT & PLAN NOTE
- Patient presents with generalized abdominal pain with non-bloody diarrhea, nausea, vomiting, and fever   - Patient previously presented 9/6 for similar symptoms and was sent home with ciprofloxacin and metronidazole for likely colitis  Patient reports no symptomatic relief  · CT abdomen/pelvis: "Diffuse progressive colorectal thickening of entire colon  Diffuse prominent pericolonic vascularity of descending colon through rectum," consistent with proctocolitis  · C  diff PCR positive however toxin A + B negative  Likely colonization given clinical correlation of symptoms  · Blood cultures negative so far  · Stool enteric baterial panel negative  · Awaiting stool fecal calprotectin     · Discontinue Unasyn 1 5g q12hr given bacteria panel negative  · Continue p o  vancomycin 125 mg b i d    Planning to give 2 weeks  · Patient refused Flagyl  · Advanced diet to low-fiber diet  · Monitor stool count  · Continue probiotics Florastor and bentyl  · Appreciate GI consult recommendations  · Encourage oral hydration

## 2022-09-16 NOTE — PROGRESS NOTES
Progress Note  Misael Kennedy 78 y o  female MRN: [id number]  Unit/Bed#: -01 Encounter: [id number]    Subjective:  Patient is actually reporting improvement today  She is reporting lower abdominal cramping and gas but her diarrhea has decreased significantly  She is trying to eat multiple small things throughout the course of the day  She denies any alarm symptoms  Objective:     Vitals:   Vitals:    09/16/22 0727   BP: 104/50   Pulse: 76   Resp: 16   Temp: 98 °F (36 7 °C)   SpO2: 94%   ,Body mass index is 29 15 kg/m²  Intake/Output Summary (Last 24 hours) at 9/16/2022 1119  Last data filed at 9/16/2022 0901  Gross per 24 hour   Intake 1510 ml   Output --   Net 1510 ml       Physical Exam:     General Appearance: Alert, appears stated age and cooperative  Lungs: Clear to auscultation bilaterally, no rales or rhonchi, no labored breathing/accessory muscle use  Heart: Regular rate and rhythm, S1, S2 normal, no murmur, click, rub or gallop  Abdomen: Soft, non-tender, non-distended; bowel sounds normal; no masses or no organomegaly  Extremities: No cyanosis, edema    Invasive Devices  Report    Peripheral Intravenous Line  Duration           Peripheral IV 09/16/22 Dorsal (posterior); Left Hand <1 day                Lab Results:  Admission on 09/14/2022   Component Date Value    WBC 09/14/2022 22 50 (A)    RBC 09/14/2022 4 49     Hemoglobin 09/14/2022 12 4     Hematocrit 09/14/2022 39 4     MCV 09/14/2022 88     MCH 09/14/2022 27 6     MCHC 09/14/2022 31 5     RDW 09/14/2022 16 0 (A)    MPV 09/14/2022 9 5     Platelets 40/13/7858 724 (A)    nRBC 09/14/2022 0     Neutrophils Relative 09/14/2022 92 (A)    Immat GRANS % 09/14/2022 1     Lymphocytes Relative 09/14/2022 3 (A)    Monocytes Relative 09/14/2022 4     Eosinophils Relative 09/14/2022 0     Basophils Relative 09/14/2022 0     Neutrophils Absolute 09/14/2022 20 51 (A)    Immature Grans Absolute 09/14/2022 0 31 (A)    Lymphocytes Absolute 09/14/2022 0 70     Monocytes Absolute 09/14/2022 0 88     Eosinophils Absolute 09/14/2022 0 02     Basophils Absolute 09/14/2022 0 08     Sodium 09/14/2022 136     Potassium 09/14/2022 3 6     Chloride 09/14/2022 98     CO2 09/14/2022 25     ANION GAP 09/14/2022 13     BUN 09/14/2022 17     Creatinine 09/14/2022 1 49 (A)    Glucose 09/14/2022 163 (A)    Calcium 09/14/2022 8 4     Corrected Calcium 09/14/2022 9 8     AST 09/14/2022 89 (A)    ALT 09/14/2022 122 (A)    Alkaline Phosphatase 09/14/2022 241 (A)    Total Protein 09/14/2022 6 6     Albumin 09/14/2022 2 2 (A)    Total Bilirubin 09/14/2022 0 38     eGFR 09/14/2022 34     Lipase 09/14/2022 28 (A)    LACTIC ACID 09/14/2022 2 3 (A)    Ventricular Rate 09/14/2022 107     Atrial Rate 09/14/2022 107     WY Interval 09/14/2022 138     QRSD Interval 09/14/2022 68     QT Interval 09/14/2022 336     QTC Interval 09/14/2022 448     P Axis 09/14/2022 34     QRS Axis 09/14/2022 53     T Wave Axis 09/14/2022 15     C difficile toxin by PCR 09/14/2022 Positive (A)    C difficile Toxins A+B, * 09/14/2022 Negative     hs TnI 0hr 09/14/2022 8     Protime 09/14/2022 18 3 (A)    INR 09/14/2022 1 55 (A)    PTT 09/14/2022 32     Blood Culture 09/14/2022 No Growth at 48 hrs   Blood Culture 09/14/2022 No Growth at 48 hrs       LACTIC ACID 09/14/2022 1 0     hs TnI 2hr 09/14/2022 8     Delta 2hr hsTnI 09/14/2022 0     Salmonella sp PCR 09/14/2022 None Detected     Shigella sp/Enteroinvasi* 09/14/2022 None Detected     Campylobacter sp (jejuni* 09/14/2022 None Detected     Shiga toxin 1/Shiga toxi* 09/14/2022 None Detected     Fecal Leukocytes 09/14/2022 2+ WBC's (3-10/hpf)     Sed Rate 09/14/2022 60 (A)    CRP 09/14/2022 227 9 (A)    WBC 09/15/2022 18 72 (A)    RBC 09/15/2022 3 42 (A)    Hemoglobin 09/15/2022 9 7 (A)    Hematocrit 09/15/2022 30 0 (A)    MCV 09/15/2022 88     MCH 09/15/2022 28 4     MCHC 09/15/2022 32 3     RDW 09/15/2022 16 2 (A)    MPV 09/15/2022 9 2     Platelets 30/28/2482 651 (A)    nRBC 09/15/2022 0     Neutrophils Relative 09/15/2022 86 (A)    Immat GRANS % 09/15/2022 1     Lymphocytes Relative 09/15/2022 7 (A)    Monocytes Relative 09/15/2022 6     Eosinophils Relative 09/15/2022 0     Basophils Relative 09/15/2022 0     Neutrophils Absolute 09/15/2022 15 97 (A)    Immature Grans Absolute 09/15/2022 0 20     Lymphocytes Absolute 09/15/2022 1 29     Monocytes Absolute 09/15/2022 1 20     Eosinophils Absolute 09/15/2022 0 02     Basophils Absolute 09/15/2022 0 04     Sodium 09/15/2022 140     Potassium 09/15/2022 3 1 (A)    Chloride 09/15/2022 102     CO2 09/15/2022 29     ANION GAP 09/15/2022 9     BUN 09/15/2022 11     Creatinine 09/15/2022 1 02     Glucose 09/15/2022 107     Calcium 09/15/2022 7 4 (A)    Corrected Calcium 09/15/2022 9 4     AST 09/15/2022 38     ALT 09/15/2022 70     Alkaline Phosphatase 09/15/2022 148 (A)    Total Protein 09/15/2022 5 2 (A)    Albumin 09/15/2022 1 5 (A)    Total Bilirubin 09/15/2022 0 19 (A)    eGFR 09/15/2022 53     Magnesium 09/15/2022 2 3     WBC 09/16/2022 11 80 (A)    RBC 09/16/2022 3 77 (A)    Hemoglobin 09/16/2022 10 3 (A)    Hematocrit 09/16/2022 33 1 (A)    MCV 09/16/2022 88     MCH 09/16/2022 27 3     MCHC 09/16/2022 31 1 (A)    RDW 09/16/2022 16 2 (A)    MPV 09/16/2022 9 1     Platelets 84/66/7686 737 (A)    nRBC 09/16/2022 0     Neutrophils Relative 09/16/2022 80 (A)    Immat GRANS % 09/16/2022 1     Lymphocytes Relative 09/16/2022 13 (A)    Monocytes Relative 09/16/2022 5     Eosinophils Relative 09/16/2022 1     Basophils Relative 09/16/2022 0     Neutrophils Absolute 09/16/2022 9 43 (A)    Immature Grans Absolute 09/16/2022 0 09     Lymphocytes Absolute 09/16/2022 1 52     Monocytes Absolute 09/16/2022 0 62     Eosinophils Absolute 09/16/2022 0 11     Basophils Absolute 09/16/2022 0 03     Sodium 09/16/2022 135     Potassium 09/16/2022 3 1 (A)    Chloride 09/16/2022 100     CO2 09/16/2022 28     ANION GAP 09/16/2022 7     BUN 09/16/2022 7     Creatinine 09/16/2022 0 92     Glucose 09/16/2022 92     Calcium 09/16/2022 7 5 (A)    Corrected Calcium 09/16/2022 9 3     AST 09/16/2022 60 (A)    ALT 09/16/2022 82 (A)    Alkaline Phosphatase 09/16/2022 144 (A)    Total Protein 09/16/2022 5 3 (A)    Albumin 09/16/2022 1 7 (A)    Total Bilirubin 09/16/2022 0 27     eGFR 09/16/2022 61        Imaging Studies:   I have personally reviewed pertinent imaging studies  CT abdomen pelvis with contrast    Result Date: 9/14/2022  Narrative: CT ABDOMEN AND PELVIS WITH IV CONTRAST INDICATION:   lower abd pain  COMPARISON:  CT abdomen and pelvis 9/6/2022 and CT abdomenn 9/13/2019 TECHNIQUE:  CT examination of the abdomen and pelvis was performed  Axial, sagittal, and coronal 2D reformatted images were created from the source data and submitted for interpretation  Radiation dose length product (DLP) for this visit:  638 mGy-cm   This examination, like all CT scans performed in the [facility], was performed utilizing techniques to minimize radiation dose exposure, including the use of iterative reconstruction and automated exposure control  IV Contrast:  100 mL of iohexol (OMNIPAQUE)  350 Enteric Contrast:  Enteric contrast was not administered  FINDINGS: ABDOMEN LOWER CHEST:  COPD  Coronary artery calcification  LIVER/BILIARY TREE:  One or more subcentimeter sharply circumscribed low-density hepatic lesion(s) are noted, too small to accurately characterize, but statistically most likely to represent subcentimeter hepatic cysts  No suspicious solid hepatic lesion is identified  Hepatic contours are normal  Diffuse fatty infiltration of the liver  No biliary dilatation  GALLBLADDER:  No calcified gallstones  No pericholecystic inflammatory change  SPLEEN:  Unremarkable   PANCREAS:  Stable 1 8 x 1 6 cm soft tissue nodule at the posterior inferior pancreatic head image 38 series 2 unchanged since September 2019 when measured in the same fashion  ADRENAL GLANDS:  Unremarkable  KIDNEYS/URETERS: One or more sharply circumscribed subcentimeter renal hypodensities are noted  These lesions are too small to accurately characterize, but are statistically most likely to represent benign cortical renal cyst(s)  According to the guidelines published in the Marguerite Shin Paper of the ACR Incidental Findings Committee (Radiology 2010), no further workup of these lesions is recommended  Left renal simple cysts, the larger of which measures 3 cm  No perinephric collection  STOMACH AND BOWEL:  Diffuse progressive colorectal thickening now involving the entire colon  Diffuse prominent pericolonic vascularity more prominently involving the descending colon through rectum  Minimal fluid is present in the descending colon through rectum  No pneumatosis coli  Colonic diverticulosis without diverticulitis  Nondistended stomach limits its evaluation  Grossly unremarkable small bowel  No bowel obstruction  APPENDIX:  No findings to suggest appendicitis  ABDOMINOPELVIC CAVITY:  Trace free fluid in the left paracolic gutter and dependent pelvis  No loculated fluid collection  No pneumoperitoneum  No lymphadenopathy  VESSELS:  Aortoiliac calcification  No aneurysm  PELVIS REPRODUCTIVE ORGANS:  New trace fluid within the uterine endometrial cavity likely within normal limits  Otherwise unremarkable for patient's age  URINARY BLADDER:  Unremarkable  ABDOMINAL WALL/INGUINAL REGIONS:  Stable small fat-containing umbilical hernia  OSSEOUS STRUCTURES:  No acute fracture or osseous destructive lesion identified  Degenerative changes of the spine  Impression: Diffuse progressive nonspecific uncomplicated proctocolitis  Incidental finding of new trace fluid within the uterine endometrial cavity likely within normal limits   This could be followed up with nonemergent pelvic ultrasound if clinically warranted  Additional stable chronic findings as above  This study demonstrates a significant  finding and was documented as such in [facility] for liaison and referring practitioner notification  Workstation performed: [id number]     CT abdomen pelvis with contrast    Result Date: 9/6/2022  Narrative: CT ABDOMEN AND PELVIS WITH IV CONTRAST INDICATION:   diverticulitis, rule out abscess  COMPARISON:  None  TECHNIQUE:  CT examination of the abdomen and pelvis was performed  Axial, sagittal, and coronal 2D reformatted images were created from the source data and submitted for interpretation  Radiation dose length product (DLP) for this visit:  679 mGy-cm   This examination, like all CT scans performed in the [facility], was performed utilizing techniques to minimize radiation dose exposure, including the use of iterative reconstruction and automated exposure control  IV Contrast:  80 mL of iohexol (OMNIPAQUE) Enteric Contrast:  Enteric contrast was not administered  FINDINGS: ABDOMEN LOWER CHEST:  No clinically significant abnormality identified in the visualized lower chest  LIVER/BILIARY TREE:  Liver is diffusely decreased in density consistent with fatty change  No CT evidence of suspicious hepatic mass  Subcentimeter hypodensities too small to characterize but likely represent cysts  Normal hepatic contours  No biliary dilatation  GALLBLADDER:  No calcified gallstones  No pericholecystic inflammatory change  SPLEEN:  Unremarkable  PANCREAS:  Unremarkable  ADRENAL GLANDS:  Unremarkable  KIDNEYS/URETERS:  One or more simple renal cyst(s) is noted  Otherwise unremarkable kidneys  No hydronephrosis  STOMACH AND BOWEL:  Wall thickening and edema with pericolonic inflammatory change involving the rectosigmoid colon  Several associated diverticulosis noted  APPENDIX:  No findings to suggest appendicitis  ABDOMINOPELVIC CAVITY:  No ascites    No pneumoperitoneum  No lymphadenopathy  VESSELS:  Unremarkable for patient's age  PELVIS REPRODUCTIVE ORGANS:  Unremarkable for patient's age  URINARY BLADDER:  Unremarkable  ABDOMINAL WALL/INGUINAL REGIONS:  Unremarkable  OSSEOUS STRUCTURES:  No acute fracture or destructive osseous lesion  Impression: Wall thickening and edema throughout the rectosigmoid colon which may either be secondary to uncomplicated diverticulitis or reflect proctocolitis of infectious or inflammatory etiology  Workstation performed: [id number]         Assessment & Plan  C diff colitis  Abdominal pain  -Patient initially presents the [facility] yesterday with a chief complaint of abdominal pain and diarrhea   -C diff toxin by PCR positive, C diff toxin EIA negative   -Stool for enteric pathogens negative  -Continue vancomycin 125 mg q 6 hours   -Patient currently refusing to take metronidazole  -Continue Florastor   -Will start patient on Pepcid and simethicone   -Continue to monitor stool output   -Will advance diet to a low-fiber diet   -Serial abdominal exams  -Bentyl for pain  -Fecal calprotectin pending   -Patient did have an MARTINA on admission; now improved  -Patient's white blood cell count trending downward to 11 8  Patient's hemoglobin level 10 3  There is no evidence of GI bleeding      Transaminitis  -Patient's liver enzymes initially elevated on admission   -9/15/22 AST 38, ALT 70, total bilirubin 0 19  -9/16/22 AST 60, ALT 2, total bilirubin 0 27  -Will continue to trend   -No plans for any further liver workup at this time; this can be evaluated in the outpatient setting      Patient will need close outpatient GI follow-up  Patient will be seen examined by Dr Rojelio Bess discharge in the next 24-48 hours  GI will be signing off please re-consult victoriano Joiner PA-C  9/16/2022,11:19 AM

## 2022-09-16 NOTE — CASE MANAGEMENT
Case Management Assessment & Discharge Planning Note    Patient name Angelica Ackerman  Location [address] 320/-01 MRN [id number]  : 1945 Date 2022       Current Admission Date: 2022  Current Admission Diagnosis:Proctocolitis   Patient Active Problem List    Diagnosis Date Noted    Proctocolitis 2022    Transaminitis 2022    Diarrhea 2022    [facility] 20[facility]    Diverticulitis 2022    Oral candidiasis 2022    Cellulitis 2022    Continuous opioid dependence ([place] [address] ) 2022    Malignant neoplasm of overlapping sites of right breast in female, estrogen receptor negative ([facility] ) 2022    COPD (chronic obstructive pulmonary disease) ([place] [address] )     Prediabetes 2021    Vitamin D deficiency 2021    Acute right ankle pain 2021    Closed avulsion fracture of lateral malleolus of right fibula 2021    Seborrheic keratoses 2020    BMI 34 0-34 9,adult 2019    Stage 3 chronic kidney disease ([facility] ) 2019    Pancreatic mass 2019    Chronic sinusitis 2018    PVD (peripheral vascular disease) ([facility] ) 2018    Carotid artery plaque, bilateral 2017    Restless leg syndrome 2016    Aortoiliac occlusive disease ([facility] ) 2015    Subclavian artery stenosis, left ([facility] ) 2015    Centrilobular emphysema ([facility] ) 2015    Anxiety 2013    Osteoporosis 2013    Hyperlipidemia 2013      LOS (days): 2  Geometric Mean LOS (GMLOS) (days): 2 60  Days to GMLOS:0 5     OBJECTIVE:    Risk of Unplanned Readmission Score: 34 27      Current admission status: Inpatient     Preferred Pharmacy:   [id number] - [address]  [facility] [address]  Phone: [phone] Fax: [phone]    Primary Care Provider: Faizan Gresham MD    Primary Insurance: MEDICARE  Secondary Insurance: Tania CROSS    ASSESSMENT:  [address], [facility] Representative - Son   Primary Phone: [phone] (Mobile)               Advance Directives  Does patient have a [address]?: Yes  Does patient have Advance Directives?: Yes  Advance Directives: Living will    Readmission Root Cause  30 Day Readmission: No    Patient Information  Admitted from[de-identified] Home  Mental Status: Alert  During Assessment patient was accompanied by: Not accompanied during assessment  Assessment information provided by[de-identified] Patient  Primary Caregiver: Self  Support Systems: Self, Daughter  [place] of Residence: [place] [address] do you live in?: [place]  Type of Current Residence: [employer]  Living Arrangements: Lives w/ Extended Family  Is patient a ?: No    Activities of Daily Living Prior to Admission  Functional Status: Assistance  Completes ADLs independently?: No  Level of ADL dependence: Assistance  Ambulates independently?: No  Level of ambulatory dependence: Assistance  Does patient use assisted devices?: Yes  Assisted Devices (DME) used: Stephan Modonal  Does patient currently own DME?: Yes  What DME does the patient currently own?: Stephan Modonal  Does patient have a history of Outpatient Therapy (PT/OT)?: No  Does the patient have a history of Short-Term Rehab?: Yes ([address])  Does patient have a history of HHC?: No  Does patient currently have [facility]?: No    The patient also has home O2 2L NC, provided by [employer]  The patient stated she has a portable at the bedside for discharge      Patient Information Continued  Does patient have prescription coverage?: Yes  Does patient receive dialysis treatments?: No  Does patient have a history of substance abuse?: No  Does patient have a history of Mental Health Diagnosis?: No    Means of Transportation  Means of Transport to [facility][de-identified] Public Transportation - Bus    DISCHARGE DETAILS:    Discharge planning discussed with[de-identified] The patient  Freedom of Choice: Yes  Comments - Freedom of Choice: CM met with the patient at the bedside to complete the initial assessment  The patient was admitted to the hospital for Proctoclitis  The patient is oriented x4 able to make needs known to staff  The patients demographic sheet was verified  The patient lives in a 1 story house with family and requires assistance completing her ADLs and IADLs  The patient has received her COVID Vaccines x 2 doses and 1 booster  The patients discharge disposition will be STR, per PT and the patient agree's  Per the patient she was just discharge from [facility] rehab and is agreeable to return for rehab  The CM will submit STR referrals, determination is pending  CM will continue to follow    CM contacted family/caregiver?: No- see comments (the patient is independent)     Did patient/caregiver verbalize understanding of patient care needs?: Yes       Contacts  Patient Contacts: Abram Corona (son)  Relationship to Patient[de-identified] Family  Contact Method: Phone  Phone Number: [phone]  Reason/Outcome: Continuity of Care, Emergency [address]         Is the patient interested in [facility] at discharge?: No    DME Referral Provided  Referral made for DME?: No    Treatment Team Recommendation: SNF  Discharge Destination Plan[de-identified] SNF

## 2022-09-16 NOTE — ASSESSMENT & PLAN NOTE
· Patient smoked for 57 years and quit at 73 y/o  · COPD appears well controlled  · Continue chronic inhaler regimen

## 2022-09-16 NOTE — PROGRESS NOTES
[facility]  Progress Note Rod Vega 1945, 78 y o  female MRN: [id number]  Unit/Bed#: -Braden Encounter: [id number]  Primary Care Provider: Angelic Nogueira MD   Date and time admitted to hospital: 9/14/2022  4:51 AM    Transaminitis  Assessment & Plan  · Patient has elevated transaminases, AST 89, ,   · Etiology unclear at this time, ALP may be related to patient's ongoing cancer  AST and ALT could be related to infectious process  · Monitor CMP    Continuous opioid dependence (HCC)  Assessment & Plan  ·  Patient currently on dilaudid 0 2 mg q4hrs p r n and percocet q6hr prn for pain given acute worsening due to colitis    Malignant neoplasm of overlapping sites of right breast in female, estrogen receptor negative ([facility] )  Assessment & Plan  - Patient reports stage 1 breast cancer, with one dose of chemotherapy in June 2022   - Has denied further chemotherapeutic regimens to focus on quality of life    COPD (chronic obstructive pulmonary disease) ([facility] )  Assessment & Plan  · Patient smoked for 57 years and quit at 71 y/o  · COPD appears well controlled  · Continue chronic inhaler regimen    Stage 3 chronic kidney disease ([facility] )  Assessment & Plan  Lab Results   Component Value Date    EGFR 61 09/16/2022    EGFR 53 09/15/2022    EGFR 34 09/14/2022    CREATININE 0 92 09/16/2022    CREATININE 1 02 09/15/2022    CREATININE 1 49 (H) 09/14/2022     · Patient has GFR 34, consistent with CKD3  ·  Patient reported she is unaware of this diagnosis which has never been discussed by any of her previous doctors  · Monitor kidney function, avoid nephrotoxins    Hyperlipidemia  Assessment & Plan  ·  Home medications include rosuvastatin 20 mg  · Continue atorvastatin which is formulary substitute    Carotid artery plaque, bilateral  Assessment & Plan  · No acute concerns at this time  · Continue aspirin and statin      Aortoiliac occlusive disease ([facility] )  Assessment & Plan  · No acute concerns at this time  · Continue statin    Anxiety  Assessment & Plan  · Currently normal mood, behavior, and thought content  · Monitor    * Proctocolitis  Assessment & Plan  - Patient presents with generalized abdominal pain with non-bloody diarrhea, nausea, vomiting, and fever   - Patient previously presented 9/6 for similar symptoms and was sent home with ciprofloxacin and metronidazole for likely colitis  Patient reports no symptomatic relief  · CT abdomen/pelvis: "Diffuse progressive colorectal thickening of entire colon  Diffuse prominent pericolonic vascularity of descending colon through rectum," consistent with proctocolitis  · C  diff PCR positive however toxin A + B negative  Likely colonization given clinical correlation of symptoms  · Blood cultures negative at 24 hrs  · Stool enteric baterial panel negative  · Awaiting stool fecal calprotectin     · Discontinue Unasyn 1 5g q12hr   · Continue p o  vancomycin 125 mg b i d  Bimal Okeefe · Patient refused Flagyl  · Advanced diet to low-fiber diet  · Monitor stool count  · Continue probiotics Florastor and bentyl  · OT recommends short-term rehab  · Appreciate GI consult recommendations          VTE Pharmacologic Prophylaxis:     Heparin    Mechanical VTE Prophylaxis in Place: Yes    Patient Centered Rounds: I have performed bedside rounds with nursing staff today  Discussions with Specialists or Other Care Team Provider: Gastroenterology    Education and Discussions with Family / Patient:     Current Length of Stay: 2 day(s)    Current Patient Status: Inpatient     Discharge Plan / Estimated Discharge Date: Anticipate discharge in 48 hrs to Home or short-term rehab    Code Status: Level 3 - DNAR and DNI      Subjective: Today, patient is resting and feeling better  She reports that her most recent bowel movement was this morning around 5:30 AM, which was lighter in color and of a different consistency than her previous episodes   She denies any abdominal pain or nausea, but does endorse some bloating and says her abdomen feels more firm and distended than usual  Denies any chest pain, SOB, or other complaints at this time  Objective:     Vitals:   Temp (24hrs), Av 6 °F (37 °C), Min:98 °F (36 7 °C), Max:99 3 °F (37 4 °C)    Temp:  [98 °F (36 7 °C)-99 3 °F (37 4 °C)] 98 °F (36 7 °C)  HR:  [76-93] 76  Resp:  [16-17] 16  BP: ()/(50-55) 104/50  SpO2:  [94 %-95 %] 94 %  Body mass index is 29 15 kg/m²  Input and Output Summary (last 24 hours): Intake/Output Summary (Last 24 hours) at 2022 1157  Last data filed at 2022 0901  Gross per 24 hour   Intake 1510 ml   Output --   Net 1510 ml       Physical Exam:     Physical Exam  Constitutional:       Appearance: Normal appearance  HENT:      Head: Normocephalic and atraumatic  Cardiovascular:      Rate and Rhythm: Normal rate and regular rhythm  Heart sounds: Normal heart sounds  No murmur heard  Pulmonary:      Effort: Pulmonary effort is normal  No respiratory distress  Breath sounds: Normal breath sounds  No wheezing, rhonchi or rales  Abdominal:      General: Bowel sounds are normal  There is distension  Tenderness: There is no abdominal tenderness  There is no guarding or rebound  Skin:     General: Skin is warm and dry  Neurological:      Mental Status: She is alert and oriented to person, place, and time            Additional Data:     Labs:  Results from last 7 days   Lab Units 22  0521   WBC Thousand/uL 11 80*   HEMOGLOBIN g/dL 10 3*   HEMATOCRIT % 33 1*   PLATELETS Thousands/uL 737*   NEUTROS PCT % 80*   LYMPHS PCT % 13*   MONOS PCT % 5   EOS PCT % 1     Results from last 7 days   Lab Units 22  0521   SODIUM mmol/L 135   POTASSIUM mmol/L 3 1*   CHLORIDE mmol/L 100   CO2 mmol/L 28   BUN mg/dL 7   CREATININE mg/dL 0 92   ANION GAP mmol/L 7   CALCIUM mg/dL 7 5*   ALBUMIN g/dL 1 7*   TOTAL BILIRUBIN mg/dL 0 27   ALK PHOS U/L 144*   ALT U/L 82* AST U/L 60*   GLUCOSE RANDOM mg/dL 92     Results from last 7 days   Lab Units 09/14/22  0600   INR  1 55*             Results from last 7 days   Lab Units 09/14/22  0755 09/14/22  0513   LACTIC ACID mmol/L 1 0 2 3*       Imaging: Reviewed radiology reports from this admission including: abdominal/pelvic CT scan    Recent Cultures (last 7 days):     Results from last 7 days   Lab Units 09/14/22  1546 09/14/22  0600   BLOOD CULTURE   --  No Growth at 48 hrs  No Growth at 48 hrs  C DIFF TOXIN B BY PCR  Positive*  --        Lines/Drains:  Invasive Devices  Report    Peripheral Intravenous Line  Duration           Peripheral IV 09/16/22 Dorsal (posterior); Left Hand <1 day                Telemetry:        Last 24 Hours Medication List:   Current Facility-Administered Medications   Medication Dose Route Frequency Provider Last Rate    acetaminophen  650 mg Oral Q6H PRN Brittany Luong MD      albuterol  2 puff Inhalation Q6H PRN Brittany Luong MD      ALPRAZolam  0 5 mg Oral BID PRN Brittany Luong MD      aspirin  81 mg Oral Daily Brittany Luong MD      atorvastatin  80 mg Oral Daily With [id number] MD Zeeshan      dicyclomine  10 mg Oral TID [facility] Nay Vargas PA-C      famotidine  20 mg Oral Daily Nay Vargas PA-C      fluticasone-vilanterol  1 puff Inhalation Daily Brittany Luong MD      gabapentin  300 mg Oral BID Brittany Luong MD      gabapentin  400 mg Oral HS Brittany Luong MD      heparin (porcine)  5,000 Units Subcutaneous [facility] Brittany Luong MD      HYDROmorphone  0 2 mg Intravenous Q4H PRN Brittany Luong MD      multi-electrolyte  75 mL/hr Intravenous Continuous Venia Quin Barrios MD 75 mL/hr (09/16/22 0900)    ondansetron  4 mg Intravenous Q6H PRN Brittany Luong MD      oxyCODONE-acetaminophen  1 tablet Oral Q6H PRN Brittany Luong MD      saccharomyces boulardii  250 mg Oral BID Stephon Butcher PA-C      simethicone  40 mg Oral Q6H PRN Stephon Butcher PA-C      vancomycin  125 mg Oral Q6H [address] Abel Kyle MD          Today, Patient Was Seen By: Monie Winchester    ** Please Note: This note has been constructed using a voice recognition system   **

## 2022-09-16 NOTE — ASSESSMENT & PLAN NOTE
Lab Results   Component Value Date    EGFR 61 09/16/2022    EGFR 53 09/15/2022    EGFR 34 09/14/2022    CREATININE 0 92 09/16/2022    CREATININE 1 02 09/15/2022    CREATININE 1 49 (H) 09/14/2022     · Baseline Cr seems to be around 1 1 -1 2   · On admission, creatinine 1 49  Improved with IV fluid hydration     · Continue gentle IV hydration given diarrhea and poor oral intake  · Monitor kidney function, avoid nephrotoxins

## 2022-09-17 PROBLEM — R53.1 GENERALIZED WEAKNESS: Status: ACTIVE | Noted: 2022-09-17

## 2022-09-17 PROBLEM — A04.72 C. DIFFICILE COLITIS: Status: ACTIVE | Noted: 2022-09-17

## 2022-09-17 LAB
ALBUMIN SERPL BCP-MCNC: 1.6 G/DL (ref 3.5–5)
ALP SERPL-CCNC: 124 U/L (ref 46–116)
ALT SERPL W P-5'-P-CCNC: 98 U/L (ref 12–78)
ANION GAP SERPL CALCULATED.3IONS-SCNC: 7 MMOL/L (ref 4–13)
AST SERPL W P-5'-P-CCNC: 79 U/L (ref 5–45)
BASOPHILS # BLD AUTO: 0.03 THOUSANDS/ΜL (ref 0–0.1)
BASOPHILS NFR BLD AUTO: 1 % (ref 0–1)
BILIRUB SERPL-MCNC: 0.26 MG/DL (ref 0.2–1)
BUN SERPL-MCNC: 6 MG/DL (ref 5–25)
CALCIUM ALBUM COR SERPL-MCNC: 9.7 MG/DL (ref 8.3–10.1)
CALCIUM SERPL-MCNC: 7.8 MG/DL (ref 8.3–10.1)
CALPROTECTIN STL-MCNT: ABNORMAL UG/G (ref 0–120)
CHLORIDE SERPL-SCNC: 106 MMOL/L (ref 96–108)
CO2 SERPL-SCNC: 27 MMOL/L (ref 21–32)
CREAT SERPL-MCNC: 0.78 MG/DL (ref 0.6–1.3)
EOSINOPHIL # BLD AUTO: 0.14 THOUSAND/ΜL (ref 0–0.61)
EOSINOPHIL NFR BLD AUTO: 2 % (ref 0–6)
ERYTHROCYTE [DISTWIDTH] IN BLOOD BY AUTOMATED COUNT: 16.2 % (ref 11.6–15.1)
GFR SERPL CREATININE-BSD FRML MDRD: 74 ML/MIN/1.73SQ M
GLUCOSE SERPL-MCNC: 91 MG/DL (ref 65–140)
HCT VFR BLD AUTO: 30.8 % (ref 34.8–46.1)
HGB BLD-MCNC: 9.6 G/DL (ref 11.5–15.4)
IMM GRANULOCYTES # BLD AUTO: 0.08 THOUSAND/UL (ref 0–0.2)
IMM GRANULOCYTES NFR BLD AUTO: 1 % (ref 0–2)
LYMPHOCYTES # BLD AUTO: 1.21 THOUSANDS/ΜL (ref 0.6–4.47)
LYMPHOCYTES NFR BLD AUTO: 19 % (ref 14–44)
MCH RBC QN AUTO: 27.4 PG (ref 26.8–34.3)
MCHC RBC AUTO-ENTMCNC: 31.2 G/DL (ref 31.4–37.4)
MCV RBC AUTO: 88 FL (ref 82–98)
MONOCYTES # BLD AUTO: 0.39 THOUSAND/ΜL (ref 0.17–1.22)
MONOCYTES NFR BLD AUTO: 6 % (ref 4–12)
NEUTROPHILS # BLD AUTO: 4.51 THOUSANDS/ΜL (ref 1.85–7.62)
NEUTS SEG NFR BLD AUTO: 71 % (ref 43–75)
NRBC BLD AUTO-RTO: 0 /100 WBCS
PLATELET # BLD AUTO: 759 THOUSANDS/UL (ref 149–390)
PMV BLD AUTO: 9.4 FL (ref 8.9–12.7)
POTASSIUM SERPL-SCNC: 3.9 MMOL/L (ref 3.5–5.3)
PROT SERPL-MCNC: 5.1 G/DL (ref 6.4–8.4)
RBC # BLD AUTO: 3.51 MILLION/UL (ref 3.81–5.12)
SODIUM SERPL-SCNC: 140 MMOL/L (ref 135–147)
WBC # BLD AUTO: 6.36 THOUSAND/UL (ref 4.31–10.16)

## 2022-09-17 PROCEDURE — 80053 COMPREHEN METABOLIC PANEL: CPT | Performed by: INTERNAL MEDICINE

## 2022-09-17 PROCEDURE — 85025 COMPLETE CBC W/AUTO DIFF WBC: CPT | Performed by: INTERNAL MEDICINE

## 2022-09-17 PROCEDURE — 99233 SBSQ HOSP IP/OBS HIGH 50: CPT | Performed by: INTERNAL MEDICINE

## 2022-09-17 RX ORDER — ENOXAPARIN SODIUM 100 MG/ML
40 INJECTION SUBCUTANEOUS
Status: DISCONTINUED | OUTPATIENT
Start: 2022-09-17 | End: 2022-09-19 | Stop reason: HOSPADM

## 2022-09-17 RX ADMIN — GABAPENTIN 300 MG: 300 CAPSULE ORAL at 17:13

## 2022-09-17 RX ADMIN — Medication 125 MG: at 07:00

## 2022-09-17 RX ADMIN — HEPARIN SODIUM 5000 UNITS: 5000 INJECTION INTRAVENOUS; SUBCUTANEOUS at 07:00

## 2022-09-17 RX ADMIN — ATORVASTATIN CALCIUM 80 MG: 40 TABLET, FILM COATED ORAL at 17:13

## 2022-09-17 RX ADMIN — Medication 250 MG: at 17:13

## 2022-09-17 RX ADMIN — Medication 125 MG: at 12:50

## 2022-09-17 RX ADMIN — Medication 250 MG: at 09:36

## 2022-09-17 RX ADMIN — ENOXAPARIN SODIUM 40 MG: 40 INJECTION SUBCUTANEOUS at 12:50

## 2022-09-17 RX ADMIN — FLUTICASONE FUROATE AND VILANTEROL TRIFENATATE 1 PUFF: 100; 25 POWDER RESPIRATORY (INHALATION) at 09:38

## 2022-09-17 RX ADMIN — DICYCLOMINE HYDROCHLORIDE 10 MG: 10 CAPSULE ORAL at 07:00

## 2022-09-17 RX ADMIN — DICYCLOMINE HYDROCHLORIDE 10 MG: 10 CAPSULE ORAL at 12:50

## 2022-09-17 RX ADMIN — Medication 125 MG: at 23:44

## 2022-09-17 RX ADMIN — Medication 125 MG: at 00:32

## 2022-09-17 RX ADMIN — GABAPENTIN 300 MG: 300 CAPSULE ORAL at 09:36

## 2022-09-17 RX ADMIN — DICYCLOMINE HYDROCHLORIDE 10 MG: 10 CAPSULE ORAL at 17:15

## 2022-09-17 RX ADMIN — GABAPENTIN 400 MG: 400 CAPSULE ORAL at 22:35

## 2022-09-17 RX ADMIN — ONDANSETRON 4 MG: 2 INJECTION INTRAMUSCULAR; INTRAVENOUS at 19:48

## 2022-09-17 RX ADMIN — Medication 125 MG: at 17:15

## 2022-09-17 RX ADMIN — Medication 40 MG: at 19:49

## 2022-09-17 RX ADMIN — FAMOTIDINE 20 MG: 20 TABLET ORAL at 09:36

## 2022-09-17 RX ADMIN — SODIUM CHLORIDE, SODIUM GLUCONATE, SODIUM ACETATE, POTASSIUM CHLORIDE, MAGNESIUM CHLORIDE, SODIUM PHOSPHATE, DIBASIC, AND POTASSIUM PHOSPHATE 75 ML/HR: .53; .5; .37; .037; .03; .012; .00082 INJECTION, SOLUTION INTRAVENOUS at 00:31

## 2022-09-17 RX ADMIN — ASPIRIN 81 MG 81 MG: 81 TABLET ORAL at 09:36

## 2022-09-17 NOTE — ASSESSMENT & PLAN NOTE
- Patient presents with generalized abdominal pain with non-bloody diarrhea, nausea, vomiting, and fever   - Patient previously presented 9/6 for similar symptoms and was sent home with ciprofloxacin and metronidazole for likely colitis  Patient reports no symptomatic relief  · CT abdomen/pelvis: "Diffuse progressive colorectal thickening of entire colon  Diffuse prominent pericolonic vascularity of descending colon through rectum," consistent with proctocolitis  · C  diff PCR positive however toxin A + B negative  Likely colonization given clinical correlation of symptoms  · Blood cultures negative so far  · Stool enteric baterial panel negative  · Stool fecal calprotectin: 46635  · Discontinue Unasyn 1 5g q12hr given bacteria panel negative    Plan:   · Continue p o  vancomycin 125 mg b i d    Planning to give 2 weeks  · Patient refused Flagyl  · Advanced diet to low-fiber diet  · Monitor stool count  · Continue probiotics Florastor and bentyl  · Appreciate GI consult recommendations  · Encourage oral hydration

## 2022-09-17 NOTE — ASSESSMENT & PLAN NOTE
Lab Results   Component Value Date    ALT 98 (H) 09/17/2022    AST 79 (H) 09/17/2022    ALKPHOS 124 (H) 09/17/2022   ·   ·   · Continue to trend   · Mild transaminitis   · No further work up at this time per GI

## 2022-09-17 NOTE — PLAN OF CARE
Problem: INFECTION - ADULT  Goal: Absence or prevention of progression during hospitalization  Description: INTERVENTIONS:  - Assess and monitor for signs and symptoms of infection  - Monitor lab/diagnostic results  - Monitor all insertion sites, i e  indwelling lines, tubes, and drains  - Monitor endotracheal if appropriate and nasal secretions for changes in amount and color  - [place] appropriate cooling/warming therapies per order  - Administer medications as ordered  - Instruct and encourage patient and family to use good hand hygiene technique  - Identify and instruct in appropriate isolation precautions for identified infection/condition  Outcome: Progressing    Problem: Potential for Falls  Goal: Patient will remain free of falls  Description: INTERVENTIONS:  - Educate patient/family on patient safety including physical limitations  - Instruct patient to call for assistance with activity   - Consult OT/PT to assist with strengthening/mobility   - Keep Call bell within reach  - Keep bed low and locked with side rails adjusted as appropriate  - Keep care items and personal belongings within reach  - Initiate and maintain comfort rounds  - Make Fall Risk Sign visible to staff  - Offer Toileting every 2 Hours, in advance of need  - Initiate/Maintain bed alarm  - Apply yellow socks and bracelet for high fall risk patients  - Consider moving patient to room near nurses station  Outcome: Progressing

## 2022-09-17 NOTE — PROGRESS NOTES
[address] [facility]  Progress Note Jessie Avalos 1945, 78 y o  female MRN: [id number]  Unit/Bed#: -Braden Encounter: [id number]  Primary Care Provider: Fabiola Betts MD   Date and time admitted to hospital: 9/14/2022  4:51 AM    * Proctocolitis  Assessment & Plan  - Patient presents with generalized abdominal pain with non-bloody diarrhea, nausea, vomiting, and fever   - Patient previously presented 9/6 for similar symptoms and was sent home with ciprofloxacin and metronidazole for likely colitis  Patient reports no symptomatic relief  · CT abdomen/pelvis: "Diffuse progressive colorectal thickening of entire colon  Diffuse prominent pericolonic vascularity of descending colon through rectum," consistent with proctocolitis  · C  diff PCR positive however toxin A + B negative  Likely colonization given clinical correlation of symptoms  · Blood cultures negative so far  · Stool enteric baterial panel negative  · Stool fecal calprotectin: 99530  · Discontinue Unasyn 1 5g q12hr given bacteria panel negative    Plan:   · Continue p o  vancomycin 125 mg b i d  Planning to give 2 weeks  · Patient refused Flagyl  · Advanced diet to low-fiber diet  · Monitor stool count  · Continue probiotics Florastor and bentyl  · Appreciate GI consult recommendations  · Encourage oral hydration    C  difficile colitis  Assessment & Plan  · Presented with profuse foul smelling watery diarrhea  · C diff positive  · She states she does not like to drink water  She drinks only juice and sweet tea  Encourage to drink water only and avoid sugary juice  · Significantly improved with oral vancomycin  · Continue oral vanco for 2 weeks    Generalized weakness  Assessment & Plan  · PT/OT recommends rehab  · Patient is agreeable  CM is working on it       Transaminitis  Assessment & Plan  Lab Results   Component Value Date    ALT 98 (H) 09/17/2022    AST 79 (H) 09/17/2022    ALKPHOS 124 (H) 09/17/2022 ·   ·   · Continue to trend   · Mild transaminitis   · No further work up at this time per GI  Continuous opioid dependence ([facility] )  Assessment & Plan  · Patient currently on dilaudid 0 2 mg q4hrs p r n and percocet q6hr prn for pain given acute worsening due to colitis    Malignant neoplasm of overlapping sites of right breast in female, estrogen receptor negative ([place] [address] )  Assessment & Plan  - Patient reports stage 1 breast cancer, with one dose of chemotherapy in June 2022   - Has denied further chemotherapeutic regimens to focus on quality of life    COPD (chronic obstructive pulmonary disease) ([place] [address] )  Assessment & Plan  · Patient smoked for 57 years and quit at 73 y/o  · COPD appears well controlled  · Continue chronic inhaler regimen    Stage 3 chronic kidney disease [facility])  Assessment & Plan  Lab Results   Component Value Date    EGFR 74 09/17/2022    EGFR 61 09/16/2022    EGFR 53 09/15/2022    CREATININE 0 78 09/17/2022    CREATININE 0 92 09/16/2022    CREATININE 1 02 09/15/2022     · Baseline Cr seems to be around 1 1 -1 2   · On admission, creatinine 1 49  Improved with IV fluid hydration  · Encourage oral hydration  · Monitor kidney function, avoid nephrotoxins    Hyperlipidemia  Assessment & Plan  ·  Home medications include rosuvastatin 20 mg  · Continue atorvastatin which is formulary substitute    Carotid artery plaque, bilateral  Assessment & Plan  · No acute concerns at this time  · Continue aspirin and statin  Aortoiliac occlusive disease ([place] [address] )  Assessment & Plan  · No acute concerns at this time  · Continue statin    Anxiety  Assessment & Plan  · Stable  · Continue home xanax 0 5 mg bid prn  She needed one dose on 9/16  TE Pharmacologic Prophylaxis: Enoxaparin (Lovenox)    Patient Centered Rounds: I have performed bedside rounds with nursing staff today  Discussions with Specialists or Other Care Team Provider: yes  Education and Discussions with Family / Patient: Patient   Discussed with the son Jenny Stewart regarding rehab  Patient and family on board for STR  CM is working on it  Current Length of Stay: 3 day(s)  Current Patient Status: Inpatient     Certification Statement: The patient will continue to require additional inpatient hospital stay due to Proctocolitis  Discharge Plan / Estimated Discharge Date:  Pending rehab    Code Status: Level 3 - DNAR and DNI  ______________________________________________________________________________    Subjective:   Patient seen and examined by me  Patient reports her diarrhea is significantly improved  Patient reports she started to have formed stool this morning for the 1st time  No nausea, vomiting  No fever, chills  Patient reports she is tolerating diet  She states she does not like to drink water  She drinks only juice and sweet tea  Encourage to drink water only and avoid sugary juice  Objective:   Vitals: Blood pressure 122/58, pulse 66, temperature 97 9 °F (36 6 °C), resp  rate 18, height 5' (1 524 m), weight 67 7 kg (149 lb 4 oz), SpO2 97 %, not currently breastfeeding  Physical Exam  Vitals and nursing note reviewed  Constitutional:       General: She is not in acute distress  Appearance: She is well-developed  She is not toxic-appearing or diaphoretic  HENT:      Head: Normocephalic and atraumatic  Eyes:      General: No scleral icterus  Conjunctiva/sclera: Conjunctivae normal    Cardiovascular:      Rate and Rhythm: Normal rate and regular rhythm  Pulses: Normal pulses  Heart sounds: Normal heart sounds  No murmur heard  No gallop  Pulmonary:      Effort: Pulmonary effort is normal  No respiratory distress  Breath sounds: Normal breath sounds  No wheezing or rales  Abdominal:      General: Bowel sounds are normal  There is no distension  Palpations: Abdomen is soft  Tenderness: There is no abdominal tenderness  There is no guarding     Musculoskeletal:      Cervical back: Neck supple  Right lower leg: No edema  Left lower leg: No edema  Skin:     General: Skin is warm and dry  Neurological:      General: No focal deficit present  Mental Status: She is alert  Psychiatric:         Mood and Affect: Mood normal          Behavior: Behavior normal            Additional Data:   Labs:  Results from last 7 days   Lab Units 09/17/22  0529 09/16/22  0521 09/15/22  0437 09/14/22  0600 09/14/22  0513   WBC Thousand/uL 6 36 11 80* 18 72*  --  22 50*   HEMOGLOBIN g/dL 9 6* 10 3* 9 7*  --  12 4   HEMATOCRIT % 30 8* 33 1* 30 0*  --  39 4   MCV fL 88 88 88  --  88   PLATELETS Thousands/uL 759* 737* 651*  --  724*   INR   --   --   --  1 55*  --      Results from last 7 days   Lab Units 09/17/22  0529 09/16/22  0521 09/15/22  0437 09/14/22  0513   SODIUM mmol/L 140 135 140 136   POTASSIUM mmol/L 3 9 3 1* 3 1* 3 6   CHLORIDE mmol/L 106 100 102 98   CO2 mmol/L 27 28 29 25   ANION GAP mmol/L 7 7 9 13   BUN mg/dL 6 7 11 17   CREATININE mg/dL 0 78 0 92 1 02 1 49*   CALCIUM mg/dL 7 8* 7 5* 7 4* 8 4   ALBUMIN g/dL 1 6* 1 7* 1 5* 2 2*   TOTAL BILIRUBIN mg/dL 0 26 0 27 0 19* 0 38   ALK PHOS U/L 124* 144* 148* 241*   ALT U/L 98* 82* 70 122*   AST U/L 79* 60* 38 89*   EGFR ml/min/1 73sq m 74 61 53 34   GLUCOSE RANDOM mg/dL 91 92 107 163*     Results from last 7 days   Lab Units 09/15/22  0437   MAGNESIUM mg/dL 2 3              Results from last 7 days   Lab Units 09/14/22  0755 09/14/22  0513   LACTIC ACID mmol/L 1 0 2 3*                 * I Have Reviewed All Lab Data Listed Above  Cultures:   Results from last 7 days   Lab Units 09/14/22  1546 09/14/22  0600   BLOOD CULTURE   --  No Growth at 72 hrs  No Growth at 72 hrs  C DIFF TOXIN B BY PCR  Positive*  --              Imaging:  Imaging Reports Reviewed Today Include:   CT abdomen pelvis with contrast    Result Date: 9/14/2022  Impression: Diffuse progressive nonspecific uncomplicated proctocolitis   Incidental finding of new trace fluid within the uterine endometrial cavity likely within normal limits  This could be followed up with nonemergent pelvic ultrasound if clinically warranted  Additional stable chronic findings as above  This study demonstrates a significant  finding and was documented as such in [facility] for liaison and referring practitioner notification  Workstation performed: [id number]     Scheduled Meds:  Current Facility-Administered Medications   Medication Dose Route Frequency Provider Last Rate    acetaminophen  650 mg Oral Q6H PRN Redgie Jon, MD      albuterol  2 puff Inhalation Q6H PRN Redgie Rather, MD      ALPRAZolam  0 5 mg Oral BID PRN Redgie Jon, MD      aspirin  81 mg Oral Daily Redgie Rather, MD      atorvastatin  80 mg Oral Daily With [address], MD      dicyclomine  10 mg Oral TID [facility] Daryn Jackson PA-C      enoxaparin  40 mg Subcutaneous Q24H [address] Debbie Thomas MD      famotidine  20 mg Oral Daily Asa Renetta, PA-C      fluticasone-vilanterol  1 puff Inhalation Daily Redgie Rather, MD      gabapentin  300 mg Oral BID Redgie Rather, MD      gabapentin  400 mg Oral HS Redgie Rather, MD      HYDROmorphone  0 2 mg Intravenous Q4H PRN Redgie Jon, MD      ondansetron  4 mg Intravenous Q6H PRN Redgie Jon, MD      oxyCODONE-acetaminophen  1 tablet Oral Q6H PRN Redgie Jon, MD      saccharomyces boulardii  250 mg Oral BID Asa ASHLEY Jackson-MARLIN      simethicone  40 mg Oral Q6H PRN Daryn Jackson PA-C      vancomycin  125 mg Oral Q6H Deleonton III, MD Debbie Thomas MD  [facility]    ** Please Note: This note has been constructed using a voice recognition system   **

## 2022-09-17 NOTE — ASSESSMENT & PLAN NOTE
Lab Results   Component Value Date    EGFR 74 09/17/2022    EGFR 61 09/16/2022    EGFR 53 09/15/2022    CREATININE 0 78 09/17/2022    CREATININE 0 92 09/16/2022    CREATININE 1 02 09/15/2022     · Baseline Cr seems to be around 1 1 -1 2   · On admission, creatinine 1 49  Improved with IV fluid hydration  · Encourage oral hydration     · Monitor kidney function, avoid nephrotoxins

## 2022-09-17 NOTE — ASSESSMENT & PLAN NOTE
· Presented with profuse foul smelling watery diarrhea  · C diff positive  · She states she does not like to drink water  She drinks only juice and sweet tea  Encourage to drink water only and avoid sugary juice     · Significantly improved with oral vancomycin, bowel movements improving day by day  · Continue oral vanco for 10 days until Sep 24th  · PT/OT recommended short-term rehab, patient going to [facility]

## 2022-09-17 NOTE — ASSESSMENT & PLAN NOTE
· Stable  · Continue home xanax 0 5 mg bid prn  She needed one dose on 9/16  Awake/Alert/Cooperative

## 2022-09-18 PROCEDURE — 99233 SBSQ HOSP IP/OBS HIGH 50: CPT | Performed by: INTERNAL MEDICINE

## 2022-09-18 RX ADMIN — FAMOTIDINE 20 MG: 20 TABLET ORAL at 08:29

## 2022-09-18 RX ADMIN — ALPRAZOLAM 0.5 MG: 0.5 TABLET ORAL at 18:21

## 2022-09-18 RX ADMIN — Medication 125 MG: at 11:36

## 2022-09-18 RX ADMIN — GABAPENTIN 400 MG: 400 CAPSULE ORAL at 21:53

## 2022-09-18 RX ADMIN — Medication 125 MG: at 17:08

## 2022-09-18 RX ADMIN — Medication 125 MG: at 23:43

## 2022-09-18 RX ADMIN — ENOXAPARIN SODIUM 40 MG: 40 INJECTION SUBCUTANEOUS at 08:28

## 2022-09-18 RX ADMIN — GABAPENTIN 300 MG: 300 CAPSULE ORAL at 17:08

## 2022-09-18 RX ADMIN — DICYCLOMINE HYDROCHLORIDE 10 MG: 10 CAPSULE ORAL at 06:44

## 2022-09-18 RX ADMIN — GABAPENTIN 300 MG: 300 CAPSULE ORAL at 08:29

## 2022-09-18 RX ADMIN — Medication 125 MG: at 06:44

## 2022-09-18 RX ADMIN — Medication 250 MG: at 17:08

## 2022-09-18 RX ADMIN — DICYCLOMINE HYDROCHLORIDE 10 MG: 10 CAPSULE ORAL at 17:00

## 2022-09-18 RX ADMIN — FLUTICASONE FUROATE AND VILANTEROL TRIFENATATE 1 PUFF: 100; 25 POWDER RESPIRATORY (INHALATION) at 08:29

## 2022-09-18 RX ADMIN — DICYCLOMINE HYDROCHLORIDE 10 MG: 10 CAPSULE ORAL at 11:36

## 2022-09-18 RX ADMIN — Medication 250 MG: at 08:28

## 2022-09-18 RX ADMIN — ATORVASTATIN CALCIUM 80 MG: 40 TABLET, FILM COATED ORAL at 17:08

## 2022-09-18 RX ADMIN — ASPIRIN 81 MG 81 MG: 81 TABLET ORAL at 08:29

## 2022-09-18 NOTE — PLAN OF CARE
Problem: Potential for Falls  Goal: Patient will remain free of falls  Description: INTERVENTIONS:  - Educate patient/family on patient safety including physical limitations  - Instruct patient to call for assistance with activity   - Consult OT/PT to assist with strengthening/mobility   - Keep Call bell within reach  - Keep bed low and locked with side rails adjusted as appropriate  - Keep care items and personal belongings within reach  - Initiate and maintain comfort rounds  - Make Fall Risk Sign visible to staff  - Initiate/Maintain bed alarm  - Obtain necessary fall risk management equipment:   - Apply yellow socks and bracelet for high fall risk patients  - Consider moving patient to room near nurses station  Outcome: Progressing     Problem: PAIN - ADULT  Goal: Verbalizes/displays adequate comfort level or baseline comfort level  Description: Interventions:  - Encourage patient to monitor pain and request assistance  - Assess pain using appropriate pain scale  - Administer analgesics based on type and severity of pain and evaluate response  - Implement non-pharmacological measures as appropriate and evaluate response  - Consider cultural and social influences on pain and pain management  - Notify physician/advanced practitioner if interventions unsuccessful or patient reports new pain  Outcome: Progressing     Problem: INFECTION - ADULT  Goal: Absence or prevention of progression during hospitalization  Description: INTERVENTIONS:  - Assess and monitor for signs and symptoms of infection  - Monitor lab/diagnostic results  - Monitor all insertion sites, i e  indwelling lines, tubes, and drains  - Monitor endotracheal if appropriate and nasal secretions for changes in amount and color  - [place] appropriate cooling/warming therapies per order  - Administer medications as ordered  - Instruct and encourage patient and family to use good hand hygiene technique  - Identify and instruct in appropriate isolation precautions for identified infection/condition  Outcome: Progressing     Problem: SAFETY ADULT  Goal: Patient will remain free of falls  Description: INTERVENTIONS:  - Educate patient/family on patient safety including physical limitations  - Instruct patient to call for assistance with activity   - Consult OT/PT to assist with strengthening/mobility   - Keep Call bell within reach  - Keep bed low and locked with side rails adjusted as appropriate  - Keep care items and personal belongings within reach  - Initiate and maintain comfort rounds  - Make Fall Risk Sign visible to staff  - Obtain necessary fall risk management equipment:   - Apply yellow socks and bracelet for high fall risk patients  - Consider moving patient to room near nurses station  Outcome: Progressing

## 2022-09-18 NOTE — PROGRESS NOTES
[address] [place] [address]  Progress Note Shira Elizondo 1945, 78 y o  female MRN: [id number]  Unit/Bed#: -01 Encounter: [id number]  Primary Care Provider: Robbin Fox MD   Date and time admitted to hospital: 9/14/2022  4:51 AM    * C  difficile colitis  Assessment & Plan  · Presented with profuse foul smelling watery diarrhea  · C diff positive  · She states she does not like to drink water  She drinks only juice and sweet tea  Encourage to drink water only and avoid sugary juice  · Significantly improved with oral vancomycin, bowel movements improving day by day  · Continue oral vanco for 10 days until Sep 24th  · PT/OT sawl patient, they did recommend rehab, disposition in progress    Continuous opioid dependence ([facility] )  Assessment & Plan  · Patient currently on dilaudid 0 2 mg q4hrs p r n and percocet q6hr prn for pain given acute worsening due to colitis    Malignant neoplasm of overlapping sites of right breast in female, estrogen receptor negative ([facility] )  Assessment & Plan  · Patient reports stage 1 breast cancer, with one dose of chemotherapy in June 2022  · Has denied further chemotherapeutic regimens to focus on quality of life  · She is currently DNR/DNI    COPD (chronic obstructive pulmonary disease) ([facility] )  Assessment & Plan  · Patient smoked for 57 years and quit at 73 y/o  · COPD appears well controlled  · Continue chronic inhaler regimen    Anxiety  Assessment & Plan  · Stable  · Continue home xanax 0 5 mg bid prn  She needed one dose on 9/16  TE Pharmacologic Prophylaxis: Enoxaparin (Lovenox)    Patient Centered Rounds: I have performed bedside rounds with nursing staff today      Discussions with Specialists or Other Care Team Provider: yes    Education and Discussions with Family / Patient: Tiffanie Renee previously updated and in agreement with rehab plan    Current Length of Stay: 4 day(s)  Current Patient Status: Inpatient     Certification Statement: The patient will continue to require additional inpatient hospital stay due to C  difficile colitis  Discharge Plan / Estimated Discharge Date:  Pending rehab    Code Status: Level 3 - DNAR and DNI  ______________________________________________________________________________    Subjective:     Patient evaluated this morning  She does mention Improvement in the diarrhea  She does mention some abdominal distension as well, she is slightly depressed but denies any suicidal homicidal ideation  Tolerating p o  Intake  Denies any nausea vomiting, diarrhea constipation  Objective:   Vitals: Blood pressure 107/60, pulse 87, temperature 97 9 °F (36 6 °C), resp  rate 18, height 5' (1 524 m), weight 67 7 kg (149 lb 4 oz), SpO2 96 %, not currently breastfeeding  Physical Exam  Vitals and nursing note reviewed  Constitutional:       Appearance: She is well-developed  Comments: Female in bed, awake   HENT:      Head: Normocephalic and atraumatic  Eyes:      General: No scleral icterus  Conjunctiva/sclera: Conjunctivae normal    Cardiovascular:      Rate and Rhythm: Normal rate and regular rhythm  Pulses: Normal pulses  Heart sounds: Normal heart sounds  No murmur heard  No gallop  Pulmonary:      Effort: Pulmonary effort is normal  No respiratory distress  Breath sounds: Normal breath sounds  No wheezing or rales  Abdominal:      General: Bowel sounds are normal  There is distension  Palpations: Abdomen is soft  Tenderness: There is no abdominal tenderness  There is no guarding  Musculoskeletal:      Cervical back: Neck supple  Right lower leg: No edema  Left lower leg: No edema  Skin:     General: Skin is warm and dry  Neurological:      General: No focal deficit present  Mental Status: She is alert     Psychiatric:         Mood and Affect: Mood normal          Behavior: Behavior normal            Additional Data:   Labs:  Results from last 7 days   Lab Units 09/17/22  0529 09/16/22  0521 09/15/22  0437 09/14/22  0600 09/14/22  0513   WBC Thousand/uL 6 36 11 80* 18 72*  --  22 50*   HEMOGLOBIN g/dL 9 6* 10 3* 9 7*  --  12 4   HEMATOCRIT % 30 8* 33 1* 30 0*  --  39 4   MCV fL 88 88 88  --  88   PLATELETS Thousands/uL 759* 737* 651*  --  724*   INR   --   --   --  1 55*  --      Results from last 7 days   Lab Units 09/17/22  0529 09/16/22  0521 09/15/22  0437 09/14/22  0513   SODIUM mmol/L 140 135 140 136   POTASSIUM mmol/L 3 9 3 1* 3 1* 3 6   CHLORIDE mmol/L 106 100 102 98   CO2 mmol/L 27 28 29 25   ANION GAP mmol/L 7 7 9 13   BUN mg/dL 6 7 11 17   CREATININE mg/dL 0 78 0 92 1 02 1 49*   CALCIUM mg/dL 7 8* 7 5* 7 4* 8 4   ALBUMIN g/dL 1 6* 1 7* 1 5* 2 2*   TOTAL BILIRUBIN mg/dL 0 26 0 27 0 19* 0 38   ALK PHOS U/L 124* 144* 148* 241*   ALT U/L 98* 82* 70 122*   AST U/L 79* 60* 38 89*   EGFR ml/min/1 73sq m 74 61 53 34   GLUCOSE RANDOM mg/dL 91 92 107 163*     Results from last 7 days   Lab Units 09/15/22  0437   MAGNESIUM mg/dL 2 3              Results from last 7 days   Lab Units 09/14/22  0755 09/14/22  0513   LACTIC ACID mmol/L 1 0 2 3*                 * I Have Reviewed All Lab Data Listed Above  Cultures:   Results from last 7 days   Lab Units 09/14/22  1546 09/14/22  0600   BLOOD CULTURE   --  No Growth After 4 Days  No Growth After 4 Days  C DIFF TOXIN B BY PCR  Positive*  --              Imaging:  Imaging Reports Reviewed Today Include:   CT abdomen pelvis with contrast    Result Date: 9/14/2022  Impression: Diffuse progressive nonspecific uncomplicated proctocolitis  Incidental finding of new trace fluid within the uterine endometrial cavity likely within normal limits  This could be followed up with nonemergent pelvic ultrasound if clinically warranted  Additional stable chronic findings as above  This study demonstrates a significant  finding and was documented as such in Epic for liaison and referring practitioner notification   Workstation performed: [id number]     Scheduled Meds:  Current Facility-Administered Medications   Medication Dose Route Frequency Provider Last Rate    acetaminophen  650 mg Oral Q6H PRN Yayo Lewis MD      albuterol  2 puff Inhalation Q6H PRN Yayo Lewis MD      ALPRAZolam  0 5 mg Oral BID PRN Yayo Lewis MD      aspirin  81 mg Oral Daily Yayo Lewis MD      atorvastatin  80 mg Oral Daily With [address], MD      dicyclomine  10 mg Oral TID [facility] Dominguez Denson PA-C      enoxaparin  40 mg Subcutaneous Q24H [facility] Melinda Patricio MD      famotidine  20 mg Oral Daily Dominguez Denson PA-C      fluticasone-vilanterol  1 puff Inhalation Daily Yayo Lewis MD      gabapentin  300 mg Oral BID Yayo Lewis MD      gabapentin  400 mg Oral HS Yayo Lewis MD      HYDROmorphone  0 2 mg Intravenous Q4H PRN Yayo Lewis MD      ondansetron  4 mg Intravenous Q6H PRN Yayo Lewis MD      oxyCODONE-acetaminophen  1 tablet Oral Q6H PRN Yayo Lewis MD      saccharomyces boulardii  250 mg Oral BID Dominguez Denson PA-C      simethicone  40 mg Oral Q6H PRN Dominguez Denson PA-C      vancomycin  125 mg Oral Q6H [facility] MD Debora Simms MD  Internal Medicine - [facility]    ** Please Note: This note has been constructed using a voice recognition system   **

## 2022-09-18 NOTE — ASSESSMENT & PLAN NOTE
· Patient reports stage 1 breast cancer, with one dose of chemotherapy in June 2022    · Has denied further chemotherapeutic regimens to focus on quality of life  · She is currently DNR/DNI

## 2022-09-18 NOTE — PLAN OF CARE
Problem: Potential for Falls  Goal: Patient will remain free of falls  Description: INTERVENTIONS:  - Educate patient/family on patient safety including physical limitations  - Instruct patient to call for assistance with activity   - Consult OT/PT to assist with strengthening/mobility   - Keep Call bell within reach  - Keep bed low and locked with side rails adjusted as appropriate  - Keep care items and personal belongings within reach  - Initiate and maintain comfort rounds  - Make Fall Risk Sign visible to staff  - Offer Toileting every    Hours, in advance of need  - Initiate/Maintain   alarm  - Obtain necessary fall risk management equipment:     - Apply yellow socks and bracelet for high fall risk patients  - Consider moving patient to room near nurses station  Outcome: Progressing     Problem: PAIN - ADULT  Goal: Verbalizes/displays adequate comfort level or baseline comfort level  Description: Interventions:  - Encourage patient to monitor pain and request assistance  - Assess pain using appropriate pain scale  - Administer analgesics based on type and severity of pain and evaluate response  - Implement non-pharmacological measures as appropriate and evaluate response  - Consider cultural and social influences on pain and pain management  - Notify physician/advanced practitioner if interventions unsuccessful or patient reports new pain  Outcome: Progressing     Problem: INFECTION - ADULT  Goal: Absence or prevention of progression during hospitalization  Description: INTERVENTIONS:  - Assess and monitor for signs and symptoms of infection  - Monitor lab/diagnostic results  - Monitor all insertion sites, i e  indwelling lines, tubes, and drains  - Monitor endotracheal if appropriate and nasal secretions for changes in amount and color  - [place] appropriate cooling/warming therapies per order  - Administer medications as ordered  - Instruct and encourage patient and family to use good hand hygiene technique  - Identify and instruct in appropriate isolation precautions for identified infection/condition  Outcome: Progressing     Problem: SAFETY ADULT  Goal: Patient will remain free of falls  Description: INTERVENTIONS:  - Educate patient/family on patient safety including physical limitations  - Instruct patient to call for assistance with activity   - Consult OT/PT to assist with strengthening/mobility   - Keep Call bell within reach  - Keep bed low and locked with side rails adjusted as appropriate  - Keep care items and personal belongings within reach  - Initiate and maintain comfort rounds  - Make Fall Risk Sign visible to staff  - Offer Toileting every    Hours, in advance of need  - Initiate/Maintain   alarm  - Obtain necessary fall risk management equipment:     - Apply yellow socks and bracelet for high fall risk patients  - Consider moving patient to room near nurses station  Outcome: Progressing  Goal: Maintain or return to baseline ADL function  Description: INTERVENTIONS:  -  Assess patient's ability to carry out ADLs; assess patient's baseline for ADL function and identify physical deficits which impact ability to perform ADLs (bathing, care of mouth/teeth, toileting, grooming, dressing, etc )  - Assess/evaluate cause of self-care deficits   - Assess range of motion  - Assess patient's mobility; develop plan if impaired  - Assess patient's need for assistive devices and provide as appropriate  - Encourage maximum independence but intervene and supervise when necessary  - Involve family in performance of ADLs  - Assess for home care needs following discharge   - Consider OT consult to assist with ADL evaluation and planning for discharge  - Provide patient education as appropriate  Outcome: Progressing  Goal: Maintains/Returns to pre admission functional level  Description: INTERVENTIONS:  - Perform BMAT or MOVE assessment daily    - Set and communicate daily mobility goal to care team and patient/family/caregiver  - Collaborate with rehabilitation services on mobility goals if consulted  - Perform Range of Motion      times a day  - Reposition patient every    hours  - Dangle patient    times a day  - Stand patient    times a day  - Ambulate patient    times a day  - Out of bed to chair    times a day   - Out of bed for meals    times a day  - Out of bed for toileting  - Record patient progress and toleration of activity level   Outcome: Progressing     Problem: DISCHARGE PLANNING  Goal: Discharge to home or other facility with appropriate resources  Description: INTERVENTIONS:  - Identify barriers to discharge w/patient and caregiver  - Arrange for needed discharge resources and transportation as appropriate  - Identify discharge learning needs (meds, wound care, etc )  - Arrange for interpretive services to assist at discharge as needed  - Refer to Case Management Department for coordinating discharge planning if the patient needs post-hospital services based on physician/advanced practitioner order or complex needs related to functional status, cognitive ability, or social support system  Outcome: Progressing     Problem: Knowledge Deficit  Goal: Patient/family/caregiver demonstrates understanding of disease process, treatment plan, medications, and discharge instructions  Description: Complete learning assessment and assess knowledge base    Interventions:  - Provide teaching at level of understanding  - Provide teaching via preferred learning methods  Outcome: Progressing     Problem: MOBILITY - ADULT  Goal: Maintain or return to baseline ADL function  Description: INTERVENTIONS:  -  Assess patient's ability to carry out ADLs; assess patient's baseline for ADL function and identify physical deficits which impact ability to perform ADLs (bathing, care of mouth/teeth, toileting, grooming, dressing, etc )  - Assess/evaluate cause of self-care deficits   - Assess range of motion  - Assess patient's mobility; develop plan if impaired  - Assess patient's need for assistive devices and provide as appropriate  - Encourage maximum independence but intervene and supervise when necessary  - Involve family in performance of ADLs  - Assess for home care needs following discharge   - Consider OT consult to assist with ADL evaluation and planning for discharge  - Provide patient education as appropriate  Outcome: Progressing  Goal: Maintains/Returns to pre admission functional level  Description: INTERVENTIONS:  - Perform BMAT or MOVE assessment daily    - Set and communicate daily mobility goal to care team and patient/family/caregiver  - Collaborate with rehabilitation services on mobility goals if consulted  - Perform Range of Motion    times a day  - Reposition patient every    hours  - Dangle patient    times a day  - Stand patient    times a day  - Ambulate patient    times a day  - Out of bed to chair    times a day   - Out of bed for meals    times a day  - Out of bed for toileting  - Record patient progress and toleration of activity level   Outcome: Progressing     Problem: Nutrition/Hydration-ADULT  Goal: Nutrient/Hydration intake appropriate for improving, restoring or maintaining nutritional needs  Description: Monitor and assess patient's nutrition/hydration status for malnutrition  Collaborate with interdisciplinary team and initiate plan and interventions as ordered  Monitor patient's weight and dietary intake as ordered or per policy  Utilize nutrition screening tool and intervene as necessary  Determine patient's food preferences and provide high-protein, high-caloric foods as appropriate       INTERVENTIONS:  - Monitor oral intake, urinary output, labs, and treatment plans  - Assess nutrition and hydration status and recommend course of action  - Evaluate amount of meals eaten  - Assist patient with eating if necessary   - Allow adequate time for meals  - Recommend/ encourage appropriate diets, oral nutritional supplements, and vitamin/mineral supplements  - Order, calculate, and assess calorie counts as needed  - Recommend, monitor, and adjust tube feedings and TPN/PPN based on assessed needs  - Assess need for intravenous fluids  - Provide specific nutrition/hydration education as appropriate  - Include patient/family/caregiver in decisions related to nutrition  Outcome: Progressing

## 2022-09-18 NOTE — CASE MANAGEMENT
Case Management Discharge Planning Note    Patient name Zaynab Acharya  Location /-01 MRN [id number]  : 1945 Date 2022       Current Admission Date: 2022  Current Admission Diagnosis:C  difficile colitis   Patient Active Problem List    Diagnosis Date Noted    Generalized weakness 2022    C  difficile colitis 2022    Proctocolitis 2022    Transaminitis 2022    Diarrhea 2022    Chemotherapy induced neutropenia ([facility] ) 2022    Diverticulitis 2022    Oral candidiasis 2022    Cellulitis 2022    Continuous opioid dependence ([place] [address] ) 2022    Malignant neoplasm of overlapping sites of right breast in female, estrogen receptor negative ([facility] ) 2022    COPD (chronic obstructive pulmonary disease) ([place] [address] )     Prediabetes 2021    Vitamin D deficiency 2021    Acute right ankle pain 2021    Closed avulsion fracture of lateral malleolus of right fibula 2021    Seborrheic keratoses 2020    BMI 34 0-34 9,adult 2019    Stage 3 [facility] 2019    Pancreatic mass 2019    Chronic sinusitis 2018    PVD (peripheral vascular disease) ([facility] ) 2018    Carotid artery plaque, bilateral 2017    Restless leg syndrome 2016    Aortoiliac occlusive disease ([facility] ) 2015    Subclavian artery stenosis, left ([facility] ) 2015    Centrilobular emphysema ([facility] ) 2015    Anxiety 2013    Osteoporosis 2013    Hyperlipidemia 2013      LOS (days): 4  Geometric Mean LOS (GMLOS) (days): 2 60  Days to GMLOS:-1 7     OBJECTIVE:  Risk of Unplanned Readmission Score: 31 37         Current admission status: Inpatient   Preferred Pharmacy:   [address] [place] [address] #[id number] Dionna Munoz - [address]  [place] [id number]  Phone: [phone] Fax: [phone]    Primary Care Provider: Fern Gibson MD    Primary Insurance: MEDICARE  Secondary Insurance: BLUE CROSS    DISCHARGE DETAILS:    Discharge planning discussed with[de-identified] suman Jenkins November of Choice: Yes  Comments - Freedom of Choice: CM spoke to [facility] and she will accept pt to [facility] tomorrow  CM spoke to son Valente Louis and he is agreeable to this dcp  She has been at [facility] in the past and did very well there  [facility] transport arranged for 13:00  [facility] informed  CM contacted family/caregiver?: Yes  Were Treatment Team discharge recommendations reviewed with patient/caregiver?: Yes  Did patient/caregiver verbalize understanding of patient care needs?: Yes  Were patient/caregiver advised of the risks associated with not following Treatment Team discharge recommendations?: Yes    Contacts  Patient Contacts: Carissaiateodoro Heriberto  Relationship to Patient[de-identified] Family  Contact Method: Phone  Phone Number: [phone]  Reason/Outcome: Discharge Planning              Other Referral/Resources/Interventions Provided:  Interventions: Short Term Rehab  Referral Comments: d/c to [facility] tomorrow-transport at 13:00    Medical Nec in chart    Would you like to participate in our [address] service program?  : No - Declined    Treatment Team Recommendation: Short Term Rehab  Discharge Destination Plan[de-identified] Short Term Rehab  Transport at Discharge : [facility]  Dispatcher Contacted: Yes     Transported by (Company and Unit #): [employer]  ETA of Transport (Date): 09/19/22  ETA of Transport (Time): 1300              IMM Given (Date):: 09/18/22  IMM Given to[de-identified] Patient

## 2022-09-19 ENCOUNTER — TELEPHONE (OUTPATIENT)
Dept: OTHER | Facility: HOSPITAL | Age: 77
End: 2022-09-19

## 2022-09-19 LAB
ANION GAP SERPL CALCULATED.3IONS-SCNC: 4 MMOL/L (ref 4–13)
BACTERIA BLD CULT: NORMAL
BACTERIA BLD CULT: NORMAL
BASOPHILS # BLD AUTO: 0.05 THOUSANDS/ΜL (ref 0–0.1)
BASOPHILS NFR BLD AUTO: 1 % (ref 0–1)
BUN SERPL-MCNC: 7 MG/DL (ref 5–25)
CALCIUM SERPL-MCNC: 8.2 MG/DL (ref 8.3–10.1)
CHLORIDE SERPL-SCNC: 105 MMOL/L (ref 96–108)
CO2 SERPL-SCNC: 30 MMOL/L (ref 21–32)
CREAT SERPL-MCNC: 0.94 MG/DL (ref 0.6–1.3)
EOSINOPHIL # BLD AUTO: 0.16 THOUSAND/ΜL (ref 0–0.61)
EOSINOPHIL NFR BLD AUTO: 2 % (ref 0–6)
ERYTHROCYTE [DISTWIDTH] IN BLOOD BY AUTOMATED COUNT: 16.2 % (ref 11.6–15.1)
GFR SERPL CREATININE-BSD FRML MDRD: 59 ML/MIN/1.73SQ M
GLUCOSE SERPL-MCNC: 92 MG/DL (ref 65–140)
HCT VFR BLD AUTO: 33.2 % (ref 34.8–46.1)
HGB BLD-MCNC: 10.2 G/DL (ref 11.5–15.4)
IMM GRANULOCYTES # BLD AUTO: 0.13 THOUSAND/UL (ref 0–0.2)
IMM GRANULOCYTES NFR BLD AUTO: 2 % (ref 0–2)
LYMPHOCYTES # BLD AUTO: 1.79 THOUSANDS/ΜL (ref 0.6–4.47)
LYMPHOCYTES NFR BLD AUTO: 25 % (ref 14–44)
MCH RBC QN AUTO: 27.8 PG (ref 26.8–34.3)
MCHC RBC AUTO-ENTMCNC: 30.7 G/DL (ref 31.4–37.4)
MCV RBC AUTO: 91 FL (ref 82–98)
MONOCYTES # BLD AUTO: 0.55 THOUSAND/ΜL (ref 0.17–1.22)
MONOCYTES NFR BLD AUTO: 8 % (ref 4–12)
NEUTROPHILS # BLD AUTO: 4.44 THOUSANDS/ΜL (ref 1.85–7.62)
NEUTS SEG NFR BLD AUTO: 62 % (ref 43–75)
NRBC BLD AUTO-RTO: 0 /100 WBCS
PLATELET # BLD AUTO: 792 THOUSANDS/UL (ref 149–390)
PMV BLD AUTO: 8.9 FL (ref 8.9–12.7)
POTASSIUM SERPL-SCNC: 4.2 MMOL/L (ref 3.5–5.3)
RBC # BLD AUTO: 3.67 MILLION/UL (ref 3.81–5.12)
SODIUM SERPL-SCNC: 139 MMOL/L (ref 135–147)
WBC # BLD AUTO: 7.12 THOUSAND/UL (ref 4.31–10.16)

## 2022-09-19 PROCEDURE — 85025 COMPLETE CBC W/AUTO DIFF WBC: CPT | Performed by: INTERNAL MEDICINE

## 2022-09-19 PROCEDURE — 80048 BASIC METABOLIC PNL TOTAL CA: CPT | Performed by: INTERNAL MEDICINE

## 2022-09-19 PROCEDURE — 97110 THERAPEUTIC EXERCISES: CPT

## 2022-09-19 PROCEDURE — 97530 THERAPEUTIC ACTIVITIES: CPT

## 2022-09-19 PROCEDURE — 99239 HOSP IP/OBS DSCHRG MGMT >30: CPT | Performed by: INTERNAL MEDICINE

## 2022-09-19 RX ORDER — DICYCLOMINE HYDROCHLORIDE 10 MG/1
10 CAPSULE ORAL
Qty: 30 CAPSULE | Refills: 0
Start: 2022-09-19

## 2022-09-19 RX ORDER — ALPRAZOLAM 0.5 MG/1
0.5 TABLET ORAL 2 TIMES DAILY PRN
Qty: 6 TABLET | Refills: 0 | Status: SHIPPED | OUTPATIENT
Start: 2022-09-19

## 2022-09-19 RX ORDER — OXYCODONE HYDROCHLORIDE AND ACETAMINOPHEN 5; 325 MG/1; MG/1
1 TABLET ORAL EVERY 6 HOURS PRN
Qty: 12 TABLET | Refills: 0 | Status: SHIPPED | OUTPATIENT
Start: 2022-09-19 | End: 2022-10-10 | Stop reason: SDUPTHER

## 2022-09-19 RX ORDER — ALPRAZOLAM 0.5 MG/1
0.5 TABLET ORAL 2 TIMES DAILY PRN
Qty: 20 TABLET | Refills: 0 | Status: SHIPPED | OUTPATIENT
Start: 2022-09-19

## 2022-09-19 RX ORDER — OXYCODONE HYDROCHLORIDE AND ACETAMINOPHEN 5; 325 MG/1; MG/1
1 TABLET ORAL EVERY 6 HOURS PRN
Qty: 20 TABLET | Refills: 0 | Status: SHIPPED | OUTPATIENT
Start: 2022-09-19

## 2022-09-19 RX ORDER — FAMOTIDINE 20 MG/1
20 TABLET, FILM COATED ORAL DAILY
Refills: 0
Start: 2022-09-19

## 2022-09-19 RX ORDER — SIMETHICONE 20 MG/.3ML
40 EMULSION ORAL EVERY 6 HOURS PRN
Refills: 0
Start: 2022-09-19 | End: 2022-10-10

## 2022-09-19 RX ADMIN — DICYCLOMINE HYDROCHLORIDE 10 MG: 10 CAPSULE ORAL at 06:13

## 2022-09-19 RX ADMIN — GABAPENTIN 300 MG: 300 CAPSULE ORAL at 10:48

## 2022-09-19 RX ADMIN — Medication 125 MG: at 12:55

## 2022-09-19 RX ADMIN — ENOXAPARIN SODIUM 40 MG: 40 INJECTION SUBCUTANEOUS at 10:48

## 2022-09-19 RX ADMIN — Medication 250 MG: at 10:54

## 2022-09-19 RX ADMIN — FAMOTIDINE 20 MG: 20 TABLET ORAL at 10:47

## 2022-09-19 RX ADMIN — ALPRAZOLAM 0.5 MG: 0.5 TABLET ORAL at 12:52

## 2022-09-19 RX ADMIN — Medication 40 MG: at 12:52

## 2022-09-19 RX ADMIN — Medication 125 MG: at 06:12

## 2022-09-19 RX ADMIN — ASPIRIN 81 MG 81 MG: 81 TABLET ORAL at 10:48

## 2022-09-19 RX ADMIN — DICYCLOMINE HYDROCHLORIDE 10 MG: 10 CAPSULE ORAL at 10:48

## 2022-09-19 RX ADMIN — FLUTICASONE FUROATE AND VILANTEROL TRIFENATATE 1 PUFF: 100; 25 POWDER RESPIRATORY (INHALATION) at 10:49

## 2022-09-19 NOTE — ASSESSMENT & PLAN NOTE
· Presented with profuse foul smelling watery diarrhea  · C diff positive  · She states she does not like to drink water  She drinks only juice and sweet tea  Encourage to drink water only and avoid sugary juice     · Significantly improved with oral vancomycin, bowel movements improving day by day  · Continue oral vanco for 10 days until Sep 24th  · PT/OT recommended short-term rehab, patient going to [facility]

## 2022-09-19 NOTE — ASSESSMENT & PLAN NOTE
- Patient presents with generalized abdominal pain with non-bloody diarrhea, nausea, vomiting, and fever   - Patient previously presented 9/6 for similar symptoms and was sent home with ciprofloxacin and metronidazole for likely colitis  Patient reports no symptomatic relief  · CT abdomen/pelvis: "Diffuse progressive colorectal thickening of entire colon  Diffuse prominent pericolonic vascularity of descending colon through rectum," consistent with proctocolitis  · C  diff PCR positive however toxin A + B negative  Likely colonization given clinical correlation of symptoms  · Blood cultures negative so far  · Stool enteric baterial panel negative  · Stool fecal calprotectin: 84088  · Discontinue Unasyn 1 5g q12hr given bacteria panel negative    Plan:   · Continue p o  vancomycin 125 mg b i d    Planning to give 2 weeks  · Patient refused Flagyl  · Advanced diet to low-fiber diet  · Monitor stool count  · Continue probiotics Florastor and bentyl  · Appreciate GI consult recommendations  · Encourage oral hydration

## 2022-09-19 NOTE — ASSESSMENT & PLAN NOTE
- Patient presents with generalized abdominal pain with non-bloody diarrhea, nausea, vomiting, and fever   - Patient previously presented 9/6 for similar symptoms and was sent home with ciprofloxacin and metronidazole for likely colitis  Patient reports no symptomatic relief  · CT abdomen/pelvis: "Diffuse progressive colorectal thickening of entire colon  Diffuse prominent pericolonic vascularity of descending colon through rectum," consistent with proctocolitis  · C  diff PCR positive however toxin A + B negative  Likely colonization given clinical correlation of symptoms  · Blood cultures negative so far  · Stool enteric baterial panel negative  · Stool fecal calprotectin: 21045  · Discontinue Unasyn 1 5g q12hr given bacteria panel negative    Plan:   · Continue p o  vancomycin 125 mg b i d    Planning to give 2 weeks  · Patient refused Flagyl  · Advanced diet to low-fiber diet  · Monitor stool count  · Continue probiotics Florastor and bentyl  · Appreciate GI consult recommendations  · Encourage oral hydration

## 2022-09-19 NOTE — TELEPHONE ENCOUNTER
We have made several attempts to make an appt with Samara Dakin for a consult with Rad Onc  She was in rehab and stated she would call to schedule when she was discharged  She is now admitted  We will be closing the referral  She is seeing Dr William Fisher on 10/10

## 2022-09-19 NOTE — DISCHARGE SUMMARY
[facility]  Discharge- Luisa Brennan 1945, 78 y o  female MRN: [id number]  Unit/Bed#: -[facility] Encounter: [id number]  Primary Care Provider: Josephine Bobo MD   Date and time admitted to hospital: 9/14/2022  4:51 AM    * C  difficile colitis  Assessment & Plan  · Presented with profuse foul smelling watery diarrhea  · C diff positive  · She states she does not like to drink water  She drinks only juice and sweet tea  Encourage to drink water only and avoid sugary juice  · Significantly improved with oral vancomycin, bowel movements improving day by day  · Continue oral vanco for 10 days until Sep 24th  · PT/OT recommended short-term rehab, patient going to [facility]    Generalized weakness  Assessment & Plan  · PT/OT recommends short term rehab  · Patient being discharged to [facility] today  Proctocolitis  Assessment & Plan  - Patient presents with generalized abdominal pain with non-bloody diarrhea, nausea, vomiting, and fever   - Patient previously presented 9/6 for similar symptoms and was sent home with ciprofloxacin and metronidazole for likely colitis  Patient reports no symptomatic relief  · CT abdomen/pelvis: "Diffuse progressive colorectal thickening of entire colon  Diffuse prominent pericolonic vascularity of descending colon through rectum," consistent with proctocolitis  · C  diff PCR positive however toxin A + B negative  Likely colonization given clinical correlation of symptoms  · Blood cultures negative so far  · Stool enteric baterial panel negative  · Stool fecal calprotectin: 27765  · Discontinue Unasyn 1 5g q12hr given bacteria panel negative    Plan:   · Continue p o  vancomycin 125 mg b i d    Planning to give 2 weeks  · Patient refused Flagyl  · Advanced diet to low-fiber diet  · Monitor stool count  · Continue probiotics Florastor and bentyl  · Appreciate GI consult recommendations  · Encourage oral hydration    Transaminitis  Assessment & Plan  Lab Results   Component Value Date    ALT 98 (H) 09/17/2022    AST 79 (H) 09/17/2022    ALKPHOS 124 (H) 09/17/2022     · Mild transaminitis   · No further work up at this time per GI  Continuous opioid dependence ([facility] )  Assessment & Plan  · Patient currently on dilaudid 0 2 mg q4hrs p r n and percocet q6hr prn for pain given acute worsening due to colitis    Malignant neoplasm of overlapping sites of right breast in female, estrogen receptor negative ([facility] )  Assessment & Plan  · Patient reports stage 1 breast cancer, with one dose of chemotherapy in June 2022  · Has denied further chemotherapeutic regimens to focus on quality of life  · She is currently DNR/DNI    COPD (chronic obstructive pulmonary disease) ([facility] )  Assessment & Plan  · Patient smoked for 57 years and quit at 73 y/o  · COPD appears well controlled  · Continue chronic inhaler regimen    Stage 3 chronic kidney disease [facility])  Assessment & Plan  Lab Results   Component Value Date    EGFR 59 09/19/2022    EGFR 74 09/17/2022    EGFR 61 09/16/2022    CREATININE 0 94 09/19/2022    CREATININE 0 78 09/17/2022    CREATININE 0 92 09/16/2022     · Baseline Cr seems to be around 1 1 -1 2   · On admission, creatinine 1 49  Improved with IV fluid hydration  · Encourage oral hydration  · Monitor kidney function, avoid nephrotoxins    Hyperlipidemia  Assessment & Plan  ·  Home medications include rosuvastatin 20 mg  · Continue atorvastatin which is formulary substitute    Carotid artery plaque, bilateral  Assessment & Plan  · No acute concerns at this time  · Continue aspirin and statin  Aortoiliac occlusive disease ([facility] )  Assessment & Plan  · No acute concerns at this time  · Continue statin    Anxiety  Assessment & Plan  · Stable  · Continue home xanax 0 5 mg bid prn       Discharging Physician / Practitioner: Yarelis Doe MD  PCP: Josephine Bobo MD  Admission Date:   Admission Orders (From admission, onward)     Ordered        09/14/22 0690 INPATIENT ADMISSION  Once                      Discharge Date: 09/19/22    Medical Problems             Resolved Problems  Date Reviewed: 9/19/2022   None                 Consultations During Hospital Stay:  · GI    Procedures Performed:   · None    Significant Findings / Test Results:   · C diff PCR positive  Toxin A and B negative  · 09/14: CT AP: Diffuse progressive nonspecific uncomplicated proctocolitis  Incidental finding of new trace fluid within the uterine endometrial cavity likely within normal limits  This could be followed up with nonemergent pelvic ultrasound if clinically warranted  Incidental Findings:   · As above     Test Results Pending at Discharge (will require follow up): · None     Outpatient Tests Requested:  · None    Complications:  None    Reason for Admission: C diff colitis     HPI: Darylene Logan a 78 y  o  female with a history of right breast cancer stage IB, CKD3, GERD, HLD, and COPD presented 9/14/22 for generalized abdominal pain  Patient reports ongoing lower abdominal pain over the last 2-3 weeks that spreads up the sides of her belly and sometimes into the back  The pain is accompanied by diarrhea, nausea, and vomiting  Patient also endorses near-daily fevers of ~100 F  She was recently seen 9/6/22 in the ED for similar complaints and was sent home on a 10-day course of ciprofloxacin and metronidazole for likely colitis  Despite trying to take the pills, patient states that she had no symptomatic relief  She denies any headache, chest pain, SOB, dysuria, or flank pain  Patient underwent one chemotherapy session in June 2022 for her breast cancer  Summary of Hospital Course:   CT abdomen/pelvis showed diffuse thickening of entire colon with prominent pericolonic vascularity of the descending colon and rectum, consistent with proctocolitis  She was evaluated by GI and started po vanco, bentyl for cramping and florastor   Clostridium difficile workup returned positive for PCR, however negative for toxin A and B  Stool enteric bacterial panel, blood cultures were negative for other infectious source  Given clinical correlation of symptoms, these findings are most consistent with C  difficile colitis  Patient was started on a 2-week course of p o  vancomycin, probiotics, and low-fiber diet       At this time, patient no longer complains of watery diarrhea as her stools are now formed with normal consistency  Her most recent bowel movement was yesterday afternoon  She does endorse occasional abdominal pain and bloating but this is manageable  No acute concerns or complications, PT/OT has recommended short-term rehab  Patient ready for discharge to [facility] today  Please see above list of diagnoses and related plan for additional information  Condition at Discharge: stable     Discharge Day Visit / Exam:     Subjective:  She reports she is anxious to go to rehab  She had large BM yesterday evening after dinner  No BM this am  No new complaints  Vitals: Blood Pressure: 116/64 (09/19/22 0812)  Pulse: 72 (09/19/22 0812)  Temperature: 98 4 °F (36 9 °C) (09/19/22 0812)  Temp Source: Oral (09/14/22 0500)  Respirations: 18 (09/19/22 0812)  Height: 5' (152 4 cm) (09/14/22 1500)  Weight - Scale: 67 7 kg (149 lb 4 oz) (09/14/22 1500)  SpO2: 95 % (09/19/22 8881)  Exam:   Physical Exam  Vitals and nursing note reviewed  Constitutional:       General: She is not in acute distress  Appearance: She is well-developed  She is not ill-appearing, toxic-appearing or diaphoretic  HENT:      Head: Normocephalic and atraumatic  Eyes:      General: No scleral icterus  Conjunctiva/sclera: Conjunctivae normal    Cardiovascular:      Rate and Rhythm: Normal rate and regular rhythm  Pulses: Normal pulses  Heart sounds: Normal heart sounds  No murmur heard  No gallop  Pulmonary:      Effort: Pulmonary effort is normal  No respiratory distress        Breath sounds: Normal breath sounds  No wheezing or rales  Abdominal:      General: Bowel sounds are normal  There is no distension  Palpations: Abdomen is soft  Tenderness: There is no abdominal tenderness  There is no guarding  Musculoskeletal:      Cervical back: Neck supple  Right lower leg: No edema  Left lower leg: No edema  Skin:     General: Skin is warm and dry  Neurological:      General: No focal deficit present  Mental Status: She is alert  Psychiatric:         Mood and Affect: Mood normal          Behavior: Behavior normal          Discussion with Family: Discussed with the patient and Mac Phalen above plans  Discharge instructions/Information to patient and family:   See after visit summary for information provided to patient and family  Provisions for Follow-Up Care:  See after visit summary for information related to follow-up care and any pertinent home health orders  Disposition:     [facility] to [facility]:   · Not Applicable to this Patient - Not Applicable to this Patient    Planned Readmission: No     Discharge Statement:  I spent 30 minutes discharging the patient  This time was spent on the day of discharge  I had direct contact with the patient on the day of discharge  Greater than 50% of the total time was spent examining patient, answering all patient questions, arranging and discussing plan of care with patient as well as directly providing post-discharge instructions  Additional time then spent on discharge activities  Discharge Medications:  See after visit summary for reconciled discharge medications provided to patient and family        ** Please Note: This note has been constructed using a voice recognition system **

## 2022-09-19 NOTE — DISCHARGE SUMMARY
[facility]  Discharge- Mahogany Winkler 1945, 78 y o  female MRN: [id number]  Unit/Bed#: -[facility] Encounter: [id number]  Primary Care Provider: Kierra Kam MD   Date and time admitted to hospital: 9/14/2022  4:51 AM    Generalized weakness  Assessment & Plan  · PT/OT recommends short term rehab  · Patient being discharged to [facility]  Transaminitis  Assessment & Plan  Lab Results   Component Value Date    ALT 98 (H) 09/17/2022    AST 79 (H) 09/17/2022    ALKPHOS 124 (H) 09/17/2022     ·   · Continue to trend   · Mild transaminitis   · No further work up at this time per GI  Proctocolitis  Assessment & Plan  - Patient presents with generalized abdominal pain with non-bloody diarrhea, nausea, vomiting, and fever   - Patient previously presented 9/6 for similar symptoms and was sent home with ciprofloxacin and metronidazole for likely colitis  Patient reports no symptomatic relief  · CT abdomen/pelvis: "Diffuse progressive colorectal thickening of entire colon  Diffuse prominent pericolonic vascularity of descending colon through rectum," consistent with proctocolitis  · C  diff PCR positive however toxin A + B negative  Likely colonization given clinical correlation of symptoms  · Blood cultures negative so far  · Stool enteric baterial panel negative  · Stool fecal calprotectin: 01209  · Discontinue Unasyn 1 5g q12hr given bacteria panel negative    Plan:   · Continue p o  vancomycin 125 mg b i d    Planning to give 2 weeks  · Patient refused Flagyl  · Advanced diet to low-fiber diet  · Monitor stool count  · Continue probiotics Florastor and bentyl  · Appreciate GI consult recommendations  · Encourage oral hydration    Continuous opioid dependence ([address] )  Assessment & Plan  · Patient currently on dilaudid 0 2 mg q4hrs p r n and percocet q6hr prn for pain given acute worsening due to colitis    Malignant neoplasm of overlapping sites of right breast in female, estrogen receptor negative ([facility] )  Assessment & Plan  · Patient reports stage 1 breast cancer, with one dose of chemotherapy in June 2022  · Has denied further chemotherapeutic regimens to focus on quality of life  · She is currently DNR/DNI    COPD (chronic obstructive pulmonary disease) ([facility] )  Assessment & Plan  · Patient smoked for 57 years and quit at 73 y/o  · COPD appears well controlled  · Continue chronic inhaler regimen    Stage 3 chronic kidney disease [facility])  Assessment & Plan  Lab Results   Component Value Date    EGFR 59 09/19/2022    EGFR 74 09/17/2022    EGFR 61 09/16/2022    CREATININE 0 94 09/19/2022    CREATININE 0 78 09/17/2022    CREATININE 0 92 09/16/2022     · Baseline Cr seems to be around 1 1 -1 2   · On admission, creatinine 1 49  Improved with IV fluid hydration  · Encourage oral hydration  · Monitor kidney function, avoid nephrotoxins    Hyperlipidemia  Assessment & Plan  ·  Home medications include rosuvastatin 20 mg  · Continue atorvastatin which is formulary substitute    Aortoiliac occlusive disease ([place] [address] )  Assessment & Plan  · No acute concerns at this time  · Continue statin    Anxiety  Assessment & Plan  · Stable  · Continue home xanax 0 5 mg bid prn  She needed one dose on 9/16  * C  difficile colitis  Assessment & Plan  · Presented with profuse foul smelling watery diarrhea  · C diff positive  · She states she does not like to drink water  She drinks only juice and sweet tea  Encourage to drink water only and avoid sugary juice     · Significantly improved with oral vancomycin, bowel movements improving day by day  · Continue oral vanco for 10 days until Sep 24th  · PT/OT recommended short-term rehab, patient going to [address] [place] [address] Problems             Resolved Problems  Date Reviewed: 9/17/2022   None                 Admission Date:   Admission Orders (From admission, onward)     Ordered        09/14/22 [facility]  Once Physical Exam  Constitutional:       General: She is not in acute distress  Appearance: Normal appearance  HENT:      Head: Normocephalic and atraumatic  Cardiovascular:      Rate and Rhythm: Normal rate and regular rhythm  Heart sounds: Normal heart sounds  No murmur heard  Pulmonary:      Effort: Pulmonary effort is normal  No respiratory distress  Breath sounds: Normal breath sounds  Abdominal:      General: Bowel sounds are normal  There is no distension  Palpations: Abdomen is soft  Tenderness: There is no abdominal tenderness  There is no guarding or rebound  Skin:     General: Skin is warm and dry  Neurological:      Mental Status: She is alert and oriented to person, place, and time  Psychiatric:         Mood and Affect: Mood normal          Behavior: Behavior normal          Thought Content: Thought content normal              Admitting Diagnosis: Proctocolitis [K52 9]    HPI: Rakesh Noyola is a 78 y o  female with a history of right breast cancer stage IB, CKD3, GERD, HLD, and COPD presented 9/14/22 for generalized abdominal pain  Patient reports ongoing lower abdominal pain over the last 2-3 weeks that spreads up the sides of her belly and sometimes into the back  The pain is accompanied by diarrhea, nausea, and vomiting  Patient also endorses near-daily fevers of ~100 F  She was recently seen 9/6/22 in the ED for similar complaints and was sent home on a 10-day course of ciprofloxacin and metronidazole for likely colitis  Despite trying to take the pills, patient states that she had no symptomatic relief  She denies any headache, chest pain, SOB, dysuria, or flank pain  Patient underwent one chemotherapy session in June 2022 for her breast cancer  Procedures Performed: No orders of the defined types were placed in this encounter        Summary of Hospital Course:   CT abdomen/pelvis showed diffuse thickening of entire colon with prominent pericolonic vascularity of the descending colon and rectum, consistent with proctocolitis  Clostridium difficile workup returned positive for PCR, however negative for toxin A and B  Stool enteric bacterial panel, blood cultures were negative for other infectious source  Given clinical correlation of symptoms, these findings are most consistent with C  difficile colitis  Patient was started on a 2-week course of p o  vancomycin, probiotics, and low-fiber diet  At this time, patient no longer complains of watery diarrhea as her stools are now formed with normal consistency  Her most recent bowel movement was yesterday afternoon  She does endorse occasional abdominal pain and bloating but this is manageable  No acute concerns or complications, PT/OT has recommended short-term rehab  Patient ready for discharge to [facility]  Significant Findings, Care, Treatment and Services Provided:     Complications: none    Condition at Discharge: stable         Discharge instructions/Information to patient and family:   See after visit summary for information provided to patient and family  Provisions for Follow-Up Care:  See after visit summary for information related to follow-up care and any pertinent home health orders  PCP: Emily Andersen MD    Disposition: [facility]-term re[facility] at [facility] Readmission: No    Discharge Statement   I spent 20 minutes discharging the patient  This time was spent on the day of discharge  I had direct contact with the patient on the day of discharge  Additional documentation is required if more than 30 minutes were spent on discharge  Discharge Medications:  See after visit summary for reconciled discharge medications provided to patient and family

## 2022-09-19 NOTE — ASSESSMENT & PLAN NOTE
· PT/OT recommends short term rehab  · Patient being discharged to [facility]

## 2022-09-19 NOTE — PLAN OF CARE
Problem: PHYSICAL THERAPY ADULT  Goal: Performs mobility at highest level of function for planned discharge setting  See evaluation for individualized goals  Description: Treatment/Interventions: Functional transfer training, LE strengthening/ROM, Endurance training, Patient/family training, Therapeutic exercise, Bed mobility, Gait training, Spoke to nursing, OT          See flowsheet documentation for full assessment, interventions and recommendations  Outcome: Progressing  Note: Prognosis: Good  Problem List: Decreased strength, Decreased endurance, Impaired balance, Decreased mobility  Assessment: Pt seen for PT treatment session this date with interventions consisting of gait training w/ emphasis on improving pt's ability to ambulate level surfaces x 2x4' with min A provided by therapist with RW, Therapeutic exercise consisting of: LE exercises performed in supine and seated positions and therapeutic activity consisting of training: bed mobility, supine<>sit transfers, sit<>stand transfers, vc and tactile cues for static standing posture faciliation and stand pivot transfers towards R direction  Pt agreeable to PT treatment session upon arrival, pt found supine in bed w/ HOB elevated, in no apparent distress and responsive  In comparison to previous session, pt with improvements in LE strength, balance and mobility  Post session: pt returned back to recliner, chair alarm engaged, all needs in reach and RN notified of session findings/recommendations  Continue to recommend post acute rehabilitation services at time of d/c in order to maximize pt's functional independence and safety w/ mobility  Pt continues to be functioning below baseline level, and remains limited 2* factors listed above and including continued need for medical management and monitoring, decreased strength and balance resulting in an increased risk for falls, decreased activity tolerance and endurance   PT will continue to see pt during current hospitalization in order to address the deficits listed above and provide interventions consistent w/ POC in effort to achieve STGs  Barriers to Discharge: Inaccessible home environment, Decreased caregiver support     PT Discharge Recommendation: Post acute rehabilitation services    See flowsheet documentation for full assessment

## 2022-09-19 NOTE — ASSESSMENT & PLAN NOTE
Lab Results   Component Value Date    EGFR 59 09/19/2022    EGFR 74 09/17/2022    EGFR 61 09/16/2022    CREATININE 0 94 09/19/2022    CREATININE 0 78 09/17/2022    CREATININE 0 92 09/16/2022     · Baseline Cr seems to be around 1 1 -1 2   · On admission, creatinine 1 49  Improved with IV fluid hydration  · Encourage oral hydration     · Monitor kidney function, avoid nephrotoxins

## 2022-09-19 NOTE — ASSESSMENT & PLAN NOTE
Lab Results   Component Value Date    ALT 98 (H) 09/17/2022    AST 79 (H) 09/17/2022    ALKPHOS 124 (H) 09/17/2022     · Mild transaminitis   · No further work up at this time per GI

## 2022-09-19 NOTE — CASE MANAGEMENT
Case Management Discharge Planning Note    Patient name Suad Cough  Location /-01 MRN [id number]  : 1945 Date 2022       Current Admission Date: 2022  Current Admission Diagnosis:C  difficile colitis   Patient Active Problem List    Diagnosis Date Noted    Generalized weakness 2022    C  difficile colitis 2022    Proctocolitis 2022    Transaminitis 2022    Diarrhea 2022    Chemotherapy induced neutropenia ([facility] ) 2022    Diverticulitis 2022    Oral candidiasis 2022    Cellulitis 2022    Continuous opioid dependence ([facility] ) 2022    Malignant neoplasm of overlapping sites of right breast in female, estrogen receptor negative ([facility] ) 2022    COPD (chronic obstructive pulmonary disease) ([place] [address] )     Prediabetes 2021    Vitamin D deficiency 2021    Acute right ankle pain 2021    Closed avulsion fracture of lateral malleolus of right fibula 2021    Seborrheic keratoses 2020    BMI 34 0-34 9,adult 2019    Stage 3 chronic [facility] ) 2019    Pancreatic mass 2019    Chronic sinusitis 2018    PVD (peripheral vascular disease) ([facility] ) 2018    Carotid artery plaque, bilateral 2017    Restless leg syndrome 2016    Aortoiliac occlusive disease ([facility] ) 2015    Subclavian artery stenosis, left ([facility] ) 2015    Centrilobular emphysema ([facility] ) 2015    Anxiety 2013    Osteoporosis 2013    Hyperlipidemia 2013      LOS (days): 5  Geometric Mean LOS (GMLOS) (days): 2 60  Days to GMLOS:-2 5     OBJECTIVE:  Risk of Unplanned Readmission Score: 31 68         Current admission status: Inpatient   Preferred Pharmacy:   [id number]  [facility] [address]  Phone: [phone] Fax: [phone]    Primary Care Provider: Crissie Habermann, MD    Primary Insurance: MEDICARE  Secondary Insurance: BLUE CROSS    DISCHARGE DETAILS:    Discharge planning discussed with[de-identified] Radha Arrow of Choice: Yes  Comments - Freedom of Choice: Pt to be discharged to [facility] today at 13:00    Son informed that [facility] does have some cases of Covid, but his mother will be segregated away from these patients                                                                             IMM Given (Date):: 09/19/22

## 2022-09-19 NOTE — ASSESSMENT & PLAN NOTE
Lab Results   Component Value Date    ALT 98 (H) 09/17/2022    AST 79 (H) 09/17/2022    ALKPHOS 124 (H) 09/17/2022     ·   · Continue to trend   · Mild transaminitis   · No further work up at this time per GI

## 2022-09-19 NOTE — INCIDENTAL FINDINGS
The following findings require follow up:  Radiographic finding   Finding: CT abdomen pelvis with contrast: Diffuse progressive nonspecific uncomplicated proctocolitis , Incidental finding of new trace fluid within the uterine endometrial cavity likely within normal limits   This could be followed up with nonemergent pelvic ultrasound if clinically warranted     Follow up required: with primary care physician   Follow up should be done within 1-2 week(s)    Please notify the following clinician to assist with the follow up:   Dr Maxx Rubio MD

## 2022-09-20 ENCOUNTER — TRANSITIONAL CARE MANAGEMENT (OUTPATIENT)
Dept: INTERNAL MEDICINE CLINIC | Facility: CLINIC | Age: 77
End: 2022-09-20

## 2022-09-20 ENCOUNTER — TELEPHONE (OUTPATIENT)
Dept: GASTROENTEROLOGY | Facility: CLINIC | Age: 77
End: 2022-09-20

## 2022-09-20 VITALS
DIASTOLIC BLOOD PRESSURE: 84 MMHG | RESPIRATION RATE: 15 BRPM | HEIGHT: 60 IN | TEMPERATURE: 97.6 F | HEART RATE: 86 BPM | OXYGEN SATURATION: 95 % | WEIGHT: 149.25 LBS | BODY MASS INDEX: 29.3 KG/M2 | SYSTOLIC BLOOD PRESSURE: 136 MMHG

## 2022-09-20 NOTE — TELEPHONE ENCOUNTER
----- Message from Chely Baker MD sent at 9/16/2022  4:26 PM EDT -----  She is to have a colonoscopy with MiraLax prep in 6 weeks-8 weeks

## 2022-09-20 NOTE — TELEPHONE ENCOUNTER
Spoke with the patient and she is in rehab right now and doesn't want to schedule anything  She has a follow up with Alejo Méndez and will schedule after that

## 2022-10-07 ENCOUNTER — TELEPHONE (OUTPATIENT)
Dept: INTERNAL MEDICINE CLINIC | Facility: CLINIC | Age: 77
End: 2022-10-07

## 2022-10-07 PROBLEM — Z98.890 HISTORY OF LUMPECTOMY OF RIGHT BREAST: Status: ACTIVE | Noted: 2022-10-07

## 2022-10-07 RX ORDER — ONDANSETRON 4 MG/1
4 TABLET, FILM COATED ORAL EVERY 8 HOURS
COMMUNITY
Start: 2022-07-15 | End: 2022-10-10

## 2022-10-07 RX ORDER — ATORVASTATIN CALCIUM 40 MG/1
TABLET, FILM COATED ORAL
COMMUNITY
Start: 2022-10-04

## 2022-10-08 DIAGNOSIS — G25.81 RLS (RESTLESS LEGS SYNDROME): ICD-10-CM

## 2022-10-08 RX ORDER — GABAPENTIN 300 MG/1
CAPSULE ORAL
Qty: 180 CAPSULE | Refills: 0 | Status: SHIPPED | OUTPATIENT
Start: 2022-10-08

## 2022-10-10 ENCOUNTER — OFFICE VISIT (OUTPATIENT)
Dept: INTERNAL MEDICINE CLINIC | Facility: CLINIC | Age: 77
End: 2022-10-10
Payer: MEDICARE

## 2022-10-10 ENCOUNTER — OFFICE VISIT (OUTPATIENT)
Dept: SURGICAL ONCOLOGY | Facility: CLINIC | Age: 77
End: 2022-10-10
Payer: MEDICARE

## 2022-10-10 VITALS
BODY MASS INDEX: 29.64 KG/M2 | DIASTOLIC BLOOD PRESSURE: 64 MMHG | OXYGEN SATURATION: 93 % | HEIGHT: 60 IN | WEIGHT: 151 LBS | HEART RATE: 87 BPM | TEMPERATURE: 98.8 F | SYSTOLIC BLOOD PRESSURE: 120 MMHG

## 2022-10-10 VITALS
SYSTOLIC BLOOD PRESSURE: 138 MMHG | BODY MASS INDEX: 29.64 KG/M2 | TEMPERATURE: 97.2 F | RESPIRATION RATE: 16 BRPM | OXYGEN SATURATION: 94 % | HEART RATE: 78 BPM | WEIGHT: 151 LBS | HEIGHT: 60 IN | DIASTOLIC BLOOD PRESSURE: 76 MMHG

## 2022-10-10 DIAGNOSIS — Z17.1 MALIGNANT NEOPLASM OF OVERLAPPING SITES OF RIGHT BREAST IN FEMALE, ESTROGEN RECEPTOR NEGATIVE (HCC): ICD-10-CM

## 2022-10-10 DIAGNOSIS — C50.811 MALIGNANT NEOPLASM OF OVERLAPPING SITES OF RIGHT BREAST IN FEMALE, ESTROGEN RECEPTOR NEGATIVE (HCC): ICD-10-CM

## 2022-10-10 DIAGNOSIS — N18.31 STAGE 3A CHRONIC KIDNEY DISEASE (HCC): ICD-10-CM

## 2022-10-10 DIAGNOSIS — J41.0 SIMPLE CHRONIC BRONCHITIS (HCC): ICD-10-CM

## 2022-10-10 DIAGNOSIS — K57.92 DIVERTICULITIS: Primary | ICD-10-CM

## 2022-10-10 DIAGNOSIS — Z12.2 ENCOUNTER FOR SCREENING FOR LUNG CANCER: ICD-10-CM

## 2022-10-10 DIAGNOSIS — C50.811 MALIGNANT NEOPLASM OF OVERLAPPING SITES OF RIGHT BREAST IN FEMALE, ESTROGEN RECEPTOR NEGATIVE (HCC): Primary | ICD-10-CM

## 2022-10-10 DIAGNOSIS — Z17.1 MALIGNANT NEOPLASM OF OVERLAPPING SITES OF RIGHT BREAST IN FEMALE, ESTROGEN RECEPTOR NEGATIVE (HCC): Primary | ICD-10-CM

## 2022-10-10 DIAGNOSIS — Z12.11 ENCOUNTER FOR SCREENING FOR MALIGNANT NEOPLASM OF COLON: ICD-10-CM

## 2022-10-10 PROCEDURE — 99214 OFFICE O/P EST MOD 30 MIN: CPT | Performed by: SURGERY

## 2022-10-10 PROCEDURE — 99214 OFFICE O/P EST MOD 30 MIN: CPT

## 2022-10-10 NOTE — PROGRESS NOTES
Surgical Oncology Follow Up  [facility]  [facility] [place] [address]    Mikayla Germain  1945  [id number]      Chief Complaint   Patient presents with   • Follow-up        Assessment & Plan:   She is here for follow-up with breast cancer  She has not been receiving adjuvant radiation as she has been in and out of the hospital with abdominal symptoms and rehab  She is here with her granddaughter  I emphasized as discuss that need for Radiation Oncology consultation she agrees to follow-up  We will follow her 3 months later  With regard to pancreatic cyst she had a CT abdomen and pelvis with contrast while she was in the hospital which demonstrated stable cyst therefore we will continue to follow her closely  As chromogranin a level also reviewed and it was significantly come down  Cancer History:     Oncology History Overview Note   with multiple comorbidities recently diagnosed with clinical stage T2N0 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   Tumor cells are ER/IN/H2N (-)  Her case was presented at Multidisciplinary Breast Tumor Board on 4/4/22  Preliminary recommendation was surgery followed by chemotherapy followed by adjuvant radiation pending final pathology  She was seen in consult at the breast [facility] on 4/4/22  She underwent right breast lumpectomy on 4/28/22 and then started adjuvant chemotherapy  Her chemo dose was reduced by 25% because of age and other comorbidities  She was then admitted to the hospital for diverticulitis  Plan was made to discontinue chemotherapy and proceed with Radiation therapy   She presents today for follow up      4/28/22 ISAI  DIRECTED LUMPECTOMY (Right Breast) with sentinel lymph node biopsy    5/4/22 Dr Patrizia Jewell  40 cc of seroma was aspirated     5/18/22 Dr Patrizia Jewell  Reviewed path  Plan for adjuvant chemotherapy and radiation  Follow up 6 months    5/19/22   [place]  Final pathologic stage pT1c pN0  I recommend adjuvant chemotherapy with TC for 4 cycles with Neulasta,  followed by adjuvant radiation therapy  Because of her age, and other medical comorbidities including COPD, I will decrease chemo dose by 25%  22 Dr Megha Vazquez  postpone chemotherapy in 2 weeks because of tooth extraction and cellulitis in surgical site  22 Dr Susana Mart  discomfort in her right breast    30 cc of seroma was aspirated     22 Infusion    22 Hospitalization for acute diverticulitis   · Does not meet sepsis criteria      22 CT C/A/P wo contrast  No evidence of metastatic disease throughout the chest, abdomen or pelvis  Stable pancreatic head lesion  Soft tissue postsurgical changes from right breast lumpectomy and axillary node dissection  Findings consistent with mild acute uncomplicated sigmoid diverticulitis  22 Dr Asif Maloney  She had decided to d/c all further chemo and proceed to RT which  is not unreasonable  will continue to follow along and defer to Surg -Onc to get mammograms ordered  Cancelling infusion chairs  Upcomin22 Dr Susana Mart  22 Medical Oncology     Malignant neoplasm of overlapping sites of right breast in female, estrogen receptor negative ([facility] )   3/25/2022 Initial Diagnosis    Malignant neoplasm of right female breast ([facility] )     3/25/2022 Biopsy    Right breast ultrasound guided biopsy  12 o'clock 5 cm from nipple  Invasive breast carcinoma of no special type (ductal NST/ invasive ductal carcinoma with apocrine features)  Grade 2  ER 0  PA 0  HER 2 2+   FISH negative  Lymphovascular invasion not identified    Concordant  Malignancy is unifocal  Mass measures 1 4 cm on mammogram and 1 9 cm on ultrasound  Right axilla ultrasound clear  Left brest clear  Amanda  reflector placed  In situ compononent: 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       2022 -  Cancer Staged    Staging form: Breast, AJCC 8th Edition  - Clinical stage from 4/4/2022: Stage IB (cT1c, cN0, cM0, G2, ER-, KS-, HER2-) - Signed by Fidel Louis MD on 4/4/2022  Stage prefix: Initial diagnosis  Nuclear grade: G2  Histologic grading system: 3 grade system  HER2-IHC interpretation: Equivocal  HER2-IHC value: Score 2+  HER2-FISH interpretation: Negative       4/4/2022 Genetic Testing    [facility]  A total of 36 genes were evaluated, including: GILL, BRCA1, BRCA2, CDH1, CHEK2, PALB2, PTEN, STK11, TP53  Negative result  No pathogenic sequence variants or deletions/duplications identified     4/28/2022 Surgery    Right breast lexy  directed lumpectomy with sentinel lymph node biopsy and axillary dissection  Invasive ductal carcinoma with apocrine features  Grade 3  1 4 cm  0/4 Lymph nodes       6/14/2022 - 6/15/2022 Chemotherapy    Pegfilgrastim-bmez (ZIEXTENZO), 6 mg, Subcutaneous, Once, 1 of 4 cycles  Administration: 6 mg (6/15/2022)  cyclophosphamide (CYTOXAN) IVPB, 450 mg/m2 = 778 mg (75 % of original dose 600 mg/m2), Intravenous, Once, 1 of 4 cycles  Dose modification: 450 mg/m2 (75 % of original dose 600 mg/m2, Cycle 1, Reason: Dose Not Tolerated)  Administration: 778 mg (6/14/2022)  DOCEtaxel (TAXOTERE) chemo infusion, 56 25 mg/m2 = 97 4 mg (75 % of original dose 75 mg/m2), Intravenous, Once, 1 of 4 cycles  Dose modification: 56 25 mg/m2 (75 % of original dose 75 mg/m2, Cycle 1, Reason: Dose Not Tolerated)  Administration: 97 4 mg (6/14/2022)           Interval History:   Follow-up with right breast cancer    Review of Systems:   Review of Systems   Constitutional: Negative for chills and fever  HENT: Negative for ear pain and sore throat  Eyes: Negative for pain and visual disturbance  Respiratory: Negative for cough and shortness of breath  Cardiovascular: Negative for chest pain and palpitations  Gastrointestinal: Negative for abdominal pain and vomiting  Genitourinary: Negative for dysuria and hematuria  Musculoskeletal: Negative for arthralgias and back pain  Skin: Negative for color change and rash  Neurological: Negative for seizures and syncope  All other systems reviewed and are negative  Past Medical History     Patient Active Problem List   Diagnosis   • Anxiety   • Aortoiliac occlusive disease ([place] [address] )   • Carotid artery plaque, bilateral   • Centrilobular emphysema ([facility] )   • Hyperlipidemia   • Osteoporosis   • Restless leg syndrome   • Subclavian artery stenosis, left ([facility])   • PVD (peripheral vascular disease) ([facility] )   • Chronic sinusitis   • Pancreatic mass   • BMI 34 0-34 9,adult   • Seborrheic keratoses   • Stage 3 chronic kidney disease ([place] [address] )   • Closed avulsion fracture of lateral malleolus of right fibula   • Prediabetes   • Vitamin D deficiency   • Acute right ankle pain   • COPD (chronic obstructive pulmonary disease) ([facility])   • Malignant neoplasm of overlapping sites of right breast in female, estrogen receptor negative ([place] [address] )   • Continuous opioid dependence ([place] [address] )   • Cellulitis   • Diverticulitis   • Oral candidiasis   • Chemotherapy induced neutropenia ([facility])   • Diarrhea   • Proctocolitis   • Transaminitis   • Generalized weakness   • C  difficile colitis   • History of lumpectomy of right breast     Past Medical History:   Diagnosis Date   • Anxiety    • Atherosclerosis of native artery of both lower extremities with intermittent claudication ([facility] ) 10/15/2015    Transitioned From: Cardiovascular Symptoms   • Breast cancer ([facility] )    • Carotid artery plaque, bilateral 03/21/2017    Transitioned From:  Atherosclerosis of both carotid arteries   • Chronic kidney disease    • Chronic sinusitis     Last assessed: 11/11/13   • COPD (chronic obstructive pulmonary disease) ([facility] )    • COVID     12/25/21 not hopsitalized- flu-like symptoms   • Fall 06/16/2021   • Fracture, ankle closed, bimalleolar, right, initial encounter 06/2021   • GERD (gastroesophageal reflux disease)    • Hyperlipidemia    • Lung nodule    • PNA (pneumonia)    • PONV (postoperative nausea and vomiting)     with C-sections   • Pulmonary emphysema (HCC)    • PVD (peripheral vascular disease) (H[facility])    • Restless leg syndrome    • Stage 3 chronic kidney disease ([facility] ) 2019   • Tobacco abuse      Past Surgical History:   Procedure Laterality Date   • BREAST LUMPECTOMY Right 2022    Procedure: ISAI  DIRECTED LUMPECTOMY;  Surgeon: Fredy Carrillo MD;  Location: [facility];  Service: Surgical Oncology   • CATARACT EXTRACTION     •  SECTION     • COLONOSCOPY      Complete  Resolved: 13   • DESCENDING AORTIC ANEURYSM REPAIR W/ STENT  2019   • IR AORTAGRAM WITH RUN-OFF  8/15/2019   • LYMPH NODE BIOPSY Right 2022    Procedure: LYMPHATIC MAPPING WITH BLUE AND RADIOACTIVE DYES, SENTINEL LYMPH NODE BIOPSY, INJECTION IN OR BY DR RODRÍGUEZ AT 1300;  Surgeon: Fredy Carrillo MD;  Location: [facility];  Service: Surgical Oncology   • SHOULDER SURGERY      For frozen shoulder    • TONSILLECTOMY     • US BREAST ISAI  NEEDLE LOC RIGHT Right 3/25/2022   • US GUIDED BREAST BIOPSY RIGHT COMPLETE Right 3/25/2022     Family History   Problem Relation Age of Onset   • No Known Problems Mother    • No Known Problems Father    • Arthritis Sister    • Pancreatic cancer Sister 61   • Melanoma Brother         twice   • Prostate cancer Brother    • Heart attack Family         Acute Myocardial Infarction   • Cirrhosis Family    • Prostate cancer Family    • Skin cancer Family    • Stroke Family         Syndrome   • No Known Problems Daughter    • No Known Problems Maternal Grandmother    • No Known Problems Paternal Grandmother    • No Known Problems Sister    • No Known Problems Maternal Aunt    • Breast cancer Other 27     Social History     Socioeconomic History   • Marital status:       Spouse name: Not on file   • Number of children: 2   • Years of education: Not on file   • Highest education level: Not on file   Occupational History   • Occupation: Retired/   Tobacco Use   • Smoking status: Former Smoker     Packs/day: 2 00     Years: 56 00     Pack years: 112 00     Types: Cigarettes     Start date: 1962     Quit date: 2018     Years since quittin 3   • Smokeless tobacco: Never Used   Vaping Use   • Vaping Use: Never used   Substance and Sexual Activity   • Alcohol use: Yes     Comment: occasionally    • Drug use: No   • Sexual activity: Not Currently     Partners: Male   Other Topics Concern   • Not on file   Social History Narrative    Living w/adult children    No advance directive on file     Social Determinants of Health     Financial Resource Strain: Not on file   Food Insecurity: Not on file   Transportation Needs: No Transportation Needs   • Lack of Transportation (Medical): No   • Lack of Transportation (Non-Medical):  No   Physical Activity: Not on file   Stress: Not on file   Social Connections: Not on file   Intimate Partner Violence: Not on file   Housing Stability: Low Risk    • Unable to Pay for Housing in the Last Year: No   • Number of Places Lived in the Last Year: 1   • Unstable Housing in the Last Year: No       Current Outpatient Medications:   •  albuterol (2 5 mg/3 mL) 0 083 % nebulizer solution, Take 1 vial (2 5 mg total) by nebulization every 6 (six) hours as needed for wheezing or shortness of breath, Disp: 360 mL, Rfl: 3  •  albuterol (PROVENTIL HFA,VENTOLIN HFA) 90 mcg/act inhaler, Inhale 2 puffs every 6 (six) hours as needed for wheezing, Disp: 3 Inhaler, Rfl: 3  •  ALPRAZolam (XANAX) 0 5 mg tablet, Take 1 tablet (0 5 mg total) by mouth 2 (two) times a day as needed for anxiety, Disp: 20 tablet, Rfl: 0  •  ALPRAZolam (XANAX) 0 5 mg tablet, Take 1 tablet (0 5 mg total) by mouth 2 (two) times a day as needed for anxiety, Disp: 6 tablet, Rfl: 0  •  ASPIRIN 81 PO, Take by mouth, Disp: , Rfl:   •  atorvastatin (LIPITOR) 40 mg tablet, , Disp: , Rfl:   •  Breo Ellipta 100-25 MCG/INH inhaler, inhale 1 puff daily Rinse mouth after use, Disp: 180 blister, Rfl: 1  •  dicyclomine (BENTYL) 10 mg capsule, Take 1 capsule (10 mg total) by mouth 3 (three) times a day before meals, Disp: 30 capsule, Rfl: 0  •  ergocalciferol (VITAMIN D2) 50,000 units, take 1 capsule by mouth every week, Disp: 12 capsule, Rfl: 0  •  famotidine (PEPCID) 20 mg tablet, Take 1 tablet (20 mg total) by mouth daily, Disp: , Rfl: 0  •  fluticasone (FLONASE) 50 mcg/act nasal spray, 2 sprays into each nostril daily, Disp: 11 1 mL, Rfl: 1  •  gabapentin (NEURONTIN) 300 mg capsule, take 1 capsule by mouth twice a day, Disp: 180 capsule, Rfl: 0  •  gabapentin (NEURONTIN) 400 mg capsule, take 1 capsule by mouth at bedtime, Disp: 90 capsule, Rfl: 1  •  naloxone (NARCAN) 4 mg/0 1 mL nasal spray, Administer 1 spray into a nostril   If no response after 2-3 minutes, give another dose in the other nostril using a new spray , Disp: 1 each, Rfl: 1  •  olopatadine (PATANOL) 0 1 % ophthalmic solution,  , Disp: , Rfl: 0  •  ondansetron (Zofran ODT) 4 mg disintegrating tablet, Take 1 tablet (4 mg total) by mouth every 6 (six) hours as needed for nausea or vomiting, Disp: 20 tablet, Rfl: 0  •  ondansetron (ZOFRAN) 4 mg tablet, Take 4 mg by mouth every 8 (eight) hours, Disp: , Rfl:   •  ondansetron (ZOFRAN) 8 mg tablet, Take 1 tablet (8 mg total) by mouth every 8 (eight) hours as needed for nausea or vomiting, Disp: 20 tablet, Rfl: 2  •  oxyCODONE-acetaminophen (Percocet) 5-325 mg per tablet, Take 1 tablet by mouth every 6 (six) hours as needed for moderate pain Max Daily Amount: 4 tablets, Disp: 20 tablet, Rfl: 0  •  oxyCODONE-acetaminophen (Percocet) 5-325 mg per tablet, Take 1 tablet by mouth every 6 (six) hours as needed for moderate pain or severe pain Max Daily Amount: 4 tablets, Disp: 12 tablet, Rfl: 0  •  rosuvastatin (CRESTOR) 20 MG tablet, Take 1 tablet (20 mg total) by mouth daily, Disp: 90 tablet, Rfl: 6  • saccharomyces boulardii (FLORASTOR) 250 mg capsule, Take 1 capsule (250 mg total) by mouth 2 (two) times a day, Disp: , Rfl: 0  •  simethicone (MYLICON) 40 FB/6 9 mL drops, Take 0 6 mL (40 mg total) by mouth every 6 (six) hours as needed for flatulence, Disp: , Rfl: 0  •  sodium chloride () 2 % hypertonic ophthalmic solution, Sue 128 2 % eye drops, Disp: , Rfl:   Allergies   Allergen Reactions   • Tiotropium Bromide Monohydrate Shortness Of Breath   • Augmentin [Amoxicillin-Pot Clavulanate] Abdominal Pain     Pt received ceftriaxone 1 g IV on 5-21-18, gets sharp pains    • Tiotropium Abdominal Pain   • Tylenol With Codeine #3 [Acetaminophen-Codeine] Abdominal Pain   • Other Other (See Comments)       Physical Exam:     Vitals:    10/10/22 1143   BP: 138/76   Pulse: 78   Resp: 16   Temp: (!) 97 2 °F (36 2 °C)   SpO2: 94%     Physical Exam  Constitutional:       Appearance: Normal appearance  HENT:      Head: Normocephalic and atraumatic  Nose: Nose normal       Mouth/Throat:      Mouth: Mucous membranes are moist    Eyes:      Pupils: Pupils are equal, round, and reactive to light  Cardiovascular:      Rate and Rhythm: Normal rate  Pulses: Normal pulses  Heart sounds: Normal heart sounds  Pulmonary:      Effort: Pulmonary effort is normal       Breath sounds: Normal breath sounds  Chest:      Comments: Well-healed right breast incision  An axillary incision  A breast exam not suspicious mass masses  No nipple  Abdominal:      General: Bowel sounds are normal       Palpations: Abdomen is soft  Musculoskeletal:         General: Normal range of motion  Cervical back: Normal range of motion and neck supple  Skin:     General: Skin is warm  Neurological:      General: No focal deficit present  Mental Status: She is alert and oriented to person, place, and time     Psychiatric:         Mood and Affect: Mood normal          Behavior: Behavior normal          Thought Content: Thought content normal          Judgment: Judgment normal            Results & Discussion:   I did review the scan finding in detail with regard to pancreatic cyst and it is stable  We also discussed the need for adjuvant radiation and she will follow with Radiation oncologist   she understands and  agrees   All patient questions were answered  Advance Care Planning/Advance Directives: I Enrike Heck discussed the disease status with Oxana Pelletier  today 10/10/22  treatment plans and follow-up with the patient

## 2022-10-10 NOTE — PROGRESS NOTES
INTERNAL MEDICINE FOLLOW-UP VISIT  [facility]    NAME: Selena Pretty  AGE: 78 y o  SEX: female  : 1945     DATE: 10/10/2022     Assessment and Plan:   1  Diverticulitis  Recent hosp admission   Feeling well  foul-smelling stools  Patient is no longer on precautions for C diff  If foul-smelling stool as well as liquid diarrhea return seek medical attention    2  Simple chronic bronchitis (HCC)  Continues on 2 L of oxygen  She follows with pulmonology    3  Stage 3a chronic kidney disease ([facility] )  Make sure to stay hydrated each day  Limit nephrotoxins such as ibuprofen, Aleve, Advil, Motrin    4  Malignant neoplasm of overlapping sites of right breast in female, estrogen receptor negative ([facility] [address] )  Follows with Dr Danette Lucero  Patient will no longer continue with chemotherapy but is considering radiation treatments    Started with home health services for lower extremity weakness this was referred to her from the rehab  BMI Counseling: Body mass index is 29 49 kg/m²  The BMI is above normal  Nutrition recommendations include decreasing portion sizes, encouraging healthy choices of fruits and vegetables, consuming healthier snacks, limiting drinks that contain sugar, moderation in carbohydrate intake, reducing intake of saturated and trans fat and reducing intake of cholesterol  Exercise recommendations include exercising 3-5 times per week  Rationale for BMI follow-up plan is due to patient being overweight or obese  No follow-ups on file  Chief Complaint:     Chief Complaint   Patient presents with   • Follow-up     Patient recently discharged from [facility] facility      History of Present Illness:     Patient is seen today for a follow-up after hospital stay as well as a rehab stay  She was diagnosed with diverticulitis completed treatment then developed C diff  her bowels are now regular  She denies any diarrhea, nausea, or vomiting    Overall she states she is improving  She lives with her family who are primary caregivers for her and help her with all needs  The following portions of the patient's history were reviewed and updated as appropriate: allergies, current medications, past family history, past medical history, past social history, past surgical history and problem list      Review of Systems:     Review of Systems   Constitutional: Negative for appetite change, chills, diaphoresis, fatigue, fever and unexpected weight change  HENT: Negative for postnasal drip and sneezing  Eyes: Negative for visual disturbance  Respiratory: Negative for chest tightness and shortness of breath  Cardiovascular: Negative for chest pain, palpitations and leg swelling  Gastrointestinal: Negative for abdominal pain and blood in stool  Endocrine: Negative for cold intolerance, heat intolerance, polydipsia, polyphagia and polyuria  Genitourinary: Negative for difficulty urinating, dysuria, frequency and urgency  Musculoskeletal: Negative for arthralgias and myalgias  Skin: Negative for rash and wound  Neurological: Positive for weakness  Negative for dizziness, light-headedness and headaches  Hematological: Negative for adenopathy  Psychiatric/Behavioral: Negative for confusion, dysphoric mood and sleep disturbance  The patient is not nervous/anxious  Past Medical History:     Past Medical History:   Diagnosis Date   • Anxiety    • Atherosclerosis of native artery of both lower extremities with intermittent claudication ([facility] ) 10/15/2015    Transitioned From: Cardiovascular Symptoms   • Breast cancer [facility])    • Carotid artery plaque, bilateral 03/21/2017    Transitioned From:  Atherosclerosis of both carotid arteries   • Chronic kidney disease    • Chronic sinusitis     Last assessed: 11/11/13   • COPD (chronic obstructive pulmonary disease) ([facility] )    • COVID     12/25/21 not hopsitalized- flu-like symptoms   • Fall 06/16/2021   • Fracture, ankle closed, bimalleolar, right, initial encounter 06/2021   • GERD (gastroesophageal reflux disease)    • Hyperlipidemia    • Lung nodule    • PNA (pneumonia)    • PONV (postoperative nausea and vomiting)     with C-sections   • Pulmonary emphysema (HCC)    • PVD (peripheral vascular disease) (HCC)    • Restless leg syndrome    • Stage 3 chronic kidney disease ([facility] ) 04/22/2019   • Tobacco abuse         Current Medications:     Current Outpatient Medications:   •  albuterol (2 5 mg/3 mL) 0 083 % nebulizer solution, Take 1 vial (2 5 mg total) by nebulization every 6 (six) hours as needed for wheezing or shortness of breath, Disp: 360 mL, Rfl: 3  •  albuterol (PROVENTIL HFA,VENTOLIN HFA) 90 mcg/act inhaler, Inhale 2 puffs every 6 (six) hours as needed for wheezing, Disp: 3 Inhaler, Rfl: 3  •  ALPRAZolam (XANAX) 0 5 mg tablet, Take 1 tablet (0 5 mg total) by mouth 2 (two) times a day as needed for anxiety, Disp: 20 tablet, Rfl: 0  •  ASPIRIN 81 PO, Take by mouth, Disp: , Rfl:   •  Breo Ellipta 100-25 MCG/INH inhaler, inhale 1 puff daily Rinse mouth after use, Disp: 180 blister, Rfl: 1  •  dicyclomine (BENTYL) 10 mg capsule, Take 1 capsule (10 mg total) by mouth 3 (three) times a day before meals, Disp: 30 capsule, Rfl: 0  •  ergocalciferol (VITAMIN D2) 50,000 units, take 1 capsule by mouth every week, Disp: 12 capsule, Rfl: 0  •  famotidine (PEPCID) 20 mg tablet, Take 1 tablet (20 mg total) by mouth daily, Disp: , Rfl: 0  •  fluticasone (FLONASE) 50 mcg/act nasal spray, 2 sprays into each nostril daily, Disp: 11 1 mL, Rfl: 1  •  gabapentin (NEURONTIN) 300 mg capsule, take 1 capsule by mouth twice a day, Disp: 180 capsule, Rfl: 0  •  gabapentin (NEURONTIN) 400 mg capsule, take 1 capsule by mouth at bedtime, Disp: 90 capsule, Rfl: 1  •  naloxone (NARCAN) 4 mg/0 1 mL nasal spray, Administer 1 spray into a nostril   If no response after 2-3 minutes, give another dose in the other nostril using a new spray , Disp: 1 each, Rfl: 1  •  olopatadine (PATANOL) 0 1 % ophthalmic solution,  , Disp: , Rfl: 0  •  ondansetron (ZOFRAN) 8 mg tablet, Take 1 tablet (8 mg total) by mouth every 8 (eight) hours as needed for nausea or vomiting, Disp: 20 tablet, Rfl: 2  •  oxyCODONE-acetaminophen (Percocet) 5-325 mg per tablet, Take 1 tablet by mouth every 6 (six) hours as needed for moderate pain Max Daily Amount: 4 tablets, Disp: 20 tablet, Rfl: 0  •  rosuvastatin (CRESTOR) 20 MG tablet, Take 1 tablet (20 mg total) by mouth daily, Disp: 90 tablet, Rfl: 6  •  saccharomyces boulardii (FLORASTOR) 250 mg capsule, Take 1 capsule (250 mg total) by mouth 2 (two) times a day, Disp: , Rfl: 0  •  sodium chloride () 2 % hypertonic ophthalmic solution, Sue 128 2 % eye drops, Disp: , Rfl:   •  ALPRAZolam (XANAX) 0 5 mg tablet, Take 1 tablet (0 5 mg total) by mouth 2 (two) times a day as needed for anxiety, Disp: 6 tablet, Rfl: 0  •  atorvastatin (LIPITOR) 40 mg tablet, , Disp: , Rfl:      Allergies: Allergies   Allergen Reactions   • Tiotropium Bromide Monohydrate Shortness Of Breath   • Augmentin [Amoxicillin-Pot Clavulanate] Abdominal Pain     Pt received ceftriaxone 1 g IV on 5-21-18, gets sharp pains    • Tiotropium Abdominal Pain   • Tylenol With Codeine #3 [Acetaminophen-Codeine] Abdominal Pain   • Other Other (See Comments)        Physical Exam:     /64 (BP Location: Right arm, Patient Position: Sitting, Cuff Size: Standard) Comment: bp  Pulse 87   Temp 98 8 °F (37 1 °C) (Temporal) Comment: no  Ht 5' (1 524 m)   Wt 68 5 kg (151 lb)   SpO2 93%   BMI 29 49 kg/m²     Physical Exam  Constitutional:       Appearance: She is well-developed  HENT:      Head: Normocephalic and atraumatic  Eyes:      Pupils: Pupils are equal, round, and reactive to light  Neck:      Thyroid: No thyromegaly  Cardiovascular:      Rate and Rhythm: Normal rate and regular rhythm  Heart sounds: No murmur heard    Pulmonary:      Effort: Pulmonary effort is normal       Breath sounds: Normal breath sounds  Abdominal:      General: Bowel sounds are normal       Palpations: Abdomen is soft  Musculoskeletal:         General: Normal range of motion  Cervical back: Normal range of motion and neck supple  Lymphadenopathy:      Cervical: No cervical adenopathy  Skin:     General: Skin is warm and dry  Neurological:      Mental Status: She is alert and oriented to person, place, and time  Data:     Laboratory Results: I have personally reviewed the pertinent laboratory results/reports   Radiology/Other Diagnostic Testing Results: I have personally reviewed pertinent reports        [id number] OF [facility]

## 2022-10-12 ENCOUNTER — OFFICE VISIT (OUTPATIENT)
Dept: GASTROENTEROLOGY | Facility: CLINIC | Age: 77
End: 2022-10-12
Payer: MEDICARE

## 2022-10-12 VITALS
WEIGHT: 148 LBS | HEART RATE: 86 BPM | SYSTOLIC BLOOD PRESSURE: 130 MMHG | HEIGHT: 60 IN | DIASTOLIC BLOOD PRESSURE: 86 MMHG | BODY MASS INDEX: 29.06 KG/M2

## 2022-10-12 DIAGNOSIS — A04.72 C. DIFFICILE COLITIS: ICD-10-CM

## 2022-10-12 DIAGNOSIS — Z86.010 HISTORY OF COLON POLYPS: ICD-10-CM

## 2022-10-12 DIAGNOSIS — K57.92 DIVERTICULITIS: Primary | ICD-10-CM

## 2022-10-12 PROCEDURE — 99213 OFFICE O/P EST LOW 20 MIN: CPT | Performed by: PHYSICIAN ASSISTANT

## 2022-10-12 NOTE — PATIENT INSTRUCTIONS
Scheduled date of colonoscopy (as of today):12/9/22  Physician performing colonoscopy: José Luis  Location of colonoscopy:[place]  Bowel prep reviewed with patient:Jose Alejandro/Miralax  Instructions reviewed with patient by:Josesito mcfadden  Clearances:  none

## 2022-10-12 NOTE — PROGRESS NOTES
[facility] - Outpatient Follow-up Note  Karyna Murphy 78 y o  female MRN: [id number]  Encounter: [id number]          ASSESSMENT AND PLAN:      1  Diverticulitis  2  C  difficile colitis  S/P hospitalization x 2 in September  S/P 14 day vancomycin course with resolution of symptoms  Continue to eat high protein diet and increase activity  Continue probiotic  Only retest stool is recurrent severe diarrhea    3  History of colon polyps  Her last colonoscopy was in 2016 with 4 polyps removed    Will tentatively plan colonoscopy in December  ______________________________________________________________________    SUBJECTIVE:  81-year-old female with a history of COPD, breast cancer, chronic kidney disease as well as a recent hospitalization for diverticulitis and C diff colitis who presents for follow-up  She was diagnosed with breast cancer within the past year  She received 1 dose of chemotherapy in August   One week later she developed severe abdominal pain was taken to the hospital   Evaluation in the emergency room with CT and labs suggesting colitis versus diverticulitis  At that point she was not having any diarrhea and so she was treated for diverticulitis with Cipro and Flagyl  While her abdominal pain did improve slightly she developed severe watery diarrhea with dehydration and was taken back to the emergency room on September 14th  She was admitted and stool was sent for cultures  She tested positive for C diff colitis  She was treated with vancomycin by mouth 250 mg 4 times daily and did quickly improved  She was discharged to a rehab facility and finally taken back home last week  She reports that overall she is doing very well  She denies any current diarrhea  She is having 2-3 formed stools a day without pain  There is no bleeding  She is working hard to improve her diet  She is not nauseous or vomiting    She is working on staying active and is receiving home physical therapy  Her last colonoscopy was in 2016 with 4 polyps removed  During her admission she was told to follow-up in 6-8 weeks for another colonoscopy  REVIEW OF SYSTEMS IS OTHERWISE NEGATIVE  Historical Information   Past Medical History:   Diagnosis Date   • Anxiety    • Atherosclerosis of native artery of both lower extremities with intermittent claudication ([facility] [address] ) 10/15/2015    Transitioned From: Cardiovascular Symptoms   • Breast cancer [facility])    • Carotid artery plaque, bilateral 2017    Transitioned From: Atherosclerosis of both carotid arteries   • Chronic kidney disease    • Chronic sinusitis     Last assessed: 13   • COPD (chronic obstructive pulmonary disease) ([facility] [address] )    • COVID     21 not hopsitalized- flu-like symptoms   • Fall 2021   • Fracture, ankle closed, bimalleolar, right, initial encounter 2021   • GERD (gastroesophageal reflux disease)    • Hyperlipidemia    • Lung nodule    • PNA (pneumonia)    • PONV (postoperative nausea and vomiting)     with C-sections   • Pulmonary emphysema (HCC)    • PVD (peripheral vascular disease) ([facility])    • Restless leg syndrome    • Stage 3 chronic kidney disease ([facility] ) 2019   • Tobacco abuse      Past Surgical History:   Procedure Laterality Date   • BREAST LUMPECTOMY Right 2022    Procedure: ISAI  DIRECTED LUMPECTOMY;  Surgeon: Sharleen Boeck, MD;  Location: [facility];  Service: Surgical Oncology   • CATARACT EXTRACTION     •  SECTION     • COLONOSCOPY      Complete   Resolved: 13   • DESCENDING AORTIC ANEURYSM REPAIR W/ STENT  2019   • IR AORTAGRAM WITH RUN-OFF  8/15/2019   • LYMPH NODE BIOPSY Right 2022    Procedure: LYMPHATIC MAPPING WITH BLUE AND RADIOACTIVE DYES, SENTINEL LYMPH NODE BIOPSY, INJECTION IN OR BY DR Maryam Dc AT 1300;  Surgeon: Sharleen Boeck, MD;  Location: [facility];  Service: Surgical Oncology   • SHOULDER SURGERY      For frozen shoulder    • TONSILLECTOMY     • US BREAST ISAI  NEEDLE LOC RIGHT Right 3/25/2022   • US GUIDED BREAST BIOPSY RIGHT COMPLETE Right 3/25/2022     Social History   Social History     Substance and Sexual Activity   Alcohol Use Yes    Comment: occasionally      Social History     Substance and Sexual Activity   Drug Use No     Social History     Tobacco Use   Smoking Status Former Smoker   • Packs/day: 2 00   • Years: 56 00   • Pack years: 112 00   • Types: Cigarettes   • Start date: 1962   • Quit date: 2018   • Years since quittin 3   Smokeless Tobacco Never Used     Family History   Problem Relation Age of Onset   • No Known Problems Mother    • No Known Problems Father    • Arthritis Sister    • Pancreatic cancer Sister 61   • Melanoma Brother         twice   • Prostate cancer Brother    • Heart attack Family         Acute Myocardial Infarction   • Cirrhosis Family    • Prostate cancer Family    • Skin cancer Family    • Stroke Family         Syndrome   • No Known Problems Daughter    • No Known Problems Maternal Grandmother    • No Known Problems Paternal Grandmother    • No Known Problems Sister    • No Known Problems Maternal Aunt    • Breast cancer Other 30       Meds/Allergies       Current Outpatient Medications:   •  albuterol (2 5 mg/3 mL) 0 083 % nebulizer solution  •  albuterol (PROVENTIL HFA,VENTOLIN HFA) 90 mcg/act inhaler  •  ALPRAZolam (XANAX) 0 5 mg tablet  •  ASPIRIN 81 PO  •  Breo Ellipta 100-25 MCG/INH inhaler  •  dicyclomine (BENTYL) 10 mg capsule  •  ergocalciferol (VITAMIN D2) 50,000 units  •  famotidine (PEPCID) 20 mg tablet  •  fluticasone (FLONASE) 50 mcg/act nasal spray  •  gabapentin (NEURONTIN) 300 mg capsule  •  gabapentin (NEURONTIN) 400 mg capsule  •  naloxone (NARCAN) 4 mg/0 1 mL nasal spray  •  olopatadine (PATANOL) 0 1 % ophthalmic solution  •  ondansetron (ZOFRAN) 8 mg tablet  •  oxyCODONE-acetaminophen (Percocet) 5-325 mg per tablet  •  rosuvastatin (CRESTOR) 20 MG tablet  • saccharomyces boulardii (FLORASTOR) 250 mg capsule  •  sodium chloride (MIKO 128) 2 % hypertonic ophthalmic solution  •  ALPRAZolam (XANAX) 0 5 mg tablet  •  atorvastatin (LIPITOR) 40 mg tablet    Allergies   Allergen Reactions   • Tiotropium Bromide Monohydrate Shortness Of Breath   • Augmentin [Amoxicillin-Pot Clavulanate] Abdominal Pain     Pt received ceftriaxone 1 g IV on 5-21-18, gets sharp pains    • Tiotropium Abdominal Pain   • Tylenol With Codeine #3 [Acetaminophen-Codeine] Abdominal Pain   • Other Other (See Comments)           Objective     Blood pressure 130/86, pulse 86, height 5' (1 524 m), weight 67 1 kg (148 lb), not currently breastfeeding  Body mass index is 28 9 kg/m²  PHYSICAL EXAM:      General Appearance:   Alert, cooperative, no distress   HEENT:   Normocephalic, atraumatic, anicteric      Neck:  Supple, symmetrical, trachea midline   Lungs:   Clear to auscultation bilaterally; no rales, rhonchi or wheezing; respirations unlabored    Heart[de-identified]   Regular rate and rhythm; no murmur, rub, or gallop  Abdomen:   Soft, non-tender, non-distended; normal bowel sounds; no masses, no organomegaly    Genitalia:   Deferred    Rectal:   Deferred    Extremities:  No cyanosis, clubbing or edema    Pulses:  2+ and symmetric    Skin:  No jaundice, rashes, or lesions    Lymph nodes:  No palpable cervical lymphadenopathy        Lab Results:   No visits with results within 1 Day(s) from this visit  Latest known visit with results is:   No results displayed because visit has over 200 results  Radiology Results:   CT abdomen pelvis with contrast    Result Date: 9/14/2022  Narrative: CT ABDOMEN AND PELVIS WITH IV CONTRAST INDICATION:   lower abd pain  COMPARISON:  CT abdomen and pelvis 9/6/2022 and CT abdomenn 9/13/2019 TECHNIQUE:  CT examination of the abdomen and pelvis was performed   Axial, sagittal, and coronal 2D reformatted images were created from the source data and submitted for interpretation  Radiation dose length product (DLP) for this visit:  638 mGy-cm   This examination, like all CT scans performed in the [facility], was performed utilizing techniques to minimize radiation dose exposure, including the use of iterative reconstruction and automated exposure control  IV Contrast:  100 mL of iohexol (OMNIPAQUE)  350 Enteric Contrast:  Enteric contrast was not administered  FINDINGS: ABDOMEN LOWER CHEST:  COPD  Coronary artery calcification  LIVER/BILIARY TREE:  One or more subcentimeter sharply circumscribed low-density hepatic lesion(s) are noted, too small to accurately characterize, but statistically most likely to represent subcentimeter hepatic cysts  No suspicious solid hepatic lesion is identified  Hepatic contours are normal  Diffuse fatty infiltration of the liver  No biliary dilatation  GALLBLADDER:  No calcified gallstones  No pericholecystic inflammatory change  SPLEEN:  Unremarkable  PANCREAS:  Stable 1 8 x 1 6 cm soft tissue nodule at the posterior inferior pancreatic head image 38 series 2 unchanged since September 2019 when measured in the same fashion  ADRENAL GLANDS:  Unremarkable  KIDNEYS/URETERS: One or more sharply circumscribed subcentimeter renal hypodensities are noted  These lesions are too small to accurately characterize, but are statistically most likely to represent benign cortical renal cyst(s)  According to the guidelines published in the [employer] Paper of the ACR Incidental Findings Committee (Radiology 2010), no further workup of these lesions is recommended  Left renal simple cysts, the larger of which measures 3 cm  No perinephric collection  STOMACH AND BOWEL:  Diffuse progressive colorectal thickening now involving the entire colon  Diffuse prominent pericolonic vascularity more prominently involving the descending colon through rectum  Minimal fluid is present in the descending colon through rectum  No pneumatosis coli   Colonic diverticulosis without diverticulitis  Nondistended stomach limits its evaluation  Grossly unremarkable small bowel  No bowel obstruction  APPENDIX:  No findings to suggest appendicitis  ABDOMINOPELVIC CAVITY:  Trace free fluid in the left paracolic gutter and dependent pelvis  No loculated fluid collection  No pneumoperitoneum  No lymphadenopathy  VESSELS:  Aortoiliac calcification  No aneurysm  PELVIS REPRODUCTIVE ORGANS:  New trace fluid within the uterine endometrial cavity likely within normal limits  Otherwise unremarkable for patient's age  URINARY BLADDER:  Unremarkable  ABDOMINAL WALL/INGUINAL REGIONS:  Stable small fat-containing umbilical hernia  OSSEOUS STRUCTURES:  No acute fracture or osseous destructive lesion identified  Degenerative changes of the spine  Impression: Diffuse progressive nonspecific uncomplicated proctocolitis  Incidental finding of new trace fluid within the uterine endometrial cavity likely within normal limits  This could be followed up with nonemergent pelvic ultrasound if clinically warranted  Additional stable chronic findings as above  This study demonstrates a significant  finding and was documented as such in [facility] for liaison and referring practitioner notification   Workstation performed: [id number]

## 2022-10-15 ENCOUNTER — APPOINTMENT (EMERGENCY)
Dept: CT IMAGING | Facility: HOSPITAL | Age: 77
End: 2022-10-15
Payer: MEDICARE

## 2022-10-15 ENCOUNTER — HOSPITAL ENCOUNTER (EMERGENCY)
Facility: HOSPITAL | Age: 77
Discharge: HOME | End: 2022-10-16
Attending: EMERGENCY MEDICINE
Payer: MEDICARE

## 2022-10-15 VITALS
TEMPERATURE: 99 F | DIASTOLIC BLOOD PRESSURE: 69 MMHG | OXYGEN SATURATION: 94 % | SYSTOLIC BLOOD PRESSURE: 127 MMHG | RESPIRATION RATE: 19 BRPM | HEART RATE: 101 BPM

## 2022-10-15 DIAGNOSIS — R19.7 DIARRHEA, UNSPECIFIED TYPE: ICD-10-CM

## 2022-10-15 DIAGNOSIS — R10.84 GENERALIZED ABDOMINAL PAIN: ICD-10-CM

## 2022-10-15 DIAGNOSIS — A04.72 C. DIFFICILE COLITIS: Primary | ICD-10-CM

## 2022-10-15 DIAGNOSIS — K51.00 PANCOLITIS (HCC): ICD-10-CM

## 2022-10-15 LAB
ALBUMIN SERPL BCP-MCNC: 3.4 G/DL (ref 3.5–5)
ALP SERPL-CCNC: 100 U/L (ref 46–116)
ALT SERPL W P-5'-P-CCNC: 40 U/L (ref 12–78)
ANION GAP SERPL CALCULATED.3IONS-SCNC: 10 MMOL/L (ref 4–13)
APTT PPP: 27 SECONDS (ref 23–37)
AST SERPL W P-5'-P-CCNC: 17 U/L (ref 5–45)
BACTERIA UR QL AUTO: ABNORMAL /HPF
BASOPHILS # BLD AUTO: 0.05 THOUSANDS/ÂΜL (ref 0–0.1)
BASOPHILS NFR BLD AUTO: 0 % (ref 0–1)
BILIRUB SERPL-MCNC: 0.3 MG/DL (ref 0.2–1)
BILIRUB UR QL STRIP: NEGATIVE
BUN SERPL-MCNC: 14 MG/DL (ref 5–25)
CALCIUM ALBUM COR SERPL-MCNC: 9.4 MG/DL (ref 8.3–10.1)
CALCIUM SERPL-MCNC: 8.9 MG/DL (ref 8.3–10.1)
CHLORIDE SERPL-SCNC: 106 MMOL/L (ref 96–108)
CLARITY UR: CLEAR
CO2 SERPL-SCNC: 27 MMOL/L (ref 21–32)
COLOR UR: ABNORMAL
CREAT SERPL-MCNC: 1.21 MG/DL (ref 0.6–1.3)
EOSINOPHIL # BLD AUTO: 0.17 THOUSAND/ÂΜL (ref 0–0.61)
EOSINOPHIL NFR BLD AUTO: 2 % (ref 0–6)
ERYTHROCYTE [DISTWIDTH] IN BLOOD BY AUTOMATED COUNT: 16.5 % (ref 11.6–15.1)
GFR SERPL CREATININE-BSD FRML MDRD: 43 ML/MIN/1.73SQ M
GLUCOSE SERPL-MCNC: 123 MG/DL (ref 65–140)
GLUCOSE UR STRIP-MCNC: NEGATIVE MG/DL
HCT VFR BLD AUTO: 38.9 % (ref 34.8–46.1)
HGB BLD-MCNC: 12 G/DL (ref 11.5–15.4)
HGB UR QL STRIP.AUTO: NEGATIVE
IMM GRANULOCYTES # BLD AUTO: 0.04 THOUSAND/UL (ref 0–0.2)
IMM GRANULOCYTES NFR BLD AUTO: 0 % (ref 0–2)
INR PPP: 1.03 (ref 0.84–1.19)
KETONES UR STRIP-MCNC: NEGATIVE MG/DL
LACTATE SERPL-SCNC: 1.1 MMOL/L (ref 0.5–2)
LEUKOCYTE ESTERASE UR QL STRIP: ABNORMAL
LIPASE SERPL-CCNC: 70 U/L (ref 73–393)
LYMPHOCYTES # BLD AUTO: 1.81 THOUSANDS/ÂΜL (ref 0.6–4.47)
LYMPHOCYTES NFR BLD AUTO: 16 % (ref 14–44)
MCH RBC QN AUTO: 28.6 PG (ref 26.8–34.3)
MCHC RBC AUTO-ENTMCNC: 30.8 G/DL (ref 31.4–37.4)
MCV RBC AUTO: 93 FL (ref 82–98)
MONOCYTES # BLD AUTO: 0.7 THOUSAND/ÂΜL (ref 0.17–1.22)
MONOCYTES NFR BLD AUTO: 6 % (ref 4–12)
MUCOUS THREADS UR QL AUTO: ABNORMAL
NEUTROPHILS # BLD AUTO: 8.69 THOUSANDS/ÂΜL (ref 1.85–7.62)
NEUTS SEG NFR BLD AUTO: 76 % (ref 43–75)
NITRITE UR QL STRIP: NEGATIVE
NON-SQ EPI CELLS URNS QL MICRO: ABNORMAL /HPF
NRBC BLD AUTO-RTO: 0 /100 WBCS
PH UR STRIP.AUTO: 5 [PH]
PLATELET # BLD AUTO: 270 THOUSANDS/UL (ref 149–390)
PMV BLD AUTO: 10.8 FL (ref 8.9–12.7)
POTASSIUM SERPL-SCNC: 3.8 MMOL/L (ref 3.5–5.3)
PROCALCITONIN SERPL-MCNC: 0.11 NG/ML
PROT SERPL-MCNC: 7 G/DL (ref 6.4–8.4)
PROT UR STRIP-MCNC: ABNORMAL MG/DL
PROTHROMBIN TIME: 13.3 SECONDS (ref 11.6–14.5)
RBC # BLD AUTO: 4.19 MILLION/UL (ref 3.81–5.12)
RBC #/AREA URNS AUTO: ABNORMAL /HPF
SODIUM SERPL-SCNC: 143 MMOL/L (ref 135–147)
SP GR UR STRIP.AUTO: 1.02 (ref 1–1.03)
UROBILINOGEN UR STRIP-ACNC: <2 MG/DL
WBC # BLD AUTO: 11.46 THOUSAND/UL (ref 4.31–10.16)
WBC #/AREA URNS AUTO: ABNORMAL /HPF

## 2022-10-15 PROCEDURE — 85610 PROTHROMBIN TIME: CPT | Performed by: EMERGENCY MEDICINE

## 2022-10-15 PROCEDURE — 99284 EMERGENCY DEPT VISIT MOD MDM: CPT

## 2022-10-15 PROCEDURE — 74177 CT ABD & PELVIS W/CONTRAST: CPT

## 2022-10-15 PROCEDURE — 85025 COMPLETE CBC W/AUTO DIFF WBC: CPT | Performed by: EMERGENCY MEDICINE

## 2022-10-15 PROCEDURE — 81001 URINALYSIS AUTO W/SCOPE: CPT | Performed by: EMERGENCY MEDICINE

## 2022-10-15 PROCEDURE — 96361 HYDRATE IV INFUSION ADD-ON: CPT

## 2022-10-15 PROCEDURE — G1004 CDSM NDSC: HCPCS

## 2022-10-15 PROCEDURE — 36415 COLL VENOUS BLD VENIPUNCTURE: CPT | Performed by: EMERGENCY MEDICINE

## 2022-10-15 PROCEDURE — 96374 THER/PROPH/DIAG INJ IV PUSH: CPT

## 2022-10-15 PROCEDURE — 84145 PROCALCITONIN (PCT): CPT | Performed by: EMERGENCY MEDICINE

## 2022-10-15 PROCEDURE — 93005 ELECTROCARDIOGRAM TRACING: CPT

## 2022-10-15 PROCEDURE — 83690 ASSAY OF LIPASE: CPT | Performed by: EMERGENCY MEDICINE

## 2022-10-15 PROCEDURE — 80053 COMPREHEN METABOLIC PANEL: CPT | Performed by: EMERGENCY MEDICINE

## 2022-10-15 PROCEDURE — 85730 THROMBOPLASTIN TIME PARTIAL: CPT | Performed by: EMERGENCY MEDICINE

## 2022-10-15 PROCEDURE — 87040 BLOOD CULTURE FOR BACTERIA: CPT | Performed by: EMERGENCY MEDICINE

## 2022-10-15 PROCEDURE — 83605 ASSAY OF LACTIC ACID: CPT | Performed by: EMERGENCY MEDICINE

## 2022-10-15 RX ORDER — ACETAMINOPHEN 325 MG/1
975 TABLET ORAL ONCE
Status: COMPLETED | OUTPATIENT
Start: 2022-10-15 | End: 2022-10-15

## 2022-10-15 RX ADMIN — SODIUM CHLORIDE 500 ML: 0.9 INJECTION, SOLUTION INTRAVENOUS at 22:08

## 2022-10-15 RX ADMIN — MORPHINE SULFATE 2 MG: 2 INJECTION, SOLUTION INTRAMUSCULAR; INTRAVENOUS at 22:11

## 2022-10-15 RX ADMIN — IOHEXOL 100 ML: 300 INJECTION, SOLUTION INTRAVENOUS at 23:34

## 2022-10-15 RX ADMIN — ACETAMINOPHEN 975 MG: 325 TABLET, FILM COATED ORAL at 22:10

## 2022-10-16 LAB
ATRIAL RATE: 91 BPM
P AXIS: 37 DEGREES
PR INTERVAL: 170 MS
QRS AXIS: 39 DEGREES
QRSD INTERVAL: 68 MS
QT INTERVAL: 318 MS
QTC INTERVAL: 391 MS
T WAVE AXIS: 48 DEGREES
VENTRICULAR RATE: 91 BPM

## 2022-10-16 PROCEDURE — 93010 ELECTROCARDIOGRAM REPORT: CPT | Performed by: INTERNAL MEDICINE

## 2022-10-16 PROCEDURE — 99285 EMERGENCY DEPT VISIT HI MDM: CPT | Performed by: EMERGENCY MEDICINE

## 2022-10-16 RX ORDER — VANCOMYCIN HYDROCHLORIDE 125 MG/1
125 CAPSULE ORAL 4 TIMES DAILY
Qty: 40 CAPSULE | Refills: 0 | Status: SHIPPED | OUTPATIENT
Start: 2022-10-16 | End: 2022-10-26

## 2022-10-16 RX ADMIN — VANCOMYCIN HYDROCHLORIDE 125 MG: 500 INJECTION, POWDER, LYOPHILIZED, FOR SOLUTION INTRAVENOUS at 01:04

## 2022-10-16 NOTE — DISCHARGE INSTRUCTIONS
Please follow up PCP and Infectious Disease  Recommend tylenol 650 mg and ibuprofen 600 mg every 6 hours as needed for pain  Please return for severe chest pain, significant shortness of breath, severely worsening symptoms, or any other concerning signs or symptoms  Please refer to the following documents for additional instructions and return precautions

## 2022-10-17 ENCOUNTER — TELEPHONE (OUTPATIENT)
Dept: RADIATION ONCOLOGY | Facility: CLINIC | Age: 77
End: 2022-10-17

## 2022-10-17 NOTE — TELEPHONE ENCOUNTER
RAD ONC - Received a message on the VM this morning to cancel Consult appointment scheduled for this morning w/ Dr Ginger Kumar @ the [facility]  FD attempted to contact Ms Dillan Zamora however, was unable to LM - VM is full  A letter has been mailed to Ms Dillan Zamora asking her to contact [employer] to reschedule   KD

## 2022-10-18 ENCOUNTER — TELEPHONE (OUTPATIENT)
Dept: INFECTIOUS DISEASES | Facility: CLINIC | Age: 77
End: 2022-10-18

## 2022-10-18 NOTE — TELEPHONE ENCOUNTER
Pt calls office today stating she was seen in the ED over the weekend and was told to follow up with ID  Informed pt we do not see ED referrals  Informed pt to follow up with PCP and if they feel pt needs to be seen by ID they would need to put in a referral and then our office nurse will review and get back to the pt with decision

## 2022-10-21 LAB
BACTERIA BLD CULT: NORMAL
BACTERIA BLD CULT: NORMAL

## 2022-10-23 NOTE — ED PROVIDER NOTES
History  Chief Complaint   Patient presents with   • Diarrhea     Pt reporting diarrhea and fever starting today, pt has hx of cdiff and reporting it feels similar     78 yof hx of breast cancer and COPD and recent admission for c diff presenting with abdominal pain and diarrhea  Patient reports one day of gradually worsening symptoms  Diarrhea up to at least once an hour of liquid stool similar to previous episodes of c diff  Worsening generalized abdominal pain especially RLQ  Denies any nausea vomiting or any CP/SOB  Denies any systemic symptoms such as fevers or chills  Finished oral abx a couple weeks ago  Denies any other complaints  Past Medical History:  No date: Anxiety  10/15/2015: Atherosclerosis of native artery of both lower   extremities with intermittent claudication (HCC)      Comment:  Transitioned From: Cardiovascular Symptoms  No date: Breast cancer ([facility] )  03/21/2017: Carotid artery plaque, bilateral      Comment:  Transitioned From: Atherosclerosis of both carotid                arteries  No date: Chronic kidney disease  No date: Chronic sinusitis      Comment:  Last assessed: 11/11/13  No date: COPD (chronic obstructive pulmonary disease) ([facility] )  No date: COVID      Comment:  12/25/21 not hopsitalized- flu-like symptoms  06/16/2021: Fall  06/2021: Fracture, ankle closed, bimalleolar, right, initial encounter  No date: GERD (gastroesophageal reflux disease)  No date: Hyperlipidemia  No date: Lung nodule  No date: PNA (pneumonia)  No date: PONV (postoperative nausea and vomiting)      Comment:  with C-sections  No date: Pulmonary emphysema ([facility] )  No date: PVD (peripheral vascular disease) ([facility] )  No date: Restless leg syndrome  04/22/2019: Stage 3 chronic kidney disease (HCC)  No date: Tobacco abuse  Family History: non-contributory  Social History            Prior to Admission Medications   Prescriptions Last Dose Informant Patient Reported? Taking?    ALPRAZolam (XANAX) 0 5 mg tablet  Self No No Sig: Take 1 tablet (0 5 mg total) by mouth 2 (two) times a day as needed for anxiety   ALPRAZolam (XANAX) 0 5 mg tablet  Self No No   Sig: Take 1 tablet (0 5 mg total) by mouth 2 (two) times a day as needed for anxiety   Patient not taking: Reported on 10/12/2022   ASPIRIN 81 PO  Self Yes No   Sig: Take by mouth   Breo Ellipta 100-25 MCG/INH inhaler  Self No No   Sig: inhale 1 puff daily Rinse mouth after use   albuterol (2 5 mg/3 mL) 0 083 % nebulizer solution  Self No No   Sig: Take 1 vial (2 5 mg total) by nebulization every 6 (six) hours as needed for wheezing or shortness of breath   albuterol (PROVENTIL HFA,VENTOLIN HFA) 90 mcg/act inhaler  Self No No   Sig: Inhale 2 puffs every 6 (six) hours as needed for wheezing   atorvastatin (LIPITOR) 40 mg tablet   Yes No   Patient not taking: Reported on 10/12/2022   dicyclomine (BENTYL) 10 mg capsule  Self No No   Sig: Take 1 capsule (10 mg total) by mouth 3 (three) times a day before meals   ergocalciferol (VITAMIN D2) 50,000 units  Self No No   Sig: take 1 capsule by mouth every week   famotidine (PEPCID) 20 mg tablet  Self No No   Sig: Take 1 tablet (20 mg total) by mouth daily   fluticasone (FLONASE) 50 mcg/act nasal spray  Self No No   Si sprays into each nostril daily   gabapentin (NEURONTIN) 300 mg capsule  Self No No   Sig: take 1 capsule by mouth twice a day   gabapentin (NEURONTIN) 400 mg capsule  Self No No   Sig: take 1 capsule by mouth at bedtime   naloxone (NARCAN) 4 mg/0 1 mL nasal spray  Self No No   Sig: Administer 1 spray into a nostril  If no response after 2-3 minutes, give another dose in the other nostril using a new spray     olopatadine (PATANOL) 0 1 % ophthalmic solution  Self Yes No   Sig:     ondansetron (ZOFRAN) 8 mg tablet  Self No No   Sig: Take 1 tablet (8 mg total) by mouth every 8 (eight) hours as needed for nausea or vomiting   oxyCODONE-acetaminophen (Percocet) 5-325 mg per tablet  Self No No   Sig: Take 1 tablet by mouth every 6 (six) hours as needed for moderate pain Max Daily Amount: 4 tablets   rosuvastatin (CRESTOR) 20 MG tablet  Self No No   Sig: Take 1 tablet (20 mg total) by mouth daily   saccharomyces boulardii (FLORASTOR) 250 mg capsule  Self No No   Sig: Take 1 capsule (250 mg total) by mouth 2 (two) times a day   sodium chloride () 2 % hypertonic ophthalmic solution  Self Yes No   Sig: Sue 128 2 % eye drops      Facility-Administered Medications: None       Past Medical History:   Diagnosis Date   • Anxiety    • Atherosclerosis of native artery of both lower extremities with intermittent claudication ([facility] ) 10/15/2015    Transitioned From: Cardiovascular Symptoms   • Breast cancer [facility])    • Carotid artery plaque, bilateral 2017    Transitioned From: Atherosclerosis of both carotid arteries   • Chronic kidney disease    • Chronic sinusitis     Last assessed: 13   • COPD (chronic obstructive pulmonary disease) ([facility] )    • COVID     21 not hopsitalized- flu-like symptoms   • Fall 2021   • Fracture, ankle closed, bimalleolar, right, initial encounter 2021   • GERD (gastroesophageal reflux disease)    • Hyperlipidemia    • Lung nodule    • PNA (pneumonia)    • PONV (postoperative nausea and vomiting)     with C-sections   • Pulmonary emphysema (HCC)    • PVD (peripheral vascular disease) (HCC)    • Restless leg syndrome    • Stage 3 chronic kidney disease ([facility] ) 2019   • Tobacco abuse        Past Surgical History:   Procedure Laterality Date   • BREAST LUMPECTOMY Right 2022    Procedure: ISAI  DIRECTED LUMPECTOMY;  Surgeon: Leilani Rendon MD;  Location: [facility];  Service: Surgical Oncology   • CATARACT EXTRACTION     •  SECTION     • COLONOSCOPY      Complete   Resolved: 13   • DESCENDING AORTIC ANEURYSM REPAIR W/ STENT  2019   • IR AORTAGRAM WITH RUN-OFF  8/15/2019   • LYMPH NODE BIOPSY Right 2022    Procedure: LYMPHATIC MAPPING WITH BLUE AND RADIOACTIVE DYES, SENTINEL LYMPH NODE BIOPSY, INJECTION IN OR BY DR Lashae Garza AT 1300;  Surgeon: Alivia Charlotn MD;  Location: [facility];  Service: Surgical Oncology   • SHOULDER SURGERY      For frozen shoulder    • TONSILLECTOMY     • US BREAST ISAI  NEEDLE LOC RIGHT Right 3/25/2022   • US GUIDED BREAST BIOPSY RIGHT COMPLETE Right 3/25/2022       Family History   Problem Relation Age of Onset   • No Known Problems Mother    • No Known Problems Father    • Arthritis Sister    • Pancreatic cancer Sister 61   • Melanoma Brother         twice   • Prostate cancer Brother    • Heart attack Family         Acute Myocardial Infarction   • Cirrhosis Family    • Prostate cancer Family    • Skin cancer Family    • Stroke Family         Syndrome   • No Known Problems Daughter    • No Known Problems Maternal Grandmother    • No Known Problems Paternal Grandmother    • No Known Problems Sister    • No Known Problems Maternal Aunt    • Breast cancer Other 30     I have reviewed and agree with the history as documented  E-Cigarette/Vaping   • E-Cigarette Use Never User      E-Cigarette/Vaping Substances   • Nicotine No    • THC No    • CBD No    • Flavoring No    • Other No    • Unknown No      Social History     Tobacco Use   • Smoking status: Former Smoker     Packs/day: 2 00     Years: 56 00     Pack years: 112 00     Types: Cigarettes     Start date: 1962     Quit date: 2018     Years since quittin 4   • Smokeless tobacco: Never Used   Vaping Use   • Vaping Use: Never used   Substance Use Topics   • Alcohol use: Yes     Comment: occasionally    • Drug use: No       Review of Systems   Constitutional: Negative for appetite change, chills, diaphoresis, fever and unexpected weight change  HENT: Negative for congestion and rhinorrhea  Eyes: Negative for photophobia and visual disturbance  Respiratory: Negative for cough, chest tightness and shortness of breath      Cardiovascular: Negative for chest pain, palpitations and leg swelling  Gastrointestinal: Positive for abdominal pain and diarrhea  Negative for abdominal distention, blood in stool, constipation, nausea and vomiting  Genitourinary: Negative for dysuria and hematuria  Musculoskeletal: Negative for back pain, joint swelling, neck pain and neck stiffness  Skin: Negative for color change, pallor, rash and wound  Neurological: Negative for dizziness, syncope, weakness, light-headedness and headaches  Psychiatric/Behavioral: Negative for agitation  All other systems reviewed and are negative  Physical Exam  Physical Exam  Vitals and nursing note reviewed  Constitutional:       General: She is not in acute distress  Appearance: Normal appearance  She is well-developed  She is not ill-appearing, toxic-appearing or diaphoretic  HENT:      Head: Normocephalic and atraumatic  Nose: Nose normal  No congestion or rhinorrhea  Mouth/Throat:      Mouth: Mucous membranes are moist       Pharynx: Oropharynx is clear  No oropharyngeal exudate or posterior oropharyngeal erythema  Eyes:      General: No scleral icterus  Right eye: No discharge  Left eye: No discharge  Extraocular Movements: Extraocular movements intact  Conjunctiva/sclera: Conjunctivae normal       Pupils: Pupils are equal, round, and reactive to light  Neck:      Vascular: No JVD  Trachea: No tracheal deviation  Comments: Supple  Normal range of motion  Cardiovascular:      Rate and Rhythm: Normal rate and regular rhythm  Heart sounds: Normal heart sounds  No murmur heard  No friction rub  No gallop  Comments: Normal rate and regular rhythm  Pulmonary:      Effort: Pulmonary effort is normal  No respiratory distress  Breath sounds: Normal breath sounds  No stridor  No wheezing or rales  Comments: Clear to auscultation bilaterally  Chest:      Chest wall: No tenderness     Abdominal:      General: Bowel sounds are normal  There is no distension  Palpations: Abdomen is soft  Tenderness: There is abdominal tenderness  There is no right CVA tenderness, left CVA tenderness, guarding or rebound  Comments: Generalized TTP without guarding or localization  Normal bowel sounds throughout   Musculoskeletal:         General: No swelling, tenderness, deformity or signs of injury  Normal range of motion  Cervical back: Normal range of motion and neck supple  No rigidity  No muscular tenderness  Right lower leg: No edema  Left lower leg: No edema  Lymphadenopathy:      Cervical: No cervical adenopathy  Skin:     General: Skin is warm and dry  Coloration: Skin is not pale  Findings: No erythema or rash  Neurological:      General: No focal deficit present  Mental Status: She is alert  Mental status is at baseline  Sensory: No sensory deficit  Motor: No weakness or abnormal muscle tone  Coordination: Coordination normal       Gait: Gait normal       Comments: Alert  Strength and sensation grossly intact  Ambulatory without difficulty at baseline  Psychiatric:         Behavior: Behavior normal          Thought Content:  Thought content normal          Vital Signs  ED Triage Vitals   Temperature Pulse Respirations Blood Pressure SpO2   10/15/22 2047 10/15/22 2046 10/15/22 2047 10/15/22 2047 10/15/22 2046   99 °F (37 2 °C) (!) 107 19 127/69 94 %      Temp Source Heart Rate Source Patient Position - Orthostatic VS BP Location FiO2 (%)   10/15/22 2047 10/15/22 2046 10/15/22 2047 10/15/22 2047 --   Oral Monitor Sitting Right arm       Pain Score       10/15/22 2210       6           Vitals:    10/15/22 2046 10/15/22 2047   BP:  127/69   Pulse: (!) 107 101   Patient Position - Orthostatic VS:  Sitting         Visual Acuity      ED Medications  Medications   sodium chloride 0 9 % bolus 500 mL (0 mL Intravenous Stopped 10/15/22 2308)   acetaminophen (TYLENOL) tablet 975 mg (975 mg Oral Given 10/15/22 2210)   morphine injection 2 mg (2 mg Intravenous Given 10/15/22 2211)   iohexol (OMNIPAQUE) 300 mg/mL injection 50 mL (100 mL Intravenous Given 10/15/22 2334)   vancomycin (VANCOCIN) oral solution 125 mg (125 mg Oral Given 10/16/22 0104)       Diagnostic Studies  Results Reviewed     Procedure Component Value Units Date/Time    Blood culture #2 [[id number]] Collected: 10/15/22 2142    Lab Status: Final result Specimen: Blood from Arm, Right Updated: 10/21/22 1403     Blood Culture No Growth After 5 Days  Blood culture #1 [[id number]] Collected: 10/15/22 2156    Lab Status: Final result Specimen: Blood from Arm, Left Updated: 10/21/22 1403     Blood Culture No Growth After 5 Days      Procalcitonin [[id number]]  (Normal) Collected: 10/15/22 2142    Lab Status: Final result Specimen: Blood from Arm, Right Updated: 10/15/22 2231     Procalcitonin 0 11 ng/ml     Comprehensive metabolic panel [[id number]]  (Abnormal) Collected: 10/15/22 2142    Lab Status: Final result Specimen: Blood from Arm, Right Updated: 10/15/22 2231     Sodium 143 mmol/L      Potassium 3 8 mmol/L      Chloride 106 mmol/L      CO2 27 mmol/L      ANION GAP 10 mmol/L      BUN 14 mg/dL      Creatinine 1 21 mg/dL      Glucose 123 mg/dL      Calcium 8 9 mg/dL      Corrected Calcium 9 4 mg/dL      AST 17 U/L      ALT 40 U/L      Alkaline Phosphatase 100 U/L      Total Protein 7 0 g/dL      Albumin 3 4 g/dL      Total Bilirubin 0 30 mg/dL      eGFR 43 ml/min/1 73sq m     Narrative:      [place] guidelines for Chronic Kidney Disease (CKD):   •  Stage 1 with normal or high GFR (GFR > 90 mL/min/1 73 square meters)  •  Stage 2 Mild CKD (GFR = 60-89 mL/min/1 73 square meters)  •  Stage 3A Moderate CKD (GFR = 45-59 mL/min/1 73 square meters)  •  Stage 3B Moderate CKD (GFR = 30-44 mL/min/1 73 square meters)  •  Stage 4 Severe CKD (GFR = 15-29 mL/min/1 73 square meters)  •  Stage 5 End Stage CKD (GFR <15 mL/min/1 73 square meters)  Note: GFR calculation is accurate only with a steady state creatinine    Lipase [[id number]]  (Abnormal) Collected: 10/15/22 2142    Lab Status: Final result Specimen: Blood from Arm, Right Updated: 10/15/22 2231     Lipase 70 u/L     Lactic acid [[id number]]  (Normal) Collected: 10/15/22 2142    Lab Status: Final result Specimen: Blood from Arm, Right Updated: 10/15/22 2225     LACTIC ACID 1 1 mmol/L     Narrative:      Result may be elevated if tourniquet was used during collection      Urine Microscopic [[id number]]  (Abnormal) Collected: 10/15/22 2214    Lab Status: Final result Specimen: Urine, Clean Catch Updated: 10/15/22 2224     RBC, UA 1-2 /hpf      WBC, UA 2-4 /hpf      Epithelial Cells Occasional /hpf      Bacteria, UA None Seen /hpf      MUCUS THREADS Occasional    UA w Reflex to Microscopic w Reflex to Culture [[id number]]  (Abnormal) Collected: 10/15/22 2214    Lab Status: Final result Specimen: Urine, Clean Catch Updated: 10/15/22 2223     Color, UA Light Yellow     Clarity, UA Clear     Specific Gravity, UA 1 023     pH, UA 5 0     Leukocytes, UA Trace     Nitrite, UA Negative     Protein, UA Trace mg/dl      Glucose, UA Negative mg/dl      Ketones, UA Negative mg/dl      Urobilinogen, UA <2 0 mg/dl      Bilirubin, UA Negative     Occult Blood, UA Negative    Protime-INR [[id number]]  (Normal) Collected: 10/15/22 2142    Lab Status: Final result Specimen: Blood from Arm, Right Updated: 10/15/22 2217     Protime 13 3 seconds      INR 1 03    APTT [[id number]]  (Normal) Collected: 10/15/22 2142    Lab Status: Final result Specimen: Blood from Arm, Right Updated: 10/15/22 2217     PTT 27 seconds     CBC and differential [[id number]]  (Abnormal) Collected: 10/15/22 2142    Lab Status: Final result Specimen: Blood from Arm, Right Updated: 10/15/22 2204     WBC 11 46 Thousand/uL      RBC 4 19 Million/uL      Hemoglobin 12 0 g/dL      Hematocrit 38 9 %      MCV 93 fL      MCH 28 6 pg      MCHC 30 8 g/dL      RDW 16 5 %      MPV 10 8 fL      Platelets 223 Thousands/uL      nRBC 0 /100 WBCs      Neutrophils Relative 76 %      Immat GRANS % 0 %      Lymphocytes Relative 16 %      Monocytes Relative 6 %      Eosinophils Relative 2 %      Basophils Relative 0 %      Neutrophils Absolute 8 69 Thousands/µL      Immature Grans Absolute 0 04 Thousand/uL      Lymphocytes Absolute 1 81 Thousands/µL      Monocytes Absolute 0 70 Thousand/µL      Eosinophils Absolute 0 17 Thousand/µL      Basophils Absolute 0 05 Thousands/µL                  CT abdomen pelvis with contrast   Final Result by Torsten Cooper DO (10/16 0021)      Pancolitis  Nonacute findings, as described  The study was marked in [facility] for immediate notification  Workstation performed: [id number]                    Procedures  Procedures         ED Course                               SBIRT 22yo+    Flowsheet Row Most Recent Value   SBIRT (23 yo +)    In order to provide better care to our patients, we are screening all of our patients for alcohol and drug use  Would it be okay to ask you these screening questions? Yes Filed at: 10/15/2022 2048   Initial Alcohol Screen: US AUDIT-C     1  How often do you have a drink containing alcohol? 0 Filed at: 10/15/2022 2048   2  How many drinks containing alcohol do you have on a typical day you are drinking? 0 Filed at: 10/15/2022 2048   3b  FEMALE Any Age, or MALE 65+: How often do you have 4 or more drinks on one occassion? 0 Filed at: 10/15/2022 2048   Audit-C Score 0 Filed at: 10/15/2022 2048   CARROLL: How many times in the past year have you    Used an illegal drug or used a prescription medication for non-medical reasons?  Never Filed at: 10/15/2022 2048                    MDM  Number of Diagnoses or Management Options  C  difficile colitis  Diarrhea, unspecified type  Pancolitis ([facility] [address] )  Diagnosis management comments: 78 yof hx of breast cancer and COPD and recent admission for c diff presenting with abdominal pain and diarrhea  Symptoms very similar to previous episodes of c diff  Plan for labs including abdominal labs and CT abd/pelvis  Patient declining any narcotic pain medication  Will start with tylenol  Chart reviewed  Reassess  Labs no acute process  Symptoms somewhat improved  Imaging with pancolitis  Consistent with c diff  Pain improved and tolerable  Shared decision making with patient given improved vitals and normal labs regarding inpatient vs discharge home  Patient opting for discharge  PO vanc and prescription sent to pharmacy  Discussed results and recommendations  Advised follow up PCP and ID given likely third time with c diff in past several months  Medication recommendations  Given instructions and return precautions  Patient/family at bedside acknowledged understanding of all written and verbal instructions and return precautions  Discharged          Amount and/or Complexity of Data Reviewed  Clinical lab tests: reviewed and ordered  Tests in the radiology section of CPT®: reviewed and ordered  Tests in the medicine section of CPT®: reviewed and ordered  Decide to obtain previous medical records or to obtain history from someone other than the patient: yes  Obtain history from someone other than the patient: yes  Review and summarize past medical records: yes  Independent visualization of images, tracings, or specimens: yes    Risk of Complications, Morbidity, and/or Mortality  Presenting problems: high  Diagnostic procedures: high  Management options: high    Patient Progress  Patient progress: improved      Disposition  Final diagnoses:   Diarrhea, unspecified type   C  difficile colitis   Pancolitis ([facility] [address] )   Generalized abdominal pain     Time reflects when diagnosis was documented in both MDM as applicable and the Disposition within this note     Time User Action Codes Description Comment    10/16/2022  1:20 AM Fred Ballard Add [R19 7] Diarrhea, unspecified type     10/16/2022  1:21 AM Joey Becket Add [A04 72] C  difficile colitis     10/16/2022  1:21 AM Joey Becket Modify [R19 7] Diarrhea, unspecified type     10/16/2022  1:21 AM Joey Becket Modify [A04 72] C  difficile colitis     10/16/2022  1:21 AM Joey Becket Add [K51 00] Pancolitis ([address] )     10/23/2022  5:56 PM Joey Becket Add [R10 84] Generalized abdominal pain       ED Disposition     ED Disposition   Discharge    Condition   Stable    Date/Time   Sun Oct 16, 2022  1:20 AM    Comment   [place] discharge to home/self care                 Follow-up Information     Follow up With Specialties Details Why Contact Info Additional Information    Curt Oviedo MD Internal Medicine Schedule an appointment as soon as possible for a visit in 1 week  [address] [facility] [address] [facility] Infectious Diseases Schedule an appointment as soon as possible for a visit in 3 days  [address] [phone] [address] [facility] [place], [place], [address] [place] [address]          Discharge Medication List as of 10/16/2022  1:23 AM      START taking these medications    Details   vancomycin (VANCOCIN) 125 MG capsule Take 1 capsule (125 mg total) by mouth 4 (four) times a day for 10 days, Starting Sun 10/16/2022, Until Wed 10/26/2022, Normal         CONTINUE these medications which have NOT CHANGED    Details   albuterol (2 5 mg/3 mL) 0 083 % nebulizer solution Take 1 vial (2 5 mg total) by nebulization every 6 (six) hours as needed for wheezing or shortness of breath, Starting Thu 10/11/2018, Print      albuterol (PROVENTIL HFA,VENTOLIN HFA) 90 mcg/act inhaler Inhale 2 puffs every 6 (six) hours as needed for wheezing, Starting Mon 7/20/2020, Normal      !! ALPRAZolam (XANAX) 0 5 mg tablet Take 1 tablet (0 5 mg total) by mouth 2 (two) times a day as needed for anxiety, Starting Mon 9/19/2022, Print      !! ALPRAZolam Jonnie Adwoa) 0 5 mg tablet Take 1 tablet (0 5 mg total) by mouth 2 (two) times a day as needed for anxiety, Starting Mon 9/19/2022, Print      ASPIRIN 81 PO Take by mouth, Historical Med      atorvastatin (LIPITOR) 40 mg tablet Starting Tue 10/4/2022, Historical Med      Breo Ellipta 100-25 MCG/INH inhaler inhale 1 puff daily Rinse mouth after use, Starting Wed 7/13/2022, Normal      dicyclomine (BENTYL) 10 mg capsule Take 1 capsule (10 mg total) by mouth 3 (three) times a day before meals, Starting Mon 9/19/2022, No Print      ergocalciferol (VITAMIN D2) 50,000 units take 1 capsule by mouth every week, Normal      famotidine (PEPCID) 20 mg tablet Take 1 tablet (20 mg total) by mouth daily, Starting Mon 9/19/2022, No Print      fluticasone (FLONASE) 50 mcg/act nasal spray 2 sprays into each nostril daily, Starting Tue 9/14/2021, Normal      !! gabapentin (NEURONTIN) 300 mg capsule take 1 capsule by mouth twice a day, Normal      !! gabapentin (NEURONTIN) 400 mg capsule take 1 capsule by mouth at bedtime, Normal      naloxone (NARCAN) 4 mg/0 1 mL nasal spray Administer 1 spray into a nostril   If no response after 2-3 minutes, give another dose in the other nostril using a new spray , Normal      olopatadine (PATANOL) 0 1 % ophthalmic solution  , Starting Tue 9/11/2018, Historical Med      ondansetron (ZOFRAN) 8 mg tablet Take 1 tablet (8 mg total) by mouth every 8 (eight) hours as needed for nausea or vomiting, Starting u 5/19/2022, Normal      oxyCODONE-acetaminophen (Percocet) 5-325 mg per tablet Take 1 tablet by mouth every 6 (six) hours as needed for moderate pain Max Daily Amount: 4 tablets, Starting Mon 9/19/2022, Print      rosuvastatin (CRESTOR) 20 MG tablet Take 1 tablet (20 mg total) by mouth daily, Starting Tue 2/15/2022, Normal      saccharomyces boulardii (FLORASTOR) 250 mg capsule Take 1 capsule (250 mg total) by mouth 2 (two) times a day, Starting Tue 6/28/2022, No Print      sodium chloride () 2 % hypertonic ophthalmic solution Sue 128 2 % eye drops, Historical Med       !! - Potential duplicate medications found  Please discuss with provider                PDMP Review       Value Time User    PDMP Reviewed  Yes 6/28/2022 11:15 AM Belinda Paige MD          ED Provider  Electronically Signed by           Mj Barros MD  10/23/22 5675

## 2022-10-28 ENCOUNTER — CLINICAL SUPPORT (OUTPATIENT)
Dept: RADIATION ONCOLOGY | Facility: CLINIC | Age: 77
End: 2022-10-28
Attending: RADIOLOGY

## 2022-10-28 ENCOUNTER — RADIATION ONCOLOGY CONSULT (OUTPATIENT)
Dept: RADIATION ONCOLOGY | Facility: CLINIC | Age: 77
End: 2022-10-28
Attending: RADIOLOGY

## 2022-10-28 VITALS
BODY MASS INDEX: 29.1 KG/M2 | RESPIRATION RATE: 18 BRPM | HEART RATE: 96 BPM | DIASTOLIC BLOOD PRESSURE: 60 MMHG | TEMPERATURE: 96.9 F | SYSTOLIC BLOOD PRESSURE: 130 MMHG | WEIGHT: 149 LBS | OXYGEN SATURATION: 96 %

## 2022-10-28 DIAGNOSIS — C50.811 MALIGNANT NEOPLASM OF OVERLAPPING SITES OF RIGHT BREAST IN FEMALE, ESTROGEN RECEPTOR NEGATIVE (HCC): ICD-10-CM

## 2022-10-28 DIAGNOSIS — C50.811 MALIGNANT NEOPLASM OF OVERLAPPING SITES OF RIGHT BREAST IN FEMALE, ESTROGEN RECEPTOR NEGATIVE (HCC): Primary | ICD-10-CM

## 2022-10-28 DIAGNOSIS — Z17.1 MALIGNANT NEOPLASM OF OVERLAPPING SITES OF RIGHT BREAST IN FEMALE, ESTROGEN RECEPTOR NEGATIVE (HCC): ICD-10-CM

## 2022-10-28 DIAGNOSIS — Z17.1 MALIGNANT NEOPLASM OF OVERLAPPING SITES OF RIGHT BREAST IN FEMALE, ESTROGEN RECEPTOR NEGATIVE (HCC): Primary | ICD-10-CM

## 2022-10-28 NOTE — PROGRESS NOTES
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   Tumor cells are ER/IN/H2N (-)  Her case was presented at Multidisciplinary Breast Tumor Board on 4/4/22  Preliminary recommendation was surgery followed by chemotherapy followed by adjuvant radiation pending final pathology  She was seen in consult at the breast [facility] clinic on 4/4/22  She underwent right breast lumpectomy on 4/28/22 and then started adjuvant chemotherapy  Her chemo dose was reduced by 25% because of age and other comorbidities  She was then admitted to the hospital for diverticulitis  Plan was made to discontinue chemotherapy and proceed with Radiation therapy  She presents today for follow up      4/28/22 ISAI  DIRECTED LUMPECTOMY (Right Breast) with sentinel lymph node biopsy    5/4/22 Dr Higinio Casey  40 cc of seroma was aspirated     5/18/22 Dr Higinio Casey  Reviewed path  Plan for adjuvant chemotherapy and radiation  Follow up 6 months    5/19/22 Dr Jag Orellana  Final pathologic stage pT1c pN0  I recommend adjuvant chemotherapy with TC for 4 cycles with Neulasta,  followed by adjuvant radiation therapy  Because of her age, and other medical comorbidities including COPD, I will decrease chemo dose by 25%  6/2/22 Dr Jag Orellana  postpone chemotherapy in 2 weeks because of tooth extraction and cellulitis in surgical site  6/13/22 Dr Higinio Casey  discomfort in her right breast    30 cc of seroma was aspirated     6/14/22 Infusion    6/20/22 Hospitalization for acute diverticulitis   · Does not meet sepsis criteria      6/20/22 CT C/A/P wo contrast  No evidence of metastatic disease throughout the chest, abdomen or pelvis  Stable pancreatic head lesion  Soft tissue postsurgical changes from right breast lumpectomy and axillary node dissection  Findings consistent with mild acute uncomplicated sigmoid diverticulitis      6/30/22   Holley David  She had decided to d/c all further chemo and proceed to RT which  is not unreasonable  will continue to follow along and defer to Surg -Onc to get mammograms ordered  Cancelling infusion chairs  22 ED abdominal pain, fever    22- 22 Hospital admission  C diff positive  Continue oral vanco for 10 days until Sep 24th  PT/OT recommended short-term rehab, patient going to [facility]    10/10/22 Dr Twin Patel  She has not been receiving adjuvant radiation as she has been in and out of the hospital with abdominal symptoms and rehab     emphasized as discuss that need for Radiation Oncology consultation she agrees to follow-up  Follow up 3 months    Upcomin22 GI  22 Dr Royal Talley  23 Dr Holley David  23 Dr Twin Patel    Follow up visit     Oncology History   Malignant neoplasm of overlapping sites of right breast in female, estrogen receptor negative ([facility] [address] )   3/25/2022 Initial Diagnosis    Malignant neoplasm of right female breast ([facility] [address] )     3/25/2022 Biopsy    Right breast ultrasound guided biopsy  12 o'clock 5 cm from nipple  Invasive breast carcinoma of no special type (ductal NST/ invasive ductal carcinoma with apocrine features)  Grade 2  ER 0  OH 0  HER 2 2+   FISH negative  Lymphovascular invasion not identified    Concordant  Malignancy is unifocal  Mass measures 1 4 cm on mammogram and 1 9 cm on ultrasound  Right axilla ultrasound clear  Left brest clear  Amanda  reflector placed  In situ compononent: 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       2022 -  Cancer Staged    Staging form: Breast, AJCC 8th Edition  - Clinical stage from 2022: Stage IB (cT1c, cN0, cM0, G2, ER-, OH-, HER2-) - Signed by Nenita Puri MD on 2022  Stage prefix: Initial diagnosis  Nuclear grade: G2  Histologic grading system: 3 grade system  HER2-IHC interpretation: Equivocal  HER2-IHC value: Score 2+  HER2-FISH interpretation: Negative       2022 Genetic Testing    [employer]  A total of 36 genes were evaluated, including: GILL, BRCA1, BRCA2, CDH1, CHEK2, PALB2, PTEN, STK11, TP53  Negative result  No pathogenic sequence variants or deletions/duplications identified     4/28/2022 Surgery    Right breast lexy  directed lumpectomy with sentinel lymph node biopsy and axillary dissection  Invasive ductal carcinoma with apocrine features  Grade 3  1 4 cm  0/4 Lymph nodes       6/14/2022 - 6/15/2022 Chemotherapy    Pegfilgrastim-bmez (ZIEXTENZO), 6 mg, Subcutaneous, Once, 1 of 4 cycles  Administration: 6 mg (6/15/2022)  cyclophosphamide (CYTOXAN) IVPB, 450 mg/m2 = 778 mg (75 % of original dose 600 mg/m2), Intravenous, Once, 1 of 4 cycles  Dose modification: 450 mg/m2 (75 % of original dose 600 mg/m2, Cycle 1, Reason: Dose Not Tolerated)  Administration: 778 mg (6/14/2022)  DOCEtaxel (TAXOTERE) chemo infusion, 56 25 mg/m2 = 97 4 mg (75 % of original dose 75 mg/m2), Intravenous, Once, 1 of 4 cycles  Dose modification: 56 25 mg/m2 (75 % of original dose 75 mg/m2, Cycle 1, Reason: Dose Not Tolerated)  Administration: 97 4 mg (6/14/2022)         Review of Systems:  Review of Systems   Constitutional: Positive for fatigue and unexpected weight change (lost 17 lbs )  HENT: Negative  Eyes: Positive for visual disturbance  Fuchs dystrophy   Respiratory: Positive for shortness of breath  2 liters O2 via nasal canula   Cardiovascular: Negative  Gastrointestinal: Positive for abdominal pain (currently being treated for c  diff) and nausea  Genitourinary: Negative  Musculoskeletal: Positive for gait problem (receiving home PT for balance)  Skin: Negative  Allergic/Immunologic: Negative  Neurological: Positive for light-headedness  Hematological: Negative  Psychiatric/Behavioral: Negative for suicidal ideas  Occasionally feels depressed  Clinical Trial: no    Teaching SIM, side effects, [employer] book      Covid Vaccine Status yes + booster    Health Maintenance   Topic Date Due   • Colorectal Cancer Screening  08/30/2021   • Influenza Vaccine (1) 09/01/2022   • COVID-19 Vaccine (4 - Booster for Pfizer series) 09/25/2022   • Depression Screening  10/14/2022   • Breast Cancer Screening: Mammogram  03/17/2023   • Fall Risk  04/13/2023   • Urinary Incontinence Screening  04/13/2023   • Medicare Annual Wellness Visit (AWV)  04/13/2023   • Falls: Plan of Care  04/14/2023   • Lung Cancer Screening  06/20/2023   • BMI: Followup Plan  10/10/2023   • BMI: Adult  10/12/2023   • DXA SCAN  02/15/2024   • Hepatitis C Screening  Completed   • Osteoporosis Screening  Completed   • Pneumococcal Vaccine: 65+ Years  Completed   • HIB Vaccine  Aged Out   • Hepatitis B Vaccine  Aged Out   • IPV Vaccine  Aged Out   • Hepatitis A Vaccine  Aged Out   • Meningococcal ACWY Vaccine  Aged Out   • HPV Vaccine  Aged Out     Patient Active Problem List   Diagnosis   • Anxiety   • Aortoiliac occlusive disease ([address] )   • Carotid artery plaque, bilateral   • Centrilobular emphysema ([address] )   • Hyperlipidemia   • Osteoporosis   • Restless leg syndrome   • Subclavian artery stenosis, left (HCC)   • PVD (peripheral vascular disease) ([address] )   • Chronic sinusitis   • Pancreatic mass   • BMI 34 0-34 9,adult   • Seborrheic keratoses   • Stage 3 chronic kidney disease ([address] )   • Closed avulsion fracture of lateral malleolus of right fibula   • Prediabetes   • Vitamin D deficiency   • Acute right ankle pain   • COPD (chronic obstructive pulmonary disease) (HCC)   • Malignant neoplasm of overlapping sites of right breast in female, estrogen receptor negative ([address] )   • Continuous opioid dependence ([address] )   • Cellulitis   • Diverticulitis   • Oral candidiasis   • Chemotherapy induced neutropenia (HCC)   • Diarrhea   • Proctocolitis   • Transaminitis   • Generalized weakness   • C  difficile colitis   • History of lumpectomy of right breast     Past Medical History:   Diagnosis Date   • Anxiety    • Atherosclerosis of native artery of both lower extremities with intermittent claudication ([facility] [address] ) 10/15/2015    Transitioned From: Cardiovascular Symptoms   • Breast cancer [facility])    • Carotid artery plaque, bilateral 2017    Transitioned From: Atherosclerosis of both carotid arteries   • Chronic kidney disease    • Chronic sinusitis     Last assessed: 13   • COPD (chronic obstructive pulmonary disease) ([facility] [address] )    • COVID     21 not hopsitalized- flu-like symptoms   • Fall 2021   • Fracture, ankle closed, bimalleolar, right, initial encounter 2021   • GERD (gastroesophageal reflux disease)    • Hyperlipidemia    • Lung nodule    • PNA (pneumonia)    • PONV (postoperative nausea and vomiting)     with C-sections   • Pulmonary emphysema (HCC)    • PVD (peripheral vascular disease) ([facility])    • Restless leg syndrome    • Stage 3 chronic kidney disease ([facility] [address] ) 2019   • Tobacco abuse      Past Surgical History:   Procedure Laterality Date   • BREAST LUMPECTOMY Right 2022    Procedure: ISAI  DIRECTED LUMPECTOMY;  Surgeon: Fran De Anda MD;  Location: [facility];  Service: Surgical Oncology   • CATARACT EXTRACTION     •  SECTION     • COLONOSCOPY      Complete   Resolved: 13   • DESCENDING AORTIC ANEURYSM REPAIR W/ STENT  2019   • IR AORTAGRAM WITH RUN-OFF  8/15/2019   • LYMPH NODE BIOPSY Right 2022    Procedure: LYMPHATIC MAPPING WITH BLUE AND RADIOACTIVE DYES, SENTINEL LYMPH NODE BIOPSY, INJECTION IN OR BY DR RODRÍGUEZ AT 1300;  Surgeon: Fran De Anda MD;  Location: [facility];  Service: Surgical Oncology   • SHOULDER SURGERY      For frozen shoulder    • TONSILLECTOMY     • US BREAST ISAI  NEEDLE LOC RIGHT Right 3/25/2022   • US GUIDED BREAST BIOPSY RIGHT COMPLETE Right 3/25/2022     Family History   Problem Relation Age of Onset   • No Known Problems Mother    • No Known Problems Father    • Arthritis Sister    • Pancreatic cancer Sister 61   • Melanoma Brother         twice   • Prostate cancer Brother    • Heart attack Family         Acute Myocardial Infarction   • Cirrhosis Family    • Prostate cancer Family    • Skin cancer Family    • Stroke Family         Syndrome   • No Known Problems Daughter    • No Known Problems Maternal Grandmother    • No Known Problems Paternal Grandmother    • No Known Problems Sister    • No Known Problems Maternal Aunt    • Breast cancer Other 27     Social History     Socioeconomic History   • Marital status:      Spouse name: Not on file   • Number of children: 2   • Years of education: Not on file   • Highest education level: Not on file   Occupational History   • Occupation: Retired/   Tobacco Use   • Smoking status: Former Smoker     Packs/day: 2 00     Years: 56 00     Pack years: 112 00     Types: Cigarettes     Start date: 1962     Quit date: 2018     Years since quittin 4   • Smokeless tobacco: Never Used   Vaping Use   • Vaping Use: Never used   Substance and Sexual Activity   • Alcohol use: Yes     Comment: occasionally    • Drug use: No   • Sexual activity: Not Currently     Partners: Male   Other Topics Concern   • Not on file   Social History Narrative    Living w/adult children    No advance directive on file     Social Determinants of Health     Financial Resource Strain: Not on file   Food Insecurity: Not on file   Transportation Needs: No Transportation Needs   • Lack of Transportation (Medical): No   • Lack of Transportation (Non-Medical):  No   Physical Activity: Not on file   Stress: Not on file   Social Connections: Not on file   Intimate Partner Violence: Not on file   Housing Stability: Low Risk    • Unable to Pay for Housing in the Last Year: No   • Number of Places Lived in the Last Year: 1   • Unstable Housing in the Last Year: No       Current Outpatient Medications:   •  albuterol (2 5 mg/3 mL) 0 083 % nebulizer solution, Take 1 vial (2 5 mg total) by nebulization every 6 (six) hours as needed for wheezing or shortness of breath, Disp: 360 mL, Rfl: 3  •  albuterol (PROVENTIL HFA,VENTOLIN HFA) 90 mcg/act inhaler, Inhale 2 puffs every 6 (six) hours as needed for wheezing, Disp: 3 Inhaler, Rfl: 3  •  ALPRAZolam (XANAX) 0 5 mg tablet, Take 1 tablet (0 5 mg total) by mouth 2 (two) times a day as needed for anxiety, Disp: 20 tablet, Rfl: 0  •  ALPRAZolam (XANAX) 0 5 mg tablet, Take 1 tablet (0 5 mg total) by mouth 2 (two) times a day as needed for anxiety (Patient not taking: Reported on 10/12/2022), Disp: 6 tablet, Rfl: 0  •  ASPIRIN 81 PO, Take by mouth, Disp: , Rfl:   •  atorvastatin (LIPITOR) 40 mg tablet, , Disp: , Rfl:   •  Breo Ellipta 100-25 MCG/INH inhaler, inhale 1 puff daily Rinse mouth after use, Disp: 180 blister, Rfl: 1  •  dicyclomine (BENTYL) 10 mg capsule, Take 1 capsule (10 mg total) by mouth 3 (three) times a day before meals, Disp: 30 capsule, Rfl: 0  •  ergocalciferol (VITAMIN D2) 50,000 units, take 1 capsule by mouth every week, Disp: 12 capsule, Rfl: 0  •  famotidine (PEPCID) 20 mg tablet, Take 1 tablet (20 mg total) by mouth daily, Disp: , Rfl: 0  •  fluticasone (FLONASE) 50 mcg/act nasal spray, 2 sprays into each nostril daily, Disp: 11 1 mL, Rfl: 1  •  gabapentin (NEURONTIN) 300 mg capsule, take 1 capsule by mouth twice a day, Disp: 180 capsule, Rfl: 0  •  gabapentin (NEURONTIN) 400 mg capsule, take 1 capsule by mouth at bedtime, Disp: 90 capsule, Rfl: 1  •  naloxone (NARCAN) 4 mg/0 1 mL nasal spray, Administer 1 spray into a nostril   If no response after 2-3 minutes, give another dose in the other nostril using a new spray , Disp: 1 each, Rfl: 1  •  olopatadine (PATANOL) 0 1 % ophthalmic solution,  , Disp: , Rfl: 0  •  ondansetron (ZOFRAN) 8 mg tablet, Take 1 tablet (8 mg total) by mouth every 8 (eight) hours as needed for nausea or vomiting, Disp: 20 tablet, Rfl: 2  •  oxyCODONE-acetaminophen (Percocet) 5-325 mg per tablet, Take 1 tablet by mouth every 6 (six) hours as needed for moderate pain Max Daily Amount: 4 tablets, Disp: 20 tablet, Rfl: 0  •  rosuvastatin (CRESTOR) 20 MG tablet, Take 1 tablet (20 mg total) by mouth daily, Disp: 90 tablet, Rfl: 6  •  saccharomyces boulardii (FLORASTOR) 250 mg capsule, Take 1 capsule (250 mg total) by mouth 2 (two) times a day, Disp: , Rfl: 0  •  sodium chloride () 2 % hypertonic ophthalmic solution, Sue 128 2 % eye drops, Disp: , Rfl:   Allergies   Allergen Reactions   • Tiotropium Bromide Monohydrate Shortness Of Breath   • Augmentin [Amoxicillin-Pot Clavulanate] Abdominal Pain     Pt received ceftriaxone 1 g IV on 5-21-18, gets sharp pains    • Tiotropium Abdominal Pain   • Tylenol With Codeine #3 [Acetaminophen-Codeine] Abdominal Pain   • Other Other (See Comments)     There were no vitals filed for this visit

## 2022-10-28 NOTE — PROGRESS NOTES
Consultation - Radiation Oncology      UTO:[id number] : 1945  Encounter: [id number]  Patient Information: Anni  Chief Complaint   Patient presents with   • Breast Cancer   • Follow-up     Cancer Staging  Malignant neoplasm of overlapping sites of right breast in female, estrogen receptor negative ([facility] [address] )  Staging form: Breast, AJCC 8th Edition  - Clinical stage from 2022: Stage IB (cT1c, cN0, cM0, G2, ER-, AZ-, HER2-) - Signed by Dwight Acuña MD on 2022  Stage prefix: Initial diagnosis  Nuclear grade: G2  Histologic grading system: 3 grade system  HER2-IHC interpretation: Equivocal  HER2-IHC value: Score 2+  HER2-FISH interpretation: Negative           History of Present Illness   Nabil Calloway is a 78 y o   female multiple comorbidities who was initially seen in consultation for clinical stage T2N0M0 grade 2 invasive ductal carcinoma of the right breast as part of Multidisciplinary Breast Tumor Board on 22   Tumor cells were ER/AZ/HER2(-)  She subsequently underwent resection demonstrating 1 4cm grade 3 invasive ductal carcinoma achieving negative margins and 1 cycle of chemotherapy  She now returns for consideration for adjuvant radiation      22 ISAI  DIRECTED LUMPECTOMY (Right Breast) with sentinel lymph node biopsy under the care of Dr Wisam Ya  Pathology demonstrated 1 4cm grade 3 invasive ductal carcinoma associated with grade 3 DCIS  All margins were widely negative  0/1 sentinel nodes and 0/2 additional axillary nodes demonstrated metastatic disease  Tumor cells were ER/AZ/HER2(-)    Stage IA ([id number])       22 Dr Cris Stevenson, Medical Oncology recommended adjuvant chemotherapy with TC for 4 cycles with Neulasta,  followed by adjuvant radiation therapy   Because of her age, and other medical comorbidities including COPD, chemo dose was decreased by 25%       22 Dr Cris Stevenson  postpone chemotherapy in 2 weeks because of tooth extraction and cellulitis in surgical site        22 Dr Oscar Pérez  discomfort in her right breast    30 cc of seroma was aspirated      22 Infusion x 1 cycle of TC  22 Hospitalization for acute diverticulitis      22 CT C/A/P wo contrast  No evidence of metastatic disease throughout the chest, abdomen or pelvis  Stable pancreatic head lesion  Soft tissue postsurgical changes from right breast lumpectomy and axillary node dissection  Findings consistent with mild acute uncomplicated sigmoid diverticulitis      22 Dr Derian Ivan  She had decided to d/c all further chemo and proceed to RT which  is not unreasonable  will continue to follow along and defer to Surg -Onc to get mammograms ordered  Cancelling infusion chairs      22 ED abdominal pain, fever  22- 22 Hospital admission  C diff positive  Continue oral vanco for 10 days until Sep 24th  PT/OT recommended short-term rehab, patient going to [facility]     10/10/22 Dr Oscar Pérez  She has not been receiving adjuvant radiation as she has been in and out of the hospital with abdominal symptoms and rehab   Emphasized as discuss that need for Radiation Oncology consultation she agrees to follow-up    The patient is currently on continuous O2 supplementation  She has been experiencing weight loss related to GI complications and recent infection  The patient completed her antibiotics for C diff yesterday  She is concerned regarding recurrent infection or complication  The patient denies palpable nodules, suspicious skin changes or nipple discharge of the breasts bilaterally  She denies significant breast pain or tenderness    She denies upper extremity edema or shoulder restriction      Upcomin22 GI  22 Dr Kristin Weaver  23 Dr Derian Ivan  23 Dr Nicolas Williamson   Oncology History   Malignant neoplasm of overlapping sites of right breast in female, estrogen receptor negative ([facility] [address] )   3/25/2022 Initial Diagnosis Malignant neoplasm of right female breast ([facility] [address] )     3/25/2022 Biopsy    Right breast ultrasound guided biopsy  12 o'clock 5 cm from nipple  Invasive breast carcinoma of no special type (ductal NST/ invasive ductal carcinoma with apocrine features)  Grade 2  ER 0  MD 0  HER 2 2+   FISH negative  Lymphovascular invasion not identified    Concordant  Malignancy is unifocal  Mass measures 1 4 cm on mammogram and 1 9 cm on ultrasound  Right axilla ultrasound clear  Left brest clear  Amanda  reflector placed  In situ compononent: 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  4/4/2022 -  Cancer Staged    Staging form: Breast, AJCC 8th Edition  - Clinical stage from 4/4/2022: Stage IB (cT1c, cN0, cM0, G2, ER-, MD-, HER2-) - Signed by Luz Valdez MD on 4/4/2022  Stage prefix: Initial diagnosis  Nuclear grade: G2  Histologic grading system: 3 grade system  HER2-IHC interpretation: Equivocal  HER2-IHC value: Score 2+  HER2-FISH interpretation: Negative       4/4/2022 Genetic Testing    [employer]  A total of 36 genes were evaluated, including: GILL, BRCA1, BRCA2, CDH1, CHEK2, PALB2, PTEN, STK11, TP53  Negative result   No pathogenic sequence variants or deletions/duplications identified     4/28/2022 Surgery    Right breast amanda  directed lumpectomy with sentinel lymph node biopsy and axillary dissection  Invasive ductal carcinoma with apocrine features  Grade 3  1 4 cm  0/4 Lymph nodes       6/14/2022 - 6/15/2022 Chemotherapy    Pegfilgrastim-bmez (ZIEXTENZO), 6 mg, Subcutaneous, Once, 1 of 4 cycles  Administration: 6 mg (6/15/2022)  cyclophosphamide (CYTOXAN) IVPB, 450 mg/m2 = 778 mg (75 % of original dose 600 mg/m2), Intravenous, Once, 1 of 4 cycles  Dose modification: 450 mg/m2 (75 % of original dose 600 mg/m2, Cycle 1, Reason: Dose Not Tolerated)  Administration: 778 mg (6/14/2022)  DOCEtaxel (TAXOTERE) chemo infusion, 56 25 mg/m2 = 97 4 mg (75 % of original dose 75 mg/m2), Intravenous, Once, 1 of 4 cycles  Dose modification: 56 25 mg/m2 (75 % of original dose 75 mg/m2, Cycle 1, Reason: Dose Not Tolerated)  Administration: 97 4 mg (2022)           Past Medical History:   Diagnosis Date   • Anxiety    • Atherosclerosis of native artery of both lower extremities with intermittent claudication ([facility] [address] ) 10/15/2015    Transitioned From: Cardiovascular Symptoms   • Breast cancer [facility])    • Carotid artery plaque, bilateral 2017    Transitioned From: Atherosclerosis of both carotid arteries   • Chronic kidney disease    • Chronic sinusitis     Last assessed: 13   • COPD (chronic obstructive pulmonary disease) ([facility] [address] )    • COVID     21 not hopsitalized- flu-like symptoms   • Fall 2021   • Fracture, ankle closed, bimalleolar, right, initial encounter 2021   • GERD (gastroesophageal reflux disease)    • Hyperlipidemia    • Lung nodule    • PNA (pneumonia)    • PONV (postoperative nausea and vomiting)     with C-sections   • Pulmonary emphysema (HCC)    • PVD (peripheral vascular disease) ([facility])    • Restless leg syndrome    • Stage 3 chronic kidney disease ([facility] [address] ) 2019   • Tobacco abuse      Past Surgical History:   Procedure Laterality Date   • BREAST LUMPECTOMY Right 2022    Procedure: ISAI  DIRECTED LUMPECTOMY;  Surgeon: Hong Srinivasan MD;  Location: [facility];  Service: Surgical Oncology   • CATARACT EXTRACTION     •  SECTION     • COLONOSCOPY      Complete   Resolved: 13   • DESCENDING AORTIC ANEURYSM REPAIR W/ STENT  2019   • IR AORTAGRAM WITH RUN-OFF  8/15/2019   • LYMPH NODE BIOPSY Right 2022    Procedure: LYMPHATIC MAPPING WITH BLUE AND RADIOACTIVE DYES, SENTINEL LYMPH NODE BIOPSY, INJECTION IN OR BY DR Madeline Clarke AT 1300;  Surgeon: Hong Srinivasan MD;  Location: [facility];  Service: Surgical Oncology   • SHOULDER SURGERY      For frozen shoulder    • TONSILLECTOMY     • US BREAST ISAI  NEEDLE LOC RIGHT Right 3/25/2022   • US GUIDED BREAST BIOPSY RIGHT COMPLETE Right 3/25/2022       Family History   Problem Relation Age of Onset   • No Known Problems Mother    • No Known Problems Father    • Arthritis Sister    • Pancreatic cancer Sister 61   • Melanoma Brother         twice   • Prostate cancer Brother    • Heart attack Family         Acute Myocardial Infarction   • Cirrhosis Family    • Prostate cancer Family    • Skin cancer Family    • Stroke Family         Syndrome   • No Known Problems Daughter    • No Known Problems Maternal Grandmother    • No Known Problems Paternal Grandmother    • No Known Problems Sister    • No Known Problems Maternal Aunt    • Breast cancer Other 27       Social History   Social History     Substance and Sexual Activity   Alcohol Use Yes    Comment: occasionally      Social History     Substance and Sexual Activity   Drug Use No     Social History     Tobacco Use   Smoking Status Former Smoker   • Packs/day: 2 00   • Years: 56 00   • Pack years: 112 00   • Types: Cigarettes   • Start date: 1962   • Quit date: 2018   • Years since quittin 4   Smokeless Tobacco Never Used       Meds/Allergies     Current Outpatient Medications:   •  albuterol (2 5 mg/3 mL) 0 083 % nebulizer solution, Take 1 vial (2 5 mg total) by nebulization every 6 (six) hours as needed for wheezing or shortness of breath, Disp: 360 mL, Rfl: 3  •  albuterol (PROVENTIL HFA,VENTOLIN HFA) 90 mcg/act inhaler, Inhale 2 puffs every 6 (six) hours as needed for wheezing, Disp: 3 Inhaler, Rfl: 3  •  ALPRAZolam (XANAX) 0 5 mg tablet, Take 1 tablet (0 5 mg total) by mouth 2 (two) times a day as needed for anxiety, Disp: 20 tablet, Rfl: 0  •  ASPIRIN 81 PO, Take by mouth, Disp: , Rfl:   •  atorvastatin (LIPITOR) 40 mg tablet, , Disp: , Rfl:   •  Breo Ellipta 100-25 MCG/INH inhaler, inhale 1 puff daily Rinse mouth after use, Disp: 180 blister, Rfl: 1  •  ergocalciferol (VITAMIN D2) 50,000 units, take 1 capsule by mouth every week, Disp: 12 capsule, Rfl: 0  •  famotidine (PEPCID) 20 mg tablet, Take 1 tablet (20 mg total) by mouth daily, Disp: , Rfl: 0  •  fluticasone (FLONASE) 50 mcg/act nasal spray, 2 sprays into each nostril daily, Disp: 11 1 mL, Rfl: 1  •  gabapentin (NEURONTIN) 300 mg capsule, take 1 capsule by mouth twice a day, Disp: 180 capsule, Rfl: 0  •  gabapentin (NEURONTIN) 400 mg capsule, take 1 capsule by mouth at bedtime, Disp: 90 capsule, Rfl: 1  •  naloxone (NARCAN) 4 mg/0 1 mL nasal spray, Administer 1 spray into a nostril   If no response after 2-3 minutes, give another dose in the other nostril using a new spray , Disp: 1 each, Rfl: 1  •  olopatadine (PATANOL) 0 1 % ophthalmic solution,  , Disp: , Rfl: 0  •  ondansetron (ZOFRAN) 8 mg tablet, Take 1 tablet (8 mg total) by mouth every 8 (eight) hours as needed for nausea or vomiting, Disp: 20 tablet, Rfl: 2  •  oxyCODONE-acetaminophen (Percocet) 5-325 mg per tablet, Take 1 tablet by mouth every 6 (six) hours as needed for moderate pain Max Daily Amount: 4 tablets, Disp: 20 tablet, Rfl: 0  •  saccharomyces boulardii (FLORASTOR) 250 mg capsule, Take 1 capsule (250 mg total) by mouth 2 (two) times a day, Disp: , Rfl: 0  •  sodium chloride () 2 % hypertonic ophthalmic solution, Sue 128 2 % eye drops, Disp: , Rfl:   •  ALPRAZolam (XANAX) 0 5 mg tablet, Take 1 tablet (0 5 mg total) by mouth 2 (two) times a day as needed for anxiety (Patient not taking: No sig reported), Disp: 6 tablet, Rfl: 0  •  dicyclomine (BENTYL) 10 mg capsule, Take 1 capsule (10 mg total) by mouth 3 (three) times a day before meals (Patient not taking: Reported on 10/28/2022), Disp: 30 capsule, Rfl: 0  •  rosuvastatin (CRESTOR) 20 MG tablet, Take 1 tablet (20 mg total) by mouth daily (Patient not taking: Reported on 10/28/2022), Disp: 90 tablet, Rfl: 6  Allergies   Allergen Reactions   • Tiotropium Bromide Monohydrate Shortness Of Breath   • Augmentin [Amoxicillin-Pot Clavulanate] Abdominal Pain     Pt received ceftriaxone 1 g IV on 5-21-18, gets sharp pains    • Tiotropium Abdominal Pain   • Tylenol With Codeine #3 [Acetaminophen-Codeine] Abdominal Pain   • Other Other (See Comments)       Review of Systems    Constitutional: Positive for fatigue and unexpected weight change (lost 17 lbs )  HENT: Negative  Eyes: Positive for visual disturbance  Fuchs dystrophy   Respiratory: Positive for shortness of breath  2 liters O2 via nasal canula   Cardiovascular: Negative  Gastrointestinal: Positive for abdominal pain (currently being treated for c  diff) and nausea  Genitourinary: Negative  Musculoskeletal: Positive for gait problem (receiving home PT for balance)  Skin: Negative  Allergic/Immunologic: Negative  Neurological: Positive for light-headedness  Hematological: Negative  Psychiatric/Behavioral: Negative for suicidal ideas  Occasionally feels depressed  OBJECTIVE:   /60   Pulse 96   Temp (!) 96 9 °F (36 1 °C)   Resp 18   Wt 67 6 kg (149 lb)   SpO2 96%   BMI 29 10 kg/m²   Performance Status: Karnofsky: 79 - Cares for self; unable to carry on normal activity or do normal work     Physical Exam  Vitals and nursing note reviewed  Constitutional:       General: She is not in acute distress  Appearance: She is well-developed  Comments: NC O2 at 2L   Cardiovascular:      Rate and Rhythm: Normal rate and regular rhythm  Pulmonary:      Breath sounds: No wheezing, rhonchi or rales  Comments: Decreased breath sounds throughout  Chest:   Breasts:      Right: No axillary adenopathy or supraclavicular adenopathy  Left: No axillary adenopathy or supraclavicular adenopathy  Comments: Breast examination demonstrates right breast smaller than the left  There is a well-healed 12:00 right breast lumpectomy scar associated with fibrotic change and mild retraction    There are no palpable nodules or suspicious skin changes of the breasts bilaterally  Abdominal:      General: There is no distension  Palpations: Abdomen is soft  Tenderness: There is no abdominal tenderness  Musculoskeletal:      Right lower leg: No edema  Left lower leg: No edema  Comments: No upper extremity edema bilaterally  Full range of motion of upper extremities bilaterally  Lymphadenopathy:      Cervical: No cervical adenopathy  Upper Body:      Right upper body: No supraclavicular or axillary adenopathy  Left upper body: No supraclavicular or axillary adenopathy  Neurological:      Mental Status: She is alert and oriented to person, place, and time  Gait: Gait normal           RESULTS  Imaging Studies  CT abdomen pelvis with contrast    Result Date: 10/16/2022  Narrative: CT ABDOMEN AND PELVIS WITH IV CONTRAST INDICATION:   RLQ abdominal pain (Age >= 14y) Diffuse abdominal tenderness particularly right lower quadrant, profuse diarrhea, history C diff  COMPARISON:  Multiple priors most recently 9/14/2022 TECHNIQUE:  CT examination of the abdomen and pelvis was performed  Axial, sagittal, and coronal 2D reformatted images were created from the source data and submitted for interpretation  Radiation dose length product (DLP) for this visit:  675 mGy-cm   This examination, like all CT scans performed in the [facility], was performed utilizing techniques to minimize radiation dose exposure, including the use of iterative reconstruction and automated exposure control  IV Contrast:  100 mL of iohexol (OMNIPAQUE) Enteric Contrast:  Enteric contrast was not administered  FINDINGS: ABDOMEN LOWER CHEST:  Small hiatal hernia noted  No other clinically significant abnormality identified in the visualized lower chest  LIVER/BILIARY TREE:  Liver is diffusely decreased in density consistent with fatty change  No CT evidence of suspicious hepatic mass  Normal hepatic contours  No biliary dilatation  Small hepatic cysts  GALLBLADDER:  No calcified gallstones  No pericholecystic inflammatory change  SPLEEN:  Unremarkable  PANCREAS:  Unchanged 1 8 cm soft tissue nodule along the posterior inferior aspect of the pancreatic head  ADRENAL GLANDS:  Unremarkable  KIDNEYS/URETERS:  No hydronephrosis  Small bilateral renal calcifications, which may represent small nonobstructing calculi and/or vascular calcifications ,  One or more sharply circumscribed subcentimeter renal hypodensities are present, too small to accurately characterize, and statistically most likely benign findings  According to recent literature (Radiology 2019) no further workup of these findings is recommended  STOMACH AND BOWEL:  Diffuse bowel wall thickening  Diverticulosis coli  APPENDIX:  A normal appendix was visualized  ABDOMINOPELVIC CAVITY:  No ascites  No pneumoperitoneum  No lymphadenopathy  VESSELS:  Atherosclerotic changes are present  No evidence of aneurysm  PELVIS REPRODUCTIVE ORGANS:  Unremarkable for patient's age  URINARY BLADDER:  Unremarkable  ABDOMINAL WALL/INGUINAL REGIONS:  Unremarkable  OSSEOUS STRUCTURES:  No acute fracture or destructive osseous lesion  Spinal degenerative changes are noted  Impression: Pancolitis  Nonacute findings, as described  The study was marked in [facility] for immediate notification  Workstation performed: [id number]         Pathology:  · Collected 3/25/2022 10:51    · Status: Edited Result - FINAL    · Visible to patient: No (inaccessible in [facility])   · Dx: Abnormal mammogram; Abnormal findings       · 0 Result Notes    Component    Case Report   Surgical Pathology Report                         Case: [id number]                                    Authorizing Provider:  ABIGAIL Lawson          Collected:           03/25/2022 1051               Ordering Location:     [address] [place] [address] Breast Received:            03/25/2022 Damir Cannon Pathologist:           Colin Romero MD                                                                Specimen:    Breast, Right, US BX RT BREAST 12:00 5CMFN 4 PASSES 12G MARQUEE                           Addendum 2   Test Description                                       Results  HER-2/flash                                               Negative / Not Amplified                                                 Interpretation  Her-2/flash : CEP-17 ratio:                                    1 1:1     Average HER-2 Signal:                                         2 5  Average CEP-17 Signal:                                       2 3          Number of Selected Invasive Cells Scanned:       50     Interpretive Information  HER2/flash FISH results are best interpreted by analyzing the ratio with respect to the HER2/flash copy number  An H&E slide was reviewed and demonstrates lesional tissue  Analysis of HER2/flash gene amplification status was performed using an automated Fluorescence In Situ hybridization (FISH) signal enumeration system ([address] [place] [address])  This system scans up to 5 different specified areas of tumor  A minimum of 20 cells representing > 10% of contiguous and homogenous invasive tumor cells were scanned  * Negative / not amplified:  HER2/CEP17 ratio of <2 0 with an average HER-2 copy number of < 4 0 signals/cell  * Equivocal/Borderline: HER2/CEP17 ratio of <2 0 with an average HER-2 copy number of >/=4 0 and < 6 0 signals/cell  * Positive / Amplified: HER2/CEP17 ratio of >/= 2 0 with an average HER-2 copy number of >/= 4 0 signals/cell, or HER2/CEP17 ratio of >/=2 0 with an average HER-2 copy number of < 4 0 signals/cell, or HER2/CEP17 ratio of <2 0 with an average HER-2 copy number of >/=  6 0 signals/cell      References:  1   2013 ASCO/CAP HER2 Testing Guidelines Update:  Recommendations for Human Epidermal Growth factor Receptor 2 Testing in BREAST Cancer  fior Carter   Arch Pathol Lab Med  doi: 10 5858/arpa  [id number]-AZ  [employer] HER-2/flash DNA probe kit ([employer]) was used  This test was developed and its performance characteristics determined by [employer]  It has been approved by the FDA to detect the over-expression of the HER-2/flash gene in neoplastic breast tissue  It may help identify candidates for Herceptin therapy  This lab has been approved by [place], designated as a high complexity laboratory    Addendum electronically signed by Omari Salvador MD on 4/4/2022 at 0721   Addendum   Test Description                                     Result                            Prognostic Interpretation  Estrogen Receptor (clone SP-1)              0%                                  Negative              Internal control: Positive   External control: Positive                          Trae Score*:  0                                                         Progesterone Receptor (clone 1E2)         0%                                  Negative              Internal control: Positive                         External control: Positive                          Trae Score*:  0     HER2 by IHC (clone 4B5)                         2+                                  Equivocal (FISH pending)     Fixative         Duration of Fixation (hrs)     Cold Ischemia Time (mins)           Sample Adequacy  Formalin       12 hours                              Not stated                                    Adequate  Appropriate positive and negative controls were reviewed  These immunohistochemical tests were performed at [facility] in [place], [place] and interpreted by Dr Brenna Sal    An electronic copy of this report will be kept on file in the Medical Records Department at [facility]   * The Trae score is a semi quantitative system that takes into consideration the proportion of positive cells (scored on a scale of 0-5) and staining intensity (scored on a scale of 0-3)  The proportion and intensity are summed to produce total scores of 0 or 2 through 8  A score of 0-2 is regarded as negative while 3-8 as positive  Addendum electronically signed by Marybeth Kemp MD on 4/1/2022 at 3369   Final Diagnosis   A  Right breast, 12:00, 5 cm from nipple (ultrasound-guided 12 gauge core biopsy, 4 passes):     - Invasive breast carcinoma of no special type (ductal NST/invasive ductal carcinoma with apocrine features)  -- Tumor present in at least 3 tissue cores with largest measurable size of 6 mm  -- mBR Grade:  Grade 2 of 3         - Duct formation:      Score 3 of 3         - Nuclear grade:       Score 3 of 3         - Mitotic rate:            Score 1 of 3     - In situ component: Favor 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     - Lymphovascular invasion:  Not definitively identified     Comment:  Block A2 forwarded for ER/SC/HER2 analysis with the results to be given in a supplemental report  Electronically signed by Marybeth Kemp MD on 3/29/2022 at 1475   Comments: This is an appended report  These results have been appended to a previously preliminary verified report  Preliminary Diagnosis   A  Right breast, 12:00, 5 cm from nipple (ultrasound-guided 12 gauge core biopsy, 4 passes):     - Invasive carcinoma, pending additional studies  Preliminary result electronically signed by Marybeth Kemp MD on 3/28/2022 at 5024   Microscopic Description    SOLEDAD-3 positive  P63 and SMM-HC negative in invasive carcinoma  E-cadherin and P120 catenin membranous  Controls stain appropriately  Comment: This is an appended report  These results have been appended to a previously preliminary verified report  Note    - Intradepartmental consultation concurs with the diagnosis  - CHUCHO Clarke from the [address] [place] [address] notified via [address]ho on 03/29/2022  Comment:  This is an appended report  These results have been appended to a previously preliminary verified report  Additional Information    All reported additional testing was performed with appropriately reactive controls  These tests were developed and their performance characteristics determined by [facility] or appropriate performing facility, though some tests may be performed on tissues which have not been validated for performance characteristics (such as staining performed on alcohol exposed cell blocks and decalcified tissues)  Results should be interpreted with caution and in the context of the patients’ clinical condition  These tests may not be cleared or approved by the [place]  Food and Drug Administration, though the FDA has determined that such clearance or approval is not necessary  These tests are used for clinical purposes and they should not be regarded as investigational or for research  This laboratory has been approved by [facility], designated as a high-complexity laboratory and is qualified to perform these tests  Interpretation performed at [address]    Synoptic Checklist     INVASIVE CARCINOMA OF THE BREAST: Biopsy  Protocol posted: 11/10/2021  INVASIVE CARCINOMA OF THE BREAST: BIOPSY - All Specimens  SPECIMEN   Procedure  Needle biopsy    Specimen Laterality  Right    TUMOR   Tumor Site  Clock position      12 o'clock    Tumor Site  Distance from nipple (Centimeters): 5 cm   Histologic Type  Invasive carcinoma of no special type (ductal)    Histologic Grade ([place] Histologic Score)     Glandular (Acinar) / Tubular Differentiation  Score 3    Nuclear Pleomorphism  Score 3    Mitotic Rate  Score 1    Overall Grade  Grade 2 (scores of 6 or 7)    Tumor Size  Greatest dimension of largest invasive focus (Millimeters): 6 mm   Ductal Carcinoma In Situ (DCIS)  Cannot be excluded    Lymphovascular Invasion  Cannot be determined: Not definitively identified    Microcalcifications  Present in invasive carcinoma            · Collected 4/28/2022 15:06    · Status: Final result   · Visible to patient: No (inaccessible in [address])   · Dx: Malignant neoplasm of overlapping sit      · 0 Result Notes    Component    Case Report   Surgical Pathology Report                         Case: [id number]                                    Authorizing Provider:  Saud Butt,  Collected:           04/28/2022 1506                                      MD                                                                            Ordering Location:     [facility] Received:            04/28/2022 1616                                     Operating Room                                                                Pathologist:           Arpit Carcamo DO                                                      Specimens:   A) - Lymph Node, [place], RIGHT AXILLARY SENTINAL LYMPH NODE HOT NOT BLUE #1                        B) - Lymph Node, RIGHT AXILLARY ADIPOSE TISSUE FOR LYMPH NODES                                       C) - Breast, Right, RIGHT BREAST POSTERIOR MARGIN INKED MOST DISTAL MARGIN (BLACK)                   D) - Breast, Right, RIGHT BREAST INFERIOR MARGIN INKED MOST DISTAL MARGIN (BLUE)                     E) - Breast, Right, RIGHT BREAST MEDIAL MARGIN INKED MOST DISTAL MARGIN (YELLOW)                     F) - Breast, Right, RIGHT BREAST SUPERIOR MARGIN INKED MOST DISTAL MARGIN (ORANGE)                   G) - Breast, Right, RIGHT BREAST LATERAL MARGIN INKED MOST DISTAL MARGIN (RED)                       H) - Breast, Right, RIGHT BREAST ANTERIOR MARGIN INKED MOST DISTAL MARGIN (GREEN)                    I) - Breast, Right, RIGHT BREAST LUMP ISAI  DIRECTED PER MARGIN PROTOCOL              Final Diagnosis   A  [place] Lymph Node, Right Axillary (#1), Biopsy:  - One lymph node, negative for carcinoma (0/1)    - AE1/AE3 immunohistochemistry supports the diagnosis  B  Lymph Nodes, Right Axillary, Dissection:  - Two lymph nodes, negative for carcinoma (0/2)  - AE1/AE3 immunohistochemistry supports the diagnosis  C  Breast, Right New Posterior Margin, Excision:  - Benign breast tissue with fibrocystic changes  D  Breast, Right New Inferior Margin, Excision:  - Benign breast tissue with fibrocystic changes  E  Breast, Right New Medial Margin, Excision:  - Benign breast tissue with fibrocystic changes  F  Breast, Right New Superior Margin, Excision:  - Benign breast tissue with fibrocystic changes  G  Breast, Right New Lateral Margin, Excision:  - Benign breast tissue with fibrocystic changes  H  Breast, Right New Anterior Margin, Excision:  - Benign breast tissue with fibrocystic changes  I  Breast, Right, Lumpectomy:  - Invasive ductal carcinoma with apocrine features, Medardo Grade III (3 + 3 + 3 = 9), 14 mm in greatest dimension  See Synoptic Report and Note  - Background fibrocystic changes and prior procedural site reactive changes  - Benign skin with no specific pathologic change  Electronically signed by Pascale Velazco DO on 5/11/2022 at 5   Note    Intradepartmental consultation is in agreement (CK)  Best representative tumor block: [id number]   Additional Information    All reported additional testing was performed with appropriately reactive controls  These tests were developed and their performance characteristics determined by [facility] or appropriate performing facility, though some tests may be performed on tissues which have not been validated for performance characteristics (such as staining performed on alcohol exposed cell blocks and decalcified tissues)  Results should be interpreted with caution and in the context of the patients’ clinical condition  These tests may not be cleared or approved by the [place]   Food and Drug Administration, though the FDA has determined that such clearance or approval is not necessary  These tests are used for clinical purposes and they should not be regarded as investigational or for research  This laboratory has been approved by [employer]IA 88, designated as a high-complexity laboratory and is qualified to perform these tests          Interpretation performed at [address]   Synoptic Checklist     INVASIVE CARCINOMA OF THE BREAST: Resection  8th Edition - Protocol posted: 12/17/2021  INVASIVE CARCINOMA OF THE BREAST: COMPLETE EXCISION - All Specimens  SPECIMEN   Procedure  Excision (less than total mastectomy)    Specimen Laterality  Right    TUMOR   Histologic Type  Invasive ductal carcinoma with apocrine features    Histologic Grade ([place] Histologic Score)     Glandular (Acinar) / Tubular Differentiation  Score 3    Nuclear Pleomorphism  Score 3    Mitotic Rate  Score 3    Overall Grade  Grade 3 (scores of 8 or 9)    Tumor Size  Greatest dimension of largest invasive focus (Millimeters): 14 mm   Tumor Focality  Single focus of invasive carcinoma    Ductal Carcinoma In Situ (DCIS)  Present      Negative for extensive intraductal component (EIC)    Size (Extent) of DCIS  Cannot be determined: 4    Number of Blocks with DCIS  37    Architectural Patterns  Cribriform    Nuclear Grade  Grade III (high)    Necrosis  Present, focal (small foci or single cell necrosis)    Lobular Carcinoma In Situ (LCIS)  Not identified    Lymphovascular Invasion  Not identified    Dermal Lymphovascular Invasion  Not identified    Treatment Effect in the Breast  No known presurgical therapy    MARGINS   Margin Status for Invasive Carcinoma  All margins negative for invasive carcinoma    Distance from Invasive Carcinoma to Closest Margin  Greater than: 20 mm   Distance from Invasive Carcinoma to Anterior Margin  Greater than: 20 mm   Distance from Invasive Carcinoma to Posterior Margin  Greater than: 20 mm Distance from Invasive Carcinoma to Superior Margin  Greater than: 20 mm   Distance from Invasive Carcinoma to Inferior Margin  Greater than: 20 mm   Distance from Invasive Carcinoma to Medial Margin  Greater than: 20 mm   Distance from Invasive Carcinoma to Lateral Margin  Greater than: 20 mm   Margin Status for DCIS  All margins negative for DCIS    Distance from DCIS to Closest Margin  Greater than: 20 mm   REGIONAL LYMPH NODES   Regional Lymph Node Status  All regional lymph nodes negative for tumor    Total Number of Lymph Nodes Examined (sentinel and non-sentinel)  3    Number of [place] Nodes Examined  1    PATHOLOGIC STAGE CLASSIFICATION (pTNM, AJCC 8th Edition)   Reporting of pT, pN, and (when applicable) pM categories is based on information available to the pathologist at the time the report is issued  As per the AJCC (Chapter 1, 8th Ed ) it is the managing physician’s responsibility to establish the final pathologic stage based upon all pertinent information, including but potentially not limited to this pathology report  pT Category  pT1c    Regional Lymph Nodes Modifier  (sn): [place] node(s) evaluated  pN Category  pN0    SPECIAL STUDIES        Estrogen Receptor (ER) Status  Negative         Progesterone Receptor (PgR) Status  Negative         HER2 (by in situ hybridization)  Negative (not amplified)    Testing Performed on Case Number  [id number]      ASSESSMENT  1   Malignant neoplasm of overlapping sites of right breast in female, estrogen receptor negative [facility])  Ambulatory referral to Radiation Oncology     Cancer Staging  Malignant neoplasm of overlapping sites of right breast in female, estrogen receptor negative ([address] )  Staging form: Breast, AJCC 8th Edition  - Clinical stage from 4/4/2022: Stage IB (cT1c, cN0, cM0, G2, ER-, AZ-, HER2-) - Signed by Bethanie Ambrosio MD on 4/4/2022  Stage prefix: Initial diagnosis  Nuclear grade: G2  Histologic grading system: 3 grade system  HER2-IHC interpretation: Equivocal  HER2-IHC value: Score 2+  HER2-FISH interpretation: Negative        PLAN/DISCUSSION  Osei Hanks is a 78 y o   female with multiple comorbidities including COPD on O2 supplementation diagnosed with stage IA ([id number]) grade 3 invasive right breast carcinoma status post lumpectomy and sentinel lymph node biopsy achieving negative margins on 4/28/22  Tumor cells were ER/AZ/HER2(-)  She received 1 cycle of dose reduced TC, but then discontinued after complication with diverticulitis followed by C  Diff infection  Given her clinical presentation, I recommend adjuvant radiation to the right breast to 40Gy with boost to 50Gy  I feel this would offer patient benefit in terms of local regional control and possible disease free survival   This is in accordance with NCCN guidelines  The rationale and potential benefits, as well as the risks and acute and late side effects and potential toxicities of radiation were discussed with the patient and her son who is with her today at length  Side effects discussed included, but were not limited to: Fatigue, skin erythema, hyperpigmentation, desquamation, fibrosis, shrinkage of the breast resulting asymmetry, rib weakening, pulmonary fibrosis  I explained that she is outside the ideal time period for adjuvant radiation, but given her disease, I would still offer her therapy and expect local control benefit  The patient and her son were concerned regarding transportation, tolerance to treatment, and ability to complete treatment  I discussed with patient that local control benefit would be significant, but would likely have minimal effect her risk of distant metastases  I would recommend treatment and feel she would tolerate whole breast radiation well overall  We could assist with transportation for treatment    We discussed truncated courses without boost, integrated boost, or 1 week course if she is unwilling to come for 4 weeks of therapy  Although treatment without boost or in 1 week, as per FAST-Forward regimen from [facility] would provide less local control, it would be superior to no radiation treatment and would likely be well tolerated with main concern being increased fibrotic changes  They were given the opportunity to ask questions and all questions were answered to their satisfaction  She is undecided, but plans to decide by next week  I spent 47 minutes reviewing the records (labs, clinician notes, outside records, medical history, ordering medicine/tests/procedures, interpreting the imaging/labs previously done) and coordination of care as well as direct time with the patient today, of which greater than 50% of the time was spent in counseling and coordination of care with the patient/family  I subsequently discussed this case with Dr Daniel Roman regarding possible alternate fractionation  Adjuvant radiation is strongly recommended given her presentation as her risk of local recurrence is likely over 50%, which would be significantly reduced with radiation  Alternate fractionations with possible integrated boost could be considered if patient unwilling to come for 4 weeks  I will plan to discuss these further recommendations with the patient next week when we follow-up with her regarding her final decision  Simone Glover  10/28/2022,3:51 PM      Portions of the record may have been created with voice recognition software  Occasional wrong word or "sound a like" substitutions may have occurred due to the inherent limitations of voice recognition software  Read the chart carefully and recognize, using context, where substitutions have occurred

## 2022-10-28 NOTE — LETTER
2022     Alivia Charlton MD  [address] [facility]  [address]  [place]    Patient: Arely Cramer   YOB: 1945   Date of Visit: 10/28/2022       Dear Dr Ute Contreras: Thank you for referring Arely Cramer to me for evaluation  Below are my notes for this consultation  If you have questions, please do not hesitate to call me  I look forward to following your patient along with you  Sincerely,        Félix Phan MD        CC: No Recipients  Félix Phan MD  10/28/2022  7:19 PM  Sign when Signing Visit  Consultation - Radiation Oncology      ANW:[id number] : 1945  Encounter: [id number]  Patient Information: Anni  Chief Complaint   Patient presents with   • Breast Cancer   • Follow-up     Cancer Staging  Malignant neoplasm of overlapping sites of right breast in female, estrogen receptor negative ([facility] [address] )  Staging form: Breast, AJCC 8th Edition  - Clinical stage from 2022: Stage IB (cT1c, cN0, cM0, G2, ER-, CA-, HER2-) - Signed by Ankita Abraham MD on 2022  Stage prefix: Initial diagnosis  Nuclear grade: G2  Histologic grading system: 3 grade system  HER2-IHC interpretation: Equivocal  HER2-IHC value: Score 2+  HER2-FISH interpretation: Negative           History of Present Illness   Arely Cramer is a 78 y o   female multiple comorbidities who was initially seen in consultation for clinical stage T2N0M0 grade 2 invasive ductal carcinoma of the right breast as part of Multidisciplinary Breast Tumor Board on 22   Tumor cells were ER/CA/HER2(-)  She subsequently underwent resection demonstrating 1 4cm grade 3 invasive ductal carcinoma achieving negative margins and 1 cycle of chemotherapy  She now returns for consideration for adjuvant radiation      22 ISAI  DIRECTED LUMPECTOMY (Right Breast) with sentinel lymph node biopsy under the care of Dr Ute Contreras    Pathology demonstrated 1 4cm grade 3 invasive ductal carcinoma associated with grade 3 DCIS  All margins were widely negative  0/1 sentinel nodes and 0/2 additional axillary nodes demonstrated metastatic disease  Tumor cells were ER/TX/HER2(-)  Stage IA ([id number])       5/19/22 Dr Sherry Moses, Medical Oncology recommended adjuvant chemotherapy with TC for 4 cycles with Neulasta,  followed by adjuvant radiation therapy   Because of her age, and other medical comorbidities including COPD, chemo dose was decreased by 25%       6/2/22 Dr Sherry Moses  postpone chemotherapy in 2 weeks because of tooth extraction and cellulitis in surgical site        6/13/22 Dr Desmond Silva  discomfort in her right breast    30 cc of seroma was aspirated      6/14/22 Infusion x 1 cycle of TC  6/20/22 Hospitalization for acute diverticulitis      6/20/22 CT C/A/P wo contrast  No evidence of metastatic disease throughout the chest, abdomen or pelvis  Stable pancreatic head lesion  Soft tissue postsurgical changes from right breast lumpectomy and axillary node dissection  Findings consistent with mild acute uncomplicated sigmoid diverticulitis      6/30/22 Dr Sanjay Ovalle  She had decided to d/c all further chemo and proceed to RT which  is not unreasonable  will continue to follow along and defer to Surg -Onc to get mammograms ordered  Cancelling infusion chairs      9/6/22 ED abdominal pain, fever  9/14/22- 9/19/22 Hospital admission  C diff positive  Continue oral vanco for 10 days until Sep 24th  PT/OT recommended short-term rehab, patient going to [facility]     10/10/22 Dr Desmond Silva  She has not been receiving adjuvant radiation as she has been in and out of the hospital with abdominal symptoms and rehab   Emphasized as discuss that need for Radiation Oncology consultation she agrees to follow-up    The patient is currently on continuous O2 supplementation  She has been experiencing weight loss related to GI complications and recent infection  The patient completed her antibiotics for C diff yesterday    She is concerned regarding recurrent infection or complication  The patient denies palpable nodules, suspicious skin changes or nipple discharge of the breasts bilaterally  She denies significant breast pain or tenderness  She denies upper extremity edema or shoulder restriction      Upcomin22 GI  22 Dr Camila Soria  23 Dr Randy Thomas  23 Dr Severo Angus   Oncology History   Malignant neoplasm of overlapping sites of right breast in female, estrogen receptor negative ([facility] [address] )   3/25/2022 Initial Diagnosis    Malignant neoplasm of right female breast ([facility] [address] )     3/25/2022 Biopsy    Right breast ultrasound guided biopsy  12 o'clock 5 cm from nipple  Invasive breast carcinoma of no special type (ductal NST/ invasive ductal carcinoma with apocrine features)  Grade 2  ER 0  NM 0  HER 2 2+   FISH negative  Lymphovascular invasion not identified    Concordant  Malignancy is unifocal  Mass measures 1 4 cm on mammogram and 1 9 cm on ultrasound  Right axilla ultrasound clear  Left brest clear  Lexy  reflector placed  In situ compononent: 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  2022 -  Cancer Staged    Staging form: Breast, AJCC 8th Edition  - Clinical stage from 2022: Stage IB (cT1c, cN0, cM0, G2, ER-, NM-, HER2-) - Signed by Anjelica Merchant MD on 2022  Stage prefix: Initial diagnosis  Nuclear grade: G2  Histologic grading system: 3 grade system  HER2-IHC interpretation: Equivocal  HER2-IHC value: Score 2+  HER2-FISH interpretation: Negative       2022 Genetic Testing    [facility]  A total of 36 genes were evaluated, including: GILL, BRCA1, BRCA2, CDH1, CHEK2, PALB2, PTEN, STK11, TP53  Negative result   No pathogenic sequence variants or deletions/duplications identified     2022 Surgery    Right breast lexy  directed lumpectomy with sentinel lymph node biopsy and axillary dissection  Invasive ductal carcinoma with apocrine features  Grade 3  1 4 cm  0/4 Lymph nodes       6/14/2022 - 6/15/2022 Chemotherapy    Pegfilgrastim-bmez (ZIEXTENZO), 6 mg, Subcutaneous, Once, 1 of 4 cycles  Administration: 6 mg (6/15/2022)  cyclophosphamide (CYTOXAN) IVPB, 450 mg/m2 = 778 mg (75 % of original dose 600 mg/m2), Intravenous, Once, 1 of 4 cycles  Dose modification: 450 mg/m2 (75 % of original dose 600 mg/m2, Cycle 1, Reason: Dose Not Tolerated)  Administration: 778 mg (6/14/2022)  DOCEtaxel (TAXOTERE) chemo infusion, 56 25 mg/m2 = 97 4 mg (75 % of original dose 75 mg/m2), Intravenous, Once, 1 of 4 cycles  Dose modification: 56 25 mg/m2 (75 % of original dose 75 mg/m2, Cycle 1, Reason: Dose Not Tolerated)  Administration: 97 4 mg (6/14/2022)           Past Medical History:   Diagnosis Date   • Anxiety    • Atherosclerosis of native artery of both lower extremities with intermittent claudication ([address] ) 10/15/2015    Transitioned From: Cardiovascular Symptoms   • Breast cancer [facility])    • Carotid artery plaque, bilateral 03/21/2017    Transitioned From:  Atherosclerosis of both carotid arteries   • Chronic kidney disease    • Chronic sinusitis     Last assessed: 11/11/13   • COPD (chronic obstructive pulmonary disease) ([address] )    • COVID     12/25/21 not hopsitalized- flu-like symptoms   • Fall 06/16/2021   • Fracture, ankle closed, bimalleolar, right, initial encounter 06/2021   • GERD (gastroesophageal reflux disease)    • Hyperlipidemia    • Lung nodule    • PNA (pneumonia)    • PONV (postoperative nausea and vomiting)     with C-sections   • Pulmonary emphysema (HCC)    • PVD (peripheral vascular disease) ([facility])    • Restless leg syndrome    • Stage 3 chronic kidney disease ([address] ) 04/22/2019   • Tobacco abuse      Past Surgical History:   Procedure Laterality Date   • BREAST LUMPECTOMY Right 4/28/2022    Procedure: ISAI  DIRECTED LUMPECTOMY;  Surgeon: Ran Pope MD;  Location: [facility];  Service: Surgical Oncology   • CATARACT EXTRACTION     •  SECTION     • COLONOSCOPY      Complete   Resolved: 13   • DESCENDING AORTIC ANEURYSM REPAIR W/ STENT  2019   • IR AORTAGRAM WITH RUN-OFF  8/15/2019   • LYMPH NODE BIOPSY Right 2022    Procedure: LYMPHATIC MAPPING WITH BLUE AND RADIOACTIVE DYES, SENTINEL LYMPH NODE BIOPSY, INJECTION IN OR BY DR Natalia Sams AT 1300;  Surgeon: Claudine Luu MD;  Location: [facility];  Service: Surgical Oncology   • SHOULDER SURGERY      For frozen shoulder    • TONSILLECTOMY     • US BREAST ISAI  NEEDLE LOC RIGHT Right 3/25/2022   • US GUIDED BREAST BIOPSY RIGHT COMPLETE Right 3/25/2022       Family History   Problem Relation Age of Onset   • No Known Problems Mother    • No Known Problems Father    • Arthritis Sister    • Pancreatic cancer Sister 61   • Melanoma Brother         twice   • Prostate cancer Brother    • Heart attack Family         Acute Myocardial Infarction   • Cirrhosis Family    • Prostate cancer Family    • Skin cancer Family    • Stroke Family         Syndrome   • No Known Problems Daughter    • No Known Problems Maternal Grandmother    • No Known Problems Paternal Grandmother    • No Known Problems Sister    • No Known Problems Maternal Aunt    • Breast cancer Other 27       Social History   Social History     Substance and Sexual Activity   Alcohol Use Yes    Comment: occasionally      Social History     Substance and Sexual Activity   Drug Use No     Social History     Tobacco Use   Smoking Status Former Smoker   • Packs/day: 2 00   • Years: 56 00   • Pack years: 112 00   • Types: Cigarettes   • Start date: 1962   • Quit date: 2018   • Years since quittin 4   Smokeless Tobacco Never Used       Meds/Allergies     Current Outpatient Medications:   •  albuterol (2 5 mg/3 mL) 0 083 % nebulizer solution, Take 1 vial (2 5 mg total) by nebulization every 6 (six) hours as needed for wheezing or shortness of breath, Disp: 360 mL, Rfl: 3  •  albuterol (PROVENTIL HFA,VENTOLIN HFA) 90 mcg/act inhaler, Inhale 2 puffs every 6 (six) hours as needed for wheezing, Disp: 3 Inhaler, Rfl: 3  •  ALPRAZolam (XANAX) 0 5 mg tablet, Take 1 tablet (0 5 mg total) by mouth 2 (two) times a day as needed for anxiety, Disp: 20 tablet, Rfl: 0  •  ASPIRIN 81 PO, Take by mouth, Disp: , Rfl:   •  atorvastatin (LIPITOR) 40 mg tablet, , Disp: , Rfl:   •  Breo Ellipta 100-25 MCG/INH inhaler, inhale 1 puff daily Rinse mouth after use, Disp: 180 blister, Rfl: 1  •  ergocalciferol (VITAMIN D2) 50,000 units, take 1 capsule by mouth every week, Disp: 12 capsule, Rfl: 0  •  famotidine (PEPCID) 20 mg tablet, Take 1 tablet (20 mg total) by mouth daily, Disp: , Rfl: 0  •  fluticasone (FLONASE) 50 mcg/act nasal spray, 2 sprays into each nostril daily, Disp: 11 1 mL, Rfl: 1  •  gabapentin (NEURONTIN) 300 mg capsule, take 1 capsule by mouth twice a day, Disp: 180 capsule, Rfl: 0  •  gabapentin (NEURONTIN) 400 mg capsule, take 1 capsule by mouth at bedtime, Disp: 90 capsule, Rfl: 1  •  naloxone (NARCAN) 4 mg/0 1 mL nasal spray, Administer 1 spray into a nostril   If no response after 2-3 minutes, give another dose in the other nostril using a new spray , Disp: 1 each, Rfl: 1  •  olopatadine (PATANOL) 0 1 % ophthalmic solution,  , Disp: , Rfl: 0  •  ondansetron (ZOFRAN) 8 mg tablet, Take 1 tablet (8 mg total) by mouth every 8 (eight) hours as needed for nausea or vomiting, Disp: 20 tablet, Rfl: 2  •  oxyCODONE-acetaminophen (Percocet) 5-325 mg per tablet, Take 1 tablet by mouth every 6 (six) hours as needed for moderate pain Max Daily Amount: 4 tablets, Disp: 20 tablet, Rfl: 0  •  saccharomyces boulardii (FLORASTOR) 250 mg capsule, Take 1 capsule (250 mg total) by mouth 2 (two) times a day, Disp: , Rfl: 0  •  sodium chloride () 2 % hypertonic ophthalmic solution, Sue 128 2 % eye drops, Disp: , Rfl:   •  ALPRAZolam (XANAX) 0 5 mg tablet, Take 1 tablet (0 5 mg total) by mouth 2 (two) times a day as needed for anxiety (Patient not taking: No sig reported), Disp: 6 tablet, Rfl: 0  •  dicyclomine (BENTYL) 10 mg capsule, Take 1 capsule (10 mg total) by mouth 3 (three) times a day before meals (Patient not taking: Reported on 10/28/2022), Disp: 30 capsule, Rfl: 0  •  rosuvastatin (CRESTOR) 20 MG tablet, Take 1 tablet (20 mg total) by mouth daily (Patient not taking: Reported on 10/28/2022), Disp: 90 tablet, Rfl: 6  Allergies   Allergen Reactions   • Tiotropium Bromide Monohydrate Shortness Of Breath   • Augmentin [Amoxicillin-Pot Clavulanate] Abdominal Pain     Pt received ceftriaxone 1 g IV on 5-21-18, gets sharp pains    • Tiotropium Abdominal Pain   • Tylenol With Codeine #3 [Acetaminophen-Codeine] Abdominal Pain   • Other Other (See Comments)       Review of Systems    Constitutional: Positive for fatigue and unexpected weight change (lost 17 lbs )  HENT: Negative  Eyes: Positive for visual disturbance  Fuchs dystrophy   Respiratory: Positive for shortness of breath  2 liters O2 via nasal canula   Cardiovascular: Negative  Gastrointestinal: Positive for abdominal pain (currently being treated for c  diff) and nausea  Genitourinary: Negative  Musculoskeletal: Positive for gait problem (receiving home PT for balance)  Skin: Negative  Allergic/Immunologic: Negative  Neurological: Positive for light-headedness  Hematological: Negative  Psychiatric/Behavioral: Negative for suicidal ideas  Occasionally feels depressed  OBJECTIVE:   /60   Pulse 96   Temp (!) 96 9 °F (36 1 °C)   Resp 18   Wt 67 6 kg (149 lb)   SpO2 96%   BMI 29 10 kg/m²   Performance Status: Karnofsky: 79 - Cares for self; unable to carry on normal activity or do normal work     Physical Exam  Vitals and nursing note reviewed  Constitutional:       General: She is not in acute distress  Appearance: She is well-developed        Comments: NC O2 at 2L   Cardiovascular:      Rate and Rhythm: Normal rate and regular rhythm  Pulmonary:      Breath sounds: No wheezing, rhonchi or rales  Comments: Decreased breath sounds throughout  Chest:   Breasts:      Right: No axillary adenopathy or supraclavicular adenopathy  Left: No axillary adenopathy or supraclavicular adenopathy  Comments: Breast examination demonstrates right breast smaller than the left  There is a well-healed 12:00 right breast lumpectomy scar associated with fibrotic change and mild retraction  There are no palpable nodules or suspicious skin changes of the breasts bilaterally  Abdominal:      General: There is no distension  Palpations: Abdomen is soft  Tenderness: There is no abdominal tenderness  Musculoskeletal:      Right lower leg: No edema  Left lower leg: No edema  Comments: No upper extremity edema bilaterally  Full range of motion of upper extremities bilaterally  Lymphadenopathy:      Cervical: No cervical adenopathy  Upper Body:      Right upper body: No supraclavicular or axillary adenopathy  Left upper body: No supraclavicular or axillary adenopathy  Neurological:      Mental Status: She is alert and oriented to person, place, and time  Gait: Gait normal           RESULTS  Imaging Studies  CT abdomen pelvis with contrast    Result Date: 10/16/2022  Narrative: CT ABDOMEN AND PELVIS WITH IV CONTRAST INDICATION:   RLQ abdominal pain (Age >= 14y) Diffuse abdominal tenderness particularly right lower quadrant, profuse diarrhea, history C diff  COMPARISON:  Multiple priors most recently 9/14/2022 TECHNIQUE:  CT examination of the abdomen and pelvis was performed  Axial, sagittal, and coronal 2D reformatted images were created from the source data and submitted for interpretation  Radiation dose length product (DLP) for this visit:  675 mGy-cm     This examination, like all CT scans performed in the [facility], was performed utilizing techniques to minimize radiation dose exposure, including the use of iterative reconstruction and automated exposure control  IV Contrast:  100 mL of iohexol (OMNIPAQUE) Enteric Contrast:  Enteric contrast was not administered  FINDINGS: ABDOMEN LOWER CHEST:  Small hiatal hernia noted  No other clinically significant abnormality identified in the visualized lower chest  LIVER/BILIARY TREE:  Liver is diffusely decreased in density consistent with fatty change  No CT evidence of suspicious hepatic mass  Normal hepatic contours  No biliary dilatation  Small hepatic cysts  GALLBLADDER:  No calcified gallstones  No pericholecystic inflammatory change  SPLEEN:  Unremarkable  PANCREAS:  Unchanged 1 8 cm soft tissue nodule along the posterior inferior aspect of the pancreatic head  ADRENAL GLANDS:  Unremarkable  KIDNEYS/URETERS:  No hydronephrosis  Small bilateral renal calcifications, which may represent small nonobstructing calculi and/or vascular calcifications ,  One or more sharply circumscribed subcentimeter renal hypodensities are present, too small to accurately characterize, and statistically most likely benign findings  According to recent literature (Radiology 2019) no further workup of these findings is recommended  STOMACH AND BOWEL:  Diffuse bowel wall thickening  Diverticulosis coli  APPENDIX:  A normal appendix was visualized  ABDOMINOPELVIC CAVITY:  No ascites  No pneumoperitoneum  No lymphadenopathy  VESSELS:  Atherosclerotic changes are present  No evidence of aneurysm  PELVIS REPRODUCTIVE ORGANS:  Unremarkable for patient's age  URINARY BLADDER:  Unremarkable  ABDOMINAL WALL/INGUINAL REGIONS:  Unremarkable  OSSEOUS STRUCTURES:  No acute fracture or destructive osseous lesion  Spinal degenerative changes are noted  Impression: Pancolitis  Nonacute findings, as described  The study was marked in [facility] for immediate notification   Workstation performed: [id number]         Pathology:  · Collected 3/25/2022 10:51    · Status: Edited Result - FINAL    · Visible to patient: No (inaccessible in [facility])   · Dx: Abnormal mammogram; Abnormal findings    · 0 Result Notes    Component    Case Report   Surgical Pathology Report                         Case: [id number]                                    Authorizing Provider:  ABIGAIL Griggs          Collected:           03/25/2022 1051               Ordering Location:     [address] [place] [address] Received:            03/25/2022 Damir Cannon                                                              Pathologist:           Omari Salvador MD                                                                Specimen:    Breast, Right, US BX RT BREAST 12:00 5CMFN 4 PASSES 12G MARQUEE                           Addendum 2   Test Description                                       Results  HER-2/flash                                               Negative / Not Amplified                                                 Interpretation  Her-2/flash : CEP-17 ratio:                                    1 1:1     Average HER-2 Signal:                                         2 5  Average CEP-17 Signal:                                       2 3          Number of Selected Invasive Cells Scanned:       50     Interpretive Information  HER2/flash FISH results are best interpreted by analyzing the ratio with respect to the HER2/flash copy number  An H&E slide was reviewed and demonstrates lesional tissue  Analysis of HER2/flash gene amplification status was performed using an automated Fluorescence In Situ hybridization (FISH) signal enumeration system ([address] [place] [address])  This system scans up to 5 different specified areas of tumor  A minimum of 20 cells representing > 10% of contiguous and homogenous invasive tumor cells were scanned     * Negative / not amplified:  HER2/CEP17 ratio of <2 0 with an average HER-2 copy number of < 4 0 signals/cell  * Equivocal/Borderline: HER2/CEP17 ratio of <2 0 with an average HER-2 copy number of >/=4 0 and < 6 0 signals/cell  * Positive / Amplified: HER2/CEP17 ratio of >/= 2 0 with an average HER-2 copy number of >/= 4 0 signals/cell, or HER2/CEP17 ratio of >/=2 0 with an average HER-2 copy number of < 4 0 signals/cell, or HER2/CEP17 ratio of <2 0 with an average HER-2 copy number of >/=  6 0 signals/cell  References:  1   2013 ASCO/CAP HER2 Testing Guidelines Update:  Recommendations for Human Epidermal Growth factor Receptor 2 Testing in BREAST Cancer  abigail Rondon al   Arch Pathol Lab Med  doi: 10 5858/arpa  [id number]  [employer] HER-2/flash DNA probe kit ([employer]) was used  This test was developed and its performance characteristics determined by [employer]  It has been approved by the FDA to detect the over-expression of the HER-2/flash gene in neoplastic breast tissue  It may help identify candidates for Herceptin therapy    This lab has been approved by CLIA 88, designated as a high complexity laboratory    Addendum electronically signed by Beti Mcclure MD on 4/4/2022 at 0721   Addendum   Test Description                                     Result                            Prognostic Interpretation  Estrogen Receptor (clone SP-1)              0%                                  Negative              Internal control: Positive   External control: Positive                          Trae Score*:  0                                                         Progesterone Receptor (clone 1E2)         0%                                  Negative              Internal control: Positive                         External control: Positive                          Trae Score*:  0     HER2 by IHC (clone 4B5)                         2+                                  Equivocal (FISH pending)     Fixative         Duration of Fixation (hrs)     Cold Ischemia Time (mins) Sample Adequacy  Formalin       12 hours                              Not stated                                    Adequate  Appropriate positive and negative controls were reviewed  These immunohistochemical tests were performed at [facility] in [place], [place] and interpreted by Dr Omar Aguilar  An electronic copy of this report will be kept on file in the Medical Records Department at [facility]   * The Trae score is a semi quantitative system that takes into consideration the proportion of positive cells (scored on a scale of 0-5) and staining intensity (scored on a scale of 0-3)  The proportion and intensity are summed to produce total scores of 0 or 2 through 8  A score of 0-2 is regarded as negative while 3-8 as positive  Addendum electronically signed by Mayank Vargas MD on 4/1/2022 at 4256   Final Diagnosis   A  Right breast, 12:00, 5 cm from nipple (ultrasound-guided 12 gauge core biopsy, 4 passes):     - Invasive breast carcinoma of no special type (ductal NST/invasive ductal carcinoma with apocrine features)  -- Tumor present in at least 3 tissue cores with largest measurable size of 6 mm  -- mBR Grade:  Grade 2 of 3         - Duct formation:      Score 3 of 3         - Nuclear grade:       Score 3 of 3         - Mitotic rate:            Score 1 of 3     - In situ component: Favor 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     - Lymphovascular invasion:  Not definitively identified     Comment:  Block A2 forwarded for ER/CO/HER2 analysis with the results to be given in a supplemental report  Electronically signed by Mayank Vargas MD on 3/29/2022 at 2077   Comments: This is an appended report  These results have been appended to a previously preliminary verified report  Preliminary Diagnosis   A   Right breast, 12:00, 5 cm from nipple (ultrasound-guided 12 gauge core biopsy, 4 passes):     - Invasive carcinoma, pending additional studies  Preliminary result electronically signed by Cole Mendiola MD on 3/28/2022 at 0813   Microscopic Description    SOLEDAD-3 positive  P63 and SMM-HC negative in invasive carcinoma  E-cadherin and P120 catenin membranous  Controls stain appropriately  Comment: This is an appended report  These results have been appended to a previously preliminary verified report  Note    - Intradepartmental consultation concurs with the diagnosis  - RN Vince from the [address] [place] [address] notified via [address] Coelho on 03/29/2022  Comment: This is an appended report  These results have been appended to a previously preliminary verified report  Additional Information    All reported additional testing was performed with appropriately reactive controls  These tests were developed and their performance characteristics determined by [facility] or appropriate performing facility, though some tests may be performed on tissues which have not been validated for performance characteristics (such as staining performed on alcohol exposed cell blocks and decalcified tissues)  Results should be interpreted with caution and in the context of the patients’ clinical condition  These tests may not be cleared or approved by the [place]  Food and Drug Administration, though the FDA has determined that such clearance or approval is not necessary  These tests are used for clinical purposes and they should not be regarded as investigational or for research  This laboratory has been approved by CLIA 88, designated as a high-complexity laboratory and is qualified to perform these tests  Interpretation performed at [facility], [address] [place] [address]  [place]     Synoptic Checklist     INVASIVE CARCINOMA OF THE BREAST: Biopsy  Protocol posted: 11/10/2021  INVASIVE CARCINOMA OF THE BREAST: BIOPSY - All Specimens  SPECIMEN   Procedure  Needle biopsy    Specimen Laterality  Right    TUMOR   Tumor Site  Clock position      12 o'clock    Tumor Site  Distance from nipple (Centimeters): 5 cm   Histologic Type  Invasive carcinoma of no special type (ductal)    Histologic Grade (Essie Histologic Score)     Glandular (Acinar) / Tubular Differentiation  Score 3    Nuclear Pleomorphism  Score 3    Mitotic Rate  Score 1    Overall Grade  Grade 2 (scores of 6 or 7)    Tumor Size  Greatest dimension of largest invasive focus (Millimeters): 6 mm   Ductal Carcinoma In Situ (DCIS)  Cannot be excluded    Lymphovascular Invasion  Cannot be determined: Not definitively identified    Microcalcifications  Present in invasive carcinoma            · Collected 4/28/2022 15:06    · Status: Final result   · Visible to patient: No (inaccessible in [facility])   · Dx: Malignant neoplasm of overlapping sit      · 0 Result Notes    Component    Case Report   Surgical Pathology Report                         Case: [id number]                                    Authorizing Provider:  Claudine Luu,  Collected:           04/28/2022 1506                                      MD                                                                            Ordering Location:     [facility] Received:            04/28/2022 1616                                     Operating Room                                                                Pathologist:           Brian Mendoza DO                                                      Specimens:   A) - Lymph Node, [place], RIGHT AXILLARY SENTINAL LYMPH NODE HOT NOT BLUE #1                        B) - Lymph Node, RIGHT AXILLARY ADIPOSE TISSUE FOR LYMPH NODES                                       C) - Breast, Right, RIGHT BREAST POSTERIOR MARGIN INKED MOST DISTAL MARGIN (BLACK)                   D) - Breast, Right, RIGHT BREAST INFERIOR MARGIN INKED MOST DISTAL MARGIN (BLUE)                     E) - Breast, Right, RIGHT BREAST MEDIAL MARGIN INKED MOST DISTAL MARGIN (YELLOW)                     F) - Breast, Right, RIGHT BREAST SUPERIOR MARGIN INKED MOST DISTAL MARGIN (ORANGE)                   G) - Breast, Right, RIGHT BREAST LATERAL MARGIN INKED MOST DISTAL MARGIN (RED)                       H) - Breast, Right, RIGHT BREAST ANTERIOR MARGIN INKED MOST DISTAL MARGIN (GREEN)                    I) - Breast, Right, RIGHT BREAST LUMP ISAI  DIRECTED PER MARGIN PROTOCOL              Final Diagnosis   A  [place] Lymph Node, Right Axillary (#1), Biopsy:  - One lymph node, negative for carcinoma (0/1)  - AE1/AE3 immunohistochemistry supports the diagnosis  B  Lymph Nodes, Right Axillary, Dissection:  - Two lymph nodes, negative for carcinoma (0/2)  - AE1/AE3 immunohistochemistry supports the diagnosis  C  Breast, Right New Posterior Margin, Excision:  - Benign breast tissue with fibrocystic changes  D  Breast, Right New Inferior Margin, Excision:  - Benign breast tissue with fibrocystic changes  E  Breast, Right New Medial Margin, Excision:  - Benign breast tissue with fibrocystic changes  F  Breast, Right New Superior Margin, Excision:  - Benign breast tissue with fibrocystic changes  G  Breast, Right New Lateral Margin, Excision:  - Benign breast tissue with fibrocystic changes  H  Breast, Right New Anterior Margin, Excision:  - Benign breast tissue with fibrocystic changes  I  Breast, Right, Lumpectomy:  - Invasive ductal carcinoma with apocrine features, Medardo Grade III (3 + 3 + 3 = 9), 14 mm in greatest dimension  See Synoptic Report and Note  - Background fibrocystic changes and prior procedural site reactive changes  - Benign skin with no specific pathologic change  Electronically signed by Bruno Quinones DO on 5/11/2022 at 5   Note    Intradepartmental consultation is in agreement (CK)       Best representative tumor block: [id number]   Additional Information    All reported additional testing was performed with appropriately reactive controls  These tests were developed and their performance characteristics determined by [facility] or appropriate performing facility, though some tests may be performed on tissues which have not been validated for performance characteristics (such as staining performed on alcohol exposed cell blocks and decalcified tissues)  Results should be interpreted with caution and in the context of the patients’ clinical condition  These tests may not be cleared or approved by the [place]  Food and Drug Administration, though the FDA has determined that such clearance or approval is not necessary  These tests are used for clinical purposes and they should not be regarded as investigational or for research  This laboratory has been approved by [place] [id number], designated as a high-complexity laboratory and is qualified to perform these tests          Interpretation performed at [place] [address]   Synoptic Checklist     INVASIVE CARCINOMA OF THE BREAST: Resection  8th Edition - Protocol posted: 12/17/2021  INVASIVE CARCINOMA OF THE BREAST: COMPLETE EXCISION - All Specimens  SPECIMEN   Procedure  Excision (less than total mastectomy)    Specimen Laterality  Right    TUMOR   Histologic Type  Invasive ductal carcinoma with apocrine features    Histologic Grade (Theodosia Histologic Score)     Glandular (Acinar) / Tubular Differentiation  Score 3    Nuclear Pleomorphism  Score 3    Mitotic Rate  Score 3    Overall Grade  Grade 3 (scores of 8 or 9)    Tumor Size  Greatest dimension of largest invasive focus (Millimeters): 14 mm   Tumor Focality  Single focus of invasive carcinoma    Ductal Carcinoma In Situ (DCIS)  Present      Negative for extensive intraductal component (EIC)    Size (Extent) of DCIS  Cannot be determined: 4    Number of Blocks with DCIS  37    Architectural Patterns  Cribriform    Nuclear Grade  Grade III (high)    Necrosis  Present, focal (small foci or single cell necrosis)    Lobular Carcinoma In Situ (LCIS)  Not identified    Lymphovascular Invasion  Not identified    Dermal Lymphovascular Invasion  Not identified    Treatment Effect in the Breast  No known presurgical therapy    MARGINS   Margin Status for Invasive Carcinoma  All margins negative for invasive carcinoma    Distance from Invasive Carcinoma to Closest Margin  Greater than: 20 mm   Distance from Invasive Carcinoma to Anterior Margin  Greater than: 20 mm   Distance from Invasive Carcinoma to Posterior Margin  Greater than: 20 mm   Distance from Invasive Carcinoma to Superior Margin  Greater than: 20 mm   Distance from Invasive Carcinoma to Inferior Margin  Greater than: 20 mm   Distance from Invasive Carcinoma to Medial Margin  Greater than: 20 mm   Distance from Invasive Carcinoma to Lateral Margin  Greater than: 20 mm   Margin Status for DCIS  All margins negative for DCIS    Distance from DCIS to Closest Margin  Greater than: 20 mm   REGIONAL LYMPH NODES   Regional Lymph Node Status  All regional lymph nodes negative for tumor    Total Number of Lymph Nodes Examined (sentinel and non-sentinel)  3    Number of [place] Nodes Examined  1    PATHOLOGIC STAGE CLASSIFICATION (pTNM, AJCC 8th Edition)   Reporting of pT, pN, and (when applicable) pM categories is based on information available to the pathologist at the time the report is issued  As per the AJCC (Chapter 1, 8th Ed ) it is the managing physician’s responsibility to establish the final pathologic stage based upon all pertinent information, including but potentially not limited to this pathology report  pT Category  pT1c    Regional Lymph Nodes Modifier  (sn): [place] node(s) evaluated  pN Category  pN0    SPECIAL STUDIES        Estrogen Receptor (ER) Status  Negative         Progesterone Receptor (PgR) Status  Negative         HER2 (by in situ hybridization)  Negative (not amplified)    Testing Performed on Case Number  [id number]      ASSESSMENT  1  Malignant neoplasm of overlapping sites of right breast in female, estrogen receptor negative [facility])  Ambulatory referral to Radiation Oncology     Cancer Staging  Malignant neoplasm of overlapping sites of right breast in female, estrogen receptor negative ([facility] [address] )  Staging form: Breast, AJCC 8th Edition  - Clinical stage from 4/4/2022: Stage IB (cT1c, cN0, cM0, G2, ER-, WA-, HER2-) - Signed by Mehnaz Brunson MD on 4/4/2022  Stage prefix: Initial diagnosis  Nuclear grade: G2  Histologic grading system: 3 grade system  HER2-IHC interpretation: Equivocal  HER2-IHC value: Score 2+  HER2-FISH interpretation: Negative        PLAN/DISCUSSION  Nay Martinez is a 78 y o   female with multiple comorbidities including COPD on O2 supplementation diagnosed with stage IA ([id number]) grade 3 invasive right breast carcinoma status post lumpectomy and sentinel lymph node biopsy achieving negative margins on 4/28/22  Tumor cells were ER/WA/HER2(-)  She received 1 cycle of dose reduced TC, but then discontinued after complication with diverticulitis followed by C  Diff infection  Given her clinical presentation, I recommend adjuvant radiation to the right breast to 40Gy with boost to 50Gy  I feel this would offer patient benefit in terms of local regional control and possible disease free survival   This is in accordance with NCCN guidelines  The rationale and potential benefits, as well as the risks and acute and late side effects and potential toxicities of radiation were discussed with the patient and her son who is with her today at length  Side effects discussed included, but were not limited to: Fatigue, skin erythema, hyperpigmentation, desquamation, fibrosis, shrinkage of the breast resulting asymmetry, rib weakening, pulmonary fibrosis       I explained that she is outside the ideal time period for adjuvant radiation, but given her disease, I would still offer her therapy and expect local control benefit  The patient and her son were concerned regarding transportation, tolerance to treatment, and ability to complete treatment  I discussed with patient that local control benefit would be significant, but would likely have minimal effect her risk of distant metastases  I would recommend treatment and feel she would tolerate whole breast radiation well overall  We could assist with transportation for treatment  We discussed truncated courses without boost, integrated boost, or 1 week course if she is unwilling to come for 4 weeks of therapy  Although treatment without boost or in 1 week, as per FAST-Forward regimen from [facility] would provide less local control, it would be superior to no radiation treatment and would likely be well tolerated with main concern being increased fibrotic changes  They were given the opportunity to ask questions and all questions were answered to their satisfaction  She is undecided, but plans to decide by next week  I spent 47 minutes reviewing the records (labs, clinician notes, outside records, medical history, ordering medicine/tests/procedures, interpreting the imaging/labs previously done) and coordination of care as well as direct time with the patient today, of which greater than 50% of the time was spent in counseling and coordination of care with the patient/family  I subsequently discussed this case with Dr Dc Matters regarding possible alternate fractionation  Adjuvant radiation is strongly recommended given her presentation as her risk of local recurrence is likely over 50%, which would be significantly reduced with radiation  Alternate fractionations with possible integrated boost could be considered if patient unwilling to come for 4 weeks  I will plan to discuss these further recommendations with the patient next week when we follow-up with her regarding her final decision      Mike Ma  10/28/2022,3:51 PM      Portions of the record may have been created with voice recognition software  Occasional wrong word or "sound a like" substitutions may have occurred due to the inherent limitations of voice recognition software  Read the chart carefully and recognize, using context, where substitutions have occurred

## 2022-11-09 ENCOUNTER — APPOINTMENT (OUTPATIENT)
Dept: RADIATION ONCOLOGY | Facility: CLINIC | Age: 77
End: 2022-11-09
Attending: RADIOLOGY

## 2022-11-16 ENCOUNTER — APPOINTMENT (OUTPATIENT)
Dept: RADIATION ONCOLOGY | Facility: CLINIC | Age: 77
End: 2022-11-16
Attending: RADIOLOGY

## 2022-11-17 ENCOUNTER — APPOINTMENT (OUTPATIENT)
Dept: RADIATION ONCOLOGY | Facility: CLINIC | Age: 77
End: 2022-11-17
Attending: RADIOLOGY

## 2022-11-18 ENCOUNTER — APPOINTMENT (OUTPATIENT)
Dept: RADIATION ONCOLOGY | Facility: CLINIC | Age: 77
End: 2022-11-18
Attending: RADIOLOGY

## 2022-11-18 DIAGNOSIS — C50.811 MALIGNANT NEOPLASM OF OVERLAPPING SITES OF RIGHT BREAST IN FEMALE, ESTROGEN RECEPTOR NEGATIVE (HCC): Primary | ICD-10-CM

## 2022-11-18 DIAGNOSIS — Z17.1 MALIGNANT NEOPLASM OF OVERLAPPING SITES OF RIGHT BREAST IN FEMALE, ESTROGEN RECEPTOR NEGATIVE (HCC): Primary | ICD-10-CM

## 2022-11-20 ENCOUNTER — APPOINTMENT (OUTPATIENT)
Dept: RADIATION ONCOLOGY | Facility: CLINIC | Age: 77
End: 2022-11-20
Attending: RADIOLOGY

## 2022-11-20 DIAGNOSIS — J32.9 CHRONIC SINUSITIS, UNSPECIFIED LOCATION: Primary | ICD-10-CM

## 2022-11-20 RX ORDER — AZITHROMYCIN 250 MG/1
TABLET, FILM COATED ORAL
Qty: 6 TABLET | Refills: 0 | Status: SHIPPED | OUTPATIENT
Start: 2022-11-20 | End: 2022-11-24

## 2022-11-20 NOTE — PROGRESS NOTES
QUICK NOTE  Patient c/o headache, sinus congestion, feeling unwell  She notes that these symptoms are consistent with sinus infection, which she suffers from frequently during this time of year  She plans to call PCP, but Sunday so office is closed  No fever  Took Advil cold and sinus without improvement  On O2 and O2 sat is 99% today on 2L  Will start Z-abimbola (5days) today and instructed patient to call PCP for adjustment/fu treatment as indicated  She has h/o C diff colitis so we discussed risks of antibiotics and to inform PCP or our office if develops diarrhea from treatment  She agreed

## 2022-11-21 ENCOUNTER — APPOINTMENT (OUTPATIENT)
Dept: RADIATION ONCOLOGY | Facility: CLINIC | Age: 77
End: 2022-11-21
Attending: RADIOLOGY

## 2022-11-21 ENCOUNTER — APPOINTMENT (OUTPATIENT)
Dept: LAB | Facility: HOSPITAL | Age: 77
End: 2022-11-21

## 2022-11-21 LAB
BASOPHILS # BLD AUTO: 0.04 THOUSANDS/ÂΜL (ref 0–0.1)
BASOPHILS NFR BLD AUTO: 1 % (ref 0–1)
EOSINOPHIL # BLD AUTO: 0.21 THOUSAND/ÂΜL (ref 0–0.61)
EOSINOPHIL NFR BLD AUTO: 3 % (ref 0–6)
ERYTHROCYTE [DISTWIDTH] IN BLOOD BY AUTOMATED COUNT: 14.9 % (ref 11.6–15.1)
HCT VFR BLD AUTO: 42.1 % (ref 34.8–46.1)
HGB BLD-MCNC: 13.3 G/DL (ref 11.5–15.4)
IMM GRANULOCYTES # BLD AUTO: 0.03 THOUSAND/UL (ref 0–0.2)
IMM GRANULOCYTES NFR BLD AUTO: 0 % (ref 0–2)
LYMPHOCYTES # BLD AUTO: 1.6 THOUSANDS/ÂΜL (ref 0.6–4.47)
LYMPHOCYTES NFR BLD AUTO: 22 % (ref 14–44)
MCH RBC QN AUTO: 28.5 PG (ref 26.8–34.3)
MCHC RBC AUTO-ENTMCNC: 31.6 G/DL (ref 31.4–37.4)
MCV RBC AUTO: 90 FL (ref 82–98)
MONOCYTES # BLD AUTO: 0.48 THOUSAND/ÂΜL (ref 0.17–1.22)
MONOCYTES NFR BLD AUTO: 7 % (ref 4–12)
NEUTROPHILS # BLD AUTO: 4.97 THOUSANDS/ÂΜL (ref 1.85–7.62)
NEUTS SEG NFR BLD AUTO: 67 % (ref 43–75)
NRBC BLD AUTO-RTO: 0 /100 WBCS
PLATELET # BLD AUTO: 332 THOUSANDS/UL (ref 149–390)
PMV BLD AUTO: 10.1 FL (ref 8.9–12.7)
RBC # BLD AUTO: 4.66 MILLION/UL (ref 3.81–5.12)
WBC # BLD AUTO: 7.33 THOUSAND/UL (ref 4.31–10.16)

## 2022-11-22 ENCOUNTER — APPOINTMENT (OUTPATIENT)
Dept: RADIATION ONCOLOGY | Facility: CLINIC | Age: 77
End: 2022-11-22
Attending: RADIOLOGY

## 2022-11-23 ENCOUNTER — APPOINTMENT (OUTPATIENT)
Dept: RADIATION ONCOLOGY | Facility: CLINIC | Age: 77
End: 2022-11-23

## 2022-11-23 ENCOUNTER — APPOINTMENT (OUTPATIENT)
Dept: RADIATION ONCOLOGY | Facility: CLINIC | Age: 77
End: 2022-11-23
Attending: RADIOLOGY

## 2022-11-28 ENCOUNTER — APPOINTMENT (OUTPATIENT)
Dept: RADIATION ONCOLOGY | Facility: CLINIC | Age: 77
End: 2022-11-28
Attending: RADIOLOGY

## 2022-11-29 ENCOUNTER — APPOINTMENT (OUTPATIENT)
Dept: RADIATION ONCOLOGY | Facility: CLINIC | Age: 77
End: 2022-11-29
Attending: RADIOLOGY

## 2022-11-30 ENCOUNTER — APPOINTMENT (OUTPATIENT)
Dept: RADIATION ONCOLOGY | Facility: CLINIC | Age: 77
End: 2022-11-30
Attending: RADIOLOGY

## 2022-11-30 ENCOUNTER — HOSPITAL ENCOUNTER (EMERGENCY)
Facility: HOSPITAL | Age: 77
Discharge: HOME | End: 2022-11-30
Attending: EMERGENCY MEDICINE

## 2022-11-30 ENCOUNTER — APPOINTMENT (OUTPATIENT)
Dept: RADIATION ONCOLOGY | Facility: CLINIC | Age: 77
End: 2022-11-30

## 2022-11-30 ENCOUNTER — APPOINTMENT (EMERGENCY)
Dept: CT IMAGING | Facility: HOSPITAL | Age: 77
End: 2022-11-30

## 2022-11-30 VITALS
SYSTOLIC BLOOD PRESSURE: 144 MMHG | OXYGEN SATURATION: 99 % | RESPIRATION RATE: 20 BRPM | DIASTOLIC BLOOD PRESSURE: 72 MMHG | TEMPERATURE: 98.2 F | HEART RATE: 77 BPM

## 2022-11-30 DIAGNOSIS — R10.9 ABDOMINAL PAIN: Primary | ICD-10-CM

## 2022-11-30 DIAGNOSIS — R19.7 DIARRHEA: ICD-10-CM

## 2022-11-30 DIAGNOSIS — K52.9 COLITIS: ICD-10-CM

## 2022-11-30 LAB
ALBUMIN SERPL BCP-MCNC: 3.3 G/DL (ref 3.5–5)
ALP SERPL-CCNC: 99 U/L (ref 46–116)
ALT SERPL W P-5'-P-CCNC: 26 U/L (ref 12–78)
ANION GAP SERPL CALCULATED.3IONS-SCNC: 10 MMOL/L (ref 4–13)
AST SERPL W P-5'-P-CCNC: 15 U/L (ref 5–45)
BASOPHILS # BLD AUTO: 0.04 THOUSANDS/ÂΜL (ref 0–0.1)
BASOPHILS NFR BLD AUTO: 1 % (ref 0–1)
BILIRUB DIRECT SERPL-MCNC: 0.06 MG/DL (ref 0–0.2)
BILIRUB SERPL-MCNC: 0.16 MG/DL (ref 0.2–1)
BILIRUB UR QL STRIP: NEGATIVE
BUN SERPL-MCNC: 17 MG/DL (ref 5–25)
CALCIUM SERPL-MCNC: 8.8 MG/DL (ref 8.3–10.1)
CHLORIDE SERPL-SCNC: 105 MMOL/L (ref 96–108)
CLARITY UR: CLEAR
CO2 SERPL-SCNC: 27 MMOL/L (ref 21–32)
COLOR UR: COLORLESS
CREAT SERPL-MCNC: 1.13 MG/DL (ref 0.6–1.3)
EOSINOPHIL # BLD AUTO: 0.16 THOUSAND/ÂΜL (ref 0–0.61)
EOSINOPHIL NFR BLD AUTO: 2 % (ref 0–6)
ERYTHROCYTE [DISTWIDTH] IN BLOOD BY AUTOMATED COUNT: 14.8 % (ref 11.6–15.1)
GFR SERPL CREATININE-BSD FRML MDRD: 47 ML/MIN/1.73SQ M
GLUCOSE SERPL-MCNC: 124 MG/DL (ref 65–140)
GLUCOSE UR STRIP-MCNC: NEGATIVE MG/DL
HCT VFR BLD AUTO: 40 % (ref 34.8–46.1)
HGB BLD-MCNC: 12.6 G/DL (ref 11.5–15.4)
HGB UR QL STRIP.AUTO: NEGATIVE
IMM GRANULOCYTES # BLD AUTO: 0.05 THOUSAND/UL (ref 0–0.2)
IMM GRANULOCYTES NFR BLD AUTO: 1 % (ref 0–2)
KETONES UR STRIP-MCNC: NEGATIVE MG/DL
LACTATE SERPL-SCNC: 1.1 MMOL/L (ref 0.5–2)
LEUKOCYTE ESTERASE UR QL STRIP: NEGATIVE
LIPASE SERPL-CCNC: 74 U/L (ref 73–393)
LYMPHOCYTES # BLD AUTO: 1.16 THOUSANDS/ÂΜL (ref 0.6–4.47)
LYMPHOCYTES NFR BLD AUTO: 14 % (ref 14–44)
MCH RBC QN AUTO: 28.4 PG (ref 26.8–34.3)
MCHC RBC AUTO-ENTMCNC: 31.5 G/DL (ref 31.4–37.4)
MCV RBC AUTO: 90 FL (ref 82–98)
MONOCYTES # BLD AUTO: 0.6 THOUSAND/ÂΜL (ref 0.17–1.22)
MONOCYTES NFR BLD AUTO: 7 % (ref 4–12)
NEUTROPHILS # BLD AUTO: 6.6 THOUSANDS/ÂΜL (ref 1.85–7.62)
NEUTS SEG NFR BLD AUTO: 75 % (ref 43–75)
NITRITE UR QL STRIP: NEGATIVE
NRBC BLD AUTO-RTO: 0 /100 WBCS
PH UR STRIP.AUTO: 6 [PH]
PLATELET # BLD AUTO: 309 THOUSANDS/UL (ref 149–390)
PMV BLD AUTO: 10 FL (ref 8.9–12.7)
POTASSIUM SERPL-SCNC: 4.3 MMOL/L (ref 3.5–5.3)
PROT SERPL-MCNC: 6.8 G/DL (ref 6.4–8.4)
PROT UR STRIP-MCNC: NEGATIVE MG/DL
RBC # BLD AUTO: 4.44 MILLION/UL (ref 3.81–5.12)
SODIUM SERPL-SCNC: 142 MMOL/L (ref 135–147)
SP GR UR STRIP.AUTO: 1.03 (ref 1–1.03)
UROBILINOGEN UR STRIP-ACNC: <2 MG/DL
WBC # BLD AUTO: 8.61 THOUSAND/UL (ref 4.31–10.16)

## 2022-11-30 RX ADMIN — SODIUM CHLORIDE 1000 ML: 0.9 INJECTION, SOLUTION INTRAVENOUS at 12:00

## 2022-11-30 RX ADMIN — IOHEXOL 100 ML: 350 INJECTION, SOLUTION INTRAVENOUS at 12:58

## 2022-11-30 NOTE — DISCHARGE INSTRUCTIONS
Please obtain stool studies as soon as able  Return immediately to the ED with any new or worsening symptoms as discussed

## 2022-11-30 NOTE — ED PROVIDER NOTES
History  Chief Complaint   Patient presents with   • Abdominal Pain     Pt reports that she has bilateral abdominal pain, pt is currently under radiation for breast cancer , pt reports that she has a history of cdiff and states that this is how it started, denies nay diarrhea at this time  HPI    Prior to Admission Medications   Prescriptions Last Dose Informant Patient Reported? Taking? ALPRAZolam (XANAX) 0 5 mg tablet   No No   Sig: Take 1 tablet (0 5 mg total) by mouth 2 (two) times a day as needed for anxiety   ALPRAZolam (XANAX) 0 5 mg tablet   No No   Sig: Take 1 tablet (0 5 mg total) by mouth 2 (two) times a day as needed for anxiety   Patient not taking: No sig reported   ASPIRIN 81 PO   Yes No   Sig: Take by mouth   Breo Ellipta 100-25 MCG/INH inhaler   No No   Sig: inhale 1 puff daily Rinse mouth after use   albuterol (2 5 mg/3 mL) 0 083 % nebulizer solution   No No   Sig: Take 1 vial (2 5 mg total) by nebulization every 6 (six) hours as needed for wheezing or shortness of breath   albuterol (PROVENTIL HFA,VENTOLIN HFA) 90 mcg/act inhaler   No No   Sig: Inhale 2 puffs every 6 (six) hours as needed for wheezing   atorvastatin (LIPITOR) 40 mg tablet   Yes No   dicyclomine (BENTYL) 10 mg capsule   No No   Sig: Take 1 capsule (10 mg total) by mouth 3 (three) times a day before meals   Patient not taking: Reported on 10/28/2022   ergocalciferol (VITAMIN D2) 50,000 units   No No   Sig: take 1 capsule by mouth every week   famotidine (PEPCID) 20 mg tablet   No No   Sig: Take 1 tablet (20 mg total) by mouth daily   fluticasone (FLONASE) 50 mcg/act nasal spray   No No   Si sprays into each nostril daily   gabapentin (NEURONTIN) 300 mg capsule   No No   Sig: take 1 capsule by mouth twice a day   gabapentin (NEURONTIN) 400 mg capsule   No No   Sig: take 1 capsule by mouth at bedtime   naloxone (NARCAN) 4 mg/0 1 mL nasal spray   No No   Sig: Administer 1 spray into a nostril   If no response after 2-3 minutes, give another dose in the other nostril using a new spray  olopatadine (PATANOL) 0 1 % ophthalmic solution   Yes No   Sig:     ondansetron (ZOFRAN) 8 mg tablet   No No   Sig: Take 1 tablet (8 mg total) by mouth every 8 (eight) hours as needed for nausea or vomiting   oxyCODONE-acetaminophen (Percocet) 5-325 mg per tablet   No No   Sig: Take 1 tablet by mouth every 6 (six) hours as needed for moderate pain Max Daily Amount: 4 tablets   rosuvastatin (CRESTOR) 20 MG tablet   No No   Sig: Take 1 tablet (20 mg total) by mouth daily   Patient not taking: Reported on 10/28/2022   saccharomyces boulardii (FLORASTOR) 250 mg capsule   No No   Sig: Take 1 capsule (250 mg total) by mouth 2 (two) times a day   sodium chloride () 2 % hypertonic ophthalmic solution   Yes No   Sig: Sue 128 2 % eye drops      Facility-Administered Medications: None       Past Medical History:   Diagnosis Date   • Anxiety    • Atherosclerosis of native artery of both lower extremities with intermittent claudication ([facility] [address] ) 10/15/2015    Transitioned From: Cardiovascular Symptoms   • Breast cancer [facility])    • Carotid artery plaque, bilateral 03/21/2017    Transitioned From:  Atherosclerosis of both carotid arteries   • Chronic kidney disease    • Chronic sinusitis     Last assessed: 11/11/13   • COPD (chronic obstructive pulmonary disease) ([address] )    • COVID     12/25/21 not hopsitalized- flu-like symptoms   • Fall 06/16/2021   • Fracture, ankle closed, bimalleolar, right, initial encounter 06/2021   • GERD (gastroesophageal reflux disease)    • Hyperlipidemia    • Lung nodule    • PNA (pneumonia)    • PONV (postoperative nausea and vomiting)     with C-sections   • Pulmonary emphysema (HCC)    • PVD (peripheral vascular disease) ([address] )    • Restless leg syndrome    • Stage 3 chronic kidney disease ([address] ) 04/22/2019   • Tobacco abuse        Past Surgical History:   Procedure Laterality Date   • BREAST LUMPECTOMY Right 4/28/2022 Procedure: ISAI  DIRECTED LUMPECTOMY;  Surgeon: Josef Murphy MD;  Location: [facility];  Service: Surgical Oncology   • CATARACT EXTRACTION     •  SECTION     • COLONOSCOPY      Complete  Resolved: 13   • DESCENDING AORTIC ANEURYSM REPAIR W/ STENT  2019   • IR AORTAGRAM WITH RUN-OFF  8/15/2019   • LYMPH NODE BIOPSY Right 2022    Procedure: LYMPHATIC MAPPING WITH BLUE AND RADIOACTIVE DYES, SENTINEL LYMPH NODE BIOPSY, INJECTION IN OR BY DR RODRÍGUEZ AT 1300;  Surgeon: Josef Murphy MD;  Location: [facility];  Service: Surgical Oncology   • SHOULDER SURGERY      For frozen shoulder    • TONSILLECTOMY     • US BREAST ISAI  NEEDLE LOC RIGHT Right 3/25/2022   • US GUIDED BREAST BIOPSY RIGHT COMPLETE Right 3/25/2022       Family History   Problem Relation Age of Onset   • No Known Problems Mother    • No Known Problems Father    • Arthritis Sister    • Pancreatic cancer Sister 61   • Melanoma Brother         twice   • Prostate cancer Brother    • Heart attack Family         Acute Myocardial Infarction   • Cirrhosis Family    • Prostate cancer Family    • Skin cancer Family    • Stroke Family         Syndrome   • No Known Problems Daughter    • No Known Problems Maternal Grandmother    • No Known Problems Paternal Grandmother    • No Known Problems Sister    • No Known Problems Maternal Aunt    • Breast cancer Other 30     I have reviewed and agree with the history as documented      E-Cigarette/Vaping   • E-Cigarette Use Never User      E-Cigarette/Vaping Substances   • Nicotine No    • THC No    • CBD No    • Flavoring No    • Other No    • Unknown No      Social History     Tobacco Use   • Smoking status: Former     Packs/day: 2 00     Years: 56 00     Pack years: 112 00     Types: Cigarettes     Start date: 1962     Quit date: 2018     Years since quittin 5   • Smokeless tobacco: Never   Vaping Use   • Vaping Use: Never used   Substance Use Topics   • Alcohol use: Yes     Comment: occasionally    • Drug use: No       Review of Systems    Physical Exam  Physical Exam    Vital Signs  ED Triage Vitals [11/30/22 1033]   Temperature Pulse Respirations Blood Pressure SpO2   98 2 °F (36 8 °C) (!) 113 20 157/76 98 %      Temp src Heart Rate Source Patient Position - Orthostatic VS BP Location FiO2 (%)   -- -- -- -- --      Pain Score       4           Vitals:    11/30/22 1033   BP: 157/76   Pulse: (!) 113         Visual Acuity      ED Medications  Medications   sodium chloride 0 9 % bolus 1,000 mL (1,000 mL Intravenous New Bag 11/30/22 1200)       Diagnostic Studies  Results Reviewed     Procedure Component Value Units Date/Time    CBC and differential [[id number]] Collected: 11/30/22 1159    Lab Status: Final result Specimen: Blood from Arm, Right Updated: 11/30/22 1209     WBC 8 61 Thousand/uL      RBC 4 44 Million/uL      Hemoglobin 12 6 g/dL      Hematocrit 40 0 %      MCV 90 fL      MCH 28 4 pg      MCHC 31 5 g/dL      RDW 14 8 %      MPV 10 0 fL      Platelets 951 Thousands/uL      nRBC 0 /100 WBCs      Neutrophils Relative 75 %      Immat GRANS % 1 %      Lymphocytes Relative 14 %      Monocytes Relative 7 %      Eosinophils Relative 2 %      Basophils Relative 1 %      Neutrophils Absolute 6 60 Thousands/µL      Immature Grans Absolute 0 05 Thousand/uL      Lymphocytes Absolute 1 16 Thousands/µL      Monocytes Absolute 0 60 Thousand/µL      Eosinophils Absolute 0 16 Thousand/µL      Basophils Absolute 0 04 Thousands/µL     Lactic acid [[id number]] Collected: 11/30/22 1159    Lab Status: In process Specimen: Blood from Arm, Right Updated: 11/30/22 7050    Basic metabolic panel [[id number]] Collected: 11/30/22 1159    Lab Status: In process Specimen: Blood from Arm, Right Updated: 11/30/22 1205    Hepatic function panel [[id number]] Collected: 11/30/22 1159    Lab Status:  In process Specimen: Blood from Arm, Right Updated: 11/30/22 1205    Lipase [[id number]] Collected: 11/30/22 1159    Lab Status: In process Specimen: Blood from Arm, Right Updated: 11/30/22 1205    Blood culture #2 [[id number]] Collected: 11/30/22 1159    Lab Status: In process Specimen: Blood from Arm, Right Updated: 11/30/22 1205    Blood culture #1 [[id number]] Collected: 11/30/22 1159    Lab Status: In process Specimen: Blood from Arm, Left Updated: 11/30/22 1205    UA w Reflex to Microscopic w Reflex to Culture [[id number]]     Lab Status: No result Specimen: Urine                  CT abdomen pelvis with contrast    (Results Pending)              Procedures  Procedures         ED Course                                             MDM    Disposition  Final diagnoses:   None     ED Disposition     None      Follow-up Information    None         Patient's Medications   Discharge Prescriptions    No medications on file       No discharge procedures on file      PDMP Review       Value Time User    PDMP Reviewed  Yes 6/28/2022 11:15 AM Gus Melgar MD          ED Provider  Electronically Signed by 1159    Lab Status: Preliminary result Specimen: Blood from Arm, Right Updated: 12/04/22 2001     Blood Culture No Growth After 4 Days  UA w Reflex to Microscopic w Reflex to Culture [[id number]]  (Abnormal) Collected: 11/30/22 1349    Lab Status: Final result Specimen: Urine, Clean Catch Updated: 11/30/22 1357     Color, UA Colorless     Clarity, UA Clear     Specific Gravity, UA 1 033     pH, UA 6 0     Leukocytes, UA Negative     Nitrite, UA Negative     Protein, UA Negative mg/dl      Glucose, UA Negative mg/dl      Ketones, UA Negative mg/dl      Urobilinogen, UA <2 0 mg/dl      Bilirubin, UA Negative     Occult Blood, UA Negative    Lactic acid [[id number]]  (Normal) Collected: 11/30/22 1159    Lab Status: Final result Specimen: Blood from Arm, Right Updated: 11/30/22 1229     LACTIC ACID 1 1 mmol/L     Narrative:      Result may be elevated if tourniquet was used during collection      Basic metabolic panel [[id number]] Collected: 11/30/22 1159    Lab Status: Final result Specimen: Blood from Arm, Right Updated: 11/30/22 1228     Sodium 142 mmol/L      Potassium 4 3 mmol/L      Chloride 105 mmol/L      CO2 27 mmol/L      ANION GAP 10 mmol/L      BUN 17 mg/dL      Creatinine 1 13 mg/dL      Glucose 124 mg/dL      Calcium 8 8 mg/dL      eGFR 47 ml/min/1 73sq m     Narrative:      [place] guidelines for Chronic Kidney Disease (CKD):   •  Stage 1 with normal or high GFR (GFR > 90 mL/min/1 73 square meters)  •  Stage 2 Mild CKD (GFR = 60-89 mL/min/1 73 square meters)  •  Stage 3A Moderate CKD (GFR = 45-59 mL/min/1 73 square meters)  •  Stage 3B Moderate CKD (GFR = 30-44 mL/min/1 73 square meters)  •  Stage 4 Severe CKD (GFR = 15-29 mL/min/1 73 square meters)  •  Stage 5 End Stage CKD (GFR <15 mL/min/1 73 square meters)  Note: GFR calculation is accurate only with a steady state creatinine    Hepatic function panel [[id number]]  (Abnormal) Collected: 11/30/22 1159    Lab Status: Final result Specimen: Blood from Arm, Right Updated: 11/30/22 1228     Total Bilirubin 0 16 mg/dL      Bilirubin, Direct 0 06 mg/dL      Alkaline Phosphatase 99 U/L      AST 15 U/L      ALT 26 U/L      Total Protein 6 8 g/dL      Albumin 3 3 g/dL     Lipase [[id number]]  (Normal) Collected: 11/30/22 1159    Lab Status: Final result Specimen: Blood from Arm, Right Updated: 11/30/22 1228     Lipase 74 u/L     CBC and differential [[id number]] Collected: 11/30/22 1159    Lab Status: Final result Specimen: Blood from Arm, Right Updated: 11/30/22 1209     WBC 8 61 Thousand/uL      RBC 4 44 Million/uL      Hemoglobin 12 6 g/dL      Hematocrit 40 0 %      MCV 90 fL      MCH 28 4 pg      MCHC 31 5 g/dL      RDW 14 8 %      MPV 10 0 fL      Platelets 608 Thousands/uL      nRBC 0 /100 WBCs      Neutrophils Relative 75 %      Immat GRANS % 1 %      Lymphocytes Relative 14 %      Monocytes Relative 7 %      Eosinophils Relative 2 %      Basophils Relative 1 %      Neutrophils Absolute 6 60 Thousands/µL      Immature Grans Absolute 0 05 Thousand/uL      Lymphocytes Absolute 1 16 Thousands/µL      Monocytes Absolute 0 60 Thousand/µL      Eosinophils Absolute 0 16 Thousand/µL      Basophils Absolute 0 04 Thousands/µL                  CT abdomen pelvis with contrast   Final Result by Henrietta Lorenzana MD (11/30 1412)   Colitis has improved  Mild residual thickening versus underdistention  Other stable findings as above  Workstation performed: [id number]                    Procedures  Procedures         ED Course                                             MDM  Number of Diagnoses or Management Options  Abdominal pain: new and requires workup  Colitis  Diarrhea: new and requires workup  Diagnosis management comments: This is a 79yo female presenting for evaluation with complaint of abdominal pain and diarrhea  She states that symptoms had developed a few days ago with no inciting event    She expresses concern for having a recurrent episode of C diff as symptoms feel somewhat similar  Differential diagnosis includes but is not limited to:  Gastritis, pancreatitis, acute cholecystitis, c diff, colitis, enteritis, diverticulitis, acute appendicitis; will obtain lab work to evaluate for any evidence of leukocytosis, electrolyte derangements, kenna    Initial ED plan: labs, imaging, stool studies    Final ED Assessment: Vital signs reviewed on ED presentation, examination as above  All labs and imaging independently reviewed with imaging interpreted by the Radiologist   There were no significant lab findings, electrolytes and renal function within normal limits  CT today reports improvement of colitis  Case discussed with Ezekiel Stahl, GI AP  Recommends obtaining outpatient stool studies, and proceeding with treatment for C diff should this result positive  Patient advised of my discussion with GI as above and is comfortable with discharge plan to include outpatient stool testing and GI follow-up  Reviewed supportive care and strict parameters for ED return discussed at length including but not limited to intractable abdominal pain or diarrhea  The patient had verbalized understanding and was comfortable and agreeable with disposition and care plan  Her stable vital signs, and negative workup today reassuring  She was discharged in stable condition and had ambulated independently from the emergency department today without issue         Amount and/or Complexity of Data Reviewed  Clinical lab tests: ordered and reviewed  Tests in the radiology section of CPT®: ordered and reviewed  Review and summarize past medical records: yes  Discuss the patient with other providers: yes  Independent visualization of images, tracings, or specimens: yes    Risk of Complications, Morbidity, and/or Mortality  Presenting problems: moderate  Diagnostic procedures: moderate  Management options: moderate    Patient Progress  Patient progress: stable      Disposition  Final diagnoses:   Abdominal pain   Diarrhea   Colitis     Time reflects when diagnosis was documented in both MDM as applicable and the Disposition within this note     Time User Action Codes Description Comment    11/30/2022  2:44 PM Annjessy Hiss Add [R10 9] Abdominal pain     11/30/2022  2:45 PM Annjessy Hiss Add [R19 7] Diarrhea     11/30/2022  2:47 PM Shelbi Cuis Add [K52 9] Colitis       ED Disposition     ED Disposition   Discharge    Condition   Stable    Date/Time   Wed Nov 30, 2022  2:44 PM    Comment   Susanna Phoenix discharge to home/self care                 Follow-up Information     Follow up With Specialties Details Why Contact Info Additional [address] Jennyfer Mario MD Internal Medicine   [address] [place] [address] [address] [facility] [address] Gastroenterology   [address] [place] [id number]  [address] [place] [address] [facility] [address], [facility] [address], [phone]     [address] [place] [address] 65 Emergency Department Emergency Medicine  If symptoms worsen [facility] 109 [facility] Emergency Department, [address], [place], [address]          Discharge Medication List as of 11/30/2022  2:52 PM      CONTINUE these medications which have NOT CHANGED    Details   albuterol (2 5 mg/3 mL) 0 083 % nebulizer solution Take 1 vial (2 5 mg total) by nebulization every 6 (six) hours as needed for wheezing or shortness of breath, Starting u 10/11/2018, Print      albuterol (PROVENTIL HFA,VENTOLIN HFA) 90 mcg/act inhaler Inhale 2 puffs every 6 (six) hours as needed for wheezing, Starting Mon 7/20/2020, Normal      !! ALPRAZolam (XANAX) 0 5 mg tablet Take 1 tablet (0 5 mg total) by mouth 2 (two) times a day as needed for anxiety, Starting Mon 9/19/2022, Print      !! ALPRAZolam Merlinda Sang) 0 5 mg tablet Take 1 tablet (0 5 mg total) by mouth 2 (two) times a day as needed for anxiety, Starting Mon 9/19/2022, Print      ASPIRIN 81 PO Take by mouth, Historical Med      atorvastatin (LIPITOR) 40 mg tablet Starting Tue 10/4/2022, Historical Med      Breo Ellipta 100-25 MCG/INH inhaler inhale 1 puff daily Rinse mouth after use, Starting Wed 7/13/2022, Normal      dicyclomine (BENTYL) 10 mg capsule Take 1 capsule (10 mg total) by mouth 3 (three) times a day before meals, Starting Mon 9/19/2022, No Print      ergocalciferol (VITAMIN D2) 50,000 units take 1 capsule by mouth every week, Normal      famotidine (PEPCID) 20 mg tablet Take 1 tablet (20 mg total) by mouth daily, Starting Mon 9/19/2022, No Print      fluticasone (FLONASE) 50 mcg/act nasal spray 2 sprays into each nostril daily, Starting Tue 9/14/2021, Normal      !! gabapentin (NEURONTIN) 300 mg capsule take 1 capsule by mouth twice a day, Normal      !! gabapentin (NEURONTIN) 400 mg capsule take 1 capsule by mouth at bedtime, Normal      naloxone (NARCAN) 4 mg/0 1 mL nasal spray Administer 1 spray into a nostril   If no response after 2-3 minutes, give another dose in the other nostril using a new spray , Normal      olopatadine (PATANOL) 0 1 % ophthalmic solution  , Starting Tue 9/11/2018, Historical Med      ondansetron (ZOFRAN) 8 mg tablet Take 1 tablet (8 mg total) by mouth every 8 (eight) hours as needed for nausea or vomiting, Starting u 5/19/2022, Normal      oxyCODONE-acetaminophen (Percocet) 5-325 mg per tablet Take 1 tablet by mouth every 6 (six) hours as needed for moderate pain Max Daily Amount: 4 tablets, Starting Mon 9/19/2022, Print      rosuvastatin (CRESTOR) 20 MG tablet Take 1 tablet (20 mg total) by mouth daily, Starting Tue 2/15/2022, Normal      saccharomyces boulardii (FLORASTOR) 250 mg capsule Take 1 capsule (250 mg total) by mouth 2 (two) times a day, Starting Tue 6/28/2022, No Print      sodium chloride () 2 % hypertonic ophthalmic solution Sue 128 2 % eye drops, Historical Med       !! - Potential duplicate medications found  Please discuss with provider  Outpatient Discharge Orders   Stool Enteric Bacterial Panel by PCR   Standing Status: Future Standing Exp  Date: 11/30/23     Clostridium difficile toxin by PCR with EIA   Standing Status: Future Standing Exp  Date: 11/30/23     Stool Enteric Bacterial Panel by PCR   Standing Status: Future Standing Exp   Date: 11/30/23       PDMP Review       Value Time User    PDMP Reviewed  Yes 6/28/2022 11:15 AM Larry Munoz MD          ED Provider  Electronically Signed by           Jud Goff PA-C  12/05/22 4771

## 2022-12-01 ENCOUNTER — APPOINTMENT (OUTPATIENT)
Dept: RADIATION ONCOLOGY | Facility: CLINIC | Age: 77
End: 2022-12-01
Attending: RADIOLOGY

## 2022-12-02 ENCOUNTER — APPOINTMENT (OUTPATIENT)
Dept: RADIATION ONCOLOGY | Facility: CLINIC | Age: 77
End: 2022-12-02
Attending: RADIOLOGY

## 2022-12-04 LAB
BACTERIA BLD CULT: NORMAL
BACTERIA BLD CULT: NORMAL

## 2022-12-05 ENCOUNTER — APPOINTMENT (OUTPATIENT)
Dept: RADIATION ONCOLOGY | Facility: CLINIC | Age: 77
End: 2022-12-05
Attending: RADIOLOGY

## 2022-12-05 LAB
BACTERIA BLD CULT: NORMAL
BACTERIA BLD CULT: NORMAL

## 2022-12-06 ENCOUNTER — APPOINTMENT (OUTPATIENT)
Dept: RADIATION ONCOLOGY | Facility: CLINIC | Age: 77
End: 2022-12-06
Attending: RADIOLOGY

## 2022-12-07 ENCOUNTER — TELEPHONE (OUTPATIENT)
Dept: OTHER | Facility: OTHER | Age: 77
End: 2022-12-07

## 2022-12-07 ENCOUNTER — PATIENT OUTREACH (OUTPATIENT)
Dept: CASE MANAGEMENT | Facility: HOSPITAL | Age: 77
End: 2022-12-07

## 2022-12-07 ENCOUNTER — APPOINTMENT (OUTPATIENT)
Dept: RADIATION ONCOLOGY | Facility: CLINIC | Age: 77
End: 2022-12-07
Attending: RADIOLOGY

## 2022-12-07 NOTE — TELEPHONE ENCOUNTER
Pt stated has a colonoscopy for 12/9/2022 and patient stated has radiation treatment and stated not able to do colonoscopy and wants to r s      Patient requesting a call back in the afternoon to discuss

## 2022-12-07 NOTE — PROGRESS NOTES
MSW saw pt in the [address] [place] [address] office today, she was happy to chat for a few minutes and got me caught up on her family and how they are all doing  Pt tells me that she herself is doing very well, we talked about her significant health issues in the past few months, hospital stays and rehab stays  She is still on O2 but says that in general she is feeling well and doing well  She is happy that her radiation is almost over because coming out every day for appointments is exhausting for her  She is otherwise doing well and we just chatted until [employer] came to pick her up  She is very complimentary of [employer] and all of the drivers she has had  MSW will remain available to her as needed moving forward, no concerns at this time

## 2022-12-08 ENCOUNTER — APPOINTMENT (OUTPATIENT)
Dept: RADIATION ONCOLOGY | Facility: CLINIC | Age: 77
End: 2022-12-08
Attending: RADIOLOGY

## 2022-12-08 ENCOUNTER — APPOINTMENT (OUTPATIENT)
Dept: LAB | Facility: HOSPITAL | Age: 77
End: 2022-12-08

## 2022-12-09 ENCOUNTER — APPOINTMENT (OUTPATIENT)
Dept: RADIATION ONCOLOGY | Facility: CLINIC | Age: 77
End: 2022-12-09
Attending: RADIOLOGY

## 2022-12-12 ENCOUNTER — APPOINTMENT (OUTPATIENT)
Dept: RADIATION ONCOLOGY | Facility: CLINIC | Age: 77
End: 2022-12-12
Attending: RADIOLOGY

## 2022-12-13 ENCOUNTER — APPOINTMENT (OUTPATIENT)
Dept: RADIATION ONCOLOGY | Facility: CLINIC | Age: 77
End: 2022-12-13
Attending: RADIOLOGY

## 2022-12-14 ENCOUNTER — APPOINTMENT (OUTPATIENT)
Dept: RADIATION ONCOLOGY | Facility: CLINIC | Age: 77
End: 2022-12-14
Attending: RADIOLOGY

## 2022-12-14 DIAGNOSIS — Z17.1 MALIGNANT NEOPLASM OF OVERLAPPING SITES OF RIGHT BREAST IN FEMALE, ESTROGEN RECEPTOR NEGATIVE (HCC): Primary | ICD-10-CM

## 2022-12-14 DIAGNOSIS — C50.811 MALIGNANT NEOPLASM OF OVERLAPPING SITES OF RIGHT BREAST IN FEMALE, ESTROGEN RECEPTOR NEGATIVE (HCC): Primary | ICD-10-CM

## 2022-12-14 RX ORDER — MOMETASONE FUROATE 1 MG/G
CREAM TOPICAL DAILY PRN
Qty: 50 G | Refills: 1 | Status: SHIPPED | OUTPATIENT
Start: 2022-12-14

## 2022-12-15 ENCOUNTER — APPOINTMENT (OUTPATIENT)
Dept: RADIATION ONCOLOGY | Facility: CLINIC | Age: 77
End: 2022-12-15
Attending: RADIOLOGY

## 2022-12-15 ENCOUNTER — APPOINTMENT (OUTPATIENT)
Dept: RADIATION ONCOLOGY | Facility: CLINIC | Age: 77
End: 2022-12-15

## 2022-12-16 ENCOUNTER — APPOINTMENT (OUTPATIENT)
Dept: RADIATION ONCOLOGY | Facility: CLINIC | Age: 77
End: 2022-12-16

## 2023-01-04 ENCOUNTER — TELEPHONE (OUTPATIENT)
Dept: HEMATOLOGY ONCOLOGY | Facility: CLINIC | Age: 78
End: 2023-01-04

## 2023-01-04 NOTE — TELEPHONE ENCOUNTER
Received vm from patient, "Yes, my name is Brian Champagne at date of birth is [birth year]  It's just a question about Doctor Rashad Castro  Doc, I only had one chemo  The sum of I had gotten so sick I wound up in the hospital most of my time  Anyway, I just want to make sure if I still have to come in tomorrow, I don't understand that 31 chemo session if I still have to come in because next week I see two other oncologists so she can get back to me  I'm at [phone], four for the appointment tomorrow  Let me know if I have to come or not  I'd appreciate it  Alright  Palomo Valencia "    Dr Mihaela Metcalf recommends patient to have appointment as scheduled or to reschedule if patient is not able to attend   Patient rescheduled for 1/19/23 at 140pm per patient request

## 2023-01-04 NOTE — TELEPHONE ENCOUNTER
CALL TRANSFER   Reason for patient call? Patient wanted to know if she has to come in for her appointment tomorrow   Patient's primary physician? Doretha Alcaraz    RN call was transferred to and time it was transferred? Santino Pendleton 10:14am    Informed patient that the message will be forwarded to the team and someone will get back to them as soon as possible    Did you relay this information to the patient?   Yes

## 2023-01-11 ENCOUNTER — RADIATION ONCOLOGY FOLLOW-UP (OUTPATIENT)
Dept: RADIATION ONCOLOGY | Facility: CLINIC | Age: 78
End: 2023-01-11

## 2023-01-11 ENCOUNTER — CLINICAL SUPPORT (OUTPATIENT)
Dept: RADIATION ONCOLOGY | Facility: CLINIC | Age: 78
End: 2023-01-11
Attending: RADIOLOGY

## 2023-01-11 ENCOUNTER — OFFICE VISIT (OUTPATIENT)
Dept: SURGICAL ONCOLOGY | Facility: CLINIC | Age: 78
End: 2023-01-11

## 2023-01-11 VITALS
BODY MASS INDEX: 29.69 KG/M2 | TEMPERATURE: 97.1 F | HEART RATE: 99 BPM | SYSTOLIC BLOOD PRESSURE: 126 MMHG | OXYGEN SATURATION: 94 % | DIASTOLIC BLOOD PRESSURE: 74 MMHG | RESPIRATION RATE: 18 BRPM | WEIGHT: 152 LBS

## 2023-01-11 VITALS
BODY MASS INDEX: 29.69 KG/M2 | WEIGHT: 152 LBS | SYSTOLIC BLOOD PRESSURE: 126 MMHG | HEART RATE: 99 BPM | RESPIRATION RATE: 18 BRPM | OXYGEN SATURATION: 94 % | DIASTOLIC BLOOD PRESSURE: 74 MMHG | TEMPERATURE: 97 F

## 2023-01-11 DIAGNOSIS — Z17.1 MALIGNANT NEOPLASM OF OVERLAPPING SITES OF RIGHT BREAST IN FEMALE, ESTROGEN RECEPTOR NEGATIVE (HCC): Primary | ICD-10-CM

## 2023-01-11 DIAGNOSIS — C50.811 MALIGNANT NEOPLASM OF OVERLAPPING SITES OF RIGHT BREAST IN FEMALE, ESTROGEN RECEPTOR NEGATIVE (HCC): Primary | ICD-10-CM

## 2023-01-11 DIAGNOSIS — Z98.890 HISTORY OF LUMPECTOMY OF RIGHT BREAST: ICD-10-CM

## 2023-01-11 NOTE — PROGRESS NOTES
Follow-up - Radiation Oncology   Luisa Brennan 1945 78 y o  female [id number]      History of Present Illness   Cancer Staging   Malignant neoplasm of overlapping sites of right breast in female, estrogen receptor negative ([facility] [address] )  Staging form: Breast, AJCC 8th Edition  - Clinical stage from 2022: Stage IB (cT1c, cN0, cM0, G2, ER-, AK-, HER2-) - Signed by Thomas Meyers MD on 2022  Stage prefix: Initial diagnosis  Nuclear grade: G2  Histologic grading system: 3 grade system  HER2-IHC interpretation: Equivocal  HER2-IHC value: Score 2+  HER2-FISH interpretation: Negative      Luisa Brennan is a 78 y o   female with multiple comorbidities including COPD who is oxygen dependent and stage Ia (pT1cN0 M0) grade 3 invasive right breast carcinoma status postlumpectomy and sentinel lymph node biopsy achieving negative margins on 2022  Tumor cells were ER/AK/HER2/flash negative  She received 1 cycle of dose reduced TC, but discontinued due to complications and toxicity  She completed a course of radiation therapy on 12/15/22  She presents today for follow up      The patient notes that her skin has healed well  She states that she tried antibiotic cream over the area of desquamation but this resulted in a pruritic rash and she stopped  Once she ceased using the antibiotic ointment the rash resolved and itching resolved  Skin is completely closed  She has some tenderness in the lumpectomy cavity region associated with firmness  She does feel that this area has enlarged somewhat  She denies baseline pain  She does have occasional sharp, shooting pains that resolved spontaneously quickly  These are unchanged since lesion of surgery  She denies any new suspicious skin lesions or nipple discharge of the breast bilaterally  She denies any upper extremity edema or shoulder restriction    She is not using emollients regularly      Upcomin23 Dr Lafe Duane  23 Dr Ricci Szymanski Oncology History   Malignant neoplasm of overlapping sites of right breast in female, estrogen receptor negative ([facility] [address] )   3/25/2022 Initial Diagnosis    Malignant neoplasm of right female breast ([facility] [address] )     3/25/2022 Biopsy    Right breast ultrasound guided biopsy  12 o'clock 5 cm from nipple  Invasive breast carcinoma of no special type (ductal NST/ invasive ductal carcinoma with apocrine features)  Grade 2  ER 0  FL 0  HER 2 2+   FISH negative  Lymphovascular invasion not identified    Concordant  Malignancy is unifocal  Mass measures 1 4 cm on mammogram and 1 9 cm on ultrasound  Right axilla ultrasound clear  Left brest clear  Amanda  reflector placed  In situ compononent: 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  4/4/2022 -  Cancer Staged    Staging form: Breast, AJCC 8th Edition  - Clinical stage from 4/4/2022: Stage IB (cT1c, cN0, cM0, G2, ER-, FL-, HER2-) - Signed by Alonzo Scott MD on 4/4/2022  Stage prefix: Initial diagnosis  Nuclear grade: G2  Histologic grading system: 3 grade system  HER2-IHC interpretation: Equivocal  HER2-IHC value: Score 2+  HER2-FISH interpretation: Negative       4/4/2022 Genetic Testing    [facility]  A total of 36 genes were evaluated, including: GILL, BRCA1, BRCA2, CDH1, CHEK2, PALB2, PTEN, STK11, TP53  Negative result   No pathogenic sequence variants or deletions/duplications identified     4/28/2022 Surgery    Right breast amanda  directed lumpectomy with sentinel lymph node biopsy and axillary dissection  Invasive ductal carcinoma with apocrine features  Grade 3  1 4 cm  0/4 Lymph nodes       6/14/2022 - 6/15/2022 Chemotherapy    Pegfilgrastim-bmez (ZIEXTENZO), 6 mg, Subcutaneous, Once, 1 of 4 cycles  Administration: 6 mg (6/15/2022)  cyclophosphamide (CYTOXAN) IVPB, 450 mg/m2 = 778 mg (75 % of original dose 600 mg/m2), Intravenous, Once, 1 of 4 cycles  Dose modification: 450 mg/m2 (75 % of original dose 600 mg/m2, Cycle 1, Reason: Dose Not Tolerated)  Administration: 778 mg (2022)  DOCEtaxel (TAXOTERE) chemo infusion, 56 25 mg/m2 = 97 4 mg (75 % of original dose 75 mg/m2), Intravenous, Once, 1 of 4 cycles  Dose modification: 56 25 mg/m2 (75 % of original dose 75 mg/m2, Cycle 1, Reason: Dose Not Tolerated)  Administration: 97 4 mg (2022)     2022 - 12/15/2022 Radiation    Treatments:  Course: C1  Plan ID Energy Fractions Dose per Fraction (cGy) Dose Correction (cGy) Total Dose Delivered (cGy) Elapsed Days   R Breast Hypo 6X 15 / 15 267 0 4,005 21   R BrstBst 6X 10X/6X 5 / 5 200 0 1,000 6    Treatment Dates:  2022 - 12/15/2022  Past Medical History:   Diagnosis Date   • Anxiety    • Atherosclerosis of native artery of both lower extremities with intermittent claudication ([facility] [address] ) 10/15/2015    Transitioned From: Cardiovascular Symptoms   • Breast cancer [facility])    • Carotid artery plaque, bilateral 2017    Transitioned From: Atherosclerosis of both carotid arteries   • Chronic kidney disease    • Chronic sinusitis     Last assessed: 13   • COPD (chronic obstructive pulmonary disease) ([facility] [address] )    • COVID     21 not hopsitalized- flu-like symptoms   • Fall 2021   • Fracture, ankle closed, bimalleolar, right, initial encounter 2021   • GERD (gastroesophageal reflux disease)    • Hyperlipidemia    • Lung nodule    • PNA (pneumonia)    • PONV (postoperative nausea and vomiting)     with C-sections   • Pulmonary emphysema (HCC)    • PVD (peripheral vascular disease) ([facility])    • Restless leg syndrome    • Stage 3 chronic kidney disease ([facility] [address] ) 2019   • Tobacco abuse      Past Surgical History:   Procedure Laterality Date   • BREAST LUMPECTOMY Right 2022    Procedure: ISAI  DIRECTED LUMPECTOMY;  Surgeon: Agustín Malik MD;  Location: [facility];  Service: Surgical Oncology   • CATARACT EXTRACTION     •  SECTION     • COLONOSCOPY      Complete   Resolved: 13   • DESCENDING AORTIC ANEURYSM REPAIR W/ STENT  2019   • IR AORTAGRAM WITH RUN-OFF  8/15/2019   • LYMPH NODE BIOPSY Right 2022    Procedure: LYMPHATIC MAPPING WITH BLUE AND RADIOACTIVE DYES, SENTINEL LYMPH NODE BIOPSY, INJECTION IN OR BY DR Lety Abdalla AT 1300;  Surgeon: Suleman Jesus MD;  Location: [facility];  Service: Surgical Oncology   • SHOULDER SURGERY      For frozen shoulder    • TONSILLECTOMY     • US BREAST ISAI  NEEDLE LOC RIGHT Right 3/25/2022   • US GUIDED BREAST BIOPSY RIGHT COMPLETE Right 3/25/2022       Social History   Social History     Substance and Sexual Activity   Alcohol Use Yes    Comment: occasionally      Social History     Substance and Sexual Activity   Drug Use No     Social History     Tobacco Use   Smoking Status Former   • Packs/day: 2 00   • Years: 56 00   • Pack years: 112 00   • Types: Cigarettes   • Start date: 1962   • Quit date: 2018   • Years since quittin 6   Smokeless Tobacco Never         Meds/Allergies     Current Outpatient Medications:   •  albuterol (2 5 mg/3 mL) 0 083 % nebulizer solution, Take 1 vial (2 5 mg total) by nebulization every 6 (six) hours as needed for wheezing or shortness of breath, Disp: 360 mL, Rfl: 3  •  albuterol (PROVENTIL HFA,VENTOLIN HFA) 90 mcg/act inhaler, Inhale 2 puffs every 6 (six) hours as needed for wheezing, Disp: 3 Inhaler, Rfl: 3  •  ALPRAZolam (XANAX) 0 5 mg tablet, Take 1 tablet (0 5 mg total) by mouth 2 (two) times a day as needed for anxiety, Disp: 6 tablet, Rfl: 0  •  ASPIRIN 81 PO, Take by mouth, Disp: , Rfl:   •  Breo Ellipta 100-25 MCG/INH inhaler, inhale 1 puff daily Rinse mouth after use, Disp: 180 blister, Rfl: 1  •  ergocalciferol (VITAMIN D2) 50,000 units, take 1 capsule by mouth every week, Disp: 12 capsule, Rfl: 0  •  famotidine (PEPCID) 20 mg tablet, Take 1 tablet (20 mg total) by mouth daily, Disp: , Rfl: 0  •  fluticasone (FLONASE) 50 mcg/act nasal spray, 2 sprays into each nostril daily, Disp: 11 1 mL, Rfl: 1  •  gabapentin (NEURONTIN) 300 mg capsule, take 1 capsule by mouth twice a day, Disp: 180 capsule, Rfl: 0  •  gabapentin (NEURONTIN) 400 mg capsule, take 1 capsule by mouth at bedtime, Disp: 90 capsule, Rfl: 1  •  mometasone (ELOCON) 0 1 % cream, Apply topically daily as needed (rash), Disp: 50 g, Rfl: 1  •  ondansetron (ZOFRAN) 8 mg tablet, Take 1 tablet (8 mg total) by mouth every 8 (eight) hours as needed for nausea or vomiting, Disp: 20 tablet, Rfl: 2  •  rosuvastatin (CRESTOR) 20 MG tablet, Take 1 tablet (20 mg total) by mouth daily, Disp: 90 tablet, Rfl: 6  •  saccharomyces boulardii (FLORASTOR) 250 mg capsule, Take 1 capsule (250 mg total) by mouth 2 (two) times a day, Disp: , Rfl: 0  •  sodium chloride () 2 % hypertonic ophthalmic solution, Sue 128 2 % eye drops, Disp: , Rfl:   •  ALPRAZolam (XANAX) 0 5 mg tablet, Take 1 tablet (0 5 mg total) by mouth 2 (two) times a day as needed for anxiety, Disp: 20 tablet, Rfl: 0  •  atorvastatin (LIPITOR) 40 mg tablet, , Disp: , Rfl:   •  dicyclomine (BENTYL) 10 mg capsule, Take 1 capsule (10 mg total) by mouth 3 (three) times a day before meals (Patient not taking: Reported on 10/28/2022), Disp: 30 capsule, Rfl: 0  •  naloxone (NARCAN) 4 mg/0 1 mL nasal spray, Administer 1 spray into a nostril  If no response after 2-3 minutes, give another dose in the other nostril using a new spray   (Patient not taking: Reported on 1/11/2023), Disp: 1 each, Rfl: 1  •  olopatadine (PATANOL) 0 1 % ophthalmic solution,   (Patient not taking: Reported on 1/11/2023), Disp: , Rfl: 0  •  oxyCODONE-acetaminophen (Percocet) 5-325 mg per tablet, Take 1 tablet by mouth every 6 (six) hours as needed for moderate pain Max Daily Amount: 4 tablets (Patient not taking: Reported on 1/11/2023), Disp: 20 tablet, Rfl: 0  Allergies   Allergen Reactions   • Tiotropium Bromide Monohydrate Shortness Of Breath   • Augmentin [Amoxicillin-Pot Clavulanate] Abdominal Pain     Pt received ceftriaxone 1 g IV on 5-21-18, gets sharp pains    • Tiotropium Abdominal Pain   • Tylenol With Codeine #3 [Acetaminophen-Codeine] Abdominal Pain   • Other Other (See Comments)       Review of Systems   Constitutional: Positive for fatigue  HENT: Positive for dental problem (full dentures)  Eyes: Positive for visual disturbance  Fuchs dystrophy   Respiratory: Positive for cough and shortness of breath  2 liters O2 via nasal canula   Cardiovascular: Negative  Gastrointestinal: Positive for constipation and nausea  Genitourinary: Negative  Musculoskeletal: Positive for back pain  Skin: Negative  Allergic/Immunologic: Negative  Neurological: Positive for light-headedness and numbness (toes)  Hematological: Negative  Psychiatric/Behavioral: Positive for sleep disturbance  Negative for suicidal ideas  Occasionally feels depressed  OBJECTIVE:   /74   Pulse 99   Temp (!) 97 °F (36 1 °C)   Resp 18   Wt 68 9 kg (152 lb)   SpO2 94%   BMI 29 69 kg/m²   Karnofsky: 80 - Normal activity with effort; some signs or symptoms of disease    Physical Exam  Vitals and nursing note reviewed  Constitutional:       General: She is not in acute distress  Appearance: She is well-developed  Comments: Patient is using O2 nasal cannula  She remove this while undergoing examination stating that she does not require it if sitting still  Cardiovascular:      Rate and Rhythm: Normal rate and regular rhythm  Pulmonary:      Breath sounds: No wheezing, rhonchi or rales  Chest:          Comments: Breast examination demonstrates the breast are symmetric in size and contour  The right breast has a well-healed 12:00 lumpectomy scar associated with dense fibrosis and some tenderness to deep palpation  There is dense fibrotic region  Full exam difficult due to tenderness to deep palpation in this area    There is diffuse hyperpigmentation more notable in the medial upper field region and over the nipple  There is mild diffuse fibrotic changes of the right breast   There are no other palpable nodules or suspicious skin changes of breast bilaterally  Abdominal:      Palpations: Abdomen is soft  Tenderness: There is no abdominal tenderness  Musculoskeletal:      Right lower leg: No edema  Left lower leg: No edema  Comments: No upper extremity edema bilaterally  Lymphadenopathy:      Cervical: No cervical adenopathy  Upper Body:      Right upper body: No supraclavicular or axillary adenopathy  Left upper body: No supraclavicular or axillary adenopathy  Neurological:      Mental Status: She is alert and oriented to person, place, and time  Gait: Gait normal           Assessment/Plan:  Mahogany Winkler is a 78 y o   female with multiple comorbidities including COPD who is oxygen dependent and stage Ia (pT1cN0 M0) grade 3 invasive right breast carcinoma status post resection on 4/28/2022 followed by truncated 1 cycle of adjuvant TC  Tumor cells were ER/ND/HER2/flash negative  She completed a course of radiation therapy on 12/15/22  She continues to recover from radiation therapy  The patient complains of enlarging area of dense fibrosis around the surgical bed  Exam is grossly non-focal   We discussed breast US evaluation based on her concerns, but patient stated she wished to discuss with Dr Antoni Alonso whom she is seeing next today  I feel this is reasonable  I instructed the patient to practice sun protection to the irradiated skin  I recommended continued daily emollient use and light to moderate breast massage in an effort to promote continued healing for the next 6 months  I have asked that she return for follow-up in 6 months  We will see her sooner should the need arise        Luis Castano MD  1/11/2023,10:40 AM    Portions of the record may have been created with voice recognition software   Occasional wrong word or "sound a like" substitutions may have occurred due to the inherent limitations of voice recognition software   Read the chart carefully and recognize, using context, where substitutions have occurred

## 2023-01-11 NOTE — PROGRESS NOTES
Angelica Ackerman 1945 is a 78 y o  female with multiple comorbidities including COPD who is oxygen dependent and stage Ia (pT1 cN0 M0) grade 3 invasive right breast carcinoma status postlumpectomy and sentinel lymph node biopsy achieving negative margins on 2022  Tumor cells were ER/CO/HER2/flash negative  She received 1 cycle of dose reduced TC, but discontinued due to complications and toxicity  She completed a course of radiation therapy on 12/15/22  She presents today for follow up  She tolerated radiation therapy well  She did experience fatigue and grade 2-3+ skin toxicity with scant moist desquamation  This was treated aggressively with emollients, steroid creams, antibiotic ointments and wound dressings to good effect  she suffered no other significant toxicity due to radiation  Upcomin23 Dr Sylvester Raphael  23 Dr Selam Jerez        Follow up visit     Oncology History   Malignant neoplasm of overlapping sites of right breast in female, estrogen receptor negative ([facility] [address] )   3/25/2022 Initial Diagnosis    Malignant neoplasm of right female breast ([facility] [address] )     3/25/2022 Biopsy    Right breast ultrasound guided biopsy  12 o'clock 5 cm from nipple  Invasive breast carcinoma of no special type (ductal NST/ invasive ductal carcinoma with apocrine features)  Grade 2  ER 0  CO 0  HER 2 2+   FISH negative  Lymphovascular invasion not identified    Concordant  Malignancy is unifocal  Mass measures 1 4 cm on mammogram and 1 9 cm on ultrasound  Right axilla ultrasound clear  Left brest clear  Amanda  reflector placed  In situ compononent: 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       2022 -  Cancer Staged    Staging form: Breast, AJCC 8th Edition  - Clinical stage from 2022: Stage IB (cT1c, cN0, cM0, G2, ER-, CO-, HER2-) - Signed by Alonzo Scott MD on 2022  Stage prefix: Initial diagnosis  Nuclear grade: G2  Histologic grading system: 3 grade system  HER2-IHC interpretation: Equivocal  HER2-IHC value: Score 2+  HER2-FISH interpretation: Negative       4/4/2022 Genetic Testing    [facility]  A total of 36 genes were evaluated, including: GILL, BRCA1, BRCA2, CDH1, CHEK2, PALB2, PTEN, STK11, TP53  Negative result  No pathogenic sequence variants or deletions/duplications identified     4/28/2022 Surgery    Right breast lexy  directed lumpectomy with sentinel lymph node biopsy and axillary dissection  Invasive ductal carcinoma with apocrine features  Grade 3  1 4 cm  0/4 Lymph nodes       6/14/2022 - 6/15/2022 Chemotherapy    Pegfilgrastim-bmez (ZIEXTENZO), 6 mg, Subcutaneous, Once, 1 of 4 cycles  Administration: 6 mg (6/15/2022)  cyclophosphamide (CYTOXAN) IVPB, 450 mg/m2 = 778 mg (75 % of original dose 600 mg/m2), Intravenous, Once, 1 of 4 cycles  Dose modification: 450 mg/m2 (75 % of original dose 600 mg/m2, Cycle 1, Reason: Dose Not Tolerated)  Administration: 778 mg (6/14/2022)  DOCEtaxel (TAXOTERE) chemo infusion, 56 25 mg/m2 = 97 4 mg (75 % of original dose 75 mg/m2), Intravenous, Once, 1 of 4 cycles  Dose modification: 56 25 mg/m2 (75 % of original dose 75 mg/m2, Cycle 1, Reason: Dose Not Tolerated)  Administration: 97 4 mg (6/14/2022)     11/17/2022 - 12/15/2022 Radiation    Treatments:  Course: C1  Plan ID Energy Fractions Dose per Fraction (cGy) Dose Correction (cGy) Total Dose Delivered (cGy) Elapsed Days   R Breast Hypo 6X 15 / 15 267 0 4,005 21   R BrstBst 6X 10X/6X 5 / 5 200 0 1,000 6    Treatment Dates:  11/17/2022 - 12/15/2022  Review of Systems:  Review of Systems   Constitutional: Positive for fatigue  HENT: Positive for dental problem (full dentures)  Eyes: Positive for visual disturbance  Fuchs dystrophy   Respiratory: Positive for cough and shortness of breath  2 liters O2 via nasal canula   Cardiovascular: Negative  Gastrointestinal: Positive for constipation and nausea  Genitourinary: Negative  Musculoskeletal: Positive for back pain  Skin: Negative  Allergic/Immunologic: Negative  Neurological: Positive for light-headedness and numbness (toes)  Hematological: Negative  Psychiatric/Behavioral: Positive for sleep disturbance  Negative for suicidal ideas  Occasionally feels depressed         Clinical Trial: no        Health Maintenance   Topic Date Due   • Colorectal Cancer Screening  08/30/2021   • COVID-19 Vaccine (4 - Booster for Pfizer series) 08/20/2022   • Influenza Vaccine (1) 09/01/2022   • Breast Cancer Screening: Mammogram  02/15/2023   • Fall Risk  04/13/2023   • Urinary Incontinence Screening  04/13/2023   • Medicare Annual Wellness Visit (AWV)  04/13/2023   • Falls: Plan of Care  04/14/2023   • Lung Cancer Screening  06/20/2023   • BMI: Followup Plan  10/10/2023   • Depression Screening  10/28/2023   • BMI: Adult  10/28/2023   • DXA SCAN  02/15/2024   • Hepatitis C Screening  Completed   • Osteoporosis Screening  Completed   • Pneumococcal Vaccine: 65+ Years  Completed   • HIB Vaccine  Aged Out   • IPV Vaccine  Aged Out   • Hepatitis A Vaccine  Aged Out   • Meningococcal ACWY Vaccine  Aged Out   • HPV Vaccine  Aged Out     Patient Active Problem List   Diagnosis   • Anxiety   • Aortoiliac occlusive disease ([address] )   • Carotid artery plaque, bilateral   • Centrilobular emphysema ([address] )   • Hyperlipidemia   • Osteoporosis   • Restless leg syndrome   • Subclavian artery stenosis, left (HCC)   • PVD (peripheral vascular disease) ([address] )   • Chronic sinusitis   • Pancreatic mass   • BMI 34 0-34 9,adult   • Seborrheic keratoses   • Stage 3 chronic kidney disease (HCC)   • Closed avulsion fracture of lateral malleolus of right fibula   • Prediabetes   • Vitamin D deficiency   • Acute right ankle pain   • COPD (chronic obstructive pulmonary disease) ([facility])   • Malignant neoplasm of overlapping sites of right breast in female, estrogen receptor negative ([address] )   • Continuous opioid dependence ([place] [address] )   • Cellulitis   • Diverticulitis   • Oral candidiasis   • Chemotherapy induced neutropenia (HCC)   • Diarrhea   • Proctocolitis   • Transaminitis   • Generalized weakness   • C  difficile colitis   • History of lumpectomy of right breast     Past Medical History:   Diagnosis Date   • Anxiety    • Atherosclerosis of native artery of both lower extremities with intermittent claudication ([facility] ) 10/15/2015    Transitioned From: Cardiovascular Symptoms   • Breast cancer [facility])    • Carotid artery plaque, bilateral 2017    Transitioned From: Atherosclerosis of both carotid arteries   • Chronic kidney disease    • Chronic sinusitis     Last assessed: 13   • COPD (chronic obstructive pulmonary disease) ([place] [address] )    • COVID     21 not hopsitalized- flu-like symptoms   • Fall 2021   • Fracture, ankle closed, bimalleolar, right, initial encounter 2021   • GERD (gastroesophageal reflux disease)    • Hyperlipidemia    • Lung nodule    • PNA (pneumonia)    • PONV (postoperative nausea and vomiting)     with C-sections   • Pulmonary emphysema (HCC)    • PVD (peripheral vascular disease) ([facility])    • Restless leg syndrome    • Stage 3 chronic kidney disease ([place] [address] ) 2019   • Tobacco abuse      Past Surgical History:   Procedure Laterality Date   • BREAST LUMPECTOMY Right 2022    Procedure: ISAI  DIRECTED LUMPECTOMY;  Surgeon: Daly Kaur MD;  Location: [facility];  Service: Surgical Oncology   • CATARACT EXTRACTION     •  SECTION     • COLONOSCOPY      Complete   Resolved: 13   • DESCENDING AORTIC ANEURYSM REPAIR W/ STENT  2019   • IR AORTAGRAM WITH RUN-OFF  8/15/2019   • LYMPH NODE BIOPSY Right 2022    Procedure: LYMPHATIC MAPPING WITH BLUE AND RADIOACTIVE DYES, SENTINEL LYMPH NODE BIOPSY, INJECTION IN OR BY DR Greg Scott AT 1300;  Surgeon: Daly Kaur MD;  Location: [facility];  Service: Surgical Oncology   • SHOULDER SURGERY      For frozen shoulder    • TONSILLECTOMY     • US BREAST ISAI  NEEDLE LOC RIGHT Right 3/25/2022   • US GUIDED BREAST BIOPSY RIGHT COMPLETE Right 3/25/2022     Family History   Problem Relation Age of Onset   • No Known Problems Mother    • No Known Problems Father    • Arthritis Sister    • Pancreatic cancer Sister 61   • Melanoma Brother         twice   • Prostate cancer Brother    • Heart attack Family         Acute Myocardial Infarction   • Cirrhosis Family    • Prostate cancer Family    • Skin cancer Family    • Stroke Family         Syndrome   • No Known Problems Daughter    • No Known Problems Maternal Grandmother    • No Known Problems Paternal Grandmother    • No Known Problems Sister    • No Known Problems Maternal Aunt    • Breast cancer Other 27     Social History     Socioeconomic History   • Marital status:      Spouse name: Not on file   • Number of children: 2   • Years of education: Not on file   • Highest education level: Not on file   Occupational History   • Occupation: Retired/   Tobacco Use   • Smoking status: Former     Packs/day: 2 00     Years: 56 00     Pack years: 112 00     Types: Cigarettes     Start date: 1962     Quit date: 2018     Years since quittin 6   • Smokeless tobacco: Never   Vaping Use   • Vaping Use: Never used   Substance and Sexual Activity   • Alcohol use: Yes     Comment: occasionally    • Drug use: No   • Sexual activity: Not Currently     Partners: Male   Other Topics Concern   • Not on file   Social History Narrative    Living w/adult children    No advance directive on file     Social Determinants of Health     Financial Resource Strain: Not on file   Food Insecurity: Not on file   Transportation Needs: No Transportation Needs   • Lack of Transportation (Medical): No   • Lack of Transportation (Non-Medical):  No   Physical Activity: Not on file   Stress: Not on file   Social Connections: Not on file   Intimate Partner Violence: Not on file   Housing Stability: Low Risk    • Unable to Pay for Housing in the Last Year: No   • Number of Places Lived in the Last Year: 1   • Unstable Housing in the Last Year: No       Current Outpatient Medications:   •  albuterol (2 5 mg/3 mL) 0 083 % nebulizer solution, Take 1 vial (2 5 mg total) by nebulization every 6 (six) hours as needed for wheezing or shortness of breath, Disp: 360 mL, Rfl: 3  •  albuterol (PROVENTIL HFA,VENTOLIN HFA) 90 mcg/act inhaler, Inhale 2 puffs every 6 (six) hours as needed for wheezing, Disp: 3 Inhaler, Rfl: 3  •  ALPRAZolam (XANAX) 0 5 mg tablet, Take 1 tablet (0 5 mg total) by mouth 2 (two) times a day as needed for anxiety, Disp: 20 tablet, Rfl: 0  •  ALPRAZolam (XANAX) 0 5 mg tablet, Take 1 tablet (0 5 mg total) by mouth 2 (two) times a day as needed for anxiety (Patient not taking: No sig reported), Disp: 6 tablet, Rfl: 0  •  ASPIRIN 81 PO, Take by mouth, Disp: , Rfl:   •  atorvastatin (LIPITOR) 40 mg tablet, , Disp: , Rfl:   •  Breo Ellipta 100-25 MCG/INH inhaler, inhale 1 puff daily Rinse mouth after use, Disp: 180 blister, Rfl: 1  •  dicyclomine (BENTYL) 10 mg capsule, Take 1 capsule (10 mg total) by mouth 3 (three) times a day before meals (Patient not taking: Reported on 10/28/2022), Disp: 30 capsule, Rfl: 0  •  ergocalciferol (VITAMIN D2) 50,000 units, take 1 capsule by mouth every week, Disp: 12 capsule, Rfl: 0  •  famotidine (PEPCID) 20 mg tablet, Take 1 tablet (20 mg total) by mouth daily, Disp: , Rfl: 0  •  fluticasone (FLONASE) 50 mcg/act nasal spray, 2 sprays into each nostril daily, Disp: 11 1 mL, Rfl: 1  •  gabapentin (NEURONTIN) 300 mg capsule, take 1 capsule by mouth twice a day, Disp: 180 capsule, Rfl: 0  •  gabapentin (NEURONTIN) 400 mg capsule, take 1 capsule by mouth at bedtime, Disp: 90 capsule, Rfl: 1  •  mometasone (ELOCON) 0 1 % cream, Apply topically daily as needed (rash), Disp: 50 g, Rfl: 1  •  naloxone (NARCAN) 4 mg/0 1 mL nasal spray, Administer 1 spray into a nostril  If no response after 2-3 minutes, give another dose in the other nostril using a new spray , Disp: 1 each, Rfl: 1  •  olopatadine (PATANOL) 0 1 % ophthalmic solution,  , Disp: , Rfl: 0  •  ondansetron (ZOFRAN) 8 mg tablet, Take 1 tablet (8 mg total) by mouth every 8 (eight) hours as needed for nausea or vomiting, Disp: 20 tablet, Rfl: 2  •  oxyCODONE-acetaminophen (Percocet) 5-325 mg per tablet, Take 1 tablet by mouth every 6 (six) hours as needed for moderate pain Max Daily Amount: 4 tablets, Disp: 20 tablet, Rfl: 0  •  rosuvastatin (CRESTOR) 20 MG tablet, Take 1 tablet (20 mg total) by mouth daily (Patient not taking: Reported on 10/28/2022), Disp: 90 tablet, Rfl: 6  •  saccharomyces boulardii (FLORASTOR) 250 mg capsule, Take 1 capsule (250 mg total) by mouth 2 (two) times a day, Disp: , Rfl: 0  •  sodium chloride () 2 % hypertonic ophthalmic solution, Sue 128 2 % eye drops, Disp: , Rfl:   Allergies   Allergen Reactions   • Tiotropium Bromide Monohydrate Shortness Of Breath   • Augmentin [Amoxicillin-Pot Clavulanate] Abdominal Pain     Pt received ceftriaxone 1 g IV on 5-21-18, gets sharp pains    • Tiotropium Abdominal Pain   • Tylenol With Codeine #3 [Acetaminophen-Codeine] Abdominal Pain   • Other Other (See Comments)     There were no vitals filed for this visit

## 2023-01-11 NOTE — PROGRESS NOTES
Surgical Oncology Follow Up  [facility]  [facility] [address]  [facility] [address]    Yemi Chavez  1945  [id number]      No chief complaint on file  Assessment & Plan:   79-year-old female with a right breast cancer s/p breast conservation surgery and completion radiation she is here for follow-up  She is feeling hardness around lumpectomy and radiation site  Quick ultrasound in the office does not demonstrate any fluid collection  Well-healed surgical site  We will obtain a right breast ultrasound and bring her to reevaluate  Cancer History:     Oncology History   Malignant neoplasm of overlapping sites of right breast in female, estrogen receptor negative ([facility] [address] )   3/25/2022 Initial Diagnosis    Malignant neoplasm of right female breast ([facility] [address] )     3/25/2022 Biopsy    Right breast ultrasound guided biopsy  12 o'clock 5 cm from nipple  Invasive breast carcinoma of no special type (ductal NST/ invasive ductal carcinoma with apocrine features)  Grade 2  ER 0  ME 0  HER 2 2+   FISH negative  Lymphovascular invasion not identified    Concordant  Malignancy is unifocal  Mass measures 1 4 cm on mammogram and 1 9 cm on ultrasound  Right axilla ultrasound clear  Left brest clear  Amanda  reflector placed  In situ compononent: 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  4/4/2022 -  Cancer Staged    Staging form: Breast, AJCC 8th Edition  - Clinical stage from 4/4/2022: Stage IB (cT1c, cN0, cM0, G2, ER-, ME-, HER2-) - Signed by Kurt Leiva MD on 4/4/2022  Stage prefix: Initial diagnosis  Nuclear grade: G2  Histologic grading system: 3 grade system  HER2-IHC interpretation: Equivocal  HER2-IHC value: Score 2+  HER2-FISH interpretation: Negative       4/4/2022 Genetic Testing    [facility]  A total of 36 genes were evaluated, including: GILL, BRCA1, BRCA2, CDH1, CHEK2, PALB2, PTEN, STK11, TP53  Negative result  No pathogenic sequence variants or deletions/duplications identified     4/28/2022 Surgery    Right breast lexy  directed lumpectomy with sentinel lymph node biopsy and axillary dissection  Invasive ductal carcinoma with apocrine features  Grade 3  1 4 cm  0/4 Lymph nodes       6/14/2022 - 6/15/2022 Chemotherapy    Pegfilgrastim-bmez (ZIEXTENZO), 6 mg, Subcutaneous, Once, 1 of 4 cycles  Administration: 6 mg (6/15/2022)  cyclophosphamide (CYTOXAN) IVPB, 450 mg/m2 = 778 mg (75 % of original dose 600 mg/m2), Intravenous, Once, 1 of 4 cycles  Dose modification: 450 mg/m2 (75 % of original dose 600 mg/m2, Cycle 1, Reason: Dose Not Tolerated)  Administration: 778 mg (6/14/2022)  DOCEtaxel (TAXOTERE) chemo infusion, 56 25 mg/m2 = 97 4 mg (75 % of original dose 75 mg/m2), Intravenous, Once, 1 of 4 cycles  Dose modification: 56 25 mg/m2 (75 % of original dose 75 mg/m2, Cycle 1, Reason: Dose Not Tolerated)  Administration: 97 4 mg (6/14/2022)     11/17/2022 - 12/15/2022 Radiation    Treatments:  Course: C1  Plan ID Energy Fractions Dose per Fraction (cGy) Dose Correction (cGy) Total Dose Delivered (cGy) Elapsed Days   R Breast Hypo 6X 15 / 15 267 0 4,005 21   R BrstBst 6X 10X/6X 5 / 5 200 0 1,000 6    Treatment Dates:  11/17/2022 - 12/15/2022  Interval History:   Follow-up with right breast cancer    Review of Systems:   Review of Systems   Constitutional: Negative for chills and fever  HENT: Negative for ear pain and sore throat  Eyes: Negative for pain and visual disturbance  Respiratory: Negative for cough and shortness of breath  Cardiovascular: Negative for chest pain and palpitations  Gastrointestinal: Negative for abdominal pain and vomiting  Genitourinary: Negative for dysuria and hematuria  Musculoskeletal: Negative for arthralgias and back pain  Skin: Negative for color change and rash  Neurological: Negative for seizures and syncope     All other systems reviewed and are negative  Past Medical History     Patient Active Problem List   Diagnosis   • Anxiety   • Aortoiliac occlusive disease ([facility] )   • Carotid artery plaque, bilateral   • Centrilobular emphysema ([facility] )   • Hyperlipidemia   • Osteoporosis   • Restless leg syndrome   • Subclavian artery stenosis, left (HCC)   • PVD (peripheral vascular disease) ([facility] )   • Chronic sinusitis   • Pancreatic mass   • BMI 34 0-34 9,adult   • Seborrheic keratoses   • Stage 3 chronic kidney disease ([facility] )   • Closed avulsion fracture of lateral malleolus of right fibula   • Prediabetes   • Vitamin D deficiency   • Acute right ankle pain   • COPD (chronic obstructive pulmonary disease) ([facility]   • Malignant neoplasm of overlapping sites of right breast in female, estrogen receptor negative ([facility] )   • Continuous opioid dependence ([facility] )   • Cellulitis   • Diverticulitis   • Oral candidiasis   • Chemotherapy induced neutropenia ([facility])   • Diarrhea   • Proctocolitis   • Transaminitis   • Generalized weakness   • C  difficile colitis   • History of lumpectomy of right breast     Past Medical History:   Diagnosis Date   • Anxiety    • Atherosclerosis of native artery of both lower extremities with intermittent claudication ([facility] ) 10/15/2015    Transitioned From: Cardiovascular Symptoms   • Breast cancer ([facility] )    • Carotid artery plaque, bilateral 03/21/2017    Transitioned From:  Atherosclerosis of both carotid arteries   • Chronic kidney disease    • Chronic sinusitis     Last assessed: 11/11/13   • COPD (chronic obstructive pulmonary disease) ([facility] )    • COVID     12/25/21 not hopsitalized- flu-like symptoms   • Fall 06/16/2021   • Fracture, ankle closed, bimalleolar, right, initial encounter 06/2021   • GERD (gastroesophageal reflux disease)    • Hyperlipidemia    • Lung nodule    • PNA (pneumonia)    • PONV (postoperative nausea and vomiting)     with C-sections   • Pulmonary emphysema ([facility] )    • PVD (peripheral vascular disease) ([facility] )    • Restless leg syndrome    • Stage 3 chronic kidney disease ([facility] ) 2019   • Tobacco abuse      Past Surgical History:   Procedure Laterality Date   • BREAST LUMPECTOMY Right 2022    Procedure: ISAI  DIRECTED LUMPECTOMY;  Surgeon: Sandra Hernandez MD;  Location: [facility];  Service: Surgical Oncology   • CATARACT EXTRACTION     •  SECTION     • COLONOSCOPY      Complete  Resolved: 13   • DESCENDING AORTIC ANEURYSM REPAIR W/ STENT  2019   • IR AORTAGRAM WITH RUN-OFF  8/15/2019   • LYMPH NODE BIOPSY Right 2022    Procedure: LYMPHATIC MAPPING WITH BLUE AND RADIOACTIVE DYES, SENTINEL LYMPH NODE BIOPSY, INJECTION IN OR BY DR RODRÍGUEZ AT 1300;  Surgeon: Sandra Hernandez MD;  Location: [facility];  Service: Surgical Oncology   • SHOULDER SURGERY      For frozen shoulder    • TONSILLECTOMY     • US BREAST ISAI  NEEDLE LOC RIGHT Right 3/25/2022   • US GUIDED BREAST BIOPSY RIGHT COMPLETE Right 3/25/2022     Family History   Problem Relation Age of Onset   • No Known Problems Mother    • No Known Problems Father    • Arthritis Sister    • Pancreatic cancer Sister 61   • Melanoma Brother         twice   • Prostate cancer Brother    • Heart attack Family         Acute Myocardial Infarction   • Cirrhosis Family    • Prostate cancer Family    • Skin cancer Family    • Stroke Family         Syndrome   • No Known Problems Daughter    • No Known Problems Maternal Grandmother    • No Known Problems Paternal Grandmother    • No Known Problems Sister    • No Known Problems Maternal Aunt    • Breast cancer Other 27     Social History     Socioeconomic History   • Marital status:       Spouse name: Not on file   • Number of children: 2   • Years of education: Not on file   • Highest education level: Not on file   Occupational History   • Occupation: Retired/   Tobacco Use   • Smoking status: Former     Packs/day: 2 00     Years: 56 00     Pack years: 112 00     Types: Cigarettes     Start date: 1962     Quit date: 2018     Years since quittin 6   • Smokeless tobacco: Never   Vaping Use   • Vaping Use: Never used   Substance and Sexual Activity   • Alcohol use: Yes     Comment: occasionally    • Drug use: No   • Sexual activity: Not Currently     Partners: Male   Other Topics Concern   • Not on file   Social History Narrative    Living w/adult children    No advance directive on file     Social Determinants of Health     Financial Resource Strain: Not on file   Food Insecurity: Not on file   Transportation Needs: No Transportation Needs   • Lack of Transportation (Medical): No   • Lack of Transportation (Non-Medical):  No   Physical Activity: Not on file   Stress: Not on file   Social Connections: Not on file   Intimate Partner Violence: Not on file   Housing Stability: Low Risk    • Unable to Pay for Housing in the Last Year: No   • Number of Places Lived in the Last Year: 1   • Unstable Housing in the Last Year: No       Current Outpatient Medications:   •  albuterol (2 5 mg/3 mL) 0 083 % nebulizer solution, Take 1 vial (2 5 mg total) by nebulization every 6 (six) hours as needed for wheezing or shortness of breath, Disp: 360 mL, Rfl: 3  •  albuterol (PROVENTIL HFA,VENTOLIN HFA) 90 mcg/act inhaler, Inhale 2 puffs every 6 (six) hours as needed for wheezing, Disp: 3 Inhaler, Rfl: 3  •  ALPRAZolam (XANAX) 0 5 mg tablet, Take 1 tablet (0 5 mg total) by mouth 2 (two) times a day as needed for anxiety, Disp: 20 tablet, Rfl: 0  •  ALPRAZolam (XANAX) 0 5 mg tablet, Take 1 tablet (0 5 mg total) by mouth 2 (two) times a day as needed for anxiety, Disp: 6 tablet, Rfl: 0  •  ASPIRIN 81 PO, Take by mouth, Disp: , Rfl:   •  atorvastatin (LIPITOR) 40 mg tablet, , Disp: , Rfl:   •  Breo Ellipta 100-25 MCG/INH inhaler, inhale 1 puff daily Rinse mouth after use, Disp: 180 blister, Rfl: 1  •  dicyclomine (BENTYL) 10 mg capsule, Take 1 capsule (10 mg total) by mouth 3 (three) times a day before meals (Patient not taking: Reported on 10/28/2022), Disp: 30 capsule, Rfl: 0  •  ergocalciferol (VITAMIN D2) 50,000 units, take 1 capsule by mouth every week, Disp: 12 capsule, Rfl: 0  •  famotidine (PEPCID) 20 mg tablet, Take 1 tablet (20 mg total) by mouth daily, Disp: , Rfl: 0  •  fluticasone (FLONASE) 50 mcg/act nasal spray, 2 sprays into each nostril daily, Disp: 11 1 mL, Rfl: 1  •  gabapentin (NEURONTIN) 300 mg capsule, take 1 capsule by mouth twice a day, Disp: 180 capsule, Rfl: 0  •  gabapentin (NEURONTIN) 400 mg capsule, take 1 capsule by mouth at bedtime, Disp: 90 capsule, Rfl: 1  •  mometasone (ELOCON) 0 1 % cream, Apply topically daily as needed (rash), Disp: 50 g, Rfl: 1  •  naloxone (NARCAN) 4 mg/0 1 mL nasal spray, Administer 1 spray into a nostril  If no response after 2-3 minutes, give another dose in the other nostril using a new spray   (Patient not taking: Reported on 1/11/2023), Disp: 1 each, Rfl: 1  •  olopatadine (PATANOL) 0 1 % ophthalmic solution,   (Patient not taking: Reported on 1/11/2023), Disp: , Rfl: 0  •  ondansetron (ZOFRAN) 8 mg tablet, Take 1 tablet (8 mg total) by mouth every 8 (eight) hours as needed for nausea or vomiting, Disp: 20 tablet, Rfl: 2  •  oxyCODONE-acetaminophen (Percocet) 5-325 mg per tablet, Take 1 tablet by mouth every 6 (six) hours as needed for moderate pain Max Daily Amount: 4 tablets (Patient not taking: Reported on 1/11/2023), Disp: 20 tablet, Rfl: 0  •  rosuvastatin (CRESTOR) 20 MG tablet, Take 1 tablet (20 mg total) by mouth daily, Disp: 90 tablet, Rfl: 6  •  saccharomyces boulardii (FLORASTOR) 250 mg capsule, Take 1 capsule (250 mg total) by mouth 2 (two) times a day, Disp: , Rfl: 0  •  sodium chloride () 2 % hypertonic ophthalmic solution, Sue 128 2 % eye drops, Disp: , Rfl:   Allergies   Allergen Reactions   • Tiotropium Bromide Monohydrate Shortness Of Breath   • Augmentin [Amoxicillin-Pot Clavulanate] Abdominal Pain     Pt received ceftriaxone 1 g IV on 5-21-18, gets sharp pains    • Tiotropium Abdominal Pain   • Tylenol With Codeine #3 [Acetaminophen-Codeine] Abdominal Pain   • Other Other (See Comments)       Physical Exam:     Vitals:    01/11/23 1100   BP: 126/74   Pulse: 99   Resp: 18   Temp: (!) 97 1 °F (36 2 °C)   SpO2: 94%     Physical Exam  Constitutional:       Appearance: Normal appearance  HENT:      Head: Normocephalic and atraumatic  Nose: Nose normal       Mouth/Throat:      Mouth: Mucous membranes are moist    Eyes:      Pupils: Pupils are equal, round, and reactive to light  Cardiovascular:      Rate and Rhythm: Normal rate  Pulses: Normal pulses  Heart sounds: Normal heart sounds  Pulmonary:      Effort: Pulmonary effort is normal       Breath sounds: Normal breath sounds  Chest:          Comments: Well-healed right breast incision and axillary incision  No palpable mass masses  No right axillary supraclavicular palpable adenopathy  Left breast no palpable mass masses nipple discharge nipple retraction or skin changes  Abdominal:      General: Bowel sounds are normal       Palpations: Abdomen is soft  Musculoskeletal:         General: Normal range of motion  Cervical back: Normal range of motion and neck supple  Skin:     General: Skin is warm  Neurological:      General: No focal deficit present  Mental Status: She is alert and oriented to person, place, and time  Psychiatric:         Mood and Affect: Mood normal          Behavior: Behavior normal          Thought Content: Thought content normal          Judgment: Judgment normal            Results & Discussion:   We did discuss in detail the further follow-up and management  We will obtain right breast ultrasound and see his  She was told to call us with any questions or concerns in the interim she understand and agree to do so she understands and  agrees   All patient questions were answered         Advance Care Planning/Advance Directives: Luther Claros MD discussed the disease status with Miriam Wagner  today 01/11/23  treatment plans and follow-up with the patient

## 2023-01-11 NOTE — PROGRESS NOTES
ENCOUNTERED PROVIDER: Aroldo Juarez      PROVIDER LOCATED AT:    [facility]    REASON FOR THE VISIT:   Follow-up for management of breast cancer, on active chemotherapy    Son: George    ECOG PS: 3 (sits down more than 1/2 a day, uses a walker)  AJCC 8th Edition Cancer Stage :    Cancer Staging  Malignant neoplasm of overlapping sites of right breast in female, estrogen receptor negative ([address] )  Staging form: Breast, AJCC 8th Edition  - Clinical stage from 4/4/2022: Stage IB (cT1c, cN0, cM0, G2, ER-, ID-, HER2-) - Signed by Jatinder Briceno MD on 4/4/2022  Stage prefix: Initial diagnosis  Nuclear grade: G2  Histologic grading system: 3 grade system  HER2-IHC interpretation: Equivocal  HER2-IHC value: Score 2+  HER2-FISH interpretation: Negative      ASSESSMENT AND PLAN:    1  Right breast invasive ductal carcinoma, initially diagnosed in March 25, 2022, ER/ID negative   HER-2 equivocal by [facility] but negative by FISH  The tumor measures 1 9 x 1 6 x 1 0 cm on ultrasound   No clinical suspicious lymphadenopathy  [id number], Grade 2    status post right lumpectomy with lymph node dissection on April 28,2022  Overall, the patient tolerated the surgery very well  All margins negative  All lymph nodes negative  Final pathologic stage pT1c pN0  Dr Nina Post recommended adjuvant chemotherapy with TC for 4 cycles with Neulasta,  followed by adjuvant radiation therapy   Because of her age, and other medical comorbidities including COPD, her chemo dose was reduced by 25%, she was then admitted for a week for diverticulitis, sepsis  She had decided to d/c all further chemo and proceed to RT which I don't think is unreasonable  I will continue to follow along and defer to Surg -Onc to get mammograms ordered  2  Cellulitis in surgical site: improved s/p Cipro  3  Diarrhea: 2/2 diverticulitis and Abx  Ongoing, and watching closely  Has imodium she started taking today  4   Inherited gene predisposition:  Family history of pancreatic cancer involving her sister and brother  Farshad Alcantara also Susanne Salas niece was diagnosed breast cancer and tested for BRCA mutation positive   Status post bilateral mastectomy   Patient also has a stable mass in the head of pancreas    invitae breast cancer stat panel negative  5  Bone health/osteoporosis   Last DEXA scan in February 15, 2022   Patient takes calcium, vitamin-D , doing exercise   Patient was previously on Prolia every 6 months  DEXA scan every 2 years (next due 2/2024) then can reassess if she needs more  6  Pancreatic mass that has been monitored by Dr Glory Alvarado   CT scan 11/30/2022 showed stable pancreatic head lesion measuring 1 6 x 1 6 cm   Repeat CT scan as per Dr Glory Alvarado  7  CKD stage 3   Self hydration is recommended   Follow-up with primary care physician  8  Follow-up: 12 months    Discussion of decision making    I personally reviewed the following lab results, the image studies, pathology, other specialty/physicians consult notes and recommendations, and outside medical records from [facility]  I had a lengthy discussion with the patient and shared the work-up findings  We discussed the diagnosis and management plan as below  I spent 33 minutes reviewing the records (labs, clinician notes, outside records, medical history, ordering medicine/tests/procedures, interpreting the imaging/labs previously done) and coordination of care as well as direct time with the patient today, of which greater than 50% of the time was spent in counseling and coordination of care with the patient/family  Heather Valle MD  Hematology-Oncology Staff Physician     HEMATOLOGY/ONCOLOGY HISTORY:     · November 24, 2021 CT scan chest abdomen pelvis showed a stable pancreatic head mass lesion no intra-abdominal abdomen are not see identified   No evidence of metastatic disease   Fatty liver    · February 15, 2022 patient had screening mammogram:  RIGHT  A) MASS: There is a high density, irregularly shaped mass seen in the right breast at 11 o'clock in the anterior depth       Left  There are no suspicious masses, grouped microcalcifications or areas of unexplained architectural distortion  The skin and nipple areolar complex are unremarkable     · February 15, 2022 DEXA scan:  Osteoporosis in the left femoral neck  · March 17, 2022 diagnostic mammogram and ultrasound:  RIGHT  A) MASS  Mammo diagnostic right w 3d & cad: There is a 14 mm x 10 mm x 9 mm high density, irregularly shaped mass seen in the right breast at 12 o'clock, 5 cm from the nipple, anterior depth   This corresponds with the mass seen on screening mammogram and is new compared to the mammogram from 2019  US breast right limited (diagnostic):  At 12 o'clock, 5 cm from nipple there is an irregular hypoechoic solid mass which measures 19 x 16 x 10 mm (see the oblique images at the end of the ultrasound study)   The mass correlates with the mass seen on the mammogram   This is highly suggestive of malignancy   Appropriate action should be taken   Ultrasound-guided core needle biopsy is recommended  B) MASS  Mammo diagnostic right w 3d & cad: There is a 5 mm oval mass with circumscribed margins seen in the upper outer quadrant of the right breast in the anterior depth  This is a new finding  US breast right limited (diagnostic): There is a 5 mm x 4 mm x 2 mm oval, parallel, anechoic mass with circumscribed margins in the right breast at 10 o'clock, 5 cm from the nipple  The cyst correlates with the mass seen on the mammogram and is benign in appearance  Axilla: Targeted ultrasound of the right axilla was performed   At least 1 morphologically benign lymph node was visualized   No morphologically abnormal lymph nodes were visualized      IMPRESSION:     Right breast:  1   Mass at 12 o'clock, 5 cm from the nipple is highly suggestive of malignancy   Appropriate action should be taken   Ultrasound-guided core needle biopsy is recommended   I discussed the imaging findings and my level of concern with the patient   She met with the nurse navigator to schedule the procedure  2  Incidentally seen is a 5 mm simple cyst in the right breast at 10 o'clock, 5 cm from the nipple   This is benign in appearance and does not require dedicated follow-up imaging  3  Ultrasound of the right axilla was performed and no abnormal lymph nodes were seen     · March 25, 2022 ultrasound-guided biopsy of right breast mass:  Final Diagnosis   A  Right breast, 12:00, 5 cm from nipple (ultrasound-guided 12 gauge core biopsy, 4 passes):     - Invasive breast carcinoma of no special type (ductal NST/invasive ductal carcinoma with apocrine features)         -- Tumor present in at least 3 tissue cores with largest measurable size of 6 mm        -- mBR Grade:  Grade 2 of 3         - Duct formation:      Score 3 of 3         - Nuclear grade:       Score 3 of 3         - Mitotic rate:            Score 1 of 3     - In situ component: Favor 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     - Lymphovascular invasion:  Not definitively identified         Addendum   Test Description                                     Result                            Prognostic Interpretation  Estrogen Receptor (clone SP-1)              0%                                  Negative              Internal control: Positive   External control: Positive                          Trae Score*:  0                                                         Progesterone Receptor (clone 1E2)         0%                                  Negative              Internal control: Positive                         External control: Positive                          Trae Score*:  0     HER2 by IHC (clone 4B5)                         2+                                  Equivocal (FISH pending)         ·  April 28, 2022 right breast lumpectomy with lymph node dissection      Final Diagnosis A  [place] Lymph Node, Right Axillary (#1), Biopsy:  - One lymph node, negative for carcinoma (0/1)  - AE1/AE3 immunohistochemistry supports the diagnosis      B  Lymph Nodes, Right Axillary, Dissection:  - Two lymph nodes, negative for carcinoma (0/2)  - AE1/AE3 immunohistochemistry supports the diagnosis      C  Breast, Right New Posterior Margin, Excision:  - Benign breast tissue with fibrocystic changes      D  Breast, Right New Inferior Margin, Excision:  - Benign breast tissue with fibrocystic changes      E  Breast, Right New Medial Margin, Excision:  - Benign breast tissue with fibrocystic changes      F  Breast, Right New Superior Margin, Excision:  - Benign breast tissue with fibrocystic changes      G  Breast, Right New Lateral Margin, Excision:  - Benign breast tissue with fibrocystic changes      H  Breast, Right New Anterior Margin, Excision:  - Benign breast tissue with fibrocystic changes      I  Breast, Right, Lumpectomy:  - Invasive ductal carcinoma with apocrine features, Medardo Grade III (3 + 3 + 3 = 9), 14 mm in greatest dimension  See Synoptic Report and Note  - Background fibrocystic changes and prior procedural site reactive changes  - Benign skin with no specific pathologic change        [id number], triple negative  · 6/2/2020 to cellulitis and UTI, started Cipro 500 mg b i d   · 6/14/2022 TC Chemo q3 weeks x4 cycles initiated  · 6/20/2022 sepsis, diverticulitis admission to the hospital for 7 days  · 11/30/2022 CT Abd/pelv w/c: Continued stability of 1 6 x 1 8 cm nodule at the inferior aspect of the pancreatic head on image 38 of series 2  When measured in a similar fashion, lesion is stable dating back to CT performed on 4/4/2019    HISTORY OF PRESENT ILLNESS:     Kalin Velasco is a 78 y o  female with the above-noted HemOnc history who is here for management of breast cancer      Patient has right-sided breast invasive ductal carcinoma, initially diagnosed on March 25, 2022, ER and RI negative HER2 negative by fish  Status post right lumpectomy with lymph node dissection on April 28, 2022  Four cycles of adjuvant chemotherapy with TAC was scheduled  Unfortunately, patient had tooth extraction on June 1, 2022  She also complained of burning sensation and urgency when urinating  Patient was taking amoxicillin which did not help  Interval events: NO change to her O2 status or SOB  No longer having diarrhea as her C diff has been treated  She is using a walker to ambulate at the rehab and is still regaining strength  She is using 3L O2 via NC all the time now since hospital discharge  ROS: A 10-point of review of systems is obtained and other than the above is noncontributory  Objective  Vitals:    01/19/23 1336   BP: 118/70   Pulse: 103   Resp: 18   Temp: 97 5 °F (36 4 °C)   SpO2: 97%         PHYSICIAN EXAM:    General: Appearance: alert, cooperative, no distress  HEENT: Normocephalic, atraumatic  No scleral icterus  conjunctivae clear  EOMI  Chest: No tenderness to palpation  No open wound noted  Lungs: Significantly diminished B/S in the upper lobes b/l, no crackles/rales/wheezing, Respirations unlabored  +O2 via NC, no accessory muscle use  Cardiac: Regular rate, +S1and S2  Abdomen: Soft, non-tender, non-distended  Bowel sounds are normal   Extremities:  No edema, cyanosis, clubbing  Skin: Skin color, turgor are normal  No rashes  Lymphatics: no palpable supra-cervical, axillary, or inguinal adenopathy  Neurologic: Awake, Alert, and oriented, no gross focal deficits noted b/l       Allergies   Allergen Reactions   • Tiotropium Bromide Monohydrate Shortness Of Breath   • Augmentin [Amoxicillin-Pot Clavulanate] Abdominal Pain     Pt received ceftriaxone 1 g IV on 5-21-18, gets sharp pains    • Tiotropium Abdominal Pain   • Tylenol With Codeine #3 [Acetaminophen-Codeine] Abdominal Pain   • Other Other (See Comments)       Past Medical History:   Diagnosis Date   • Anxiety    • Atherosclerosis of native artery of both lower extremities with intermittent claudication ([facility] [address] ) 10/15/2015    Transitioned From: Cardiovascular Symptoms   • Breast cancer [facility])    • Carotid artery plaque, bilateral 2017    Transitioned From: Atherosclerosis of both carotid arteries   • Chronic kidney disease    • Chronic sinusitis     Last assessed: 13   • COPD (chronic obstructive pulmonary disease) ([facility] [address] )    • COVID     21 not hopsitalized- flu-like symptoms   • Fall 2021   • Fracture, ankle closed, bimalleolar, right, initial encounter 2021   • GERD (gastroesophageal reflux disease)    • Hyperlipidemia    • Lung nodule    • PNA (pneumonia)    • PONV (postoperative nausea and vomiting)     with C-sections   • Pulmonary emphysema (HCC)    • PVD (peripheral vascular disease) ([facility])    • Restless leg syndrome    • Stage 3 chronic [facility] disease [facility] ) 2019   • Tobacco abuse        Past Surgical History:   Procedure Laterality Date   • BREAST LUMPECTOMY Right 2022    Procedure: ISAI  DIRECTED LUMPECTOMY;  Surgeon: Rajeev Land MD;  Location: [facility];  Service: Surgical Oncology   • CATARACT EXTRACTION     •  SECTION     • COLONOSCOPY      Complete   Resolved: 13   • DESCENDING AORTIC ANEURYSM REPAIR W/ STENT  2019   • IR AORTAGRAM WITH RUN-OFF  8/15/2019   • LYMPH NODE BIOPSY Right 2022    Procedure: LYMPHATIC MAPPING WITH BLUE AND RADIOACTIVE DYES, SENTINEL LYMPH NODE BIOPSY, INJECTION IN OR BY DR RODRÍGUEZ AT 1300;  Surgeon: Rajeev Land MD;  Location: [facility];  Service: Surgical Oncology   • SHOULDER SURGERY      For frozen shoulder    • TONSILLECTOMY     • US BREAST ISAI  NEEDLE LOC RIGHT Right 3/25/2022   • US GUIDED BREAST BIOPSY RIGHT COMPLETE Right 3/25/2022       Family History   Problem Relation Age of Onset   • No Known Problems Mother    • No Known Problems Father    • Arthritis Sister    • Pancreatic cancer Sister 61   • Melanoma Brother         twice   • Prostate cancer Brother    • Heart attack Family         Acute Myocardial Infarction   • Cirrhosis Family    • Prostate cancer Family    • Skin cancer Family    • Stroke Family         Syndrome   • No Known Problems Daughter    • No Known Problems Maternal Grandmother    • No Known Problems Paternal Grandmother    • No Known Problems Sister    • No Known Problems Maternal Aunt    • Breast cancer Other 27       Social History     Socioeconomic History   • Marital status:      Spouse name: Not on file   • Number of children: 2   • Years of education: Not on file   • Highest education level: Not on file   Occupational History   • Occupation: Retired/   Tobacco Use   • Smoking status: Former     Packs/day: 2 00     Years: 56 00     Pack years: 112 00     Types: Cigarettes     Start date: 1962     Quit date: 2018     Years since quittin 6   • Smokeless tobacco: Never   Vaping Use   • Vaping Use: Never used   Substance and Sexual Activity   • Alcohol use: Yes     Comment: occasionally    • Drug use: No   • Sexual activity: Not Currently     Partners: Male   Other Topics Concern   • Not on file   Social History Narrative    Living w/adult children    No advance directive on file     Social Determinants of Health     Financial Resource Strain: Not on file   Food Insecurity: Not on file   Transportation Needs: No Transportation Needs   • Lack of Transportation (Medical): No   • Lack of Transportation (Non-Medical):  No   Physical Activity: Not on file   Stress: Not on file   Social Connections: Not on file   Intimate Partner Violence: Not on file   Housing Stability: Low Risk    • Unable to Pay for Housing in the Last Year: No   • Number of Places Lived in the Last Year: 1   • Unstable Housing in the Last Year: No       Current Outpatient Medications   Medication Sig Dispense Refill   • albuterol (2 5 mg/3 mL) 0 083 % nebulizer solution Take 1 vial (2 5 mg total) by nebulization every 6 (six) hours as needed for wheezing or shortness of breath 360 mL 3   • albuterol (PROVENTIL HFA,VENTOLIN HFA) 90 mcg/act inhaler Inhale 2 puffs every 6 (six) hours as needed for wheezing 3 Inhaler 3   • ALPRAZolam (XANAX) 0 5 mg tablet Take 1 tablet (0 5 mg total) by mouth 2 (two) times a day as needed for anxiety 45 tablet 0   • ASPIRIN 81 PO Take by mouth     • ergocalciferol (VITAMIN D2) 50,000 units take 1 capsule by mouth every week 12 capsule 0   • famotidine (PEPCID) 20 mg tablet Take 1 tablet (20 mg total) by mouth daily  0   • fluticasone (FLONASE) 50 mcg/act nasal spray 2 sprays into each nostril daily 11 1 mL 1   • Fluticasone Furoate-Vilanterol (Breo Ellipta) 100-25 mcg/actuation inhaler Inhale 1 puff daily Rinse mouth after use  180 blister 3   • gabapentin (NEURONTIN) 300 mg capsule Take 1 capsule (300 mg total) by mouth 2 (two) times a day 180 capsule 2   • gabapentin (NEURONTIN) 400 mg capsule take 1 capsule by mouth at bedtime 90 capsule 1   • mometasone (ELOCON) 0 1 % cream Apply topically daily as needed (rash) 50 g 1   • ondansetron (ZOFRAN) 8 mg tablet Take 1 tablet (8 mg total) by mouth every 8 (eight) hours as needed for nausea or vomiting 20 tablet 2   • rosuvastatin (CRESTOR) 20 MG tablet Take 1 tablet (20 mg total) by mouth daily 90 tablet 6   • saccharomyces boulardii (FLORASTOR) 250 mg capsule Take 1 capsule (250 mg total) by mouth 2 (two) times a day  0   • sodium chloride () 2 % hypertonic ophthalmic solution Sue 128 2 % eye drops     • atorvastatin (LIPITOR) 40 mg tablet  (Patient not taking: Reported on 1/11/2023)     • naloxone (NARCAN) 4 mg/0 1 mL nasal spray Administer 1 spray into a nostril  If no response after 2-3 minutes, give another dose in the other nostril using a new spray   (Patient not taking: Reported on 1/11/2023) 1 each 1   • olopatadine (PATANOL) 0 1 % ophthalmic solution   (Patient not taking: Reported on 1/11/2023)  0     No current facility-administered medications for this visit  DATA REVIEW:    Pathology Result:    Final Diagnosis   Date Value Ref Range Status   04/28/2022   Final    A  [place] Lymph Node, Right Axillary (#1), Biopsy:  - One lymph node, negative for carcinoma (0/1)  - AE1/AE3 immunohistochemistry supports the diagnosis  B  Lymph Nodes, Right Axillary, Dissection:  - Two lymph nodes, negative for carcinoma (0/2)  - AE1/AE3 immunohistochemistry supports the diagnosis  C  Breast, Right New Posterior Margin, Excision:  - Benign breast tissue with fibrocystic changes  D  Breast, Right New Inferior Margin, Excision:  - Benign breast tissue with fibrocystic changes  E  Breast, Right New Medial Margin, Excision:  - Benign breast tissue with fibrocystic changes  F  Breast, Right New Superior Margin, Excision:  - Benign breast tissue with fibrocystic changes  G  Breast, Right New Lateral Margin, Excision:  - Benign breast tissue with fibrocystic changes  H  Breast, Right New Anterior Margin, Excision:  - Benign breast tissue with fibrocystic changes  I  Breast, Right, Lumpectomy:  - Invasive ductal carcinoma with apocrine features, Medardo Grade III (3 + 3 + 3 = 9), 14 mm in greatest dimension  See Synoptic Report and Note  - Background fibrocystic changes and prior procedural site reactive changes  - Benign skin with no specific pathologic change  03/25/2022   Final    A  Right breast, 12:00, 5 cm from nipple (ultrasound-guided 12 gauge core biopsy, 4 passes):     - Invasive breast carcinoma of no special type (ductal NST/invasive ductal carcinoma with apocrine features)  -- Tumor present in at least 3 tissue cores with largest measurable size of 6 mm         -- mBR Grade:  Grade 2 of 3         - Duct formation: Score 3 of 3         - Nuclear grade: Score 3 of 3         - Mitotic rate: Score 1 of 3     - In situ component: Favor 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     - Lymphovascular invasion:  Not definitively identified    Comment:  Block A2 forwarded for ER/[place]/HER2 analysis with the results to be given in a supplemental report  Comment: This is an appended report  These results have been appended to a previously preliminary verified report  05/28/2019   Final    A  Esophagus, biopsy:             - Reactive squamous mucosa  - Benign oxyntic mucosa  - No intestinal metaplasia, dysplasia or malignancy is identified  Interpretation performed at , [address] [place] [address]           Image Results: They are reviewed and documented in Hematology/Oncology history    CT abdomen pelvis with contrast  Narrative: CT ABDOMEN AND PELVIS WITH IV CONTRAST    INDICATION:   Abdominal pain, acute, nonlocalized  abd pain, diarrhea, h/o c diff  COMPARISON:  CT dated 10/15/2022  MRI dated 5/15/2019  TECHNIQUE:  CT examination of the abdomen and pelvis was performed  Axial, sagittal, and coronal 2D reformatted images were created from the source data and submitted for interpretation  Radiation dose length product (DLP) for this visit:  588 mGy-cm   This examination, like all CT scans performed in the [place], was performed utilizing techniques to minimize radiation dose exposure, including the use of iterative   reconstruction and automated exposure control  IV Contrast:  100 mL of iohexol (OMNIPAQUE)  Enteric Contrast:  Enteric contrast was not administered  FINDINGS:    ABDOMEN    LOWER CHEST:  Emphysematous changes  LIVER/BILIARY TREE:  Stable subcentimeter cysts in the lateral segment of the liver  Fatty infiltration  No biliary dilatation  GALLBLADDER:  No calcified gallstones  No pericholecystic inflammatory change  SPLEEN:  Unremarkable      PANCREAS: Continued stability of 1 6 x 1 8 cm nodule at the inferior aspect of the pancreatic head on image 38 of series 2  When measured in a similar fashion, lesion is stable dating back to CT performed on 4/4/2019  Pancreas is otherwise   unremarkable  ADRENAL GLANDS:  Unremarkable  KIDNEYS/URETERS:  Stable 3 cm left renal cyst   No obstructing stone or hydronephrosis     Stable small nonobstructing renal calcifications that are favored to be vascular  STOMACH AND BOWEL: Decreased colonic thickening  Mild residual thickening versus underdistention of the descending and rectosigmoid colon  Diverticulosis without diverticulitis  No obstruction  APPENDIX:  A normal appendix was visualized  ABDOMINOPELVIC CAVITY:  No ascites  No pneumoperitoneum  No lymphadenopathy  VESSELS:  Atherosclerotic changes are present  No evidence of aneurysm  PELVIS    REPRODUCTIVE ORGANS:  Unremarkable for patient's age  URINARY BLADDER:  Unremarkable  ABDOMINAL WALL/INGUINAL REGIONS:  Small fat-containing umbilical hernia  OSSEOUS STRUCTURES:  No acute fracture or destructive osseous lesion  Impression: Colitis has improved  Mild residual thickening versus underdistention  Other stable findings as above  Workstation performed: [id number]        LABS:  Lab data are reviewed and documented in HemOnc history  No results found for this or any previous visit (from the past 48 hour(s))

## 2023-01-12 ENCOUNTER — TELEPHONE (OUTPATIENT)
Dept: INTERNAL MEDICINE CLINIC | Facility: CLINIC | Age: 78
End: 2023-01-12

## 2023-01-12 ENCOUNTER — OFFICE VISIT (OUTPATIENT)
Dept: INTERNAL MEDICINE CLINIC | Facility: CLINIC | Age: 78
End: 2023-01-12

## 2023-01-12 VITALS
BODY MASS INDEX: 29.53 KG/M2 | HEART RATE: 82 BPM | OXYGEN SATURATION: 92 % | SYSTOLIC BLOOD PRESSURE: 134 MMHG | HEIGHT: 60 IN | TEMPERATURE: 99 F | WEIGHT: 150.4 LBS | DIASTOLIC BLOOD PRESSURE: 78 MMHG

## 2023-01-12 DIAGNOSIS — J44.9 CHRONIC OBSTRUCTIVE PULMONARY DISEASE, UNSPECIFIED COPD TYPE (HCC): ICD-10-CM

## 2023-01-12 DIAGNOSIS — Z17.1 MALIGNANT NEOPLASM OF OVERLAPPING SITES OF RIGHT BREAST IN FEMALE, ESTROGEN RECEPTOR NEGATIVE (HCC): Primary | ICD-10-CM

## 2023-01-12 DIAGNOSIS — C50.811 MALIGNANT NEOPLASM OF OVERLAPPING SITES OF RIGHT BREAST IN FEMALE, ESTROGEN RECEPTOR NEGATIVE (HCC): Primary | ICD-10-CM

## 2023-01-12 DIAGNOSIS — F41.1 GENERALIZED ANXIETY DISORDER: ICD-10-CM

## 2023-01-12 DIAGNOSIS — Z23 ENCOUNTER FOR IMMUNIZATION: ICD-10-CM

## 2023-01-12 DIAGNOSIS — N18.31 STAGE 3A CHRONIC KIDNEY DISEASE (HCC): ICD-10-CM

## 2023-01-12 DIAGNOSIS — G25.81 RLS (RESTLESS LEGS SYNDROME): ICD-10-CM

## 2023-01-12 PROBLEM — K57.92 DIVERTICULITIS: Status: RESOLVED | Noted: 2022-06-20 | Resolved: 2023-01-12

## 2023-01-12 PROBLEM — R53.1 GENERALIZED WEAKNESS: Status: RESOLVED | Noted: 2022-09-17 | Resolved: 2023-01-12

## 2023-01-12 PROBLEM — K51.00 ULCERATIVE (CHRONIC) PANCOLITIS WITHOUT COMPLICATIONS (HCC): Status: ACTIVE | Noted: 2023-01-12

## 2023-01-12 PROBLEM — M25.571 ACUTE RIGHT ANKLE PAIN: Status: RESOLVED | Noted: 2021-09-14 | Resolved: 2023-01-12

## 2023-01-12 PROBLEM — R19.7 DIARRHEA: Status: RESOLVED | Noted: 2022-06-26 | Resolved: 2023-01-12

## 2023-01-12 PROBLEM — J96.11 CHRONIC HYPOXEMIC RESPIRATORY FAILURE (HCC): Status: ACTIVE | Noted: 2023-01-12

## 2023-01-12 PROBLEM — L03.90 CELLULITIS: Status: RESOLVED | Noted: 2022-06-02 | Resolved: 2023-01-12

## 2023-01-12 PROBLEM — F11.20 CONTINUOUS OPIOID DEPENDENCE (HCC): Status: RESOLVED | Noted: 2022-04-13 | Resolved: 2023-01-12

## 2023-01-12 RX ORDER — FLUTICASONE FUROATE AND VILANTEROL 100; 25 UG/1; UG/1
1 POWDER RESPIRATORY (INHALATION) DAILY
Qty: 180 BLISTER | Refills: 3 | Status: SHIPPED | OUTPATIENT
Start: 2023-01-12 | End: 2023-01-19 | Stop reason: SDUPTHER

## 2023-01-12 RX ORDER — GABAPENTIN 300 MG/1
300 CAPSULE ORAL 2 TIMES DAILY
Qty: 180 CAPSULE | Refills: 2 | Status: SHIPPED | OUTPATIENT
Start: 2023-01-12

## 2023-01-12 RX ORDER — ALPRAZOLAM 0.5 MG/1
0.5 TABLET ORAL 2 TIMES DAILY PRN
Qty: 45 TABLET | Refills: 0 | Status: SHIPPED | OUTPATIENT
Start: 2023-01-12

## 2023-01-12 NOTE — PROGRESS NOTES
INTERNAL MEDICINE FOLLOW-UP VISIT  [facility] - [facility]    NAME: Harrison Butterfield  AGE: 78 y o  SEX: female  : 1945     DATE: 2023     Assessment and Plan:   1  Malignant neoplasm of overlapping sites of right breast in female, estrogen receptor negative ([facility] )  Completed radiation treatment, could not tolerate chemotherapy  Continues to follow with oncology    2  Stage 3a chronic kidney disease (HCC)  Avoid nephrotoxic medications, increase water intake    3  Chronic obstructive pulmonary disease, unspecified COPD type ([facility] 75 )  On oxygen, stable  - Fluticasone Furoate-Vilanterol (Breo Ellipta) 100-25 mcg/actuation inhaler; Inhale 1 puff daily Rinse mouth after use  Dispense: 180 blister; Refill: 3    4  Generalized anxiety disorder  [place] or [place]  reviewed: No red flags were identified; safe to proceed with prescription  - ALPRAZolam (XANAX) 0 5 mg tablet; Take 1 tablet (0 5 mg total) by mouth 2 (two) times a day as needed for anxiety  Dispense: 45 tablet; Refill: 0    5  RLS (restless legs syndrome)  Stable with medication use  - gabapentin (NEURONTIN) 300 mg capsule; Take 1 capsule (300 mg total) by mouth 2 (two) times a day  Dispense: 180 capsule; Refill: 2    6  Encounter for immunization  - influenza vaccine, high-dose, PF 0 7 mL (FLUZONE HIGH-DOSE)        No follow-ups on file  Chief Complaint:     Chief Complaint   Patient presents with   • Flu Vaccine     Requesting vaccine and just recently finished with cancer treatment       History of Present Illness:     Patient is here today for follow-up  She states she has been feeling well  She recently completed her chemotherapy treatment for triple negative breast cancer  She does still have some tenderness to the right breast which she is following up with oncology for this  He is on continued oxygen and her breathing is stable      The following portions of the patient's history were reviewed and updated as appropriate: allergies, current medications, past family history, past medical history, past social history, past surgical history and problem list      Review of Systems:     Review of Systems   Constitutional: Negative for appetite change, chills, diaphoresis, fatigue, fever and unexpected weight change  HENT: Negative for postnasal drip and sneezing  Eyes: Negative for visual disturbance  Respiratory: Positive for shortness of breath  Negative for chest tightness  Cardiovascular: Negative for chest pain, palpitations and leg swelling  Gastrointestinal: Negative for abdominal pain and blood in stool  Endocrine: Negative for cold intolerance, heat intolerance, polydipsia, polyphagia and polyuria  Genitourinary: Negative for difficulty urinating, dysuria, frequency and urgency  Musculoskeletal: Negative for arthralgias and myalgias  Skin: Negative for rash and wound  Neurological: Negative for dizziness, weakness, light-headedness and headaches  Hematological: Negative for adenopathy  Psychiatric/Behavioral: Negative for confusion, dysphoric mood and sleep disturbance  The patient is not nervous/anxious  Past Medical History:     Past Medical History:   Diagnosis Date   • Anxiety    • Atherosclerosis of native artery of both lower extremities with intermittent claudication ([facility] ) 10/15/2015    Transitioned From: Cardiovascular Symptoms   • Breast cancer [facility])    • Carotid artery plaque, bilateral 03/21/2017    Transitioned From:  Atherosclerosis of both carotid arteries   • Chronic kidney disease    • Chronic sinusitis     Last assessed: 11/11/13   • COPD (chronic obstructive pulmonary disease) ([facility] )    • COVID     12/25/21 not hopsitalized- flu-like symptoms   • Fall 06/16/2021   • Fracture, ankle closed, bimalleolar, right, initial encounter 06/2021   • GERD (gastroesophageal reflux disease)    • Hyperlipidemia    • Lung nodule    • PNA (pneumonia)    • PONV (postoperative nausea and vomiting)     with C-sections   • Pulmonary emphysema (HCC)    • PVD (peripheral vascular disease) (HCC)    • Restless leg syndrome    • Stage 3 chronic kidney disease ([facility] ) 04/22/2019   • Tobacco abuse         Current Medications:     Current Outpatient Medications:   •  albuterol (2 5 mg/3 mL) 0 083 % nebulizer solution, Take 1 vial (2 5 mg total) by nebulization every 6 (six) hours as needed for wheezing or shortness of breath, Disp: 360 mL, Rfl: 3  •  albuterol (PROVENTIL HFA,VENTOLIN HFA) 90 mcg/act inhaler, Inhale 2 puffs every 6 (six) hours as needed for wheezing, Disp: 3 Inhaler, Rfl: 3  •  ALPRAZolam (XANAX) 0 5 mg tablet, Take 1 tablet (0 5 mg total) by mouth 2 (two) times a day as needed for anxiety, Disp: 45 tablet, Rfl: 0  •  ASPIRIN 81 PO, Take by mouth, Disp: , Rfl:   •  ergocalciferol (VITAMIN D2) 50,000 units, take 1 capsule by mouth every week, Disp: 12 capsule, Rfl: 0  •  famotidine (PEPCID) 20 mg tablet, Take 1 tablet (20 mg total) by mouth daily, Disp: , Rfl: 0  •  fluticasone (FLONASE) 50 mcg/act nasal spray, 2 sprays into each nostril daily, Disp: 11 1 mL, Rfl: 1  •  Fluticasone Furoate-Vilanterol (Breo Ellipta) 100-25 mcg/actuation inhaler, Inhale 1 puff daily Rinse mouth after use , Disp: 180 blister, Rfl: 3  •  gabapentin (NEURONTIN) 300 mg capsule, Take 1 capsule (300 mg total) by mouth 2 (two) times a day, Disp: 180 capsule, Rfl: 2  •  gabapentin (NEURONTIN) 400 mg capsule, take 1 capsule by mouth at bedtime, Disp: 90 capsule, Rfl: 1  •  mometasone (ELOCON) 0 1 % cream, Apply topically daily as needed (rash), Disp: 50 g, Rfl: 1  •  ondansetron (ZOFRAN) 8 mg tablet, Take 1 tablet (8 mg total) by mouth every 8 (eight) hours as needed for nausea or vomiting, Disp: 20 tablet, Rfl: 2  •  rosuvastatin (CRESTOR) 20 MG tablet, Take 1 tablet (20 mg total) by mouth daily, Disp: 90 tablet, Rfl: 6  •  saccharomyces boulardii (FLORASTOR) 250 mg capsule, Take 1 capsule (250 mg total) by mouth 2 (two) times a day, Disp: , Rfl: 0  •  sodium chloride () 2 % hypertonic ophthalmic solution, Sue 128 2 % eye drops, Disp: , Rfl:   •  atorvastatin (LIPITOR) 40 mg tablet, , Disp: , Rfl:   •  naloxone (NARCAN) 4 mg/0 1 mL nasal spray, Administer 1 spray into a nostril  If no response after 2-3 minutes, give another dose in the other nostril using a new spray  (Patient not taking: Reported on 1/11/2023), Disp: 1 each, Rfl: 1  •  olopatadine (PATANOL) 0 1 % ophthalmic solution,   (Patient not taking: Reported on 1/11/2023), Disp: , Rfl: 0     Allergies: Allergies   Allergen Reactions   • Tiotropium Bromide Monohydrate Shortness Of Breath   • Augmentin [Amoxicillin-Pot Clavulanate] Abdominal Pain     Pt received ceftriaxone 1 g IV on 5-21-18, gets sharp pains    • Tiotropium Abdominal Pain   • Tylenol With Codeine #3 [Acetaminophen-Codeine] Abdominal Pain   • Other Other (See Comments)        Physical Exam:     /78 (BP Location: Left arm, Patient Position: Sitting, Cuff Size: Standard)   Pulse 82   Temp 99 °F (37 2 °C) (Temporal)   Ht 5' (1 524 m)   Wt 68 2 kg (150 lb 6 4 oz)   SpO2 92%   BMI 29 37 kg/m²     Physical Exam  Constitutional:       Appearance: She is well-developed  HENT:      Head: Normocephalic and atraumatic  Eyes:      Pupils: Pupils are equal, round, and reactive to light  Neck:      Thyroid: No thyromegaly  Cardiovascular:      Rate and Rhythm: Normal rate and regular rhythm  Heart sounds: No murmur heard  Pulmonary:      Effort: Pulmonary effort is normal       Breath sounds: Examination of the right-upper field reveals decreased breath sounds  Examination of the left-upper field reveals decreased breath sounds  Examination of the right-middle field reveals decreased breath sounds  Examination of the left-middle field reveals decreased breath sounds  Examination of the right-lower field reveals decreased breath sounds   Examination of the left-lower field reveals decreased breath sounds  Decreased breath sounds present  Abdominal:      General: Bowel sounds are normal       Palpations: Abdomen is soft  Musculoskeletal:         General: Normal range of motion  Cervical back: Normal range of motion and neck supple  Lymphadenopathy:      Cervical: No cervical adenopathy  Skin:     General: Skin is warm and dry  Neurological:      Mental Status: She is alert and oriented to person, place, and time  Data:     Laboratory Results: I have personally reviewed the pertinent laboratory results/reports   Radiology/Other Diagnostic Testing Results: I have personally reviewed pertinent reports        ABIGAIL Schaeffer  [facility]

## 2023-01-12 NOTE — TELEPHONE ENCOUNTER
Patient saw Evy Kennedy today  When reading her AVS, she noticed that it says "Continuous Opioid Dependence"  She needs an explanation as to why this is in her record  What is it that she takes that is considered opioid and how does she get this removed from her record  Pt requesting call back to discuss

## 2023-01-18 DIAGNOSIS — J44.9 CHRONIC OBSTRUCTIVE PULMONARY DISEASE, UNSPECIFIED COPD TYPE (HCC): ICD-10-CM

## 2023-01-18 NOTE — PROGRESS NOTES
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)   5  Impaired fasting glucose (790 21) (R73 01)    Plan  Abdominal bruit, Impaired fasting glucose    · (1) T4, FREE; Status:Active; Requested for:17Oct2017;    · (1) TSH; Status:Active; Requested PVK:37CVM1137; Aortoiliac occlusive disease    · Aortoiliac duplex Complete; Status:Canceled;    · VAS ABDOMINAL AORTA/ILIACS; COMPLETE STUDY; Status:Canceled; Aortoiliac occlusive disease, Celiac artery stenosis, Intermittent claudication    · VAS ABDOMINAL AORTA/ILIACS; COMPLETE STUDY; Status:Canceled;   Asymptomatic bilateral carotid artery stenosis, Subclavian artery stenosis, left    · VAS CAROTID COMPLETE STUDY; SIDE:Bilateral; Status:Canceled; Atherosclerosis of both carotid arteries    · VAS CAROTID COMPLETE STUDY; SIDE:Bilateral; Status:Canceled; Atherosclerosis of native artery of both lower extremities with intermittent claudication    · Lower extremity arterial duplex Complete Bilateral; Status:Canceled;    · VAS LOWER LIMB ARTERIAL DUPLEX, COMPLETE BILATERAL/GRAFTS; SIDE:Bilateral;Status:Canceled; Health Maintenance    · COLONOSCOPY ; every 5 years; Last 01Sep2016; Next [id number]; Status:Active  Hyperlipidemia    · Atorvastatin Calcium 40 MG Oral Tablet; take 1 tablet by mouth once daily   · (1) URINALYSIS (will reflex a microscopy if leukocytes, occult blood, protein or nitrites are not withinnormal limits); Status:Canceled; Impaired fasting glucose    · (1) CBC/PLT/DIFF; Status:Active; Requested for:17Oct2017;    · (1) COMPREHENSIVE METABOLIC PANEL; Status:Active; Requested for:17Oct2017;    · (1) HEMOGLOBIN A1C; Status:Active;  Requested for:17Oct2017;    · (1) LIPID PANEL, NON FASTING W/O TRIG; Status:Active; Requested for:17Oct2017;    · (1) MICROALBUMIN CREATININE RATIO, RANDOM URINE; Status:Active; Requested for:17Oct2017;    · (1) MICROALBUMIN CREATININE RATIO, RANDOM URINE; Status:Canceled;    · (1) URINALYSIS (will reflex a microscopy if leukocytes, occult blood, protein or nitrites are not withinnormal limits); Status:Active; Requested for:17Oct2017;   Restless leg syndrome    · ROPINIRole HCl - 0 5 MG Oral Tablet; Take 1 tablet by mouth at bedtime  Subclavian artery stenosis, left    · Carotid artery duplex Bilateral; Status:Canceled; Discussion/Summary  Discussion Summary:   No immediate problems are identified  to some laboratory testing today in particular regarding sugar, cholesterol, kidney and liver function  medication changes need to be done  to follow with, in particular, ENT and Pulmonary  me again in about 6 months but call if any problems develop  History of Present Illness  HPI: A vasculopath:ultrasound documents aortic artery stenosis distally above the bifurcation secondary to atherosclerosis with the presence of plaque  Examination of the legs was unremarkable  This aortic artery stenosis is felt to be the cause of her claudication symptoms  carotid disease; bilateral plaque with less than 50% obstructionsubclavian stenosis; again recommendation is control of risk factors and avoidance of blood pressure in the left arm left celiac nonocclusive plaque without symptomsfactors are principally cigarette smoking although she does have impaired fasting glucose with hemoglobin A1c last reported at 5 8%patient has chronic lung disease, and osteoporosis   She has very occasional use of when necessary Xanax for anxiety disorder  60 tablets will go 6 months  ENT for a chronic ear discomfort right side for several months, has needed multiple courses of antibiotic and steroids but might need drainage    COPD (Follow-Up):  The patient states she has been stable with her COPD control since the last visit  She has no comorbid illnesses  Symptoms: stable dyspnea on exertion-- and-- stable coughing  Hyperlipidemia (Follow-Up): The patient states her hyperlipidemia has been stable since the last visit  Comorbid Illnesses: carotid disease,-- diabetes mellitus-- and-- peripheral vascular disease  Symptoms: improved chest pain-- and-- stable intermittent leg claudication  Carotid Artery Stenosis: There is Less than 50% % occlusion of the left carotid and Less than 50% % occlusion of the right carotid  The patient is currently asymptomatic  Intermittent Claudication (Brief): The patient is being seen for a routine clinic follow-up of intermittent claudication  The patient presents with complaints of gradual onset of frequent episodes of mild bilateral posterior lower leg pain, described as aching  Episodes started about 1 year ago  Symptoms are made worse by walking  Symptoms are unchanged  Pertinent Medical History: peripheral vascular disease  Diabetes: The patient is being seen for routine follow-up of  The HbA1c was 6 0%  The patient is not currently taking any medication for this problem  There are no known contributing risk factors or pertinent lifestyle factors  Review of Systems  Complete-Female:  Constitutional: no fever-- and-- no chills  Eyes: no eyesight problems-- and-- no purulent discharge from the eyes  ENT: no nasal discharge  Cardiovascular: no chest pain-- and-- no palpitations  Respiratory: cough-- and-- shortness of breath during exertion  Gastrointestinal: no abdominal pain-- and-- no nausea  Genitourinary: no dysuria  Musculoskeletal: arthralgias  Neurological: no headache-- and-- no fainting  Psychiatric: no anxiety-- and-- no depression  Hematologic/Lymphatic: no tendency for easy bleeding-- and-- no tendency for easy bruising  Preventive Quality 65 Older:  Preventive Quality 65 and Older: The patient currently has no urinary incontinence symptoms  Active Problems    1  Abdominal bruit (785 9) (R09 89)   2  Acute nonsuppurative otitis media of right ear (381 00) (H65 191)   3  Adhesive capsulitis of left shoulder (726 0) (M75 02)   4  Anxiety (300 00) (F41 9)   5  Aortoiliac occlusive disease (444 09) (I74 09)   6  Asymptomatic bilateral carotid artery stenosis (433 10,433 30) (I65 23)   7  Atherosclerosis of native artery of both lower extremities with intermittent claudication (440 21) (I70 213)   8  Bilateral carotid bruits (785 9) (R09 89)   9  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)   10  Chest pain (786 50) (R07 9)   11  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)   12  Encounter for colorectal cancer screening (V76 51) (Z12 11,Z12 12)   13  Flu vaccine need (V04 81) (Z23)   14  Hyperlipidemia (272 4) (E78 5)   15  Impaired fasting glucose (790 21) (R73 01)   16  Intermittent claudication (443 9) (I73 9)   17  Murmur (785 2) (R01 1)   18  Need for influenza vaccination (V04 81) (Z23)   19  Nicotine dependence (305 1) (F17 200)   20  Osteoporosis (733 00) (M81 0)   21  Preoperative clearance (V72 84) (Z01 818)   22  Restless leg syndrome (333 94) (G25 81)   23  Screening for genitourinary condition (V81 6) (Z13 89)   24  Shoulder pain (719 41) (M25 519)   25  Sinusitis (473 9) (J32 9)   26  Subclavian artery stenosis, left (447 1) (I77 1)   27  Tachycardia (785 0) (R00 0)   28  Tobacco abuse (305 1) (Z72 0)   29  Vision loss (369 9) (H54 7)    Past Medical History  1  History of Chest pain (786 50) (R07 9)   2  History of Encounter for screening mammogram for malignant neoplasm of breast (V76 12) (Z12 31)   3  History of Headache (784 0) (R51)   4  History of chronic sinusitis (V12 69) (Z87 09)   5  History of upper respiratory infection (V12 09) (Z87 09)   6  History of Limb pain (729 5) (M95 379)  Active Problems And Past Medical History Reviewed: The active problems and past medical history were reviewed and updated today  Surgical History  1   History of  Section   2  History of Complete Colonoscopy   3  History of Tonsillectomy  Surgical History Reviewed: The surgical history was reviewed and updated today  Family History  Mother    1  Family history of Mother  At Age 64  Father    2  Family history of Father  At Age 39  Sister    1  Family history of Arthritis (V17 7)   4  Family history of Carcinoma Of The Pancreas   5  Family history of Sister  At Age 79  Brother    10  Family history of Brother  At Age 76  Family History    7  Family history of Acute Myocardial Infarction (V17 3)   8  Family history of Cirrhosis   9  Family history of Prostate Cancer (V16 42)   10  Family history of Skin Cancer (V16 8)   11  Family history of Stroke Syndrome (V17 1)  Family History Reviewed: The family history was reviewed and updated today  Social History     · Alcohol Use (History)   · Cigarette smoker (305 1) (F17 210)   · Current every day smoker (305 1) (F17 200)   · Current Smoker (305 1)   · Living With Adult Children   · Marital History -    · Never Used Drugs   · Retired From Work  Social History Reviewed: The social history was reviewed and updated today  Current Meds   1  ALPRAZolam 0 5 MG Oral Tablet; 1 tab bid prn; Last Rx:2017 Ordered   2  Aspirin EC 81 MG Oral Tablet Delayed Release; TAKE 1 TABLET DAILY; Therapy: 55Hms8243 to (Evaluate:2016); Last Rx:02Imn1750 Ordered   3  Atorvastatin Calcium 40 MG Oral Tablet; take 1 tablet by mouth once daily; Therapy: 02Yuc5733 to (Evaluate:60Uey6148)  Requested for: 52Jzj1522; Last Rx:34Fld9782 Ordered   4  Breo Ellipta 100-25 MCG/INH Inhalation Aerosol Powder Breath Activated; take 1 inhalation by mouth once daily; Therapy: 26Ysk4800 to (Evaluate:2017)  Requested for: 95Bbh3742; Last Rx:15Dul4586 Ordered   5  Eye Drops SOLN; Therapy: (Recorded:40Uat9874) to Recorded   6   Ibuprofen 600 MG Oral Tablet; TAKE 1 TABLET EVERY 6 TO 8 HOURS AS NEEDED; Therapy: 61MEW8392 to [phone])  Requested for: 91NHF2504; Last Rx:80Bgu5140 Ordered   7  Nicotine 14 MG/24HR Transdermal Patch 24 Hour; APPLY 1 PATCH DAILY AS DIRECTED; Therapy: 46KDZ0387 to (Evaluate:98Mtm1204)  Requested for: 21Mar2017; Last Rx:21Mar2017 Ordered   8  Omeprazole 20 MG Oral Capsule Delayed Release; TAKE 1 CAPSULE Daily; Therapy: 78GQD6772 to (Brittni Manrique)  Requested for: 15KEV1828; Last Rx:11Mar2016 Ordered   9  Risedronate Sodium 35 MG Oral Tablet; take 1 tablet by mouth every week; Therapy: 15SAC5680 to (Evaluate:38Voj8441)  Requested for: 61Zpz7197; Last Rx:19Sep2017 Ordered   10  ROPINIRole HCl - 0 5 MG Oral Tablet; Take 1 tablet by mouth at bedtime; Therapy: 95GIK4463 to (Ronny Landeros)  Requested for: 68Xbp8952; Last Rx:05Crz3586  Ordered    Allergies  1  Augmentin TABS   2  Spiriva HandiHaler CAPS   3  Tylenol with Codeine #E#3 TABS    Vitals  Vital Signs    Recorded: 93NOK5475 01:17PM   Heart Rate 90   Systolic 418   Diastolic 68   Height 5 ft    Weight 122 lb 2 oz   BMI Calculated 23 85   BSA Calculated 1 51   O2 Saturation 96       Physical Exam   Constitutional  General appearance: No acute distress, well appearing and well nourished  Ears, Nose, Mouth, and Throat  External inspection of ears and nose: Normal    Pulmonary  Respiratory effort: No increased work of breathing or signs of respiratory distress  Auscultation of lungs: Abnormal   Auscultation of the lungs revealed decreased breath sounds diffusely  no rales or crackles were heard bilaterally  no rhonchi  no wheezing  Cardiovascular  Auscultation of heart: Normal rate and rhythm, normal S1 and S2, without murmurs  Abdomen  Abdomen: Abnormal  -- Abdominal bruit  Neurologic  Reflexes: 2+ and symmetric     Psychiatric  Orientation to person, place, and time: Normal        Results/Data  Health Maintenance Flow Sheet 43OIQ0085 01:26PM      Test Name Result Flag Reference   Mammography 2013 Health Management  Health Maintenance   COLONOSCOPY; every 5 years; Last 68WFF7901; Next Due: 53MYC2019; Active  Digital Bilateral Screening Mammogram With CAD; every 1 year; Next Due: 30HTE1295;  Overdue    Future Appointments    Date/Time Provider Specialty Site   10/17/2017 02:00 PM Carlos Augustin [facility]  [facility]       Signatures   Electronically signed by : EZEQUIEL Gr ; Oct 17 2017  1:52PM EST                       (Author) [Negative] : Heme/Lymph

## 2023-01-18 NOTE — TELEPHONE ENCOUNTER
PT WENT TO REBIScan TO  BREO - PHARMACIST TOLD HER [facility] WAS WAITING FOR HER - NOT [facility]    PLEASE RESEND RX FOR BRE[facility] TO [facility]

## 2023-01-19 ENCOUNTER — OFFICE VISIT (OUTPATIENT)
Dept: HEMATOLOGY ONCOLOGY | Facility: CLINIC | Age: 78
End: 2023-01-19

## 2023-01-19 VITALS
SYSTOLIC BLOOD PRESSURE: 118 MMHG | RESPIRATION RATE: 18 BRPM | WEIGHT: 154 LBS | HEIGHT: 60 IN | DIASTOLIC BLOOD PRESSURE: 70 MMHG | HEART RATE: 103 BPM | BODY MASS INDEX: 30.23 KG/M2 | OXYGEN SATURATION: 97 % | TEMPERATURE: 97.5 F

## 2023-01-19 DIAGNOSIS — Z17.1 MALIGNANT NEOPLASM OF OVERLAPPING SITES OF RIGHT BREAST IN FEMALE, ESTROGEN RECEPTOR NEGATIVE (HCC): Primary | ICD-10-CM

## 2023-01-19 DIAGNOSIS — C50.811 MALIGNANT NEOPLASM OF OVERLAPPING SITES OF RIGHT BREAST IN FEMALE, ESTROGEN RECEPTOR NEGATIVE (HCC): Primary | ICD-10-CM

## 2023-01-19 RX ORDER — FLUTICASONE FUROATE AND VILANTEROL 100; 25 UG/1; UG/1
1 POWDER RESPIRATORY (INHALATION) DAILY
Qty: 180 BLISTER | Refills: 3 | Status: SHIPPED | OUTPATIENT
Start: 2023-01-19

## 2023-01-26 DIAGNOSIS — G25.81 RLS (RESTLESS LEGS SYNDROME): ICD-10-CM

## 2023-01-26 RX ORDER — GABAPENTIN 400 MG/1
CAPSULE ORAL
Qty: 90 CAPSULE | Refills: 1 | Status: SHIPPED | OUTPATIENT
Start: 2023-01-26

## 2023-01-30 DIAGNOSIS — E55.9 VITAMIN D DEFICIENCY: ICD-10-CM

## 2023-01-30 RX ORDER — ERGOCALCIFEROL 1.25 MG/1
CAPSULE ORAL
Qty: 12 CAPSULE | Refills: 0 | Status: SHIPPED | OUTPATIENT
Start: 2023-01-30

## 2023-02-10 ENCOUNTER — TELEPHONE (OUTPATIENT)
Dept: HEMATOLOGY ONCOLOGY | Facility: CLINIC | Age: 78
End: 2023-02-10

## 2023-02-10 NOTE — TELEPHONE ENCOUNTER
Appointment Confirmation (to confirm pre existing appointments - ONLY)  No need to route   Who is calling to confirm? Patient   If someone other than patient is calling, are they listed on the communication consent form? N/A   Appointment with [facility]   Appointment date & time 2/14/23 9:45am   Appointment location [facility]   Patient verbilized understanding Yes   Pt calling to arrange [facility]  [facility]ed [facility] and set up transportation   Pt is aware transport is confirmed

## 2023-02-14 ENCOUNTER — HOSPITAL ENCOUNTER (OUTPATIENT)
Dept: ULTRASOUND IMAGING | Facility: CLINIC | Age: 78
Discharge: HOME | End: 2023-02-14

## 2023-02-14 ENCOUNTER — HOSPITAL ENCOUNTER (OUTPATIENT)
Dept: MAMMOGRAPHY | Facility: CLINIC | Age: 78
Discharge: HOME | End: 2023-02-14

## 2023-02-14 ENCOUNTER — OFFICE VISIT (OUTPATIENT)
Dept: PULMONOLOGY | Facility: CLINIC | Age: 78
End: 2023-02-14

## 2023-02-14 VITALS
DIASTOLIC BLOOD PRESSURE: 80 MMHG | HEART RATE: 94 BPM | TEMPERATURE: 98.2 F | OXYGEN SATURATION: 97 % | HEIGHT: 60 IN | SYSTOLIC BLOOD PRESSURE: 124 MMHG | WEIGHT: 155 LBS | BODY MASS INDEX: 30.43 KG/M2

## 2023-02-14 VITALS — WEIGHT: 154 LBS | HEIGHT: 60 IN | BODY MASS INDEX: 30.23 KG/M2

## 2023-02-14 DIAGNOSIS — N63.11 LUMP IN UPPER OUTER QUADRANT OF RIGHT BREAST: ICD-10-CM

## 2023-02-14 DIAGNOSIS — Z17.1 MALIGNANT NEOPLASM OF OVERLAPPING SITES OF RIGHT BREAST IN FEMALE, ESTROGEN RECEPTOR NEGATIVE (HCC): ICD-10-CM

## 2023-02-14 DIAGNOSIS — Z85.3 HISTORY OF RIGHT BREAST CANCER: ICD-10-CM

## 2023-02-14 DIAGNOSIS — C50.811 MALIGNANT NEOPLASM OF OVERLAPPING SITES OF RIGHT BREAST IN FEMALE, ESTROGEN RECEPTOR NEGATIVE (HCC): ICD-10-CM

## 2023-02-14 DIAGNOSIS — J41.0 SIMPLE CHRONIC BRONCHITIS (HCC): Primary | ICD-10-CM

## 2023-02-14 DIAGNOSIS — J96.11 CHRONIC HYPOXEMIC RESPIRATORY FAILURE (HCC): ICD-10-CM

## 2023-02-14 DIAGNOSIS — N64.4 BREAST TENDERNESS: ICD-10-CM

## 2023-02-14 DIAGNOSIS — R52 TENDERNESS: ICD-10-CM

## 2023-02-14 RX ORDER — FLUTICASONE FUROATE, UMECLIDINIUM BROMIDE AND VILANTEROL TRIFENATATE 100; 62.5; 25 UG/1; UG/1; UG/1
1 POWDER RESPIRATORY (INHALATION) DAILY
Qty: 60 BLISTER | Refills: 0 | Status: SHIPPED | COMMUNITY
Start: 2023-02-14 | End: 2023-03-16

## 2023-02-14 NOTE — PROGRESS NOTES
Assessment/Plan:   Diagnoses and all orders for this visit:    Simple chronic bronchitis ([address] )  -     Complete PFT with post bronchodilator; Future  -     fluticasone-umeclidinium-vilanterol (Trelegy Ellipta) 100-62 5-25 mcg/actuation inhaler; Inhale 1 puff daily Rinse mouth after use  Chronic hypoxemic respiratory failure (HCC)    Malignant neoplasm of overlapping sites of right breast in female, estrogen receptor negative [facility])    Patient is here today for follow-up  She has noted a slight worsening of her shortness of breath and is also noticed an ongoing cough  She continues with her Breo daily, albuterol as needed  Over the last year she was diagnosed with breast cancer and did receive some chemotherapy as well as completed radiation  She was hospitalized a couple of times with colitis and C  difficile  Her last PFT done about 2 years ago she had normal spirometry  Given her worsening symptoms as well as recent radiation, we will have her check a repeat PFT  We will switch her from [place] onto Trelegy to given the added anticholinergic to see if it helps with her breathing  She can continue the albuterol 4 times per day as needed  Her last CT scan was done in October, she has surveillance CT scans done with her surgeon  She already has a follow-up scheduled  She will follow-up with us in about 3 months or sooner if necessary  No follow-ups on file  All questions are answered to the patient's satisfaction and understanding  She verbalizes understanding  She is encouraged to call with any further questions or concerns  Portions of the record may have been created with voice recognition software  Occasional wrong word or "sound a like" substitutions may have occurred due to the inherent limitations of voice recognition software  Read the chart carefully and recognize, using context, where substitutions have occurred      Electronically Signed by Tiana Farrell, HILARY    ______________________________________________________________________    Chief Complaint:   Chief Complaint   Patient presents with   • Follow-up       Patient ID: Kenny Templeton is a 78 y o  y o  female has a past medical history of Anxiety, Atherosclerosis of native artery of both lower extremities with intermittent claudication ([facility] ) (10/15/2015), Breast cancer ([facility] ) (03/25/2022), Carotid artery plaque, bilateral (03/21/2017), Chronic kidney disease, Chronic sinusitis, COPD (chronic obstructive pulmonary disease) ([facility] ), COVID, Fall (06/16/2021), Fracture, ankle closed, bimalleolar, right, initial encounter (06/2021), GERD (gastroesophageal reflux disease), Hyperlipidemia, Lung nodule, PNA (pneumonia), PONV (postoperative nausea and vomiting), Pulmonary emphysema ([address] ), PVD (peripheral vascular disease) ([facility] [address] ), Restless leg syndrome, Stage 3 chronic kidney disease ([facility] [address] ) (04/22/2019), and Tobacco abuse  2/14/2023  Patient presents today for follow-up visit  Patient is a 79 yo female former smoker who quit over 2 years ago with PMH of emphysema/COPD, chronic sinusitis, peripheral vascular disease , breast cancer  She is here today for follow-up  Since her last visit with us she has had a couple hospitalizations for colitis and C  difficile  She had started chemotherapy for her breast cancer which was diagnosed in March 2022 but was unable to tolerate full chemotherapy  She then completed radiation  She did finally get oxygen which she has been using more frequently  She continues on her Breo daily, albuterol as needed, not using her nebulizer regularly  She is noting increased cough with some sputum production more recently  Review of Systems   Constitutional: Negative  HENT: Negative  Respiratory: Positive for cough and shortness of breath  Cardiovascular: Negative  Gastrointestinal: Negative  Genitourinary: Negative  Musculoskeletal: Negative  Skin: Negative  Allergic/Immunologic: Negative  Neurological: Negative  Psychiatric/Behavioral: Negative  Smoking history: She reports that she quit smoking about 4 years ago  Her smoking use included cigarettes  She started smoking about 61 years ago  She has a 112 00 pack-year smoking history   She has never used smokeless tobacco     The following portions of the patient's history were reviewed and updated as appropriate: allergies, current medications, past family history, past medical history, past social history, past surgical history and problem list     Immunization History   Administered Date(s) Administered   • COVID-19 PFIZER VACCINE 0 3 ML IM 03/03/2021, 04/22/2021   • COVID-19 Pfizer vac (Newton-sucrose, gray cap) 12 yr+ IM 06/25/2022   • INFLUENZA 11/07/2017, 01/14/2022   • Influenza Split High Dose Preservative Free IM 10/15/2015, 12/22/2016   • Influenza, high dose seasonal 0 7 mL 10/11/2018, 10/23/2019, 11/20/2020, 01/12/2023   • Pneumococcal Conjugate 13-Valent 08/04/2016   • Pneumococcal Polysaccharide PPV23 1945, 11/16/2015, 06/05/2017   • Tdap 1945   • Tuberculin Skin Test 06/14/2018   • influenza, trivalent, adjuvanted 10/11/2018, 10/23/2019, 11/20/2020     Current Outpatient Medications   Medication Sig Dispense Refill   • albuterol (2 5 mg/3 mL) 0 083 % nebulizer solution Take 1 vial (2 5 mg total) by nebulization every 6 (six) hours as needed for wheezing or shortness of breath 360 mL 3   • albuterol (PROVENTIL HFA,VENTOLIN HFA) 90 mcg/act inhaler Inhale 2 puffs every 6 (six) hours as needed for wheezing 3 Inhaler 3   • ALPRAZolam (XANAX) 0 5 mg tablet Take 1 tablet (0 5 mg total) by mouth 2 (two) times a day as needed for anxiety 45 tablet 0   • ASPIRIN 81 PO Take by mouth     • ergocalciferol (VITAMIN D2) 50,000 units take 1 capsule by mouth every week 12 capsule 0   • famotidine (PEPCID) 20 mg tablet Take 1 tablet (20 mg total) by mouth daily  0   • fluticasone (FLONASE) 50 mcg/act nasal spray 2 sprays into each nostril daily 11 1 mL 1   • fluticasone-umeclidinium-vilanterol (Trelegy Ellipta) 100-62 5-25 mcg/actuation inhaler Inhale 1 puff daily Rinse mouth after use  60 blister 0   • gabapentin (NEURONTIN) 300 mg capsule Take 1 capsule (300 mg total) by mouth 2 (two) times a day 180 capsule 2   • gabapentin (NEURONTIN) 400 mg capsule take 1 tablet by mouth at bedtime 90 capsule 1   • mometasone (ELOCON) 0 1 % cream Apply topically daily as needed (rash) 50 g 1   • ondansetron (ZOFRAN) 8 mg tablet Take 1 tablet (8 mg total) by mouth every 8 (eight) hours as needed for nausea or vomiting 20 tablet 2   • rosuvastatin (CRESTOR) 20 MG tablet Take 1 tablet (20 mg total) by mouth daily 90 tablet 6   • saccharomyces boulardii (FLORASTOR) 250 mg capsule Take 1 capsule (250 mg total) by mouth 2 (two) times a day  0   • sodium chloride () 2 % hypertonic ophthalmic solution Sue 128 2 % eye drops     • atorvastatin (LIPITOR) 40 mg tablet  (Patient not taking: Reported on 1/11/2023)     • naloxone (NARCAN) 4 mg/0 1 mL nasal spray Administer 1 spray into a nostril  If no response after 2-3 minutes, give another dose in the other nostril using a new spray  (Patient not taking: Reported on 1/11/2023) 1 each 1   • olopatadine (PATANOL) 0 1 % ophthalmic solution   (Patient not taking: Reported on 1/11/2023)  0     No current facility-administered medications for this visit  Allergies: Tiotropium bromide monohydrate, Augmentin [amoxicillin-pot clavulanate], Tiotropium, Tylenol with codeine #3 [acetaminophen-codeine], and Other    Objective:  Vitals:    02/14/23 1331   BP: 124/80   Pulse: 94   Temp: 98 2 °F (36 8 °C)   SpO2: 97%   Weight: 70 3 kg (155 lb)   Height: 5' (1 524 m)   Oxygen Therapy  SpO2: 97 % (with 2 lts of oxygen)      Wt Readings from Last 3 Encounters:   02/14/23 70 3 kg (155 lb)   02/14/23 69 9 kg (154 lb)   01/19/23 69 9 kg (154 lb)     Body mass index is 30 27 kg/m²  Physical Exam  Vitals reviewed  Constitutional:       General: She is not in acute distress  Appearance: Normal appearance  She is not ill-appearing  HENT:      Head: Normocephalic and atraumatic  Mouth/Throat:      Pharynx: Oropharynx is clear  Eyes:      Conjunctiva/sclera: Conjunctivae normal    Cardiovascular:      Rate and Rhythm: Normal rate and regular rhythm  Pulmonary:      Effort: Pulmonary effort is normal       Breath sounds: Normal breath sounds  No decreased breath sounds, wheezing, rhonchi or rales  Abdominal:      General: Abdomen is flat  There is no distension  Musculoskeletal:         General: Normal range of motion  Cervical back: Normal range of motion  Right lower leg: No edema  Left lower leg: No edema  Skin:     General: Skin is warm and dry  Neurological:      Mental Status: She is alert and oriented to person, place, and time  Psychiatric:         Mood and Affect: Mood normal          Behavior: Behavior normal          Lab Review:   Lab Results   Component Value Date    K 4 3 11/30/2022    K 4 8 03/29/2019     11/30/2022     03/29/2019    CO2 27 11/30/2022    CO2 27 03/29/2019    BUN 17 11/30/2022    BUN 21 04/25/2019    CREATININE 1 13 11/30/2022    CALCIUM 8 8 11/30/2022    CALCIUM 9 3 03/29/2019     Lab Results   Component Value Date    WBC 7 87 12/08/2022    HGB 12 5 12/08/2022    HCT 40 4 12/08/2022    MCV 91 12/08/2022     12/08/2022       Diagnostics:  I have personally reviewed pertinent reports  and I have personally reviewed pertinent films in PACS  Reviewed prior CT  Office Spirometry Results:     ESS:    Mammo diagnostic bilateral w 3d & cad, US breast right limited (diagnostic)    Result Date: 2/14/2023  Narrative: DIAGNOSIS: History of right breast cancer; Lump in upper outer quadrant of right breast; Breast tenderness TECHNIQUE: Digital diagnostic mammography was performed   Computer Aided Detection (CAD) analyzed all applicable images  Ultrasound of the right breast(s) was performed  COMPARISONS: Prior breast imaging dated: 04/28/2022, 03/25/2022, 03/25/2022, 03/25/2022, 03/17/2022, 03/17/2022, 02/15/2022, 09/04/2019, and 03/14/2013 RELEVANT HISTORY: Family Breast Cancer History: History of breast cancer in Other  Family Medical History: Family medical history includes breast cancer in other  Personal History: No known relevant hormone history  Surgical history includes lumpectomy  Medical history includes breast cancer  RISK ASSESSMENT: [facility] risk assessment reporting was suppressed due to the patient's history and/or demographic factors  TISSUE DENSITY: There are scattered areas of fibroglandular density  INDICATION: Katrina Lai is a 78 y o  female presenting for right breast lump and pain   FINDINGS: Bilateral Mammo diagnostic bilateral w 3d & cad There are no suspicious masses, grouped microcalcifications or areas of unexplained architectural distortion  The skin and nipple areolar complex are unremarkable  Square shaped skin marker overlying the breast demarcates an area of pain which was marked by the technologist prior to imaging, as directed by the patient  No discrete abnormality is noted in this region  Triangular shaped skin marker overlying the breast demarcates an area of palpable concern which was marked by the technologist prior to imaging, as directed by the patient  No discrete abnormality is noted in this region  Postsurgical changes are noted in this region  Benign-appearing calcifications are noted in each breast  Right US breast right limited (diagnostic) Targeted breast ultrasound is performed using a dedicated high-resolution probe, directed by the patient  In the area of palpable concern, postsurgical changes are noted  No discrete abnormality is identified separate from the scar tissue  No suspicious sonographic findings identified in the area of pain       Impression: No evidence of malignancy  Clinical management of right breast lump and pain is recommended  ASSESSMENT/BI-RADS CATEGORY: Left: 2 - Benign Right: 2 - Benign Overall: 2 - Benign RECOMMENDATION:      - Clinical management for the right breast       - Diagnostic mammogram in 1 year for both breasts   Workstation ID: [id number]

## 2023-02-16 ENCOUNTER — HOSPITAL ENCOUNTER (OUTPATIENT)
Dept: NON INVASIVE DIAGNOSTICS | Facility: CLINIC | Age: 78
Discharge: HOME | End: 2023-02-16

## 2023-02-16 DIAGNOSIS — I74.09 AORTOILIAC OCCLUSIVE DISEASE (HCC): ICD-10-CM

## 2023-02-17 ENCOUNTER — TELEPHONE (OUTPATIENT)
Dept: SURGICAL ONCOLOGY | Facility: CLINIC | Age: 78
End: 2023-02-17

## 2023-02-17 NOTE — TELEPHONE ENCOUNTER
Called patient and confirmed appt for 2/22/23 with Dr Viviana Pickett and emailed [facility] [employer] to confirm transportation for patient

## 2023-02-21 ENCOUNTER — OFFICE VISIT (OUTPATIENT)
Dept: VASCULAR SURGERY | Facility: CLINIC | Age: 78
End: 2023-02-21

## 2023-02-21 VITALS
HEIGHT: 60 IN | SYSTOLIC BLOOD PRESSURE: 124 MMHG | WEIGHT: 152 LBS | BODY MASS INDEX: 29.84 KG/M2 | DIASTOLIC BLOOD PRESSURE: 70 MMHG | HEART RATE: 88 BPM

## 2023-02-21 DIAGNOSIS — I65.23 CAROTID ARTERY PLAQUE, BILATERAL: ICD-10-CM

## 2023-02-21 DIAGNOSIS — Z17.1 MALIGNANT NEOPLASM OF OVERLAPPING SITES OF RIGHT BREAST IN FEMALE, ESTROGEN RECEPTOR NEGATIVE (HCC): ICD-10-CM

## 2023-02-21 DIAGNOSIS — I74.09 AORTOILIAC OCCLUSIVE DISEASE (HCC): Primary | ICD-10-CM

## 2023-02-21 DIAGNOSIS — I73.9 PVD (PERIPHERAL VASCULAR DISEASE) (HCC): ICD-10-CM

## 2023-02-21 DIAGNOSIS — I77.1 SUBCLAVIAN ARTERY STENOSIS, LEFT (HCC): ICD-10-CM

## 2023-02-21 DIAGNOSIS — E78.5 HYPERLIPIDEMIA, UNSPECIFIED HYPERLIPIDEMIA TYPE: ICD-10-CM

## 2023-02-21 DIAGNOSIS — C50.811 MALIGNANT NEOPLASM OF OVERLAPPING SITES OF RIGHT BREAST IN FEMALE, ESTROGEN RECEPTOR NEGATIVE (HCC): ICD-10-CM

## 2023-02-21 DIAGNOSIS — J96.11 CHRONIC HYPOXEMIC RESPIRATORY FAILURE (HCC): ICD-10-CM

## 2023-02-21 NOTE — PATIENT INSTRUCTIONS
Aorto iliac occlusive disease  -aortic stent is open  -continue to walk as tolerated  -continue aspirin and Crestor  -check cholesterol  -follow up AOIL in 6 months with office visit  -Call if any concerns

## 2023-02-21 NOTE — PROGRESS NOTES
Assessment/Plan: Aortoiliac occlusive disease ([address] )  Peripheral arterial disease  -     VAS abdominal aorta/iliacs; complete study; Future  -     Lipids with direct LDL    -Functionally limited due to COPD  -No claudication at her level of activity; no ischemic rest pain or wounds    Imaging:    -AOIL 2/16/23:   Distal aorta stent is patent and normal in caliber  50-75% mid-aorta stenosis  Celiac, SMA and bilateral renals are not visualized    ISABELLA's / toe pressure: R 1 23/ 103, L 1 14 /104  No change    -AOIL 12/8/2021:  Patent distal aortic stent  Ao 1 8 cm  Mid Aorta 50-75% stenosis  ? R CHARISMA  L CHARISMA and EIA are patent  > 70% celiac  Patent SMA and prox renals  ISABELLA/Toe pressures: R ISABELLA 1 2/ 93; L 1 / 96  Plan:  -Patent distal aorta stent with stenosis in the mid aorta  -Functionally limited from COPD requiring supplemental O2, but no leg claudication, ischemic rest pain or tissue loss  -DP pulses present bilaterally  -Recommend continued walking and activity as tolerated  -Good protective footwear and monitoring feet for wounds/ changes given PAD  -Patient education regarding the pathophysiology and treatment of PAD provided  -Due to functional limitations, will maintain closer surveillance with follow-up in about 6 months      Carotid artery plaque, bilateral  Subclavian artery stenosis, left (HCC)  -asymptomatic MIC and SCA disease     -CV duplex 12/8/21: < 50% carotid artery stenosis (R 76/14, L 106/17); more proximal L SCA    Plan:  -Continue with aspirin/statin therapy  -Follow up duplex in Oct '23 for surveillance of disease  -Patient education regarding stroke/ carotid disease provided      Additional diagnoses:  Chronic hypoxemic respiratory failure ([facility] [address] )    Malignant neoplasm of overlapping sites of right breast in female, estrogen receptor negative ([facility] [address] )    Hyperlipidemia, unspecified hyperlipidemia type      Subjective:      Patient ID: Rajan Cornejo is a 78 y o  female    Patient presents today to review AOIL s/p aortic stent graft in 2019  Patient admits to positional LLE pain w/ sitting or lying down  Denies claudication  Patient is functionally limited secondary to emphysema  HPI   Chu Lucaslizzy [place] CA, HTN, HLD, DM, CKD, severe emphysema on inhalers, (former smoker quit 2018), aortoiliac disease s/p aortic stent graft ('19), mild carotid artery and subclavian disease  Kendra Blizzard underwent balloon angioplasty and stenting of abdominal aorta with 14 mm x 40 mm Protege GPS stent and 10mm x 20mm  Balloon in 2019  She did well after surgery without any claudication  Unfortunately, she has has been suffering from increased shortness of breath due to emphysema which is quite limiting to her  She was also found to have pancreatic mass which is on observation          2/21/23:  Patient returns for annual vascular follow up and to review testing  She reports that she had a difficulty year  Since she was last seen, she was found to have breast CA  She was unable to tolerate chemo, but had radiation and surgery  She tells me that prognosis is good  She was also hospitalized for abdominal pain and diarrhea  She was found to have C diff and diverticular disease  She is now improved and eating better  She remains quite limited functionally and has difficulty walking due to COPD on supplemental oxygen but she is trying to walk and stay as active as she can  She walks at the grocery store  She is now going to the [facility] for yoga and balance  She complains that the legs get "tired", but "never any pain like before the (abdominal aorta) stent " She has no new or exertional thigh or calf claudication  No foot pain or tissue loss  No symptoms of TIA or stroke  She is maintained on aspirin and atorvastatin 40  We will plan to check her lipids since they haven't been checked since 2019    She also has IFG which we will defer testing to [facility]      -Nausea > C diff and diverticular disease  -Breast CA  -Trying to walk - walks at store ( no cart) - but limited due to COPD on O2  -Never the pain like before stent        Imaging:  Abdominal aortogram 8/15/19  findings -   Abdominal aorta -Celiac scattered throughout its length  The bilateral renal arteries are patent  Infrarenal aorta is heavily diseased with 1 area of about 30% stenosis in its proximal portion  There are several large lumbar collaterals arising from the aorta and the large inferior mesenteric artery  At the level of the ELBA there is a heavily calcified plaque causing critical stenosis of at least 90%  Bilateral common external and internal iliac arteries are patent  VAS abdominal aorta/iliacs with ISABELLA 2/16/23  [facility] REPORT  CLINICAL:  Indications:  Evaluate for progression of Aorto-Iliac occlusive disease s/p  revascularization  Patient is asymptomatic at this time  Operative History:  2019-07-18 Aorta Stent Distal  Risk Factors: Hyperlipidemia, CKD and previous smoking (quit 5-10yrs ago)  Clinical  Right Pressure:  128/ mm Hg, Left Pressure:  89/ mm Hg  FINDINGS:     Unilateral             Impression  PSV (cm/s)  EDV (cm/s)  AP (cm)  TRV (cm)    Sup-Angelina Ao                                142          28      1 7              Px Inf-David Ao          50-75%             262          32      1 2       1 4    Ds Inf-David Ao - Stent                     174          13      1 8       1 6       Segment    Right                   Left                    PSV (cm/s)  EDV (cm/s)  PSV (cm/s)  EDV (cm/s)    Prox CHARISMA          145           0         192          44    Dist CHARISMA                                  143           0    Prox  EIA         116          16         106           0    Dist EIA          140          13          84           0             CONCLUSION:     Impression  There is a 50-75% stenosis of the mid aorta  The aorta distal stent is patent and normal in caliber, 1 8cm in its greatest  diameter, Prior 1 8cm    The common iliac arteries are patent and normal in caliber  The celiac, superior mesenteric and bilateral renal arteries were not  visualized  Ankle/Brachial indices are: Rt -   1 23(Prior 1 2 ) and Lt - 1 14  (Prior 1 0 )  Great toe pressures are within the healing range measuring 103mmHg on the right  (Prior 93mmHg), and 104mmHg on the left (Prior 96mmHg)  Technically Difficult/Limited study due to overlying bowel gas  Compared to previous study on 12/08/2021, there is no significant change  The following portions of the patient's history were reviewed and updated as appropriate: allergies, current medications, past family history, past medical history, past social history, past surgical history and problem list /    Review of Systems   Constitutional: Negative  HENT: Negative  Eyes: Negative  Respiratory: Positive for shortness of breath  Cardiovascular: Negative  Gastrointestinal: Negative  Endocrine: Negative  Genitourinary: Negative  Musculoskeletal: Negative  Skin: Negative  Allergic/Immunologic: Negative  Neurological: Positive for weakness  Hematological: Negative  Psychiatric/Behavioral: Negative  Objective:      /70 (BP Location: Right arm, Patient Position: Sitting)   Pulse 88   Ht 5' (1 524 m)   Wt 68 9 kg (152 lb)   BMI 29 69 kg/m²     Abdomen is soft/ NT  Non-tender Ao  No easily palp fem pulses  Physical Exam  Vitals and nursing note reviewed  Constitutional:       Appearance: She is well-developed  Comments: Supplemental oxygen therapy   HENT:      Head: Normocephalic and atraumatic  Eyes:      Pupils: Pupils are equal, round, and reactive to light  Neck:      Thyroid: No thyromegaly  Vascular: No JVD  Trachea: Trachea normal    Cardiovascular:      Rate and Rhythm: Normal rate and regular rhythm  Pulses:           Carotid pulses are 2+ on the right side and 2+ on the left side         Radial pulses are 2+ on the right side  Dorsalis pedis pulses are 2+ on the right side and 2+ on the left side  Heart sounds: S1 normal and S2 normal  Murmur (2/6 SM) heard  No friction rub  No gallop  Pulmonary:      Effort: Pulmonary effort is normal  No accessory muscle usage or respiratory distress  Breath sounds: No wheezing or rales  Comments: Decreased breath sounds  Abdominal:      General: Bowel sounds are normal  There is no distension  Palpations: Abdomen is soft  Tenderness: There is no abdominal tenderness  Musculoskeletal:         General: No deformity  Normal range of motion  Cervical back: Neck supple  Skin:     General: Skin is warm and dry  Findings: No lesion or rash  Nails: There is no clubbing  Neurological:      Mental Status: She is alert and oriented to person, place, and time  Comments: Grossly normal    Psychiatric:         Behavior: Behavior is cooperative  I have reviewed and made appropriate changes to the review of systems input by the medical assistant      Vitals:    02/21/23 1316   BP: 124/70   BP Location: Right arm   Patient Position: Sitting   Pulse: 88   Weight: 68 9 kg (152 lb)   Height: 5' (1 524 m)       Patient Active Problem List   Diagnosis   • Anxiety   • Aortoiliac occlusive disease (HCC)   • Carotid artery plaque, bilateral   • Centrilobular emphysema ([facility])   • Hyperlipidemia   • Osteoporosis   • Restless leg syndrome   • Subclavian artery stenosis, left (HCC)   • PVD (peripheral vascular disease) ([facility])   • Chronic sinusitis   • Pancreatic mass   • BMI 34 0-34 9,adult   • Seborrheic keratoses   • Stage 3 chronic kidney disease (HCC)   • Closed avulsion fracture of lateral malleolus of right fibula   • Prediabetes   • Vitamin D deficiency   • COPD (chronic obstructive pulmonary disease) ([facility])   • Malignant neoplasm of overlapping sites of right breast in female, estrogen receptor negative ([facility] [address] )   • Oral candidiasis   • Chemotherapy induced neutropenia (HCC)   • Proctocolitis   • Transaminitis   • C  difficile colitis   • History of lumpectomy of right breast   • Ulcerative (chronic) pancolitis without complications (HCC)   • Chronic hypoxemic respiratory failure [facility])       Past Surgical History:   Procedure Laterality Date   • BREAST LUMPECTOMY Right 2022    Procedure: ISAI  DIRECTED LUMPECTOMY;  Surgeon: Sylvain Caldwell MD;  Location: [facility];  Service: Surgical Oncology   • CATARACT EXTRACTION     •  SECTION     • COLONOSCOPY      Complete  Resolved: 13   • DESCENDING AORTIC ANEURYSM REPAIR W/ STENT  2019   • IR AORTAGRAM WITH RUN-OFF  8/15/2019   • LYMPH NODE BIOPSY Right 2022    Procedure: LYMPHATIC MAPPING WITH BLUE AND RADIOACTIVE DYES, SENTINEL LYMPH NODE BIOPSY, INJECTION IN OR BY DR RODRÍGUEZ AT 1300;  Surgeon: Sylvain Caldwell MD;  Location: [facility];  Service: Surgical Oncology   • SHOULDER SURGERY      For frozen shoulder    • TONSILLECTOMY     • US BREAST ISAI  NEEDLE LOC RIGHT Right 3/25/2022   • US GUIDED BREAST BIOPSY RIGHT COMPLETE Right 3/25/2022       Family History   Problem Relation Age of Onset   • No Known Problems Mother    • No Known Problems Father    • Arthritis Sister    • Pancreatic cancer Sister 61   • Melanoma Brother         twice   • Prostate cancer Brother    • Heart attack Family         Acute Myocardial Infarction   • Cirrhosis Family    • Prostate cancer Family    • Skin cancer Family    • Stroke Family         Syndrome   • No Known Problems Daughter    • No Known Problems Maternal Grandmother    • No Known Problems Paternal Grandmother    • No Known Problems Sister    • No Known Problems Maternal Aunt    • Breast cancer Other 27       Social History     Socioeconomic History   • Marital status:       Spouse name: Not on file   • Number of children: 2   • Years of education: Not on file   • Highest education level: Not on file   Occupational History   • Occupation: Retired/   Tobacco Use   • Smoking status: Former     Packs/day: 2 00     Years: 56 00     Pack years: 112 00     Types: Cigarettes     Start date: 1962     Quit date: 2018     Years since quittin 7   • Smokeless tobacco: Never   Vaping Use   • Vaping Use: Never used   Substance and Sexual Activity   • Alcohol use: Yes     Comment: occasionally    • Drug use: No   • Sexual activity: Not Currently     Partners: Male   Other Topics Concern   • Not on file   Social History Narrative    Living w/adult children    No advance directive on file     Social Determinants of Health     Financial Resource Strain: Not on file   Food Insecurity: Not on file   Transportation Needs: No Transportation Needs   • Lack of Transportation (Medical): No   • Lack of Transportation (Non-Medical):  No   Physical Activity: Not on file   Stress: Not on file   Social Connections: Not on file   Intimate Partner Violence: Not on file   Housing Stability: Low Risk    • Unable to Pay for Housing in the Last Year: No   • Number of Places Lived in the Last Year: 1   • Unstable Housing in the Last Year: No       Allergies   Allergen Reactions   • Tiotropium Bromide Monohydrate Shortness Of Breath   • Augmentin [Amoxicillin-Pot Clavulanate] Abdominal Pain     Pt received ceftriaxone 1 g IV on 18, gets sharp pains    • Tiotropium Abdominal Pain   • Tylenol With Codeine #3 [Acetaminophen-Codeine] Abdominal Pain   • Other Other (See Comments)         Current Outpatient Medications:   •  albuterol (2 5 mg/3 mL) 0 083 % nebulizer solution, Take 1 vial (2 5 mg total) by nebulization every 6 (six) hours as needed for wheezing or shortness of breath, Disp: 360 mL, Rfl: 3  •  albuterol (PROVENTIL HFA,VENTOLIN HFA) 90 mcg/act inhaler, Inhale 2 puffs every 6 (six) hours as needed for wheezing, Disp: 3 Inhaler, Rfl: 3  •  ALPRAZolam (XANAX) 0 5 mg tablet, Take 1 tablet (0 5 mg total) by mouth 2 (two) times a day as needed for anxiety, Disp: 45 tablet, Rfl: 0  •  ASPIRIN 81 PO, Take by mouth, Disp: , Rfl:   •  atorvastatin (LIPITOR) 40 mg tablet, , Disp: , Rfl:   •  ergocalciferol (VITAMIN D2) 50,000 units, take 1 capsule by mouth every week, Disp: 12 capsule, Rfl: 0  •  famotidine (PEPCID) 20 mg tablet, Take 1 tablet (20 mg total) by mouth daily, Disp: , Rfl: 0  •  fluticasone (FLONASE) 50 mcg/act nasal spray, 2 sprays into each nostril daily, Disp: 11 1 mL, Rfl: 1  •  fluticasone-umeclidinium-vilanterol (Trelegy Ellipta) 100-62 5-25 mcg/actuation inhaler, Inhale 1 puff daily Rinse mouth after use , Disp: 60 blister, Rfl: 0  •  gabapentin (NEURONTIN) 300 mg capsule, Take 1 capsule (300 mg total) by mouth 2 (two) times a day, Disp: 180 capsule, Rfl: 2  •  gabapentin (NEURONTIN) 400 mg capsule, take 1 tablet by mouth at bedtime, Disp: 90 capsule, Rfl: 1  •  mometasone (ELOCON) 0 1 % cream, Apply topically daily as needed (rash), Disp: 50 g, Rfl: 1  •  ondansetron (ZOFRAN) 8 mg tablet, Take 1 tablet (8 mg total) by mouth every 8 (eight) hours as needed for nausea or vomiting, Disp: 20 tablet, Rfl: 2  •  rosuvastatin (CRESTOR) 20 MG tablet, Take 1 tablet (20 mg total) by mouth daily, Disp: 90 tablet, Rfl: 6  •  saccharomyces boulardii (FLORASTOR) 250 mg capsule, Take 1 capsule (250 mg total) by mouth 2 (two) times a day, Disp: , Rfl: 0  •  sodium chloride () 2 % hypertonic ophthalmic solution, Sue 128 2 % eye drops, Disp: , Rfl:   •  naloxone (NARCAN) 4 mg/0 1 mL nasal spray, Administer 1 spray into a nostril  If no response after 2-3 minutes, give another dose in the other nostril using a new spray   (Patient not taking: Reported on 1/11/2023), Disp: 1 each, Rfl: 1  •  olopatadine (PATANOL) 0 1 % ophthalmic solution,   (Patient not taking: Reported on 1/11/2023), Disp: , Rfl: 0

## 2023-02-22 ENCOUNTER — OFFICE VISIT (OUTPATIENT)
Dept: SURGICAL ONCOLOGY | Facility: CLINIC | Age: 78
End: 2023-02-22

## 2023-02-22 VITALS
BODY MASS INDEX: 30.43 KG/M2 | HEART RATE: 128 BPM | HEIGHT: 60 IN | OXYGEN SATURATION: 98 % | RESPIRATION RATE: 16 BRPM | DIASTOLIC BLOOD PRESSURE: 78 MMHG | SYSTOLIC BLOOD PRESSURE: 140 MMHG | TEMPERATURE: 97.7 F | WEIGHT: 155 LBS

## 2023-02-22 DIAGNOSIS — Z98.890 HISTORY OF LUMPECTOMY OF RIGHT BREAST: ICD-10-CM

## 2023-02-22 DIAGNOSIS — C50.811 MALIGNANT NEOPLASM OF OVERLAPPING SITES OF RIGHT BREAST IN FEMALE, ESTROGEN RECEPTOR NEGATIVE (HCC): Primary | ICD-10-CM

## 2023-02-22 DIAGNOSIS — Z17.1 MALIGNANT NEOPLASM OF OVERLAPPING SITES OF RIGHT BREAST IN FEMALE, ESTROGEN RECEPTOR NEGATIVE (HCC): Primary | ICD-10-CM

## 2023-02-22 NOTE — PROGRESS NOTES
Surgical Oncology Follow Up  [facility]  [facility] [place]  [address]  [place] [id number]    Rakesh Noyola  1945  [id number]      No chief complaint on file  Assessment & Plan:   She is here for follow-up with right breast cancer she had a mammogram and ultrasound which I reviewed personally no worrisome features  She is doing overall well with no palpable mass masses nipple discharge nipple retraction or skin changes well-healed right breast and right axillary incision  I will follow her in 6 months time  Cancer History:     Oncology History   Malignant neoplasm of overlapping sites of right breast in female, estrogen receptor negative ([facility] [address] )   3/25/2022 Initial Diagnosis    Malignant neoplasm of right female breast ([facility] [address] )     3/25/2022 Biopsy    Right breast ultrasound guided biopsy  12 o'clock 5 cm from nipple  Invasive breast carcinoma of no special type (ductal NST/ invasive ductal carcinoma with apocrine features)  Grade 2  ER 0  WY 0  HER 2 2+   FISH negative  Lymphovascular invasion not identified    Concordant  Malignancy is unifocal  Mass measures 1 4 cm on mammogram and 1 9 cm on ultrasound  Right axilla ultrasound clear  Left brest clear  Amanda  reflector placed  In situ compononent: 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  4/4/2022 -  Cancer Staged    Staging form: Breast, AJCC 8th Edition  - Clinical stage from 4/4/2022: Stage IB (cT1c, cN0, cM0, G2, ER-, WY-, HER2-) - Signed by Mariela Gonzalez MD on 4/4/2022  Stage prefix: Initial diagnosis  Nuclear grade: G2  Histologic grading system: 3 grade system  HER2-IHC interpretation: Equivocal  HER2-IHC value: Score 2+  HER2-FISH interpretation: Negative       4/4/2022 Genetic Testing    [facility]  A total of 36 genes were evaluated, including: GILL, BRCA1, BRCA2, CDH1, CHEK2, PALB2, PTEN, STK11, TP53  Negative result   No pathogenic sequence variants or deletions/duplications identified     4/28/2022 Surgery    Right breast lexy  directed lumpectomy with sentinel lymph node biopsy and axillary dissection  Invasive ductal carcinoma with apocrine features  Grade 3  1 4 cm  0/4 Lymph nodes       6/14/2022 - 6/15/2022 Chemotherapy    Pegfilgrastim-bmez (ZIEXTENZO), 6 mg, Subcutaneous, Once, 1 of 4 cycles  Administration: 6 mg (6/15/2022)  cyclophosphamide (CYTOXAN) IVPB, 450 mg/m2 = 778 mg (75 % of original dose 600 mg/m2), Intravenous, Once, 1 of 4 cycles  Dose modification: 450 mg/m2 (75 % of original dose 600 mg/m2, Cycle 1, Reason: Dose Not Tolerated)  Administration: 778 mg (6/14/2022)  DOCEtaxel (TAXOTERE) chemo infusion, 56 25 mg/m2 = 97 4 mg (75 % of original dose 75 mg/m2), Intravenous, Once, 1 of 4 cycles  Dose modification: 56 25 mg/m2 (75 % of original dose 75 mg/m2, Cycle 1, Reason: Dose Not Tolerated)  Administration: 97 4 mg (6/14/2022)     11/17/2022 - 12/15/2022 Radiation    Treatments:  Course: C1  Plan ID Energy Fractions Dose per Fraction (cGy) Dose Correction (cGy) Total Dose Delivered (cGy) Elapsed Days   R Breast Hypo 6X 15 / 15 267 0 4,005 21   R BrstBst 6X 10X/6X 5 / 5 200 0 1,000 6    Treatment Dates:  11/17/2022 - 12/15/2022  Interval History:   Follow-up with right breast cancer    Review of Systems:   Review of Systems   Constitutional: Negative for chills and fever  HENT: Negative for ear pain and sore throat  Eyes: Negative for pain and visual disturbance  Respiratory: Negative for cough and shortness of breath  Cardiovascular: Negative for chest pain and palpitations  Gastrointestinal: Negative for abdominal pain and vomiting  Genitourinary: Negative for dysuria and hematuria  Musculoskeletal: Negative for arthralgias and back pain  Skin: Negative for color change and rash  Neurological: Negative for seizures and syncope  All other systems reviewed and are negative        Past Medical History Patient Active Problem List   Diagnosis   • Anxiety   • Aortoiliac occlusive disease ([facility] [address] )   • Carotid artery plaque, bilateral   • Centrilobular emphysema ([facility] [address] )   • Hyperlipidemia   • Osteoporosis   • Restless leg syndrome   • Subclavian artery stenosis, left (HCC)   • PVD (peripheral vascular disease) (HCC)   • Chronic sinusitis   • Pancreatic mass   • BMI 34 0-34 9,adult   • Seborrheic keratoses   • Stage 3 chronic kidney disease (HCC)   • Closed avulsion fracture of lateral malleolus of right fibula   • Prediabetes   • Vitamin D deficiency   • COPD (chronic obstructive pulmonary disease) (HCC)   • Malignant neoplasm of overlapping sites of right breast in female, estrogen receptor negative ([facility] [address] )   • Oral candidiasis   • Chemotherapy induced neutropenia ([facility])   • Proctocolitis   • Transaminitis   • C  difficile colitis   • History of lumpectomy of right breast   • Ulcerative (chronic) pancolitis without complications ([facility] )   • Chronic hypoxemic respiratory failure ([facility])     Past Medical History:   Diagnosis Date   • Anxiety    • Atherosclerosis of native artery of both lower extremities with intermittent claudication ([facility] ) 10/15/2015    Transitioned From: Cardiovascular Symptoms   • Breast cancer ([facility] ) 03/25/2022    right breast   • Carotid artery plaque, bilateral 03/21/2017    Transitioned From:  Atherosclerosis of both carotid arteries   • Chronic kidney disease    • Chronic sinusitis     Last assessed: 11/11/13   • COPD (chronic obstructive pulmonary disease) ([facility] [address] )    • COVID     12/25/21 not hopsitalized- flu-like symptoms   • Fall 06/16/2021   • Fracture, ankle closed, bimalleolar, right, initial encounter 06/2021   • GERD (gastroesophageal reflux disease)    • Hyperlipidemia    • Lung nodule    • PNA (pneumonia)    • PONV (postoperative nausea and vomiting)     with C-sections   • Pulmonary emphysema (HCC)    • PVD (peripheral vascular disease) ([facility])    • Restless leg syndrome    • Stage 3 chronic kidney disease ([facility] [address] ) 2019   • Tobacco abuse      Past Surgical History:   Procedure Laterality Date   • BREAST LUMPECTOMY Right 2022    Procedure: ISAI  DIRECTED LUMPECTOMY;  Surgeon: Daryn Khalil MD;  Location: [facility];  Service: Surgical Oncology   • CATARACT EXTRACTION     •  SECTION     • COLONOSCOPY      Complete  Resolved: 13   • DESCENDING AORTIC ANEURYSM REPAIR W/ STENT  2019   • IR AORTAGRAM WITH RUN-OFF  8/15/2019   • LYMPH NODE BIOPSY Right 2022    Procedure: LYMPHATIC MAPPING WITH BLUE AND RADIOACTIVE DYES, SENTINEL LYMPH NODE BIOPSY, INJECTION IN OR BY DR RODRÍGUEZ AT 1300;  Surgeon: Daryn Khalil MD;  Location: [facility];  Service: Surgical Oncology   • SHOULDER SURGERY      For frozen shoulder    • TONSILLECTOMY     • US BREAST ISAI  NEEDLE LOC RIGHT Right 3/25/2022   • US GUIDED BREAST BIOPSY RIGHT COMPLETE Right 3/25/2022     Family History   Problem Relation Age of Onset   • No Known Problems Mother    • No Known Problems Father    • Arthritis Sister    • Pancreatic cancer Sister 61   • Melanoma Brother         twice   • Prostate cancer Brother    • Heart attack Family         Acute Myocardial Infarction   • Cirrhosis Family    • Prostate cancer Family    • Skin cancer Family    • Stroke Family         Syndrome   • No Known Problems Daughter    • No Known Problems Maternal Grandmother    • No Known Problems Paternal Grandmother    • No Known Problems Sister    • No Known Problems Maternal Aunt    • Breast cancer Other 27     Social History     Socioeconomic History   • Marital status:       Spouse name: Not on file   • Number of children: 2   • Years of education: Not on file   • Highest education level: Not on file   Occupational History   • Occupation: Retired/   Tobacco Use   • Smoking status: Former     Packs/day: 2 00     Years: 56 00     Pack years: 112 00     Types: Cigarettes     Start date: 1962 Quit date: 2018     Years since quittin 7   • Smokeless tobacco: Never   Vaping Use   • Vaping Use: Never used   Substance and Sexual Activity   • Alcohol use: Yes     Comment: occasionally    • Drug use: No   • Sexual activity: Not Currently     Partners: Male   Other Topics Concern   • Not on file   Social History Narrative    Living w/adult children    No advance directive on file     Social Determinants of Health     Financial Resource Strain: Not on file   Food Insecurity: Not on file   Transportation Needs: No Transportation Needs   • Lack of Transportation (Medical): No   • Lack of Transportation (Non-Medical):  No   Physical Activity: Not on file   Stress: Not on file   Social Connections: Not on file   Intimate Partner Violence: Not on file   Housing Stability: Low Risk    • Unable to Pay for Housing in the Last Year: No   • Number of Places Lived in the Last Year: 1   • Unstable Housing in the Last Year: No       Current Outpatient Medications:   •  albuterol (2 5 mg/3 mL) 0 083 % nebulizer solution, Take 1 vial (2 5 mg total) by nebulization every 6 (six) hours as needed for wheezing or shortness of breath, Disp: 360 mL, Rfl: 3  •  albuterol (PROVENTIL HFA,VENTOLIN HFA) 90 mcg/act inhaler, Inhale 2 puffs every 6 (six) hours as needed for wheezing, Disp: 3 Inhaler, Rfl: 3  •  ALPRAZolam (XANAX) 0 5 mg tablet, Take 1 tablet (0 5 mg total) by mouth 2 (two) times a day as needed for anxiety, Disp: 45 tablet, Rfl: 0  •  ASPIRIN 81 PO, Take by mouth, Disp: , Rfl:   •  atorvastatin (LIPITOR) 40 mg tablet, , Disp: , Rfl:   •  ergocalciferol (VITAMIN D2) 50,000 units, take 1 capsule by mouth every week, Disp: 12 capsule, Rfl: 0  •  famotidine (PEPCID) 20 mg tablet, Take 1 tablet (20 mg total) by mouth daily, Disp: , Rfl: 0  •  fluticasone (FLONASE) 50 mcg/act nasal spray, 2 sprays into each nostril daily, Disp: 11 1 mL, Rfl: 1  •  fluticasone-umeclidinium-vilanterol (Trelegy Ellipta) 100-62 5-25 mcg/actuation inhaler, Inhale 1 puff daily Rinse mouth after use , Disp: 60 blister, Rfl: 0  •  gabapentin (NEURONTIN) 300 mg capsule, Take 1 capsule (300 mg total) by mouth 2 (two) times a day, Disp: 180 capsule, Rfl: 2  •  gabapentin (NEURONTIN) 400 mg capsule, take 1 tablet by mouth at bedtime, Disp: 90 capsule, Rfl: 1  •  mometasone (ELOCON) 0 1 % cream, Apply topically daily as needed (rash), Disp: 50 g, Rfl: 1  •  ondansetron (ZOFRAN) 8 mg tablet, Take 1 tablet (8 mg total) by mouth every 8 (eight) hours as needed for nausea or vomiting, Disp: 20 tablet, Rfl: 2  •  rosuvastatin (CRESTOR) 20 MG tablet, Take 1 tablet (20 mg total) by mouth daily, Disp: 90 tablet, Rfl: 6  •  saccharomyces boulardii (FLORASTOR) 250 mg capsule, Take 1 capsule (250 mg total) by mouth 2 (two) times a day, Disp: , Rfl: 0  •  sodium chloride () 2 % hypertonic ophthalmic solution, Sue 128 2 % eye drops, Disp: , Rfl:   •  naloxone (NARCAN) 4 mg/0 1 mL nasal spray, Administer 1 spray into a nostril  If no response after 2-3 minutes, give another dose in the other nostril using a new spray  (Patient not taking: Reported on 1/11/2023), Disp: 1 each, Rfl: 1  •  olopatadine (PATANOL) 0 1 % ophthalmic solution,   (Patient not taking: Reported on 1/11/2023), Disp: , Rfl: 0  Allergies   Allergen Reactions   • Tiotropium Bromide Monohydrate Shortness Of Breath   • Augmentin [Amoxicillin-Pot Clavulanate] Abdominal Pain     Pt received ceftriaxone 1 g IV on 5-21-18, gets sharp pains    • Tiotropium Abdominal Pain   • Tylenol With Codeine #3 [Acetaminophen-Codeine] Abdominal Pain   • Other Other (See Comments)       Physical Exam:     Vitals:    02/22/23 1041   BP: 140/78   Pulse: (!) 128   Resp: 16   Temp: 97 7 °F (36 5 °C)   SpO2: 98%     Physical Exam  Constitutional:       Appearance: Normal appearance  HENT:      Head: Normocephalic and atraumatic        Nose: Nose normal       Mouth/Throat:      Mouth: Mucous membranes are moist  Eyes:      Pupils: Pupils are equal, round, and reactive to light  Cardiovascular:      Rate and Rhythm: Normal rate  Pulses: Normal pulses  Heart sounds: Normal heart sounds  Pulmonary:      Effort: Pulmonary effort is normal       Breath sounds: Normal breath sounds  Chest:          Comments: The bilateral Breast(s) were examined  There was not any sign of an inverted nipple, mass, nipple discharge, skin changes or tenderness  The bilateral  breast(s) were examined in the sitting and supine position  There are not any worrisome skin changes, tenderness, nipple changes, swelling ,bleeding or evidence of mass/s in all four quadrants  Dayanna survey demonstrated that there is not any evidence of any clinically suspicious axillary, pectoral or supraclavicular lymph nodes  Abdominal:      General: Bowel sounds are normal       Palpations: Abdomen is soft  Musculoskeletal:         General: Normal range of motion  Cervical back: Normal range of motion and neck supple  Skin:     General: Skin is warm  Neurological:      General: No focal deficit present  Mental Status: She is alert and oriented to person, place, and time  Psychiatric:         Mood and Affect: Mood normal          Behavior: Behavior normal          Thought Content: Thought content normal          Judgment: Judgment normal            Results & Discussion:   I did review personally the mammogram and ultrasound films and agree with the report and this was discussed with the patient I did discussed in detail nature of breast cancer follow-up need for routine diagnostic mammogram   Follow-up in 6 months she understands and  agrees   All patient questions were answered  Advance Care Planning/Advance Directives: Ronny Wilson MD discussed the disease status with Yelena Ponce  today 02/22/23  treatment plans and follow-up with the patient

## 2023-03-27 ENCOUNTER — HOSPITAL ENCOUNTER (OUTPATIENT)
Dept: PULMONOLOGY | Facility: HOSPITAL | Age: 78
Discharge: HOME | End: 2023-03-27

## 2023-03-27 DIAGNOSIS — J41.0 SIMPLE CHRONIC BRONCHITIS (HCC): ICD-10-CM

## 2023-03-27 RX ORDER — ALBUTEROL SULFATE 2.5 MG/3ML
2.5 SOLUTION RESPIRATORY (INHALATION) ONCE
Status: COMPLETED | OUTPATIENT
Start: 2023-03-27 | End: 2023-03-27

## 2023-03-27 RX ADMIN — ALBUTEROL SULFATE 2.5 MG: 2.5 SOLUTION RESPIRATORY (INHALATION) at 12:52

## 2023-03-27 NOTE — ASSESSMENT & PLAN NOTE
· Presented with profuse foul smelling watery diarrhea  · C diff positive  · She states she does not like to drink water  She drinks only juice and sweet tea  Encourage to drink water only and avoid sugary juice  · Significantly improved with oral vancomycin     · Continue oral vanco for 2 weeks show

## 2023-04-24 ENCOUNTER — RA CDI HCC (OUTPATIENT)
Dept: OTHER | Facility: HOSPITAL | Age: 78
End: 2023-04-24

## 2023-05-05 DIAGNOSIS — E55.9 VITAMIN D DEFICIENCY: ICD-10-CM

## 2023-05-05 RX ORDER — ERGOCALCIFEROL 1.25 MG/1
CAPSULE ORAL
Qty: 12 CAPSULE | Refills: 0 | Status: SHIPPED | OUTPATIENT
Start: 2023-05-05

## 2023-05-11 NOTE — ASSESSMENT & PLAN NOTE
Patient presenting to the ED with lower abdominal pain, constipation, fevers, body aches for 5 days  · CT A/P: Findings consistent with mild acute uncomplicated sigmoid diverticulitis  · Does not meet sepsis criteria  Negative procalcitonin and lactic  · Started on IV zosyn  · BC taken 6/20 negative at 72 hours  · Was NPO, advanced to clear liquids by GI, advanced to surgical diet, now on regular diet with high fiber, continue to monitor for toleration  · GI following, recs include continuing antibiotics and outpatient colonoscopy in 8 weeks  · Patient has new-onset loose stools, likely antibiotic-induced, started scheduled probiotic and imodium prn   If doesn't improve in a couple of days, begin C diff prophylaxis and test stool for C diff toxin .

## 2023-05-15 ENCOUNTER — OFFICE VISIT (OUTPATIENT)
Age: 78
End: 2023-05-15

## 2023-05-15 VITALS
OXYGEN SATURATION: 96 % | HEIGHT: 60 IN | WEIGHT: 158.6 LBS | TEMPERATURE: 98.3 F | RESPIRATION RATE: 18 BRPM | SYSTOLIC BLOOD PRESSURE: 122 MMHG | DIASTOLIC BLOOD PRESSURE: 68 MMHG | HEART RATE: 110 BPM | BODY MASS INDEX: 31.14 KG/M2

## 2023-05-15 DIAGNOSIS — J01.90 ACUTE NON-RECURRENT SINUSITIS, UNSPECIFIED LOCATION: ICD-10-CM

## 2023-05-15 DIAGNOSIS — J96.11 CHRONIC HYPOXEMIC RESPIRATORY FAILURE (HCC): ICD-10-CM

## 2023-05-15 DIAGNOSIS — J41.0 SIMPLE CHRONIC BRONCHITIS (HCC): ICD-10-CM

## 2023-05-15 DIAGNOSIS — R06.02 SHORTNESS OF BREATH: Primary | ICD-10-CM

## 2023-05-15 DIAGNOSIS — I27.20 PULMONARY HYPERTENSION (HCC): ICD-10-CM

## 2023-05-15 RX ORDER — CEFUROXIME AXETIL 500 MG/1
500 TABLET ORAL EVERY 12 HOURS SCHEDULED
Qty: 10 TABLET | Refills: 0 | Status: SHIPPED | OUTPATIENT
Start: 2023-05-15 | End: 2023-05-20

## 2023-05-15 NOTE — PROGRESS NOTES
Assessment/Plan:   Diagnoses and all orders for this visit:    Shortness of breath  -     Echo complete w/ contrast if indicated; Future    Simple chronic bronchitis ([address] )    Pulmonary hypertension ([address] )  -     Echo complete w/ contrast if indicated; Future    Chronic hypoxemic respiratory failure (HCC)    Acute non-recurrent sinusitis, unspecified location  -     cefuroxime (CEFTIN) 500 mg tablet; Take 1 tablet (500 mg total) by mouth every 12 (twelve) hours for 5 days    Patient is here today for follow-up  She is overall stable with her breathing, continues to have chronic dyspnea on exertion  She is using her oxygen more frequently though feels she does not want to become dependent on her  I explained to her that she would not become dependent on the O2 just because she is using it  Would benefit from using it more frequently to maintain her O2 sats above 89%  She will use it more with exertion  She did have a PFT done which shows only mild obstruction with severely decreased DLCO  She does have moderate emphysema on her CT scans  Given the decreased DLCO, will order her an echocardiogram, may have some pulmonary hypertension which may also be contributing to her shortness of breath and hypoxia  She continues with the [facility], albuterol as needed  We did give her Trelegy but she would like to complete the supply of Breo that she has  She had been undergoing surveillance CT scans, last was June 2022 - if no CT scan ordered by Oncology by her next visit, will order screening CT scan for her  Do not want to double studies if she will have a CT scan with oncology  She will follow-up with us in about 3 months or sooner if necessary  Return in about 3 months (around 8/15/2023)  All questions are answered to the patient's satisfaction and understanding  She verbalizes understanding  She is encouraged to call with any further questions or concerns      Portions of the record may have been created "with voice recognition software  Occasional wrong word or \"sound a like\" substitutions may have occurred due to the inherent limitations of voice recognition software  Read the chart carefully and recognize, using context, where substitutions have occurred  Electronically Signed by Caroline Sanford PA-C    ______________________________________________________________________    Chief Complaint:   Chief Complaint   Patient presents with   • Follow-up       Patient ID: Carlos Mcfarland is a 78 y o  y o  female has a past medical history of Anxiety, Atherosclerosis of native artery of both lower extremities with intermittent claudication ([facility] ) (10/15/2015), Breast cancer ([facility] ) (03/25/2022), Carotid artery plaque, bilateral (03/21/2017), Chronic kidney disease, Chronic sinusitis, COPD (chronic obstructive pulmonary disease) ([facility] ), COVID, Fall (06/16/2021), Fracture, ankle closed, bimalleolar, right, initial encounter (06/2021), GERD (gastroesophageal reflux disease), Hyperlipidemia, Lung nodule, PNA (pneumonia), PONV (postoperative nausea and vomiting), Pulmonary emphysema ([address] ), PVD (peripheral vascular disease) ([facility] ), Restless leg syndrome, Stage 3 chronic kidney disease ([facility] ) (04/22/2019), and Tobacco abuse  5/15/2023  Patient presents today for follow-up visit  Patient is a 79 yo female former smoker who quit over 2 years ago with PMH of emphysema/COPD, chronic sinusitis, peripheral vascular disease , breast cancer s/p radiation, unable to tolerate chemo  She is here today for follow-up  She continues to have ongoing shortness of breath/dyspnea on exertion similar to prior visit  She is using her oxygen but has been trying not to use it on a regular basis  She is using her Breo daily, albuterol as needed  We did give her Trelegy at the last visit but she wanted to finish the [facility] that she [place]    She is complaining of increased sinus congestion and discharge, usually has a sinus infection around this time of " year            Review of Systems   Constitutional: Negative  HENT: Negative  Respiratory: Positive for shortness of breath  Cardiovascular: Negative  Gastrointestinal: Negative  Genitourinary: Negative  Musculoskeletal: Negative  Skin: Negative  Allergic/Immunologic: Negative  Neurological: Negative  Psychiatric/Behavioral: Negative  Smoking history: She reports that she quit smoking about 4 years ago  Her smoking use included cigarettes  She started smoking about 61 years ago  She has a 112 00 pack-year smoking history   She has never used smokeless tobacco     The following portions of the patient's history were reviewed and updated as appropriate: allergies, current medications, past family history, past medical history, past social history, past surgical history and problem list     Immunization History   Administered Date(s) Administered   • COVID-19 PFIZER VACCINE 0 3 ML IM 03/03/2021, 04/22/2021   • COVID-19 Pfizer vac ([place]-sucrose, gray cap) 12 yr+ IM 06/25/2022   • INFLUENZA 11/07/2017, 01/14/2022   • Influenza Split High Dose Preservative Free IM 10/15/2015, 12/22/2016   • Influenza, high dose seasonal 0 7 mL 10/11/2018, 10/23/2019, 11/20/2020, 01/12/2023   • Pneumococcal Conjugate 13-Valent 08/04/2016   • Pneumococcal Polysaccharide PPV23 1945, 11/16/2015, 06/05/2017   • Tdap 1945   • Tuberculin Skin Test 06/14/2018   • influenza, trivalent, adjuvanted 10/11/2018, 10/23/2019, 11/20/2020     Current Outpatient Medications   Medication Sig Dispense Refill   • albuterol (2 5 mg/3 mL) 0 083 % nebulizer solution Take 1 vial (2 5 mg total) by nebulization every 6 (six) hours as needed for wheezing or shortness of breath 360 mL 3   • albuterol (PROVENTIL HFA,VENTOLIN HFA) 90 mcg/act inhaler Inhale 2 puffs every 6 (six) hours as needed for wheezing 3 Inhaler 3   • ALPRAZolam (XANAX) 0 5 mg tablet Take 1 tablet (0 5 mg total) by mouth 2 (two) times a day as needed for anxiety 45 tablet 0   • ASPIRIN 81 PO Take by mouth     • atorvastatin (LIPITOR) 40 mg tablet      • cefuroxime (CEFTIN) 500 mg tablet Take 1 tablet (500 mg total) by mouth every 12 (twelve) hours for 5 days 10 tablet 0   • ergocalciferol (VITAMIN D2) 50,000 units take 1 capsule by mouth every week 12 capsule 0   • famotidine (PEPCID) 20 mg tablet Take 1 tablet (20 mg total) by mouth daily  0   • fluticasone (FLONASE) 50 mcg/act nasal spray 2 sprays into each nostril daily 11 1 mL 1   • gabapentin (NEURONTIN) 300 mg capsule Take 1 capsule (300 mg total) by mouth 2 (two) times a day 180 capsule 2   • gabapentin (NEURONTIN) 400 mg capsule take 1 tablet by mouth at bedtime 90 capsule 1   • mometasone (ELOCON) 0 1 % cream Apply topically daily as needed (rash) 50 g 1   • olopatadine (PATANOL) 0 1 % ophthalmic solution   0   • ondansetron (ZOFRAN) 8 mg tablet Take 1 tablet (8 mg total) by mouth every 8 (eight) hours as needed for nausea or vomiting 20 tablet 2   • rosuvastatin (CRESTOR) 20 MG tablet take 1 tablet by mouth once daily 90 tablet 0   • saccharomyces boulardii (FLORASTOR) 250 mg capsule Take 1 capsule (250 mg total) by mouth 2 (two) times a day  0   • sodium chloride () 2 % hypertonic ophthalmic solution Sue 128 2 % eye drops     • fluticasone-umeclidinium-vilanterol (Trelegy Ellipta) 100-62 5-25 mcg/actuation inhaler Inhale 1 puff daily Rinse mouth after use  60 blister 0   • naloxone (NARCAN) 4 mg/0 1 mL nasal spray Administer 1 spray into a nostril  If no response after 2-3 minutes, give another dose in the other nostril using a new spray  (Patient not taking: Reported on 1/11/2023) 1 each 1     No current facility-administered medications for this visit       Allergies: Tiotropium bromide monohydrate, Augmentin [amoxicillin-pot clavulanate], Tiotropium, Tylenol with codeine #3 [acetaminophen-codeine], and Other    Objective:  Vitals:    05/15/23 1315 05/15/23 1317   BP: 122/68    BP Location: Right arm    Patient Position: Sitting    Cuff Size: Large    Pulse: (!) 110    Resp: 18    Temp: 98 3 °F (36 8 °C)    SpO2: 97% 96%   Weight: 71 9 kg (158 lb 9 6 oz)    Height: 5' (1 524 m)    Oxygen Therapy  SpO2: 96 %  Oxygen Therapy: Supplemental oxygen    Wt Readings from Last 3 Encounters:   05/15/23 71 9 kg (158 lb 9 6 oz)   02/22/23 70 3 kg (155 lb)   02/21/23 68 9 kg (152 lb)     Body mass index is 30 97 kg/m²  Physical Exam  Vitals reviewed  Constitutional:       Appearance: Normal appearance  HENT:      Head: Normocephalic and atraumatic  Mouth/Throat:      Pharynx: Oropharynx is clear  Eyes:      Conjunctiva/sclera: Conjunctivae normal    Cardiovascular:      Rate and Rhythm: Normal rate and regular rhythm  Pulmonary:      Effort: Pulmonary effort is normal  No respiratory distress  Breath sounds: Normal breath sounds  No decreased breath sounds, wheezing, rhonchi or rales  Abdominal:      General: Abdomen is flat  There is no distension  Musculoskeletal:         General: Normal range of motion  Cervical back: Normal range of motion  Right lower leg: No edema  Left lower leg: No edema  Skin:     General: Skin is warm and dry  Neurological:      Mental Status: She is alert and oriented to person, place, and time  Psychiatric:         Mood and Affect: Mood normal          Behavior: Behavior normal          Lab Review:   Lab Results   Component Value Date    K 4 3 11/30/2022    K 4 8 03/29/2019     11/30/2022     03/29/2019    CO2 27 11/30/2022    CO2 27 03/29/2019    BUN 17 11/30/2022    BUN 21 04/25/2019    CREATININE 1 13 11/30/2022    CALCIUM 8 8 11/30/2022    CALCIUM 9 3 03/29/2019     Lab Results   Component Value Date    WBC 7 87 12/08/2022    HGB 12 5 12/08/2022    HCT 40 4 12/08/2022    MCV 91 12/08/2022     12/08/2022       Diagnostics:  I have personally reviewed pertinent reports      Reviewed PFT  Office Spirometry Results: ESS:    No results found

## 2023-06-22 ENCOUNTER — HOSPITAL ENCOUNTER (OUTPATIENT)
Dept: NON INVASIVE DIAGNOSTICS | Facility: CLINIC | Age: 78
Discharge: HOME | End: 2023-06-22
Payer: MEDICARE

## 2023-06-22 VITALS
DIASTOLIC BLOOD PRESSURE: 68 MMHG | HEART RATE: 78 BPM | HEIGHT: 60 IN | WEIGHT: 158 LBS | BODY MASS INDEX: 31.02 KG/M2 | SYSTOLIC BLOOD PRESSURE: 122 MMHG

## 2023-06-22 DIAGNOSIS — R06.02 SHORTNESS OF BREATH: ICD-10-CM

## 2023-06-22 DIAGNOSIS — I27.20 PULMONARY HYPERTENSION (HCC): ICD-10-CM

## 2023-06-22 PROCEDURE — 93306 TTE W/DOPPLER COMPLETE: CPT

## 2023-06-23 LAB
AORTIC ROOT: 3 CM
APICAL FOUR CHAMBER EJECTION FRACTION: 67 %
AV LVOT PEAK GRADIENT: 5 MMHG
AV PEAK GRADIENT: 6 MMHG
E WAVE DECELERATION TIME: 181 MS
FRACTIONAL SHORTENING: 34 % (ref 28–44)
INTERVENTRICULAR SEPTUM IN DIASTOLE (PARASTERNAL SHORT AXIS VIEW): 1.1 CM
INTERVENTRICULAR SEPTUM: 1.1 CM (ref 0.6–1.1)
LAAS-AP2: 14.6 CM2
LAAS-AP4: 13.6 CM2
LEFT ATRIUM AREA SYSTOLE SINGLE PLANE A4C: 12.4 CM2
LEFT ATRIUM SIZE: 3.1 CM
LEFT INTERNAL DIMENSION IN SYSTOLE: 2.5 CM (ref 2.1–4)
LEFT VENTRICULAR INTERNAL DIMENSION IN DIASTOLE: 3.8 CM (ref 3.5–6)
LEFT VENTRICULAR POSTERIOR WALL IN END DIASTOLE: 1.1 CM
LEFT VENTRICULAR STROKE VOLUME: 41 ML
LVSV (TEICH): 41 ML
MITRAL REGURGITATION PEAK VELOCITY: 4.55 M/S
MITRAL VALVE MEAN INFLOW VELOCITY: 3.97 M/S
MITRAL VALVE REGURGITANT PEAK GRADIENT: 83 MMHG
MV E'TISSUE VEL-SEP: 6 CM/S
MV PEAK A VEL: 1.13 M/S
MV PEAK E VEL: 78 CM/S
MV STENOSIS PRESSURE HALF TIME: 52 MS
MV VALVE AREA P 1/2 METHOD: 4.23 CM2
RIGHT ATRIUM AREA SYSTOLE A4C: 7.1 CM2
RIGHT VENTRICLE ID DIMENSION: 2.5 CM
SL CV DOP CALC MV VTI RETROGRADE: 170.7 CM
SL CV LEFT ATRIUM LENGTH A2C: 4.8 CM
SL CV MV MEAN GRADIENT RETROGRADE: 65 MMHG
SL CV PED ECHO LEFT VENTRICLE DIASTOLIC VOLUME (MOD BIPLANE) 2D: 64 ML
SL CV PED ECHO LEFT VENTRICLE SYSTOLIC VOLUME (MOD BIPLANE) 2D: 23 ML
TR MAX PG: 30 MMHG
TR PEAK VELOCITY: 2.7 M/S
TRICUSPID ANNULAR PLANE SYSTOLIC EXCURSION: 2 CM
TRICUSPID VALVE PEAK REGURGITATION VELOCITY: 2.73 M/S

## 2023-06-23 PROCEDURE — 93306 TTE W/DOPPLER COMPLETE: CPT | Performed by: INTERNAL MEDICINE

## 2023-07-10 ENCOUNTER — CLINICAL SUPPORT (OUTPATIENT)
Dept: RADIATION ONCOLOGY | Facility: CLINIC | Age: 78
End: 2023-07-10
Attending: RADIOLOGY
Payer: MEDICARE

## 2023-07-10 VITALS
TEMPERATURE: 97.5 F | SYSTOLIC BLOOD PRESSURE: 134 MMHG | BODY MASS INDEX: 32.03 KG/M2 | RESPIRATION RATE: 18 BRPM | OXYGEN SATURATION: 90 % | DIASTOLIC BLOOD PRESSURE: 70 MMHG | HEART RATE: 76 BPM | WEIGHT: 164 LBS

## 2023-07-10 DIAGNOSIS — Z17.1 MALIGNANT NEOPLASM OF OVERLAPPING SITES OF RIGHT BREAST IN FEMALE, ESTROGEN RECEPTOR NEGATIVE (HCC): Primary | ICD-10-CM

## 2023-07-10 DIAGNOSIS — C50.811 MALIGNANT NEOPLASM OF OVERLAPPING SITES OF RIGHT BREAST IN FEMALE, ESTROGEN RECEPTOR NEGATIVE (HCC): Primary | ICD-10-CM

## 2023-07-10 PROCEDURE — 99211 OFF/OP EST MAY X REQ PHY/QHP: CPT | Performed by: RADIOLOGY

## 2023-07-10 PROCEDURE — 99213 OFFICE O/P EST LOW 20 MIN: CPT | Performed by: RADIOLOGY

## 2023-07-10 NOTE — PROGRESS NOTES
Silvina Stage 1945 is a 70 y.o. female with multiple comorbidities including COPD who is oxygen dependent and stage Ia (pT1cN0 M0) grade 3 invasive right breast carcinoma status postlumpectomy and sentinel lymph node biopsy achieving negative margins on 4/28/2022. Tumor cells were ER/IA/HER2/flash negative. She received 1 cycle of dose reduced TC, but discontinued due to complications and toxicity. She completed a course of radiation therapy on 12/15/22. She was last seen 1/11/23 and presents today for follow up. 1/19/23 Dr. Param Montemayor  Follow up 12 months    2/14/23 B/L diagnostic mammogram & right breast US  Bilateral  Mammo diagnostic bilateral w 3d & cad  There are no suspicious masses, grouped microcalcifications or areas of unexplained architectural distortion. The skin and nipple areolar complex are unremarkable. Square shaped skin marker overlying the breast demarcates an area of pain which was marked by the technologist prior to imaging, as directed by the patient. No discrete abnormality is noted in this region. Triangular shaped skin marker overlying the breast demarcates an area of palpable concern which was marked by the technologist prior to imaging, as directed by the patient. No discrete abnormality is noted in this region. Postsurgical changes are noted in this region. Benign-appearing calcifications are noted in each breast.   Right  US breast right limited (diagnostic)  Targeted breast ultrasound is performed using a dedicated high-resolution probe, directed by the patient. In the area of palpable concern, postsurgical changes are noted. No discrete abnormality is identified separate from the scar tissue. No suspicious sonographic findings identified in the area of pain. 2/22/23 Dr. Jamal Pimentel   had a mammogram and ultrasound which I reviewed personally no worrisome features.   She is doing overall well with no palpable mass masses nipple discharge nipple retraction or skin changes well-healed right breast and right axillary incision. Follow up 6 months    Upcomin23 Dr. Rowell Corporal  24 Dr. Jeff Gray      Follow up visit     Oncology History   Malignant neoplasm of overlapping sites of right breast in female, estrogen receptor negative ([address])   3/25/2022 Initial Diagnosis    Malignant neoplasm of right female breast ([address])     3/25/2022 Biopsy    Right breast ultrasound guided biopsy  12 o'clock 5 cm from nipple  Invasive breast carcinoma of no special type (ductal NST/ invasive ductal carcinoma with apocrine features)  Grade 2  ER 0  IL 0  HER 2 2+   FISH negative  Lymphovascular invasion not identified    Concordant. Malignancy is unifocal. Mass measures 1.4 cm on mammogram and 1.9 cm on ultrasound. Right axilla ultrasound clear. Left brest clear. Amanda  reflector placed. 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 2022 -  Cancer Staged    Staging form: Breast, AJCC 8th Edition  - Clinical stage from 2022: Stage IB (cT1c, cN0, cM0, G2, ER-, IL-, HER2-) - Signed by King Leonel MD on 2022  Stage prefix: Initial diagnosis  Nuclear grade: G2  Histologic grading system: 3 grade system  HER2-IHC interpretation: Equivocal  HER2-IHC value: Score 2+  HER2-FISH interpretation: Negative       2022 Genetic Testing    [facility]  A total of 36 genes were evaluated, including: GILL, BRCA1, BRCA2, CDH1, CHEK2, PALB2, PTEN, STK11, TP53  Negative result.  No pathogenic sequence variants or deletions/duplications identified     2022 Surgery    Right breast amadna  directed lumpectomy with sentinel lymph node biopsy and axillary dissection  Invasive ductal carcinoma with apocrine features  Grade 3  1.4 cm  0/4 Lymph nodes       2022 - 6/15/2022 Chemotherapy    Pegfilgrastim-bmez (ZIEXTENZO), 6 mg, Subcutaneous, Once, 1 of 4 cycles  Administration: 6 mg (6/15/2022)  cyclophosphamide (CYTOXAN) IVPB, 450 mg/m2 = 778 mg (75 % of original dose 600 mg/m2), Intravenous, Once, 1 of 4 cycles  Dose modification: 450 mg/m2 (75 % of original dose 600 mg/m2, Cycle 1, Reason: Dose Not Tolerated)  Administration: 778 mg (6/14/2022)  DOCEtaxel (TAXOTERE) chemo infusion, 56.25 mg/m2 = 97.4 mg (75 % of original dose 75 mg/m2), Intravenous, Once, 1 of 4 cycles  Dose modification: 56.25 mg/m2 (75 % of original dose 75 mg/m2, Cycle 1, Reason: Dose Not Tolerated)  Administration: 97.4 mg (6/14/2022)     11/17/2022 - 12/15/2022 Radiation    Treatments:  Course: C1  Plan ID Energy Fractions Dose per Fraction (cGy) Dose Correction (cGy) Total Dose Delivered (cGy) Elapsed Days   R Breast Hypo 6X 15 / 15 267 0 4,005 21   R BrstBst 6X 10X/6X 5 / 5 200 0 1,000 6    Treatment Dates:  11/17/2022 - 12/15/2022. Review of Systems:  Review of Systems   Constitutional: Positive for fatigue and unexpected weight change (gained weight). HENT: Positive for dental problem (full dentures). Eyes: Positive for visual disturbance. Fuchs dystrophy   Respiratory: Positive for cough and shortness of breath. 2 liters O2 via nasal canula   Cardiovascular: Negative. Gastrointestinal: Positive for constipation, diarrhea and nausea. Genitourinary: Negative. Musculoskeletal: Positive for back pain. Skin: Positive for color change (right breast hyperpigmentation). Allergic/Immunologic: Negative. Neurological: Positive for light-headedness and numbness (toes and fingers). Hematological: Negative. Psychiatric/Behavioral: Positive for sleep disturbance. Negative for suicidal ideas. Occasionally feels depressed.        Clinical Trial: no    Health Maintenance   Topic Date Due   • Colorectal Cancer Screening  08/30/2021   • COVID-19 Vaccine (4 - Booster for Pfizer series) 08/20/2022   • Fall Risk  04/13/2023   • Urinary Incontinence Screening  04/13/2023   • Medicare Annual Wellness Visit (AWV)  04/13/2023   • Lung Cancer Screening 06/20/2023   • Influenza Vaccine (1) 09/01/2023   • BMI: Followup Plan  10/10/2023   • Depression Screening  01/11/2024   • Breast Cancer Screening: Mammogram  02/14/2024   • DXA SCAN  02/15/2024   • BMI: Adult  06/22/2024   • Hepatitis C Screening  Completed   • Osteoporosis Screening  Completed   • Pneumococcal Vaccine: 65+ Years  Completed   • HIB Vaccine  Aged Out   • IPV Vaccine  Aged Out   • Hepatitis A Vaccine  Aged Out   • Meningococcal ACWY Vaccine  Aged Out   • HPV Vaccine  Aged Out     Patient Active Problem List   Diagnosis   • Anxiety   • Aortoiliac occlusive disease ([address])   • Carotid artery plaque, bilateral   • Centrilobular emphysema ([address])   • Hyperlipidemia   • Osteoporosis   • Restless leg syndrome   • Subclavian artery stenosis, left (HCC)   • PVD (peripheral vascular disease) ([address])   • Chronic sinusitis   • Pancreatic mass   • BMI 34.0-34.9,adult   • Seborrheic keratoses   • Stage 3 chronic kidney disease ([address])   • Closed avulsion fracture of lateral malleolus of right fibula   • Prediabetes   • Vitamin D deficiency   • COPD (chronic obstructive pulmonary disease) ([address])   • Malignant neoplasm of overlapping sites of right breast in female, estrogen receptor negative ([address])   • Oral candidiasis   • Chemotherapy induced neutropenia ([address])   • Proctocolitis   • Transaminitis   • C. difficile colitis   • History of lumpectomy of right breast   • Ulcerative (chronic) pancolitis without complications ([address])   • Chronic hypoxemic respiratory failure ([address])     Past Medical History:   Diagnosis Date   • Anxiety    • Atherosclerosis of native artery of both lower extremities with intermittent claudication ([address]) 10/15/2015    Transitioned From: Cardiovascular Symptoms   • Breast cancer ([address]) 03/25/2022    right breast   • Carotid artery plaque, bilateral 03/21/2017    Transitioned From:  Atherosclerosis of both carotid arteries   • Chronic kidney disease    • Chronic sinusitis     Last assessed: 11/11/13   • COPD (chronic obstructive pulmonary disease) ([address] [facility])    • COVID     21 not hopsitalized- flu-like symptoms   • Fall 2021   • Fracture, ankle closed, bimalleolar, right, initial encounter 2021   • GERD (gastroesophageal reflux disease)    • Hyperlipidemia    • Lung nodule    • PNA (pneumonia)    • PONV (postoperative nausea and vomiting)     with C-sections   • Pulmonary emphysema (HCC)    • PVD (peripheral vascular disease) ([facility])    • Restless leg syndrome    • Stage 3 chronic kidney disease ([address]) 2019   • Tobacco abuse      Past Surgical History:   Procedure Laterality Date   • BREAST LUMPECTOMY Right 2022    Procedure: ISAI  DIRECTED LUMPECTOMY;  Surgeon: Aubrie Pruitt MD;  Location: [facility];  Service: Surgical Oncology   • CATARACT EXTRACTION     •  SECTION     • COLONOSCOPY      Complete.  Resolved: 13   • DESCENDING AORTIC ANEURYSM REPAIR W/ STENT  2019   • IR AORTAGRAM WITH RUN-OFF  8/15/2019   • LYMPH NODE BIOPSY Right 2022    Procedure: LYMPHATIC MAPPING WITH BLUE AND RADIOACTIVE DYES, SENTINEL LYMPH NODE BIOPSY, INJECTION IN OR BY DR RODRÍGUEZ AT 1300;  Surgeon: Aubrie Pruitt MD;  Location: [facility];  Service: Surgical Oncology   • SHOULDER SURGERY      For frozen shoulder    • TONSILLECTOMY     • US BREAST ISAI  NEEDLE LOC RIGHT Right 3/25/2022   • US GUIDED BREAST BIOPSY RIGHT COMPLETE Right 3/25/2022     Family History   Problem Relation Age of Onset   • No Known Problems Mother    • No Known Problems Father    • Arthritis Sister    • Pancreatic cancer Sister 61   • Melanoma Brother         twice   • Prostate cancer Brother    • Heart attack Family         Acute Myocardial Infarction   • Cirrhosis Family    • Prostate cancer Family    • Skin cancer Family    • Stroke Family         Syndrome   • No Known Problems Daughter    • No Known Problems Maternal Grandmother    • No Known Problems Paternal Grandmother    • No Known Problems Sister    • No Known Problems Maternal Aunt    • Breast cancer Other 27     Social History     Socioeconomic History   • Marital status:      Spouse name: Not on file   • Number of children: 2   • Years of education: Not on file   • Highest education level: Not on file   Occupational History   • Occupation: Retired/   Tobacco Use   • Smoking status: Former     Packs/day: 2.00     Years: 56.00     Total pack years: 112.00     Types: Cigarettes     Start date: 1962     Quit date: 2018     Years since quittin.1   • Smokeless tobacco: Never   Vaping Use   • Vaping Use: Never used   Substance and Sexual Activity   • Alcohol use: Yes     Comment: occasionally    • Drug use: No   • Sexual activity: Not Currently     Partners: Male   Other Topics Concern   • Not on file   Social History Narrative    Living w/adult children    No advance directive on file     Social Determinants of Health     Financial Resource Strain: Not on file   Food Insecurity: Not on file   Transportation Needs: No Transportation Needs (2022)    PRAPARE - Transportation    • Lack of Transportation (Medical): No    • Lack of Transportation (Non-Medical):  No   Physical Activity: Inactive (2021)    Exercise Vital Sign    • Days of Exercise per Week: 0 days    • Minutes of Exercise per Session: 0 min   Stress: Stress Concern Present (2021)    [facility]    • Feeling of Stress : Very much   Social Connections: Not on file   Intimate Partner Violence: Not on file   Housing Stability: [address]  (2022)    Housing Stability Vital Sign    • Unable to Pay for Housing in the Last Year: No    • Number of [address] in the Last Year: 1    • Unstable Housing in the Last Year: No       Current Outpatient Medications:   •  albuterol (2.5 mg/3 mL) 0.083 % nebulizer solution, Take 1 vial (2.5 mg total) by nebulization every 6 (six) hours as needed for wheezing or shortness of breath, Disp: 360 mL, Rfl: 3  •  albuterol (PROVENTIL HFA,VENTOLIN HFA) 90 mcg/act inhaler, Inhale 2 puffs every 6 (six) hours as needed for wheezing, Disp: 3 Inhaler, Rfl: 3  •  ALPRAZolam (XANAX) 0.5 mg tablet, Take 1 tablet (0.5 mg total) by mouth 2 (two) times a day as needed for anxiety, Disp: 45 tablet, Rfl: 0  •  ASPIRIN 81 PO, Take by mouth, Disp: , Rfl:   •  atorvastatin (LIPITOR) 40 mg tablet, , Disp: , Rfl:   •  ergocalciferol (VITAMIN D2) 50,000 units, take 1 capsule by mouth every week, Disp: 12 capsule, Rfl: 0  •  famotidine (PEPCID) 20 mg tablet, Take 1 tablet (20 mg total) by mouth daily, Disp: , Rfl: 0  •  fluticasone (FLONASE) 50 mcg/act nasal spray, 2 sprays into each nostril daily, Disp: 11.1 mL, Rfl: 1  •  fluticasone-umeclidinium-vilanterol (Trelegy Ellipta) 100-62.5-25 mcg/actuation inhaler, Inhale 1 puff daily Rinse mouth after use., Disp: 60 blister, Rfl: 0  •  gabapentin (NEURONTIN) 300 mg capsule, Take 1 capsule (300 mg total) by mouth 2 (two) times a day, Disp: 180 capsule, Rfl: 2  •  gabapentin (NEURONTIN) 400 mg capsule, take 1 tablet by mouth at bedtime, Disp: 90 capsule, Rfl: 1  •  mometasone (ELOCON) 0.1 % cream, Apply topically daily as needed (rash), Disp: 50 g, Rfl: 1  •  naloxone (NARCAN) 4 mg/0.1 mL nasal spray, Administer 1 spray into a nostril. If no response after 2-3 minutes, give another dose in the other nostril using a new spray.  (Patient not taking: Reported on 1/11/2023), Disp: 1 each, Rfl: 1  •  olopatadine (PATANOL) 0.1 % ophthalmic solution, , Disp: , Rfl: 0  •  ondansetron (ZOFRAN) 8 mg tablet, Take 1 tablet (8 mg total) by mouth every 8 (eight) hours as needed for nausea or vomiting, Disp: 20 tablet, Rfl: 2  •  rosuvastatin (CRESTOR) 20 MG tablet, take 1 tablet by mouth once daily, Disp: 90 tablet, Rfl: 0  •  saccharomyces boulardii (FLORASTOR) 250 mg capsule, Take 1 capsule (250 mg total) by mouth 2 (two) times a day, Disp: , Rfl: 0  •  sodium chloride () 2 % hypertonic ophthalmic solution, Sue 128 2 % eye drops, Disp: , Rfl:   Allergies   Allergen Reactions   • Tiotropium Bromide Monohydrate Shortness Of Breath   • Augmentin [Amoxicillin-Pot Clavulanate] Abdominal Pain     Pt received ceftriaxone 1 g IV on 5-21-18, gets sharp pains    • Tiotropium Abdominal Pain   • Tylenol With Codeine #3 [Acetaminophen-Codeine] Abdominal Pain   • Other Other (See Comments)     There were no vitals filed for this visit.

## 2023-07-10 NOTE — PROGRESS NOTES
Follow-up - Radiation Oncology   Yadira Lambert 1945 70 y.o. female [id number]      History of Present Illness   Cancer Staging   Malignant neoplasm of overlapping sites of right breast in female, estrogen receptor negative ([address])  Staging form: Breast, AJCC 8th Edition  - Clinical stage from 4/4/2022: Stage IB (cT1c, cN0, cM0, G2, ER-, CO-, HER2-) - Signed by Surinder Mello MD on 4/4/2022  Stage prefix: Initial diagnosis  Nuclear grade: G2  Histologic grading system: 3 grade system  HER2-IHC interpretation: Equivocal  HER2-IHC value: Score 2+  HER2-FISH interpretation: Negative      Yadira Lambert is a 70 y.o. woman with multiple comorbidities including COPD, oxygen dependent, and stage IA (pT1cN0 M0) grade 3 invasive right breast carcinoma status postlumpectomy and sentinel lymph node biopsy achieving negative margins on 4/28/2022.  Tumor cells were ER/CO/HER2/flash negative. She received 1 cycle of dose reduced TC, but discontinued due to complications and toxicity.  She completed a course of radiation therapy on 12/15/22. She was last seen 1/11/23 and presents today for follow up.     1/19/23 Dr. Bobby Conrad  Follow up 12 months     2/14/23 B/L diagnostic mammogram & right breast US was benign (BIRADS-2). Follow-up imagng was recommended in 1 year.     2/22/23 Dr. Diane Parker  Had a mammogram and ultrasound which I reviewed personally no worrisome features. Fermin Alvares is doing overall well with no palpable mass masses nipple discharge nipple retraction or skin changes well-healed right breast and right axillary incision. Follow up 6 months     The patient continues to experience tenderness of the breast most days. This waxes and wanes in severity and improves with massage. She notes she has swelling periodically and pain is worse on these days. She is unable to massage regularly due to tenderness that is exacerbated with pressure. Denies significant pain, erythema, or fever.   She denies new palpable nodules, skin changes, or nipple discharge of the breast bilaterally. She denies upper extremity edema. She has been referred for corneal transplant, but decided to defer at this time. Upcomin23 Dr. Nicky Kocher  24 Dr. Johana Fernandez   Oncology History   Malignant neoplasm of overlapping sites of right breast in female, estrogen receptor negative ([address])   3/25/2022 Initial Diagnosis    Malignant neoplasm of right female breast ([address])     3/25/2022 Biopsy    Right breast ultrasound guided biopsy  12 o'clock 5 cm from nipple  Invasive breast carcinoma of no special type (ductal NST/ invasive ductal carcinoma with apocrine features)  Grade 2  ER 0  MA 0  HER 2 2+   FISH negative  Lymphovascular invasion not identified    Concordant. Malignancy is unifocal. Mass measures 1.4 cm on mammogram and 1.9 cm on ultrasound. Right axilla ultrasound clear. Left brest clear. Lexy  reflector placed. 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 2022 -  Cancer Staged    Staging form: Breast, AJCC 8th Edition  - Clinical stage from 2022: Stage IB (cT1c, cN0, cM0, G2, ER-, MA-, HER2-) - Signed by Christiana Guthrie MD on 2022  Stage prefix: Initial diagnosis  Nuclear grade: G2  Histologic grading system: 3 grade system  HER2-IHC interpretation: Equivocal  HER2-IHC value: Score 2+  HER2-FISH interpretation: Negative       2022 Genetic Testing    [facility]  A total of 36 genes were evaluated, including: GILL, BRCA1, BRCA2, CDH1, CHEK2, PALB2, PTEN, STK11, TP53  Negative result.  No pathogenic sequence variants or deletions/duplications identified     2022 Surgery    Right breast lexy  directed lumpectomy with sentinel lymph node biopsy and axillary dissection  Invasive ductal carcinoma with apocrine features  Grade 3  1.4 cm  0/4 Lymph nodes       2022 - 6/15/2022 Chemotherapy    Pegfilgrastim-bmez (ZIEXTENZO), 6 mg, Subcutaneous, Once, 1 of 4 cycles  Administration: 6 mg (6/15/2022)  cyclophosphamide (CYTOXAN) IVPB, 450 mg/m2 = 778 mg (75 % of original dose 600 mg/m2), Intravenous, Once, 1 of 4 cycles  Dose modification: 450 mg/m2 (75 % of original dose 600 mg/m2, Cycle 1, Reason: Dose Not Tolerated)  Administration: 778 mg (6/14/2022)  DOCEtaxel (TAXOTERE) chemo infusion, 56.25 mg/m2 = 97.4 mg (75 % of original dose 75 mg/m2), Intravenous, Once, 1 of 4 cycles  Dose modification: 56.25 mg/m2 (75 % of original dose 75 mg/m2, Cycle 1, Reason: Dose Not Tolerated)  Administration: 97.4 mg (6/14/2022)     11/17/2022 - 12/15/2022 Radiation    Treatments:  Course: C1  Plan ID Energy Fractions Dose per Fraction (cGy) Dose Correction (cGy) Total Dose Delivered (cGy) Elapsed Days   R Breast Hypo 6X 15 / 15 267 0 4,005 21   R BrstBst 6X 10X/6X 5 / 5 200 0 1,000 6    Treatment Dates:  11/17/2022 - 12/15/2022. Past Medical History:   Diagnosis Date   • Anxiety    • Atherosclerosis of native artery of both lower extremities with intermittent claudication ([address]) 10/15/2015    Transitioned From: Cardiovascular Symptoms   • Breast cancer ([address]) 03/25/2022    right breast   • Carotid artery plaque, bilateral 03/21/2017    Transitioned From:  Atherosclerosis of both carotid arteries   • Chronic kidney disease    • Chronic sinusitis     Last assessed: 11/11/13   • COPD (chronic obstructive pulmonary disease) ([address])    • COVID     12/25/21 not hopsitalized- flu-like symptoms   • Fall 06/16/2021   • Fracture, ankle closed, bimalleolar, right, initial encounter 06/2021   • GERD (gastroesophageal reflux disease)    • Hyperlipidemia    • Lung nodule    • PNA (pneumonia)    • PONV (postoperative nausea and vomiting)     with C-sections   • Pulmonary emphysema (HCC)    • PVD (peripheral vascular disease) ([address])    • Restless leg syndrome    • Stage 3 chronic kidney disease ([address]) 04/22/2019   • Tobacco abuse      Past Surgical History:   Procedure Laterality Date   • BREAST LUMPECTOMY Right 2022    Procedure: ISAI  DIRECTED LUMPECTOMY;  Surgeon: Jeffy Nevarez MD;  Location: [facility];  Service: Surgical Oncology   • CATARACT EXTRACTION     •  SECTION     • COLONOSCOPY      Complete.  Resolved: 13   • DESCENDING AORTIC ANEURYSM REPAIR W/ STENT  2019   • IR AORTAGRAM WITH RUN-OFF  8/15/2019   • LYMPH NODE BIOPSY Right 2022    Procedure: LYMPHATIC MAPPING WITH BLUE AND RADIOACTIVE DYES, SENTINEL LYMPH NODE BIOPSY, INJECTION IN OR BY DR Wilfredo Lowery AT 1300;  Surgeon: Jeffy Nevarez MD;  Location: [facility];  Service: Surgical Oncology   • SHOULDER SURGERY      For frozen shoulder    • TONSILLECTOMY     • US BREAST ISAI  NEEDLE LOC RIGHT Right 3/25/2022   • US GUIDED BREAST BIOPSY RIGHT COMPLETE Right 3/25/2022       Social History   Social History     Substance and Sexual Activity   Alcohol Use Yes    Comment: occasionally      Social History     Substance and Sexual Activity   Drug Use No     Social History     Tobacco Use   Smoking Status Former   • Packs/day: 2.00   • Years: 56.00   • Total pack years: 112.00   • Types: Cigarettes   • Start date: 1962   • Quit date: 2018   • Years since quittin.1   Smokeless Tobacco Never         Meds/Allergies     Current Outpatient Medications:   •  albuterol (2.5 mg/3 mL) 0.083 % nebulizer solution, Take 1 vial (2.5 mg total) by nebulization every 6 (six) hours as needed for wheezing or shortness of breath, Disp: 360 mL, Rfl: 3  •  albuterol (PROVENTIL HFA,VENTOLIN HFA) 90 mcg/act inhaler, Inhale 2 puffs every 6 (six) hours as needed for wheezing, Disp: 3 Inhaler, Rfl: 3  •  ALPRAZolam (XANAX) 0.5 mg tablet, Take 1 tablet (0.5 mg total) by mouth 2 (two) times a day as needed for anxiety, Disp: 45 tablet, Rfl: 0  •  ASPIRIN 81 PO, Take by mouth, Disp: , Rfl:   •  ergocalciferol (VITAMIN D2) 50,000 units, take 1 capsule by mouth every week, Disp: 12 capsule, Rfl: 0  •  famotidine (PEPCID) 20 mg tablet, Take 1 tablet (20 mg total) by mouth daily, Disp: , Rfl: 0  •  fluticasone (FLONASE) 50 mcg/act nasal spray, 2 sprays into each nostril daily, Disp: 11.1 mL, Rfl: 1  •  gabapentin (NEURONTIN) 300 mg capsule, Take 1 capsule (300 mg total) by mouth 2 (two) times a day, Disp: 180 capsule, Rfl: 2  •  gabapentin (NEURONTIN) 400 mg capsule, take 1 tablet by mouth at bedtime, Disp: 90 capsule, Rfl: 1  •  mometasone (ELOCON) 0.1 % cream, Apply topically daily as needed (rash), Disp: 50 g, Rfl: 1  •  olopatadine (PATANOL) 0.1 % ophthalmic solution, , Disp: , Rfl: 0  •  ondansetron (ZOFRAN) 8 mg tablet, Take 1 tablet (8 mg total) by mouth every 8 (eight) hours as needed for nausea or vomiting, Disp: 20 tablet, Rfl: 2  •  rosuvastatin (CRESTOR) 20 MG tablet, take 1 tablet by mouth once daily, Disp: 90 tablet, Rfl: 0  •  saccharomyces boulardii (FLORASTOR) 250 mg capsule, Take 1 capsule (250 mg total) by mouth 2 (two) times a day, Disp: , Rfl: 0  •  sodium chloride () 2 % hypertonic ophthalmic solution, Sue 128 2 % eye drops, Disp: , Rfl:   •  atorvastatin (LIPITOR) 40 mg tablet, , Disp: , Rfl:   •  fluticasone-umeclidinium-vilanterol (Trelegy Ellipta) 100-62.5-25 mcg/actuation inhaler, Inhale 1 puff daily Rinse mouth after use. (Patient not taking: Reported on 7/10/2023), Disp: 60 blister, Rfl: 0  •  naloxone (NARCAN) 4 mg/0.1 mL nasal spray, Administer 1 spray into a nostril. If no response after 2-3 minutes, give another dose in the other nostril using a new spray.  (Patient not taking: Reported on 1/11/2023), Disp: 1 each, Rfl: 1  Allergies   Allergen Reactions   • Tiotropium Bromide Monohydrate Shortness Of Breath   • Augmentin [Amoxicillin-Pot Clavulanate] Abdominal Pain     Pt received ceftriaxone 1 g IV on 5-21-18, gets sharp pains    • Tiotropium Abdominal Pain   • Tylenol With Codeine #3 [Acetaminophen-Codeine] Abdominal Pain   • Other Other (See Comments)         Review of Systems Constitutional: Positive for fatigue and unexpected weight change (gained weight). HENT: Positive for dental problem (full dentures). Eyes: Positive for visual disturbance. Fuchs dystrophy   Respiratory: Positive for cough and shortness of breath. 2 liters O2 via nasal canula   Cardiovascular: Negative. Gastrointestinal: Positive for constipation, diarrhea and nausea. Genitourinary: Negative. Musculoskeletal: Positive for back pain. Skin: Positive for color change (right breast hyperpigmentation). Allergic/Immunologic: Negative. Neurological: Positive for light-headedness and numbness (toes and fingers). Hematological: Negative. Psychiatric/Behavioral: Positive for sleep disturbance. Negative for suicidal ideas. Occasionally feels depressed. OBJECTIVE:   /70   Pulse 76   Temp 97.5 °F (36.4 °C)   Resp 18   Wt 74.4 kg (164 lb)   SpO2 90%   BMI 32.03 kg/m²   Karnofsky: 70 - Cares for self; unable to carry on normal activity or do normal work     Physical Exam  Vitals and nursing note reviewed. Constitutional:       General: She is not in acute distress. Appearance: She is well-developed. Comments: O2 NC 2L noted. Cardiovascular:      Rate and Rhythm: Normal rate and regular rhythm. Pulmonary:      Breath sounds: No wheezing, rhonchi or rales. Comments: Decreased breath sounds throughout  Chest:      Comments: Breast examination demonstrates the right breast is smaller then the left. Contours are symmetric. The right breast has a well-healed 12:00 lumpectomy scar associated with denser fibrosis and enderness to deep palpation. There is mild diffuse fibrosis also more notable at nipple. Mild diffuse hyperpigmentation of the right breast.  She has mild tenderness to deep palpation of the right breast. There are no other palpable nodules or suspicious skin changes of breast bilaterally  Abdominal:      Palpations: Abdomen is soft. Tenderness: There is no abdominal tenderness. Musculoskeletal:      Right lower leg: No edema. Left lower leg: No edema. Lymphadenopathy:      Cervical: No cervical adenopathy. Upper Body:      Right upper body: No supraclavicular or axillary adenopathy. Left upper body: No supraclavicular or axillary adenopathy. Neurological:      Mental Status: She is alert and oriented to person, place, and time. Gait: Gait normal.            RESULTS  Imaging Studies:  2/14/23  Mammo diagnostic bilateral w 3d & cad   US breast right limited (diagnostic)  Status: Final result      Show images for Mammo diagnostic bilateral w 3d & cad  Study Result    Narrative & Impression   DIAGNOSIS: History of right breast cancer; Lump in upper outer quadrant of right breast; Breast tenderness      TECHNIQUE:   Digital diagnostic mammography was performed. Computer Aided Detection (CAD) analyzed all applicable images. Ultrasound of the right breast(s) was performed.      COMPARISONS: Prior breast imaging dated: 04/28/2022, 03/25/2022, 03/25/2022, 03/25/2022, 03/17/2022, 03/17/2022, 02/15/2022, 09/04/2019, and 03/14/2013     RELEVANT HISTORY:   Family Breast Cancer History: History of breast cancer in Other. Family Medical History: Family medical history includes breast cancer in other. Personal History: No known relevant hormone history. Surgical history includes lumpectomy. Medical history includes breast cancer.     RISK ASSESSMENT:   [facility] risk assessment reporting was suppressed due to the patient's history and/or demographic factors.     TISSUE DENSITY:   There are scattered areas of fibroglandular density.        INDICATION: Amanda Stokes is a 78 y.o. female presenting for right breast lump and pain .     FINDINGS:   Bilateral  Mammo diagnostic bilateral w 3d & cad  There are no suspicious masses, grouped microcalcifications or areas of unexplained architectural distortion.  The skin and nipple areolar complex are unremarkable. Square shaped skin marker overlying the breast demarcates an area of pain which was marked by the technologist prior to imaging, as directed by the patient. No discrete abnormality is noted in this region. Triangular shaped skin marker overlying the breast demarcates an area of palpable concern which was marked by the technologist prior to imaging, as directed by the patient. No discrete abnormality is noted in this region. Postsurgical changes are noted in this region. Benign-appearing calcifications are noted in each breast.     Right  US breast right limited (diagnostic)  Targeted breast ultrasound is performed using a dedicated high-resolution probe, directed by the patient. In the area of palpable concern, postsurgical changes are noted. No discrete abnormality is identified separate from the scar tissue. No suspicious sonographic findings identified in the area of pain.        IMPRESSION:  No evidence of malignancy. Clinical management of right breast lump and pain is recommended.           ASSESSMENT/BI-RADS CATEGORY:  Left: 2 - Benign  Right: 2 - Benign  Overall: 2 - Benign     RECOMMENDATION:       - Clinical management for the right breast.       - Diagnostic mammogram in 1 year for both breasts. Assessment/Plan:  Wyona Severe is a 70 y.o. woman with multiple comorbidities and a history of IA, grade 3 invasive right breast carcinoma status post resection on 4/28/2022 followed by truncated 1 cycle of adjuvant TC. Tumor cells were ER/MN/HER2/flash negative. She completed a course of radiation therapy on 12/15/22. She is currently clinically TRACIE. The patient continues to have tenderness of the right breast, although this seems to have improved somewhat on exam.  She has fibrotic changes and based on history may also have some lymphedema of the breast.  She does not have appreciable edema today.   I offered her referral to lymphedema clinic for evaluation and management as indicated, but she deferred at this time. I recommended continued massage as needed when there is swelling of the breast.  I instructed her to practice protection to the irradiated skin. She is compliant with both medical oncology and surgical oncology follow-up. Dr. Eleonora Arambula has been ordering her imaging which is next due in February 2024. I have asked that she return for follow-up in 6 months. We will see her sooner should the need arise. Blue Lainez MD  7/10/2023,10:04 AM    Portions of the record may have been created with voice recognition software.  Occasional wrong word or "sound a like" substitutions may have occurred due to the inherent limitations of voice recognition software.  Read the chart carefully and recognize, using context, where substitutions have occurred.

## 2023-07-25 ENCOUNTER — APPOINTMENT (OUTPATIENT)
Dept: LAB | Facility: HOSPITAL | Age: 78
End: 2023-07-25
Payer: MEDICARE

## 2023-07-25 DIAGNOSIS — I74.09 AORTOILIAC OCCLUSIVE DISEASE (HCC): ICD-10-CM

## 2023-07-25 DIAGNOSIS — E78.5 HYPERLIPIDEMIA, UNSPECIFIED HYPERLIPIDEMIA TYPE: ICD-10-CM

## 2023-07-25 LAB
CHOLEST SERPL-MCNC: 111 MG/DL
HDLC SERPL-MCNC: 39 MG/DL
LDLC SERPL CALC-MCNC: 47 MG/DL (ref 0–100)
TRIGL SERPL-MCNC: 123 MG/DL

## 2023-07-25 PROCEDURE — 36415 COLL VENOUS BLD VENIPUNCTURE: CPT

## 2023-07-25 PROCEDURE — 80061 LIPID PANEL: CPT

## 2023-08-04 DIAGNOSIS — G25.81 RLS (RESTLESS LEGS SYNDROME): ICD-10-CM

## 2023-08-04 DIAGNOSIS — F41.1 GENERALIZED ANXIETY DISORDER: ICD-10-CM

## 2023-08-04 DIAGNOSIS — E55.9 VITAMIN D DEFICIENCY: ICD-10-CM

## 2023-08-04 RX ORDER — ERGOCALCIFEROL 1.25 MG/1
CAPSULE ORAL
Qty: 12 CAPSULE | Refills: 0 | Status: SHIPPED | OUTPATIENT
Start: 2023-08-04

## 2023-08-04 RX ORDER — ALPRAZOLAM 0.5 MG/1
0.5 TABLET ORAL 2 TIMES DAILY PRN
Qty: 45 TABLET | Refills: 0 | Status: SHIPPED | OUTPATIENT
Start: 2023-08-04

## 2023-08-04 RX ORDER — GABAPENTIN 300 MG/1
300 CAPSULE ORAL 2 TIMES DAILY
Qty: 180 CAPSULE | Refills: 2 | Status: SHIPPED | OUTPATIENT
Start: 2023-08-04

## 2023-08-04 RX ORDER — GABAPENTIN 400 MG/1
400 CAPSULE ORAL
Qty: 100 CAPSULE | Refills: 1 | Status: SHIPPED | OUTPATIENT
Start: 2023-08-04

## 2023-08-08 DIAGNOSIS — I74.09 AORTOILIAC OCCLUSIVE DISEASE (HCC): ICD-10-CM

## 2023-08-08 RX ORDER — ROSUVASTATIN CALCIUM 20 MG/1
TABLET, COATED ORAL
Qty: 90 TABLET | Refills: 0 | Status: SHIPPED | OUTPATIENT
Start: 2023-08-08

## 2023-08-08 NOTE — TELEPHONE ENCOUNTER
Continue with statin. LFT's 11/2022 are ok. Recommend that she have refilled with PCP in the future.  -RD

## 2023-08-23 ENCOUNTER — RA CDI HCC (OUTPATIENT)
Dept: OTHER | Facility: HOSPITAL | Age: 78
End: 2023-08-23

## 2023-08-23 ENCOUNTER — OFFICE VISIT (OUTPATIENT)
Dept: SURGICAL ONCOLOGY | Facility: CLINIC | Age: 78
End: 2023-08-23
Payer: MEDICARE

## 2023-08-23 VITALS
DIASTOLIC BLOOD PRESSURE: 80 MMHG | TEMPERATURE: 98.4 F | BODY MASS INDEX: 32.39 KG/M2 | HEIGHT: 60 IN | RESPIRATION RATE: 16 BRPM | HEART RATE: 70 BPM | OXYGEN SATURATION: 98 % | WEIGHT: 165 LBS | SYSTOLIC BLOOD PRESSURE: 132 MMHG

## 2023-08-23 DIAGNOSIS — K86.89 PANCREATIC MASS: ICD-10-CM

## 2023-08-23 DIAGNOSIS — Z17.1 MALIGNANT NEOPLASM OF OVERLAPPING SITES OF RIGHT BREAST IN FEMALE, ESTROGEN RECEPTOR NEGATIVE (HCC): Primary | ICD-10-CM

## 2023-08-23 DIAGNOSIS — C50.811 MALIGNANT NEOPLASM OF OVERLAPPING SITES OF RIGHT BREAST IN FEMALE, ESTROGEN RECEPTOR NEGATIVE (HCC): Primary | ICD-10-CM

## 2023-08-23 DIAGNOSIS — Z98.890 HISTORY OF LUMPECTOMY OF RIGHT BREAST: ICD-10-CM

## 2023-08-23 PROCEDURE — 99204 OFFICE O/P NEW MOD 45 MIN: CPT | Performed by: SURGERY

## 2023-08-23 PROCEDURE — 99214 OFFICE O/P EST MOD 30 MIN: CPT | Performed by: SURGERY

## 2023-08-23 RX ORDER — FLUTICASONE FUROATE AND VILANTEROL 100; 25 UG/1; UG/1
1 POWDER RESPIRATORY (INHALATION) DAILY
COMMUNITY
End: 2023-08-29

## 2023-08-23 NOTE — PROGRESS NOTES
Surgical Oncology Follow Up  [facility]  [facility] [id number]    Nita Murray  1945  [id number]      No chief complaint on file. Assessment & Plan:   She is here for follow-up with right breast cancer as well as pancreatic cyst which has been stable since 2019. She denies of any breast pain nipple discharge nipple retraction or skin changes. She was recently admitted to the hospital with a diverticulitis. Unfortunately she was unable to tolerate her adjuvant chemotherapy. We will follow her closely. I will see her in 6 months. At that time we will also obtain a CT pancreas, chromogranin as well as diagnostic bilateral mammogram and see her. She was told to call us with any questions or concerns in the interim she understand and agree to do so. Cancer History:     Oncology History   Malignant neoplasm of overlapping sites of right breast in female, estrogen receptor negative ([address])   3/25/2022 Initial Diagnosis    Malignant neoplasm of right female breast ([address])     3/25/2022 Biopsy    Right breast ultrasound guided biopsy  12 o'clock 5 cm from nipple  Invasive breast carcinoma of no special type (ductal NST/ invasive ductal carcinoma with apocrine features)  Grade 2  ER 0  CT 0  HER 2 2+   FISH negative  Lymphovascular invasion not identified    Concordant. Malignancy is unifocal. Mass measures 1.4 cm on mammogram and 1.9 cm on ultrasound. Right axilla ultrasound clear. Left brest clear. Amanda  reflector placed. 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      4/4/2022 -  Cancer Staged    Staging form: Breast, AJCC 8th Edition  - Clinical stage from 4/4/2022: Stage IB (cT1c, cN0, cM0, G2, ER-, CT-, HER2-) - Signed by Domonique Macedo MD on 4/4/2022  Stage prefix: Initial diagnosis  Nuclear grade: G2  Histologic grading system: 3 grade system  HER2-IHC interpretation: Equivocal  HER2-IHC value: Score 2+  HER2-FISH interpretation: Negative       4/4/2022 Genetic Testing    [employer]  A total of 36 genes were evaluated, including: GILL, BRCA1, BRCA2, CDH1, CHEK2, PALB2, PTEN, STK11, TP53  Negative result. No pathogenic sequence variants or deletions/duplications identified     4/28/2022 Surgery    Right breast lexy  directed lumpectomy with sentinel lymph node biopsy and axillary dissection  Invasive ductal carcinoma with apocrine features  Grade 3  1.4 cm  0/4 Lymph nodes       6/14/2022 - 6/15/2022 Chemotherapy    Pegfilgrastim-bmez (ZIEXTENZO), 6 mg, Subcutaneous, Once, 1 of 4 cycles  Administration: 6 mg (6/15/2022)  cyclophosphamide (CYTOXAN) IVPB, 450 mg/m2 = 778 mg (75 % of original dose 600 mg/m2), Intravenous, Once, 1 of 4 cycles  Dose modification: 450 mg/m2 (75 % of original dose 600 mg/m2, Cycle 1, Reason: Dose Not Tolerated)  Administration: 778 mg (6/14/2022)  DOCEtaxel (TAXOTERE) chemo infusion, 56.25 mg/m2 = 97.4 mg (75 % of original dose 75 mg/m2), Intravenous, Once, 1 of 4 cycles  Dose modification: 56.25 mg/m2 (75 % of original dose 75 mg/m2, Cycle 1, Reason: Dose Not Tolerated)  Administration: 97.4 mg (6/14/2022)     11/17/2022 - 12/15/2022 Radiation    Treatments:  Course: C1  Plan ID Energy Fractions Dose per Fraction (cGy) Dose Correction (cGy) Total Dose Delivered (cGy) Elapsed Days   R Breast Hypo 6X 15 / 15 267 0 4,005 21   R BrstBst 6X 10X/6X 5 / 5 200 0 1,000 6    Treatment Dates:  11/17/2022 - 12/15/2022. Interval History:   Follow-up with right breast cancer    Review of Systems:   Review of Systems   Constitutional: Negative for chills and fever. HENT: Negative for ear pain and sore throat. Eyes: Negative for pain and visual disturbance. Respiratory: Negative for cough and shortness of breath. Cardiovascular: Negative for chest pain and palpitations.    Gastrointestinal: Negative for abdominal pain and vomiting. Genitourinary: Negative for dysuria and hematuria. Musculoskeletal: Negative for arthralgias and back pain. Skin: Negative for color change and rash. Neurological: Negative for seizures and syncope. All other systems reviewed and are negative. Past Medical History     Patient Active Problem List   Diagnosis   • Anxiety   • Aortoiliac occlusive disease ([address])   • Carotid artery plaque, bilateral   • Centrilobular emphysema (HCC)   • Hyperlipidemia   • Osteoporosis   • Restless leg syndrome   • Subclavian artery stenosis, left (HCC)   • PVD (peripheral vascular disease) ([facility])   • Chronic sinusitis   • Pancreatic mass   • BMI 34.0-34.9,adult   • Seborrheic keratoses   • Stage 3 chronic kidney disease ([facility])   • Closed avulsion fracture of lateral malleolus of right fibula   • Prediabetes   • Vitamin D deficiency   • COPD (chronic obstructive pulmonary disease) ([facility])   • Malignant neoplasm of overlapping sites of right breast in female, estrogen receptor negative ([address])   • Oral candidiasis   • Chemotherapy induced neutropenia ([facility])   • Proctocolitis   • Transaminitis   • C. difficile colitis   • History of lumpectomy of right breast   • Ulcerative (chronic) pancolitis without complications ([address])   • Chronic hypoxemic respiratory failure ([facility])     Past Medical History:   Diagnosis Date   • Anxiety    • Atherosclerosis of native artery of both lower extremities with intermittent claudication ([address]) 10/15/2015    Transitioned From: Cardiovascular Symptoms   • Breast cancer ([address]) 03/25/2022    right breast   • Carotid artery plaque, bilateral 03/21/2017    Transitioned From:  Atherosclerosis of both carotid arteries   • Chronic kidney disease    • Chronic sinusitis     Last assessed: 11/11/13   • COPD (chronic obstructive pulmonary disease) ([address])    • COVID     12/25/21 not hopsitalized- flu-like symptoms   • Fall 06/16/2021   • Fracture, ankle closed, bimalleolar, right, initial encounter 06/2021   • GERD (gastroesophageal reflux disease)    • Hyperlipidemia    • Lung nodule    • PNA (pneumonia)    • PONV (postoperative nausea and vomiting)     with C-sections   • Pulmonary emphysema (HCC)    • PVD (peripheral vascular disease) (HCC)    • Restless leg syndrome    • Stage 3 chronic kidney disease ([address]) 2019   • Tobacco abuse      Past Surgical History:   Procedure Laterality Date   • BREAST LUMPECTOMY Right 2022    Procedure: ISAI  DIRECTED LUMPECTOMY;  Surgeon: Ya Montemayor MD;  Location: [facility];  Service: Surgical Oncology   • CATARACT EXTRACTION     •  SECTION     • COLONOSCOPY      Complete. Resolved: 13   • DESCENDING AORTIC ANEURYSM REPAIR W/ STENT  2019   • IR AORTAGRAM WITH RUN-OFF  8/15/2019   • LYMPH NODE BIOPSY Right 2022    Procedure: LYMPHATIC MAPPING WITH BLUE AND RADIOACTIVE DYES, SENTINEL LYMPH NODE BIOPSY, INJECTION IN OR BY DR RODRÍGUEZ AT 1300;  Surgeon: Ya Montemayor MD;  Location: [facility];  Service: Surgical Oncology   • SHOULDER SURGERY      For frozen shoulder    • TONSILLECTOMY     • US BREAST ISAI  NEEDLE LOC RIGHT Right 3/25/2022   • US GUIDED BREAST BIOPSY RIGHT COMPLETE Right 3/25/2022     Family History   Problem Relation Age of Onset   • No Known Problems Mother    • No Known Problems Father    • Arthritis Sister    • Pancreatic cancer Sister 61   • Melanoma Brother         twice   • Prostate cancer Brother    • Heart attack Family         Acute Myocardial Infarction   • Cirrhosis Family    • Prostate cancer Family    • Skin cancer Family    • Stroke Family         Syndrome   • No Known Problems Daughter    • No Known Problems Maternal Grandmother    • No Known Problems Paternal Grandmother    • No Known Problems Sister    • No Known Problems Maternal Aunt    • Breast cancer Other 27     Social History     Socioeconomic History   • Marital status:       Spouse name: Not on file   • Number of children: 2   • Years of education: Not on file   • Highest education level: Not on file   Occupational History   • Occupation: Retired/   Tobacco Use   • Smoking status: Former     Packs/day: 2.00     Years: 56.00     Total pack years: 112.00     Types: Cigarettes     Start date: 1962     Quit date: 2018     Years since quittin.2   • Smokeless tobacco: Never   Vaping Use   • Vaping Use: Never used   Substance and Sexual Activity   • Alcohol use: Yes     Comment: occasionally    • Drug use: No   • Sexual activity: Not Currently     Partners: Male   Other Topics Concern   • Not on file   Social History Narrative    Living w/adult children    No advance directive on file     Social Determinants of Health     Financial Resource Strain: Not on file   Food Insecurity: Not on file   Transportation Needs: No Transportation Needs (2022)    PRAPARE - Transportation    • Lack of Transportation (Medical): No    • Lack of Transportation (Non-Medical):  No   Physical Activity: Inactive (2021)    Exercise Vital Sign    • Days of Exercise per Week: 0 days    • Minutes of Exercise per Session: 0 min   Stress: Stress Concern Present (2021)    [facility]    • Feeling of Stress : Very much   Social Connections: Not on file   Intimate Partner Violence: Not on file   Housing Stability: [address]  (2022)    Housing Stability Vital Sign    • Unable to Pay for Housing in the Last Year: No    • Number of [address] in the Last Year: 1    • Unstable Housing in the Last Year: No       Current Outpatient Medications:   •  albuterol (2.5 mg/3 mL) 0.083 % nebulizer solution, Take 1 vial (2.5 mg total) by nebulization every 6 (six) hours as needed for wheezing or shortness of breath, Disp: 360 mL, Rfl: 3  •  albuterol (PROVENTIL HFA,VENTOLIN HFA) 90 mcg/act inhaler, Inhale 2 puffs every 6 (six) hours as needed for wheezing, Disp: 3 Inhaler, Rfl: 3  • ALPRAZolam (XANAX) 0.5 mg tablet, Take 1 tablet (0.5 mg total) by mouth 2 (two) times a day as needed for anxiety, Disp: 45 tablet, Rfl: 0  •  ASPIRIN 81 PO, Take by mouth, Disp: , Rfl:   •  ergocalciferol (VITAMIN D2) 50,000 units, take 1 capsule by mouth every week, Disp: 12 capsule, Rfl: 0  •  famotidine (PEPCID) 20 mg tablet, Take 1 tablet (20 mg total) by mouth daily, Disp: , Rfl: 0  •  fluticasone (FLONASE) 50 mcg/act nasal spray, 2 sprays into each nostril daily, Disp: 11.1 mL, Rfl: 1  •  Fluticasone Furoate-Vilanterol (Breo Ellipta) 100-25 mcg/actuation inhaler, Inhale 1 puff daily Rinse mouth after use., Disp: , Rfl:   •  gabapentin (NEURONTIN) 300 mg capsule, Take 1 capsule (300 mg total) by mouth 2 (two) times a day, Disp: 180 capsule, Rfl: 2  •  gabapentin (NEURONTIN) 400 mg capsule, Take 1 capsule (400 mg total) by mouth daily at bedtime, Disp: 100 capsule, Rfl: 1  •  mometasone (ELOCON) 0.1 % cream, Apply topically daily as needed (rash), Disp: 50 g, Rfl: 1  •  olopatadine (PATANOL) 0.1 % ophthalmic solution, , Disp: , Rfl: 0  •  ondansetron (ZOFRAN) 8 mg tablet, Take 1 tablet (8 mg total) by mouth every 8 (eight) hours as needed for nausea or vomiting, Disp: 20 tablet, Rfl: 2  •  rosuvastatin (CRESTOR) 20 MG tablet, take 1 tablet by mouth once daily, Disp: 90 tablet, Rfl: 0  •  saccharomyces boulardii (FLORASTOR) 250 mg capsule, Take 1 capsule (250 mg total) by mouth 2 (two) times a day, Disp: , Rfl: 0  •  sodium chloride () 2 % hypertonic ophthalmic solution, Sue 128 2 % eye drops, Disp: , Rfl:   •  atorvastatin (LIPITOR) 40 mg tablet, , Disp: , Rfl:   •  fluticasone-umeclidinium-vilanterol (Trelegy Ellipta) 100-62.5-25 mcg/actuation inhaler, Inhale 1 puff daily Rinse mouth after use. (Patient not taking: Reported on 7/10/2023), Disp: 60 blister, Rfl: 0  •  naloxone (NARCAN) 4 mg/0.1 mL nasal spray, Administer 1 spray into a nostril.  If no response after 2-3 minutes, give another dose in the other nostril using a new spray. (Patient not taking: Reported on 1/11/2023), Disp: 1 each, Rfl: 1  Allergies   Allergen Reactions   • Tiotropium Bromide Monohydrate Shortness Of Breath   • Augmentin [Amoxicillin-Pot Clavulanate] Abdominal Pain     Pt received ceftriaxone 1 g IV on 5-21-18, gets sharp pains    • Tiotropium Abdominal Pain   • Tylenol With Codeine #3 [Acetaminophen-Codeine] Abdominal Pain   • Other Other (See Comments)       Physical Exam:     Vitals:    08/23/23 1033   BP: 132/80   Pulse: 70   Resp: 16   Temp: 98.4 °F (36.9 °C)   SpO2: 98%     Physical Exam  Constitutional:       Appearance: Normal appearance. HENT:      Head: Normocephalic and atraumatic. Nose: Nose normal.      Mouth/Throat:      Mouth: Mucous membranes are moist.   Eyes:      Pupils: Pupils are equal, round, and reactive to light. Cardiovascular:      Rate and Rhythm: Normal rate. Pulses: Normal pulses. Heart sounds: Normal heart sounds. Pulmonary:      Effort: Pulmonary effort is normal.      Breath sounds: Normal breath sounds. Chest:      Comments: Lateral breast examination no palpable mass masses nipple discharge nipple retraction or skin changes. Bilateral axillary and supraclavicular examination no palpable adenopathy. Right breast and right axilla well-healed surgical scars. Patient was examined seated as well as supine to supine position  Abdominal:      General: Bowel sounds are normal.      Palpations: Abdomen is soft. Musculoskeletal:         General: Normal range of motion. Cervical back: Normal range of motion and neck supple. Skin:     General: Skin is warm. Neurological:      General: No focal deficit present. Mental Status: She is alert and oriented to person, place, and time. Psychiatric:         Mood and Affect: Mood normal.         Behavior: Behavior normal.         Thought Content:  Thought content normal.         Judgment: Judgment normal.           Results & Discussion:   I did review in detail pancreatic cyst and follow-up given this has been stable since 2019 I reassured her it is very unlikely malignant she will continue with CT scan, chromogranin. She is also on home oxygen. I did discussed in detail nature of breast cancer as well as IPMN in detail. We will follow her after the studies. she understands and  agrees . All patient questions were answered. Advance Care Planning/Advance Directives: Marlee Houser MD discussed the disease status with Marshall Emery  today 08/23/23  treatment plans and follow-up with the patient.

## 2023-08-29 ENCOUNTER — OFFICE VISIT (OUTPATIENT)
Age: 78
End: 2023-08-29
Payer: MEDICARE

## 2023-08-29 VITALS
DIASTOLIC BLOOD PRESSURE: 70 MMHG | OXYGEN SATURATION: 98 % | BODY MASS INDEX: 32 KG/M2 | SYSTOLIC BLOOD PRESSURE: 142 MMHG | HEIGHT: 60 IN | WEIGHT: 163 LBS | HEART RATE: 100 BPM

## 2023-08-29 DIAGNOSIS — F41.9 ANXIETY: ICD-10-CM

## 2023-08-29 DIAGNOSIS — J41.0 SIMPLE CHRONIC BRONCHITIS (HCC): ICD-10-CM

## 2023-08-29 DIAGNOSIS — G25.81 RESTLESS LEG SYNDROME: ICD-10-CM

## 2023-08-29 DIAGNOSIS — Z13.29 SCREENING FOR THYROID DISORDER: ICD-10-CM

## 2023-08-29 DIAGNOSIS — Z17.1 MALIGNANT NEOPLASM OF OVERLAPPING SITES OF RIGHT BREAST IN FEMALE, ESTROGEN RECEPTOR NEGATIVE (HCC): ICD-10-CM

## 2023-08-29 DIAGNOSIS — K86.89 PANCREATIC MASS: ICD-10-CM

## 2023-08-29 DIAGNOSIS — N18.31 STAGE 3A CHRONIC KIDNEY DISEASE (HCC): ICD-10-CM

## 2023-08-29 DIAGNOSIS — Z00.00 MEDICARE ANNUAL WELLNESS VISIT, SUBSEQUENT: Primary | ICD-10-CM

## 2023-08-29 DIAGNOSIS — C50.811 MALIGNANT NEOPLASM OF OVERLAPPING SITES OF RIGHT BREAST IN FEMALE, ESTROGEN RECEPTOR NEGATIVE (HCC): ICD-10-CM

## 2023-08-29 DIAGNOSIS — R73.01 IMPAIRED FASTING GLUCOSE: ICD-10-CM

## 2023-08-29 DIAGNOSIS — E55.9 VITAMIN D DEFICIENCY: ICD-10-CM

## 2023-08-29 DIAGNOSIS — E78.5 HYPERLIPIDEMIA, UNSPECIFIED HYPERLIPIDEMIA TYPE: ICD-10-CM

## 2023-08-29 PROCEDURE — G0439 PPPS, SUBSEQ VISIT: HCPCS

## 2023-08-29 RX ORDER — ROPINIROLE 0.25 MG/1
0.25 TABLET, FILM COATED ORAL
Qty: 90 TABLET | Refills: 1 | Status: SHIPPED | OUTPATIENT
Start: 2023-08-29

## 2023-08-29 NOTE — ASSESSMENT & PLAN NOTE
Follows with pulmonology. 2 L at night when sleeping. 2 L when she is out and about. Pulse ox running around 95%.

## 2023-08-29 NOTE — PATIENT INSTRUCTIONS
Follow up in 1 year. Ordered A1c, CMP, TSH, and vitamin D to get done in the next week or so. Gabapentin- take afternoon and pm doses for 1 week. Then take pm dose for 1 week. On the third week, start taking requip which is 1x a day. Continue to follow with pulmonology. Avoid medications like ibuprofen due to your kidney disease. Continue xanax as needed. Medicare Preventive Visit Patient Instructions  Thank you for completing your Welcome to Medicare Visit or Medicare Annual Wellness Visit today. Your next wellness visit will be due in one year (8/29/2024). The screening/preventive services that you may require over the next 5-10 years are detailed below. Some tests may not apply to you based off risk factors and/or age. Screening tests ordered at today's visit but not completed yet may show as past due. Also, please note that scanned in results may not display below. Preventive Screenings:  Service Recommendations Previous Testing/Comments   Colorectal Cancer Screening  * Colonoscopy    * Fecal Occult Blood Test (FOBT)/Fecal Immunochemical Test (FIT)  * Fecal DNA/Cologuard Test  * Flexible Sigmoidoscopy Age: 43-73 years old   Colonoscopy: every 10 years (may be performed more frequently if at higher risk)  OR  FOBT/FIT: every 1 year  OR  Cologuard: every 3 years  OR  Sigmoidoscopy: every 5 years  Screening may be recommended earlier than age 39 if at higher risk for colorectal cancer. Also, an individualized decision between you and your healthcare provider will decide whether screening between the ages of 77-80 would be appropriate.  Colonoscopy: 08/30/2016  FOBT/FIT: Not on file  Cologuard: Not on file  Sigmoidoscopy: Not on file          Breast Cancer Screening Age: 38 years old  Frequency: every 1-2 years  Not required if history of left and right mastectomy Mammogram: 02/14/2023    History Breast Cancer   Cervical Cancer Screening Between the ages of 21-29, pap smear recommended once every 3 years. Between the ages of 32-69, can perform pap smear with HPV co-testing every 5 years. Recommendations may differ for women with a history of total hysterectomy, cervical cancer, or abnormal pap smears in past. Pap Smear: Not on file    Screening Not Indicated   Hepatitis C Screening Once for adults born between 1945 and 1965  More frequently in patients at high risk for Hepatitis C Hep C Antibody: 12/30/2020    Screening Current   Diabetes Screening 1-2 times per year if you're at risk for diabetes or have pre-diabetes Fasting glucose: 106 mg/dL (7/27/2022)  A1C: 6.5 % (9/14/2021)  Screening Current   Cholesterol Screening Once every 5 years if you don't have a lipid disorder. May order more often based on risk factors. Lipid panel: 07/25/2023    Screening Not Indicated  History Lipid Disorder     Other Preventive Screenings Covered by Medicare:  Abdominal Aortic Aneurysm (AAA) Screening: covered once if your at risk. You're considered to be at risk if you have a family history of AAA. Lung Cancer Screening: covers low dose CT scan once per year if you meet all of the following conditions: (1) Age 48-67; (2) No signs or symptoms of lung cancer; (3) Current smoker or have quit smoking within the last 15 years; (4) You have a tobacco smoking history of at least 20 pack years (packs per day multiplied by number of years you smoked); (5) You get a written order from a healthcare provider.   Glaucoma Screening: covered annually if you're considered high risk: (1) You have diabetes OR (2) Family history of glaucoma OR (3)  aged 48 and older OR (3)  American aged 72 and older  Osteoporosis Screening: covered every 2 years if you meet one of the following conditions: (1) You're estrogen deficient and at risk for osteoporosis based off medical history and other findings; (2) Have a vertebral abnormality; (3) On glucocorticoid therapy for more than 3 months; (4) Have primary hyperparathyroidism; (5) On osteoporosis medications and need to assess response to drug therapy. Last bone density test (DXA Scan): 02/15/2022. HIV Screening: covered annually if you're between the age of 14-79. Also covered annually if you are younger than 13 and older than 72 with risk factors for HIV infection. For pregnant patients, it is covered up to 3 times per pregnancy. Immunizations:  Immunization Recommendations   Influenza Vaccine Annual influenza vaccination during flu season is recommended for all persons aged >= 6 months who do not have contraindications   Pneumococcal Vaccine   * Pneumococcal conjugate vaccine = PCV13 (Prevnar 13), PCV15 (Vaxneuvance), PCV20 (Prevnar 20)  * Pneumococcal polysaccharide vaccine = PPSV23 (Pneumovax) Adults 20-63 years old: 1-3 doses may be recommended based on certain risk factors  Adults 72 years old: 1-2 doses may be recommended based off what pneumonia vaccine you previously received   Hepatitis B Vaccine 3 dose series if at intermediate or high risk (ex: diabetes, end stage renal disease, liver disease)   Tetanus (Td) Vaccine - COST NOT COVERED BY MEDICARE PART B Following completion of primary series, a booster dose should be given every 10 years to maintain immunity against tetanus. Td may also be given as tetanus wound prophylaxis. Tdap Vaccine - COST NOT COVERED BY MEDICARE PART B Recommended at least once for all adults. For pregnant patients, recommended with each pregnancy.    Shingles Vaccine (Shingrix) - COST NOT COVERED BY MEDICARE PART B  2 shot series recommended in those aged 48 and above     Health Maintenance Due:      Topic Date Due    Colorectal Cancer Screening  08/30/2021    Lung Cancer Screening  06/20/2023    Breast Cancer Screening: Mammogram  02/14/2024    DXA SCAN  02/15/2024    Hepatitis C Screening  Completed     Immunizations Due:      Topic Date Due    COVID-19 Vaccine (4 - Booster for Pfizer series) 08/20/2022    Influenza Vaccine (1) 09/01/2023     Advance Directives   What are advance directives? Advance directives are legal documents that state your wishes and plans for medical care. These plans are made ahead of time in case you lose your ability to make decisions for yourself. Advance directives can apply to any medical decision, such as the treatments you want, and if you want to donate organs. What are the types of advance directives? There are many types of advance directives, and each state has rules about how to use them. You may choose a combination of any of the following:  Living will: This is a written record of the treatment you want. You can also choose which treatments you do not want, which to limit, and which to stop at a certain time. This includes surgery, medicine, IV fluid, and tube feedings. Durable power of  for healthcare [facility]): This is a written record that states who you want to make healthcare choices for you when you are unable to make them for yourself. This person, called a proxy, is usually a family member or a friend. You may choose more than 1 proxy. Do not resuscitate (DNR) order:  A DNR order is used in case your heart stops beating or you stop breathing. It is a request not to have certain forms of treatment, such as CPR. A DNR order may be included in other types of advance directives. Medical directive: This covers the care that you want if you are in a coma, near death, or unable to make decisions for yourself. You can list the treatments you want for each condition. Treatment may include pain medicine, surgery, blood transfusions, dialysis, IV or tube feedings, and a ventilator (breathing machine). Values history: This document has questions about your views, beliefs, and how you feel and think about life. This information can help others choose the care that you would choose. Why are advance directives important? An advance directive helps you control your care. Although spoken wishes may be used, it is better to have your wishes written down. Spoken wishes can be misunderstood, or not followed. Treatments may be given even if you do not want them. An advance directive may make it easier for your family to make difficult choices about your care. Weight Management   Why it is important to manage your weight:  Being overweight increases your risk of health conditions such as heart disease, high blood pressure, type 2 diabetes, and certain types of cancer. It can also increase your risk for osteoarthritis, sleep apnea, and other respiratory problems. Aim for a slow, steady weight loss. Even a small amount of weight loss can lower your risk of health problems. How to lose weight safely:  A safe and healthy way to lose weight is to eat fewer calories and get regular exercise. You can lose up about 1 pound a week by decreasing the number of calories you eat by 500 calories each day. Healthy meal plan for weight management:  A healthy meal plan includes a variety of foods, contains fewer calories, and helps you stay healthy. A healthy meal plan includes the following:  Eat whole-grain foods more often. A healthy meal plan should contain fiber. Fiber is the part of grains, fruits, and vegetables that is not broken down by your body. Whole-grain foods are healthy and provide extra fiber in your diet. Some examples of whole-grain foods are whole-wheat breads and pastas, oatmeal, brown rice, and bulgur. Eat a variety of vegetables every day. Include dark, leafy greens such as spinach, kale, brooke greens, and mustard greens. Eat yellow and orange vegetables such as carrots, sweet potatoes, and winter squash. Eat a variety of fruits every day. Choose fresh or canned fruit (canned in its own juice or light syrup) instead of juice. Fruit juice has very little or no fiber. Eat low-fat dairy foods. Drink fat-free (skim) milk or 1% milk.  Eat fat-free yogurt and low-fat cottage cheese. Try low-fat cheeses such as mozzarella and other reduced-fat cheeses. Choose meat and other protein foods that are low in fat. Choose beans or other legumes such as split peas or lentils. Choose fish, skinless poultry (chicken or turkey), or lean cuts of red meat (beef or pork). Before you cook meat or poultry, cut off any visible fat. Use less fat and oil. Try baking foods instead of frying them. Add less fat, such as margarine, sour cream, regular salad dressing and mayonnaise to foods. Eat fewer high-fat foods. Some examples of high-fat foods include french fries, doughnuts, ice cream, and cakes. Eat fewer sweets. Limit foods and drinks that are high in sugar. This includes candy, cookies, regular soda, and sweetened drinks. Exercise:  Exercise at least 30 minutes per day on most days of the week. Some examples of exercise include walking, biking, dancing, and swimming. You can also fit in more physical activity by taking the stairs instead of the elevator or parking farther away from stores. Ask your healthcare provider about the best exercise plan for you. © Copyright [address] 2018 Information is for End User's use only and may not be sold, redistributed or otherwise used for commercial purposes. All illustrations and images included in CareNotes® are the copyrighted property of [employer] or [facility] Preventive Visit Patient Instructions  Thank you for completing your Welcome to Medicare Visit or Medicare Annual Wellness Visit today. Your next wellness visit will be due in one year (8/29/2024). The screening/preventive services that you may require over the next 5-10 years are detailed below. Some tests may not apply to you based off risk factors and/or age. Screening tests ordered at today's visit but not completed yet may show as past due. Also, please note that scanned in results may not display below.   Preventive Screenings:  Service Recommendations Previous Testing/Comments   Colorectal Cancer Screening  * Colonoscopy    * Fecal Occult Blood Test (FOBT)/Fecal Immunochemical Test (FIT)  * Fecal DNA/Cologuard Test  * Flexible Sigmoidoscopy Age: 43-73 years old   Colonoscopy: every 10 years (may be performed more frequently if at higher risk)  OR  FOBT/FIT: every 1 year  OR  Cologuard: every 3 years  OR  Sigmoidoscopy: every 5 years  Screening may be recommended earlier than age 39 if at higher risk for colorectal cancer. Also, an individualized decision between you and your healthcare provider will decide whether screening between the ages of 77-80 would be appropriate. Colonoscopy: 08/30/2016  FOBT/FIT: Not on file  Cologuard: Not on file  Sigmoidoscopy: Not on file          Breast Cancer Screening Age: 36 years old  Frequency: every 1-2 years  Not required if history of left and right mastectomy Mammogram: 02/14/2023    History Breast Cancer   Cervical Cancer Screening Between the ages of 21-29, pap smear recommended once every 3 years. Between the ages of 32-69, can perform pap smear with HPV co-testing every 5 years. Recommendations may differ for women with a history of total hysterectomy, cervical cancer, or abnormal pap smears in past. Pap Smear: Not on file    Screening Not Indicated   Hepatitis C Screening Once for adults born between 1945 and 1965  More frequently in patients at high risk for Hepatitis C Hep C Antibody: 12/30/2020    Screening Current   Diabetes Screening 1-2 times per year if you're at risk for diabetes or have pre-diabetes Fasting glucose: 106 mg/dL (7/27/2022)  A1C: 6.5 % (9/14/2021)  Screening Current   Cholesterol Screening Once every 5 years if you don't have a lipid disorder. May order more often based on risk factors. Lipid panel: 07/25/2023    Screening Not Indicated  History Lipid Disorder     Other Preventive Screenings Covered by Medicare:  Abdominal Aortic Aneurysm (AAA) Screening: covered once if your at risk.  Irena Herndon considered to be at risk if you have a family history of AAA. Lung Cancer Screening: covers low dose CT scan once per year if you meet all of the following conditions: (1) Age 48-67; (2) No signs or symptoms of lung cancer; (3) Current smoker or have quit smoking within the last 15 years; (4) You have a tobacco smoking history of at least 20 pack years (packs per day multiplied by number of years you smoked); (5) You get a written order from a healthcare provider. Glaucoma Screening: covered annually if you're considered high risk: (1) You have diabetes OR (2) Family history of glaucoma OR (3)  aged 48 and older OR (3)  American aged 72 and older  Osteoporosis Screening: covered every 2 years if you meet one of the following conditions: (1) You're estrogen deficient and at risk for osteoporosis based off medical history and other findings; (2) Have a vertebral abnormality; (3) On glucocorticoid therapy for more than 3 months; (4) Have primary hyperparathyroidism; (5) On osteoporosis medications and need to assess response to drug therapy. Last bone density test (DXA Scan): 02/15/2022. HIV Screening: covered annually if you're between the age of 14-79. Also covered annually if you are younger than 13 and older than 72 with risk factors for HIV infection. For pregnant patients, it is covered up to 3 times per pregnancy.     Immunizations:  Immunization Recommendations   Influenza Vaccine Annual influenza vaccination during flu season is recommended for all persons aged >= 6 months who do not have contraindications   Pneumococcal Vaccine   * Pneumococcal conjugate vaccine = PCV13 (Prevnar 13), PCV15 (Vaxneuvance), PCV20 (Prevnar 20)  * Pneumococcal polysaccharide vaccine = PPSV23 (Pneumovax) Adults 20-63 years old: 1-3 doses may be recommended based on certain risk factors  Adults 72 years old: 1-2 doses may be recommended based off what pneumonia vaccine you previously received   Hepatitis B Vaccine 3 dose series if at intermediate or high risk (ex: diabetes, end stage renal disease, liver disease)   Tetanus (Td) Vaccine - COST NOT COVERED BY MEDICARE PART B Following completion of primary series, a booster dose should be given every 10 years to maintain immunity against tetanus. Td may also be given as tetanus wound prophylaxis. Tdap Vaccine - COST NOT COVERED BY MEDICARE PART B Recommended at least once for all adults. For pregnant patients, recommended with each pregnancy. Shingles Vaccine (Shingrix) - COST NOT COVERED BY MEDICARE PART B  2 shot series recommended in those aged 48 and above     Health Maintenance Due:      Topic Date Due    Colorectal Cancer Screening  08/30/2021    Lung Cancer Screening  06/20/2023    Breast Cancer Screening: Mammogram  02/14/2024    DXA SCAN  02/15/2024    Hepatitis C Screening  Completed     Immunizations Due:      Topic Date Due    COVID-19 Vaccine (4 - Booster for Pfizer series) 08/20/2022    Influenza Vaccine (1) 09/01/2023     Advance Directives   What are advance directives? Advance directives are legal documents that state your wishes and plans for medical care. These plans are made ahead of time in case you lose your ability to make decisions for yourself. Advance directives can apply to any medical decision, such as the treatments you want, and if you want to donate organs. What are the types of advance directives? There are many types of advance directives, and each state has rules about how to use them. You may choose a combination of any of the following:  Living will: This is a written record of the treatment you want. You can also choose which treatments you do not want, which to limit, and which to stop at a certain time. This includes surgery, medicine, IV fluid, and tube feedings. Durable power of  for healthcare [facility]):   This is a written record that states who you want to make healthcare choices for you when you are unable to make them for yourself. This person, called a proxy, is usually a family member or a friend. You may choose more than 1 proxy. Do not resuscitate (DNR) order:  A DNR order is used in case your heart stops beating or you stop breathing. It is a request not to have certain forms of treatment, such as CPR. A DNR order may be included in other types of advance directives. Medical directive: This covers the care that you want if you are in a coma, near death, or unable to make decisions for yourself. You can list the treatments you want for each condition. Treatment may include pain medicine, surgery, blood transfusions, dialysis, IV or tube feedings, and a ventilator (breathing machine). Values history: This document has questions about your views, beliefs, and how you feel and think about life. This information can help others choose the care that you would choose. Why are advance directives important? An advance directive helps you control your care. Although spoken wishes may be used, it is better to have your wishes written down. Spoken wishes can be misunderstood, or not followed. Treatments may be given even if you do not want them. An advance directive may make it easier for your family to make difficult choices about your care. Weight Management   Why it is important to manage your weight:  Being overweight increases your risk of health conditions such as heart disease, high blood pressure, type 2 diabetes, and certain types of cancer. It can also increase your risk for osteoarthritis, sleep apnea, and other respiratory problems. Aim for a slow, steady weight loss. Even a small amount of weight loss can lower your risk of health problems. How to lose weight safely:  A safe and healthy way to lose weight is to eat fewer calories and get regular exercise. You can lose up about 1 pound a week by decreasing the number of calories you eat by 500 calories each day.    Healthy meal plan for weight management:  A healthy meal plan includes a variety of foods, contains fewer calories, and helps you stay healthy. A healthy meal plan includes the following:  Eat whole-grain foods more often. A healthy meal plan should contain fiber. Fiber is the part of grains, fruits, and vegetables that is not broken down by your body. Whole-grain foods are healthy and provide extra fiber in your diet. Some examples of whole-grain foods are whole-wheat breads and pastas, oatmeal, brown rice, and bulgur. Eat a variety of vegetables every day. Include dark, leafy greens such as spinach, kale, brooke greens, and mustard greens. Eat yellow and orange vegetables such as carrots, sweet potatoes, and winter squash. Eat a variety of fruits every day. Choose fresh or canned fruit (canned in its own juice or light syrup) instead of juice. Fruit juice has very little or no fiber. Eat low-fat dairy foods. Drink fat-free (skim) milk or 1% milk. Eat fat-free yogurt and low-fat cottage cheese. Try low-fat cheeses such as mozzarella and other reduced-fat cheeses. Choose meat and other protein foods that are low in fat. Choose beans or other legumes such as split peas or lentils. Choose fish, skinless poultry (chicken or turkey), or lean cuts of red meat (beef or pork). Before you cook meat or poultry, cut off any visible fat. Use less fat and oil. Try baking foods instead of frying them. Add less fat, such as margarine, sour cream, regular salad dressing and mayonnaise to foods. Eat fewer high-fat foods. Some examples of high-fat foods include french fries, doughnuts, ice cream, and cakes. Eat fewer sweets. Limit foods and drinks that are high in sugar. This includes candy, cookies, regular soda, and sweetened drinks. Exercise:  Exercise at least 30 minutes per day on most days of the week. Some examples of exercise include walking, biking, dancing, and swimming.  You can also fit in more physical activity by taking the stairs instead of the elevator or parking farther away from stores. Ask your healthcare provider about the best exercise plan for you. © Copyright "[employer] 2018 Information is for End User's use only and may not be sold, redistributed or otherwise used for commercial purposes.  All illustrations and images included in CareNotes® are the copyrighted property of [employer] or [address] [place] [address]

## 2023-08-29 NOTE — ASSESSMENT & PLAN NOTE
Titrate gabapentin. Only take afternoon and pm for 1 wk. Second wk just take pm. Third week start requip. Side effects discussed.

## 2023-08-29 NOTE — ASSESSMENT & PLAN NOTE
Lab Results   Component Value Date    EGFR 47 11/30/2022    EGFR 43 10/15/2022    EGFR 59 09/19/2022    CREATININE 1.13 11/30/2022    CREATININE 1.21 10/15/2022    CREATININE 0.94 09/19/2022   Avoid nephrotoxic medications. Increase fluid intake.

## 2023-08-29 NOTE — PROGRESS NOTES
Assessment and Plan:     Problem List Items Addressed This Visit        Endocrine    Impaired fasting glucose     A1c ordered to monitor. Relevant Orders    HEMOGLOBIN A1C W/ EAG ESTIMATION       Respiratory    COPD (chronic obstructive pulmonary disease) ([address])     Follows with pulmonology. 2 L at night when sleeping. 2 L when she is out and about. Pulse ox running around 95%. Genitourinary    Stage 3 chronic kidney disease [facility])     Lab Results   Component Value Date    EGFR 47 11/30/2022    EGFR 43 10/15/2022    EGFR 59 09/19/2022    CREATININE 1.13 11/30/2022    CREATININE 1.21 10/15/2022    CREATININE 0.94 09/19/2022   Avoid nephrotoxic medications. Increase fluid intake. Relevant Orders    Comprehensive metabolic panel       Other    Anxiety     Xanax as needed. Discussed healthy coping mechanisms. Hyperlipidemia     Continue with statin. Stable. Increasing exercise will increase HDL. Restless leg syndrome     Titrate gabapentin. Only take afternoon and pm for 1 wk. Second wk just take pm. Third week start requip. Side effects discussed. Relevant Medications    rOPINIRole (REQUIP) 0.25 mg tablet    Pancreatic mass     Follows once a year with imaging. BMI 34.0-34.9,adult     Discussed decreasing sugars, carbs, and fats in diet. Increase exercise. Vitamin D deficiency     Rechecking vitamin D. Has been taking 50,000 units for a while. Relevant Orders    Vitamin D 25 hydroxy    Malignant neoplasm of overlapping sites of right breast in female, estrogen receptor negative ([address])     Follows closely with oncology. In remission. Screening for thyroid disorder     TSH ordered for screening. Relevant Orders    TSH, 3rd generation with Free T4 reflex    Medicare annual wellness visit, subsequent - Primary     Discussed healthy eating habits, exercise, and water intake. Mailing 5 wishes packet to her house.           BMI Counseling: Body mass index is 31.83 kg/m². The BMI is above normal. Nutrition recommendations include decreasing portion sizes, encouraging healthy choices of fruits and vegetables, decreasing fast food intake, consuming healthier snacks, limiting drinks that contain sugar, moderation in carbohydrate intake, increasing intake of lean protein, reducing intake of saturated and trans fat and reducing intake of cholesterol. Exercise recommendations include moderate physical activity 150 minutes/week and exercising 3-5 times per week. No pharmacotherapy was ordered. Patient referred to PCP. Rationale for BMI follow-up plan is due to patient being overweight or obese. Depression Screening and Follow-up Plan: Patient was screened for depression during today's encounter. They screened negative with a PHQ-2 score of 2. Preventive health issues were discussed with patient, and age appropriate screening tests were ordered as noted in patient's After Visit Summary. Personalized health advice and appropriate referrals for health education or preventive services given if needed, as noted in patient's After Visit Summary. History of Present Illness:   Patient is a 72-year-old female that presents for her annual Medicare wellness visit today. She wanted to discuss her restless leg syndrome. She admits that the gabapentin does not help. She would like to restart on her Requip. HPI   Patient Care Team:  Guillermo Gold MD as PCP - MD Marlen Balderas MD Dorethia Pou, MD (Vascular Surgery)  Adalgisa Woodruff MD (Dermatology)  Cj Mcmanus MD as Surgeon (Surgical Oncology)  KRISS Bender as  Care Manager ()  Farnaz Urbina MD (Gastroenterology)     Review of Systems:     Review of Systems   Constitutional: Positive for fatigue. Negative for chills and fever.    HENT: Negative for ear discharge, ear pain, postnasal drip, rhinorrhea, sinus pain and sore throat. Eyes: Positive for visual disturbance. Respiratory: Positive for shortness of breath. Negative for cough and wheezing. Cardiovascular: Negative for chest pain and leg swelling. Gastrointestinal: Negative for abdominal pain, constipation, diarrhea, nausea and vomiting. Genitourinary: Negative for difficulty urinating and dysuria. Musculoskeletal: Positive for back pain. Skin: Negative for rash. Neurological: Positive for dizziness. Negative for light-headedness and headaches. Psychiatric/Behavioral: The patient is nervous/anxious.          Problem List:     Patient Active Problem List   Diagnosis   • Anxiety   • Aortoiliac occlusive disease ([address])   • Carotid artery plaque, bilateral   • Centrilobular emphysema ([address])   • Hyperlipidemia   • Osteoporosis   • Restless leg syndrome   • Subclavian artery stenosis, left (HCC)   • PVD (peripheral vascular disease) ([address])   • Chronic sinusitis   • Pancreatic mass   • BMI 34.0-34.9,adult   • Seborrheic keratoses   • Stage 3 chronic kidney disease ([address])   • Closed avulsion fracture of lateral malleolus of right fibula   • Prediabetes   • Vitamin D deficiency   • COPD (chronic obstructive pulmonary disease) ([address])   • Malignant neoplasm of overlapping sites of right breast in female, estrogen receptor negative ([address])   • Oral candidiasis   • Chemotherapy induced neutropenia ([address])   • Proctocolitis   • Transaminitis   • C. difficile colitis   • History of lumpectomy of right breast   • Ulcerative (chronic) pancolitis without complications ([address])   • Chronic hypoxemic respiratory failure (HCC)   • Screening for thyroid disorder   • Medicare annual wellness visit, subsequent   • Impaired fasting glucose      Past Medical and Surgical History:     Past Medical History:   Diagnosis Date   • Anxiety    • Atherosclerosis of native artery of both lower extremities with intermittent claudication ([address]) 10/15/2015    Transitioned From: Cardiovascular Symptoms   • Breast cancer ([address]) 2022    right breast   • Carotid artery plaque, bilateral 2017    Transitioned From: Atherosclerosis of both carotid arteries   • Chronic kidney disease    • Chronic sinusitis     Last assessed: 13   • COPD (chronic obstructive pulmonary disease) ([address])    • COVID     21 not hopsitalized- flu-like symptoms   • Fall 2021   • Fracture, ankle closed, bimalleolar, right, initial encounter 2021   • GERD (gastroesophageal reflux disease)    • Hyperlipidemia    • Lung nodule    • PNA (pneumonia)    • PONV (postoperative nausea and vomiting)     with C-sections   • Pulmonary emphysema (HCC)    • PVD (peripheral vascular disease) (HCC)    • Restless leg syndrome    • Stage 3 chronic kidney disease ([address] [facility]) 2019   • Tobacco abuse      Past Surgical History:   Procedure Laterality Date   • BREAST LUMPECTOMY Right 2022    Procedure: ISAI  DIRECTED LUMPECTOMY;  Surgeon: Sara Sung MD;  Location: [facility];  Service: Surgical Oncology   • CATARACT EXTRACTION     •  SECTION     • COLONOSCOPY      Complete.  Resolved: 13   • DESCENDING AORTIC ANEURYSM REPAIR W/ STENT  2019   • IR AORTAGRAM WITH RUN-OFF  8/15/2019   • LYMPH NODE BIOPSY Right 2022    Procedure: LYMPHATIC MAPPING WITH BLUE AND RADIOACTIVE DYES, SENTINEL LYMPH NODE BIOPSY, INJECTION IN OR BY DR Kristine Soni AT 1300;  Surgeon: Sara Sung MD;  Location: [facility];  Service: Surgical Oncology   • SHOULDER SURGERY      For frozen shoulder    • TONSILLECTOMY     • US BREAST ISAI  NEEDLE LOC RIGHT Right 3/25/2022   • US GUIDED BREAST BIOPSY RIGHT COMPLETE Right 3/25/2022      Family History:     Family History   Problem Relation Age of Onset   • No Known Problems Mother    • No Known Problems Father    • Arthritis Sister    • Pancreatic cancer Sister 61   • Melanoma Brother         twice   • Prostate cancer Brother    • Heart attack Family         Acute Myocardial Infarction   • Cirrhosis Family    • Prostate cancer Family    • Skin cancer Family    • Stroke Family         Syndrome   • No Known Problems Daughter    • No Known Problems Maternal Grandmother    • No Known Problems Paternal Grandmother    • No Known Problems Sister    • No Known Problems Maternal Aunt    • Breast cancer Other 27      Social History:     Social History     Socioeconomic History   • Marital status:      Spouse name: None   • Number of children: 2   • Years of education: None   • Highest education level: None   Occupational History   • Occupation: Retired/   Tobacco Use   • Smoking status: Former     Packs/day: 2.00     Years: 56.00     Total pack years: 112.00     Types: Cigarettes     Start date: 1962     Quit date: 2018     Years since quittin.2   • Smokeless tobacco: Never   Vaping Use   • Vaping Use: Never used   Substance and Sexual Activity   • Alcohol use: Yes     Comment: occasionally    • Drug use: No   • Sexual activity: Not Currently     Partners: Male   Other Topics Concern   • None   Social History Narrative    Living w/adult children    No advance directive on file     Social Determinants of Health     Financial Resource Strain: Not on file   Food Insecurity: Not on file   Transportation Needs: No Transportation Needs (2022)    PRAPARE - Transportation    • Lack of Transportation (Medical): No    • Lack of Transportation (Non-Medical):  No   Physical Activity: Inactive (2021)    Exercise Vital Sign    • Days of Exercise per Week: 0 days    • Minutes of Exercise per Session: 0 min   Stress: Stress Concern Present (2021)    [facility]    • Feeling of Stress : Very much   Social Connections: Not on file   Intimate Partner Violence: Not on file   Housing Stability: Low Risk  (2022)    Housing Stability Vital Sign    • Unable to Pay for Housing in the Last Year: No    • Number of Places Lived in the Last Year: 1    • Unstable Housing in the Last Year: No      Medications and Allergies:     Current Outpatient Medications   Medication Sig Dispense Refill   • ALPRAZolam (XANAX) 0.5 mg tablet Take 1 tablet (0.5 mg total) by mouth 2 (two) times a day as needed for anxiety 45 tablet 0   • ASPIRIN 81 PO Take by mouth     • ergocalciferol (VITAMIN D2) 50,000 units take 1 capsule by mouth every week 12 capsule 0   • famotidine (PEPCID) 20 mg tablet Take 1 tablet (20 mg total) by mouth daily  0   • fluticasone (FLONASE) 50 mcg/act nasal spray 2 sprays into each nostril daily 11.1 mL 1   • gabapentin (NEURONTIN) 300 mg capsule Take 1 capsule (300 mg total) by mouth 2 (two) times a day 180 capsule 2   • gabapentin (NEURONTIN) 400 mg capsule Take 1 capsule (400 mg total) by mouth daily at bedtime 100 capsule 1   • mometasone (ELOCON) 0.1 % cream Apply topically daily as needed (rash) 50 g 1   • olopatadine (PATANOL) 0.1 % ophthalmic solution   0   • ondansetron (ZOFRAN) 8 mg tablet Take 1 tablet (8 mg total) by mouth every 8 (eight) hours as needed for nausea or vomiting 20 tablet 2   • rOPINIRole (REQUIP) 0.25 mg tablet Take 1 tablet (0.25 mg total) by mouth daily at bedtime 90 tablet 1   • rosuvastatin (CRESTOR) 20 MG tablet take 1 tablet by mouth once daily 90 tablet 0   • saccharomyces boulardii (FLORASTOR) 250 mg capsule Take 1 capsule (250 mg total) by mouth 2 (two) times a day  0   • sodium chloride () 2 % hypertonic ophthalmic solution Sue 128 2 % eye drops     • naloxone (NARCAN) 4 mg/0.1 mL nasal spray Administer 1 spray into a nostril. If no response after 2-3 minutes, give another dose in the other nostril using a new spray. (Patient not taking: Reported on 1/11/2023) 1 each 1     No current facility-administered medications for this visit.      Allergies   Allergen Reactions   • Tiotropium Bromide Monohydrate Shortness Of Breath   • Augmentin [Amoxicillin-Pot Clavulanate] Abdominal Pain     Pt received ceftriaxone 1 g IV on 5-21-18, gets sharp pains    • Tiotropium Abdominal Pain   • Tylenol With Codeine #3 [Acetaminophen-Codeine] Abdominal Pain   • Other Other (See Comments)      Immunizations:     Immunization History   Administered Date(s) Administered   • COVID-19 PFIZER VACCINE 0.3 ML IM 03/03/2021, 04/22/2021   • COVID-19 Pfizer vac ([place]-sucrose, gray cap) 12 yr+ IM 06/25/2022   • INFLUENZA 11/07/2017, 01/14/2022, 01/12/2023   • Influenza Split High Dose Preservative Free IM 10/15/2015, 12/22/2016   • Influenza, high dose seasonal 0.7 mL 10/11/2018, 10/23/2019, 11/20/2020, 01/12/2023   • Pneumococcal Conjugate 13-Valent 08/04/2016   • Pneumococcal Polysaccharide PPV23 1945, 11/16/2015, 06/05/2017   • Tdap 1945   • Tuberculin Skin Test 06/14/2018   • influenza, trivalent, adjuvanted 10/11/2018, 10/23/2019, 11/20/2020      Health Maintenance:         Topic Date Due   • Colorectal Cancer Screening  08/30/2021   • Lung Cancer Screening  06/20/2023   • Breast Cancer Screening: Mammogram  02/14/2024   • DXA SCAN  02/15/2024   • Hepatitis C Screening  Completed         Topic Date Due   • COVID-19 Vaccine (4 - Booster for [place] series) 08/20/2022   • Influenza Vaccine (1) 09/01/2023      Medicare Screening Tests and Risk Assessments:     [facility] is here for her Subsequent Wellness visit. Last Medicare Wellness visit information reviewed, patient interviewed and updates made to the record today. Health Risk Assessment:   Patient rates overall health as fair. Patient feels that their physical health rating is same. Patient is satisfied with their life. Eyesight was rated as same. Hearing was rated as same. Patient feels that their emotional and mental health rating is same. Patients states they are never, rarely angry. Patient states they are always unusually tired/fatigued. Pain experienced in the last 7 days has been none. Patient states that she has experienced no weight loss or gain in last 6 months. Depression Screening:   PHQ-2 Score: 2      Fall Risk Screening: In the past year, patient has experienced: no history of falling in past year      Urinary Incontinence Screening:   Patient has not leaked urine accidently in the last six months. Home Safety:  Patient does not have trouble with stairs inside or outside of their home. Patient has working smoke alarms and has working carbon monoxide detector. Home safety hazards include: none. Nutrition:   Current diet is Regular. Medications:   Patient is currently taking over-the-counter supplements. OTC medications include: see medication list. Patient is able to manage medications. Activities of Daily Living (ADLs)/Instrumental Activities of Daily Living (IADLs):   Walk and transfer into and out of bed and chair?: Yes  Dress and groom yourself?: Yes    Bathe or shower yourself?: Yes    Do your laundry/housekeeping?: Yes  Manage your money, pay your bills and track your expenses?: Yes  Make your own meals?: Yes    Do your own shopping?: Yes    Previous Hospitalizations:   Any hospitalizations or ED visits within the last 12 months?: Yes    How many hospitalizations have you had in the last year?: 1-2    Advance Care Planning:       Comments: Mailing advance care planning to her house.     PREVENTIVE SCREENINGS      Cardiovascular Screening:    General: Screening Not Indicated and History Lipid Disorder      Diabetes Screening:     General: Screening Current      Colorectal Cancer Screening:     General: Patient Declines      Breast Cancer Screening:     General: History Breast Cancer      Cervical Cancer Screening:    General: Screening Not Indicated      Osteoporosis Screening:    General: Screening Not Indicated and History Osteoporosis      Abdominal Aortic Aneurysm (AAA) Screening:        General: Screening Not Indicated and History AAA      Lung Cancer Screening:     General: Patient Declines      Hepatitis C Screening:    General: Screening Current    Screening, Brief Intervention, and Referral to Treatment (SBIRT)    Screening  Typical number of drinks in a day: 0  Typical number of drinks in a week: 0  Interpretation: Low risk drinking behavior. No results found. Physical Exam:     /70 (BP Location: Right arm, Patient Position: Sitting, Cuff Size: Standard)   Pulse 100   Ht 5' (1.524 m)   Wt 73.9 kg (163 lb)   SpO2 98%   BMI 31.83 kg/m²     Physical Exam  Vitals and nursing note reviewed. Constitutional:       General: She is awake. She is not in acute distress. Appearance: Normal appearance. She is well-developed and well-groomed. She is obese. HENT:      Head: Normocephalic and atraumatic. Right Ear: Hearing and external ear normal.      Left Ear: Hearing and external ear normal.      Nose: Nose normal.      Mouth/Throat:      Lips: Pink. Mouth: Mucous membranes are moist.   Eyes:      General: Lids are normal. Vision grossly intact. Gaze aligned appropriately. Conjunctiva/sclera: Conjunctivae normal.   Neck:      Vascular: No carotid bruit. Trachea: Trachea and phonation normal.   Cardiovascular:      Rate and Rhythm: Normal rate and regular rhythm. Heart sounds: Normal heart sounds, S1 normal and S2 normal. No murmur heard. No friction rub. No gallop. Pulmonary:      Effort: Pulmonary effort is normal. No respiratory distress. Breath sounds: Normal breath sounds and air entry. No decreased breath sounds, wheezing, rhonchi or rales. Abdominal:      General: Abdomen is protuberant. Bowel sounds are normal.      Palpations: Abdomen is soft. Tenderness: There is no abdominal tenderness. Musculoskeletal:         General: No swelling. Cervical back: Neck supple. Right lower leg: No edema. Left lower leg: No edema. Skin:     General: Skin is warm and dry.       Capillary Refill: Capillary refill takes less than 2 seconds. Neurological:      Mental Status: She is alert. Psychiatric:         Attention and Perception: Attention and perception normal.         Mood and Affect: Mood and affect normal.         Speech: Speech normal.         Behavior: Behavior normal. Behavior is cooperative. Thought Content:  Thought content normal.         Cognition and Memory: Cognition and memory normal.         Judgment: Judgment normal.          Alejandro Ordonez PA-C

## 2023-10-05 DIAGNOSIS — G25.81 RLS (RESTLESS LEGS SYNDROME): ICD-10-CM

## 2023-10-05 RX ORDER — GABAPENTIN 300 MG/1
300 CAPSULE ORAL 2 TIMES DAILY
Qty: 180 CAPSULE | Refills: 2 | Status: SHIPPED | OUTPATIENT
Start: 2023-10-05

## 2023-10-10 ENCOUNTER — HOSPITAL ENCOUNTER (OUTPATIENT)
Dept: NON INVASIVE DIAGNOSTICS | Facility: CLINIC | Age: 78
Discharge: HOME | End: 2023-10-10
Payer: MEDICARE

## 2023-10-10 DIAGNOSIS — I74.09 AORTOILIAC OCCLUSIVE DISEASE (HCC): ICD-10-CM

## 2023-10-10 DIAGNOSIS — I77.1 SUBCLAVIAN ARTERY STENOSIS, LEFT (HCC): ICD-10-CM

## 2023-10-10 DIAGNOSIS — I65.23 CAROTID ARTERY PLAQUE, BILATERAL: ICD-10-CM

## 2023-10-10 PROCEDURE — 93923 UPR/LXTR ART STDY 3+ LVLS: CPT

## 2023-10-10 PROCEDURE — 93880 EXTRACRANIAL BILAT STUDY: CPT

## 2023-10-10 PROCEDURE — 93978 VASCULAR STUDY: CPT

## 2023-10-10 PROCEDURE — 93978 VASCULAR STUDY: CPT | Performed by: SURGERY

## 2023-10-10 PROCEDURE — 93880 EXTRACRANIAL BILAT STUDY: CPT | Performed by: SURGERY

## 2023-10-11 ENCOUNTER — TELEPHONE (OUTPATIENT)
Dept: VASCULAR SURGERY | Facility: CLINIC | Age: 78
End: 2023-10-11

## 2023-10-11 NOTE — TELEPHONE ENCOUNTER
Attempted to contact patient to schedule appointment(s) listed below. Requested patient call [phone] option 3 to schedule appointment(s). Patient's appointment(s) are due now.     Dopplers  [] Abdominal Aorta Iliac (AOIL)  [] Carotid (CV)   [] Celiac and/or Mesenteric  [] Endovascular Aortic Repair (EVAR)   [] Hemodialysis Access (HD)   [] Lower Limb Arterial (CLAUDIO)  [] Lower Limb Venous (LEV)  [] Lower Limb Venous Duplex with Reflux (LEVDR)  [] Renal Artery  [] Upper Limb Arterial (UEA)    [] Upper Limb Venous (UEV)              [] ISABELLA and Waveform analysis     Advanced Imaging   [] CTA head/neck    [] CTA abdomen    [] CTA abdomen & pelvis    [] CT abdomen with/ without contrast  [] CT abdomen with contrast  [] CT abdomen without contrast    [] CT abdomen & pelvis with/ without contrast  [] CT abdomen & pelvis with contrast  [] CT abdomen & pelvis without contrast    Office Visit   [] New patient, patient last seen over 3 years ago  [] Risk factor modification (RFM)   [x] Follow up   [] Lost to follow up (LTFU)   Called patient & [facility] to schedule 6 mo follow up/Rev AOIL 10/10/23

## 2023-10-26 NOTE — PROGRESS NOTES
Assessment/Plan:    PVD (peripheral vascular disease) ([address])  Infra-renal self expanding stent placement 2019 by Dr. Viola Gibbons for claudication  Denies current claudication symptoms  Denies rest pain and LE wounds  AOIL reviewed  There is a moderate infra-renal aortic stenosis  Proximal to the stent which is widely patent    ISABELLA's normal  Palpable DP pulses    Cont asa/statin  Repeat AOIL in 6 months  RTC 1 year    Carotid artery plaque, bilateral  Mild bilateral ICA stenosis asymptomatic  Asa/statin  Duplex 1 year       Diagnoses and all orders for this visit:    Carotid artery plaque, bilateral  -     VAS carotid complete study; Future    PVD (peripheral vascular disease) (HCC)  -     VAS abdominal aorta/iliacs; complete study; Future          Subjective:      Patient ID: Cindy Limon is a 70 y.o. female. Patient presents today to review AOIL and carotid duplex done 10/10/23. Functionally limited due to COPD, on oxygen. Denies any s/s of CVA. HPI    The following portions of the patient's history were reviewed and updated as appropriate: allergies, current medications, past family history, past medical history, past social history, past surgical history, and problem list.  Denies claudication, gets SOB prior to having any leg pain. Denies rest pain. Not smoking. Review of Systems   Constitutional: Negative. HENT: Negative. Eyes: Negative. Respiratory:  Positive for cough and shortness of breath. Cardiovascular: Negative. Gastrointestinal: Negative. Endocrine: Negative. Genitourinary: Negative. Musculoskeletal: Negative. Skin: Negative. Allergic/Immunologic: Negative. Neurological: Negative. Hematological: Negative. Psychiatric/Behavioral: Negative. I have reviewed the ROS above and made changes as needed.         Objective:      /70 (BP Location: Right arm, Patient Position: Sitting)   Pulse 102   Ht 5' (1.524 m)   Wt 75.3 kg (166 lb)   BMI 32.42 kg/m²          Physical Exam      General  Exam: alert, awake, oriented, no distress, consistent with stated age    Integumentary  Exam: warm, dry, no gross lesions, no bruises and normal color    Head and Neck  Exam: supple, no bruits, trachea midline, no JVD, no mass or palpable nodes    Eye  Exam: extraoccular movements intact, no scleral icterus, sclera clear, pupils equal round and reactive to light    ENMT  Exam: oral mucosa pink and moist    Chest and Lung  Exam: chest normal without deformity, bilaterally expansive, clear to auscultation    Cardiovascular  Exam: regular rate, regular rhythm, no murmurs, no rubs or gallops    Adbomen  Exam: soft, non-tender, non-distended, no pulsatile abdominal masses, no abdominal bruit    Peripheral Vascular  Exam: no clubbing of the digits of the upper extremity, no cyanosis, no edema, both feet are warm, radial pulses 2+ bilaterally, skin well perfused, without and no varicosities.     No widened popliteal pulse noted bilaterally    Upper Extremity:  Palpation: Radial pulse- Bilateral 2+    Lower Extremity:  Palpation: Femoral pulse- Bilateral 2+                 Dorsalis Pedis - Bilateral 1+        Neurologic  Exam:alert, non-focal, oriented x 3, cranial nerves II-XII grossly intact

## 2023-10-28 PROBLEM — Z13.29 SCREENING FOR THYROID DISORDER: Status: RESOLVED | Noted: 2023-08-29 | Resolved: 2023-10-28

## 2023-10-28 PROBLEM — Z00.00 MEDICARE ANNUAL WELLNESS VISIT, SUBSEQUENT: Status: RESOLVED | Noted: 2023-08-29 | Resolved: 2023-10-28

## 2023-10-30 ENCOUNTER — OFFICE VISIT (OUTPATIENT)
Dept: VASCULAR SURGERY | Facility: CLINIC | Age: 78
End: 2023-10-30
Payer: MEDICARE

## 2023-10-30 VITALS
WEIGHT: 166 LBS | SYSTOLIC BLOOD PRESSURE: 132 MMHG | HEART RATE: 102 BPM | BODY MASS INDEX: 32.59 KG/M2 | HEIGHT: 60 IN | DIASTOLIC BLOOD PRESSURE: 70 MMHG

## 2023-10-30 DIAGNOSIS — I65.23 CAROTID ARTERY PLAQUE, BILATERAL: Primary | ICD-10-CM

## 2023-10-30 DIAGNOSIS — I73.9 PVD (PERIPHERAL VASCULAR DISEASE) (HCC): ICD-10-CM

## 2023-10-30 PROCEDURE — 99215 OFFICE O/P EST HI 40 MIN: CPT | Performed by: SURGERY

## 2023-10-30 NOTE — PLAN OF CARE
Problem: Potential for Falls  Goal: Patient will remain free of falls  Description: INTERVENTIONS:  - Educate patient/family on patient safety including physical limitations  - Instruct patient to call for assistance with activity   - Consult OT/PT to assist with strengthening/mobility   - Keep Call bell within reach  - Keep bed low and locked with side rails adjusted as appropriate  - Keep care items and personal belongings within reach  - Initiate and maintain comfort rounds  - Make Fall Risk Sign visible to staff  - Initiate/Maintain bed alarm  - Obtain necessary fall risk management equipment:   - Apply yellow socks and bracelet for high fall risk patients  - Consider moving patient to room near nurses station  Outcome: Progressing     Problem: PAIN - ADULT  Goal: Verbalizes/displays adequate comfort level or baseline comfort level  Description: Interventions:  - Encourage patient to monitor pain and request assistance  - Assess pain using appropriate pain scale  - Administer analgesics based on type and severity of pain and evaluate response  - Implement non-pharmacological measures as appropriate and evaluate response  - Consider cultural and social influences on pain and pain management  - Notify physician/advanced practitioner if interventions unsuccessful or patient reports new pain  Outcome: Progressing     Problem: INFECTION - ADULT  Goal: Absence or prevention of progression during hospitalization  Description: INTERVENTIONS:  - Assess and monitor for signs and symptoms of infection  - Monitor lab/diagnostic results  - Monitor all insertion sites, i e  indwelling lines, tubes, and drains  - Monitor endotracheal if appropriate and nasal secretions for changes in amount and color  - [place] appropriate cooling/warming therapies per order  - Administer medications as ordered  - Instruct and encourage patient and family to use good hand hygiene technique  - Identify and instruct in appropriate isolation precautions for identified infection/condition  Outcome: Progressing     Problem: SAFETY ADULT  Goal: Patient will remain free of falls  Description: INTERVENTIONS:  - Educate patient/family on patient safety including physical limitations  - Instruct patient to call for assistance with activity   - Consult OT/PT to assist with strengthening/mobility   - Keep Call bell within reach  - Keep bed low and locked with side rails adjusted as appropriate  - Keep care items and personal belongings within reach  - Initiate and maintain comfort rounds  - Make Fall Risk Sign visible to staff  - Obtain necessary fall risk management equipment:   - Apply yellow socks and bracelet for high fall risk patients  - Consider moving patient to room near nurses station  Outcome: Progressing pt awake, alert, and appropriate with parent at bedside. pt is color appropriate with easy WOB noted. pt awaiting discharge per ED MD

## 2023-10-30 NOTE — ASSESSMENT & PLAN NOTE
Infra-renal self expanding stent placement 2019 by Dr. Rasheeda Lawtno for claudication  Denies current claudication symptoms  Denies rest pain and LE wounds  AOIL reviewed  There is a moderate infra-renal aortic stenosis  Proximal to the stent which is widely patent    ISABELLA's normal  Palpable DP pulses    Cont asa/statin  Repeat AOIL in 6 months  RTC 1 year

## 2023-11-07 ENCOUNTER — APPOINTMENT (OUTPATIENT)
Dept: LAB | Facility: HOSPITAL | Age: 78
End: 2023-11-07
Payer: MEDICARE

## 2023-11-07 DIAGNOSIS — N18.31 STAGE 3A CHRONIC KIDNEY DISEASE (HCC): ICD-10-CM

## 2023-11-07 DIAGNOSIS — R73.01 IMPAIRED FASTING GLUCOSE: ICD-10-CM

## 2023-11-07 DIAGNOSIS — Z13.29 SCREENING FOR THYROID DISORDER: ICD-10-CM

## 2023-11-07 DIAGNOSIS — E55.9 VITAMIN D DEFICIENCY: ICD-10-CM

## 2023-11-07 LAB
25(OH)D3 SERPL-MCNC: 54.1 NG/ML (ref 30–100)
ALBUMIN SERPL BCP-MCNC: 4.2 G/DL (ref 3.5–5)
ALP SERPL-CCNC: 71 U/L (ref 34–104)
ALT SERPL W P-5'-P-CCNC: 27 U/L (ref 7–52)
ANION GAP SERPL CALCULATED.3IONS-SCNC: 6 MMOL/L
AST SERPL W P-5'-P-CCNC: 17 U/L (ref 13–39)
BILIRUB SERPL-MCNC: 0.35 MG/DL (ref 0.2–1)
BUN SERPL-MCNC: 19 MG/DL (ref 5–25)
CALCIUM SERPL-MCNC: 9.7 MG/DL (ref 8.4–10.2)
CHLORIDE SERPL-SCNC: 107 MMOL/L (ref 96–108)
CO2 SERPL-SCNC: 28 MMOL/L (ref 21–32)
CREAT SERPL-MCNC: 1.13 MG/DL (ref 0.6–1.3)
GFR SERPL CREATININE-BSD FRML MDRD: 47 ML/MIN/1.73SQ M
GLUCOSE P FAST SERPL-MCNC: 136 MG/DL (ref 65–99)
POTASSIUM SERPL-SCNC: 4.2 MMOL/L (ref 3.5–5.3)
PROT SERPL-MCNC: 7 G/DL (ref 6.4–8.4)
SODIUM SERPL-SCNC: 141 MMOL/L (ref 135–147)
TSH SERPL DL<=0.05 MIU/L-ACNC: 2.54 UIU/ML (ref 0.45–4.5)

## 2023-11-07 PROCEDURE — 84443 ASSAY THYROID STIM HORMONE: CPT

## 2023-11-07 PROCEDURE — 82306 VITAMIN D 25 HYDROXY: CPT

## 2023-11-07 PROCEDURE — 36415 COLL VENOUS BLD VENIPUNCTURE: CPT

## 2023-11-07 PROCEDURE — 80053 COMPREHEN METABOLIC PANEL: CPT

## 2023-11-07 PROCEDURE — 83036 HEMOGLOBIN GLYCOSYLATED A1C: CPT

## 2023-11-08 ENCOUNTER — TELEPHONE (OUTPATIENT)
Age: 78
End: 2023-11-08

## 2023-11-08 LAB
EST. AVERAGE GLUCOSE BLD GHB EST-MCNC: 157 MG/DL
HBA1C MFR BLD: 7.1 %

## 2023-11-08 NOTE — TELEPHONE ENCOUNTER
----- Message from Jaimee Ackerman PA-C sent at 11/8/2023  7:59 AM EST -----  Hemoglobin A1c, which is the measure of your sugars over the past 3 months, is now in the diabetic range. Please make a follow-up visit to discuss this. All other labs look stable.

## 2023-11-14 ENCOUNTER — OFFICE VISIT (OUTPATIENT)
Age: 78
End: 2023-11-14
Payer: MEDICARE

## 2023-11-14 VITALS
HEART RATE: 113 BPM | SYSTOLIC BLOOD PRESSURE: 122 MMHG | DIASTOLIC BLOOD PRESSURE: 60 MMHG | HEIGHT: 60 IN | OXYGEN SATURATION: 95 % | BODY MASS INDEX: 32.42 KG/M2 | RESPIRATION RATE: 17 BRPM

## 2023-11-14 DIAGNOSIS — Z00.00 WELL ADULT EXAM: ICD-10-CM

## 2023-11-14 DIAGNOSIS — I74.09 AORTOILIAC OCCLUSIVE DISEASE (HCC): ICD-10-CM

## 2023-11-14 DIAGNOSIS — E11.9 TYPE 2 DIABETES MELLITUS WITHOUT COMPLICATION, WITHOUT LONG-TERM CURRENT USE OF INSULIN (HCC): Primary | ICD-10-CM

## 2023-11-14 DIAGNOSIS — F41.1 GENERALIZED ANXIETY DISORDER: ICD-10-CM

## 2023-11-14 PROCEDURE — 99215 OFFICE O/P EST HI 40 MIN: CPT

## 2023-11-14 RX ORDER — ALPRAZOLAM 0.5 MG/1
0.5 TABLET ORAL 2 TIMES DAILY PRN
Qty: 45 TABLET | Refills: 0 | Status: SHIPPED | OUTPATIENT
Start: 2023-11-14

## 2023-11-14 RX ORDER — ROSUVASTATIN CALCIUM 20 MG/1
20 TABLET, COATED ORAL DAILY
Qty: 90 TABLET | Refills: 0 | Status: SHIPPED | OUTPATIENT
Start: 2023-11-14

## 2023-11-14 NOTE — PROGRESS NOTES
INTERNAL MEDICINE FOLLOW-UP VISIT  [facility] - [facility] INTERNAL MEDICINE [address]    NAME: Bronson Ashby  AGE: 70 y.o. SEX: female  : 1945     DATE: 2023     Assessment and Plan:   1. Type 2 diabetes mellitus without complication, without long-term current use of insulin ([facility])  A1c is now 7.1. Discussed her treatment options which included therapeutic lifestyle changes or starting on metformin, she wanted to start with lifestyle changes. Reviewed her diet and she will begin to decrease the amount of Pepsi she drinks and extra sweets she eats at night. Discussed the long-term effects of untreated diabetes which include kidney disease, poor wound healing, retinopathy, and neuropathy. We will recheck A1c in February. She does have a family history of diabetes. 2. Generalized anxiety disorder  Refill needed. - ALPRAZolam (XANAX) 0.5 mg tablet; Take 1 tablet (0.5 mg total) by mouth 2 (two) times a day as needed for anxiety  Dispense: 45 tablet; Refill: 0    3. Aortoiliac occlusive disease ([address])  Refill needed. - rosuvastatin (CRESTOR) 20 MG tablet; Take 1 tablet (20 mg total) by mouth daily  Dispense: 90 tablet; Refill: 0            No follow-ups on file. Chief Complaint:     Chief Complaint   Patient presents with    Follow-up     Lab results      History of Present Illness:   Patient is a 72-year-old female that presents today to review her labs. The following portions of the patient's history were reviewed and updated as appropriate: allergies, current medications, past family history, past medical history, past social history, past surgical history and problem list.     Review of Systems:     Review of Systems   Constitutional:  Negative for chills, fatigue and fever. HENT:  Negative for ear discharge, ear pain, postnasal drip, rhinorrhea, sinus pressure, sinus pain, sore throat, tinnitus and trouble swallowing.     Eyes:  Negative for pain, discharge and itching. Respiratory:  Positive for shortness of breath. Negative for cough and wheezing. Cardiovascular:  Negative for chest pain, palpitations and leg swelling. Gastrointestinal:  Negative for abdominal pain, constipation, diarrhea, nausea and vomiting. Endocrine: Negative for polydipsia, polyphagia and polyuria. Genitourinary:  Negative for difficulty urinating, frequency, hematuria and urgency. Musculoskeletal:  Negative for arthralgias, joint swelling and myalgias. Skin:  Negative for color change. Allergic/Immunologic: Negative for environmental allergies. Neurological:  Negative for dizziness, weakness, light-headedness, numbness and headaches. Hematological:  Negative for adenopathy. Psychiatric/Behavioral:  Negative for decreased concentration and sleep disturbance. The patient is not nervous/anxious. Past Medical History:     Past Medical History:   Diagnosis Date    Anxiety     Atherosclerosis of native artery of both lower extremities with intermittent claudication ([address]) 10/15/2015    Transitioned From: Cardiovascular Symptoms    Breast cancer ([address]) 03/25/2022    right breast    Carotid artery plaque, bilateral 03/21/2017    Transitioned From:  Atherosclerosis of both carotid arteries    Chronic kidney disease     Chronic sinusitis     Last assessed: 11/11/13    COPD (chronic obstructive pulmonary disease) ([address])     COVID     12/25/21 not hopsitalized- flu-like symptoms    Fall 06/16/2021    Fracture, ankle closed, bimalleolar, right, initial encounter 06/2021    GERD (gastroesophageal reflux disease)     Hyperlipidemia     Lung nodule     PNA (pneumonia)     PONV (postoperative nausea and vomiting)     with C-sections    Pulmonary emphysema (HCC)     PVD (peripheral vascular disease) (HCC)     Restless leg syndrome     Stage 3 chronic kidney disease ([address]) 04/22/2019    Tobacco abuse         Current Medications:     Current Outpatient Medications:     ALPRAZolam (XANAX) 0.5 mg tablet, Take 1 tablet (0.5 mg total) by mouth 2 (two) times a day as needed for anxiety, Disp: 45 tablet, Rfl: 0    ASPIRIN 81 PO, Take by mouth, Disp: , Rfl:     ergocalciferol (VITAMIN D2) 50,000 units, take 1 capsule by mouth every week, Disp: 12 capsule, Rfl: 0    famotidine (PEPCID) 20 mg tablet, Take 1 tablet (20 mg total) by mouth daily, Disp: , Rfl: 0    fluticasone (FLONASE) 50 mcg/act nasal spray, 2 sprays into each nostril daily, Disp: 11.1 mL, Rfl: 1    gabapentin (NEURONTIN) 300 mg capsule, Take 1 capsule (300 mg total) by mouth 2 (two) times a day, Disp: 180 capsule, Rfl: 2    gabapentin (NEURONTIN) 400 mg capsule, Take 1 capsule (400 mg total) by mouth daily at bedtime, Disp: 100 capsule, Rfl: 1    naloxone (NARCAN) 4 mg/0.1 mL nasal spray, Administer 1 spray into a nostril. If no response after 2-3 minutes, give another dose in the other nostril using a new spray., Disp: 1 each, Rfl: 1    olopatadine (PATANOL) 0.1 % ophthalmic solution, , Disp: , Rfl: 0    rosuvastatin (CRESTOR) 20 MG tablet, take 1 tablet by mouth once daily, Disp: 90 tablet, Rfl: 0    saccharomyces boulardii (FLORASTOR) 250 mg capsule, Take 1 capsule (250 mg total) by mouth 2 (two) times a day, Disp: , Rfl: 0    sodium chloride () 2 % hypertonic ophthalmic solution, Sue 128 2 % eye drops, Disp: , Rfl:     mometasone (ELOCON) 0.1 % cream, Apply topically daily as needed (rash) (Patient not taking: Reported on 11/14/2023), Disp: 50 g, Rfl: 1    ondansetron (ZOFRAN) 8 mg tablet, Take 1 tablet (8 mg total) by mouth every 8 (eight) hours as needed for nausea or vomiting (Patient not taking: Reported on 11/14/2023), Disp: 20 tablet, Rfl: 2    rOPINIRole (REQUIP) 0.25 mg tablet, Take 1 tablet (0.25 mg total) by mouth daily at bedtime (Patient not taking: Reported on 10/30/2023), Disp: 90 tablet, Rfl: 1     Allergies:      Allergies   Allergen Reactions    Tiotropium Bromide Monohydrate Shortness Of Breath    Augmentin [Amoxicillin-Pot Clavulanate] Abdominal Pain     Pt received ceftriaxone 1 g IV on 5-21-18, gets sharp pains     Tiotropium Abdominal Pain    Tylenol With Codeine #3 [Acetaminophen-Codeine] Abdominal Pain    Other Other (See Comments)        Physical Exam:     /60 (BP Location: Right arm, Patient Position: Sitting, Cuff Size: Adult)   Pulse (!) 113   Resp 17   Ht 5' (1.524 m)   SpO2 95%   BMI 32.42 kg/m²     Physical Exam  Vitals and nursing note reviewed. Constitutional:       General: She is awake. She is not in acute distress. Appearance: Normal appearance. She is well-developed and well-groomed. She is obese. Interventions: Nasal cannula in place. HENT:      Head: Normocephalic and atraumatic. Right Ear: Hearing and external ear normal.      Left Ear: Hearing and external ear normal.      Nose: Nose normal.      Mouth/Throat:      Lips: Pink. Mouth: Mucous membranes are moist.   Eyes:      General: Lids are normal. Vision grossly intact. Gaze aligned appropriately. Conjunctiva/sclera: Conjunctivae normal.   Neck:      Vascular: No carotid bruit. Trachea: Trachea and phonation normal.   Cardiovascular:      Rate and Rhythm: Normal rate and regular rhythm. Heart sounds: Normal heart sounds, S1 normal and S2 normal. No murmur heard. No friction rub. No gallop. Pulmonary:      Effort: Pulmonary effort is normal. No respiratory distress. Breath sounds: Normal breath sounds and air entry. No decreased breath sounds, wheezing, rhonchi or rales. Abdominal:      General: Abdomen is protuberant. Musculoskeletal:         General: No swelling. Cervical back: Neck supple. Right lower leg: No edema. Left lower leg: No edema. Skin:     General: Skin is warm. Capillary Refill: Capillary refill takes less than 2 seconds. Neurological:      Mental Status: She is alert.    Psychiatric:         Attention and Perception: Attention and perception normal.         Mood and Affect: Mood and affect normal.         Speech: Speech normal.         Behavior: Behavior normal. Behavior is cooperative. Thought Content: Thought content normal.         Cognition and Memory: Cognition and memory normal.         Judgment: Judgment normal.           Data:     Laboratory Results: I have personally reviewed the pertinent laboratory results/reports   Radiology/Other Diagnostic Testing Results: I have personally reviewed pertinent reports.       Trae Wong PA-C  [facility]

## 2023-11-14 NOTE — PATIENT INSTRUCTIONS
Mediterranean Diet   WHAT YOU NEED TO KNOW:   What is a Mediterranean diet? A Mediterranean diet is a meal plan that includes foods that are commonly eaten in countries that border the [facility]. This meal plan may provide several health benefits. These include losing or maintaining weight, and decreasing blood pressure, blood sugar, and cholesterol levels. It may also help protect against certain health conditions such as heart disease, cancer, type 2 diabetes, and Alzheimer disease. Work with a dietitian to develop a meal plan that is right for you. What foods are included in the [address]? Include fruits and vegetables in each meal.  Eat a variety of fresh fruits and vegetables. Choose whole grains every day. These foods include whole-grain breads, pastas, and cereals. It also includes brown rice, quinoa, and millet. Use unsaturated fats instead of saturated fats. Cook with olive or canola oil. Limit saturated fats, such as butter, margarine, and shortening. Saturated fat is an unhealthy fat that can increase your cholesterol levels. Choose plant foods, poultry, and fish as your main sources of protein. Eat plant-based foods that provide protein,  such as lentils, beans, chickpeas, nuts, and seeds. Choose mostly plant-based foods in place of meat on most days of the week. Eat protein foods high in omega-3 fats. Fish high in omega-3 fats include salmon, trout, and tuna. Include these types of fish 1 or 2 times each week. Limit fish high in mercury, such as shark, swordfish, tilefish, and marcio mackerel. Omega-3 fats are also found in walnuts and flaxseed. Choose poultry (chicken or turkey)  without skin instead of red meat. Red meat is high in saturated fat. Limit eggs and high-fat meats, such as bermudez, sausage, and hot dogs. Choose low-fat dairy foods  such as nonfat or 1% milk, or low-fat almond, cashew, or soy milk.  Other examples include low-fat cheese, yogurt, and cottage cheese. Limit sweets. Limit your intake of high-sugar foods, such as soda, desserts, and candy. Talk to your healthcare provider about alcohol. Studies have shown that moderate intake of wine may reduce the risk of heart disease. A moderate amount of wine is 1 serving for women and men 65 years and older each day. Two servings is recommended for men 24to 59years of age each day. A serving of wine is 5 ounces. What else do I need to know if I follow the Mediterranean diet? Include foods high in iron and vitamin C.  Plant-based foods that are high in iron include spinach, beans, tofu, and artichoke. Eat a serving of vitamin C with any iron-rich food to help your body absorb more iron. Examples include oranges, strawberries, cantaloupe, broccoli, and yellow peppers. Get regular physical activity. The Mediterranean diet will have the most benefit if you get regular physical activity. Get 30 minutes of physical activity at least 5 days a week. Choose physical activities that increase your heart rate. Examples include walking, hiking, swimming, and riding a bike. Ask your healthcare provider about the best exercise plan for you. CARE AGREEMENT:   You have the right to help plan your care. Discuss treatment options with your healthcare provider to decide what care you want to receive. You always have the right to refuse treatment. The above information is an  only. It is not intended as medical advice for individual conditions or treatments. Talk to your doctor, nurse or pharmacist before following any medical regimen to see if it is safe and effective for you. © Copyright [facility] [place] 2023 Information is for End User's use only and may not be sold, redistributed or otherwise used for commercial purposes.

## 2024-01-08 ENCOUNTER — OFFICE VISIT (OUTPATIENT)
Age: 79
End: 2024-01-08
Payer: MEDICARE

## 2024-01-08 VITALS
HEART RATE: 110 BPM | WEIGHT: 165 LBS | HEIGHT: 60 IN | BODY MASS INDEX: 32.39 KG/M2 | SYSTOLIC BLOOD PRESSURE: 130 MMHG | OXYGEN SATURATION: 97 % | DIASTOLIC BLOOD PRESSURE: 60 MMHG | RESPIRATION RATE: 18 BRPM | TEMPERATURE: 98.6 F

## 2024-01-08 DIAGNOSIS — M54.2 NECK PAIN: ICD-10-CM

## 2024-01-08 DIAGNOSIS — J06.9 VIRAL UPPER RESPIRATORY TRACT INFECTION: Primary | ICD-10-CM

## 2024-01-08 LAB
SARS-COV-2 AG UPPER RESP QL IA: NEGATIVE
SL AMB POCT RAPID FLU A: NORMAL
SL AMB POCT RAPID FLU B: NORMAL
VALID CONTROL: NORMAL

## 2024-01-08 PROCEDURE — 87811 SARS-COV-2 COVID19 W/OPTIC: CPT

## 2024-01-08 PROCEDURE — 87804 INFLUENZA ASSAY W/OPTIC: CPT

## 2024-01-08 PROCEDURE — 99213 OFFICE O/P EST LOW 20 MIN: CPT

## 2024-01-08 RX ORDER — PREDNISONE 10 MG/1
TABLET ORAL
Qty: 28 TABLET | Refills: 0 | Status: SHIPPED | OUTPATIENT
Start: 2024-01-08

## 2024-01-08 NOTE — PROGRESS NOTES
INTERNAL MEDICINE FOLLOW-UP VISIT  [facility] - [facility] INTERNAL MEDICINE [address]    NAME: Connie Royal  AGE: 78 y.o. SEX: female  : 1945     DATE: 2024     Assessment and Plan:   1. Viral upper respiratory tract infection  Flu and covid negative in office. O2 is 97% in office. Heart rate was 79 on pulse recheck.  Discussed this could be related to a viral URI or worsening COPD.  Prednisone taper sent.  Discussed using albuterol daily during these exacerbations.  Make pulmonology follow-up. Chest x-ray ordered to rule out any ongoing infectious cause.    2. Neck pain  Left sided, and reproducible on exam. Cervical x-ray ordered to r/o any bony abnormality. Discussed using tylenol, massage, heat, and stretching. Most likely MSK related. Prednisone may help with inflammation as well.       Depression Screening and Follow-up Plan: Patient was screened for depression during today's encounter. They screened negative with a PHQ-2 score of 0.       Return if symptoms worsen or fail to improve.     Chief Complaint:     No chief complaint on file.     History of Present Illness:   Patient is a 79 yo female that presents today for a URI.  The most recent illness started about 3 days ago.  She has been having upper respiratory infections on and off for the past few weeks.  She admits to some sick contacts recently. She is eating and drinking okay.  She is sleeping a lot.  She is not taking anything at home.  She is using her home oxygen only when she is moving around.  It is typically in the mid to high 90s at home.  She does note a cough early in the morning, but she is able to clear it up.    She also admits to some left sided neck pain that has been bothering her the past few weeks. She denies any injury. She thinks it's related to her oxygen tank bookbag. She describes the pain as sharp and stays in her neck. She denies any N/T. The pain bothers her when she's trying to sleep. Sometimes  she notes some stiffness in her neck and problems with ROM.     The following portions of the patient's history were reviewed and updated as appropriate: allergies, current medications, past family history, past medical history, past social history, past surgical history and problem list.     Review of Systems:     Review of Systems   Constitutional:  Positive for fatigue. Negative for chills and fever.   HENT:  Positive for sinus pressure, sinus pain, sore throat and trouble swallowing. Negative for ear discharge, ear pain and rhinorrhea.    Eyes:  Negative for pain and itching.   Respiratory:  Positive for shortness of breath. Negative for cough and wheezing.    Cardiovascular:  Negative for chest pain and leg swelling.   Gastrointestinal:  Positive for constipation. Negative for abdominal pain, diarrhea, nausea and vomiting.   Genitourinary:  Negative for difficulty urinating and dysuria.   Musculoskeletal:  Positive for arthralgias and myalgias.   Skin:  Negative for color change.   Neurological:  Positive for dizziness and headaches. Negative for light-headedness.        Past Medical History:     Past Medical History:   Diagnosis Date    Anxiety     Atherosclerosis of native artery of both lower extremities with intermittent claudication ([facility]) 10/15/2015    Transitioned From: Cardiovascular Symptoms    Breast cancer ([facility]) 03/25/2022    right breast    Carotid artery plaque, bilateral 03/21/2017    Transitioned From: Atherosclerosis of both carotid arteries    Chronic kidney disease     Chronic sinusitis     Last assessed: 11/11/13    COPD (chronic obstructive pulmonary disease) ([facility])     COVID     12/25/21 not hopsitalized- flu-like symptoms    Fall 06/16/2021    Fracture, ankle closed, bimalleolar, right, initial encounter 06/2021    GERD (gastroesophageal reflux disease)     Hyperlipidemia     Lung nodule     PNA (pneumonia)     PONV (postoperative nausea and vomiting)     with C-sections    Pulmonary  emphysema (HCC)     PVD (peripheral vascular disease) ([facility])     Restless leg syndrome     Stage 3 chronic kidney disease ([facility]) 04/22/2019    Tobacco abuse         Current Medications:     Current Outpatient Medications:     ALPRAZolam (XANAX) 0.5 mg tablet, Take 1 tablet (0.5 mg total) by mouth 2 (two) times a day as needed for anxiety, Disp: 45 tablet, Rfl: 0    ASPIRIN 81 PO, Take by mouth, Disp: , Rfl:     ergocalciferol (VITAMIN D2) 50,000 units, take 1 capsule by mouth every week, Disp: 12 capsule, Rfl: 0    famotidine (PEPCID) 20 mg tablet, Take 1 tablet (20 mg total) by mouth daily, Disp: , Rfl: 0    fluticasone (FLONASE) 50 mcg/act nasal spray, 2 sprays into each nostril daily, Disp: 11.1 mL, Rfl: 1    gabapentin (NEURONTIN) 300 mg capsule, Take 1 capsule (300 mg total) by mouth 2 (two) times a day, Disp: 180 capsule, Rfl: 2    gabapentin (NEURONTIN) 400 mg capsule, Take 1 capsule (400 mg total) by mouth daily at bedtime, Disp: 100 capsule, Rfl: 1    mometasone (ELOCON) 0.1 % cream, Apply topically daily as needed (rash), Disp: 50 g, Rfl: 1    naloxone (NARCAN) 4 mg/0.1 mL nasal spray, Administer 1 spray into a nostril. If no response after 2-3 minutes, give another dose in the other nostril using a new spray., Disp: 1 each, Rfl: 1    olopatadine (PATANOL) 0.1 % ophthalmic solution, , Disp: , Rfl: 0    ondansetron (ZOFRAN) 8 mg tablet, Take 1 tablet (8 mg total) by mouth every 8 (eight) hours as needed for nausea or vomiting, Disp: 20 tablet, Rfl: 2    predniSONE 10 mg tablet, Take 4 tablets for 3 days then 3 tablets for 3 days then 2 tablet for 3 days 1 for 1 day, Disp: 28 tablet, Rfl: 0    rOPINIRole (REQUIP) 0.25 mg tablet, Take 1 tablet (0.25 mg total) by mouth daily at bedtime, Disp: 90 tablet, Rfl: 1    rosuvastatin (CRESTOR) 20 MG tablet, Take 1 tablet (20 mg total) by mouth daily, Disp: 90 tablet, Rfl: 0    saccharomyces boulardii (FLORASTOR) 250 mg capsule, Take 1 capsule (250 mg total) by mouth 2  (two) times a day, Disp: , Rfl: 0    sodium chloride () 2 % hypertonic ophthalmic solution, Sue 128 2 % eye drops, Disp: , Rfl:      Allergies:     Allergies   Allergen Reactions    Tiotropium Bromide Monohydrate Shortness Of Breath    Augmentin [Amoxicillin-Pot Clavulanate] Abdominal Pain     Pt received ceftriaxone 1 g IV on 5-21-18, gets sharp pains     Tiotropium Abdominal Pain    Tylenol With Codeine #3 [Acetaminophen-Codeine] Abdominal Pain    Other Other (See Comments)        Physical Exam:     /60 (BP Location: Right arm, Patient Position: Sitting, Cuff Size: Large)   Pulse (!) 110   Temp 98.6 °F (37 °C)   Resp 18   Ht 5' (1.524 m)   Wt 74.8 kg (165 lb)   SpO2 97%   BMI 32.22 kg/m²     Physical Exam  Vitals and nursing note reviewed.   Constitutional:       General: She is awake. She is not in acute distress.     Appearance: Normal appearance. She is well-developed, well-groomed and normal weight.   HENT:      Head: Normocephalic and atraumatic.      Right Ear: Hearing, tympanic membrane, ear canal and external ear normal.      Left Ear: Hearing, tympanic membrane, ear canal and external ear normal.      Nose: Nose normal.      Mouth/Throat:      Lips: Pink.      Mouth: Mucous membranes are moist.      Pharynx: Uvula midline. Posterior oropharyngeal erythema present.   Eyes:      General: Lids are normal. Vision grossly intact. Gaze aligned appropriately.      Conjunctiva/sclera: Conjunctivae normal.   Neck:      Vascular: No carotid bruit.      Trachea: Trachea and phonation normal.   Cardiovascular:      Rate and Rhythm: Normal rate and regular rhythm.      Heart sounds: Normal heart sounds, S1 normal and S2 normal. No murmur heard.     No friction rub. No gallop.   Pulmonary:      Effort: Pulmonary effort is normal. No respiratory distress.      Breath sounds: Decreased air movement present. Decreased breath sounds present. No wheezing, rhonchi or rales.   Abdominal:      General:  Abdomen is flat.   Musculoskeletal:         General: No swelling.      Cervical back: Neck supple. No edema, erythema or rigidity. Muscular tenderness present. No pain with movement or spinous process tenderness. Decreased range of motion.      Right lower leg: No edema.      Left lower leg: No edema.   Lymphadenopathy:      Cervical: No cervical adenopathy.   Skin:     General: Skin is warm.      Capillary Refill: Capillary refill takes less than 2 seconds.   Neurological:      Mental Status: She is alert.   Psychiatric:         Attention and Perception: Attention and perception normal.         Mood and Affect: Mood and affect normal.         Speech: Speech normal.         Behavior: Behavior normal. Behavior is cooperative.         Thought Content: Thought content normal.         Cognition and Memory: Cognition and memory normal.         Judgment: Judgment normal.           Data:     Laboratory Results: I have personally reviewed the pertinent laboratory results/reports   Radiology/Other Diagnostic Testing Results: I have personally reviewed pertinent reports.      Deanne Wong PA-C  [facility] INTERNAL MEDICINE [address]

## 2024-01-12 ENCOUNTER — TELEPHONE (OUTPATIENT)
Dept: OTHER | Facility: OTHER | Age: 79
End: 2024-01-12

## 2024-01-12 NOTE — TELEPHONE ENCOUNTER
Patient is calling regarding cancelling an appointment.    Date/Time: 1/12/24 at 840 am    Patient was rescheduled: YES [] NO [x]    Patient requesting call back to reschedule: YES [] NO [x]

## 2024-01-15 ENCOUNTER — TELEPHONE (OUTPATIENT)
Age: 79
End: 2024-01-15

## 2024-01-15 ENCOUNTER — CLINICAL SUPPORT (OUTPATIENT)
Dept: RADIATION ONCOLOGY | Facility: CLINIC | Age: 79
End: 2024-01-15
Attending: RADIOLOGY
Payer: MEDICARE

## 2024-01-15 ENCOUNTER — HOSPITAL ENCOUNTER (OUTPATIENT)
Dept: RADIOLOGY | Facility: HOSPITAL | Age: 79
Discharge: HOME | End: 2024-01-15
Payer: MEDICARE

## 2024-01-15 VITALS
SYSTOLIC BLOOD PRESSURE: 126 MMHG | WEIGHT: 167 LBS | TEMPERATURE: 97.1 F | HEART RATE: 100 BPM | OXYGEN SATURATION: 92 % | BODY MASS INDEX: 32.61 KG/M2 | DIASTOLIC BLOOD PRESSURE: 70 MMHG | RESPIRATION RATE: 16 BRPM

## 2024-01-15 DIAGNOSIS — C50.811 MALIGNANT NEOPLASM OF OVERLAPPING SITES OF RIGHT BREAST IN FEMALE, ESTROGEN RECEPTOR NEGATIVE: Primary | ICD-10-CM

## 2024-01-15 DIAGNOSIS — Z17.1 MALIGNANT NEOPLASM OF OVERLAPPING SITES OF RIGHT BREAST IN FEMALE, ESTROGEN RECEPTOR NEGATIVE: Primary | ICD-10-CM

## 2024-01-15 DIAGNOSIS — M54.2 NECK PAIN: ICD-10-CM

## 2024-01-15 DIAGNOSIS — J06.9 VIRAL UPPER RESPIRATORY TRACT INFECTION: ICD-10-CM

## 2024-01-15 PROCEDURE — 72050 X-RAY EXAM NECK SPINE 4/5VWS: CPT

## 2024-01-15 PROCEDURE — 99211 OFF/OP EST MAY X REQ PHY/QHP: CPT | Performed by: RADIOLOGY

## 2024-01-15 PROCEDURE — 99213 OFFICE O/P EST LOW 20 MIN: CPT | Performed by: RADIOLOGY

## 2024-01-15 PROCEDURE — 71046 X-RAY EXAM CHEST 2 VIEWS: CPT

## 2024-01-15 NOTE — TELEPHONE ENCOUNTER
----- Message from Deanne Wong PA-C sent at 1/15/2024  3:52 PM EST -----  Your cervical spine x-ray showed slight vertebrae slippage at C5 and C6, which can be causing some pain.  How is her neck feeling?  I can place a referral to physical therapy, but I know that she has trouble with transportation.  It may also be benef  icial to see orthospine.  Let me know what she wants to do.

## 2024-01-15 NOTE — PROGRESS NOTES
Follow-up - Radiation Oncology   Connie Royal 1945 78 y.o. female [id number]      History of Present Illness   Cancer Staging   Malignant neoplasm of overlapping sites of right breast in female, estrogen receptor negative   Staging form: Breast, AJCC 8th Edition  - Clinical stage from 4/4/2022: Stage IB (cT1c, cN0, cM0, G2, ER-, ID-, HER2-) - Signed by Selena Conrad MD on 4/4/2022  Stage prefix: Initial diagnosis  Nuclear grade: G2  Histologic grading system: 3 grade system  HER2-IHC interpretation: Equivocal  HER2-IHC value: Score 2+  HER2-FISH interpretation: Negative      Connie Royal is a 78 y.o. woman with multiple comorbidities including COPD, oxygen dependent, and stage IA (pT1cN0 M0) grade 3 invasive right breast carcinoma status postlumpectomy and sentinel lymph node biopsy achieving negative margins on 4/28/2022.  Tumor cells were ER/ID/HER2/flash negative. She received 1 cycle of dose reduced TC, but discontinued due to complications.  She completed a course of radiation therapy on 12/15/22. She was last seen 7/10/23 and presents today for follow up.      8/23/23 Dr. Merlos  She denies of any breast pain nipple discharge nipple retraction or skin changes.  She was recently admitted to the hospital with a diverticulitis.  Unfortunately she was unable to tolerate her adjuvant chemotherapy.   Follow up 6 months.  Will also obtain a CT pancreas, chromogranin as well as diagnostic bilateral mammogram and see her.      The patient denies significant breast pain or tenderness.  She denies palpable nodules, suspicious skin changes, or nipple discharge of the breasts bilaterally.  She denies upper extremity edema bilaterally.  She notes that she had right axillary swelling and tenderness that involved lateral breast per report.  She denies erythema, fever and states this has happened in the past so she did not seek further care.  The patient states the swelling has decreased and pain and tenderness has  diminished as well.  She denies fever or redness.  She denies upper extremity edema or shoulder restriction.     Corneal transplant is planned in the future.     Upcomin24 Dr. Ruelas  24 CT  24 Mammogram  24 Dr. Merlos        Historical Information   Oncology History   Malignant neoplasm of overlapping sites of right breast in female, estrogen receptor negative    3/25/2022 Initial Diagnosis    Malignant neoplasm of right female breast (HCC)     3/25/2022 Biopsy    Right breast ultrasound guided biopsy  12 o'clock 5 cm from nipple  Invasive breast carcinoma of no special type (ductal NST/ invasive ductal carcinoma with apocrine features)  Grade 2  ER 0  AK 0  HER 2 2+   FISH negative  Lymphovascular invasion not identified    Concordant. Malignancy is unifocal. Mass measures 1.4 cm on mammogram and 1.9 cm on ultrasound. Right axilla ultrasound clear. Left brest clear. Amanda  reflector placed. 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     2022 -  Cancer Staged    Staging form: Breast, AJCC 8th Edition  - Clinical stage from 2022: Stage IB (cT1c, cN0, cM0, G2, ER-, AK-, HER2-) - Signed by Selena Conrad MD on 2022  Stage prefix: Initial diagnosis  Nuclear grade: G2  Histologic grading system: 3 grade system  HER2-IHC interpretation: Equivocal  HER2-IHC value: Score 2+  HER2-FISH interpretation: Negative       2022 Genetic Testing    [employer]  A total of 36 genes were evaluated, including: GILL, BRCA1, BRCA2, CDH1, CHEK2, PALB2, PTEN, STK11, TP53  Negative result. No pathogenic sequence variants or deletions/duplications identified     2022 Surgery    Right breast amanda  directed lumpectomy with sentinel lymph node biopsy and axillary dissection  Invasive ductal carcinoma with apocrine features  Grade 3  1.4 cm  0/4 Lymph nodes       2022 - 6/15/2022 Chemotherapy    Pegfilgrastim-bmez (ZIEXTENZO), 6 mg,  Subcutaneous, Once, 1 of 4 cycles  Administration: 6 mg (6/15/2022)  cyclophosphamide (CYTOXAN) IVPB, 450 mg/m2 = 778 mg (75 % of original dose 600 mg/m2), Intravenous, Once, 1 of 4 cycles  Dose modification: 450 mg/m2 (75 % of original dose 600 mg/m2, Cycle 1, Reason: Dose Not Tolerated)  Administration: 778 mg (6/14/2022)  DOCEtaxel (TAXOTERE) chemo infusion, 56.25 mg/m2 = 97.4 mg (75 % of original dose 75 mg/m2), Intravenous, Once, 1 of 4 cycles  Dose modification: 56.25 mg/m2 (75 % of original dose 75 mg/m2, Cycle 1, Reason: Dose Not Tolerated)  Administration: 97.4 mg (6/14/2022)     11/17/2022 - 12/15/2022 Radiation    Treatments:  Course: C1  Plan ID Energy Fractions Dose per Fraction (cGy) Dose Correction (cGy) Total Dose Delivered (cGy) Elapsed Days   R Breast Hypo 6X 15 / 15 267 0 4,005 21   R BrstBst 6X 10X/6X 5 / 5 200 0 1,000 6    Treatment Dates:  11/17/2022 - 12/15/2022.            Past Medical History:   Diagnosis Date    Anxiety     Atherosclerosis of native artery of both lower extremities with intermittent claudication ([facility]CC) 10/15/2015    Transitioned From: Cardiovascular Symptoms    Breast cancer (HCC) 03/25/2022    right breast    Carotid artery plaque, bilateral 03/21/2017    Transitioned From: Atherosclerosis of both carotid arteries    Chronic kidney disease     Chronic sinusitis     Last assessed: 11/11/13    COPD (chronic obstructive pulmonary disease) ([facility])     COVID     12/25/21 not hopsitalized- flu-like symptoms    Fall 06/16/2021    Fracture, ankle closed, bimalleolar, right, initial encounter 06/2021    GERD (gastroesophageal reflux disease)     Hyperlipidemia     Lung nodule     PNA (pneumonia)     PONV (postoperative nausea and vomiting)     with C-sections    Pulmonary emphysema (HCC)     PVD (peripheral vascular disease) ([facility])     Restless leg syndrome     Stage 3 chronic kidney disease ([facility]) 04/22/2019    Tobacco abuse      Past Surgical History:   Procedure Laterality Date     BREAST LUMPECTOMY Right 2022    Procedure: ISAI  DIRECTED LUMPECTOMY;  Surgeon: Enrike Merlos MD;  Location: [facility];  Service: Surgical Oncology    CATARACT EXTRACTION       SECTION      COLONOSCOPY      Complete. Resolved: 13    DESCENDING AORTIC ANEURYSM REPAIR W/ STENT  2019    IR AORTAGRAM WITH RUN-OFF  8/15/2019    LYMPH NODE BIOPSY Right 2022    Procedure: LYMPHATIC MAPPING WITH BLUE AND RADIOACTIVE DYES, SENTINEL LYMPH NODE BIOPSY, INJECTION IN OR BY DR MERLOS AT 1300;  Surgeon: Enrike Merlos MD;  Location: [facility];  Service: Surgical Oncology    SHOULDER SURGERY      For frozen shoulder     TONSILLECTOMY      US BREAST ISAI  NEEDLE LOC RIGHT Right 3/25/2022    US GUIDED BREAST BIOPSY RIGHT COMPLETE Right 3/25/2022       Social History   Social History     Substance and Sexual Activity   Alcohol Use Yes    Comment: occasionally      Social History     Substance and Sexual Activity   Drug Use No     Social History     Tobacco Use   Smoking Status Former    Current packs/day: 0.00    Average packs/day: 2.0 packs/day for 56.3 years (112.5 ttl pk-yrs)    Types: Cigarettes    Start date: 1962    Quit date: 2018    Years since quittin.6    Passive exposure: Past   Smokeless Tobacco Never         Meds/Allergies     Current Outpatient Medications:     ALPRAZolam (XANAX) 0.5 mg tablet, Take 1 tablet (0.5 mg total) by mouth 2 (two) times a day as needed for anxiety, Disp: 45 tablet, Rfl: 0    ASPIRIN 81 PO, Take by mouth, Disp: , Rfl:     famotidine (PEPCID) 20 mg tablet, Take 1 tablet (20 mg total) by mouth daily, Disp: , Rfl: 0    gabapentin (NEURONTIN) 300 mg capsule, Take 1 capsule (300 mg total) by mouth 2 (two) times a day, Disp: 180 capsule, Rfl: 2    gabapentin (NEURONTIN) 400 mg capsule, Take 1 capsule (400 mg total) by mouth daily at bedtime, Disp: 100 capsule, Rfl: 1    olopatadine (PATANOL) 0.1 % ophthalmic solution, , Disp: ,  Rfl: 0    predniSONE 10 mg tablet, Take 4 tablets for 3 days then 3 tablets for 3 days then 2 tablet for 3 days 1 for 1 day, Disp: 28 tablet, Rfl: 0    rosuvastatin (CRESTOR) 20 MG tablet, Take 1 tablet (20 mg total) by mouth daily, Disp: 90 tablet, Rfl: 0    saccharomyces boulardii (FLORASTOR) 250 mg capsule, Take 1 capsule (250 mg total) by mouth 2 (two) times a day, Disp: , Rfl: 0    sodium chloride () 2 % hypertonic ophthalmic solution, Sue 128 2 % eye drops, Disp: , Rfl:     ergocalciferol (VITAMIN D2) 50,000 units, take 1 capsule by mouth every week (Patient not taking: Reported on 1/15/2024), Disp: 12 capsule, Rfl: 0    fluticasone (FLONASE) 50 mcg/act nasal spray, 2 sprays into each nostril daily (Patient not taking: Reported on 1/15/2024), Disp: 11.1 mL, Rfl: 1    mometasone (ELOCON) 0.1 % cream, Apply topically daily as needed (rash) (Patient not taking: Reported on 1/15/2024), Disp: 50 g, Rfl: 1    naloxone (NARCAN) 4 mg/0.1 mL nasal spray, Administer 1 spray into a nostril. If no response after 2-3 minutes, give another dose in the other nostril using a new spray. (Patient not taking: Reported on 1/15/2024), Disp: 1 each, Rfl: 1    ondansetron (ZOFRAN) 8 mg tablet, Take 1 tablet (8 mg total) by mouth every 8 (eight) hours as needed for nausea or vomiting (Patient not taking: Reported on 1/15/2024), Disp: 20 tablet, Rfl: 2    rOPINIRole (REQUIP) 0.25 mg tablet, Take 1 tablet (0.25 mg total) by mouth daily at bedtime (Patient not taking: Reported on 1/15/2024), Disp: 90 tablet, Rfl: 1  Allergies   Allergen Reactions    Tiotropium Bromide Monohydrate Shortness Of Breath    Augmentin [Amoxicillin-Pot Clavulanate] Abdominal Pain     Pt received ceftriaxone 1 g IV on 5-21-18, gets sharp pains     Tiotropium Abdominal Pain    Tylenol With Codeine #3 [Acetaminophen-Codeine] Abdominal Pain    Other Other (See Comments)         Review of Systems   Constitutional:  Positive for fatigue and unexpected  weight change (gained weight).   HENT:  Positive for dental problem (full dentures).    Eyes:  Positive for visual disturbance.        Fuchs dystrophy  Broken blood vessel left eye   Respiratory:  Positive for cough and shortness of breath.         2 liters O2 via nasal canula   Cardiovascular: Negative.    Gastrointestinal:  Positive for constipation.   Genitourinary: Negative.    Musculoskeletal:  Positive for back pain and neck pain.   Skin:  Positive for color change (right breast hyperpigmentation).   Allergic/Immunologic: Negative.    Neurological:  Positive for light-headedness and numbness (toes and fingers).   Hematological: Negative.    Psychiatric/Behavioral:  Positive for sleep disturbance. Negative for suicidal ideas.         Occasionally feels depressed.          OBJECTIVE:   /70   Pulse 100   Temp (!) 97.1 °F (36.2 °C)   Resp 16   Wt 75.8 kg (167 lb)   SpO2 92%   BMI 32.61 kg/m²   Karnofsky: 70 - Cares for self; unable to carry on normal activity or do normal work     Physical Exam  Vitals and nursing note reviewed.   Constitutional:       General: She is not in acute distress.     Appearance: She is well-developed.   Cardiovascular:      Rate and Rhythm: Normal rate and regular rhythm.   Pulmonary:      Breath sounds: No wheezing, rhonchi or rales.   Chest:          Comments: Breast examination demonstrates the right breast is smaller then the left.  Well-healed 12:00 right lumpectomy scar associated with denser fibrosis.  There is mild diffuse fibrosis also more notable at nipple.  Mild diffuse hyperpigmentation of the right breast. There are no other palpable nodules or suspicious skin changes breasts bilaterally.  There is a 2nd well -healed right axillary scar with a palpable tender, soft fullness.  Abdominal:      Palpations: Abdomen is soft.      Tenderness: There is no abdominal tenderness.   Musculoskeletal:      Right lower leg: No edema.      Left lower leg: No edema.       "Comments: Full range motion upper extremities bilaterally.  No upper extremity edema bilaterally.   Lymphadenopathy:      Cervical: No cervical adenopathy.      Upper Body:      Right upper body: No supraclavicular or axillary adenopathy.      Left upper body: No supraclavicular or axillary adenopathy.   Neurological:      Mental Status: She is alert and oriented to person, place, and time.            Assessment/Plan:  Connie Royal is a 78 y.o. woman with multiple comorbidities and a history of IA, grade 3 invasive right breast carcinoma status post resection on 4/28/2022 followed by truncated 1 cycle of adjuvant TC. Tumor cells were ER/MI/HER2/flash negative.  She completed a course of radiation therapy on 12/15/22. She is currently clinically TRACIE.      There is tenderness and fullness in the right axilla.  Based on her clinical presentation this is likely seroma or lymphedema.  There does not appear to be infection and the improvement is less concerning for recurrence/metastasis.  I recommended continued surveillance.  We discussed symptoms that would be concerning for infection or recurrence.  I feel she would likely benefit from US in addition to upcoming mammogram imaging.  As she is setup with Dr. Merlos, I will plan to reach out to Surgical Oncology to see if they would agree with addition of this on examination.  Otherwise, we would plan to add ourselves.    She is compliant with follow-up with imaging and Dr. Merlos.  She continues to follow with her teams for management of comorbidities.     After discussion of follow-up burden given her comorbidities, she will plan to follow-up in 1 year.  We will see her sooner should the need arise.    Candi Ruby MD  1/15/2024,5:08 PM    Portions of the record may have been created with voice recognition software.  Occasional wrong word or \"sound a like\" substitutions may have occurred due to the inherent limitations of voice recognition software.  Read the chart " carefully and recognize, using context, where substitutions have occurred.

## 2024-01-15 NOTE — PROGRESS NOTES
Connie Royal 1945 is a 78 y.o. female  with multiple comorbidities including COPD, oxygen dependent, and stage IA (pT1cN0 M0) grade 3 invasive right breast carcinoma status postlumpectomy and sentinel lymph node biopsy achieving negative margins on 2022.  Tumor cells were ER/AR/HER2/flash negative. She received 1 cycle of dose reduced TC, but discontinued due to complications and toxicity.  She completed a course of radiation therapy on 12/15/22. She was last seen 7/10/23 and presents today for follow up.     23 Dr. Merlos  She denies of any breast pain nipple discharge nipple retraction or skin changes.  She was recently admitted to the hospital with a diverticulitis.  Unfortunately she was unable to tolerate her adjuvant chemotherapy.   Follow up 6 months   will also obtain a CT pancreas, chromogranin as well as diagnostic bilateral mammogram and see her.       Upcomin24 Dr. Ruelas  24 CT  24 Mammogram  24 Dr. Merlos      Follow up visit     Oncology History   Malignant neoplasm of overlapping sites of right breast in female, estrogen receptor negative    3/25/2022 Initial Diagnosis    Malignant neoplasm of right female breast (HCC)     3/25/2022 Biopsy    Right breast ultrasound guided biopsy  12 o'clock 5 cm from nipple  Invasive breast carcinoma of no special type (ductal NST/ invasive ductal carcinoma with apocrine features)  Grade 2  ER 0  AR 0  HER 2 2+   FISH negative  Lymphovascular invasion not identified    Concordant. Malignancy is unifocal. Mass measures 1.4 cm on mammogram and 1.9 cm on ultrasound. Right axilla ultrasound clear. Left brest clear. Amanda  reflector placed. 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     2022 -  Cancer Staged    Staging form: Breast, AJCC 8th Edition  - Clinical stage from 2022: Stage IB (cT1c, cN0, cM0, G2, ER-, AR-, HER2-) - Signed by Selena Conrad MD on 2022  Stage  prefix: Initial diagnosis  Nuclear grade: G2  Histologic grading system: 3 grade system  HER2-IHC interpretation: Equivocal  HER2-IHC value: Score 2+  HER2-FISH interpretation: Negative       4/4/2022 Genetic Testing    [facility]  A total of 36 genes were evaluated, including: GILL, BRCA1, BRCA2, CDH1, CHEK2, PALB2, PTEN, STK11, TP53  Negative result. No pathogenic sequence variants or deletions/duplications identified     4/28/2022 Surgery    Right breast lexy  directed lumpectomy with sentinel lymph node biopsy and axillary dissection  Invasive ductal carcinoma with apocrine features  Grade 3  1.4 cm  0/4 Lymph nodes       6/14/2022 - 6/15/2022 Chemotherapy    Pegfilgrastim-bmez (ZIEXTENZO), 6 mg, Subcutaneous, Once, 1 of 4 cycles  Administration: 6 mg (6/15/2022)  cyclophosphamide (CYTOXAN) IVPB, 450 mg/m2 = 778 mg (75 % of original dose 600 mg/m2), Intravenous, Once, 1 of 4 cycles  Dose modification: 450 mg/m2 (75 % of original dose 600 mg/m2, Cycle 1, Reason: Dose Not Tolerated)  Administration: 778 mg (6/14/2022)  DOCEtaxel (TAXOTERE) chemo infusion, 56.25 mg/m2 = 97.4 mg (75 % of original dose 75 mg/m2), Intravenous, Once, 1 of 4 cycles  Dose modification: 56.25 mg/m2 (75 % of original dose 75 mg/m2, Cycle 1, Reason: Dose Not Tolerated)  Administration: 97.4 mg (6/14/2022)     11/17/2022 - 12/15/2022 Radiation    Treatments:  Course: C1  Plan ID Energy Fractions Dose per Fraction (cGy) Dose Correction (cGy) Total Dose Delivered (cGy) Elapsed Days   R Breast Hypo 6X 15 / 15 267 0 4,005 21   R BrstBst 6X 10X/6X 5 / 5 200 0 1,000 6    Treatment Dates:  11/17/2022 - 12/15/2022.            Review of Systems:  Review of Systems   Constitutional:  Positive for fatigue and unexpected weight change (gained weight).   HENT:  Positive for dental problem (full dentures).    Eyes:  Positive for visual disturbance.        Fuchs dystrophy  Broken blood vessel left eye   Respiratory:  Positive for cough and shortness of  breath.         2 liters O2 via nasal canula   Cardiovascular: Negative.    Gastrointestinal:  Positive for constipation.   Genitourinary: Negative.    Musculoskeletal:  Positive for back pain and neck pain.   Skin:  Positive for color change (right breast hyperpigmentation).   Allergic/Immunologic: Negative.    Neurological:  Positive for light-headedness and numbness (toes and fingers).   Hematological: Negative.    Psychiatric/Behavioral:  Positive for sleep disturbance. Negative for suicidal ideas.         Occasionally feels depressed.       Clinical Trial: no        Health Maintenance   Topic Date Due    Zoster Vaccine (1 of 2) Never done    Colorectal Cancer Screening  08/30/2021    Lung Cancer Screening  06/20/2023    Influenza Vaccine (1) 09/01/2023    COVID-19 Vaccine (5 - 2023-24 season) 09/01/2023    Breast Cancer Screening: Mammogram  02/14/2024    DXA SCAN  02/15/2024    Medicare Annual Wellness Visit (AWV)  08/29/2024    BMI: Followup Plan  08/29/2024    Fall Risk  01/08/2025    Depression Screening  01/08/2025    Urinary Incontinence Screening  01/08/2025    BMI: Adult  01/08/2025    Hepatitis C Screening  Completed    Osteoporosis Screening  Completed    Pneumococcal Vaccine: 65+ Years  Completed    HIB Vaccine  Aged Out    IPV Vaccine  Aged Out    Hepatitis A Vaccine  Aged Out    Meningococcal ACWY Vaccine  Aged Out    HPV Vaccine  Aged Out     Patient Active Problem List   Diagnosis    Anxiety    Aortoiliac occlusive disease (HCC)    Carotid artery plaque, bilateral    Centrilobular emphysema (HCC)    Hyperlipidemia    Osteoporosis    Restless leg syndrome    Subclavian artery stenosis, left (HCC)    PVD (peripheral vascular disease) (HCC)    Chronic sinusitis    Pancreatic mass    BMI 34.0-34.9,adult    Seborrheic keratoses    Stage 3 chronic kidney disease (HCC)    Closed avulsion fracture of lateral malleolus of right fibula    Prediabetes    Vitamin D deficiency    COPD (chronic obstructive  pulmonary disease) (HCC)    Malignant neoplasm of overlapping sites of right breast in female, estrogen receptor negative     Oral candidiasis    Chemotherapy induced neutropenia     Proctocolitis    Transaminitis    C. difficile colitis    History of lumpectomy of right breast    Ulcerative (chronic) pancolitis without complications (HCC)    Chronic hypoxemic respiratory failure (HCC)    Impaired fasting glucose     Past Medical History:   Diagnosis Date    Anxiety     Atherosclerosis of native artery of both lower extremities with intermittent claudication (HCC) 10/15/2015    Transitioned From: Cardiovascular Symptoms    Breast cancer (HCC) 2022    right breast    Carotid artery plaque, bilateral 2017    Transitioned From: Atherosclerosis of both carotid arteries    Chronic kidney disease     Chronic sinusitis     Last assessed: 13    COPD (chronic obstructive pulmonary disease) (HCC)     COVID     21 not hopsitalized- flu-like symptoms    Fall 2021    Fracture, ankle closed, bimalleolar, right, initial encounter 2021    GERD (gastroesophageal reflux disease)     Hyperlipidemia     Lung nodule     PNA (pneumonia)     PONV (postoperative nausea and vomiting)     with C-sections    Pulmonary emphysema (HCC)     PVD (peripheral vascular disease) (HCC)     Restless leg syndrome     Stage 3 chronic kidney disease (HCC) 2019    Tobacco abuse      Past Surgical History:   Procedure Laterality Date    BREAST LUMPECTOMY Right 2022    Procedure: ISAI  DIRECTED LUMPECTOMY;  Surgeon: Enrike Merlos MD;  Location: [facility] OR;  Service: Surgical Oncology    CATARACT EXTRACTION       SECTION      COLONOSCOPY      Complete. Resolved: 13    DESCENDING AORTIC ANEURYSM REPAIR W/ STENT  2019    IR AORTAGRAM WITH RUN-OFF  8/15/2019    LYMPH NODE BIOPSY Right 2022    Procedure: LYMPHATIC MAPPING WITH BLUE AND RADIOACTIVE DYES, SENTINEL LYMPH NODE BIOPSY,  INJECTION IN OR BY DR MERLOS AT 1300;  Surgeon: Enrike Merlos MD;  Location: [facility];  Service: Surgical Oncology    SHOULDER SURGERY      For frozen shoulder     TONSILLECTOMY      US BREAST ISAI  NEEDLE LOC RIGHT Right 3/25/2022    US GUIDED BREAST BIOPSY RIGHT COMPLETE Right 3/25/2022     Family History   Problem Relation Age of Onset    No Known Problems Mother     No Known Problems Father     Arthritis Sister     Pancreatic cancer Sister 63    Melanoma Brother         twice    Prostate cancer Brother     Heart attack Family         Acute Myocardial Infarction    Cirrhosis Family     Prostate cancer Family     Skin cancer Family     Stroke Family         Syndrome    No Known Problems Daughter     No Known Problems Maternal Grandmother     No Known Problems Paternal Grandmother     No Known Problems Sister     No Known Problems Maternal Aunt     Breast cancer Other 30     Social History     Socioeconomic History    Marital status:      Spouse name: Not on file    Number of children: 2    Years of education: Not on file    Highest education level: Not on file   Occupational History    Occupation: Retired/   Tobacco Use    Smoking status: Former     Current packs/day: 0.00     Average packs/day: 2.0 packs/day for 56.3 years (112.5 ttl pk-yrs)     Types: Cigarettes     Start date: 1962     Quit date: 2018     Years since quittin.6     Passive exposure: Past    Smokeless tobacco: Never   Vaping Use    Vaping status: Never Used   Substance and Sexual Activity    Alcohol use: Yes     Comment: occasionally     Drug use: No    Sexual activity: Not Currently     Partners: Male   Other Topics Concern    Not on file   Social History Narrative    Living w/adult children    No advance directive on file     Social Determinants of Health     Financial Resource Strain: Not on file   Food Insecurity: Not on file   Transportation Needs: No Transportation Needs (2022)     PRAPARE - Transportation     Lack of Transportation (Medical): No     Lack of Transportation (Non-Medical): No   Physical Activity: Inactive (9/14/2021)    Exercise Vital Sign     Days of Exercise per Week: 0 days     Minutes of Exercise per Session: 0 min   Stress: Stress Concern Present (9/14/2021)    [employer] - Occupational Stress Questionnaire     Feeling of Stress : Very much   Social Connections: Not on file   Intimate Partner Violence: Not on file   Housing Stability: Low Risk  (6/23/2022)    Housing Stability Vital Sign     Unable to Pay for Housing in the Last Year: No     Number of Places Lived in the Last Year: 1     Unstable Housing in the Last Year: No       Current Outpatient Medications:     ALPRAZolam (XANAX) 0.5 mg tablet, Take 1 tablet (0.5 mg total) by mouth 2 (two) times a day as needed for anxiety, Disp: 45 tablet, Rfl: 0    ASPIRIN 81 PO, Take by mouth, Disp: , Rfl:     ergocalciferol (VITAMIN D2) 50,000 units, take 1 capsule by mouth every week, Disp: 12 capsule, Rfl: 0    famotidine (PEPCID) 20 mg tablet, Take 1 tablet (20 mg total) by mouth daily, Disp: , Rfl: 0    fluticasone (FLONASE) 50 mcg/act nasal spray, 2 sprays into each nostril daily, Disp: 11.1 mL, Rfl: 1    gabapentin (NEURONTIN) 300 mg capsule, Take 1 capsule (300 mg total) by mouth 2 (two) times a day, Disp: 180 capsule, Rfl: 2    gabapentin (NEURONTIN) 400 mg capsule, Take 1 capsule (400 mg total) by mouth daily at bedtime, Disp: 100 capsule, Rfl: 1    mometasone (ELOCON) 0.1 % cream, Apply topically daily as needed (rash), Disp: 50 g, Rfl: 1    naloxone (NARCAN) 4 mg/0.1 mL nasal spray, Administer 1 spray into a nostril. If no response after 2-3 minutes, give another dose in the other nostril using a new spray., Disp: 1 each, Rfl: 1    olopatadine (PATANOL) 0.1 % ophthalmic solution, , Disp: , Rfl: 0    ondansetron (ZOFRAN) 8 mg tablet, Take 1 tablet (8 mg total) by mouth every 8 (eight) hours  as needed for nausea or vomiting, Disp: 20 tablet, Rfl: 2    predniSONE 10 mg tablet, Take 4 tablets for 3 days then 3 tablets for 3 days then 2 tablet for 3 days 1 for 1 day, Disp: 28 tablet, Rfl: 0    rOPINIRole (REQUIP) 0.25 mg tablet, Take 1 tablet (0.25 mg total) by mouth daily at bedtime, Disp: 90 tablet, Rfl: 1    rosuvastatin (CRESTOR) 20 MG tablet, Take 1 tablet (20 mg total) by mouth daily, Disp: 90 tablet, Rfl: 0    saccharomyces boulardii (FLORASTOR) 250 mg capsule, Take 1 capsule (250 mg total) by mouth 2 (two) times a day, Disp: , Rfl: 0    sodium chloride () 2 % hypertonic ophthalmic solution, Seu 128 2 % eye drops, Disp: , Rfl:   Allergies   Allergen Reactions    Tiotropium Bromide Monohydrate Shortness Of Breath    Augmentin [Amoxicillin-Pot Clavulanate] Abdominal Pain     Pt received ceftriaxone 1 g IV on 5-21-18, gets sharp pains     Tiotropium Abdominal Pain    Tylenol With Codeine #3 [Acetaminophen-Codeine] Abdominal Pain    Other Other (See Comments)     There were no vitals filed for this visit.

## 2024-01-16 ENCOUNTER — TELEPHONE (OUTPATIENT)
Age: 79
End: 2024-01-16

## 2024-01-16 DIAGNOSIS — M43.12 ANTEROLISTHESIS OF CERVICAL SPINE: Primary | ICD-10-CM

## 2024-01-16 NOTE — TELEPHONE ENCOUNTER
Pain is still in neck, but pain is more in left arm,     Told her the referral is placed for orthoispine

## 2024-01-16 NOTE — TELEPHONE ENCOUNTER
Patient said it is not related to her heart, the pain is coming from her neck she will make an appt with ortho.

## 2024-01-17 DIAGNOSIS — C50.811 MALIGNANT NEOPLASM OF OVERLAPPING SITES OF RIGHT BREAST IN FEMALE, ESTROGEN RECEPTOR NEGATIVE: Primary | ICD-10-CM

## 2024-01-17 DIAGNOSIS — B37.0 ORAL CANDIDIASIS: ICD-10-CM

## 2024-01-17 DIAGNOSIS — Z17.1 MALIGNANT NEOPLASM OF OVERLAPPING SITES OF RIGHT BREAST IN FEMALE, ESTROGEN RECEPTOR NEGATIVE: Primary | ICD-10-CM

## 2024-01-17 DIAGNOSIS — Z98.890 HISTORY OF LUMPECTOMY OF RIGHT BREAST: ICD-10-CM

## 2024-01-19 ENCOUNTER — TELEPHONE (OUTPATIENT)
Dept: HEMATOLOGY ONCOLOGY | Facility: CLINIC | Age: 79
End: 2024-01-19

## 2024-01-19 NOTE — TELEPHONE ENCOUNTER
Left message for patient on her phone that Dr. Ruelas is recommending she f/u with the office for surveillance of her breast cancer.

## 2024-01-19 NOTE — TELEPHONE ENCOUNTER
I called patient to reschedule due to schedule changes. Patient would like to speak to someone and discuss the reasoning behind her coming in for a visit. She does not understand why she has to follow up. Please advise.

## 2024-02-01 ENCOUNTER — APPOINTMENT (OUTPATIENT)
Dept: LAB | Facility: HOSPITAL | Age: 79
End: 2024-02-01
Payer: MEDICARE

## 2024-02-01 ENCOUNTER — RA CDI HCC (OUTPATIENT)
Dept: OTHER | Facility: HOSPITAL | Age: 79
End: 2024-02-01

## 2024-02-01 DIAGNOSIS — Z17.1 MALIGNANT NEOPLASM OF OVERLAPPING SITES OF RIGHT BREAST IN FEMALE, ESTROGEN RECEPTOR NEGATIVE: ICD-10-CM

## 2024-02-01 DIAGNOSIS — C50.811 MALIGNANT NEOPLASM OF OVERLAPPING SITES OF RIGHT BREAST IN FEMALE, ESTROGEN RECEPTOR NEGATIVE: ICD-10-CM

## 2024-02-01 DIAGNOSIS — K86.89 PANCREATIC MASS: ICD-10-CM

## 2024-02-01 DIAGNOSIS — Z00.00 WELL ADULT EXAM: ICD-10-CM

## 2024-02-01 LAB
ALBUMIN SERPL BCP-MCNC: 4.3 G/DL (ref 3.5–5)
ALP SERPL-CCNC: 79 U/L (ref 34–104)
ALT SERPL W P-5'-P-CCNC: 26 U/L (ref 7–52)
ANION GAP SERPL CALCULATED.3IONS-SCNC: 8 MMOL/L
AST SERPL W P-5'-P-CCNC: 19 U/L (ref 13–39)
BASOPHILS # BLD AUTO: 0.05 THOUSANDS/ÂΜL (ref 0–0.1)
BASOPHILS NFR BLD AUTO: 1 % (ref 0–1)
BILIRUB SERPL-MCNC: 0.33 MG/DL (ref 0.2–1)
BUN SERPL-MCNC: 15 MG/DL (ref 5–25)
CALCIUM SERPL-MCNC: 9.3 MG/DL (ref 8.4–10.2)
CHLORIDE SERPL-SCNC: 106 MMOL/L (ref 96–108)
CHOLEST SERPL-MCNC: 123 MG/DL
CO2 SERPL-SCNC: 27 MMOL/L (ref 21–32)
CREAT SERPL-MCNC: 1.16 MG/DL (ref 0.6–1.3)
CREAT UR-MCNC: 294.8 MG/DL
EOSINOPHIL # BLD AUTO: 0.15 THOUSAND/ÂΜL (ref 0–0.61)
EOSINOPHIL NFR BLD AUTO: 3 % (ref 0–6)
ERYTHROCYTE [DISTWIDTH] IN BLOOD BY AUTOMATED COUNT: 13.5 % (ref 11.6–15.1)
EST. AVERAGE GLUCOSE BLD GHB EST-MCNC: 160 MG/DL
GFR SERPL CREATININE-BSD FRML MDRD: 45 ML/MIN/1.73SQ M
GLUCOSE P FAST SERPL-MCNC: 140 MG/DL (ref 65–99)
HBA1C MFR BLD: 7.2 %
HCT VFR BLD AUTO: 45 % (ref 34.8–46.1)
HDLC SERPL-MCNC: 43 MG/DL
HGB BLD-MCNC: 14.7 G/DL (ref 11.5–15.4)
IMM GRANULOCYTES # BLD AUTO: 0.02 THOUSAND/UL (ref 0–0.2)
IMM GRANULOCYTES NFR BLD AUTO: 0 % (ref 0–2)
LDLC SERPL CALC-MCNC: 50 MG/DL (ref 0–100)
LYMPHOCYTES # BLD AUTO: 1.91 THOUSANDS/ÂΜL (ref 0.6–4.47)
LYMPHOCYTES NFR BLD AUTO: 33 % (ref 14–44)
MCH RBC QN AUTO: 29.9 PG (ref 26.8–34.3)
MCHC RBC AUTO-ENTMCNC: 32.7 G/DL (ref 31.4–37.4)
MCV RBC AUTO: 92 FL (ref 82–98)
MICROALBUMIN UR-MCNC: 19.7 MG/L
MICROALBUMIN/CREAT 24H UR: 7 MG/G CREATININE (ref 0–30)
MONOCYTES # BLD AUTO: 0.36 THOUSAND/ÂΜL (ref 0.17–1.22)
MONOCYTES NFR BLD AUTO: 6 % (ref 4–12)
NEUTROPHILS # BLD AUTO: 3.35 THOUSANDS/ÂΜL (ref 1.85–7.62)
NEUTS SEG NFR BLD AUTO: 57 % (ref 43–75)
NONHDLC SERPL-MCNC: 80 MG/DL
NRBC BLD AUTO-RTO: 0 /100 WBCS
PLATELET # BLD AUTO: 283 THOUSANDS/UL (ref 149–390)
PMV BLD AUTO: 9.5 FL (ref 8.9–12.7)
POTASSIUM SERPL-SCNC: 4.1 MMOL/L (ref 3.5–5.3)
PROT SERPL-MCNC: 7 G/DL (ref 6.4–8.4)
RBC # BLD AUTO: 4.92 MILLION/UL (ref 3.81–5.12)
SODIUM SERPL-SCNC: 141 MMOL/L (ref 135–147)
TRIGL SERPL-MCNC: 152 MG/DL
TSH SERPL DL<=0.05 MIU/L-ACNC: 3.15 UIU/ML (ref 0.45–4.5)
WBC # BLD AUTO: 5.84 THOUSAND/UL (ref 4.31–10.16)

## 2024-02-01 PROCEDURE — 83036 HEMOGLOBIN GLYCOSYLATED A1C: CPT

## 2024-02-01 PROCEDURE — 85025 COMPLETE CBC W/AUTO DIFF WBC: CPT

## 2024-02-01 PROCEDURE — 80061 LIPID PANEL: CPT

## 2024-02-01 PROCEDURE — 82043 UR ALBUMIN QUANTITATIVE: CPT

## 2024-02-01 PROCEDURE — 80053 COMPREHEN METABOLIC PANEL: CPT

## 2024-02-01 PROCEDURE — 86316 IMMUNOASSAY TUMOR OTHER: CPT

## 2024-02-01 PROCEDURE — 36415 COLL VENOUS BLD VENIPUNCTURE: CPT

## 2024-02-01 PROCEDURE — 84443 ASSAY THYROID STIM HORMONE: CPT

## 2024-02-01 PROCEDURE — 82570 ASSAY OF URINE CREATININE: CPT

## 2024-02-01 NOTE — PROGRESS NOTES
HCC coding opportunities          Chart Reviewed number of suggestions sent to Provider: 3     Patients Insurance   J44.9, I73.9 and I77.1  Medicare Insurance: Medicare

## 2024-02-02 ENCOUNTER — APPOINTMENT (OUTPATIENT)
Dept: RADIOLOGY | Facility: HOSPITAL | Age: 79
End: 2024-02-02
Payer: MEDICARE

## 2024-02-02 ENCOUNTER — HOSPITAL ENCOUNTER (OUTPATIENT)
Dept: CT IMAGING | Facility: HOSPITAL | Age: 79
End: 2024-02-02
Attending: SURGERY
Payer: MEDICARE

## 2024-02-02 DIAGNOSIS — C50.811 MALIGNANT NEOPLASM OF OVERLAPPING SITES OF RIGHT BREAST IN FEMALE, ESTROGEN RECEPTOR NEGATIVE: ICD-10-CM

## 2024-02-02 DIAGNOSIS — Z17.1 MALIGNANT NEOPLASM OF OVERLAPPING SITES OF RIGHT BREAST IN FEMALE, ESTROGEN RECEPTOR NEGATIVE: ICD-10-CM

## 2024-02-02 DIAGNOSIS — K86.89 PANCREATIC MASS: ICD-10-CM

## 2024-02-02 PROCEDURE — G1004 CDSM NDSC: HCPCS

## 2024-02-02 PROCEDURE — 74177 CT ABD & PELVIS W/CONTRAST: CPT

## 2024-02-02 RX ADMIN — IOHEXOL 100 ML: 350 INJECTION, SOLUTION INTRAVENOUS at 13:32

## 2024-02-02 RX ADMIN — IOHEXOL 50 ML: 240 INJECTION, SOLUTION INTRATHECAL; INTRAVASCULAR; INTRAVENOUS; ORAL at 13:31

## 2024-02-05 LAB — CGA SERPL-MCNC: 128.6 NG/ML (ref 0–101.8)

## 2024-02-07 ENCOUNTER — TELEPHONE (OUTPATIENT)
Age: 79
End: 2024-02-07

## 2024-02-07 ENCOUNTER — OFFICE VISIT (OUTPATIENT)
Age: 79
End: 2024-02-07
Payer: MEDICARE

## 2024-02-07 VITALS
BODY MASS INDEX: 33.26 KG/M2 | TEMPERATURE: 97.9 F | OXYGEN SATURATION: 99 % | SYSTOLIC BLOOD PRESSURE: 132 MMHG | DIASTOLIC BLOOD PRESSURE: 78 MMHG | HEIGHT: 60 IN | WEIGHT: 169.4 LBS | HEART RATE: 89 BPM

## 2024-02-07 DIAGNOSIS — Z17.1 MALIGNANT NEOPLASM OF OVERLAPPING SITES OF RIGHT BREAST IN FEMALE, ESTROGEN RECEPTOR NEGATIVE: ICD-10-CM

## 2024-02-07 DIAGNOSIS — I65.23 CAROTID ARTERY PLAQUE, BILATERAL: ICD-10-CM

## 2024-02-07 DIAGNOSIS — N18.31 STAGE 3A CHRONIC KIDNEY DISEASE (HCC): ICD-10-CM

## 2024-02-07 DIAGNOSIS — J41.0 SIMPLE CHRONIC BRONCHITIS (HCC): ICD-10-CM

## 2024-02-07 DIAGNOSIS — F41.9 ANXIETY: ICD-10-CM

## 2024-02-07 DIAGNOSIS — J96.11 CHRONIC HYPOXEMIC RESPIRATORY FAILURE (HCC): ICD-10-CM

## 2024-02-07 DIAGNOSIS — I74.09 AORTOILIAC OCCLUSIVE DISEASE (HCC): ICD-10-CM

## 2024-02-07 DIAGNOSIS — Z86.19 HISTORY OF CLOSTRIDIOIDES DIFFICILE COLITIS: ICD-10-CM

## 2024-02-07 DIAGNOSIS — C50.811 MALIGNANT NEOPLASM OF OVERLAPPING SITES OF RIGHT BREAST IN FEMALE, ESTROGEN RECEPTOR NEGATIVE: ICD-10-CM

## 2024-02-07 DIAGNOSIS — J43.2 CENTRILOBULAR EMPHYSEMA (HCC): ICD-10-CM

## 2024-02-07 DIAGNOSIS — M54.12 CERVICAL RADICULOPATHY AT C5: ICD-10-CM

## 2024-02-07 DIAGNOSIS — Z87.891 HISTORY OF NICOTINE DEPENDENCE: ICD-10-CM

## 2024-02-07 DIAGNOSIS — M54.50 CHRONIC MIDLINE LOW BACK PAIN WITHOUT SCIATICA: ICD-10-CM

## 2024-02-07 DIAGNOSIS — Z12.11 SCREENING FOR COLON CANCER: ICD-10-CM

## 2024-02-07 DIAGNOSIS — G89.29 CHRONIC MIDLINE LOW BACK PAIN WITHOUT SCIATICA: ICD-10-CM

## 2024-02-07 DIAGNOSIS — K86.89 PANCREATIC MASS: ICD-10-CM

## 2024-02-07 DIAGNOSIS — M81.0 AGE-RELATED OSTEOPOROSIS WITHOUT CURRENT PATHOLOGICAL FRACTURE: ICD-10-CM

## 2024-02-07 DIAGNOSIS — E78.5 HYPERLIPIDEMIA, UNSPECIFIED HYPERLIPIDEMIA TYPE: ICD-10-CM

## 2024-02-07 DIAGNOSIS — E66.9 OBESITY (BMI 30.0-34.9): ICD-10-CM

## 2024-02-07 DIAGNOSIS — G25.81 RLS (RESTLESS LEGS SYNDROME): ICD-10-CM

## 2024-02-07 DIAGNOSIS — I77.1 SUBCLAVIAN ARTERY STENOSIS, LEFT (HCC): ICD-10-CM

## 2024-02-07 DIAGNOSIS — I73.9 PVD (PERIPHERAL VASCULAR DISEASE) (HCC): ICD-10-CM

## 2024-02-07 DIAGNOSIS — E11.9 TYPE 2 DIABETES MELLITUS WITHOUT COMPLICATION, WITHOUT LONG-TERM CURRENT USE OF INSULIN (HCC): Primary | ICD-10-CM

## 2024-02-07 PROBLEM — R73.01 IMPAIRED FASTING GLUCOSE: Status: RESOLVED | Noted: 2023-08-29 | Resolved: 2024-02-07

## 2024-02-07 PROBLEM — B37.0 ORAL CANDIDIASIS: Status: RESOLVED | Noted: 2022-06-20 | Resolved: 2024-02-07

## 2024-02-07 PROBLEM — R73.03 PREDIABETES: Status: RESOLVED | Noted: 2021-09-14 | Resolved: 2024-02-07

## 2024-02-07 PROBLEM — A04.72 C. DIFFICILE COLITIS: Status: RESOLVED | Noted: 2022-09-17 | Resolved: 2024-02-07

## 2024-02-07 PROCEDURE — 99215 OFFICE O/P EST HI 40 MIN: CPT | Performed by: INTERNAL MEDICINE

## 2024-02-07 RX ORDER — GABAPENTIN 400 MG/1
400 CAPSULE ORAL
Qty: 100 CAPSULE | Refills: 1 | Status: SHIPPED | OUTPATIENT
Start: 2024-02-07

## 2024-02-07 NOTE — PATIENT INSTRUCTIONS
Lab data reviewed in detail and compared to prior    Type 2 diabetes mellitus-implications discussed.  A1c 7.2.  We discussed consideration of medical therapy with metformin versus GLP-1 agonist or SGLT2 antagonist.  Instead we will focus on lifestyle modification with low-carb diet.  Start into a gradual exercise regimen and continue with weight loss efforts.  Consider medication if unable to get A1c less than 7 x 6-month follow-up.    Blood pressure remained stable without medication.  No evidence of microalbuminuria    Hyperlipidemia-LDL at goal on rosuvastatin in setting of peripheral vascular disease.  Triglycerides are elevated.  This should improve with exercise and weight loss.    Peripheral vascular disease-continue statin and aspirin and close follow-up with vascular surgery.    COPD-clinically stable on present regimen following with pulmonary    History of tobacco abuse-we discussed pros and cons of lung screening CT and CT has been ordered.    History of breast cancer following with surgical, radiation and medical oncology    History of pancreatic mass-CT is pending.  Surgical oncology follow-up    Cervical radiculopathy as well as lumbago-x-rays reviewed.  Refer to physical therapy.    History of colitis-patient encouraged to follow-up with Dr. Ordonez to review and have colon cancer screening.    Anxiety stable with rare use of alprazolam.    Routine follow-up after labs in 6 months, sooner as needed

## 2024-02-07 NOTE — PROGRESS NOTES
Assessment/Plan:    Diagnoses and all orders for this visit:    Type 2 diabetes mellitus without complication, without long-term current use of insulin (HCC)  -     CBC and differential; Future  -     Comprehensive metabolic panel; Future  -     Lipid panel; Future  -     Hemoglobin A1C; Future    Screening for colon cancer  -     Ambulatory Referral to Gastroenterology; Future    History of Clostridioides difficile colitis    Simple chronic bronchitis (HCC)    Chronic hypoxemic respiratory failure (HCC)    Centrilobular emphysema (HCC)    Subclavian artery stenosis, left (HCC)    Aortoiliac occlusive disease (HCC)    PVD (peripheral vascular disease) (HCC)    Carotid artery plaque, bilateral    Age-related osteoporosis without current pathological fracture    Stage 3a chronic kidney disease (HCC)    Pancreatic mass    Malignant neoplasm of overlapping sites of right breast in female, estrogen receptor negative     Hyperlipidemia, unspecified hyperlipidemia type    Anxiety    Obesity (BMI 30.0-34.9)    Chronic midline low back pain without sciatica  -     Ambulatory Referral to Physical Therapy; Future    Cervical radiculopathy at C5  -     Ambulatory Referral to Physical Therapy; Future    History of nicotine dependence  -     CT lung screening program; Future              Patient Instructions   Lab data reviewed in detail and compared to prior    Type 2 diabetes mellitus-implications discussed.  A1c 7.2.  We discussed consideration of medical therapy with metformin versus GLP-1 agonist or SGLT2 antagonist.  Instead we will focus on lifestyle modification with low-carb diet.  Start into a gradual exercise regimen and continue with weight loss efforts.  Consider medication if unable to get A1c less than 7 x 6-month follow-up.    Blood pressure remained stable without medication.  No evidence of microalbuminuria    Hyperlipidemia-LDL at goal on rosuvastatin in setting of peripheral vascular disease.  Triglycerides  are elevated.  This should improve with exercise and weight loss.    Peripheral vascular disease-continue statin and aspirin and close follow-up with vascular surgery.    COPD-clinically stable on present regimen following with pulmonary    History of tobacco abuse-we discussed pros and cons of lung screening CT and CT has been ordered.    History of breast cancer following with surgical, radiation and medical oncology    History of pancreatic mass-CT is pending.  Surgical oncology follow-up    Cervical radiculopathy as well as lumbago-x-rays reviewed.  Refer to physical therapy.    History of colitis-patient encouraged to follow-up with Dr. Ordonez to review and have colon cancer screening.    Anxiety stable with rare use of alprazolam.    Routine follow-up after labs in 6 months, sooner as needed    Subjective:      Patient ID: Connie Royal is a 78 y.o. female    F/u MMP and review labs   '05.  2 kids living w/ her.    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  Dinner/shopping.  No regular exercise.    Feeling ok, but very fatigued.  Off kilter since cancer.   Breast CA s/p lumpectomy 4/22 f/b XRT, then chemo but halted d/t n/v and hospitalized x 9 d, then re admitted for c diff.    Pancreatic mass f/b Dr. Corona, CT 2/2 pnd  COPD-quit smoking on 73st bday, w/ chronic hypoxic respiratory failure on o2 x 24 hrs, on breo and albuterol, f/b Dr. Mesa  DM-notes 50 lb wt gain since quitting smoking, watching carbs.  HPL-tolerating rosuvastatin  PVD w/ AAA s/p endovascular repair 8/19, b/l carotid stenosis f/b Dr. Banerjee q 6 mos .  Anxiety-rare use of xanax.    H/o fuch's dystrophy working w/ Dr. Muñiz.  Needs procdedure  Notes L neck pain radiates to shoulder.  Xrays show DDD w/ C5-6 anterolisthesis          Current Outpatient Medications:     ALPRAZolam (XANAX) 0.5 mg tablet, Take 1 tablet (0.5 mg total) by mouth 2 (two) times a day as needed for anxiety, Disp: 45 tablet, Rfl: 0     ASPIRIN 81 PO, Take by mouth, Disp: , Rfl:     famotidine (PEPCID) 20 mg tablet, Take 1 tablet (20 mg total) by mouth daily, Disp: , Rfl: 0    fluticasone (FLONASE) 50 mcg/act nasal spray, 2 sprays into each nostril daily, Disp: 11.1 mL, Rfl: 1    gabapentin (NEURONTIN) 300 mg capsule, Take 1 capsule (300 mg total) by mouth 2 (two) times a day, Disp: 180 capsule, Rfl: 2    gabapentin (NEURONTIN) 400 mg capsule, Take 1 capsule (400 mg total) by mouth daily at bedtime, Disp: 100 capsule, Rfl: 1    olopatadine (PATANOL) 0.1 % ophthalmic solution, , Disp: , Rfl: 0    ondansetron (ZOFRAN) 8 mg tablet, Take 1 tablet (8 mg total) by mouth every 8 (eight) hours as needed for nausea or vomiting, Disp: 20 tablet, Rfl: 2    rosuvastatin (CRESTOR) 20 MG tablet, Take 1 tablet (20 mg total) by mouth daily, Disp: 90 tablet, Rfl: 0    saccharomyces boulardii (FLORASTOR) 250 mg capsule, Take 1 capsule (250 mg total) by mouth 2 (two) times a day, Disp: , Rfl: 0    sodium chloride () 2 % hypertonic ophthalmic solution, Sue 128 2 % eye drops, Disp: , Rfl:     ergocalciferol (VITAMIN D2) 50,000 units, take 1 capsule by mouth every week (Patient not taking: Reported on 1/15/2024), Disp: 12 capsule, Rfl: 0    mometasone (ELOCON) 0.1 % cream, Apply topically daily as needed (rash) (Patient not taking: Reported on 1/15/2024), Disp: 50 g, Rfl: 1    naloxone (NARCAN) 4 mg/0.1 mL nasal spray, Administer 1 spray into a nostril. If no response after 2-3 minutes, give another dose in the other nostril using a new spray. (Patient not taking: Reported on 1/15/2024), Disp: 1 each, Rfl: 1    predniSONE 10 mg tablet, Take 4 tablets for 3 days then 3 tablets for 3 days then 2 tablet for 3 days 1 for 1 day (Patient not taking: Reported on 2/7/2024), Disp: 28 tablet, Rfl: 0    rOPINIRole (REQUIP) 0.25 mg tablet, Take 1 tablet (0.25 mg total) by mouth daily at bedtime (Patient not taking: Reported on 1/15/2024), Disp: 90 tablet, Rfl: 1    Recent  Results (from the past 1008 hour(s))   POCT Rapid Covid Ag    Collection Time: 01/08/24  2:48 PM   Result Value Ref Range    POCT SARS-CoV-2 Ag Negative Negative    VALID CONTROL Valid    POCT rapid flu A and B    Collection Time: 01/08/24  2:49 PM   Result Value Ref Range    RAPID FLU A Neg     RAPID FLU B Neg    Lipid panel    Collection Time: 02/01/24  9:25 AM   Result Value Ref Range    Cholesterol 123 See Comment mg/dL    Triglycerides 152 (H) See Comment mg/dL    HDL, Direct 43 (L) >=50 mg/dL    LDL Calculated 50 0 - 100 mg/dL    Non-HDL-Chol (CHOL-HDL) 80 mg/dl   Hemoglobin A1C    Collection Time: 02/01/24  9:25 AM   Result Value Ref Range    Hemoglobin A1C 7.2 (H) Normal 4.0-5.6%; PreDiabetic 5.7-6.4%; Diabetic >=6.5%; Glycemic control for adults with diabetes <7.0% %     mg/dl   Comprehensive metabolic panel    Collection Time: 02/01/24  9:25 AM   Result Value Ref Range    Sodium 141 135 - 147 mmol/L    Potassium 4.1 3.5 - 5.3 mmol/L    Chloride 106 96 - 108 mmol/L    CO2 27 21 - 32 mmol/L    ANION GAP 8 mmol/L    BUN 15 5 - 25 mg/dL    Creatinine 1.16 0.60 - 1.30 mg/dL    Glucose, Fasting 140 (H) 65 - 99 mg/dL    Calcium 9.3 8.4 - 10.2 mg/dL    AST 19 13 - 39 U/L    ALT 26 7 - 52 U/L    Alkaline Phosphatase 79 34 - 104 U/L    Total Protein 7.0 6.4 - 8.4 g/dL    Albumin 4.3 3.5 - 5.0 g/dL    Total Bilirubin 0.33 0.20 - 1.00 mg/dL    eGFR 45 ml/min/1.73sq m   CBC and differential    Collection Time: 02/01/24  9:25 AM   Result Value Ref Range    WBC 5.84 4.31 - 10.16 Thousand/uL    RBC 4.92 3.81 - 5.12 Million/uL    Hemoglobin 14.7 11.5 - 15.4 g/dL    Hematocrit 45.0 34.8 - 46.1 %    MCV 92 82 - 98 fL    MCH 29.9 26.8 - 34.3 pg    MCHC 32.7 31.4 - 37.4 g/dL    RDW 13.5 11.6 - 15.1 %    MPV 9.5 8.9 - 12.7 fL    Platelets 283 149 - 390 Thousands/uL    nRBC 0 /100 WBCs    Neutrophils Relative 57 43 - 75 %    Immat GRANS % 0 0 - 2 %    Lymphocytes Relative 33 14 - 44 %    Monocytes Relative 6 4 - 12 %     Eosinophils Relative 3 0 - 6 %    Basophils Relative 1 0 - 1 %    Neutrophils Absolute 3.35 1.85 - 7.62 Thousands/µL    Immature Grans Absolute 0.02 0.00 - 0.20 Thousand/uL    Lymphocytes Absolute 1.91 0.60 - 4.47 Thousands/µL    Monocytes Absolute 0.36 0.17 - 1.22 Thousand/µL    Eosinophils Absolute 0.15 0.00 - 0.61 Thousand/µL    Basophils Absolute 0.05 0.00 - 0.10 Thousands/µL   Albumin / creatinine urine ratio    Collection Time: 02/01/24  9:25 AM   Result Value Ref Range    Creatinine, Ur 294.8 Reference range not established. mg/dL    Albumin,U,Random 19.7 <20.0 mg/L    Albumin Creat Ratio 7 0 - 30 mg/g creatinine   TSH, 3rd generation with Free T4 reflex    Collection Time: 02/01/24  9:25 AM   Result Value Ref Range    TSH 3RD GENERATON 3.150 0.450 - 4.500 uIU/mL   Chromogranin A    Collection Time: 02/01/24  9:25 AM   Result Value Ref Range    Chromogranin A 128.6 (H) 0.0 - 101.8 ng/mL       The following portions of the patient's history were reviewed and updated as appropriate: allergies, current medications, past family history, past medical history, past social history, past surgical history and problem list.     Review of Systems   Constitutional:  Negative for appetite change, chills, diaphoresis, fatigue, fever and unexpected weight change.   HENT:  Negative for congestion, hearing loss and rhinorrhea.    Eyes:  Negative for visual disturbance.   Respiratory:  Negative for cough, chest tightness, shortness of breath and wheezing.    Cardiovascular:  Negative for chest pain, palpitations and leg swelling.   Gastrointestinal:  Negative for abdominal pain and blood in stool.   Endocrine: Negative for cold intolerance, heat intolerance, polydipsia and polyuria.   Genitourinary:  Negative for difficulty urinating, dysuria, frequency and urgency.   Musculoskeletal:  Negative for arthralgias and myalgias.   Skin:  Negative for rash.   Neurological:  Negative for dizziness, weakness, light-headedness and  headaches.   Hematological:  Does not bruise/bleed easily.   Psychiatric/Behavioral:  Negative for dysphoric mood and sleep disturbance.          Objective:      Vitals:    02/07/24 1203   BP: 132/78   Pulse: 89   Temp: 97.9 °F (36.6 °C)   SpO2: 99%          Physical Exam  Constitutional:       Appearance: She is well-developed.   HENT:      Head: Normocephalic and atraumatic.      Nose: Nose normal.   Eyes:      General: No scleral icterus.     Conjunctiva/sclera: Conjunctivae normal.      Pupils: Pupils are equal, round, and reactive to light.   Neck:      Thyroid: No thyromegaly.      Vascular: No JVD.      Trachea: No tracheal deviation.   Cardiovascular:      Rate and Rhythm: Normal rate and regular rhythm.      Pulses: no weak pulses          Dorsalis pedis pulses are 1+ on the right side and 1+ on the left side.        Posterior tibial pulses are 1+ on the right side and 1+ on the left side.      Heart sounds: No murmur heard.     No friction rub. No gallop.   Pulmonary:      Effort: Pulmonary effort is normal. No respiratory distress.      Breath sounds: Normal breath sounds. No wheezing or rales.   Musculoskeletal:         General: No deformity.      Cervical back: Normal range of motion and neck supple.   Feet:      Right foot:      Skin integrity: No ulcer, skin breakdown, erythema, warmth, callus or dry skin.      Left foot:      Skin integrity: No ulcer, skin breakdown, erythema, warmth, callus or dry skin.   Lymphadenopathy:      Cervical: No cervical adenopathy.   Skin:     General: Skin is warm and dry.      Coloration: Skin is not pale.      Findings: No erythema or rash.   Neurological:      Mental Status: She is alert and oriented to person, place, and time.      Cranial Nerves: No cranial nerve deficit.   Psychiatric:         Behavior: Behavior normal.         Thought Content: Thought content normal.         Judgment: Judgment normal.     Diabetic Foot Exam    Patient's shoes and socks  removed.    Right Foot/Ankle   Right Foot Inspection  Skin Exam: skin normal and skin intact. No dry skin, no warmth, no callus, no erythema, no maceration, no abnormal color, no pre-ulcer, no ulcer and no callus.     Toe Exam: ROM and strength within normal limits.     Sensory   Proprioception: intact  Monofilament testing: intact    Vascular  Capillary refills: < 3 seconds  The right DP pulse is 1+. The right PT pulse is 1+.     Left Foot/Ankle  Left Foot Inspection  Skin Exam: skin normal and skin intact. No dry skin, no warmth, no erythema, no maceration, normal color, no pre-ulcer, no ulcer and no callus.     Toe Exam: ROM and strength within normal limits.     Sensory   Proprioception: intact  Monofilament testing: intact    Vascular  Capillary refills: < 3 seconds  The left DP pulse is 1+. The left PT pulse is 1+.     Assign Risk Category  No deformity present  No loss of protective sensation  No weak pulses  Risk: 0  I discussed with her that she is a candidate for lung cancer CT screening.     The following Shared Decision-Making points were covered:  Benefits of screening were discussed, including the rates of reduction in death from lung cancer and other causes.  Harms of screening were reviewed, including false positive tests, radiation exposure levels, risks of invasive procedures, risks of complications of screening, and risk of overdiagnosis.  I counseled on the importance of adherence to annual lung cancer LDCT screening, impact of co-morbidities, and ability or willingness to undergo diagnosis and treatment.  I counseled on the importance of maintaining abstinence as a former smoker or was counseled on the importance of smoking cessation if a current smoker    Review of Eligibility Criteria: She meets all of the criteria for Lung Cancer Screening.   She is 78 y.o.   She has 20 pack year tobacco history and is a current smoker or has quit within the past 15 years  She presents no signs or symptoms of  lung cancer    After discussion, the patient decided to elect lung cancer screening.

## 2024-02-13 DIAGNOSIS — I74.09 AORTOILIAC OCCLUSIVE DISEASE (HCC): ICD-10-CM

## 2024-02-13 RX ORDER — ROSUVASTATIN CALCIUM 20 MG/1
20 TABLET, COATED ORAL DAILY
Qty: 90 TABLET | Refills: 0 | Status: SHIPPED | OUTPATIENT
Start: 2024-02-13

## 2024-02-16 ENCOUNTER — HOSPITAL ENCOUNTER (OUTPATIENT)
Dept: ULTRASOUND IMAGING | Facility: CLINIC | Age: 79
End: 2024-02-16
Payer: MEDICARE

## 2024-02-16 ENCOUNTER — HOSPITAL ENCOUNTER (OUTPATIENT)
Dept: MAMMOGRAPHY | Facility: CLINIC | Age: 79
End: 2024-02-16
Payer: MEDICARE

## 2024-02-16 VITALS — WEIGHT: 169 LBS | BODY MASS INDEX: 33.18 KG/M2 | HEIGHT: 60 IN

## 2024-02-16 DIAGNOSIS — Z98.890 HISTORY OF LUMPECTOMY OF RIGHT BREAST: ICD-10-CM

## 2024-02-16 DIAGNOSIS — C50.811 MALIGNANT NEOPLASM OF OVERLAPPING SITES OF RIGHT BREAST IN FEMALE, ESTROGEN RECEPTOR NEGATIVE: ICD-10-CM

## 2024-02-16 DIAGNOSIS — Z17.1 MALIGNANT NEOPLASM OF OVERLAPPING SITES OF RIGHT BREAST IN FEMALE, ESTROGEN RECEPTOR NEGATIVE: ICD-10-CM

## 2024-02-16 PROCEDURE — 77066 DX MAMMO INCL CAD BI: CPT

## 2024-02-16 PROCEDURE — 76642 ULTRASOUND BREAST LIMITED: CPT

## 2024-02-16 PROCEDURE — G0279 TOMOSYNTHESIS, MAMMO: HCPCS

## 2024-02-23 ENCOUNTER — OFFICE VISIT (OUTPATIENT)
Dept: HEMATOLOGY ONCOLOGY | Facility: CLINIC | Age: 79
End: 2024-02-23
Payer: MEDICARE

## 2024-02-23 ENCOUNTER — OFFICE VISIT (OUTPATIENT)
Dept: SURGICAL ONCOLOGY | Facility: CLINIC | Age: 79
End: 2024-02-23

## 2024-02-23 VITALS
RESPIRATION RATE: 20 BRPM | DIASTOLIC BLOOD PRESSURE: 82 MMHG | HEIGHT: 60 IN | BODY MASS INDEX: 33.38 KG/M2 | WEIGHT: 170 LBS | HEART RATE: 82 BPM | SYSTOLIC BLOOD PRESSURE: 132 MMHG | OXYGEN SATURATION: 95 % | TEMPERATURE: 98.2 F

## 2024-02-23 VITALS
OXYGEN SATURATION: 95 % | BODY MASS INDEX: 33.38 KG/M2 | WEIGHT: 170 LBS | TEMPERATURE: 98.2 F | HEIGHT: 60 IN | HEART RATE: 82 BPM | DIASTOLIC BLOOD PRESSURE: 82 MMHG | RESPIRATION RATE: 20 BRPM | SYSTOLIC BLOOD PRESSURE: 132 MMHG

## 2024-02-23 DIAGNOSIS — Z85.3 ENCOUNTER FOR FOLLOW-UP SURVEILLANCE OF BREAST CANCER: ICD-10-CM

## 2024-02-23 DIAGNOSIS — C50.811 MALIGNANT NEOPLASM OF OVERLAPPING SITES OF RIGHT BREAST IN FEMALE, ESTROGEN RECEPTOR NEGATIVE: ICD-10-CM

## 2024-02-23 DIAGNOSIS — Z98.890 STATUS POST RIGHT BREAST LUMPECTOMY: ICD-10-CM

## 2024-02-23 DIAGNOSIS — Z17.1 MALIGNANT NEOPLASM OF OVERLAPPING SITES OF RIGHT BREAST IN FEMALE, ESTROGEN RECEPTOR NEGATIVE: ICD-10-CM

## 2024-02-23 DIAGNOSIS — Z98.890 HISTORY OF LUMPECTOMY OF RIGHT BREAST: ICD-10-CM

## 2024-02-23 DIAGNOSIS — Z17.1 MALIGNANT NEOPLASM OF OVERLAPPING SITES OF RIGHT BREAST IN FEMALE, ESTROGEN RECEPTOR NEGATIVE: Primary | ICD-10-CM

## 2024-02-23 DIAGNOSIS — C50.811 MALIGNANT NEOPLASM OF OVERLAPPING SITES OF RIGHT BREAST IN FEMALE, ESTROGEN RECEPTOR NEGATIVE: Primary | ICD-10-CM

## 2024-02-23 DIAGNOSIS — Z08 ENCOUNTER FOR FOLLOW-UP SURVEILLANCE OF BREAST CANCER: ICD-10-CM

## 2024-02-23 DIAGNOSIS — D49.0 PANCREATIC NEOPLASM: Primary | ICD-10-CM

## 2024-02-23 PROCEDURE — 99024 POSTOP FOLLOW-UP VISIT: CPT | Performed by: SURGERY

## 2024-02-23 PROCEDURE — 99214 OFFICE O/P EST MOD 30 MIN: CPT | Performed by: INTERNAL MEDICINE

## 2024-02-23 NOTE — PROGRESS NOTES
Hematology/Oncology Outpatient Follow- up Note  Connie Royal 78 y.o. female MRN: @ Encounter: [id number]        Date:  2/23/2024        Assessment / Plan:    1 stage Ib T1 cN0 M0 triple negative diagnosed 4/4/2022  2 pancreatic mass being followed by surgery  3 COPD oxygen dependent  Plan: Patient doing well follow-up in 1 year she needs to follow-up with surgery watching her pancreatic area.  No other major suggestions.    HPI: 78-year-old female with COPD oxygen dependent comes in with the above problem.  She is feeling well.  Breast well-healed.  No major complaints.  Of note she does have a pancreatic mass.  There is a family history of pancreatic and breast problems there was 1 family member was BRCA mutation positive.  The patient herself at [facility] breast cancer stat panel that was negative.  She is feeling doing fairly well except for COPD.      Interval History: Note from 1/19/2023.  Right breast invasive ductal carcinoma, initially diagnosed in March 25, 2022, ER/NE negative.  HER-2 equivocal by IHC but negative by FISH. The tumor measures 1.9 x 1.6 x 1.0 cm on ultrasound.  No clinical suspicious lymphadenopathy. [id number], Grade 2.   status post right lumpectomy with lymph node dissection on April 28,2022. Overall, the patient tolerated the surgery very well.  All margins negative.  All lymph nodes negative.  Final pathologic stage pT1c pN0. Dr. Conrad recommended adjuvant chemotherapy with TC for 4 cycles with Neulasta,  followed by adjuvant radiation therapy.  Because of her age, and other medical comorbidities including COPD, her chemo dose was reduced by 25%, she was then admitted for a week for diverticulitis, sepsis. She had decided to d/c all further chemo and proceed to RT which I don't think is unreasonable. I will continue to follow along and defer to Surg -Onc to get mammograms ordered.   Cellulitis in surgical site: improved s/p Cipro  Diarrhea: 2/2 diverticulitis and Abx. Ongoing, and  watching closely. Has imodium she started taking today  Inherited gene predisposition:  Family history of pancreatic cancer involving her sister and brother.  Brother also had melanoma.  Her niece was diagnosed breast cancer and tested for BRCA mutation positive.  Status post bilateral mastectomy.  Patient also has a stable mass in the head of pancreas.   [facility] breast cancer stat panel negative.   Bone health/osteoporosis.  Last DEXA scan in February 15, 2022.  Patient takes calcium, vitamin-D , doing exercise.  Patient was previously on Prolia every 6 months. DEXA scan every 2 years (next due 2/2024) then can reassess if she needs more  Pancreatic mass that has been monitored by Dr. Cabrera.  CT scan 11/30/2022 showed stable pancreatic head lesion measuring 1.6 x 1.6 cm.  Repeat CT scan as per Dr. Cabrera.  CKD stage 3.  Self hydration is recommended.  Follow-up with primary care physician.  Follow-up: 12 months      Cancer Staging:  Cancer Staging   Malignant neoplasm of overlapping sites of right breast in female, estrogen receptor negative   Staging form: Breast, AJCC 8th Edition  - Clinical stage from 4/4/2022: Stage IB (cT1c, cN0, cM0, G2, ER-, CA-, HER2-) - Signed by Selena Conrad MD on 4/4/2022  Stage prefix: Initial diagnosis  Nuclear grade: G2  Histologic grading system: 3 grade system  HER2-IHC interpretation: Equivocal  HER2-IHC value: Score 2+  HER2-FISH interpretation: Negative      Molecular Testing:     Previous Hematologic/ Oncologic History:    Oncology History   Malignant neoplasm of overlapping sites of right breast in female, estrogen receptor negative    3/25/2022 Initial Diagnosis    Malignant neoplasm of right female breast (HCC)     3/25/2022 Biopsy    Right breast ultrasound guided biopsy  12 o'clock 5 cm from nipple  Invasive breast carcinoma of no special type (ductal NST/ invasive ductal carcinoma with apocrine features)  Grade 2  ER 0  CA 0  HER 2 2+   FISH negative  Lymphovascular  invasion not identified    Concordant. Malignancy is unifocal. Mass measures 1.4 cm on mammogram and 1.9 cm on ultrasound. Right axilla ultrasound clear. Left brest clear. Amanda  reflector placed. 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     4/4/2022 -  Cancer Staged    Staging form: Breast, AJCC 8th Edition  - Clinical stage from 4/4/2022: Stage IB (cT1c, cN0, cM0, G2, ER-, AR-, HER2-) - Signed by Selena Conrad MD on 4/4/2022  Stage prefix: Initial diagnosis  Nuclear grade: G2  Histologic grading system: 3 grade system  HER2-IHC interpretation: Equivocal  HER2-IHC value: Score 2+  HER2-FISH interpretation: Negative       4/4/2022 Genetic Testing    [facility]  A total of 36 genes were evaluated, including: GILL, BRCA1, BRCA2, CDH1, CHEK2, PALB2, PTEN, STK11, TP53  Negative result. No pathogenic sequence variants or deletions/duplications identified     4/28/2022 Surgery    Right breast amanda  directed lumpectomy with sentinel lymph node biopsy and axillary dissection  Invasive ductal carcinoma with apocrine features  Grade 3  1.4 cm  0/4 Lymph nodes       6/14/2022 - 6/15/2022 Chemotherapy    Pegfilgrastim-bmez (ZIEXTENZO), 6 mg, Subcutaneous, Once, 1 of 4 cycles  Administration: 6 mg (6/15/2022)  cyclophosphamide (CYTOXAN) IVPB, 450 mg/m2 = 778 mg (75 % of original dose 600 mg/m2), Intravenous, Once, 1 of 4 cycles  Dose modification: 450 mg/m2 (75 % of original dose 600 mg/m2, Cycle 1, Reason: Dose Not Tolerated)  Administration: 778 mg (6/14/2022)  DOCEtaxel (TAXOTERE) chemo infusion, 56.25 mg/m2 = 97.4 mg (75 % of original dose 75 mg/m2), Intravenous, Once, 1 of 4 cycles  Dose modification: 56.25 mg/m2 (75 % of original dose 75 mg/m2, Cycle 1, Reason: Dose Not Tolerated)  Administration: 97.4 mg (6/14/2022)     11/17/2022 - 12/15/2022 Radiation    Treatments:  Course: C1  Plan ID Energy Fractions Dose per Fraction (cGy) Dose Correction (cGy) Total Dose  Delivered (cGy) Elapsed Days   R Breast Hypo 6X 15 / 15 267 0 4,005 21   R BrstBst 6X 10X/6X 5 / 5 200 0 1,000 6    Treatment Dates:  11/17/2022 - 12/15/2022.            Current Hematologic/ Oncologic Treatment:       Cycle 1         Test Results:    Imaging: Mammo diagnostic bilateral w 3d & cad, US breast right limited (diagnostic)    Result Date: 2/16/2024  Narrative: DIAGNOSIS: Malignant neoplasm of overlapping sites of right breast in female, estrogen receptor negative  TECHNIQUE: Digital diagnostic mammography was performed. Computer Aided Detection (CAD) analyzed all applicable images. Right breast ultrasound was performed. COMPARISONS: Prior breast imaging dated: 02/14/2023, 02/14/2023, 04/28/2022, 03/25/2022, 03/25/2022, 03/25/2022, 03/17/2022, 03/17/2022, 02/15/2022, and 09/04/2019 RELEVANT HISTORY: Family Breast Cancer History: History of breast cancer in Other. Family Medical History: Family medical history includes breast cancer in other. Personal History: No known relevant hormone history. Surgical history includes lumpectomy. Medical history includes breast cancer. RISK ASSESSMENT: Tyrer-Cuzick risk assessment reporting was suppressed due to the patient's history and/or demographic factors. TISSUE DENSITY: There are scattered areas of fibroglandular density. INDICATION: Connie Royal is a 78 y.o. female presenting for history of right breast cancer.  Patient's hematology oncologist felt fullness in the area of the right axillary surgery. FINDINGS: RIGHT 1) POST-SURGICAL FINDING [C] Mammo diagnostic bilateral w 3d & cad: There are post-surgical findings from a previous lumpectomy seen in the upper central region of the right breast.  There are stable postsurgical changes in the right axilla.  There is mild skin thickening, similar to the prior study.  There are no suspicious masses or grouped microcalcifications US breast right limited (diagnostic): Targeted ultrasound performed to evaluate the area of  concern in the right axilla.  No suspicious cystic or solid masses were seen.  Left Mammo diagnostic bilateral w 3d & cad There are no suspicious masses, grouped microcalcifications or areas of unexplained architectural distortion. The skin and nipple areolar complex are unremarkable. ?    Impression: There are stable benign-appearing postoperative changes in the superior central right breast and right axilla.  Targeted ultrasound of the area of concern in the right axilla was performed and no sonographic abnormality was seen.  There is no mammographic evidence of malignancy in either breast. Correlation with physical examination is recommended.  If there is a clinically suspicious palpable mass then biopsy would be indicated.  Otherwise, clinical management of the breast symptoms is recommended. ? ASSESSMENT/BI-RADS CATEGORY: Left: 1 - Negative Right: 2 - Benign Overall: 2 - Benign RECOMMENDATION:      - Clinical management for the right breast.      - Diagnostic mammogram in 1 year for both breasts. Workstation ID: [id number]     CT abdomen pelvis w contrast    Result Date: 2/11/2024  Narrative: CT ABDOMEN AND PELVIS WITH IV CONTRAST INDICATION: C50.811: Malignant neoplasm of overlapping sites of right female breast. Z17.1: Estrogen receptor negative status (ER-) K86.89: Other specified diseases of pancreas. COMPARISON: CT abdomen and pelvis 11/30/2022, 10/15/2022, 9/14/2022 and MRI abdomen 5/15/2019 TECHNIQUE: CT examination of the abdomen and pelvis was performed. Multiplanar 2D reformatted images were created from the source data. Arterial phase images of the abdomen were also obtained. This examination, like all CT scans performed in the [facility], was performed utilizing techniques to minimize radiation dose exposure, including the use of iterative reconstruction and automated exposure control. Radiation dose length product (DLP) for this visit: 1028 mGy-cm IV Contrast: 100 mL of iohexol  (OMNIPAQUE) Enteric Contrast: Administered. FINDINGS: ABDOMEN LOWER CHEST: 8 mm low-density nodule in the anterior left breast, unchanged. Patient had recent diagnostic mammography. LIVER/BILIARY TREE: There is diffuse fatty infiltration of the liver with probable geographic areas of focal fatty sparing in the subcapsular regions, most notably the right lobe. There is a vaguely nodular 8 mm hyperattenuating focus in the dome of the liver (2/9) on arterial phase, not clearly evident on prior imaging. Although this is favored to represent nodular focal fatty sparing, follow-up MRI is recommended to exclude less  likely possibility of subtle metastasis. A couple small cysts are also suggested in the left hepatic lobe. No biliary dilatation. GALLBLADDER: No calcified gallstones. No pericholecystic inflammatory change. SPLEEN: Unremarkable. Small splenules. PANCREAS: Mild fatty replacement of the pancreas without acute findings. 2.2 x 1.7 cm faintly hyperattenuating nodule posterior pancreatic head, previously measured 1.8 x 1.6 cm. The longest dimension is closer to 2 cm on the prior study by my measurements when remeasured in a similar fashion. ADRENAL GLANDS: Unremarkable. KIDNEYS/URETERS: Partially intraparenchymal and partially exophytic cyst left mid to upper pole, similar. No hydronephrosis. STOMACH AND BOWEL: Evaluation of the stomach is limited by underdistention.  No focal pathology seen within limitations. Small bowel is normal caliber. Descending and sigmoid colon diverticulosis without evidence of diverticulitis. APPENDIX: No findings to suggest appendicitis. ABDOMINOPELVIC CAVITY: No ascites. No pneumoperitoneum. No lymphadenopathy. VESSELS: Atherosclerotic changes abdominal aorta without evidence of aneurysm. PELVIS REPRODUCTIVE ORGANS: Unremarkable for patient's age. URINARY BLADDER: Unremarkable. ABDOMINAL WALL/INGUINAL REGIONS: Tiny fat-containing umbilical hernia. BONES: No acute fracture or suspicious  "osseous lesion. Mild S-shaped thoracolumbar scoliosis with degenerative changes.     Impression: 1. Fatty liver with geographic fatty sparing predominantly along the subcapsular regions. Vaguely nodular 8 mm hyperattenuating focus in the dome of the liver (2/9) on  arterial phase, not clearly evident on prior imaging. Although this is favored to represent nodular focal fatty sparing, follow-up MRI with IV contrast (Eovist) is recommended to exclude less likely possibility of subtle metastasis. 2. Grossly stable 2.2 cm faintly hyperattenuating nodule posterior pancreatic head, previously measured about 2 cm (by my measurements) on the prior study when remeasured in a similar fashion. 3. Colonic diverticulosis without diverticulitis. The study was marked in EPIC for significant notification and follow-up. Workstation performed: [id number]             Labs:   Lab Results   Component Value Date    WBC 5.84 02/01/2024    HGB 14.7 02/01/2024    HCT 45.0 02/01/2024    MCV 92 02/01/2024     02/01/2024     Lab Results   Component Value Date    K 4.1 02/01/2024     02/01/2024    CO2 27 02/01/2024    BUN 15 02/01/2024    CREATININE 1.16 02/01/2024    GLUF 140 (H) 02/01/2024    CALCIUM 9.3 02/01/2024    CORRECTEDCA 9.4 10/15/2022    AST 19 02/01/2024    ALT 26 02/01/2024    ALKPHOS 79 02/01/2024    EGFR 45 02/01/2024         No results found for: \"SPEP\", \"UPEP\"    No results found for: \"PSA\"    No results found for: \"CEA\"    No results found for: \"\"    No results found for: \"AFP\"    No results found for: \"IRON\", \"TIBC\", \"FERRITIN\"    No results found for: \"BITGNBHX67\"      ROS: Review of Systems   Constitutional: Negative.    HENT: Negative.     Eyes: Negative.    Respiratory: Negative.     Cardiovascular: Negative.    Gastrointestinal: Negative.    Endocrine: Negative.    Genitourinary: Negative.    Musculoskeletal: Negative.    Skin: Negative.    Allergic/Immunologic: Negative.    Neurological: Negative.  "   Hematological: Negative.    Psychiatric/Behavioral: Negative.           Current Medications: Reviewed  Allergies: Reviewed  PMH/FH/SH:  Reviewed      Physical Exam:    Body surface area is 1.74 meters squared.    Wt Readings from Last 3 Encounters:   02/23/24 77.1 kg (170 lb)   02/23/24 77.1 kg (170 lb)   02/16/24 76.7 kg (169 lb)        Temp Readings from Last 3 Encounters:   02/23/24 98.2 °F (36.8 °C) (Temporal)   02/23/24 98.2 °F (36.8 °C) (Temporal)   02/07/24 97.9 °F (36.6 °C) (Tympanic)        BP Readings from Last 3 Encounters:   02/23/24 132/82   02/23/24 132/82   02/07/24 132/78         Pulse Readings from Last 3 Encounters:   02/23/24 82   02/23/24 82   02/07/24 89     @LASTSAO2(3)@      Physical Exam  Vitals reviewed.   Constitutional:       Appearance: Normal appearance. She is normal weight.   HENT:      Head: Normocephalic and atraumatic.   Eyes:      Extraocular Movements: Extraocular movements intact.      Conjunctiva/sclera: Conjunctivae normal.      Pupils: Pupils are equal, round, and reactive to light.   Cardiovascular:      Rate and Rhythm: Normal rate and regular rhythm.      Heart sounds: Normal heart sounds.   Pulmonary:      Breath sounds: Normal breath sounds.   Abdominal:      General: Abdomen is flat. Bowel sounds are normal.      Palpations: Abdomen is soft.   Musculoskeletal:         General: Normal range of motion.      Cervical back: Normal range of motion and neck supple.   Skin:     General: Skin is warm and dry.   Neurological:      General: No focal deficit present.      Mental Status: She is alert and oriented to person, place, and time. Mental status is at baseline.     Right breast well-healed scar no masses no masses left breast no axillary adenopathy in her last mammogram please note was negative.  She knows that she gets it yearly.      Goals and Barriers:  Current Goal: Prolong Survival from Cancer.   Barriers: None.      Patient's Capacity to Self Care:  Patient is able  to self care.    Code Status: [unfilled]  Advance Directive and Living Will:      Power of :

## 2024-02-23 NOTE — PROGRESS NOTES
Surgical Oncology Follow Up  [facility]  [facility] [place]  [address] [address]    Connie Royal  1945  [id number]      Chief Complaint   Patient presents with   • Follow-up        Assessment & Plan:       Cancer History:     Oncology History   Malignant neoplasm of overlapping sites of right breast in female, estrogen receptor negative    3/25/2022 Initial Diagnosis    Malignant neoplasm of right female breast (HCC)     3/25/2022 Biopsy    Right breast ultrasound guided biopsy  12 o'clock 5 cm from nipple  Invasive breast carcinoma of no special type (ductal NST/ invasive ductal carcinoma with apocrine features)  Grade 2  ER 0  TX 0  HER 2 2+   FISH negative  Lymphovascular invasion not identified    Concordant. Malignancy is unifocal. Mass measures 1.4 cm on mammogram and 1.9 cm on ultrasound. Right axilla ultrasound clear. Left brest clear. Amanda  reflector placed. 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     4/4/2022 -  Cancer Staged    Staging form: Breast, AJCC 8th Edition  - Clinical stage from 4/4/2022: Stage IB (cT1c, cN0, cM0, G2, ER-, TX-, HER2-) - Signed by Selena Conrad MD on 4/4/2022  Stage prefix: Initial diagnosis  Nuclear grade: G2  Histologic grading system: 3 grade system  HER2-IHC interpretation: Equivocal  HER2-IHC value: Score 2+  HER2-FISH interpretation: Negative       4/4/2022 Genetic Testing    [employer]  A total of 36 genes were evaluated, including: GILL, BRCA1, BRCA2, CDH1, CHEK2, PALB2, PTEN, STK11, TP53  Negative result. No pathogenic sequence variants or deletions/duplications identified     4/28/2022 Surgery    Right breast amanda  directed lumpectomy with sentinel lymph node biopsy and axillary dissection  Invasive ductal carcinoma with apocrine features  Grade 3  1.4 cm  0/4 Lymph nodes       6/14/2022 - 6/15/2022 Chemotherapy    Pegfilgrastim-bmez  (ZIEXTENZO), 6 mg, Subcutaneous, Once, 1 of 4 cycles  Administration: 6 mg (6/15/2022)  cyclophosphamide (CYTOXAN) IVPB, 450 mg/m2 = 778 mg (75 % of original dose 600 mg/m2), Intravenous, Once, 1 of 4 cycles  Dose modification: 450 mg/m2 (75 % of original dose 600 mg/m2, Cycle 1, Reason: Dose Not Tolerated)  Administration: 778 mg (6/14/2022)  DOCEtaxel (TAXOTERE) chemo infusion, 56.25 mg/m2 = 97.4 mg (75 % of original dose 75 mg/m2), Intravenous, Once, 1 of 4 cycles  Dose modification: 56.25 mg/m2 (75 % of original dose 75 mg/m2, Cycle 1, Reason: Dose Not Tolerated)  Administration: 97.4 mg (6/14/2022)     11/17/2022 - 12/15/2022 Radiation    Treatments:  Course: C1  Plan ID Energy Fractions Dose per Fraction (cGy) Dose Correction (cGy) Total Dose Delivered (cGy) Elapsed Days   R Breast Hypo 6X 15 / 15 267 0 4,005 21   R BrstBst 6X 10X/6X 5 / 5 200 0 1,000 6    Treatment Dates:  11/17/2022 - 12/15/2022.              Interval History:   Follow-up with right breast cancer    Review of Systems:   Review of Systems   Constitutional:  Negative for chills and fever.   HENT:  Negative for ear pain and sore throat.    Eyes:  Negative for pain and visual disturbance.   Respiratory:  Negative for cough and shortness of breath.    Cardiovascular:  Negative for chest pain and palpitations.   Gastrointestinal:  Negative for abdominal pain and vomiting.   Genitourinary:  Negative for dysuria and hematuria.   Musculoskeletal:  Negative for arthralgias and back pain.   Skin:  Negative for color change and rash.   Neurological:  Negative for seizures and syncope.   All other systems reviewed and are negative.    Past Medical History     Patient Active Problem List   Diagnosis   • Anxiety   • Aortoiliac occlusive disease (HCC)   • Carotid artery plaque, bilateral   • Centrilobular emphysema (HCC)   • Hyperlipidemia   • Osteoporosis   • Restless leg syndrome   • Subclavian artery stenosis, left (HCC)   • PVD (peripheral vascular  disease) (HCC)   • Chronic sinusitis   • Pancreatic mass   • Seborrheic keratoses   • Stage 3 chronic kidney disease (HCC)   • Closed avulsion fracture of lateral malleolus of right fibula   • Vitamin D deficiency   • COPD (chronic obstructive pulmonary disease) (HCC)   • Malignant neoplasm of overlapping sites of right breast in female, estrogen receptor negative    • Chemotherapy induced neutropenia    • Proctocolitis   • Transaminitis   • History of lumpectomy of right breast   • Ulcerative (chronic) pancolitis without complications (HCC)   • Chronic hypoxemic respiratory failure (HCC)     Past Medical History:   Diagnosis Date   • Anxiety    • Atherosclerosis of native artery of both lower extremities with intermittent claudication (HCC) 10/15/2015    Transitioned From: Cardiovascular Symptoms   • Breast cancer (HCC) 2022    right breast   • Carotid artery plaque, bilateral 2017    Transitioned From: Atherosclerosis of both carotid arteries   • Chronic kidney disease    • Chronic sinusitis     Last assessed: 13   • COPD (chronic obstructive pulmonary disease) ([facility])    • COVID     21 not hopsitalized- flu-like symptoms   • Fall 2021   • Fracture, ankle closed, bimalleolar, right, initial encounter 2021   • GERD (gastroesophageal reflux disease)    • History of radiation therapy     right breast   • Hyperlipidemia    • Lung nodule    • PNA (pneumonia)    • PONV (postoperative nausea and vomiting)     with C-sections   • Pulmonary emphysema (HCC)    • PVD (peripheral vascular disease) ([facility])    • Restless leg syndrome    • Stage 3 chronic kidney disease (HCC) 2019   • Tobacco abuse      Past Surgical History:   Procedure Laterality Date   • BREAST LUMPECTOMY Right 2022    Procedure: ISAI  DIRECTED LUMPECTOMY;  Surgeon: Enrike Merlos MD;  Location: [facility] OR;  Service: Surgical Oncology   • CATARACT EXTRACTION     •  SECTION     • COLONOSCOPY       Complete. Resolved: 13   • DESCENDING AORTIC ANEURYSM REPAIR W/ STENT  2019   • IR AORTAGRAM WITH RUN-OFF  8/15/2019   • LYMPH NODE BIOPSY Right 2022    Procedure: LYMPHATIC MAPPING WITH BLUE AND RADIOACTIVE DYES, SENTINEL LYMPH NODE BIOPSY, INJECTION IN OR BY DR MERLOS AT 1300;  Surgeon: Enrike Merlos MD;  Location: [facility];  Service: Surgical Oncology   • SHOULDER SURGERY      For frozen shoulder    • TONSILLECTOMY     • US BREAST ISAI  NEEDLE LOC RIGHT Right 3/25/2022   • US GUIDED BREAST BIOPSY RIGHT COMPLETE Right 3/25/2022     Family History   Problem Relation Age of Onset   • No Known Problems Mother    • No Known Problems Father    • Arthritis Sister    • Pancreatic cancer Sister 63   • Melanoma Brother         twice   • Prostate cancer Brother    • Heart attack Family         Acute Myocardial Infarction   • Cirrhosis Family    • Prostate cancer Family    • Skin cancer Family    • Stroke Family         Syndrome   • No Known Problems Daughter    • No Known Problems Maternal Grandmother    • No Known Problems Paternal Grandmother    • No Known Problems Sister    • No Known Problems Maternal Aunt    • Breast cancer Other 30     Social History     Socioeconomic History   • Marital status:      Spouse name: Not on file   • Number of children: 2   • Years of education: Not on file   • Highest education level: Not on file   Occupational History   • Occupation: Retired/   Tobacco Use   • Smoking status: Former     Current packs/day: 0.00     Average packs/day: 2.0 packs/day for 56.3 years (112.5 ttl pk-yrs)     Types: Cigarettes     Start date: 1962     Quit date: 2018     Years since quittin.7     Passive exposure: Past   • Smokeless tobacco: Never   Vaping Use   • Vaping status: Never Used   Substance and Sexual Activity   • Alcohol use: Yes     Comment: occasionally    • Drug use: No   • Sexual activity: Not Currently     Partners: Male    Other Topics Concern   • Not on file   Social History Narrative    Living w/adult children    No advance directive on file     Social Determinants of Health     Financial Resource Strain: Not on file   Food Insecurity: Not on file   Transportation Needs: No Transportation Needs (6/23/2022)    PRAPARE - Transportation    • Lack of Transportation (Medical): No    • Lack of Transportation (Non-Medical): No   Physical Activity: Inactive (9/14/2021)    Exercise Vital Sign    • Days of Exercise per Week: 0 days    • Minutes of Exercise per Session: 0 min   Stress: Stress Concern Present (9/14/2021)    [employer] - Occupational Stress Questionnaire    • Feeling of Stress : Very much   Social Connections: Not on file   Intimate Partner Violence: Not on file   Housing Stability: Low Risk  (6/23/2022)    Housing Stability Vital Sign    • Unable to Pay for Housing in the Last Year: No    • Number of Places Lived in the Last Year: 1    • Unstable Housing in the Last Year: No       Current Outpatient Medications:   •  ALPRAZolam (XANAX) 0.5 mg tablet, Take 1 tablet (0.5 mg total) by mouth 2 (two) times a day as needed for anxiety, Disp: 45 tablet, Rfl: 0  •  ASPIRIN 81 PO, Take by mouth, Disp: , Rfl:   •  famotidine (PEPCID) 20 mg tablet, Take 1 tablet (20 mg total) by mouth daily, Disp: , Rfl: 0  •  fluticasone (FLONASE) 50 mcg/act nasal spray, 2 sprays into each nostril daily, Disp: 11.1 mL, Rfl: 1  •  gabapentin (NEURONTIN) 300 mg capsule, Take 1 capsule (300 mg total) by mouth 2 (two) times a day, Disp: 180 capsule, Rfl: 2  •  gabapentin (NEURONTIN) 400 mg capsule, Take 1 capsule (400 mg total) by mouth daily at bedtime, Disp: 100 capsule, Rfl: 1  •  olopatadine (PATANOL) 0.1 % ophthalmic solution, , Disp: , Rfl: 0  •  ondansetron (ZOFRAN) 8 mg tablet, Take 1 tablet (8 mg total) by mouth every 8 (eight) hours as needed for nausea or vomiting, Disp: 20 tablet, Rfl: 2  •  rosuvastatin (CRESTOR)  20 MG tablet, TAKE 1 TABLET BY MOUTH EVERY DAY, Disp: 90 tablet, Rfl: 0  •  saccharomyces boulardii (FLORASTOR) 250 mg capsule, Take 1 capsule (250 mg total) by mouth 2 (two) times a day, Disp: , Rfl: 0  •  sodium chloride () 2 % hypertonic ophthalmic solution, Sue 128 2 % eye drops, Disp: , Rfl:   •  ergocalciferol (VITAMIN D2) 50,000 units, take 1 capsule by mouth every week (Patient not taking: Reported on 1/15/2024), Disp: 12 capsule, Rfl: 0  •  mometasone (ELOCON) 0.1 % cream, Apply topically daily as needed (rash) (Patient not taking: Reported on 1/15/2024), Disp: 50 g, Rfl: 1  •  naloxone (NARCAN) 4 mg/0.1 mL nasal spray, Administer 1 spray into a nostril. If no response after 2-3 minutes, give another dose in the other nostril using a new spray. (Patient not taking: Reported on 1/15/2024), Disp: 1 each, Rfl: 1  •  predniSONE 10 mg tablet, Take 4 tablets for 3 days then 3 tablets for 3 days then 2 tablet for 3 days 1 for 1 day (Patient not taking: Reported on 2/7/2024), Disp: 28 tablet, Rfl: 0  •  rOPINIRole (REQUIP) 0.25 mg tablet, Take 1 tablet (0.25 mg total) by mouth daily at bedtime (Patient not taking: Reported on 1/15/2024), Disp: 90 tablet, Rfl: 1  Allergies   Allergen Reactions   • Tiotropium Bromide Monohydrate Shortness Of Breath   • Augmentin [Amoxicillin-Pot Clavulanate] Abdominal Pain     Pt received ceftriaxone 1 g IV on 5-21-18, gets sharp pains    • Tiotropium Abdominal Pain   • Tylenol With Codeine #3 [Acetaminophen-Codeine] Abdominal Pain   • Other Other (See Comments)       Physical Exam:     Vitals:    02/23/24 1220   BP: 132/82   Pulse: 82   Resp: 20   Temp: 98.2 °F (36.8 °C)   SpO2: 95%     Physical Exam  Constitutional:       Appearance: Normal appearance.   HENT:      Head: Normocephalic and atraumatic.      Nose: Nose normal.      Mouth/Throat:      Mouth: Mucous membranes are moist.   Eyes:      Pupils: Pupils are equal, round, and reactive to light.   Cardiovascular:       Rate and Rhythm: Normal rate.      Pulses: Normal pulses.      Heart sounds: Normal heart sounds.   Pulmonary:      Effort: Pulmonary effort is normal.      Breath sounds: Normal breath sounds.   Chest:      Comments: Bilateral breast examination no palpable mass masses nipple discharge nipple retraction or skin changes bilateral axillary and supraclavicular examination no palpable adenopathy.  Well-healed surgical scars.  Patient was examined seated as well as supine position.  Abdominal:      General: Bowel sounds are normal.      Palpations: Abdomen is soft.   Musculoskeletal:         General: Normal range of motion.      Cervical back: Normal range of motion and neck supple.   Skin:     General: Skin is warm.   Neurological:      General: No focal deficit present.      Mental Status: She is alert and oriented to person, place, and time.   Psychiatric:         Mood and Affect: Mood normal.         Behavior: Behavior normal.         Thought Content: Thought content normal.         Judgment: Judgment normal.       Results & Discussion:   Mammogram:  Narrative & Impression   DIAGNOSIS: Malignant neoplasm of overlapping sites of right breast in female, estrogen receptor negative       TECHNIQUE:   Digital diagnostic mammography was performed. Computer Aided Detection (CAD) analyzed all applicable images.  Right breast ultrasound was performed.      COMPARISONS: Prior breast imaging dated: 02/14/2023, 02/14/2023, 04/28/2022, 03/25/2022, 03/25/2022, 03/25/2022, 03/17/2022, 03/17/2022, 02/15/2022, and 09/04/2019     RELEVANT HISTORY:   Family Breast Cancer History: History of breast cancer in Other.  Family Medical History: Family medical history includes breast cancer in other.   Personal History: No known relevant hormone history. Surgical history includes lumpectomy. Medical history includes breast cancer.     RISK ASSESSMENT:   Tyrer-Cuzick risk assessment reporting was suppressed due to the patient's history and/or  demographic factors.     TISSUE DENSITY:   There are scattered areas of fibroglandular density.     INDICATION: Connie Royal is a 78 y.o. female presenting for history of right breast cancer.  Patient's hematology oncologist felt fullness in the area of the right axillary surgery.     FINDINGS:   RIGHT  1) POST-SURGICAL FINDING [C]  Mammo diagnostic bilateral w 3d & cad: There are post-surgical findings from a previous lumpectomy seen in the upper central region of the right breast.  There are stable postsurgical changes in the right axilla.  There is mild skin thickening, similar to the prior study.  There are no suspicious masses or grouped microcalcifications  US breast right limited (diagnostic): Targeted ultrasound performed to evaluate the area of concern in the right axilla.  No suspicious cystic or solid masses were seen.       Left  Mammo diagnostic bilateral w 3d & cad  There are no suspicious masses, grouped microcalcifications or areas of unexplained architectural distortion. The skin and nipple areolar complex are unremarkable.           IMPRESSION:  There are stable benign-appearing postoperative changes in the superior central right breast and right axilla.  Targeted ultrasound of the area of concern in the right axilla was performed and no sonographic abnormality was seen.  There is no mammographic evidence of malignancy in either breast. Correlation with physical examination is recommended.  If there is a clinically suspicious palpable mass then biopsy would be indicated.  Otherwise, clinical management of the breast symptoms is recommended.       ASSESSMENT/BI-RADS CATEGORY:  Left: 1 - Negative  Right: 2 - Benign  Overall: 2 - Benign     RECOMMENDATION:       - Clinical management for the right breast.       - Diagnostic mammogram in 1 year for both breasts.     Narrative & Impression   CT ABDOMEN AND PELVIS WITH IV CONTRAST     INDICATION: C50.811: Malignant neoplasm of overlapping sites of right  female breast.  Z17.1: Estrogen receptor negative status (ER-)  K86.89: Other specified diseases of pancreas.     COMPARISON: CT abdomen and pelvis 11/30/2022, 10/15/2022, 9/14/2022 and MRI abdomen 5/15/2019     TECHNIQUE: CT examination of the abdomen and pelvis was performed. Multiplanar 2D reformatted images were created from the source data. Arterial phase images of the abdomen were also obtained.     This examination, like all CT scans performed in the [facility], was performed utilizing techniques to minimize radiation dose exposure, including the use of iterative reconstruction and automated exposure control. Radiation dose length   product (DLP) for this visit: 1028 mGy-cm     IV Contrast: 100 mL of iohexol (OMNIPAQUE)  Enteric Contrast: Administered.     FINDINGS:     ABDOMEN     LOWER CHEST: 8 mm low-density nodule in the anterior left breast, unchanged. Patient had recent diagnostic mammography.     LIVER/BILIARY TREE: There is diffuse fatty infiltration of the liver with probable geographic areas of focal fatty sparing in the subcapsular regions, most notably the right lobe.     There is a vaguely nodular 8 mm hyperattenuating focus in the dome of the liver (2/9) on arterial phase, not clearly evident on prior imaging. Although this is favored to represent nodular focal fatty sparing, follow-up MRI is recommended to exclude less   likely possibility of subtle metastasis. A couple small cysts are also suggested in the left hepatic lobe.     No biliary dilatation.     GALLBLADDER: No calcified gallstones. No pericholecystic inflammatory change.     SPLEEN: Unremarkable. Small splenules.     PANCREAS: Mild fatty replacement of the pancreas without acute findings. 2.2 x 1.7 cm faintly hyperattenuating nodule posterior pancreatic head, previously measured 1.8 x 1.6 cm. The longest dimension is closer to 2 cm on the prior study by my   measurements when remeasured in a similar fashion.      ADRENAL GLANDS: Unremarkable.     KIDNEYS/URETERS: Partially intraparenchymal and partially exophytic cyst left mid to upper pole, similar. No hydronephrosis.     STOMACH AND BOWEL: Evaluation of the stomach is limited by underdistention.  No focal pathology seen within limitations.     Small bowel is normal caliber.     Descending and sigmoid colon diverticulosis without evidence of diverticulitis.     APPENDIX: No findings to suggest appendicitis.     ABDOMINOPELVIC CAVITY: No ascites. No pneumoperitoneum. No lymphadenopathy.     VESSELS: Atherosclerotic changes abdominal aorta without evidence of aneurysm.     PELVIS     REPRODUCTIVE ORGANS: Unremarkable for patient's age.     URINARY BLADDER: Unremarkable.     ABDOMINAL WALL/INGUINAL REGIONS: Tiny fat-containing umbilical hernia.     BONES: No acute fracture or suspicious osseous lesion. Mild S-shaped thoracolumbar scoliosis with degenerative changes.     IMPRESSION:     1. Fatty liver with geographic fatty sparing predominantly along the subcapsular regions. Vaguely nodular 8 mm hyperattenuating focus in the dome of the liver (2/9) on  arterial phase, not clearly evident on prior imaging. Although this is favored to   represent nodular focal fatty sparing, follow-up MRI with IV contrast (Eovist) is recommended to exclude less likely possibility of subtle metastasis.     2. Grossly stable 2.2 cm faintly hyperattenuating nodule posterior pancreatic head, previously measured about 2 cm (by my measurements) on the prior study when remeasured in a similar fashion.     3. Colonic diverticulosis without diverticulitis.     I did review mammogram in detail no evidence of malignancy I did discussed in detail nature of breast cancer and further follow-up for triple negative breast cancer.she also has known stable 2.2 cm pancreatic head mass with a smooth appearance with mildly elevated chromogranin A.  This has been evaluated including with the EUS assumption is it is  slow-growing neuroendocrine tumor.  She is either very not a surgical candidate severe emphysema as well as she has aortic stent it even precluded EUS and biopsy in the past.  We would like to state MRI of the abdomen to more delineate the liver as well as a pancreas however she states that she was told she cannot undergo the MRI due to abdominal aortic stent.  Therefore we will obtain CT triple phase pancreatic protocol.  She has emphysema and home oxygen 2 L.  She has been evaluated in the past by Dr. Cabrera as well and she is not a candidate for surgery.  Pancreatic head mass we will probably follow closely.  Advance Care Planning/Advance Directives:  AYUSH Merlos MD discussed the disease status with Connie Royal  today 02/23/24  treatment plans and follow-up with the patient.

## 2024-02-29 ENCOUNTER — TELEPHONE (OUTPATIENT)
Age: 79
End: 2024-02-29

## 2024-02-29 DIAGNOSIS — I74.09 AORTOILIAC OCCLUSIVE DISEASE (HCC): ICD-10-CM

## 2024-03-01 RX ORDER — ROSUVASTATIN CALCIUM 20 MG/1
20 TABLET, COATED ORAL DAILY
Qty: 90 TABLET | Refills: 3 | Status: SHIPPED | OUTPATIENT
Start: 2024-03-01

## 2024-03-05 ENCOUNTER — TELEPHONE (OUTPATIENT)
Dept: HEMATOLOGY ONCOLOGY | Facility: CLINIC | Age: 79
End: 2024-03-05

## 2024-03-05 NOTE — TELEPHONE ENCOUNTER
Spoke to patient and answered all questions. She may reach out before her scheduled appt to get the results of her CT scheduled for 3/8. She states that she is nervous.

## 2024-03-05 NOTE — TELEPHONE ENCOUNTER
Patient Call    Who are you speaking with? Patient    If it is not the patient, are they listed on an active communication consent form? N/A   What is the reason for this call? The patient would like a call back to go over her most recent lab orders so she is aware of her RBC.    Does this require a call back? Yes   If a call back is required, please list best call back number [phone]   If a call back is required, advise that a message will be forwarded to their care team and someone will return their call as soon as possible.   Did you relay this information to the patient? Yes

## 2024-03-08 ENCOUNTER — HOSPITAL ENCOUNTER (OUTPATIENT)
Dept: CT IMAGING | Facility: HOSPITAL | Age: 79
End: 2024-03-08
Attending: SURGERY
Payer: MEDICARE

## 2024-03-08 DIAGNOSIS — D49.0 PANCREATIC NEOPLASM: ICD-10-CM

## 2024-03-08 PROCEDURE — 74177 CT ABD & PELVIS W/CONTRAST: CPT

## 2024-03-08 RX ADMIN — IOHEXOL 100 ML: 350 INJECTION, SOLUTION INTRAVENOUS at 08:43

## 2024-03-14 ENCOUNTER — TELEPHONE (OUTPATIENT)
Dept: HEMATOLOGY ONCOLOGY | Facility: CLINIC | Age: 79
End: 2024-03-14

## 2024-03-14 NOTE — TELEPHONE ENCOUNTER
Call Transfer   Who are you speaking with?  Patient   If it is not the patient, are they listed on an active communication consent form? N/A   Who is the patients HemOnc/SurgOnc provider? Dr. Merlos   What is the reason for this call? CT results   Person/Department that the call was transferred to?    Time that call was transferred?    Kelsey RITTER   Your call will be transferred now. If you receive a voicemail, please leave a detailed message and a member of the team will return your call as soon as possible.    Did you relay this information to the caller?  N/A      Yes

## 2024-03-14 NOTE — TELEPHONE ENCOUNTER
Spoke to patient and let her know that CT showed stable panc mass. She will follow up with Dr. Merlos on 3/25. She verbalized understanding and thanks.

## 2024-03-25 ENCOUNTER — TELEPHONE (OUTPATIENT)
Dept: SURGICAL ONCOLOGY | Facility: CLINIC | Age: 79
End: 2024-03-25

## 2024-03-25 ENCOUNTER — TELEPHONE (OUTPATIENT)
Dept: HEMATOLOGY ONCOLOGY | Facility: CLINIC | Age: 79
End: 2024-03-25

## 2024-03-25 NOTE — TELEPHONE ENCOUNTER
Patient Call    Who are you speaking with? Patient    If it is not the patient, are they listed on an active communication consent form? N/A   What is the reason for this call? Pt is sick but doesn't want to reschedule at this time. She asked if she needs to be seen in the office   Does this require a call back? Yes   If a call back is required, please list best call back number [phone]    If a call back is required, advise that a message will be forwarded to their care team and someone will return their call as soon as possible.   Did you relay this information to the patient? Yes

## 2024-03-25 NOTE — TELEPHONE ENCOUNTER
Called patient to reschedule appt . Patient is asking why she needs to come in . If you can give a call and talk to patient will be grate     Thank you

## 2024-03-25 NOTE — TELEPHONE ENCOUNTER
Called and spoke with patient about why she was on the schedule to see Dr Merlos on 3/25/24. Pt states that she already knew about her CT results and that she will be following up. Pt is scheduled for 1 year f/u with [facility] for her breast ca. Pt's schedule emailed to [facility] at this time so they have an updated copy.

## 2024-04-18 ENCOUNTER — TELEPHONE (OUTPATIENT)
Dept: HEMATOLOGY ONCOLOGY | Facility: CLINIC | Age: 79
End: 2024-04-18

## 2024-04-18 NOTE — TELEPHONE ENCOUNTER
Patient Call    Who are you speaking with? Patient    If it is not the patient, are they listed on an active communication consent form? N/A   What is the reason for this call? Patient would like a call back informing her if she still needs to have any follow up appointments with Dr. middleton   Does this require a call back? Yes   If a call back is required, please list best call back number [phone]   If a call back is required, advise that a message will be forwarded to their care team and someone will return their call as soon as possible.   Did you relay this information to the patient? Yes

## 2024-04-18 NOTE — TELEPHONE ENCOUNTER
Detailed message sent to Dr. Merlos and CHUCHO Moody for advice on follow up for panc head mass surveillance.

## 2024-04-18 NOTE — TELEPHONE ENCOUNTER
Dr. Merlos wants to see the patient. Appt made for Monday, April 22. [employer] transportation arranged.

## 2024-04-22 ENCOUNTER — OFFICE VISIT (OUTPATIENT)
Dept: SURGICAL ONCOLOGY | Facility: CLINIC | Age: 79
End: 2024-04-22
Payer: MEDICARE

## 2024-04-22 VITALS
SYSTOLIC BLOOD PRESSURE: 122 MMHG | HEIGHT: 60 IN | HEART RATE: 95 BPM | RESPIRATION RATE: 20 BRPM | OXYGEN SATURATION: 99 % | TEMPERATURE: 98 F | BODY MASS INDEX: 32.89 KG/M2 | DIASTOLIC BLOOD PRESSURE: 82 MMHG | WEIGHT: 167.5 LBS

## 2024-04-22 DIAGNOSIS — Z87.891 FORMER SMOKER: ICD-10-CM

## 2024-04-22 DIAGNOSIS — Z17.1 MALIGNANT NEOPLASM OF OVERLAPPING SITES OF RIGHT BREAST IN FEMALE, ESTROGEN RECEPTOR NEGATIVE (HCC): Primary | ICD-10-CM

## 2024-04-22 DIAGNOSIS — K86.89 PANCREATIC MASS: ICD-10-CM

## 2024-04-22 DIAGNOSIS — D49.0 PANCREATIC NEOPLASM: ICD-10-CM

## 2024-04-22 DIAGNOSIS — Z98.890 HISTORY OF LUMPECTOMY OF RIGHT BREAST: ICD-10-CM

## 2024-04-22 DIAGNOSIS — C50.811 MALIGNANT NEOPLASM OF OVERLAPPING SITES OF RIGHT BREAST IN FEMALE, ESTROGEN RECEPTOR NEGATIVE (HCC): Primary | ICD-10-CM

## 2024-04-22 PROCEDURE — 99204 OFFICE O/P NEW MOD 45 MIN: CPT | Performed by: SURGERY

## 2024-04-22 PROCEDURE — 99214 OFFICE O/P EST MOD 30 MIN: CPT | Performed by: SURGERY

## 2024-04-22 NOTE — PROGRESS NOTES
Surgical Oncology Follow Up  [facility]  [facility] [place]  [address] [facility] [address]    Connie Royal  1945  [id number]      Chief Complaint   Patient presents with   • Follow-up     2 mo FU for pancreatic mass, 3/8/24 CT CAP - no new findings, Dx b/l mammo/dx right breast US B2 2/16/24, CT AP 2/2/24 SEE!, 4/28/22 right lumpectomy and axillary dissection, IDC Grade 3 1.4cm, 0/4 lymphnodes, genetics negative, ER -, LA - , HER2 2+, follows with fan in rad onc, unable to tolerate adjuvant chemo        Assessment & Plan:   78-year-old female with triple negative right breast cancer also known pancreatic head mass is here for follow-up.  She denies of any breast pain nipple discharge nipple retraction or skin changes.  Mammogram films were reviewed and discussed.  CT of the abdomen also reviewed and discussed.  She is on home oxygen 2 L nasal cannula she is a former smoker 2 packs a day for 40 years.  CT scan and mammogram are discussed and reviewed plan is to follow her closely.  She also has a history of abdominal aortic aneurysm and repair with stenting.    Cancer History:     Oncology History   Malignant neoplasm of overlapping sites of right breast in female, estrogen receptor negative (HCC)   3/25/2022 Initial Diagnosis    Malignant neoplasm of right female breast (HCC)     3/25/2022 Biopsy    Right breast ultrasound guided biopsy  12 o'clock 5 cm from nipple  Invasive breast carcinoma of no special type (ductal NST/ invasive ductal carcinoma with apocrine features)  Grade 2  ER 0  LA 0  HER 2 2+   FISH negative  Lymphovascular invasion not identified    Concordant. Malignancy is unifocal. Mass measures 1.4 cm on mammogram and 1.9 cm on ultrasound. Right axilla ultrasound clear. Left brest clear. Amanda  reflector placed. 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     4/4/2022 -  Cancer Staged     Staging form: Breast, AJCC 8th Edition  - Clinical stage from 4/4/2022: Stage IB (cT1c, cN0, cM0, G2, ER-, PA-, HER2-) - Signed by Selena Conrad MD on 4/4/2022  Stage prefix: Initial diagnosis  Nuclear grade: G2  Histologic grading system: 3 grade system  HER2-IHC interpretation: Equivocal  HER2-IHC value: Score 2+  HER2-FISH interpretation: Negative       4/4/2022 Genetic Testing    [employer]  A total of 36 genes were evaluated, including: GILL, BRCA1, BRCA2, CDH1, CHEK2, PALB2, PTEN, STK11, TP53  Negative result. No pathogenic sequence variants or deletions/duplications identified     4/28/2022 Surgery    Right breast lexy  directed lumpectomy with sentinel lymph node biopsy and axillary dissection  Invasive ductal carcinoma with apocrine features  Grade 3  1.4 cm  0/4 Lymph nodes       6/14/2022 - 6/15/2022 Chemotherapy    Pegfilgrastim-bmez (ZIEXTENZO), 6 mg, Subcutaneous, Once, 1 of 4 cycles  Administration: 6 mg (6/15/2022)  cyclophosphamide (CYTOXAN) IVPB, 450 mg/m2 = 778 mg (75 % of original dose 600 mg/m2), Intravenous, Once, 1 of 4 cycles  Dose modification: 450 mg/m2 (75 % of original dose 600 mg/m2, Cycle 1, Reason: Dose Not Tolerated)  Administration: 778 mg (6/14/2022)  DOCEtaxel (TAXOTERE) chemo infusion, 56.25 mg/m2 = 97.4 mg (75 % of original dose 75 mg/m2), Intravenous, Once, 1 of 4 cycles  Dose modification: 56.25 mg/m2 (75 % of original dose 75 mg/m2, Cycle 1, Reason: Dose Not Tolerated)  Administration: 97.4 mg (6/14/2022)     11/17/2022 - 12/15/2022 Radiation    Treatments:  Course: C1  Plan ID Energy Fractions Dose per Fraction (cGy) Dose Correction (cGy) Total Dose Delivered (cGy) Elapsed Days   R Breast Hypo 6X 15 / 15 267 0 4,005 21   R BrstBst 6X 10X/6X 5 / 5 200 0 1,000 6    Treatment Dates:  11/17/2022 - 12/15/2022.              Interval History:   Follow-up with triple negative breast cancer and pancreatic head mass    Review of Systems:   Review of Systems   Constitutional:   Positive for activity change. Negative for chills and fever.   HENT:  Negative for ear pain and sore throat.    Eyes:  Negative for pain and visual disturbance.   Respiratory:  Negative for cough and shortness of breath.    Cardiovascular:  Negative for chest pain and palpitations.   Gastrointestinal:  Negative for abdominal pain and vomiting.   Genitourinary:  Negative for dysuria and hematuria.   Musculoskeletal:  Positive for arthralgias. Negative for back pain.   Skin:  Negative for color change and rash.   Neurological:  Negative for seizures and syncope.   Hematological:  Negative for adenopathy.   All other systems reviewed and are negative.    Past Medical History     Patient Active Problem List   Diagnosis   • Anxiety   • Aortoiliac occlusive disease (HCC)   • Carotid artery plaque, bilateral   • Centrilobular emphysema (HCC)   • Hyperlipidemia   • Osteoporosis   • Restless leg syndrome   • Subclavian artery stenosis, left (HCC)   • PVD (peripheral vascular disease) (HCC)   • Chronic sinusitis   • Pancreatic neoplasm   • Seborrheic keratoses   • Stage 3 chronic kidney disease (HCC)   • Closed avulsion fracture of lateral malleolus of right fibula   • Vitamin D deficiency   • COPD (chronic obstructive pulmonary disease) (HCC)   • Malignant neoplasm of overlapping sites of right breast in female, estrogen receptor negative (HCC)   • Chemotherapy induced neutropenia (HCC)   • Proctocolitis   • Transaminitis   • History of lumpectomy of right breast   • Ulcerative (chronic) pancolitis without complications (HCC)   • Chronic hypoxemic respiratory failure (HCC)   • Status post right breast lumpectomy   • Encounter for follow-up surveillance of breast cancer     Past Medical History:   Diagnosis Date   • Anxiety    • Atherosclerosis of native artery of both lower extremities with intermittent claudication (HCC) 10/15/2015    Transitioned From: Cardiovascular Symptoms   • Breast cancer (HCC) 03/25/2022    right  breast   • Carotid artery plaque, bilateral 2017    Transitioned From: Atherosclerosis of both carotid arteries   • Chronic kidney disease    • Chronic sinusitis     Last assessed: 13   • COPD (chronic obstructive pulmonary disease) ([facility])    • COVID     21 not hopsitalized- flu-like symptoms   • Fall 2021   • Fracture, ankle closed, bimalleolar, right, initial encounter 2021   • GERD (gastroesophageal reflux disease)    • History of radiation therapy     right breast   • Hyperlipidemia    • Lung nodule    • PNA (pneumonia)    • PONV (postoperative nausea and vomiting)     with C-sections   • Pulmonary emphysema ([facility])    • PVD (peripheral vascular disease) ([facility])    • Restless leg syndrome    • Stage 3 chronic kidney disease ([facility]) 2019   • Tobacco abuse      Past Surgical History:   Procedure Laterality Date   • BREAST LUMPECTOMY Right 2022    Procedure: ISAI  DIRECTED LUMPECTOMY;  Surgeon: Enrike Merlos MD;  Location: [facility];  Service: Surgical Oncology   • CATARACT EXTRACTION     •  SECTION     • COLONOSCOPY      Complete. Resolved: 13   • DESCENDING AORTIC ANEURYSM REPAIR W/ STENT  2019   • IR AORTAGRAM WITH RUN-OFF  8/15/2019   • LYMPH NODE BIOPSY Right 2022    Procedure: LYMPHATIC MAPPING WITH BLUE AND RADIOACTIVE DYES, SENTINEL LYMPH NODE BIOPSY, INJECTION IN OR BY DR MERLOS AT 1300;  Surgeon: Enrike Merlos MD;  Location: [facility];  Service: Surgical Oncology   • SHOULDER SURGERY      For frozen shoulder    • TONSILLECTOMY     • US BREAST CLIP NEEDLE LOC RIGHT Right 3/25/2022   • US GUIDED BREAST BIOPSY RIGHT COMPLETE Right 3/25/2022     Family History   Problem Relation Age of Onset   • No Known Problems Mother    • No Known Problems Father    • Arthritis Sister    • Pancreatic cancer Sister 63   • Melanoma Brother         twice   • Prostate cancer Brother    • Heart attack Family         Acute Myocardial  Infarction   • Cirrhosis Family    • Prostate cancer Family    • Skin cancer Family    • Stroke Family         Syndrome   • No Known Problems Daughter    • No Known Problems Maternal Grandmother    • No Known Problems Paternal Grandmother    • No Known Problems Sister    • No Known Problems Maternal Aunt    • Breast cancer Other 30     Social History     Socioeconomic History   • Marital status:      Spouse name: Not on file   • Number of children: 2   • Years of education: Not on file   • Highest education level: Not on file   Occupational History   • Occupation: Retired/   Tobacco Use   • Smoking status: Former     Current packs/day: 0.00     Average packs/day: 2.0 packs/day for 56.3 years (112.5 ttl pk-yrs)     Types: Cigarettes     Start date: 1962     Quit date: 2018     Years since quittin.9     Passive exposure: Past   • Smokeless tobacco: Never   Vaping Use   • Vaping status: Never Used   Substance and Sexual Activity   • Alcohol use: Yes     Comment: occasionally    • Drug use: No   • Sexual activity: Not Currently     Partners: Male   Other Topics Concern   • Not on file   Social History Narrative    Living w/adult children    No advance directive on file     Social Determinants of Health     Financial Resource Strain: Not on file   Food Insecurity: Not on file   Transportation Needs: No Transportation Needs (2022)    PRAPARE - Transportation    • Lack of Transportation (Medical): No    • Lack of Transportation (Non-Medical): No   Physical Activity: Inactive (2021)    Exercise Vital Sign    • Days of Exercise per Week: 0 days    • Minutes of Exercise per Session: 0 min   Stress: Stress Concern Present (2021)    [employer] [place] [employer] - Occupational Stress Questionnaire    • Feeling of Stress : Very much   Social Connections: Not on file   Intimate Partner Violence: Not on file   Housing Stability: Low Risk  (2022)    Housing Stability  Vital Sign    • Unable to Pay for Housing in the Last Year: No    • Number of Places Lived in the Last Year: 1    • Unstable Housing in the Last Year: No       Current Outpatient Medications:   •  ALPRAZolam (XANAX) 0.5 mg tablet, Take 1 tablet (0.5 mg total) by mouth 2 (two) times a day as needed for anxiety, Disp: 45 tablet, Rfl: 0  •  ASPIRIN 81 PO, Take by mouth, Disp: , Rfl:   •  famotidine (PEPCID) 20 mg tablet, Take 1 tablet (20 mg total) by mouth daily, Disp: , Rfl: 0  •  fluticasone (FLONASE) 50 mcg/act nasal spray, 2 sprays into each nostril daily, Disp: 11.1 mL, Rfl: 1  •  gabapentin (NEURONTIN) 300 mg capsule, Take 1 capsule (300 mg total) by mouth 2 (two) times a day, Disp: 180 capsule, Rfl: 2  •  gabapentin (NEURONTIN) 400 mg capsule, Take 1 capsule (400 mg total) by mouth daily at bedtime, Disp: 100 capsule, Rfl: 1  •  olopatadine (PATANOL) 0.1 % ophthalmic solution, , Disp: , Rfl: 0  •  ondansetron (ZOFRAN) 8 mg tablet, Take 1 tablet (8 mg total) by mouth every 8 (eight) hours as needed for nausea or vomiting, Disp: 20 tablet, Rfl: 2  •  rosuvastatin (CRESTOR) 20 MG tablet, Take 1 tablet (20 mg total) by mouth daily, Disp: 90 tablet, Rfl: 3  •  saccharomyces boulardii (FLORASTOR) 250 mg capsule, Take 1 capsule (250 mg total) by mouth 2 (two) times a day, Disp: , Rfl: 0  •  sodium chloride () 2 % hypertonic ophthalmic solution, Sue 128 2 % eye drops, Disp: , Rfl:   •  ergocalciferol (VITAMIN D2) 50,000 units, take 1 capsule by mouth every week (Patient not taking: Reported on 1/15/2024), Disp: 12 capsule, Rfl: 0  •  mometasone (ELOCON) 0.1 % cream, Apply topically daily as needed (rash) (Patient not taking: Reported on 1/15/2024), Disp: 50 g, Rfl: 1  •  naloxone (NARCAN) 4 mg/0.1 mL nasal spray, Administer 1 spray into a nostril. If no response after 2-3 minutes, give another dose in the other nostril using a new spray. (Patient not taking: Reported on 1/15/2024), Disp: 1 each, Rfl: 1  •   predniSONE 10 mg tablet, Take 4 tablets for 3 days then 3 tablets for 3 days then 2 tablet for 3 days 1 for 1 day (Patient not taking: Reported on 2/7/2024), Disp: 28 tablet, Rfl: 0  •  rOPINIRole (REQUIP) 0.25 mg tablet, Take 1 tablet (0.25 mg total) by mouth daily at bedtime (Patient not taking: Reported on 1/15/2024), Disp: 90 tablet, Rfl: 1  Allergies   Allergen Reactions   • Tiotropium Bromide Monohydrate Shortness Of Breath   • Augmentin [Amoxicillin-Pot Clavulanate] Abdominal Pain     Pt received ceftriaxone 1 g IV on 5-21-18, gets sharp pains    • Tiotropium Abdominal Pain   • Tylenol With Codeine #3 [Acetaminophen-Codeine] Abdominal Pain   • Other Other (See Comments)       Physical Exam:   There were no vitals filed for this visit.  Physical Exam  Constitutional:       Appearance: Normal appearance.   HENT:      Head: Normocephalic and atraumatic.      Nose: Nose normal.      Mouth/Throat:      Mouth: Mucous membranes are moist.   Eyes:      Pupils: Pupils are equal, round, and reactive to light.   Cardiovascular:      Rate and Rhythm: Normal rate.      Pulses: Normal pulses.      Heart sounds: Normal heart sounds.   Pulmonary:      Effort: Pulmonary effort is normal.      Breath sounds: Normal breath sounds.   Chest:      Comments: Bilateral breast examination no palpable mass masses nipple discharge nipple retraction or skin changes other than well-healed surgical scar from her breast conservation surgery with sentinel lymph node biopsy.  Bilateral axillary and supraclavicular examination no palpable adenopathy  Abdominal:      General: Bowel sounds are normal.      Palpations: Abdomen is soft.   Musculoskeletal:         General: Normal range of motion.      Cervical back: Normal range of motion and neck supple.   Skin:     General: Skin is warm.      Coloration: Skin is not jaundiced.   Neurological:      General: No focal deficit present.      Mental Status: She is alert and oriented to person, place,  and time. Mental status is at baseline.   Psychiatric:         Mood and Affect: Mood normal.         Behavior: Behavior normal.         Thought Content: Thought content normal.         Judgment: Judgment normal.       Results & Discussion:     Narrative & Impression   DIAGNOSIS: Malignant neoplasm of overlapping sites of right breast in female, estrogen receptor negative       TECHNIQUE:   Digital diagnostic mammography was performed. Computer Aided Detection (CAD) analyzed all applicable images.  Right breast ultrasound was performed.      COMPARISONS: Prior breast imaging dated: 02/14/2023, 02/14/2023, 04/28/2022, 03/25/2022, 03/25/2022, 03/25/2022, 03/17/2022, 03/17/2022, 02/15/2022, and 09/04/2019     RELEVANT HISTORY:   Family Breast Cancer History: History of breast cancer in Other.  Family Medical History: Family medical history includes breast cancer in other.   Personal History: No known relevant hormone history. Surgical history includes lumpectomy. Medical history includes breast cancer.     RISK ASSESSMENT:   [facility] reporting was suppressed due to the patient's history and/or demographic factors.     TISSUE DENSITY:   There are scattered areas of fibroglandular density.     INDICATION: Connie Royal is a 78 y.o. female presenting for history of right breast cancer.  Patient's hematology oncologist felt fullness in the area of the right axillary surgery.     FINDINGS:   RIGHT  1) POST-SURGICAL FINDING [C]  Mammo diagnostic bilateral w 3d & cad: There are post-surgical findings from a previous lumpectomy seen in the upper central region of the right breast.  There are stable postsurgical changes in the right axilla.  There is mild skin thickening, similar to the prior study.  There are no suspicious masses or grouped microcalcifications  US breast right limited (diagnostic): Targeted ultrasound performed to evaluate the area of concern in the right axilla.  No suspicious cystic or  solid masses were seen.       Left  Mammo diagnostic bilateral w 3d & cad  There are no suspicious masses, grouped microcalcifications or areas of unexplained architectural distortion. The skin and nipple areolar complex are unremarkable.           IMPRESSION:  There are stable benign-appearing postoperative changes in the superior central right breast and right axilla.  Targeted ultrasound of the area of concern in the right axilla was performed and no sonographic abnormality was seen.  There is no mammographic evidence of malignancy in either breast. Correlation with physical examination is recommended.  If there is a clinically suspicious palpable mass then biopsy would be indicated.  Otherwise, clinical management of the breast symptoms is recommended.       ASSESSMENT/BI-RADS CATEGORY:  Left: 1 - Negative  Right: 2 - Benign  Overall: 2 - Benign     RECOMMENDATION:       - Clinical management for the right breast.       - Diagnostic mammogram in 1 year for both breasts.        Narrative & Impression         CT ABDOMEN AND PELVIS WITH IV CONTRAST     INDICATION:   Neoplasm of unspecified behavior of digestive system.       COMPARISON: 2/2/2024, 11/24/2021     TECHNIQUE:  CT examination of the abdomen and pelvis was performed.  In addition to portal venous phase postcontrast scanning through the abdomen and pelvis, late arterial phase postcontrast scanning was performed through the upper abdominal viscera.   Multiplanar 2D reformatted images were created from the source data.     This examination, like all CT scans performed in the [facility], was performed utilizing techniques to minimize radiation dose exposure, including the use of iterative reconstruction and automated exposure control. Radiation dose length   product (DLP) for this visit:     IV Contrast:  iohexol (OMNIPAQUE) 350 MG/ML injection (MULTI-DOSE) 100 mL -   Enteric Contrast:  Enteric contrast was administered.     FINDINGS:      ABDOMEN     LOWER CHEST: Emphysema with scarring in the right middle lobe and right lower lobe     LIVER/BILIARY TREE: Hepatic steatosis. Subcentimeter hypoattenuating lesion(s) too small to characterize, but statistically likely benign findings which do not require follow-up (ACR White Paper 2017). No suspicious mass. Hepatic contours are normal. No   biliary dilation. Unchanged vague nodular 8 mm hyperattenuating/enhancing focus in the right liver dome (series 2 image 16) seen on late arterial phase with washout on portal venous phase.      GALLBLADDER: No calcified gallstones. No pericholecystic inflammatory change.     SPLEEN: Unremarkable.     PANCREAS: Unchanged 2.2 x 2.2 cm pancreatic head hyperenhancing mass (series 2, image 75)     ADRENAL GLANDS: Unremarkable.     KIDNEYS/URETERS: 3.4 cm left renal simple cysy. No hydronephrosis.     STOMACH AND BOWEL: Colonic diverticulosis without findings of acute diverticulitis.     APPENDIX: No findings to suggest appendicitis.     ABDOMINOPELVIC CAVITY: No ascites. No pneumoperitoneum. No lymphadenopathy.     VESSELS: Unremarkable for patient's age.     PELVIS     REPRODUCTIVE ORGANS: Unremarkable for patient's age.     URINARY BLADDER: Unremarkable.     ABDOMINAL WALL/INGUINAL REGIONS: Unremarkable.     BONES: No acute fracture or suspicious osseous lesion.     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     Unchanged 2.2 x 2.2 cm pancreatic head hyperenhancing mass, stable since at least 11/24/2021     No new findings     I did review mammogram films and ultrasound.  I also reviewed the CT of the chest abdomen pelvis pancreatic head mass unchanged as per report since 2021 these were discussed with her and will follow her clinically.  I did discussed in detail nature of breast breast cancer as well as pancreatic mass/cyst.  We did discuss in  detail her CT scan finding and pancreatic head mass has been more or less stable this is probably a slow-growing neuroendocrine tumor.  Most recent chromogranin A it was also reviewed.  Will follow clinically .with regards to her breast cancer she was unable to tolerate adjuvant chemotherapy with recurring hospital admission.  Chemotherapy was discontinued.  she understands and  agrees . All patient questions were answered.       Advance Care Planning/Advance Directives:  AYUSH Merlos MD discussed the disease status with Connie Royal  today 04/22/24  treatment plans and follow-up with the patient.

## 2024-04-30 ENCOUNTER — HOSPITAL ENCOUNTER (OUTPATIENT)
Dept: NON INVASIVE DIAGNOSTICS | Facility: CLINIC | Age: 79
Discharge: HOME | End: 2024-04-30
Payer: MEDICARE

## 2024-04-30 DIAGNOSIS — I73.9 PVD (PERIPHERAL VASCULAR DISEASE) (HCC): ICD-10-CM

## 2024-04-30 PROCEDURE — 93922 UPR/L XTREMITY ART 2 LEVELS: CPT | Performed by: SURGERY

## 2024-04-30 PROCEDURE — 93978 VASCULAR STUDY: CPT

## 2024-04-30 PROCEDURE — 93978 VASCULAR STUDY: CPT | Performed by: SURGERY

## 2024-04-30 PROCEDURE — 93923 UPR/LXTR ART STDY 3+ LVLS: CPT

## 2024-05-06 ENCOUNTER — OFFICE VISIT (OUTPATIENT)
Age: 79
End: 2024-05-06
Payer: MEDICARE

## 2024-05-06 VITALS
SYSTOLIC BLOOD PRESSURE: 124 MMHG | WEIGHT: 168 LBS | DIASTOLIC BLOOD PRESSURE: 78 MMHG | OXYGEN SATURATION: 94 % | HEART RATE: 102 BPM | TEMPERATURE: 98 F | BODY MASS INDEX: 32.98 KG/M2 | HEIGHT: 60 IN | RESPIRATION RATE: 16 BRPM

## 2024-05-06 DIAGNOSIS — J96.11 CHRONIC HYPOXEMIC RESPIRATORY FAILURE (HCC): ICD-10-CM

## 2024-05-06 DIAGNOSIS — R06.09 DYSPNEA ON EXERTION: Primary | ICD-10-CM

## 2024-05-06 DIAGNOSIS — J43.2 CENTRILOBULAR EMPHYSEMA (HCC): ICD-10-CM

## 2024-05-06 PROCEDURE — G2211 COMPLEX E/M VISIT ADD ON: HCPCS | Performed by: PHYSICIAN ASSISTANT

## 2024-05-06 PROCEDURE — 99214 OFFICE O/P EST MOD 30 MIN: CPT | Performed by: PHYSICIAN ASSISTANT

## 2024-05-06 NOTE — PROGRESS NOTES
"Assessment/Plan:   Diagnoses and all orders for this visit:    Dyspnea on exertion    Centrilobular emphysema (HCC)  -     Cancel: Ambulatory Referral to Pulmonary Rehabilitation; Future  -     Ambulatory Referral to Pulmonary Rehabilitation; Future    Chronic hypoxemic respiratory failure (HCC)        Shortness of breath/dyspnea on exertion suspect due in large part to deconditioning/weight gain, also contributed by her COPD.  She has gained about 50 pounds over the past 3 to 4 years.  Recent PFT done about 1 year ago showed only mild obstruction without appreciable response to the bronchodilator.  Severely decreased DLCO.  She did have an echocardiogram done which did not show any pulmonary hypertension, LVEF was normal.    She will continue with her Breo daily, albuterol 4 times per day as needed.  She does have oxygen which she uses with exertion and at night.    Will send her for pulmonary rehab given her worsening dyspnea on exertion.    She is due for screening CT scan which has already been ordered by her PCP    She will follow-up with us in 4 months or sooner if necessary.    Return in about 4 months (around 9/6/2024).  All questions are answered to the patient's satisfaction and understanding.  She verbalizes understanding.  She is encouraged to call with any further questions or concerns.    Portions of the record may have been created with voice recognition software.  Occasional wrong word or \"sound a like\" substitutions may have occurred due to the inherent limitations of voice recognition software.  Read the chart carefully and recognize, using context, where substitutions have occurred.    Electronically Signed by Kuldip Andrew PA-C    ______________________________________________________________________    Chief Complaint:   Chief Complaint   Patient presents with    Follow-up       Patient ID: Connie is a 78 y.o. y.o. female has a past medical history of Anxiety, Atherosclerosis of native artery " of both lower extremities with intermittent claudication (HCC) (10/15/2015), Breast cancer (HCC) (03/25/2022), Carotid artery plaque, bilateral (03/21/2017), Chronic kidney disease, Chronic sinusitis, COPD (chronic obstructive pulmonary disease) ([facility]), COVID, Fall (06/16/2021), Fracture, ankle closed, bimalleolar, right, initial encounter (06/2021), GERD (gastroesophageal reflux disease), History of radiation therapy (2022), Hyperlipidemia, Lung nodule, PNA (pneumonia), PONV (postoperative nausea and vomiting), Pulmonary emphysema ([facility]), PVD (peripheral vascular disease) ([facility]), Restless leg syndrome, Stage 3 chronic kidney disease ([facility]) (04/22/2019), and Tobacco abuse.    5/6/2024  Patient presents today for follow-up visit.  Patient is a 77 yo female former smoker who quit over 2 years ago with PMH of emphysema/COPD, chronic sinusitis, peripheral vascular disease , breast cancer s/p radiation, unable to tolerate chemo.    She is here today for follow up. She continues to note increasing MENDOZA over time. She has gained 50 pounds over the last 3-4 years. She is using her Breo, albuterol as needed. She finds that she needs to take breaks when ambulating, gets short of breath when she bends over. She is using her oxygen with exertion and at night.     .        Review of Systems   Constitutional: Negative.    HENT: Negative.     Respiratory:  Positive for shortness of breath.    Cardiovascular: Negative.    Gastrointestinal: Negative.    Genitourinary: Negative.    Musculoskeletal: Negative.    Skin: Negative.    Allergic/Immunologic: Negative.    Neurological: Negative.    Psychiatric/Behavioral: Negative.         Smoking history: She reports that she quit smoking about 5 years ago. Her smoking use included cigarettes. She started smoking about 62 years ago. She has a 112.5 pack-year smoking history. She has been exposed to tobacco smoke. She has never used smokeless tobacco.    The following portions of the patient's  history were reviewed and updated as appropriate: allergies, current medications, past family history, past medical history, past social history, past surgical history, and problem list.    Immunization History   Administered Date(s) Administered    COVID-19 PFIZER VACCINE 0.3 ML IM 03/03/2021, 03/31/2021, 04/22/2021, 06/25/2022, 09/23/2022    COVID-19 Pfizer vac (Newton-sucrose, gray cap) 12 yr+ IM 06/25/2022    INFLUENZA 10/15/2015, 12/22/2016, 11/07/2017, 10/23/2019, 01/14/2022, 01/12/2023    Influenza Split High Dose Preservative Free IM 10/15/2015, 12/22/2016    Influenza, high dose seasonal 0.7 mL 10/11/2018, 10/23/2019, 11/20/2020, 01/12/2023    Pneumococcal Conjugate 13-Valent 08/04/2016    Pneumococcal Polysaccharide PPV23 1945, 11/16/2015, 06/05/2017    Tdap 1945    Tuberculin Skin Test 06/14/2018, 06/29/2022, 09/21/2022    influenza, trivalent, adjuvanted 10/11/2018, 10/23/2019, 11/20/2020     Current Outpatient Medications   Medication Sig Dispense Refill    ALPRAZolam (XANAX) 0.5 mg tablet Take 1 tablet (0.5 mg total) by mouth 2 (two) times a day as needed for anxiety 45 tablet 0    ASPIRIN 81 PO Take by mouth      famotidine (PEPCID) 20 mg tablet Take 1 tablet (20 mg total) by mouth daily  0    fluticasone (FLONASE) 50 mcg/act nasal spray 2 sprays into each nostril daily 11.1 mL 1    gabapentin (NEURONTIN) 300 mg capsule Take 1 capsule (300 mg total) by mouth 2 (two) times a day 180 capsule 2    gabapentin (NEURONTIN) 400 mg capsule Take 1 capsule (400 mg total) by mouth daily at bedtime 100 capsule 1    olopatadine (PATANOL) 0.1 % ophthalmic solution   0    ondansetron (ZOFRAN) 8 mg tablet Take 1 tablet (8 mg total) by mouth every 8 (eight) hours as needed for nausea or vomiting 20 tablet 2    rosuvastatin (CRESTOR) 20 MG tablet Take 1 tablet (20 mg total) by mouth daily 90 tablet 3    saccharomyces boulardii (FLORASTOR) 250 mg capsule Take 1 capsule (250 mg total) by mouth 2 (two) times a  day  0    sodium chloride () 2 % hypertonic ophthalmic solution Sue 128 2 % eye drops      ergocalciferol (VITAMIN D2) 50,000 units take 1 capsule by mouth every week (Patient not taking: Reported on 1/15/2024) 12 capsule 0    mometasone (ELOCON) 0.1 % cream Apply topically daily as needed (rash) (Patient not taking: Reported on 1/15/2024) 50 g 1    naloxone (NARCAN) 4 mg/0.1 mL nasal spray Administer 1 spray into a nostril. If no response after 2-3 minutes, give another dose in the other nostril using a new spray. (Patient not taking: Reported on 1/15/2024) 1 each 1    predniSONE 10 mg tablet Take 4 tablets for 3 days then 3 tablets for 3 days then 2 tablet for 3 days 1 for 1 day (Patient not taking: Reported on 2/7/2024) 28 tablet 0    rOPINIRole (REQUIP) 0.25 mg tablet Take 1 tablet (0.25 mg total) by mouth daily at bedtime (Patient not taking: Reported on 1/15/2024) 90 tablet 1     No current facility-administered medications for this visit.     Allergies: Tiotropium bromide monohydrate, Augmentin [amoxicillin-pot clavulanate], Tiotropium, Tylenol with codeine #3 [acetaminophen-codeine], and Other    Objective:  Vitals:    05/06/24 1352 05/06/24 1355   BP: 124/78    BP Location: Right arm    Patient Position: Sitting    Cuff Size: Large    Pulse: 102    Resp: 16    Temp: 98 °F (36.7 °C)    SpO2: 94% 94%   Weight: 76.2 kg (168 lb)    Height: 5' (1.524 m)    Oxygen Therapy  SpO2: 94 %  Oxygen Therapy: None (Room air)  .  Wt Readings from Last 3 Encounters:   05/06/24 76.2 kg (168 lb)   04/22/24 76 kg (167 lb 8 oz)   02/23/24 77.1 kg (170 lb)     Body mass index is 32.81 kg/m².    Physical Exam  Constitutional:       General: She is not in acute distress.     Appearance: Normal appearance. She is well-developed. She is not ill-appearing.   HENT:      Head: Normocephalic and atraumatic.      Mouth/Throat:      Pharynx: Oropharynx is clear.   Eyes:      Pupils: Pupils are equal, round, and reactive to light.    Cardiovascular:      Rate and Rhythm: Normal rate and regular rhythm.   Pulmonary:      Effort: Pulmonary effort is normal. No respiratory distress.      Breath sounds: Normal breath sounds. No decreased breath sounds, wheezing, rhonchi or rales.   Abdominal:      General: Abdomen is flat. There is no distension.   Musculoskeletal:         General: Normal range of motion.      Cervical back: Normal range of motion.      Right lower leg: No edema.      Left lower leg: No edema.   Skin:     General: Skin is warm and dry.      Findings: No rash.   Neurological:      Mental Status: She is alert and oriented to person, place, and time.   Psychiatric:         Mood and Affect: Mood normal.         Behavior: Behavior normal.         Lab Review:   Lab Results   Component Value Date    K 4.1 02/01/2024    K 4.6 07/06/2022     02/01/2024     (H) 07/06/2022    CO2 27 02/01/2024    CO2 27 07/06/2022    BUN 15 02/01/2024    BUN 14 07/06/2022    CREATININE 1.16 02/01/2024    CREATININE 1.07 07/06/2022    CALCIUM 9.3 02/01/2024    CALCIUM 9.3 07/06/2022     Lab Results   Component Value Date    WBC 5.84 02/01/2024    HGB 14.7 02/01/2024    HCT 45.0 02/01/2024    MCV 92 02/01/2024     02/01/2024       Diagnostics:  I have personally reviewed pertinent reports.   and I have personally reviewed pertinent films in PACS  Reviewed prior CT scan and PFT  Office Spirometry Results:     ESS:    VAS abdominal aorta/iliacs; with ISABELLA's    Result Date: 4/30/2024  Narrative:  [facility] REPORT [facility]INICAL: Indications:  Evaluate for progression of Aorto-Iliac occlusive disease. Patient is asymptomatic at this time. Operative History: 2019-07-18 Aorta Stent Distal Risk Factors The patient has history of Hyperlipidemia, CKD and previous smoking (quit 5-10yrs ago). Clinical Right Pressure:  131/ mm Hg, Left Pressure:  105/ mm Hg.  FINDINGS:  Unilateral             Impression  PSV (cm/s)  EDV (cm/s)  AP (cm)  TRV (cm)   Sup-Angelina Ao                                119          81      1.3       1.4  Px Inf-David Ao          50-75%             226          28      1.2       1.7  Ds Inf-David Ao - Stent                     164          18      1.2            Celiac                                    181          35                     Prox. SMA                                 121          19                      Segment    Right                            Left                  PSV (cm/s)  EDV (cm/s)  AP (cm)  PSV (cm/s)  EDV (cm/s)  AP (cm)  Prox CHARISMA          109           0      1.1          88           4      1.0  Dist CHARISMA                                           100           0           Prox. EIA          99           0                   93           4           Dist EIA          112           0      0.8          97           0      1.5     CONCLUSION: Impression There is a 50-75%stenosis of the mid aorta. The distal aorta and stent is patent. The aorta is normal in caliber measuring 1.3cm in its greatest diameter. The common and external iliac arteries are patent and normal in caliber The celiac,  and superior mesenteric arteries are patent. The bilateral renal arteries were not visualized. Ankle/Brachial indices are: Rt - 1.10, normal range (Prior: 1.05) and Lt - 1.15, normal range (Prior: 1.02) Great toe pressures are within the healing range.  Compared to previous study on 10/10/2023, there is no significant change noted.  SIGNATURE: Electronically Signed by: HUSSAIN MORALES DO on 2024-04-30 06:20:11 PM

## 2024-05-09 ENCOUNTER — EVALUATION (OUTPATIENT)
Dept: PHYSICAL THERAPY | Facility: CLINIC | Age: 79
End: 2024-05-09
Payer: MEDICARE

## 2024-05-09 DIAGNOSIS — M54.50 CHRONIC BILATERAL LOW BACK PAIN WITHOUT SCIATICA: Primary | ICD-10-CM

## 2024-05-09 DIAGNOSIS — G89.29 CHRONIC BILATERAL LOW BACK PAIN WITHOUT SCIATICA: Primary | ICD-10-CM

## 2024-05-09 PROCEDURE — 97110 THERAPEUTIC EXERCISES: CPT | Performed by: PHYSICAL THERAPIST

## 2024-05-09 PROCEDURE — 97112 NEUROMUSCULAR REEDUCATION: CPT | Performed by: PHYSICAL THERAPIST

## 2024-05-09 PROCEDURE — 97162 PT EVAL MOD COMPLEX 30 MIN: CPT | Performed by: PHYSICAL THERAPIST

## 2024-05-09 NOTE — PROGRESS NOTES
PT Evaluation     Today's date: 2024  Patient name: Connie Royal  : 1945  MRN: [id number]  Referring provider: Ceasar Agrawal MD  Dx:   Encounter Diagnosis     ICD-10-CM    1. Chronic bilateral low back pain without sciatica  M54.50     G89.29                      Assessment  Assessment details: Patient is a 79 y/o female with chief complaints of lumbar pain that is gradually worsening.  Patient presents with decreased functional mobility due to increased pain, decreased hip and core strength, decreased lumbar ROM.  She also has a history of cervical pain as well, that she wishes to address in future session. No improvement with extension based exercise this visit.  She may benefit from gradual strengthening program.  Patient will benefit from skilled physical therapy to address impairment and improve functional mobility.  PT needed to allow for return to maximal function and improve quality of life.   Impairments: abnormal or restricted ROM, activity intolerance, impaired physical strength, lacks appropriate home exercise program and pain with function  Understanding of Dx/Px/POC: good   Prognosis: good    Goals  STG within 4 weeks:   1. Patient to be independent in HEP.   2. Reduce pain by 50% to improve quality of life.   3. Improve lumbar ROM to no more than minimal limitation without pain.   LTG within 8 weeks:   1. Patient to be independent in ADLs/IADLs without difficulty.  2. Patient to be able to ambulate community distances with no more than minimal difficulty (due to her back).   3. Patient to be independent in comprehensive HEP.     Plan  Patient would benefit from: skilled physical therapy and PT eval  Planned modality interventions: cryotherapy, hydrotherapy and unattended electrical stimulation  Planned therapy interventions: therapeutic training, therapeutic exercise, therapeutic activities, stretching, strengthening, postural training, patient education, neuromuscular re-education,  "manual therapy, joint mobilization, IADL retraining, activity modification, ADL retraining, ADL training, body mechanics training, flexibility, functional ROM exercises, gait training, graded activity, graded exercise, graded motor, home exercise program and abdominal trunk stabilization  Frequency: 2x week  Duration in weeks: 8  Plan of Care beginning date: 2024  Plan of Care expiration date: 2024  Treatment plan discussed with: patient    Subjective Evaluation    History of Present Illness  Mechanism of injury: Patient is a 79 y/o female with chief complaints of low back pain that has been going on for at least two years. She denies specific onset or injury. Pain has progressively getting worse.  Now it's to the point where she can't bend over without pain, she states.  She was seen by PCP and referred for evaluation and treatment. She also has neck pain.    Patient Goals  Patient goals for therapy: decreased pain  Patient goal: \"To be able to walk without less pain.\"  Pain  At best pain ratin  At worst pain ratin  Quality: sharp  Alleviating factors: nothing.  Exacerbated by: bending, doing dishes, folding laundry, walking.  Progression: worsening    Social Support  Steps to enter house: yes  Stairs in house: yes   Lives in: multiple-level home  Lives with: adult children    Employment status: not working  Exercise history: none      Diagnostic Tests  No diagnostic tests performed  Treatments  No previous or current treatments      Objective     Active Range of Motion     Lumbar   Flexion:  with pain Restriction level: moderate  Extension:  with pain Restriction level: moderate  Left lateral flexion:  Restriction level: moderate  Right lateral flexion:  Restriction level: moderate    Joint Play     Pain: L5 and S1   Mechanical Assessment    Cervical      Thoracic      Lumbar    Lying extension: repeated movements  Pain location: peripheralized  Pain intensity: worse  Pain level: " "increased    Strength/Myotome Testing     Left Hip   Planes of Motion   Flexion: 4-  Extension: 4-    Right Hip   Planes of Motion   Flexion: 4  Extension: 4+    Left Knee   Flexion: 4+  Extension: 4+    Right Knee   Flexion: 5  Extension: 5    Left Ankle/Foot   Dorsiflexion: 5    Right Ankle/Foot   Dorsiflexion: 5    General Comments:      Lumbar Comments  Pain with palpation of B  SIJ            Precautions: emphysema/COPD (on oxygen); multiple comorbidities    POC expires Unit limit Auth Expiration date PT/OT/ST + Visit Limit?   7/4/24    N/a BOMN                            Visit/Unit Tracking  AUTH Status:  Date 5/9               N/a Used 1               Remaining                  Increased time spent on patient education with diagnosis, prognosis, goals of therapy, progression of therapy, and plan of care.  All questions answered. Patient instructed to call clinic with questions or concerns.         Manuals 5/9                                                                 Neuro Re-Ed             Seated TA  HEP instruction             DLS w pillow push (TA)  3\"x20             Seated adductor set  3\"X20             Seated hip abd  Red 2x10 ea                                                                Ther Ex             Pt Edu  KS            Prone press up  X10; HOLD                                                                                           Ther Activity                                       Gait Training                                       Modalities                                            "

## 2024-05-16 ENCOUNTER — OFFICE VISIT (OUTPATIENT)
Dept: PHYSICAL THERAPY | Facility: CLINIC | Age: 79
End: 2024-05-16
Payer: MEDICARE

## 2024-05-16 DIAGNOSIS — M54.50 CHRONIC BILATERAL LOW BACK PAIN WITHOUT SCIATICA: Primary | ICD-10-CM

## 2024-05-16 DIAGNOSIS — G89.29 CHRONIC BILATERAL LOW BACK PAIN WITHOUT SCIATICA: Primary | ICD-10-CM

## 2024-05-16 PROCEDURE — 97112 NEUROMUSCULAR REEDUCATION: CPT

## 2024-05-16 PROCEDURE — 97110 THERAPEUTIC EXERCISES: CPT

## 2024-05-16 NOTE — PROGRESS NOTES
"Daily Note     Today's date: 2024  Patient name: Connie Royal  : 1945  MRN: [id number]  Referring provider: Ceasar Agrawal MD  Dx:   Encounter Diagnosis     ICD-10-CM    1. Chronic bilateral low back pain without sciatica  M54.50     G89.29                      Subjective: Pt reports her neck and shoulder hurt more than her lower back.  She reports she would like to be evaluated for this.  She doesn't have any back pain today but reports when she bends over, stands for prolonged period      Objective: See treatment diary below      Assessment: Pt completes charted interventions without complications or c/o pain in lower back.  She demonstrates fatigue with standing exercise secondary to comorbidities.  Requires VC for proper facilitation of TA.      Plan: Continue per plan of care.      Precautions: emphysema/COPD (on oxygen); multiple comorbidities    POC expires Unit limit Auth Expiration date PT/OT/ST + Visit Limit?   24    N/a BOMN                            Visit/Unit Tracking  AUTH Status:  Date              N/a Used 1 2              Remaining                      Manuals                                                                Neuro Re-Ed             Seated TA  HEP instruction             DLS w pillow push (TA)  3\"x20  3\"x20           Seated adductor set  3\"X20  5\"x20           Seated hip abd  Red 2x10 ea RTB 2x10           Seated TA march  x20           Paloff press  RTB x20                                     Ther Ex             Pt Edu  KS AF           Prone press up  X10; HOLD             LAQ  X20                                                                             Ther Activity                                       Gait Training                                       Modalities                                            "

## 2024-05-21 ENCOUNTER — TELEPHONE (OUTPATIENT)
Age: 79
End: 2024-05-21

## 2024-05-21 NOTE — TELEPHONE ENCOUNTER
Patient called and is wondering if there is a way for the office to call [phone] and get her a  to pick her up and get her to the appointments. She states that she has been taking cabs but they leave her at the bottom of a hill and she is unable to walk up due to her oxygen. Patient requests a call back to advise.

## 2024-05-22 NOTE — CONSULTS
Consultation - [facility]  Benji Denson 78 y o  female MRN: [id number]  Unit/Bed#: -01 Encounter: [id number]      Consults   Reason for Consult / Principal Problem: Diverticulitis    HPI: Benji Denson is a 78y o  year old female with a PMH of breast cancer who just recently began chemotherapy, CKD, COPD, hyperlipidemia who presented to the ED with the development of abdominal pain  Patient reports she began with abdominal pain yesterday  She reports the pain is located in the lower quadrants  No fevers  No vomiting but reports associated nausea  She also reports constipation over the past week  CT Scan showed evidence of acute diverticulitis  She was started on IV antibiotics and is reporting improvement in her pain today  She also reports she developed a sore throat and odynophagia since starting the chemo  She was started on Fluconazole for oral candidiasis and is reporting improvement in those symptoms as well  She denies any prior history of diverticulitis  She has known diverticulosis  Her last colonoscopy was in August of 2016 and 4 polyps were removed and moderate diverticulosis was noted  She denies any family history of colon cancer  REVIEW OF SYSTEMS:     CONSTITUTIONAL: Denies any fever, chills, or rigors  HEENT: No earache or tinnitus  Denies hearing loss or visual disturbances  CARDIOVASCULAR: No chest pain or palpitations  RESPIRATORY: Denies any cough, hemoptysis, shortness of breath or dyspnea on exertion  GASTROINTESTINAL: As noted in the History of Present Illness  GENITOURINARY: No problems with urination  Denies any hematuria or dysuria  NEUROLOGIC: No dizziness or vertigo, denies headaches  MUSCULOSKELETAL: Denies any muscle or joint pain  SKIN: Denies skin rashes or itching  ENDOCRINE: Denies excessive thirst  Denies intolerance to heat or cold  PSYCHOSOCIAL: Denies depression or anxiety  Denies any recent memory loss       Historical Information   Past Medical History:   Diagnosis Date    Anxiety     Atherosclerosis of native artery of both lower extremities with intermittent claudication ([facility] ) 10/15/2015    Transitioned From: Cardiovascular Symptoms    Breast cancer ([facility] )     Carotid artery plaque, bilateral 2017    Transitioned From: Atherosclerosis of both carotid arteries    Chronic kidney disease     Chronic sinusitis     Last assessed: 13    COPD (chronic obstructive pulmonary disease) ([facility] )     COVID     21 not hopsitalized- flu-like symptoms    Fall 2021    Fracture, ankle closed, bimalleolar, right, initial encounter 2021    GERD (gastroesophageal reflux disease)     Hyperlipidemia     Lung nodule     PNA (pneumonia)     PONV (postoperative nausea and vomiting)     with C-sections    Pulmonary emphysema ([place] [address] )     PVD (peripheral vascular disease) ([place] [address] )     Restless leg syndrome     Stage 3 chronic kidney disease ([place] [address] ) 2019    Tobacco abuse      Past Surgical History:   Procedure Laterality Date    BREAST LUMPECTOMY Right 2022    Procedure: ISAI  DIRECTED LUMPECTOMY;  Surgeon: Nghia Zheng MD;  Location: [facility];  Service: Surgical Oncology    CATARACT EXTRACTION       SECTION      COLONOSCOPY      Complete   Resolved: 13    DESCENDING AORTIC ANEURYSM REPAIR W/ STENT  2019    IR AORTAGRAM WITH RUN-OFF  8/15/2019    LYMPH NODE BIOPSY Right 2022    Procedure: LYMPHATIC MAPPING WITH BLUE AND RADIOACTIVE DYES, SENTINEL LYMPH NODE BIOPSY, INJECTION IN OR BY DR RODRÍGUEZ AT 1300;  Surgeon: Nghia Zheng MD;  Location: [facility];  Service: Surgical Oncology    SHOULDER SURGERY      For frozen shoulder     TONSILLECTOMY      US BREAST ISAI  NEEDLE LOC RIGHT Right 3/25/2022    US GUIDED BREAST BIOPSY RIGHT COMPLETE Right 3/25/2022     Social History   Social History     Substance and Sexual Activity   Alcohol Use Yes Comment: occasionally      Social History     Substance and Sexual Activity   Drug Use No     Social History     Tobacco Use   Smoking Status Former Smoker    Packs/day: 2 00    Years: 56 00    Pack years: 112 00    Types: Cigarettes    Start date: 1962   Paula Walters Quit date: 2018    Years since quittin 0   Smokeless Tobacco Never Used     Family History   Problem Relation Age of Onset    No Known Problems Mother     No Known Problems Father     Arthritis Sister     Pancreatic cancer Sister 61    Melanoma Brother         twice    Prostate cancer Brother     Heart attack Family         Acute Myocardial Infarction    Cirrhosis Family     Prostate cancer Family     Skin cancer Family     Stroke Family         Syndrome    No Known Problems Daughter     No Known Problems Maternal Grandmother     No Known Problems Paternal Grandmother     No Known Problems Sister     No Known Problems Maternal Aunt     Breast cancer Other 30       Meds/Allergies     Medications Prior to Admission   Medication    al mag oxide-diphenhydramine-lidocaine viscous (MAGIC MOUTHWASH) 1:1:1 suspension    ALPRAZolam (XANAX) 0 5 mg tablet    ASPIRIN 81 PO    dexamethasone (DECADRON) 4 mg tablet    ergocalciferol (VITAMIN D2) 50,000 units    fluticasone (FLONASE) 50 mcg/act nasal spray    fluticasone-vilanterol (Breo Ellipta) 100-25 mcg/inh inhaler    gabapentin (NEURONTIN) 300 mg capsule    gabapentin (NEURONTIN) 400 mg capsule    olopatadine (PATANOL) 0 1 % ophthalmic solution    oxyCODONE-acetaminophen (Percocet) 5-325 mg per tablet    pantoprazole (PROTONIX) 40 mg tablet    rosuvastatin (CRESTOR) 20 MG tablet    sodium chloride (MIKO 128) 2 % hypertonic ophthalmic solution    albuterol (2 5 mg/3 mL) 0 083 % nebulizer solution    albuterol (PROVENTIL HFA,VENTOLIN HFA) 90 mcg/act inhaler    dexamethasone (DECADRON) 4 mg tablet    naloxone (NARCAN) 4 mg/0 1 mL nasal spray    ondansetron (ZOFRAN) 8 mg tablet     Current Facility-Administered Medications   Medication Dose Route Frequency    acetaminophen (TYLENOL) tablet 650 mg  650 mg Oral Q6H PRN    albuterol (PROVENTIL HFA,VENTOLIN HFA) inhaler 2 puff  2 puff Inhalation Q6H PRN    aspirin chewable tablet 81 mg  81 mg Oral Daily    atorvastatin (LIPITOR) tablet 40 mg  40 mg Oral Daily With Dinner    enoxaparin (LOVENOX) subcutaneous injection 40 mg  40 mg Subcutaneous Daily    fluticasone-vilanterol (BREO ELLIPTA) 100-25 mcg/inh inhaler 1 puff  1 puff Inhalation Daily    gabapentin (NEURONTIN) capsule 300 mg  300 mg Oral BID    gabapentin (NEURONTIN) capsule 400 mg  400 mg Oral HS    lactated ringers infusion  75 mL/hr Intravenous Continuous    nystatin (MYCOSTATIN) oral suspension 500,000 Units  500,000 Units Swish & Spit 4x Daily    ondansetron (ZOFRAN) injection 4 mg  4 mg Intravenous Q6H PRN    pantoprazole (PROTONIX) EC tablet 40 mg  40 mg Oral Daily Before Breakfast    piperacillin-tazobactam (ZOSYN) 3 375 g in sodium chloride 0 9 % 100 mL IVPB  3 375 g Intravenous Q6H       Allergies   Allergen Reactions    Spiriva Handihaler [Tiotropium Bromide Monohydrate] Shortness Of Breath    Augmentin [Amoxicillin-Pot Clavulanate] Abdominal Pain     Pt received ceftriaxone 1 g IV on 5-21-18, gets sharp pains     Tiotropium Abdominal Pain    Tylenol With Codeine #3 [Acetaminophen-Codeine] Abdominal Pain       Objective   Blood pressure 118/58, pulse 88, temperature 98 5 °F (36 9 °C), resp  rate 18, SpO2 (!) 88 %, not currently breastfeeding      Intake/Output Summary (Last 24 hours) at 6/21/2022 1250  Last data filed at 6/21/2022 0847  Gross per 24 hour   Intake 1100 ml   Output --   Net 1100 ml         PHYSICAL EXAM:      General Appearance:   Alert, cooperative, no distress, appears stated age    HEENT:   Normocephalic, atraumatic, anicteric      Neck:  Supple, symmetrical, trachea midline, no adenopathy;    thyroid: no enlargement/tenderness/nodules; no carotid  bruit or JVD    Lungs:   Clear to auscultation bilaterally; no rales, rhonchi or wheezing; respirations unlabored    Heart[de-identified]   S1 and S2 normal; regular rate and rhythm; no murmur, rub, or gallop  Abdomen:   Soft, non-tender, non-distended; normal bowel sounds; no masses, no organomegaly    Genitalia:   Deferred    Rectal:   Deferred    Extremities:  No cyanosis, clubbing or edema    Pulses:  2+ and symmetric all extremities    Skin:  Skin color, texture, turgor normal, no rashes or lesions    Lymph nodes:  No palpable cervical, axillary or inguinal lymphadenopathy        Lab Results:   Admission on 06/20/2022   Component Date Value    WBC 06/20/2022 3 00 (A)    RBC 06/20/2022 4 70     Hemoglobin 06/20/2022 13 1     Hematocrit 06/20/2022 41 1     MCV 06/20/2022 87     MCH 06/20/2022 27 9     MCHC 06/20/2022 31 9     RDW 06/20/2022 15 2 (A)    MPV 06/20/2022 11 0     Platelets 04/48/9118 164     Sodium 06/20/2022 138     Potassium 06/20/2022 4 0     Chloride 06/20/2022 101     CO2 06/20/2022 27     ANION GAP 06/20/2022 10     BUN 06/20/2022 16     Creatinine 06/20/2022 1 21     Glucose 06/20/2022 183 (A)    Calcium 06/20/2022 8 3     Corrected Calcium 06/20/2022 8 9     AST 06/20/2022 16     ALT 06/20/2022 29     Alkaline Phosphatase 06/20/2022 80     Total Protein 06/20/2022 6 5     Albumin 06/20/2022 3 2 (A)    Total Bilirubin 06/20/2022 0 35     eGFR 06/20/2022 43     LACTIC ACID 06/20/2022 1 6     Procalcitonin 06/20/2022 0 23     Protime 06/21/2022 14 1     INR 06/21/2022 1 13     PTT 06/21/2022 32     Blood Culture 06/20/2022 Received in Microbiology Lab  Culture in Progress   Blood Culture 06/20/2022 Received in Microbiology Lab  Culture in Progress       Color, UA 06/21/2022 Yellow     Clarity, UA 06/21/2022 Clear     Specific Gravity, UA 06/21/2022 1 020     pH, UA 06/21/2022 6 0     Leukocytes, UA 06/21/2022 Negative     Nitrite, UA 06/21/2022 Negative     Protein, UA 06/21/2022 Negative     Glucose, UA 06/21/2022 Negative     Ketones, UA 06/21/2022 Negative     Urobilinogen, UA 06/21/2022 0 2     Bilirubin, UA 06/21/2022 Negative     Blood, UA 06/21/2022 Negative     Lipase 06/20/2022 50 (A)    Magnesium 06/20/2022 1 9     SARS-CoV-2 06/20/2022 Negative     INFLUENZA A PCR 06/20/2022 Negative     INFLUENZA B PCR 06/20/2022 Negative     RSV PCR 06/20/2022 Negative     Segmented % 06/20/2022 32 (A)    Bands % 06/20/2022 2     Lymphocytes % 06/20/2022 43     Monocytes % 06/20/2022 17 (A)    Eosinophils, % 06/20/2022 2     Basophils % 06/20/2022 1     Atypical Lymphocytes % 06/20/2022 3 (A)    Absolute Neutrophils 06/20/2022 1 02 (A)    Lymphocytes Absolute 06/20/2022 1 29     Monocytes Absolute 06/20/2022 0 51     Eosinophils Absolute 06/20/2022 0 06     Basophils Absolute 06/20/2022 0 03     Platelet Estimate 71/14/5877 Adequate     Giant PLTs 06/20/2022 Present     Sodium 06/21/2022 142     Potassium 06/21/2022 4 7     Chloride 06/21/2022 106     CO2 06/21/2022 29     ANION GAP 06/21/2022 7     BUN 06/21/2022 14     Creatinine 06/21/2022 1 20     Glucose 06/21/2022 121     Calcium 06/21/2022 8 0 (A)    Corrected Calcium 06/21/2022 9 1     AST 06/21/2022 13     ALT 06/21/2022 23     Alkaline Phosphatase 06/21/2022 68     Total Protein 06/21/2022 5 4 (A)    Albumin 06/21/2022 2 6 (A)    Total Bilirubin 06/21/2022 0 22     eGFR 06/21/2022 44     WBC 06/21/2022 6 04     RBC 06/21/2022 4 03     Hemoglobin 06/21/2022 11 3 (A)    Hematocrit 06/21/2022 35 4     MCV 06/21/2022 88     MCH 06/21/2022 28 0     MCHC 06/21/2022 31 9     RDW 06/21/2022 15 4 (A)    Platelets 18/34/7090 141 (A)    MPV 06/21/2022 10 7        Imaging Studies: I have personally reviewed pertinent imaging studies        ASSESSMENT & PLAN:    Acute diverticulitis    - Patient presented with lower abdominal pain which began yesterday    - CT Scan C/A/P 6/20 showed findings consistent with mild acute sigmoid diverticulitis  -  WBC count is 3   - She recently began chemotherapy for her breast cancer   - Continue Zosyn 3 375mg IV q 6 hours  - Serial abdominal exams, supportive care, IVFs  - Advance diet to clear liquids  - Patient will need a colonoscopy in 6-8 weeks after the acute episode as an outpatient to rule out an underlying malignancy  Oral candidiasis  Odynophagia    - Patient reports the development of sore throat and odynophagia after starting chemotherapy  - She was started on IV Fluconazole and a PPI and is reporting improvement  - Continue the Fluconazole course  - Continue the PPI  - If odynophagia were to persist, can plan for an EGD to further investigate  At present, she is reporting improvement  Thank you for this consultation  The patient will be seen and evaluated by Dr Monse Chapa PA-C  6/21/2022,12:50 PM Mouth opening: >2cm  Thyromental distance: >3 FBs  Cervical ROM: grossly intact

## 2024-05-23 ENCOUNTER — EVALUATION (OUTPATIENT)
Dept: PHYSICAL THERAPY | Facility: CLINIC | Age: 79
End: 2024-05-23
Payer: MEDICARE

## 2024-05-23 DIAGNOSIS — M54.12 CERVICAL RADICULOPATHY: Primary | ICD-10-CM

## 2024-05-23 DIAGNOSIS — G89.29 CHRONIC BILATERAL LOW BACK PAIN WITHOUT SCIATICA: ICD-10-CM

## 2024-05-23 DIAGNOSIS — M54.50 CHRONIC BILATERAL LOW BACK PAIN WITHOUT SCIATICA: ICD-10-CM

## 2024-05-23 PROCEDURE — 97164 PT RE-EVAL EST PLAN CARE: CPT | Performed by: PHYSICAL THERAPIST

## 2024-05-23 PROCEDURE — 97112 NEUROMUSCULAR REEDUCATION: CPT | Performed by: PHYSICAL THERAPIST

## 2024-05-23 PROCEDURE — 97110 THERAPEUTIC EXERCISES: CPT | Performed by: PHYSICAL THERAPIST

## 2024-05-23 NOTE — PROGRESS NOTES
PT Evaluation     Today's date: 2024  Patient name: Connie Royal  : 1945  MRN: [id number]  Referring provider: Ceasar Agrawal MD  Dx:   Encounter Diagnosis     ICD-10-CM    1. Cervical radiculopathy  M54.12       2. Chronic bilateral low back pain without sciatica  M54.50     G89.29                      Assessment  Impairments: abnormal or restricted ROM, abnormal movement, activity intolerance, impaired physical strength, lacks appropriate home exercise program, pain with function and poor posture   Symptom irritability: moderate    Assessment details: Connie Royal is a pleasant 79 y.o. female who presents with chronic neck and shoulder pain.  Physical exam is consistent with cervical radiculopathy. Primary movement impairment diagnosis of cervical derangement. Pt demonstrated a favorable response to cervical biased retraction movements as shown by a decrease in sx's, centralization of sx's, and decreased pain with comparable signs. Pt was instructed to perform exercises every 2-3 hours or sooner if sx's return and postural correction. Patient was instructed to stop performing exercises if pain started to peripheralize and contact their PT.      The patient's primary goals from physical therapy are:     No further referral appears necessary at this time based upon examination results.    Pt. will benefit from skilled PT services that includes manual therapy techniques to enhance tissue extensibility, neuromuscular re-education to facilitate motor control, therapeutic exercise to increase functional mobility, and modalities prn to reduce pain and inflammation.            Understanding of Dx/Px/POC: good     Prognosis: good  Prognosis details: Positive prognostic indicators include positive attitude toward recovery.  Negative prognostic indicators include chronicity of symptoms, high symptom irritability, multiple concurrent orthopedic problems, and past medical history.      Goals  ST.  Independent with HEP in 2 weeks  2. Pt will have verbal report of improvement in symptoms by >/=25% in 2 weeks     To be achieved by D/C   LT. Pt will be able to carry her oxygen bag with no difficulty   2. Pt will be able to sleep through the night without disturbances of shoulder/neck pain   3. Pt will be able to perform household chores with little difficulty       Plan  Patient would benefit from: skilled physical therapy    Planned therapy interventions: activity modification, manual therapy, motor coordination training, neuromuscular re-education, patient education, self care, therapeutic activities, therapeutic exercise, graded activity, home exercise program, graded exercise, functional ROM exercises and strengthening    Frequency: 2x week  Duration in weeks: 8  Plan of Care beginning date: 2024  Plan of Care expiration date: 2024  Treatment plan discussed with: patient        Subjective Evaluation    History of Present Illness  Mechanism of injury: Pt reports that's that she had neck and arm pain of insidious onset of neck and shoulder pain. Initially she thought it was frozen shoulder because she had this previously but it doesn't feel like that. She notices that her sleep is being impacted. She notes that she is starting to drop things because of this.   Patient Goals  Patient goals for therapy: decreased pain  Patient goal: be able sleep through the night without shoulders sx's,  Pain  At best pain ratin  At worst pain ratin  Quality: cramping  Exacerbated by: shopping.  Progression: worsening    Hand dominance: right          Objective     Concurrent Complaints  Positive for disturbed sleep. Negative for night pain, dizziness, faints, headaches, nausea/motion sickness, tinnitus, trouble swallowing, difficulty breathing and shortness of breath    Neurological Testing     Sensation   Cervical/Thoracic   Left   Intact: light touch    Right   Intact: light touch    Active Range of  "Motion   Cervical/Thoracic Spine       Cervical  Subcranial protraction:  WFL and with pain   Subcranial retraction:   Restriction level: maximal  Flexion: 35 degrees   Extension: 30 degrees     with pain  Left lateral flexion: 25 degrees     with pain  Right lateral flexion: 30 degrees     with pain  Left rotation: 53 degrees with pain  Right rotation: 80 degrees     Mechanical Assessment    Cervical    Seated retraction: repeated movements   Pain location: no change  Pain intensity: better  Pain level: decreased  Seated Extension: repeated movements  Pain location: no change  Pain intensity: better  Pain level: decreased    Thoracic      Lumbar      Strength/Myotome Testing   Cervical Spine     Left   Normal strength    Right   Normal strength  Neuro Exam:     Headaches   Patient reports headaches: No.              Precautions: emphysema/COPD (on oxygen); multiple comorbidities    POC expires Unit limit Auth Expiration date PT/OT/ST + Visit Limit?   7/4/24    N/a BOMN                            Visit/Unit Tracking  AUTH Status:  Date 5/9 5/16 5/23            N/a Used 1 2 3             Remaining                      Manuals 5/9 5/16 5/23                                                              Neuro Re-Ed             Seated TA  HEP instruction             DLS w pillow push (TA)  3\"x20  3\"x20           Seated adductor set  3\"X20  5\"x20           Seated hip abd  Red 2x10 ea RTB 2x10           Seated TA march  x20           Paloff press  RTB x20           Posture ed   8 min          Scap ret c holds                          Ther Ex             Pt Edu  KS AF KB          Prone press up  X10; HOLD             LAQ  X20            Cervical ret   2x15 PT guidance          Cervical ret/ext    x15          Seated thoracic ext              Towel rot SNAGs             Towel ext SNAG             Ther Activity                                       Gait Training                                       Modalities                "

## 2024-05-29 ENCOUNTER — TELEPHONE (OUTPATIENT)
Age: 79
End: 2024-05-29

## 2024-05-29 DIAGNOSIS — J43.2 CENTRILOBULAR EMPHYSEMA (HCC): Primary | ICD-10-CM

## 2024-05-29 RX ORDER — ALBUTEROL SULFATE 90 UG/1
2 AEROSOL, METERED RESPIRATORY (INHALATION) EVERY 6 HOURS PRN
Qty: 6.7 G | Refills: 3 | Status: SHIPPED | OUTPATIENT
Start: 2024-05-29 | End: 2024-06-28

## 2024-05-29 RX ORDER — FLUTICASONE FUROATE AND VILANTEROL 100; 25 UG/1; UG/1
POWDER RESPIRATORY (INHALATION)
Qty: 60 BLISTER | Refills: 5 | Status: SHIPPED | OUTPATIENT
Start: 2024-05-29

## 2024-05-29 NOTE — TELEPHONE ENCOUNTER
"Patient requesting the following \"old\" scripts be sent to [id number] - [place], [place] - [address]:    albuterol (PROVENTIL HFA,VENTOLIN HFA) 90 mcg/act inhaler   fluticasone furoate-vilanterol (BREO ELLIPTA)     Patient requests 3-month supply.    Thank you.  "

## 2024-05-30 ENCOUNTER — TELEPHONE (OUTPATIENT)
Age: 79
End: 2024-05-30

## 2024-05-30 ENCOUNTER — OFFICE VISIT (OUTPATIENT)
Dept: PHYSICAL THERAPY | Facility: CLINIC | Age: 79
End: 2024-05-30
Payer: MEDICARE

## 2024-05-30 DIAGNOSIS — G89.29 CHRONIC BILATERAL LOW BACK PAIN WITHOUT SCIATICA: ICD-10-CM

## 2024-05-30 DIAGNOSIS — M54.12 CERVICAL RADICULOPATHY: Primary | ICD-10-CM

## 2024-05-30 DIAGNOSIS — M54.50 CHRONIC BILATERAL LOW BACK PAIN WITHOUT SCIATICA: ICD-10-CM

## 2024-05-30 PROCEDURE — 97110 THERAPEUTIC EXERCISES: CPT

## 2024-05-30 PROCEDURE — 97112 NEUROMUSCULAR REEDUCATION: CPT

## 2024-05-30 NOTE — PROGRESS NOTES
"Daily Note     Today's date: 2024  Patient name: Connie Royal  : 1945  MRN: [id number]  Referring provider: Ceasar Agrawal MD  Dx:   Encounter Diagnosis     ICD-10-CM    1. Cervical radiculopathy  M54.12       2. Chronic bilateral low back pain without sciatica  M54.50     G89.29                      Subjective: Pt reports her neck is feeling better from exercises provided last visit.      Objective: See treatment diary below      Assessment: Limited cervical traction mobility.  Patient with limited R cervical rotation.  Pt experiences TOLBERT with cervical extension SNAG.  TTP and increased L sided suboccipital tone is present.  Poor TA is present and requires VC to facilitate proper activation.  Pt would benefit from continued PT.    Plan: Continue per plan of care.      Precautions: emphysema/COPD (on oxygen); multiple comorbidities    POC expires Unit limit Auth Expiration date PT/OT/ST + Visit Limit?   24    N/a BOMN                            Visit/Unit Tracking  AUTH Status:  Date            N/a Used 1 2 3 4            Remaining                      Manuals          SOR                                                    Neuro Re-Ed             Seated TA  HEP instruction             DLS w pillow push (TA)  3\"x20  3\"x20           Seated adductor set  3\"X20  5\"x20           Seated hip abd  Red 2x10 ea RTB 2x10           Seated TA march  x20  X20 supine         Paloff press  RTB x20           Posture ed   8 min          Scap ret c holds    RTB 2x10         PPT    3\"x20         Ther Ex             Pt Edu  KS AF KB AF         Prone press up  X10; HOLD             LAQ  X20            Cervical ret   2x15 PT guidance X15 & c PT assist x10         Cervical ret/ext    x15          Seated thoracic ext              Towel rot SNAGs    x10         Towel ext SNAG    x10         Supine chin tuck    10\"x10         Ther Activity                                       Gait " Training                                       Modalities

## 2024-05-31 ENCOUNTER — HOSPITAL ENCOUNTER (OUTPATIENT)
Dept: CT IMAGING | Facility: HOSPITAL | Age: 79
End: 2024-05-31
Attending: INTERNAL MEDICINE
Payer: MEDICARE

## 2024-05-31 DIAGNOSIS — Z87.891 HISTORY OF NICOTINE DEPENDENCE: ICD-10-CM

## 2024-05-31 PROCEDURE — 71271 CT THORAX LUNG CANCER SCR C-: CPT

## 2024-05-31 NOTE — TELEPHONE ENCOUNTER
Patient needs prior authorization for  Fluticasone Furoate-Vilanterol 100-25 mcg/actuation inhaler

## 2024-06-03 NOTE — TELEPHONE ENCOUNTER
PA for Fluticasone Furoate-Vilanterol 100-25 mcg/actuation inhaler     Submitted via    []RemoteReality-KEY   [x][employer]-Case ID # [id number]   []Faxed to plan   []Other website   []Phone call Case ID #     Office notes sent, clinical questions answered. Awaiting determination    Turnaround time for your insurance to make a decision on your Prior Authorization can take 7-21 business days.

## 2024-06-04 NOTE — TELEPHONE ENCOUNTER
PA for Fluticasone Furoate-Vilanterol 100-25 mcg/actuation inhaler Approved   Date(s) approved 1/1/24-12/31/24  Case #    Patient advised by [] aWhere Message                      [x] Phone call       Pharmacy advised by [x]Fax                                     []Phone call    Approval letter scanned into Media Yes

## 2024-06-12 ENCOUNTER — TELEPHONE (OUTPATIENT)
Age: 79
End: 2024-06-12

## 2024-06-12 DIAGNOSIS — J43.2 CENTRILOBULAR EMPHYSEMA (HCC): ICD-10-CM

## 2024-06-12 RX ORDER — FLUTICASONE FUROATE AND VILANTEROL 100; 25 UG/1; UG/1
POWDER RESPIRATORY (INHALATION)
Qty: 60 BLISTER | Refills: 5 | Status: SHIPPED | OUTPATIENT
Start: 2024-06-12

## 2024-06-12 NOTE — TELEPHONE ENCOUNTER
Patient calling for a refill on Breo, patient states she has been using it for years.  I do not see it listed on medication list.    Patient requesting it be sent to [facility] 3 month supply

## 2024-06-17 ENCOUNTER — TELEPHONE (OUTPATIENT)
Age: 79
End: 2024-06-17

## 2024-06-17 DIAGNOSIS — F41.1 GENERALIZED ANXIETY DISORDER: ICD-10-CM

## 2024-06-17 RX ORDER — ALPRAZOLAM 0.5 MG/1
0.5 TABLET ORAL 2 TIMES DAILY PRN
Qty: 60 TABLET | Refills: 0 | Status: SHIPPED | OUTPATIENT
Start: 2024-06-17

## 2024-06-17 NOTE — TELEPHONE ENCOUNTER
Patient called requesting refill for  Fluticasone Furoate-Vilanterol 100-25 mcg/actuation inhaler Patient made aware medication was refilled on 06- for 60 [id number]  Patient instructed to contact the pharmacy to obtain refills of medication. Patient verbalized understanding.

## 2024-06-17 NOTE — TELEPHONE ENCOUNTER
Medication: ALPRAZolam (XANAX) 0.5 mg tablet     Dose/Frequency: Take 1 tablet (0.5 mg total) by mouth 2 (two) times a day as needed for anxiety     Quantity: 45 tablet     Pharmacy: [id number] - [place] - [address]  [[id number]]     Office:   [x] PCP/Provider -   [] Speciality/Provider -     Does the patient have enough for 3 days?   [x] Yes   [] No - Send as HP to POD

## 2024-06-17 NOTE — TELEPHONE ENCOUNTER
Patient called stating she has a sinus infection. She would like an antibiotic sent to the [facility] on  [address] in [place] if possible.

## 2024-06-18 ENCOUNTER — OFFICE VISIT (OUTPATIENT)
Age: 79
End: 2024-06-18
Payer: MEDICARE

## 2024-06-18 ENCOUNTER — TELEPHONE (OUTPATIENT)
Age: 79
End: 2024-06-18

## 2024-06-18 VITALS
HEIGHT: 60 IN | WEIGHT: 168 LBS | DIASTOLIC BLOOD PRESSURE: 76 MMHG | SYSTOLIC BLOOD PRESSURE: 122 MMHG | OXYGEN SATURATION: 96 % | HEART RATE: 81 BPM | BODY MASS INDEX: 32.98 KG/M2

## 2024-06-18 DIAGNOSIS — J32.9 BACTERIAL SINUSITIS: Primary | ICD-10-CM

## 2024-06-18 DIAGNOSIS — B96.89 BACTERIAL SINUSITIS: Primary | ICD-10-CM

## 2024-06-18 PROCEDURE — G2211 COMPLEX E/M VISIT ADD ON: HCPCS

## 2024-06-18 PROCEDURE — 99213 OFFICE O/P EST LOW 20 MIN: CPT

## 2024-06-18 RX ORDER — DOXYCYCLINE HYCLATE 100 MG
100 TABLET ORAL 2 TIMES DAILY
Qty: 14 TABLET | Refills: 0 | Status: SHIPPED | OUTPATIENT
Start: 2024-06-18 | End: 2024-06-25

## 2024-06-18 NOTE — PROGRESS NOTES
INTERNAL MEDICINE FOLLOW-UP VISIT  [facility] - [facility] INTERNAL MEDICINE [address]    NAME: Connie Royal  AGE: 79 y.o. SEX: female  : 1945     DATE: 2024     Assessment and Plan:   1. Bacterial sinusitis  Discussed taking doxycycline twice daily for 7 days.  Avoid prolonged sun exposure while taking medication.  Take with food and a probiotic.  Increase fluid intake, use Tylenol as needed for headache.  If you begin to experience any worsening sinus congestion, fevers greater than 103, or chills notify the office.  - doxycycline hyclate (VIBRA-TABS) 100 mg tablet; Take 1 tablet (100 mg total) by mouth 2 (two) times a day for 7 days  Dispense: 14 tablet; Refill: 0        No follow-ups on file.       Chief Complaint:     Chief Complaint   Patient presents with    Sinusitis     Congestion headaches eye pressure      History of Present Illness:   Patient is a 79-year-old female that presents today for sinus congestion, runny nose, and headache.  This has been going on for over a week and a half.  She notes it started to get worse yesterday.  She is eating and drinking okay.  She has been sleeping a lot the past week.  She has been taking Tylenol cold and sinus and Advil at home with no relief.  She denies any sick contacts.  She did not COVID test at home.  She has a history of sinus infections.    The following portions of the patient's history were reviewed and updated as appropriate: allergies, current medications, past family history, past medical history, past social history, past surgical history and problem list.     Review of Systems:     Review of Systems   Constitutional:  Positive for chills. Negative for appetite change and fever.   HENT:  Positive for rhinorrhea, sinus pressure and sinus pain. Negative for ear discharge, ear pain, sore throat and trouble swallowing.    Eyes:  Negative for pain and itching.   Respiratory:  Positive for cough (Purulent and dry) and  wheezing. Negative for shortness of breath.    Cardiovascular:  Negative for chest pain.   Gastrointestinal:  Negative for abdominal pain, constipation, diarrhea, nausea and vomiting.   Genitourinary:  Negative for difficulty urinating and dysuria.   Musculoskeletal:  Negative for arthralgias and myalgias.   Skin:  Negative for rash.   Neurological:  Positive for headaches. Negative for dizziness and light-headedness.        Past Medical History:     Past Medical History:   Diagnosis Date    Anxiety     Atherosclerosis of native artery of both lower extremities with intermittent claudication ([facility]) 10/15/2015    Transitioned From: Cardiovascular Symptoms    Breast cancer ([facility]) 03/25/2022    right breast    Carotid artery plaque, bilateral 03/21/2017    Transitioned From: Atherosclerosis of both carotid arteries    Chronic kidney disease     Chronic sinusitis     Last assessed: 11/11/13    COPD (chronic obstructive pulmonary disease) ([facility])     COVID     12/25/21 not hopsitalized- flu-like symptoms    Fall 06/16/2021    Fracture, ankle closed, bimalleolar, right, initial encounter 06/2021    GERD (gastroesophageal reflux disease)     History of radiation therapy 2022    right breast    Hyperlipidemia     Lung nodule     PNA (pneumonia)     PONV (postoperative nausea and vomiting)     with C-sections    Pulmonary emphysema ([facility])     PVD (peripheral vascular disease) ([facility])     Restless leg syndrome     Stage 3 chronic kidney disease ([facility]) 04/22/2019    Tobacco abuse         Current Medications:     Current Outpatient Medications:     albuterol (PROVENTIL HFA,VENTOLIN HFA) 90 mcg/act inhaler, Inhale 2 puffs every 6 (six) hours as needed for wheezing, Disp: 6.7 g, Rfl: 3    ALPRAZolam (XANAX) 0.5 mg tablet, Take 1 tablet (0.5 mg total) by mouth 2 (two) times a day as needed for anxiety, Disp: 60 tablet, Rfl: 0    ASPIRIN 81 PO, Take by mouth, Disp: , Rfl:     famotidine (PEPCID) 20 mg tablet, Take 1 tablet (20 mg total) by  mouth daily, Disp: , Rfl: 0    fluticasone (FLONASE) 50 mcg/act nasal spray, 2 sprays into each nostril daily, Disp: 11.1 mL, Rfl: 1    Fluticasone Furoate-Vilanterol 100-25 mcg/actuation inhaler, Inhale 1 puff daily and rinse mouth after use., Disp: 60 blister, Rfl: 5    gabapentin (NEURONTIN) 300 mg capsule, Take 1 capsule (300 mg total) by mouth 2 (two) times a day, Disp: 180 capsule, Rfl: 2    gabapentin (NEURONTIN) 400 mg capsule, Take 1 capsule (400 mg total) by mouth daily at bedtime, Disp: 100 capsule, Rfl: 1    mometasone (ELOCON) 0.1 % cream, Apply topically daily as needed (rash), Disp: 50 g, Rfl: 1    olopatadine (PATANOL) 0.1 % ophthalmic solution, , Disp: , Rfl: 0    ondansetron (ZOFRAN) 8 mg tablet, Take 1 tablet (8 mg total) by mouth every 8 (eight) hours as needed for nausea or vomiting, Disp: 20 tablet, Rfl: 2    rosuvastatin (CRESTOR) 20 MG tablet, Take 1 tablet (20 mg total) by mouth daily, Disp: 90 tablet, Rfl: 3    saccharomyces boulardii (FLORASTOR) 250 mg capsule, Take 1 capsule (250 mg total) by mouth 2 (two) times a day, Disp: , Rfl: 0    sodium chloride () 2 % hypertonic ophthalmic solution, Sue 128 2 % eye drops, Disp: , Rfl:     ergocalciferol (VITAMIN D2) 50,000 units, take 1 capsule by mouth every week (Patient not taking: Reported on 1/15/2024), Disp: 12 capsule, Rfl: 0    naloxone (NARCAN) 4 mg/0.1 mL nasal spray, Administer 1 spray into a nostril. If no response after 2-3 minutes, give another dose in the other nostril using a new spray. (Patient not taking: Reported on 1/15/2024), Disp: 1 each, Rfl: 1    predniSONE 10 mg tablet, Take 4 tablets for 3 days then 3 tablets for 3 days then 2 tablet for 3 days 1 for 1 day (Patient not taking: Reported on 2/7/2024), Disp: 28 tablet, Rfl: 0    rOPINIRole (REQUIP) 0.25 mg tablet, Take 1 tablet (0.25 mg total) by mouth daily at bedtime (Patient not taking: Reported on 1/15/2024), Disp: 90 tablet, Rfl: 1     Allergies:     Allergies    Allergen Reactions    Tiotropium Bromide Monohydrate Shortness Of Breath    Augmentin [Amoxicillin-Pot Clavulanate] Abdominal Pain     Pt received ceftriaxone 1 g IV on 5-21-18, gets sharp pains     Tiotropium Abdominal Pain    Tylenol With Codeine #3 [Acetaminophen-Codeine] Abdominal Pain    Other Other (See Comments)        Physical Exam:     /76   Pulse 81   Ht 5' (1.524 m)   Wt 76.2 kg (168 lb)   SpO2 96%   BMI 32.81 kg/m²     Physical Exam  Vitals and nursing note reviewed.   Constitutional:       General: She is awake. She is not in acute distress.     Appearance: Normal appearance. She is well-developed, well-groomed and overweight.   HENT:      Head: Normocephalic and atraumatic.      Right Ear: Hearing, tympanic membrane, ear canal and external ear normal.      Left Ear: Hearing, tympanic membrane, ear canal and external ear normal.      Nose: Nose normal.      Mouth/Throat:      Lips: Pink.      Mouth: Mucous membranes are moist.      Pharynx: Uvula midline. Posterior oropharyngeal erythema present.   Eyes:      General: Lids are normal. Vision grossly intact. Gaze aligned appropriately.      Conjunctiva/sclera: Conjunctivae normal.   Neck:      Vascular: No carotid bruit.      Trachea: Trachea and phonation normal.   Cardiovascular:      Rate and Rhythm: Normal rate and regular rhythm.      Heart sounds: Normal heart sounds, S1 normal and S2 normal. No murmur heard.     No friction rub. No gallop.   Pulmonary:      Effort: Pulmonary effort is normal. No respiratory distress.      Breath sounds: Normal breath sounds. Decreased air movement present. No decreased breath sounds, wheezing, rhonchi or rales.   Abdominal:      General: Abdomen is protuberant.   Musculoskeletal:         General: No swelling.      Cervical back: Neck supple.      Right lower leg: No edema.      Left lower leg: No edema.   Lymphadenopathy:      Cervical: No cervical adenopathy.   Skin:     General: Skin is warm.       Capillary Refill: Capillary refill takes less than 2 seconds.   Neurological:      Mental Status: She is alert.   Psychiatric:         Attention and Perception: Attention and perception normal.         Mood and Affect: Mood and affect normal.         Speech: Speech normal.         Behavior: Behavior normal. Behavior is cooperative.         Thought Content: Thought content normal.         Cognition and Memory: Cognition and memory normal.         Judgment: Judgment normal.           Data:     Laboratory Results: I have personally reviewed the pertinent laboratory results/reports   Radiology/Other Diagnostic Testing Results: I have personally reviewed pertinent reports.      Deanne Wong PA-C  [facility] INTERNAL MEDICINE [address]

## 2024-06-18 NOTE — TELEPHONE ENCOUNTER
Darrius, son of pt called in stating that pt dropped her Tee Earbuds somewhere in the office for her appt today.     If you find the earbuds, please call Darrius casillas. Thank you!    Darrius Royal   [phone]

## 2024-06-18 NOTE — TELEPHONE ENCOUNTER
Called the patient to let her know that we found her era buds that she thought were lost in the office and they are at the nurses station

## 2024-06-28 ENCOUNTER — TELEPHONE (OUTPATIENT)
Age: 79
End: 2024-06-28

## 2024-06-28 NOTE — TELEPHONE ENCOUNTER
Pt reports she is still sick , reporting pain still going into pts jaw, head and she states this is still really bad she finished her Abx. She does report her fever and she states she feels awful. The headache is increasing worse before.  Please call pt to further advise at Phone: [phone].

## 2024-06-28 NOTE — TELEPHONE ENCOUNTER
She will most likely need to be evaluated in the ER or urgent care since we are going into the weekend now.

## 2024-07-01 ENCOUNTER — APPOINTMENT (EMERGENCY)
Dept: CT IMAGING | Facility: HOSPITAL | Age: 79
End: 2024-07-01
Payer: MEDICARE

## 2024-07-01 ENCOUNTER — HOSPITAL ENCOUNTER (EMERGENCY)
Facility: HOSPITAL | Age: 79
Discharge: HOME | End: 2024-07-01
Attending: STUDENT IN AN ORGANIZED HEALTH CARE EDUCATION/TRAINING PROGRAM
Payer: MEDICARE

## 2024-07-01 VITALS
SYSTOLIC BLOOD PRESSURE: 117 MMHG | RESPIRATION RATE: 18 BRPM | BODY MASS INDEX: 32.98 KG/M2 | WEIGHT: 168 LBS | HEIGHT: 60 IN | OXYGEN SATURATION: 96 % | DIASTOLIC BLOOD PRESSURE: 58 MMHG | HEART RATE: 59 BPM | TEMPERATURE: 97.8 F

## 2024-07-01 DIAGNOSIS — R51.9 HEADACHE: Primary | ICD-10-CM

## 2024-07-01 LAB
ANION GAP SERPL CALCULATED.3IONS-SCNC: 5 MMOL/L (ref 4–13)
BASOPHILS # BLD AUTO: 0.04 THOUSANDS/ÂΜL (ref 0–0.1)
BASOPHILS NFR BLD AUTO: 1 % (ref 0–1)
BUN SERPL-MCNC: 16 MG/DL (ref 5–25)
CALCIUM SERPL-MCNC: 8.9 MG/DL (ref 8.4–10.2)
CHLORIDE SERPL-SCNC: 106 MMOL/L (ref 96–108)
CO2 SERPL-SCNC: 28 MMOL/L (ref 21–32)
CREAT SERPL-MCNC: 0.99 MG/DL (ref 0.6–1.3)
EOSINOPHIL # BLD AUTO: 0.17 THOUSAND/ÂΜL (ref 0–0.61)
EOSINOPHIL NFR BLD AUTO: 3 % (ref 0–6)
ERYTHROCYTE [DISTWIDTH] IN BLOOD BY AUTOMATED COUNT: 13.3 % (ref 11.6–15.1)
GFR SERPL CREATININE-BSD FRML MDRD: 55 ML/MIN/1.73SQ M
GLUCOSE SERPL-MCNC: 95 MG/DL (ref 65–140)
HCT VFR BLD AUTO: 42.7 % (ref 34.8–46.1)
HGB BLD-MCNC: 13.7 G/DL (ref 11.5–15.4)
IMM GRANULOCYTES # BLD AUTO: 0.02 THOUSAND/UL (ref 0–0.2)
IMM GRANULOCYTES NFR BLD AUTO: 0 % (ref 0–2)
LYMPHOCYTES # BLD AUTO: 1.67 THOUSANDS/ÂΜL (ref 0.6–4.47)
LYMPHOCYTES NFR BLD AUTO: 26 % (ref 14–44)
MCH RBC QN AUTO: 29.5 PG (ref 26.8–34.3)
MCHC RBC AUTO-ENTMCNC: 32.1 G/DL (ref 31.4–37.4)
MCV RBC AUTO: 92 FL (ref 82–98)
MONOCYTES # BLD AUTO: 0.49 THOUSAND/ÂΜL (ref 0.17–1.22)
MONOCYTES NFR BLD AUTO: 8 % (ref 4–12)
NEUTROPHILS # BLD AUTO: 3.95 THOUSANDS/ÂΜL (ref 1.85–7.62)
NEUTS SEG NFR BLD AUTO: 62 % (ref 43–75)
NRBC BLD AUTO-RTO: 0 /100 WBCS
PLATELET # BLD AUTO: 258 THOUSANDS/UL (ref 149–390)
PMV BLD AUTO: 9.9 FL (ref 8.9–12.7)
POTASSIUM SERPL-SCNC: 4.1 MMOL/L (ref 3.5–5.3)
RBC # BLD AUTO: 4.65 MILLION/UL (ref 3.81–5.12)
SODIUM SERPL-SCNC: 139 MMOL/L (ref 135–147)
WBC # BLD AUTO: 6.34 THOUSAND/UL (ref 4.31–10.16)

## 2024-07-01 PROCEDURE — 85025 COMPLETE CBC W/AUTO DIFF WBC: CPT | Performed by: STUDENT IN AN ORGANIZED HEALTH CARE EDUCATION/TRAINING PROGRAM

## 2024-07-01 PROCEDURE — 70498 CT ANGIOGRAPHY NECK: CPT

## 2024-07-01 PROCEDURE — 96361 HYDRATE IV INFUSION ADD-ON: CPT

## 2024-07-01 PROCEDURE — 96375 TX/PRO/DX INJ NEW DRUG ADDON: CPT

## 2024-07-01 PROCEDURE — 36415 COLL VENOUS BLD VENIPUNCTURE: CPT | Performed by: STUDENT IN AN ORGANIZED HEALTH CARE EDUCATION/TRAINING PROGRAM

## 2024-07-01 PROCEDURE — 99284 EMERGENCY DEPT VISIT MOD MDM: CPT

## 2024-07-01 PROCEDURE — 96365 THER/PROPH/DIAG IV INF INIT: CPT

## 2024-07-01 PROCEDURE — 80048 BASIC METABOLIC PNL TOTAL CA: CPT | Performed by: STUDENT IN AN ORGANIZED HEALTH CARE EDUCATION/TRAINING PROGRAM

## 2024-07-01 PROCEDURE — 70496 CT ANGIOGRAPHY HEAD: CPT

## 2024-07-01 PROCEDURE — 99285 EMERGENCY DEPT VISIT HI MDM: CPT | Performed by: STUDENT IN AN ORGANIZED HEALTH CARE EDUCATION/TRAINING PROGRAM

## 2024-07-01 RX ORDER — ACETAMINOPHEN 10 MG/ML
1000 INJECTION, SOLUTION INTRAVENOUS ONCE
Status: COMPLETED | OUTPATIENT
Start: 2024-07-01 | End: 2024-07-01

## 2024-07-01 RX ORDER — DIPHENHYDRAMINE HYDROCHLORIDE 50 MG/ML
25 INJECTION INTRAMUSCULAR; INTRAVENOUS ONCE
Status: COMPLETED | OUTPATIENT
Start: 2024-07-01 | End: 2024-07-01

## 2024-07-01 RX ORDER — BUTALBITAL, ACETAMINOPHEN AND CAFFEINE 50; 325; 40 MG/1; MG/1; MG/1
1 TABLET ORAL ONCE
Status: COMPLETED | OUTPATIENT
Start: 2024-07-01 | End: 2024-07-01

## 2024-07-01 RX ORDER — ONDANSETRON 2 MG/ML
4 INJECTION INTRAMUSCULAR; INTRAVENOUS ONCE
Status: COMPLETED | OUTPATIENT
Start: 2024-07-01 | End: 2024-07-01

## 2024-07-01 RX ADMIN — ONDANSETRON 4 MG: 2 INJECTION INTRAMUSCULAR; INTRAVENOUS at 16:48

## 2024-07-01 RX ADMIN — IOHEXOL 85 ML: 350 INJECTION, SOLUTION INTRAVENOUS at 17:45

## 2024-07-01 RX ADMIN — SODIUM CHLORIDE 500 ML: 0.9 INJECTION, SOLUTION INTRAVENOUS at 16:48

## 2024-07-01 RX ADMIN — DIPHENHYDRAMINE HYDROCHLORIDE 25 MG: 50 INJECTION, SOLUTION INTRAMUSCULAR; INTRAVENOUS at 16:48

## 2024-07-01 RX ADMIN — ACETAMINOPHEN 1000 MG: 10 INJECTION INTRAVENOUS at 16:47

## 2024-07-01 RX ADMIN — BUTALBITAL, ACETAMINOPHEN, AND CAFFEINE 1 TABLET: 50; 325; 40 TABLET ORAL at 18:32

## 2024-07-01 RX ADMIN — DEXAMETHASONE SODIUM PHOSPHATE 10 MG: 10 INJECTION, SOLUTION INTRAMUSCULAR; INTRAVENOUS at 22:15

## 2024-07-01 NOTE — ED PROVIDER NOTES
"History  Chief Complaint   Patient presents with    Headache     Hx of headaches \"all their life\" reports since stopping their breast cancer treatments in  their headaches have increased in severity. Patient reports seeing their PCP 2.5 weeks ago for same and started on an unknown antibiotic for potential sinus infection but reports no relief. 2L NC at  baseline     HPI    Patient is a 79-year-old female present emerged department for persistent headache.  Patient was treated for sinusitis approximately 2 weeks ago.  Patient says she has a history of headaches but been worse since finishing chemo.  Patient has a history of breast cancer currently in remission.  Patient feels like there is a  around the top of her head and the pain radiates into her neck.  Nothing is able to help improve her symptoms at this time.  Additional history includes GERD, emphysema, CKD, hyperlipidemia.    Prior to Admission Medications   Prescriptions Last Dose Informant Patient Reported? Taking?   ALPRAZolam (XANAX) 0.5 mg tablet   No No   Sig: Take 1 tablet (0.5 mg total) by mouth 2 (two) times a day as needed for anxiety   ASPIRIN 81 PO  Self Yes No   Sig: Take by mouth   Fluticasone Furoate-Vilanterol 100-25 mcg/actuation inhaler   No No   Sig: Inhale 1 puff daily and rinse mouth after use.   famotidine (PEPCID) 20 mg tablet  Self No No   Sig: Take 1 tablet (20 mg total) by mouth daily   fluticasone (FLONASE) 50 mcg/act nasal spray  Self No No   Si sprays into each nostril daily   gabapentin (NEURONTIN) 300 mg capsule  Self No No   Sig: Take 1 capsule (300 mg total) by mouth 2 (two) times a day   gabapentin (NEURONTIN) 400 mg capsule  Self No No   Sig: Take 1 capsule (400 mg total) by mouth daily at bedtime   mometasone (ELOCON) 0.1 % cream  Self No No   Sig: Apply topically daily as needed (rash)   naloxone (NARCAN) 4 mg/0.1 mL nasal spray  Self No No   Sig: Administer 1 spray into a nostril. If no response after 2-3 " minutes, give another dose in the other nostril using a new spray.   Patient not taking: Reported on 1/15/2024   olopatadine (PATANOL) 0.1 % ophthalmic solution  Self Yes No   ondansetron (ZOFRAN) 8 mg tablet  Self No No   Sig: Take 1 tablet (8 mg total) by mouth every 8 (eight) hours as needed for nausea or vomiting   rosuvastatin (CRESTOR) 20 MG tablet  Self No No   Sig: Take 1 tablet (20 mg total) by mouth daily   saccharomyces boulardii (FLORASTOR) 250 mg capsule  Self No No   Sig: Take 1 capsule (250 mg total) by mouth 2 (two) times a day   sodium chloride () 2 % hypertonic ophthalmic solution  Self Yes No   Sig: Sue 128 2 % eye drops      Facility-Administered Medications: None       Past Medical History:   Diagnosis Date    Anxiety     Atherosclerosis of native artery of both lower extremities with intermittent claudication ([facility]) 10/15/2015    Transitioned From: Cardiovascular Symptoms    Breast cancer ([facility]) 03/25/2022    right breast    Carotid artery plaque, bilateral 03/21/2017    Transitioned From: Atherosclerosis of both carotid arteries    Chronic kidney disease     Chronic sinusitis     Last assessed: 11/11/13    COPD (chronic obstructive pulmonary disease) ([facility])     COVID     12/25/21 not hopsitalized- flu-like symptoms    Fall 06/16/2021    Fracture, ankle closed, bimalleolar, right, initial encounter 06/2021    GERD (gastroesophageal reflux disease)     History of radiation therapy 2022    right breast    Hyperlipidemia     Lung nodule     PNA (pneumonia)     PONV (postoperative nausea and vomiting)     with C-sections    Pulmonary emphysema ([facility])     PVD (peripheral vascular disease) ([facility])     Restless leg syndrome     Stage 3 chronic kidney disease ([facility]) 04/22/2019    Tobacco abuse        Past Surgical History:   Procedure Laterality Date    BREAST LUMPECTOMY Right 4/28/2022    Procedure: ISAI  DIRECTED LUMPECTOMY;  Surgeon: Enrike Merlos MD;  Location: [facility];   Service: Surgical Oncology    CATARACT EXTRACTION       SECTION      COLONOSCOPY      Complete. Resolved: 13    DESCENDING AORTIC ANEURYSM REPAIR W/ STENT  2019    IR AORTAGRAM WITH RUN-OFF  8/15/2019    LYMPH NODE BIOPSY Right 2022    Procedure: LYMPHATIC MAPPING WITH BLUE AND RADIOACTIVE DYES, SENTINEL LYMPH NODE BIOPSY, INJECTION IN OR BY DR MERLOS AT 1300;  Surgeon: Enrike Merlos MD;  Location: [facility];  Service: Surgical Oncology    SHOULDER SURGERY      For frozen shoulder     TONSILLECTOMY      US BREAST CLIP NEEDLE LOC RIGHT Right 3/25/2022    US GUIDED BREAST BIOPSY RIGHT COMPLETE Right 3/25/2022       Family History   Problem Relation Age of Onset    No Known Problems Mother     No Known Problems Father     Arthritis Sister     Pancreatic cancer Sister 63    Melanoma Brother         twice    Prostate cancer Brother     Heart attack Family         Acute Myocardial Infarction    Cirrhosis Family     Prostate cancer Family     Skin cancer Family     Stroke Family         Syndrome    No Known Problems Daughter     No Known Problems Maternal Grandmother     No Known Problems Paternal Grandmother     No Known Problems Sister     No Known Problems Maternal Aunt     Breast cancer Other 30     I have reviewed and agree with the history as documented.    E-Cigarette/Vaping    E-Cigarette Use Never User      E-Cigarette/Vaping Substances    Nicotine No     THC No     CBD No     Flavoring No     Other No     Unknown No      Social History     Tobacco Use    Smoking status: Former     Current packs/day: 0.00     Average packs/day: 2.0 packs/day for 56.3 years (112.5 ttl pk-yrs)     Types: Cigarettes     Start date: 1962     Quit date: 2018     Years since quittin.1     Passive exposure: Past    Smokeless tobacco: Never   Vaping Use    Vaping status: Never Used   Substance Use Topics    Alcohol use: Yes     Comment: occasionally     Drug use: No       Review of Systems    Constitutional:  Negative for chills and fever.   HENT:  Negative for ear pain and sore throat.    Eyes:  Negative for pain and visual disturbance.   Respiratory:  Negative for cough and shortness of breath.    Cardiovascular:  Negative for chest pain and palpitations.   Gastrointestinal:  Negative for abdominal pain and vomiting.   Genitourinary:  Negative for dysuria and hematuria.   Musculoskeletal:  Negative for arthralgias and back pain.   Skin:  Negative for color change and rash.   Neurological:  Positive for headaches. Negative for seizures and syncope.   All other systems reviewed and are negative.      Physical Exam  Physical Exam  Vitals and nursing note reviewed.   Constitutional:       General: She is not in acute distress.     Appearance: She is well-developed.   HENT:      Head: Normocephalic and atraumatic.      Right Ear: Tympanic membrane normal.      Left Ear: Tympanic membrane normal.      Nose: Nose normal.      Mouth/Throat:      Mouth: Mucous membranes are moist.      Pharynx: Oropharynx is clear.   Eyes:      Extraocular Movements: Extraocular movements intact.      Conjunctiva/sclera: Conjunctivae normal.      Pupils: Pupils are equal, round, and reactive to light.   Cardiovascular:      Rate and Rhythm: Normal rate and regular rhythm.      Heart sounds: No murmur heard.  Pulmonary:      Effort: Pulmonary effort is normal. No respiratory distress.      Breath sounds: Normal breath sounds.   Abdominal:      Palpations: Abdomen is soft.      Tenderness: There is no abdominal tenderness.   Musculoskeletal:         General: No swelling.      Cervical back: Neck supple.   Skin:     General: Skin is warm and dry.      Capillary Refill: Capillary refill takes less than 2 seconds.   Neurological:      General: No focal deficit present.      Mental Status: She is alert and oriented to person, place, and time.      Comments: Cranial nerves II through XII intact.  Strength, sensation range of motion  intact in bilateral upper and lower extremities.  Negative finger-nose-finger and heel-to-shin.     Psychiatric:         Mood and Affect: Mood normal.         Vital Signs  ED Triage Vitals [07/01/24 1553]   Temperature Pulse Respirations Blood Pressure SpO2   97.8 °F (36.6 °C) 104 18 128/63 97 %      Temp Source Heart Rate Source Patient Position - Orthostatic VS BP Location FiO2 (%)   Oral Monitor Sitting Left arm --      Pain Score       --           Vitals:    07/01/24 1553 07/01/24 1615 07/01/24 1915 07/01/24 1930   BP: 128/63 137/97 104/56 117/58   Pulse: 104 85 66 59   Patient Position - Orthostatic VS: Sitting  Lying          Visual Acuity  Visual Acuity      Flowsheet Row Most Recent Value   L Pupil Size (mm) 3   R Pupil Size (mm) 3            ED Medications  Medications   diphenhydrAMINE (BENADRYL) injection 25 mg (25 mg Intravenous Given 7/1/24 1648)   sodium chloride 0.9 % bolus 500 mL (0 mL Intravenous Stopped 7/1/24 1901)   acetaminophen (Ofirmev) injection 1,000 mg (0 mg Intravenous Stopped 7/1/24 1747)   ondansetron (ZOFRAN) injection 4 mg (4 mg Intravenous Given 7/1/24 1648)   iohexol (OMNIPAQUE) 350 MG/ML injection (MULTI-DOSE) 85 mL (85 mL Intravenous Given 7/1/24 1745)   butalbital-acetaminophen-caffeine (FIORICET,ESGIC) -40 mg per tablet 1 tablet (1 tablet Oral Given 7/1/24 1832)   dexamethasone oral liquid 10 mg 1 mL (10 mg Oral Given 7/1/24 2215)       Diagnostic Studies  Results Reviewed       Procedure Component Value Units Date/Time    Basic metabolic panel [[id number]] Collected: 07/01/24 1647    Lab Status: Final result Specimen: Blood from Arm, Right Updated: 07/01/24 1721     Sodium 139 mmol/L      Potassium 4.1 mmol/L      Chloride 106 mmol/L      CO2 28 mmol/L      ANION GAP 5 mmol/L      BUN 16 mg/dL      Creatinine 0.99 mg/dL      Glucose 95 mg/dL      Calcium 8.9 mg/dL      eGFR 55 ml/min/1.73sq m     Narrative:      National Kidney Disease Foundation guidelines for Chronic  Kidney Disease (CKD):     Stage 1 with normal or high GFR (GFR > 90 mL/min/1.73 square meters)    Stage 2 Mild CKD (GFR = 60-89 mL/min/1.73 square meters)    Stage 3A Moderate CKD (GFR = 45-59 mL/min/1.73 square meters)    Stage 3B Moderate CKD (GFR = 30-44 mL/min/1.73 square meters)    Stage 4 Severe CKD (GFR = 15-29 mL/min/1.73 square meters)    Stage 5 End Stage CKD (GFR <15 mL/min/1.73 square meters)  Note: GFR calculation is accurate only with a steady state creatinine    CBC and differential [[id number]] Collected: 07/01/24 1647    Lab Status: Final result Specimen: Blood from Arm, Right Updated: 07/01/24 1705     WBC 6.34 Thousand/uL      RBC 4.65 Million/uL      Hemoglobin 13.7 g/dL      Hematocrit 42.7 %      MCV 92 fL      MCH 29.5 pg      MCHC 32.1 g/dL      RDW 13.3 %      MPV 9.9 fL      Platelets 258 Thousands/uL      nRBC 0 /100 WBCs      Segmented % 62 %      Immature Grans % 0 %      Lymphocytes % 26 %      Monocytes % 8 %      Eosinophils Relative 3 %      Basophils Relative 1 %      Absolute Neutrophils 3.95 Thousands/µL      Absolute Immature Grans 0.02 Thousand/uL      Absolute Lymphocytes 1.67 Thousands/µL      Absolute Monocytes 0.49 Thousand/µL      Eosinophils Absolute 0.17 Thousand/µL      Basophils Absolute 0.04 Thousands/µL                    CTA head and neck with and without contrast   Final Result by uJnior Doss MD (07/01 2051)         1. No evidence of acute infarct, intracranial hemorrhage or mass.   2. 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      3. No large vessel occlusion, thrombus or significant stenosis involving the major vessels of the [place].                           Workstation performed: [id number]                    Procedures  Procedures         ED Course                               SBIRT 20yo+      Flowsheet Row Most Recent  Value   Initial Alcohol Screen: US AUDIT-C     1. How often do you have a drink containing alcohol? 0 Filed at: 07/01/2024 1624   2. How many drinks containing alcohol do you have on a typical day you are drinking?  0 Filed at: 07/01/2024 1624   3a. Male UNDER 65: How often do you have five or more drinks on one occasion? 0 Filed at: 07/01/2024 1624   3b. FEMALE Any Age, or MALE 65+: How often do you have 4 or more drinks on one occassion? 0 Filed at: 07/01/2024 1624   Audit-C Score 0 Filed at: 07/01/2024 1624   CARROLL: How many times in the past year have you...    Used an illegal drug or used a prescription medication for non-medical reasons? Never Filed at: 07/01/2024 1624                      Medical Decision Making  Differential sinus headache, tension headache, vertebral artery dissection.    Patient is a 79-year-old female presenting for department no acute respiratory distress and vital signs unremarkable.  Patient given interventions for her discomfort.  Lab work unremarkable.  CT a showed finding of possible subclavian steal.  Discussed this case with vascular surgery, patient reported no drop attacks or acute changes in her gait.vascular suggested outpatient follow-up on their service.    Discussed this conversation, test results and findings with patient at bedside.  Patient overall had improvement of symptoms.  Verbalized understanding for return and follow-up precautions.  Patient given referrals for ENT and neuro.    Amount and/or Complexity of Data Reviewed  Labs: ordered.  Radiology: ordered.    Risk  Prescription drug management.             Disposition  Final diagnoses:   Headache     Time reflects when diagnosis was documented in both MDM as applicable and the Disposition within this note       Time User Action Codes Description Comment    7/1/2024  9:46 PM Amber Darby Add [R51.9] Headache           ED Disposition       ED Disposition   Discharge    Condition   Stable    Date/Time   Mon Jul 1,  2024 2146    Comment   Connie Royal discharge to home/self care.                   Follow-up Information       Follow up With Specialties Details Why Contact Info    Nathen Bain MD Otolaryngology   [address], [address]  [facility] [id number] [phone]              Discharge Medication List as of 7/1/2024 10:10 PM        CONTINUE these medications which have NOT CHANGED    Details   ALPRAZolam (XANAX) 0.5 mg tablet Take 1 tablet (0.5 mg total) by mouth 2 (two) times a day as needed for anxiety, Starting Mon 6/17/2024, Normal      ASPIRIN 81 PO Take by mouth, Historical Med      famotidine (PEPCID) 20 mg tablet Take 1 tablet (20 mg total) by mouth daily, Starting Mon 9/19/2022, No Print      fluticasone (FLONASE) 50 mcg/act nasal spray 2 sprays into each nostril daily, Starting Tue 9/14/2021, Normal      Fluticasone Furoate-Vilanterol 100-25 mcg/actuation inhaler Inhale 1 puff daily and rinse mouth after use., Normal      !! gabapentin (NEURONTIN) 300 mg capsule Take 1 capsule (300 mg total) by mouth 2 (two) times a day, Starting Thu 10/5/2023, Normal      !! gabapentin (NEURONTIN) 400 mg capsule Take 1 capsule (400 mg total) by mouth daily at bedtime, Starting Wed 2/7/2024, Normal      mometasone (ELOCON) 0.1 % cream Apply topically daily as needed (rash), Starting Wed 12/14/2022, Normal      naloxone (NARCAN) 4 mg/0.1 mL nasal spray Administer 1 spray into a nostril. If no response after 2-3 minutes, give another dose in the other nostril using a new spray., Normal      olopatadine (PATANOL) 0.1 % ophthalmic solution Starting Tue 9/11/2018, Historical Med      ondansetron (ZOFRAN) 8 mg tablet Take 1 tablet (8 mg total) by mouth every 8 (eight) hours as needed for nausea or vomiting, Starting Thu 5/19/2022, Normal      rosuvastatin (CRESTOR) 20 MG tablet Take 1 tablet (20 mg total) by mouth daily, Starting Fri 3/1/2024, Normal      saccharomyces boulardii (FLORASTOR) 250 mg capsule Take 1 capsule (250  mg total) by mouth 2 (two) times a day, Starting Tue 6/28/2022, No Print      sodium chloride () 2 % hypertonic ophthalmic solution Sue 128 2 % eye drops, Historical Med       !! - Potential duplicate medications found. Please discuss with provider.              PDMP Review         Value Time User    PDMP Reviewed  Yes 6/17/2024  3:34 PM Deanne Wong PA-C            ED Provider  Electronically Signed by             Amber Darby DO  07/03/24 3317

## 2024-07-02 ENCOUNTER — VBI (OUTPATIENT)
Age: 79
End: 2024-07-02

## 2024-07-02 NOTE — DISCHARGE INSTRUCTIONS
Continues over-the-counter medication as needed for discomfort.  He can also try medications like Benadryl.  Continue stay well-hydrated.    Follow-up with your primary care physician for reevaluation.    Return for any new or worsening symptoms such as vision changes, difficulty walking or swallowing.

## 2024-07-02 NOTE — TELEPHONE ENCOUNTER
07/02/24 8:55 AM    Patient contacted post ED visit, first outreach attempt made. Message was left for patient to return a call to the [facility] Department at [facility]: Phone [phone].    Thank you.  [facility] Duque  [facility]

## 2024-07-03 NOTE — TELEPHONE ENCOUNTER
07/03/24 9:25 AM    Patient contacted post ED visit, [facility] department spoke with patient/caregiver and outreach was successful.    Thank you.  [facility] Duque  [facility]

## 2024-07-05 ENCOUNTER — TELEPHONE (OUTPATIENT)
Age: 79
End: 2024-07-05

## 2024-07-05 NOTE — TELEPHONE ENCOUNTER
L/S 10/30/23 Dr SOUSA , Carotid artery plaque, bilateral. ER F/U visit 07/01/24. call was disconnected , called back the pt and left a message, Pt needs an appt in the [place] location

## 2024-07-09 ENCOUNTER — TELEPHONE (OUTPATIENT)
Facility: HOSPITAL | Age: 79
End: 2024-07-09

## 2024-08-01 DIAGNOSIS — G25.81 RLS (RESTLESS LEGS SYNDROME): ICD-10-CM

## 2024-08-01 RX ORDER — GABAPENTIN 300 MG/1
300 CAPSULE ORAL 2 TIMES DAILY
Qty: 200 CAPSULE | Refills: 1 | Status: SHIPPED | OUTPATIENT
Start: 2024-08-01

## 2024-08-01 NOTE — TELEPHONE ENCOUNTER
Reason for call:   [x] Refill   [] Prior Auth  [] Other:     Office:   [x] PCP/Provider - [facility] / Deanne Wong PA-C   [] Specialty/Provider -     Medication:       Does the patient have enough for 3 days?   [] Yes   [x] No - Send as HP to POD

## 2024-08-19 ENCOUNTER — OFFICE VISIT (OUTPATIENT)
Age: 79
End: 2024-08-19
Payer: MEDICARE

## 2024-08-19 VITALS
SYSTOLIC BLOOD PRESSURE: 122 MMHG | RESPIRATION RATE: 18 BRPM | DIASTOLIC BLOOD PRESSURE: 60 MMHG | BODY MASS INDEX: 33.18 KG/M2 | HEIGHT: 60 IN | TEMPERATURE: 98.6 F | OXYGEN SATURATION: 98 % | HEART RATE: 78 BPM | WEIGHT: 169 LBS

## 2024-08-19 DIAGNOSIS — N30.00 ACUTE CYSTITIS WITHOUT HEMATURIA: Primary | ICD-10-CM

## 2024-08-19 PROCEDURE — 99213 OFFICE O/P EST LOW 20 MIN: CPT | Performed by: INTERNAL MEDICINE

## 2024-08-19 PROCEDURE — G2211 COMPLEX E/M VISIT ADD ON: HCPCS | Performed by: INTERNAL MEDICINE

## 2024-08-19 RX ORDER — PHENAZOPYRIDINE HYDROCHLORIDE 200 MG/1
200 TABLET, FILM COATED ORAL
Qty: 10 TABLET | Refills: 0 | Status: SHIPPED | OUTPATIENT
Start: 2024-08-19

## 2024-08-19 RX ORDER — CEPHALEXIN 500 MG/1
500 CAPSULE ORAL 2 TIMES DAILY
Qty: 14 CAPSULE | Refills: 0 | Status: SHIPPED | OUTPATIENT
Start: 2024-08-19 | End: 2024-08-26

## 2024-08-19 NOTE — PATIENT INSTRUCTIONS
Symptoms most suggestive of acute cystitis.  Patient has intolerance to Augmentin with stomach upset however has tolerated Keflex in the past.  Will start empirically on Keflex as transportation is an issue and patient cannot easily get to the lab today.  She is advised to do the urinalysis and culture if symptoms fail to improve within the next 48 to 72 hours.  She will do her routine lab work prior to our scheduled follow-up next week

## 2024-08-19 NOTE — PROGRESS NOTES
Assessment/Plan:    Diagnoses and all orders for this visit:    Acute cystitis without hematuria  -     UA w Reflex to Microscopic w Reflex to Culture; Future  -     cephalexin (KEFLEX) 500 mg capsule; Take 1 capsule (500 mg total) by mouth 2 (two) times a day for 7 days  -     phenazopyridine (PYRIDIUM) 200 mg tablet; Take 1 tablet (200 mg total) by mouth 3 (three) times a day with meals              Patient Instructions   Symptoms most suggestive of acute cystitis.  Patient has intolerance to Augmentin with stomach upset however has tolerated Keflex in the past.  Will start empirically on Keflex as transportation is an issue and patient cannot easily get to the lab today.  She is advised to do the urinalysis and culture if symptoms fail to improve within the next 48 to 72 hours.  She will do her routine lab work prior to our scheduled follow-up next week    Subjective:      Patient ID: Connie Royal is a 79 y.o. female    Acute visit w/ c/o dysuria x 1 wk, now w/ increased urgency and frequency.  No hematuria or flank pain.  Feels feverish, but no n/v.            Current Outpatient Medications:     ALPRAZolam (XANAX) 0.5 mg tablet, Take 1 tablet (0.5 mg total) by mouth 2 (two) times a day as needed for anxiety, Disp: 60 tablet, Rfl: 0    ASPIRIN 81 PO, Take by mouth, Disp: , Rfl:     cephalexin (KEFLEX) 500 mg capsule, Take 1 capsule (500 mg total) by mouth 2 (two) times a day for 7 days, Disp: 14 capsule, Rfl: 0    famotidine (PEPCID) 20 mg tablet, Take 1 tablet (20 mg total) by mouth daily, Disp: , Rfl: 0    fluticasone (FLONASE) 50 mcg/act nasal spray, 2 sprays into each nostril daily, Disp: 11.1 mL, Rfl: 1    Fluticasone Furoate-Vilanterol 100-25 mcg/actuation inhaler, Inhale 1 puff daily and rinse mouth after use., Disp: 60 blister, Rfl: 5    gabapentin (NEURONTIN) 300 mg capsule, Take 1 capsule (300 mg total) by mouth 2 (two) times a day, Disp: 200 capsule, Rfl: 1    gabapentin (NEURONTIN) 400 mg capsule,  Take 1 capsule (400 mg total) by mouth daily at bedtime, Disp: 100 capsule, Rfl: 1    mometasone (ELOCON) 0.1 % cream, Apply topically daily as needed (rash), Disp: 50 g, Rfl: 1    olopatadine (PATANOL) 0.1 % ophthalmic solution, , Disp: , Rfl: 0    ondansetron (ZOFRAN) 8 mg tablet, Take 1 tablet (8 mg total) by mouth every 8 (eight) hours as needed for nausea or vomiting, Disp: 20 tablet, Rfl: 2    phenazopyridine (PYRIDIUM) 200 mg tablet, Take 1 tablet (200 mg total) by mouth 3 (three) times a day with meals, Disp: 10 tablet, Rfl: 0    rosuvastatin (CRESTOR) 20 MG tablet, Take 1 tablet (20 mg total) by mouth daily, Disp: 90 tablet, Rfl: 3    saccharomyces boulardii (FLORASTOR) 250 mg capsule, Take 1 capsule (250 mg total) by mouth 2 (two) times a day, Disp: , Rfl: 0    sodium chloride () 2 % hypertonic ophthalmic solution, Sue 128 2 % eye drops, Disp: , Rfl:     naloxone (NARCAN) 4 mg/0.1 mL nasal spray, Administer 1 spray into a nostril. If no response after 2-3 minutes, give another dose in the other nostril using a new spray. (Patient not taking: Reported on 1/15/2024), Disp: 1 each, Rfl: 1    No results found for this or any previous visit (from the past 1008 hour(s)).    The following portions of the patient's history were reviewed and updated as appropriate: allergies, current medications, past family history, past medical history, past social history, past surgical history and problem list.     Review of Systems   Constitutional:  Negative for appetite change, chills, diaphoresis, fatigue, fever and unexpected weight change.   HENT:  Negative for congestion, hearing loss and rhinorrhea.    Eyes:  Negative for visual disturbance.   Respiratory:  Negative for cough, chest tightness, shortness of breath and wheezing.    Cardiovascular:  Negative for chest pain, palpitations and leg swelling.   Gastrointestinal:  Negative for abdominal pain and blood in stool.   Endocrine: Negative for cold  intolerance, heat intolerance, polydipsia and polyuria.   Genitourinary:  Positive for frequency and urgency. Negative for difficulty urinating and dysuria.   Musculoskeletal:  Negative for arthralgias and myalgias.   Skin:  Negative for rash.   Neurological:  Negative for dizziness, weakness, light-headedness and headaches.   Hematological:  Does not bruise/bleed easily.   Psychiatric/Behavioral:  Negative for dysphoric mood and sleep disturbance.          Objective:      Vitals:    08/19/24 1526   BP: 122/60   Pulse: 78   Resp: 18   Temp: 98.6 °F (37 °C)   SpO2: 98%          Physical Exam  Constitutional:       Appearance: She is well-developed.   HENT:      Head: Normocephalic and atraumatic.   Pulmonary:      Effort: Pulmonary effort is normal.   Abdominal:      Tenderness: There is no right CVA tenderness or left CVA tenderness.   Neurological:      Mental Status: She is alert and oriented to person, place, and time.   Psychiatric:         Behavior: Behavior normal. Behavior is cooperative.         Thought Content: Thought content normal.         Judgment: Judgment normal.

## 2024-08-21 ENCOUNTER — NURSE TRIAGE (OUTPATIENT)
Dept: OTHER | Facility: OTHER | Age: 79
End: 2024-08-21

## 2024-08-21 NOTE — TELEPHONE ENCOUNTER
"Regarding: Stomach pain  ----- Message from Emilie GARCIA sent at 8/21/2024  6:19 PM EDT -----  \" I am having so much stomach pain, I am going to the bathroom so much that my bottom is raw. I wonder if I am having a reaction to the antibiotics that the Dr prescribed me\"    "

## 2024-08-21 NOTE — TELEPHONE ENCOUNTER
"Pt reports frequent stools, not liquid or watery, denies its diarrhea, denies any blood, since 11am. Did start cephalexin for UTI Monday evening and believes it may be a reaction. Pt reports moderate abdominal lower pain that is severe with BM, but then feels better after BM. Discussed abx side effects, pt states she would like to discontinue cephalexin and wondering if she needs a different abx. States UTI symptoms have resolved at this time.   Discussed ED eval option for severity of pt pain, pt declined ED. Pt advised to monitor for worsening symptoms, blood in stool, or increasing pain and to see higher level of care if needed.   Please reach out to pt in AM if she requires additional treatment of UTI.     Reason for Disposition   [1] SEVERE pain (e.g., excruciating) AND [2] present > 1 hour    Answer Assessment - Initial Assessment Questions  1. LOCATION: \"Where does it hurt?\"       Lower abdomen  2. RADIATION: \"Does the pain shoot anywhere else?\" (e.g., chest, back)      Around to back  3. ONSET: \"When did the pain begin?\" (e.g., minutes, hours or days ago)       11am.   4. SUDDEN: \"Gradual or sudden onset?\"      gradual  6. SEVERITY: \"How bad is the pain?\"  (e.g., Scale 1-10; mild, moderate, or severe)    - MILD (1-3): doesn't interfere with normal activities, abdomen soft and not tender to touch     - MODERATE (4-7): interferes with normal activities or awakens from sleep, tender to touch     - SEVERE (8-10): excruciating pain, doubled over, unable to do any normal activities       10/10 while having BM, 5/10 when not having BM  7. RECURRENT SYMPTOM: \"Have you ever had this type of stomach pain before?\" If Yes, ask: \"When was the last time?\" and \"What happened that time?\"       unsure  8. CAUSE: \"What do you think is causing the stomach pain?\"      Possible reaction to cephalexin  9. RELIEVING/AGGRAVATING FACTORS: \"What makes it better or worse?\" (e.g., movement, antacids, bowel movement)      BM improves " "pain  10. OTHER SYMPTOMS: \"Has there been any vomiting, diarrhea, constipation, or urine problems?\"        Nausea, lack of appetite due to nausea, denies vomiting, noticing blood in stool    Protocols used: Abdominal Pain - Female-ADULT-    "

## 2024-08-23 NOTE — TELEPHONE ENCOUNTER
Advise her to stop the Keflex and take a probiotic.  We can repeat the urinalysis next week if symptoms warrant.

## 2024-08-27 ENCOUNTER — TELEPHONE (OUTPATIENT)
Age: 79
End: 2024-08-27

## 2024-08-27 NOTE — TELEPHONE ENCOUNTER
Patient had an appt conflict and moved appt from 10/30 to 10/28, please schedule [facility] transport for this new appt.

## 2024-09-03 ENCOUNTER — RA CDI HCC (OUTPATIENT)
Dept: OTHER | Facility: HOSPITAL | Age: 79
End: 2024-09-03

## 2024-09-03 DIAGNOSIS — G25.81 RLS (RESTLESS LEGS SYNDROME): ICD-10-CM

## 2024-09-03 NOTE — PROGRESS NOTES
HCC coding opportunities          Chart Reviewed number of suggestions sent to Provider: 1     Patients Insurance   J44.9  Medicare Insurance: Medicare

## 2024-09-03 NOTE — TELEPHONE ENCOUNTER
[facility]  [facility]. She stated she only received 20 pills.  She is requesting 100 for a 90 day supply.  She has an appointment on 9/12/24 with Deanne     gabapentin (NEURONTIN) 400 mg capsule Take 1 capsule (400 mg total) by mouth daily at bedtime     [facility]  # [id number] Katherine    Pt requesting a call back to let know    Please advise. Thankyou.

## 2024-09-04 RX ORDER — GABAPENTIN 400 MG/1
400 CAPSULE ORAL
Qty: 100 CAPSULE | Refills: 1 | Status: SHIPPED | OUTPATIENT
Start: 2024-09-04

## 2024-09-05 ENCOUNTER — OFFICE VISIT (OUTPATIENT)
Age: 79
End: 2024-09-05
Payer: MEDICARE

## 2024-09-05 VITALS
SYSTOLIC BLOOD PRESSURE: 120 MMHG | TEMPERATURE: 98 F | HEART RATE: 93 BPM | DIASTOLIC BLOOD PRESSURE: 84 MMHG | HEIGHT: 60 IN | BODY MASS INDEX: 33.38 KG/M2 | OXYGEN SATURATION: 96 % | WEIGHT: 170 LBS

## 2024-09-05 DIAGNOSIS — R06.09 DYSPNEA ON EXERTION: Primary | ICD-10-CM

## 2024-09-05 DIAGNOSIS — J43.2 CENTRILOBULAR EMPHYSEMA (HCC): ICD-10-CM

## 2024-09-05 DIAGNOSIS — J96.11 CHRONIC HYPOXEMIC RESPIRATORY FAILURE (HCC): ICD-10-CM

## 2024-09-05 PROCEDURE — 99214 OFFICE O/P EST MOD 30 MIN: CPT | Performed by: PHYSICIAN ASSISTANT

## 2024-09-05 PROCEDURE — G2211 COMPLEX E/M VISIT ADD ON: HCPCS | Performed by: PHYSICIAN ASSISTANT

## 2024-09-05 RX ORDER — FLUTICASONE PROPIONATE AND SALMETEROL 250; 50 UG/1; UG/1
1 POWDER RESPIRATORY (INHALATION) 2 TIMES DAILY
Qty: 60 BLISTER | Refills: 3 | Status: SHIPPED | OUTPATIENT
Start: 2024-09-05

## 2024-09-05 NOTE — PROGRESS NOTES
"Assessment/Plan:   Diagnoses and all orders for this visit:    Dyspnea on exertion    Centrilobular emphysema (HCC)  -     Fluticasone-Salmeterol (Wixela Inhub) 250-50 mcg/dose inhaler; Inhale 1 puff 2 (two) times a day Rinse mouth after use.    Chronic hypoxemic respiratory failure (HCC)        Patient is here today for follow-up.  She continues to note dyspnea on exertion and needing to take breaks with activity.  Do think this is doing large part to deconditioning and her weight gain since her cancer diagnosis.  PFT was done after her radiation which showed mild obstruction with moderately decreased DLCO.  Echocardiogram done at that time did not show any significant pulmonary hypertension.    We did order her pulmonary rehab at her last visit.  She was scheduled to go but the extreme heat in the summer caused her to cancel.  She will call to reschedule this.  Do think this will help with her dyspnea on exertion.    She does have oxygen which she uses with exertion and at night.    She has not been on her Breo which is also likely contributing to her shortness of breath/dyspnea on exertion.  It looks like Breo was not covered, she was unable to obtain a refill recently.  Will send Wixela to the pharmacy and she can use her albuterol 4 times per day as needed as well.    Recent CT lung screening showed some right upper lobe radiation fibrosis.  Will be due for screening CT scan in May 2025.    She will follow-up with us in about 6 months or sooner if necessary.  Return in about 6 months (around 3/5/2025).  All questions are answered to the patient's satisfaction and understanding.  She verbalizes understanding.  She is encouraged to call with any further questions or concerns.    Portions of the record may have been created with voice recognition software.  Occasional wrong word or \"sound a like\" substitutions may have occurred due to the inherent limitations of voice recognition software.  Read the chart " carefully and recognize, using context, where substitutions have occurred.    Electronically Signed by Kuldip Andrew PA-C    ______________________________________________________________________    Chief Complaint: No chief complaint on file.      Patient ID: Connie is a 79 y.o. y.o. female has a past medical history of Anxiety, Atherosclerosis of native artery of both lower extremities with intermittent claudication ([facility]) (10/15/2015), Breast cancer ([facility]) (03/25/2022), Carotid artery plaque, bilateral (03/21/2017), Chronic kidney disease, Chronic sinusitis, COPD (chronic obstructive pulmonary disease) ([facility]), COVID, Fall (06/16/2021), Fracture, ankle closed, bimalleolar, right, initial encounter (06/2021), GERD (gastroesophageal reflux disease), History of radiation therapy (2022), Hyperlipidemia, Lung nodule, PNA (pneumonia), PONV (postoperative nausea and vomiting), Pulmonary emphysema ([facility]), PVD (peripheral vascular disease) ([facility]), Restless leg syndrome, Stage 3 chronic kidney disease ([facility]) (04/22/2019), and Tobacco abuse.    9/5/2024  Patient presents today for follow-up visit.  Patient is a 78 yo female former smoker who quit over 2 years ago with PMH of emphysema/COPD, chronic sinusitis, peripheral vascular disease , breast cancer s/p radiation, unable to tolerate chemo.    She is here today for follow-up.  She continues to note dyspnea on exertion and needing to take breaks.  She does recover quickly once she takes a break.  She has not had Breo in some time, she ran out and states she was unable to get a refill from our office.  She does have albuterol but does not use this often.  She does have her oxygen which she uses on exertion and at night.  She has gained at least 50 pounds over the last couple of years.  She was due to start pulmonary rehab but the heat caused her to cancel and she has not yet rescheduled.      .        Review of Systems   Constitutional: Negative.    HENT: Negative.      Respiratory:  Positive for shortness of breath.    Cardiovascular: Negative.    Gastrointestinal: Negative.    Genitourinary: Negative.    Musculoskeletal: Negative.    Skin: Negative.    Allergic/Immunologic: Negative.    Neurological: Negative.    Psychiatric/Behavioral: Negative.         Smoking history: She reports that she quit smoking about 6 years ago. Her smoking use included cigarettes. She started smoking about 62 years ago. She has a 112.5 pack-year smoking history. She has been exposed to tobacco smoke. She has never used smokeless tobacco.    The following portions of the patient's history were reviewed and updated as appropriate: allergies, current medications, past family history, past medical history, past social history, past surgical history, and problem list.    Immunization History   Administered Date(s) Administered    COVID-19 PFIZER VACCINE 0.3 ML IM 03/03/2021, 03/31/2021, 04/22/2021, 06/25/2022, 09/23/2022    COVID-19 Pfizer vac (Newton-sucrose, gray cap) 12 yr+ IM 06/25/2022    INFLUENZA 10/15/2015, 12/22/2016, 11/07/2017, 10/23/2019, 01/14/2022, 01/12/2023    Influenza Split High Dose Preservative Free IM 10/15/2015, 12/22/2016    Influenza, high dose seasonal 0.7 mL 10/11/2018, 10/23/2019, 11/20/2020, 01/12/2023    Pneumococcal Conjugate 13-Valent 08/04/2016    Pneumococcal Polysaccharide PPV23 1945, 11/16/2015, 06/05/2017    Tdap 1945    Tuberculin Skin Test 06/14/2018, 06/29/2022, 09/21/2022    influenza, trivalent, adjuvanted 10/11/2018, 10/23/2019, 11/20/2020     Current Outpatient Medications   Medication Sig Dispense Refill    ALPRAZolam (XANAX) 0.5 mg tablet Take 1 tablet (0.5 mg total) by mouth 2 (two) times a day as needed for anxiety 60 tablet 0    ASPIRIN 81 PO Take by mouth      famotidine (PEPCID) 20 mg tablet Take 1 tablet (20 mg total) by mouth daily  0    fluticasone (FLONASE) 50 mcg/act nasal spray 2 sprays into each nostril daily 11.1 mL 1     Fluticasone-Salmeterol (Wixela Inhub) 250-50 mcg/dose inhaler Inhale 1 puff 2 (two) times a day Rinse mouth after use. 60 blister 3    gabapentin (NEURONTIN) 300 mg capsule Take 1 capsule (300 mg total) by mouth 2 (two) times a day 200 capsule 1    gabapentin (NEURONTIN) 400 mg capsule Take 1 capsule (400 mg total) by mouth daily at bedtime 100 capsule 1    mometasone (ELOCON) 0.1 % cream Apply topically daily as needed (rash) 50 g 1    olopatadine (PATANOL) 0.1 % ophthalmic solution   0    ondansetron (ZOFRAN) 8 mg tablet Take 1 tablet (8 mg total) by mouth every 8 (eight) hours as needed for nausea or vomiting 20 tablet 2    phenazopyridine (PYRIDIUM) 200 mg tablet Take 1 tablet (200 mg total) by mouth 3 (three) times a day with meals 10 tablet 0    rosuvastatin (CRESTOR) 20 MG tablet Take 1 tablet (20 mg total) by mouth daily 90 tablet 3    saccharomyces boulardii (FLORASTOR) 250 mg capsule Take 1 capsule (250 mg total) by mouth 2 (two) times a day  0    sodium chloride () 2 % hypertonic ophthalmic solution Sue 128 2 % eye drops      naloxone (NARCAN) 4 mg/0.1 mL nasal spray Administer 1 spray into a nostril. If no response after 2-3 minutes, give another dose in the other nostril using a new spray. (Patient not taking: Reported on 1/15/2024) 1 each 1     No current facility-administered medications for this visit.     Allergies: Tiotropium bromide monohydrate, Augmentin [amoxicillin-pot clavulanate], Tiotropium, Tylenol with codeine #3 [acetaminophen-codeine], and Other    Objective:  Vitals:    09/05/24 1403   BP: 120/84   Pulse: 93   Temp: 98 °F (36.7 °C)   SpO2: 96%   Weight: 77.1 kg (170 lb)   Height: 5' (1.524 m)   Oxygen Therapy  SpO2: 96 % (with 2 lts of oxygen)  .  Wt Readings from Last 3 Encounters:   09/05/24 77.1 kg (170 lb)   08/19/24 76.7 kg (169 lb)   07/01/24 76.2 kg (168 lb)     Body mass index is 33.2 kg/m².    Physical Exam  Constitutional:       General: She is not in acute  distress.     Appearance: Normal appearance. She is well-developed. She is not ill-appearing.   HENT:      Head: Normocephalic and atraumatic.      Mouth/Throat:      Pharynx: Oropharynx is clear.   Eyes:      Pupils: Pupils are equal, round, and reactive to light.   Cardiovascular:      Rate and Rhythm: Normal rate and regular rhythm.   Pulmonary:      Effort: Pulmonary effort is normal. No respiratory distress.      Breath sounds: Normal breath sounds. No decreased breath sounds, wheezing, rhonchi or rales.   Abdominal:      General: Abdomen is flat. There is no distension.   Musculoskeletal:         General: Normal range of motion.      Cervical back: Normal range of motion.      Right lower leg: No edema.      Left lower leg: No edema.   Skin:     General: Skin is warm and dry.      Findings: No rash.   Neurological:      Mental Status: She is alert and oriented to person, place, and time.   Psychiatric:         Mood and Affect: Mood normal.         Behavior: Behavior normal.         Lab Review:   Lab Results   Component Value Date    K 4.1 07/01/2024    K 4.6 07/06/2022     07/01/2024     (H) 07/06/2022    CO2 28 07/01/2024    CO2 27 07/06/2022    BUN 16 07/01/2024    BUN 14 07/06/2022    CREATININE 0.99 07/01/2024    CREATININE 1.07 07/06/2022    CALCIUM 8.9 07/01/2024    CALCIUM 9.3 07/06/2022     Lab Results   Component Value Date    WBC 6.34 07/01/2024    HGB 13.7 07/01/2024    HCT 42.7 07/01/2024    MCV 92 07/01/2024     07/01/2024       Diagnostics:  I have personally reviewed pertinent reports.   and I have personally reviewed pertinent films in PACS  Reviewed previous CT scan  Office Spirometry Results:     ESS:    No results found.

## 2024-09-11 PROBLEM — K52.9 NONINFECTIVE GASTROENTERITIS AND COLITIS, UNSPECIFIED: Status: RESOLVED | Noted: 2022-09-19 | Resolved: 2024-09-11

## 2024-09-11 PROBLEM — S83.249A ACUTE MENISCAL TEAR, MEDIAL: Status: RESOLVED | Noted: 2022-03-10 | Resolved: 2024-09-11

## 2024-09-11 PROBLEM — M25.569 KNEE PAIN: Status: RESOLVED | Noted: 2022-03-10 | Resolved: 2024-09-11

## 2024-09-11 PROBLEM — R06.02 SHORTNESS OF BREATH: Status: RESOLVED | Noted: 2018-06-14 | Resolved: 2024-09-11

## 2024-09-11 PROBLEM — I65.23 OCCLUSION AND STENOSIS OF BILATERAL CAROTID ARTERIES: Status: ACTIVE | Noted: 2022-06-30

## 2024-09-11 PROBLEM — R26.9 UNSPECIFIED ABNORMALITIES OF GAIT AND MOBILITY: Status: RESOLVED | Noted: 2018-06-14 | Resolved: 2024-09-11

## 2024-09-11 PROBLEM — I65.23 CAROTID ARTERY PLAQUE, BILATERAL: Status: RESOLVED | Noted: 2017-03-21 | Resolved: 2024-09-11

## 2024-09-11 PROBLEM — R48.8 OTHER SYMBOLIC DYSFUNCTIONS: Status: RESOLVED | Noted: 2018-06-14 | Resolved: 2024-09-11

## 2024-09-11 PROBLEM — I74.09 OTHER ARTERIAL EMBOLISM AND THROMBOSIS OF ABDOMINAL AORTA (HCC): Status: ACTIVE | Noted: 2022-06-30

## 2024-09-11 PROBLEM — M75.00 ADHESIVE CAPSULITIS OF SHOULDER: Status: ACTIVE | Noted: 2022-03-10

## 2024-09-11 PROBLEM — A04.72 ENTEROCOLITIS DUE TO CLOSTRIDIUM DIFFICILE, NOT SPECIFIED AS RECURRENT: Status: RESOLVED | Noted: 2022-09-19 | Resolved: 2024-09-11

## 2024-09-11 PROBLEM — M62.81 GENERALIZED MUSCLE WEAKNESS: Status: RESOLVED | Noted: 2018-06-14 | Resolved: 2024-09-11

## 2024-09-11 PROBLEM — K57.92 DIVERTICULITIS OF INTESTINE, PART UNSPECIFIED, WITHOUT PERFORATION OR ABSCESS WITHOUT BLEEDING: Status: RESOLVED | Noted: 2022-06-28 | Resolved: 2024-09-11

## 2024-09-11 PROBLEM — K21.9 GASTRO-ESOPHAGEAL REFLUX DISEASE WITHOUT ESOPHAGITIS: Status: ACTIVE | Noted: 2022-06-28

## 2024-09-11 PROBLEM — Z98.890 HISTORY OF LUMPECTOMY OF RIGHT BREAST: Status: RESOLVED | Noted: 2022-10-07 | Resolved: 2024-09-11

## 2024-10-17 ENCOUNTER — TELEPHONE (OUTPATIENT)
Dept: SURGICAL ONCOLOGY | Facility: CLINIC | Age: 79
End: 2024-10-17

## 2024-10-17 NOTE — TELEPHONE ENCOUNTER
Called patient and left a message to call the office back to reschedule her 10/28/2024 appointment with Dr. Merlos. Dr. Merlos will be in the OR.      [facility] WILL NEED TO BE RESCHEDULE AT THE TIME OF THE APPOINTMENT RESCHEDULE.

## 2024-10-22 NOTE — TELEPHONE ENCOUNTER
Patient called back and appointment was rescheduled to 11/4 at 11:00 AM. [facility] will need to be contacted by the office to update appointment date and time.

## 2024-10-28 DIAGNOSIS — I74.09 AORTOILIAC OCCLUSIVE DISEASE (HCC): ICD-10-CM

## 2024-10-28 RX ORDER — ROSUVASTATIN CALCIUM 20 MG/1
20 TABLET, COATED ORAL DAILY
Qty: 90 TABLET | Refills: 3 | Status: SHIPPED | OUTPATIENT
Start: 2024-10-28

## 2024-10-28 NOTE — TELEPHONE ENCOUNTER
Pts son reports her medication(  rosuvastatin (CRESTOR) 20 MG tablet ) needs to be reordered and she hasn't used it in a couple mths.   PCP please call son back [phone],once medication is able to be reordered.  Thank you

## 2024-10-30 ENCOUNTER — HOSPITAL ENCOUNTER (OUTPATIENT)
Dept: NON INVASIVE DIAGNOSTICS | Facility: CLINIC | Age: 79
Discharge: HOME | End: 2024-10-30
Payer: MEDICARE

## 2024-10-30 DIAGNOSIS — I65.23 CAROTID ARTERY PLAQUE, BILATERAL: ICD-10-CM

## 2024-10-30 PROCEDURE — 93880 EXTRACRANIAL BILAT STUDY: CPT

## 2024-10-31 ENCOUNTER — TELEPHONE (OUTPATIENT)
Age: 79
End: 2024-10-31

## 2024-10-31 PROCEDURE — 93880 EXTRACRANIAL BILAT STUDY: CPT | Performed by: SURGERY

## 2024-10-31 NOTE — TELEPHONE ENCOUNTER
I called Connie regarding an appointment that they have scheduled with Dr Ruelas scheduled on  2/24/25 at 11:40 am      I left a message advising patient that appointment has been rescheduled to 2/24/25 at 10:00 AM with Dr Langston. Advised pt to call back if this new appointment does not work.

## 2024-11-04 ENCOUNTER — OFFICE VISIT (OUTPATIENT)
Dept: SURGICAL ONCOLOGY | Facility: CLINIC | Age: 79
End: 2024-11-04
Payer: MEDICARE

## 2024-11-04 ENCOUNTER — TELEPHONE (OUTPATIENT)
Dept: HEMATOLOGY ONCOLOGY | Facility: CLINIC | Age: 79
End: 2024-11-04

## 2024-11-04 VITALS
HEART RATE: 107 BPM | BODY MASS INDEX: 33.38 KG/M2 | TEMPERATURE: 97.7 F | DIASTOLIC BLOOD PRESSURE: 82 MMHG | SYSTOLIC BLOOD PRESSURE: 130 MMHG | WEIGHT: 170 LBS | HEIGHT: 60 IN | OXYGEN SATURATION: 96 %

## 2024-11-04 DIAGNOSIS — Z08 ENCOUNTER FOR FOLLOW-UP SURVEILLANCE OF BREAST CANCER: ICD-10-CM

## 2024-11-04 DIAGNOSIS — Z17.1 MALIGNANT NEOPLASM OF OVERLAPPING SITES OF RIGHT BREAST IN FEMALE, ESTROGEN RECEPTOR NEGATIVE (HCC): Primary | ICD-10-CM

## 2024-11-04 DIAGNOSIS — C50.811 MALIGNANT NEOPLASM OF OVERLAPPING SITES OF RIGHT BREAST IN FEMALE, ESTROGEN RECEPTOR NEGATIVE (HCC): Primary | ICD-10-CM

## 2024-11-04 DIAGNOSIS — Z85.3 ENCOUNTER FOR FOLLOW-UP SURVEILLANCE OF BREAST CANCER: ICD-10-CM

## 2024-11-04 DIAGNOSIS — K86.89 MASS OF PANCREAS: ICD-10-CM

## 2024-11-04 DIAGNOSIS — Z98.890 HISTORY OF LUMPECTOMY OF RIGHT BREAST: ICD-10-CM

## 2024-11-04 PROCEDURE — 99204 OFFICE O/P NEW MOD 45 MIN: CPT | Performed by: SURGERY

## 2024-11-04 PROCEDURE — 99214 OFFICE O/P EST MOD 30 MIN: CPT | Performed by: SURGERY

## 2024-11-04 NOTE — PROGRESS NOTES
Surgical Oncology Follow Up  [facility]  [facility] [place]  [address] [id number]    Connie Royal  1945  [id number]      Chief Complaint   Patient presents with   • Follow-up     6 Month Follow Up        Assessment & Plan:   79-year-old female follow-up with right breast cancer s/p breast conservation surgery and radiation.  She recently had CTA neck demonstrated retrograde flow in the vertebral artery distal to left subclavian artery operation consisting is subclavian steal syndrome.  She has an upcoming with regards to her vertebral artery retrograde flow with vascular surgery.  She will need her mammogram which we will order for February.  With regard to pancreas nodule we will also repeat her CT scan in February.    Cancer History:     Oncology History   Malignant neoplasm of overlapping sites of right breast in female, estrogen receptor negative (HCC)   3/25/2022 Initial Diagnosis    Malignant neoplasm of right female breast (HCC)     3/25/2022 Biopsy    Right breast ultrasound guided biopsy  12 o'clock 5 cm from nipple  Invasive breast carcinoma of no special type (ductal NST/ invasive ductal carcinoma with apocrine features)  Grade 2  ER 0  ME 0  HER 2 2+   FISH negative  Lymphovascular invasion not identified    Concordant. Malignancy is unifocal. Mass measures 1.4 cm on mammogram and 1.9 cm on ultrasound. Right axilla ultrasound clear. Left brest clear. Amanda  reflector placed. 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     4/4/2022 -  Cancer Staged    Staging form: Breast, AJCC 8th Edition  - Clinical stage from 4/4/2022: Stage IB (cT1c, cN0, cM0, G2, ER-, ME-, HER2-) - Signed by Selena Conrad MD on 4/4/2022  Stage prefix: Initial diagnosis  Nuclear grade: G2  Histologic grading system: 3 grade system  HER2-IHC interpretation: Equivocal  HER2-IHC value: Score 2+  HER2-FISH  interpretation: Negative       4/4/2022 Genetic Testing    [employer]  A total of 36 genes were evaluated, including: GILL, BRCA1, BRCA2, CDH1, CHEK2, PALB2, PTEN, STK11, TP53  Negative result. No pathogenic sequence variants or deletions/duplications identified     4/28/2022 Surgery    Right breast lexy  directed lumpectomy with sentinel lymph node biopsy and axillary dissection  Invasive ductal carcinoma with apocrine features  Grade 3  1.4 cm  0/4 Lymph nodes       6/14/2022 - 6/15/2022 Chemotherapy    Pegfilgrastim-bmez (ZIEXTENZO), 6 mg, Subcutaneous, Once, 1 of 4 cycles  Administration: 6 mg (6/15/2022)  cyclophosphamide (CYTOXAN) IVPB, 450 mg/m2 = 778 mg (75 % of original dose 600 mg/m2), Intravenous, Once, 1 of 4 cycles  Dose modification: 450 mg/m2 (75 % of original dose 600 mg/m2, Cycle 1, Reason: Dose Not Tolerated)  Administration: 778 mg (6/14/2022)  DOCEtaxel (TAXOTERE) chemo infusion, 56.25 mg/m2 = 97.4 mg (75 % of original dose 75 mg/m2), Intravenous, Once, 1 of 4 cycles  Dose modification: 56.25 mg/m2 (75 % of original dose 75 mg/m2, Cycle 1, Reason: Dose Not Tolerated)  Administration: 97.4 mg (6/14/2022)     11/17/2022 - 12/15/2022 Radiation    Treatments:  Course: C1  Plan ID Energy Fractions Dose per Fraction (cGy) Dose Correction (cGy) Total Dose Delivered (cGy) Elapsed Days   R Breast Hypo 6X 15 / 15 267 0 4,005 21   R BrstBst 6X 10X/6X 5 / 5 200 0 1,000 6    Treatment Dates:  11/17/2022 - 12/15/2022.              Interval History:   Follow-up with right breast cancer    Review of Systems:   Review of Systems   Constitutional:  Negative for chills and fever.   HENT:  Negative for ear pain and sore throat.    Eyes:  Negative for pain and visual disturbance.   Respiratory:  Positive for shortness of breath. Negative for cough.    Cardiovascular:  Negative for chest pain and palpitations.   Gastrointestinal:  Negative for abdominal pain and vomiting.   Genitourinary:  Negative for dysuria and  hematuria.   Musculoskeletal:  Negative for arthralgias and back pain.   Skin:  Negative for color change and rash.   Neurological:  Negative for seizures and syncope.   All other systems reviewed and are negative.    Past Medical History     Patient Active Problem List   Diagnosis   • Anxiety   • Aortoiliac occlusive disease (HCC)   • Centrilobular emphysema (HCC)   • Hyperlipidemia   • Osteoporosis   • Restless leg syndrome   • Subclavian artery stenosis, left (HCC)   • PVD (peripheral vascular disease) (HCC)   • Chronic sinusitis   • Mass of pancreas   • Seborrheic keratoses   • Stage 3 chronic kidney disease (HCC)   • Closed avulsion fracture of lateral malleolus of right fibula   • Vitamin D deficiency   • COPD (chronic obstructive pulmonary disease) (HCC)   • Malignant neoplasm of overlapping sites of right breast in female, estrogen receptor negative (HCC)   • Chemotherapy induced neutropenia (HCC)   • Proctocolitis   • Transaminitis   • Ulcerative (chronic) pancolitis without complications (HCC)   • Chronic hypoxemic respiratory failure (HCC)   • History of lumpectomy of right breast   • Encounter for follow-up surveillance of breast cancer   • Former smoker   • Adhesive capsulitis of shoulder   • Gastro-esophageal reflux disease without esophagitis   • Occlusion and stenosis of bilateral carotid arteries   • 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)     Past Medical History:   Diagnosis Date   • Acute meniscal tear, medial 03/10/2022   • Anxiety    • Atherosclerosis of native artery of both lower extremities with intermittent claudication (HCC) 10/15/2015    Transitioned From: Cardiovascular Symptoms   • Breast cancer (HCC) 03/25/2022    right breast   • Carotid artery plaque, bilateral 03/21/2017    Transitioned From: Atherosclerosis of both carotid arteries   • Chronic kidney disease    • Chronic sinusitis     Last assessed: 11/11/13   • COPD (chronic obstructive pulmonary disease)  ([facility])    • COVID     21 not hopsitalized- flu-like symptoms   • Diverticulitis of intestine, part unspecified, without perforation or abscess without bleeding 2022   • Enterocolitis due to Clostridium difficile, not specified as recurrent 2022   • Fall 2021   • Fracture, ankle closed, bimalleolar, right, initial encounter 2021   • Generalized muscle weakness 2018   • GERD (gastroesophageal reflux disease)    • History of radiation therapy     right breast   • Hyperlipidemia    • Knee pain 03/10/2022   • Lung nodule    • Noninfective gastroenteritis and colitis, unspecified 2022   • Other symbolic dysfunctions 2018   • PNA (pneumonia)    • PONV (postoperative nausea and vomiting)     with C-sections   • Pulmonary emphysema ([facility])    • PVD (peripheral vascular disease) ([facility])    • Restless leg syndrome    • Shortness of breath 2018   • Stage 3 chronic kidney disease ([facility]) 2019   • Tobacco abuse    • Unspecified abnormalities of gait and mobility 2018     Past Surgical History:   Procedure Laterality Date   • BREAST LUMPECTOMY Right 2022    Procedure: ISAI  DIRECTED LUMPECTOMY;  Surgeon: Enrike Merlos MD;  Location: [facility];  Service: Surgical Oncology   • CATARACT EXTRACTION     •  SECTION     • COLONOSCOPY      Complete. Resolved: 13   • DESCENDING AORTIC ANEURYSM REPAIR W/ STENT  2019   • IR AORTAGRAM WITH RUN-OFF  8/15/2019   • LYMPH NODE BIOPSY Right 2022    Procedure: LYMPHATIC MAPPING WITH BLUE AND RADIOACTIVE DYES, SENTINEL LYMPH NODE BIOPSY, INJECTION IN OR BY DR MERLOS AT 1300;  Surgeon: Enrike Merlos MD;  Location: [facility];  Service: Surgical Oncology   • SHOULDER SURGERY      For frozen shoulder    • TONSILLECTOMY     • US BREAST CLIP NEEDLE LOC RIGHT Right 3/25/2022   • US GUIDED BREAST BIOPSY RIGHT COMPLETE Right 3/25/2022     Family History   Problem Relation Age of Onset   • No  Known Problems Mother    • No Known Problems Father    • Arthritis Sister    • Pancreatic cancer Sister 63   • Melanoma Brother         twice   • Prostate cancer Brother    • Heart attack Family         Acute Myocardial Infarction   • Cirrhosis Family    • Prostate cancer Family    • Skin cancer Family    • Stroke Family         Syndrome   • No Known Problems Daughter    • No Known Problems Maternal Grandmother    • No Known Problems Paternal Grandmother    • No Known Problems Sister    • No Known Problems Maternal Aunt    • Breast cancer Other 30     Social History     Socioeconomic History   • Marital status:      Spouse name: Not on file   • Number of children: 2   • Years of education: Not on file   • Highest education level: Not on file   Occupational History   • Occupation: Retired/   Tobacco Use   • Smoking status: Former     Current packs/day: 0.00     Average packs/day: 2.0 packs/day for 56.3 years (112.5 ttl pk-yrs)     Types: Cigarettes     Start date: 1962     Quit date: 2018     Years since quittin.4     Passive exposure: Past   • Smokeless tobacco: Never   Vaping Use   • Vaping status: Never Used   Substance and Sexual Activity   • Alcohol use: Yes     Comment: occasionally    • Drug use: No   • Sexual activity: Not Currently     Partners: Male   Other Topics Concern   • Not on file   Social History Narrative    Living w/adult children    No advance directive on file     Social Determinants of Health     Financial Resource Strain: Not on file   Food Insecurity: Not on file   Transportation Needs: No Transportation Needs (2022)    PRAPARE - Transportation    • Lack of Transportation (Medical): No    • Lack of Transportation (Non-Medical): No   Physical Activity: Inactive (2021)    Exercise Vital Sign    • Days of Exercise per Week: 0 days    • Minutes of Exercise per Session: 0 min   Stress: Stress Concern Present (2021)    [facility] [place] [facility]  Occupational Health - Occupational Stress Questionnaire    • Feeling of Stress : Very much   Social Connections: Not on file   Intimate Partner Violence: Not on file   Housing Stability: Low Risk  (6/23/2022)    Housing Stability Vital Sign    • Unable to Pay for Housing in the Last Year: No    • Number of Places Lived in the Last Year: 1    • Unstable Housing in the Last Year: No       Current Outpatient Medications:   •  ALPRAZolam (XANAX) 0.5 mg tablet, Take 1 tablet (0.5 mg total) by mouth 2 (two) times a day as needed for anxiety, Disp: 60 tablet, Rfl: 0  •  ASPIRIN 81 PO, Take by mouth, Disp: , Rfl:   •  famotidine (PEPCID) 20 mg tablet, Take 1 tablet (20 mg total) by mouth daily, Disp: , Rfl: 0  •  fluticasone (FLONASE) 50 mcg/act nasal spray, 2 sprays into each nostril daily, Disp: 11.1 mL, Rfl: 1  •  Fluticasone-Salmeterol (Wixela Inhub) 250-50 mcg/dose inhaler, Inhale 1 puff 2 (two) times a day Rinse mouth after use., Disp: 60 blister, Rfl: 3  •  gabapentin (NEURONTIN) 300 mg capsule, Take 1 capsule (300 mg total) by mouth 2 (two) times a day, Disp: 200 capsule, Rfl: 1  •  gabapentin (NEURONTIN) 400 mg capsule, Take 1 capsule (400 mg total) by mouth daily at bedtime, Disp: 100 capsule, Rfl: 1  •  mometasone (ELOCON) 0.1 % cream, Apply topically daily as needed (rash), Disp: 50 g, Rfl: 1  •  olopatadine (PATANOL) 0.1 % ophthalmic solution, , Disp: , Rfl: 0  •  ondansetron (ZOFRAN) 8 mg tablet, Take 1 tablet (8 mg total) by mouth every 8 (eight) hours as needed for nausea or vomiting, Disp: 20 tablet, Rfl: 2  •  phenazopyridine (PYRIDIUM) 200 mg tablet, Take 1 tablet (200 mg total) by mouth 3 (three) times a day with meals, Disp: 10 tablet, Rfl: 0  •  rosuvastatin (CRESTOR) 20 MG tablet, Take 1 tablet (20 mg total) by mouth daily, Disp: 90 tablet, Rfl: 3  •  saccharomyces boulardii (FLORASTOR) 250 mg capsule, Take 1 capsule (250 mg total) by mouth 2 (two) times a day, Disp: , Rfl: 0  •  sodium chloride (MIKO  128) 2 % hypertonic ophthalmic solution, Sue 128 2 % eye drops, Disp: , Rfl:   •  naloxone (NARCAN) 4 mg/0.1 mL nasal spray, Administer 1 spray into a nostril. If no response after 2-3 minutes, give another dose in the other nostril using a new spray. (Patient not taking: Reported on 1/15/2024), Disp: 1 each, Rfl: 1  Allergies   Allergen Reactions   • Tiotropium Bromide Monohydrate Shortness Of Breath   • Augmentin [Amoxicillin-Pot Clavulanate] Abdominal Pain     Pt received ceftriaxone 1 g IV on 5-21-18, gets sharp pains    • Tiotropium Abdominal Pain   • Tylenol With Codeine #3 [Acetaminophen-Codeine] Abdominal Pain   • Other Other (See Comments)       Physical Exam:     Vitals:    11/04/24 1115   BP: 130/82   Pulse: (!) 107   Temp: 97.7 °F (36.5 °C)   SpO2: 96%     Physical Exam  Constitutional:       Appearance: Normal appearance.   HENT:      Head: Normocephalic and atraumatic.      Nose: Nose normal.      Mouth/Throat:      Mouth: Mucous membranes are moist.   Eyes:      Pupils: Pupils are equal, round, and reactive to light.   Cardiovascular:      Rate and Rhythm: Normal rate.      Pulses: Normal pulses.      Heart sounds: Normal heart sounds.   Pulmonary:      Effort: Pulmonary effort is normal.      Breath sounds: Normal breath sounds.   Chest:          Comments: Bilateral breast examination no palpable mass masses nipple discharge nipple retraction or skin changes well-healed right breast and right axillary surgical scar.  Bilateral axillary and supraclavicular examination no palpable adenopathy.  Patient was examined seated as well as supine position.  Abdominal:      General: Bowel sounds are normal.      Palpations: Abdomen is soft.   Musculoskeletal:         General: Normal range of motion.      Cervical back: Normal range of motion and neck supple.   Skin:     General: Skin is warm.   Neurological:      General: No focal deficit present.      Mental Status: She is alert and oriented to person, place,  and time.   Psychiatric:         Mood and Affect: Mood normal.         Behavior: Behavior normal.         Thought Content: Thought content normal.         Judgment: Judgment normal.       Results & Discussion:   CTA NECK AND BRAIN WITH AND WITHOUT CONTRAST     INDICATION: Headache and neck pain     COMPARISON:   6/20/2022.     TECHNIQUE:  Routine CT imaging of the Brain without contrast.  Post contrast imaging was performed after administration of iodinated contrast through the neck and brain. Post contrast axial 0.625 mm images timed to opacify the arterial system.     3D rendering was performed on an independent workstation.   MIP reconstructions performed. Coronal reconstructions were performed of the non contrast portion of the brain.     Radiation dose length product (DLP) for this visit:  1284 mGy-cm .  This examination, like all CT scans performed in the [facility], was performed utilizing techniques to minimize radiation dose exposure, including the use of iterative   reconstruction and automated exposure control.     IV Contrast:  85 mL of iohexol (OMNIPAQUE)     IMAGE QUALITY:   Diagnostic     FINDINGS:     NONCONTRAST BRAIN  PARENCHYMA: Periventricular and and subcortical hypoattenuating foci consistent with microangiopathic disease.     No acute intracranial hemorrhage or mass effect.     VENTRICLES AND EXTRA-AXIAL SPACES:  No hydrocephalus or extra-axial collection.     VISUALIZED ORBITS:  Intact.        PARANASAL SINUSES:  Clear.        FINDINGS:     CERVICAL VASCULATURE  AORTIC ARCH AND GREAT VESSELS: Three-vessel configuration of the aortic arch. Occlusion of the left subclavian artery just distal to the origin and proximal to the vertebral artery takeoff. Distal vessels are patent and normal in caliber.     RIGHT VERTEBRAL ARTERY CERVICAL SEGMENT:  Normal origin. The vessel is normal in caliber throughout the neck.     LEFT VERTEBRAL ARTERY CERVICAL SEGMENT:  Normal origin. The  vessel is normal in caliber throughout the neck.     RIGHT EXTRACRANIAL CAROTID SEGMENT: Normal caliber common carotid artery. Minimal calcified plaque at the bifurcation and ICA origin without significant stenosis        LEFT EXTRACRANIAL CAROTID SEGMENT: Mild stenosis at the common carotid artery origin. Minimal plaque at the bifurcation and ICA origin without stenosis.     NASCET criteria was used to determine the degree of internal carotid artery diameter stenosis.        INTRACRANIAL VASCULATURE  INTERNAL CAROTID ARTERIES: Calcified plaque in the carotid siphons without hemodynamically significant stenosis.     ANTERIOR CIRCULATION:  Symmetric A1 segments and anterior cerebral arteries with normal enhancement.  Normal anterior communicating artery.     MIDDLE CEREBRAL ARTERY CIRCULATION:  M1 segment and middle cerebral artery branches demonstrate normal enhancement bilaterally.     DISTAL VERTEBRAL ARTERIES:  Normal distal vertebral arteries.  Posterior inferior cerebellar artery origins are normal. Normal vertebral basilar junction.     BASILAR ARTERY:  Basilar artery is normal in caliber.  Normal superior cerebellar arteries.     POSTERIOR CEREBRAL ARTERIES: No stenosis in the posterior cerebral arteries. Prominent bilateral posterior communicating arteries.     VENOUS STRUCTURES:  Normal.        NON VASCULAR ANATOMY  BONY STRUCTURES:  No acute osseous abnormality.     SOFT TISSUES OF THE NECK: No mass or lymphadenopathy.     THORACIC INLET: Severe panlobular emphysema in the visualized upper lung zones.        IMPRESSION:        1. No evidence of acute infarct, intracranial hemorrhage or mass.  2. 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     3. No large vessel occlusion, thrombus or significant stenosis involving the major vessels of the [place].     I did review CTA of the  neck and the implications of subclavian steal syndrome.  She will follow-up with her vascular surgeon.  I did discussed in detail nature of breast cancer and follow-up self breast examination and annual diagnostic mammogram were reviewed and discussed.  she understands and  agrees . All patient questions were answered.       Advance Care Planning/Advance Directives:  I Enrike Merlos MD discussed the disease status with Connie Royal  today 11/04/24  treatment plans and follow-up with the patient.

## 2024-11-04 NOTE — TELEPHONE ENCOUNTER
All cares, assessments and medications performed by [facility] were validated and verified by writer.    Patient will need Star for appt

## 2024-11-05 ENCOUNTER — TELEPHONE (OUTPATIENT)
Dept: MAMMOGRAPHY | Facility: CLINIC | Age: 79
End: 2024-11-05

## 2024-11-05 ENCOUNTER — OFFICE VISIT (OUTPATIENT)
Dept: VASCULAR SURGERY | Facility: CLINIC | Age: 79
End: 2024-11-05
Payer: MEDICARE

## 2024-11-05 VITALS
HEIGHT: 60 IN | BODY MASS INDEX: 33.2 KG/M2 | SYSTOLIC BLOOD PRESSURE: 114 MMHG | DIASTOLIC BLOOD PRESSURE: 70 MMHG | HEART RATE: 92 BPM

## 2024-11-05 DIAGNOSIS — I74.09 AORTOILIAC OCCLUSIVE DISEASE (HCC): ICD-10-CM

## 2024-11-05 DIAGNOSIS — I70.0 STENOSIS OF INFRARENAL ABDOMINAL AORTA DUE TO ARTERIOSCLEROSIS (HCC): ICD-10-CM

## 2024-11-05 DIAGNOSIS — I65.23 OCCLUSION AND STENOSIS OF BILATERAL CAROTID ARTERIES: Primary | ICD-10-CM

## 2024-11-05 DIAGNOSIS — I77.1 SUBCLAVIAN ARTERY STENOSIS, LEFT (HCC): ICD-10-CM

## 2024-11-05 DIAGNOSIS — R51.9 HEADACHE: ICD-10-CM

## 2024-11-05 PROCEDURE — 99214 OFFICE O/P EST MOD 30 MIN: CPT | Performed by: SURGERY

## 2024-11-05 NOTE — PROGRESS NOTES
Ambulatory Visit  Name: Connie Royal      : 1945      MRN: [id number]  Encounter Provider: Johny Banerjee MD  Encounter Date: 2024   Encounter department: [facility] [place]    Assessment & Plan  Headache  Unrelated to left subclavian occlusion.  Orders:    Ambulatory Referral to Vascular Surgery    Occlusion and stenosis of bilateral carotid arteries  Mild atherosclerosis of the bilateral carotid arteries.  Continue to manage with aspirin and rosuvastatin.  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  Recent Doppler and CAT scan with IV contrast was reviewed in detail, stent remains widely patent and ISABELLA is well-preserved.  Stent is open with good flow.  Will keep an eye yearly for any stenosis.  Continue daily 81mg aspirin and rosuvastatin 20mg.        Orders:    VAS abdominal aorta/iliac duplex; Future    VAS carotid complete study; Future    Subclavian artery stenosis, left (HCC)  Chronic, total occlusion of the proximal left subclavian artery unchanged since .  She has managed well due to collateral flow from Left vertebral artery  Signs and symptoms of subclavian steal syndrome.  There is some tiredness in the left arm which she manages.  Very high risk patient for intervention due to chronic COPD on long-term oxygen.  However if condition worsens we can consider left subclavian stent via brachial approach.    At present she is managing well overall, no surgical intervention recommended.  Continue aspirin and crestor, arm exercise and walking daily.        Orders:    VAS abdominal aorta/iliac duplex; Future    VAS carotid complete study; Future    Aortoiliac occlusive disease (HCC)  See above, treated with aortic stent in 2019 with resolution of claudication  Orders:    VAS abdominal  aorta/iliac duplex; Future    VAS carotid complete study; Future      History of Present Illness     Connie Royal is a 79 y.o. female who presents for follow-up of several vascular conditions.  Concern for left arm arterial blockage.  She is able to do her day-to-day activity.  She is right-handed.  She does get tiredness in her left arm after some activities.  She also has significant issues with frozen shoulder that was treated with physical therapy for several months and has now regained full mobility.    History obtained from : patient and patient's son  Review of Systems   Constitutional: Negative.    HENT: Negative.     Eyes: Negative.    Respiratory:  Positive for cough and shortness of breath.    Cardiovascular: Negative.    Gastrointestinal: Negative.    Endocrine: Negative.    Genitourinary: Negative.    Musculoskeletal: Negative.    Skin: Negative.    Allergic/Immunologic: Negative.    Neurological: Negative.    Hematological: Negative.    Psychiatric/Behavioral: Negative.     I have reviewed the review of systems as entered and made appropriate changes as necessary  Past Medical History   Past Medical History:   Diagnosis Date    Acute meniscal tear, medial 03/10/2022    Anxiety     Atherosclerosis of native artery of both lower extremities with intermittent claudication ([facility]) 10/15/2015    Transitioned From: Cardiovascular Symptoms    Breast cancer ([facility]) 03/25/2022    right breast    Carotid artery plaque, bilateral 03/21/2017    Transitioned From: Atherosclerosis of both carotid arteries    Chronic kidney disease     Chronic sinusitis     Last assessed: 11/11/13    COPD (chronic obstructive pulmonary disease) ([facility])     COVID     12/25/21 not hopsitalized- flu-like symptoms    Diverticulitis of intestine, part unspecified, without perforation or abscess without bleeding 06/28/2022    Enterocolitis due to Clostridium difficile, not specified as recurrent 09/19/2022    Fall 06/16/2021    Fracture, ankle  closed, bimalleolar, right, initial encounter 2021    Generalized muscle weakness 2018    GERD (gastroesophageal reflux disease)     History of radiation therapy     right breast    Hyperlipidemia     Knee pain 03/10/2022    Lung nodule     Noninfective gastroenteritis and colitis, unspecified 2022    Other symbolic dysfunctions 2018    PNA (pneumonia)     PONV (postoperative nausea and vomiting)     with C-sections    Pulmonary emphysema (HCC)     PVD (peripheral vascular disease) (HCC)     Restless leg syndrome     Shortness of breath 2018    Stage 3 chronic kidney disease (HCC) 2019    Tobacco abuse     Unspecified abnormalities of gait and mobility 2018     Past Surgical History:   Procedure Laterality Date    BREAST LUMPECTOMY Right 2022    Procedure: ISAI  DIRECTED LUMPECTOMY;  Surgeon: Enrike Merlos MD;  Location: [facility];  Service: Surgical Oncology    CATARACT EXTRACTION       SECTION      COLONOSCOPY      Complete. Resolved: 13    DESCENDING AORTIC ANEURYSM REPAIR W/ STENT  2019    IR AORTAGRAM WITH RUN-OFF  8/15/2019    LYMPH NODE BIOPSY Right 2022    Procedure: LYMPHATIC MAPPING WITH BLUE AND RADIOACTIVE DYES, SENTINEL LYMPH NODE BIOPSY, INJECTION IN OR BY DR MERLOS AT 1300;  Surgeon: Enrike Merlos MD;  Location: [facility];  Service: Surgical Oncology    SHOULDER SURGERY      For frozen shoulder     TONSILLECTOMY      US BREAST CLIP NEEDLE LOC RIGHT Right 3/25/2022    US GUIDED BREAST BIOPSY RIGHT COMPLETE Right 3/25/2022     Family History   Problem Relation Age of Onset    No Known Problems Mother     No Known Problems Father     Arthritis Sister     Pancreatic cancer Sister 63    Melanoma Brother         twice    Prostate cancer Brother     Heart attack Family         Acute Myocardial Infarction    Cirrhosis Family     Prostate cancer Family     Skin cancer Family     Stroke Family         Syndrome     No Known Problems Daughter     No Known Problems Maternal Grandmother     No Known Problems Paternal Grandmother     No Known Problems Sister     No Known Problems Maternal Aunt     Breast cancer Other 30     Current Outpatient Medications on File Prior to Visit   Medication Sig Dispense Refill    ALPRAZolam (XANAX) 0.5 mg tablet Take 1 tablet (0.5 mg total) by mouth 2 (two) times a day as needed for anxiety 60 tablet 0    ASPIRIN 81 PO Take by mouth      famotidine (PEPCID) 20 mg tablet Take 1 tablet (20 mg total) by mouth daily  0    fluticasone (FLONASE) 50 mcg/act nasal spray 2 sprays into each nostril daily 11.1 mL 1    Fluticasone-Salmeterol (Wixela Inhub) 250-50 mcg/dose inhaler Inhale 1 puff 2 (two) times a day Rinse mouth after use. 60 blister 3    gabapentin (NEURONTIN) 300 mg capsule Take 1 capsule (300 mg total) by mouth 2 (two) times a day 200 capsule 1    gabapentin (NEURONTIN) 400 mg capsule Take 1 capsule (400 mg total) by mouth daily at bedtime 100 capsule 1    mometasone (ELOCON) 0.1 % cream Apply topically daily as needed (rash) 50 g 1    olopatadine (PATANOL) 0.1 % ophthalmic solution   0    rosuvastatin (CRESTOR) 20 MG tablet Take 1 tablet (20 mg total) by mouth daily 90 tablet 3    saccharomyces boulardii (FLORASTOR) 250 mg capsule Take 1 capsule (250 mg total) by mouth 2 (two) times a day  0    sodium chloride () 2 % hypertonic ophthalmic solution Sue 128 2 % eye drops      naloxone (NARCAN) 4 mg/0.1 mL nasal spray Administer 1 spray into a nostril. If no response after 2-3 minutes, give another dose in the other nostril using a new spray. (Patient not taking: Reported on 1/15/2024) 1 each 1    ondansetron (ZOFRAN) 8 mg tablet Take 1 tablet (8 mg total) by mouth every 8 (eight) hours as needed for nausea or vomiting 20 tablet 2    phenazopyridine (PYRIDIUM) 200 mg tablet Take 1 tablet (200 mg total) by mouth 3 (three) times a day with meals 10 tablet 0     No current  facility-administered medications on file prior to visit.     Allergies   Allergen Reactions    Tiotropium Bromide Monohydrate Shortness Of Breath    Augmentin [Amoxicillin-Pot Clavulanate] Abdominal Pain     Pt received ceftriaxone 1 g IV on 5-21-18, gets sharp pains     Tiotropium Abdominal Pain    Tylenol With Codeine #3 [Acetaminophen-Codeine] Abdominal Pain    Other Other (See Comments)          Objective     There were no vitals taken for this visit.    Physical Exam  Vitals and nursing note reviewed.   Constitutional:       Appearance: Normal appearance.   HENT:      Head: Normocephalic and atraumatic.   Cardiovascular:      Rate and Rhythm: Normal rate and regular rhythm.      Pulses:           Radial pulses are 2+ on the right side and 1+ on the left side.   Pulmonary:      Breath sounds: Normal breath sounds.      Comments: On oxygen  Abdominal:      Palpations: Abdomen is soft.      Tenderness: There is no abdominal tenderness.   Musculoskeletal:      Cervical back: Neck supple. No rigidity.      Right lower leg: Edema present.      Left lower leg: Edema present.   Skin:     General: Skin is warm and dry.      Capillary Refill: Capillary refill takes less than 2 seconds.   Neurological:      General: No focal deficit present.      Mental Status: She is alert and oriented to person, place, and time.   Psychiatric:         Mood and Affect: Mood normal.         Behavior: Behavior normal.

## 2024-11-05 NOTE — ASSESSMENT & PLAN NOTE
Mild atherosclerosis of the bilateral carotid arteries.  Continue to manage with aspirin and rosuvastatin.  Orders:    VAS abdominal aorta/iliac duplex; Future    VAS carotid complete study; Future

## 2024-11-05 NOTE — ASSESSMENT & PLAN NOTE
Treated with infrarenal aortic bare-metal stent in 2019.  Recent Doppler and CAT scan with IV contrast was reviewed in detail, stent remains widely patent and ISABELLA is well-preserved.  Stent is open with good flow.  Will keep an eye yearly for any stenosis.  Continue daily 81mg aspirin and rosuvastatin 20mg.        Orders:    VAS abdominal aorta/iliac duplex; Future    VAS carotid complete study; Future

## 2024-11-05 NOTE — ASSESSMENT & PLAN NOTE
See above, treated with aortic stent in 2019 with resolution of claudication  Orders:    VAS abdominal aorta/iliac duplex; Future    VAS carotid complete study; Future

## 2024-11-05 NOTE — ASSESSMENT & PLAN NOTE
Chronic, total occlusion of the proximal left subclavian artery unchanged since 2021.  She has managed well due to collateral flow from Left vertebral artery  Signs and symptoms of subclavian steal syndrome.  There is some tiredness in the left arm which she manages.  Very high risk patient for intervention due to chronic COPD on long-term oxygen.  However if condition worsens we can consider left subclavian stent via brachial approach.    At present she is managing well overall, no surgical intervention recommended.  Continue aspirin and crestor, arm exercise and walking daily.        Orders:    VAS abdominal aorta/iliac duplex; Future    VAS carotid complete study; Future

## 2024-11-05 NOTE — PATIENT INSTRUCTIONS
Chronic, total occlusion of the proximal left subclavian artery unchanged since 2021.  She has managed well due to collateral flow from Left vertebral artery    She is managing well overall, no surgical intervention recommended.  Continue aspirin and crestor, arm exercise and walking daily.    The aorta stent is open with good flow.  Will keep an eye yearly for any stenosis.  Continue daily 81mg aspirin and rosuvastatin 20mg.

## 2024-11-14 ENCOUNTER — TELEPHONE (OUTPATIENT)
Age: 79
End: 2024-11-14

## 2024-11-14 NOTE — TELEPHONE ENCOUNTER
Pt called in stating she is starting with symptoms of a UTI. She states she has pain with urination but does not have a fever or the frequency. Advised patient that their is a active order for her to go get a UA done at a [facility] lab. She states she is unable to drive or get a ride to do that or come in for a office visit. She states she had a bad reaction to keflex and Pyridium last time she was prescribed. Please  review and advise patient recommendations.

## 2024-11-15 NOTE — TELEPHONE ENCOUNTER
Called and left a detailed message for patient informing her she needs to have a urine test at the lab or seen at the urgent care in order for treatment to get done.

## 2024-11-19 ENCOUNTER — RESULTS FOLLOW-UP (OUTPATIENT)
Age: 79
End: 2024-11-19

## 2024-11-19 ENCOUNTER — APPOINTMENT (OUTPATIENT)
Dept: LAB | Facility: HOSPITAL | Age: 79
End: 2024-11-19
Attending: INTERNAL MEDICINE
Payer: MEDICARE

## 2024-11-19 DIAGNOSIS — N30.00 ACUTE CYSTITIS WITHOUT HEMATURIA: ICD-10-CM

## 2024-11-19 DIAGNOSIS — E11.9 TYPE 2 DIABETES MELLITUS WITHOUT COMPLICATION, WITHOUT LONG-TERM CURRENT USE OF INSULIN (HCC): ICD-10-CM

## 2024-11-19 LAB
ALBUMIN SERPL BCG-MCNC: 4.3 G/DL (ref 3.5–5)
ALP SERPL-CCNC: 69 U/L (ref 34–104)
ALT SERPL W P-5'-P-CCNC: 33 U/L (ref 7–52)
ANION GAP SERPL CALCULATED.3IONS-SCNC: 8 MMOL/L (ref 4–13)
AST SERPL W P-5'-P-CCNC: 21 U/L (ref 13–39)
BACTERIA UR QL AUTO: ABNORMAL /HPF
BASOPHILS # BLD AUTO: 0.05 THOUSANDS/ÂΜL (ref 0–0.1)
BASOPHILS NFR BLD AUTO: 1 % (ref 0–1)
BILIRUB SERPL-MCNC: 0.38 MG/DL (ref 0.2–1)
BILIRUB UR QL STRIP: NEGATIVE
BUN SERPL-MCNC: 20 MG/DL (ref 5–25)
CALCIUM SERPL-MCNC: 9.1 MG/DL (ref 8.4–10.2)
CHLORIDE SERPL-SCNC: 105 MMOL/L (ref 96–108)
CHOLEST SERPL-MCNC: 128 MG/DL (ref ?–200)
CLARITY UR: ABNORMAL
CO2 SERPL-SCNC: 28 MMOL/L (ref 21–32)
COLOR UR: YELLOW
CREAT SERPL-MCNC: 1.09 MG/DL (ref 0.6–1.3)
EOSINOPHIL # BLD AUTO: 0.25 THOUSAND/ÂΜL (ref 0–0.61)
EOSINOPHIL NFR BLD AUTO: 4 % (ref 0–6)
ERYTHROCYTE [DISTWIDTH] IN BLOOD BY AUTOMATED COUNT: 13.8 % (ref 11.6–15.1)
EST. AVERAGE GLUCOSE BLD GHB EST-MCNC: 163 MG/DL
GFR SERPL CREATININE-BSD FRML MDRD: 49 ML/MIN/1.73SQ M
GLUCOSE P FAST SERPL-MCNC: 157 MG/DL (ref 65–99)
GLUCOSE UR STRIP-MCNC: NEGATIVE MG/DL
HBA1C MFR BLD: 7.3 %
HCT VFR BLD AUTO: 44.2 % (ref 34.8–46.1)
HDLC SERPL-MCNC: 38 MG/DL
HGB BLD-MCNC: 13.9 G/DL (ref 11.5–15.4)
HGB UR QL STRIP.AUTO: ABNORMAL
HYALINE CASTS #/AREA URNS LPF: ABNORMAL /LPF
IMM GRANULOCYTES # BLD AUTO: 0 THOUSAND/UL (ref 0–0.2)
IMM GRANULOCYTES NFR BLD AUTO: 0 % (ref 0–2)
KETONES UR STRIP-MCNC: NEGATIVE MG/DL
LDLC SERPL CALC-MCNC: 60 MG/DL (ref 0–100)
LEUKOCYTE ESTERASE UR QL STRIP: ABNORMAL
LYMPHOCYTES # BLD AUTO: 1.95 THOUSANDS/ÂΜL (ref 0.6–4.47)
LYMPHOCYTES NFR BLD AUTO: 30 % (ref 14–44)
MCH RBC QN AUTO: 29 PG (ref 26.8–34.3)
MCHC RBC AUTO-ENTMCNC: 31.4 G/DL (ref 31.4–37.4)
MCV RBC AUTO: 92 FL (ref 82–98)
MONOCYTES # BLD AUTO: 0.48 THOUSAND/ÂΜL (ref 0.17–1.22)
MONOCYTES NFR BLD AUTO: 7 % (ref 4–12)
MUCOUS THREADS UR QL AUTO: ABNORMAL
NEUTROPHILS # BLD AUTO: 3.86 THOUSANDS/ÂΜL (ref 1.85–7.62)
NEUTS SEG NFR BLD AUTO: 58 % (ref 43–75)
NITRITE UR QL STRIP: NEGATIVE
NON-SQ EPI CELLS URNS QL MICRO: ABNORMAL /HPF
NONHDLC SERPL-MCNC: 90 MG/DL
NRBC BLD AUTO-RTO: 0 /100 WBCS
PH UR STRIP.AUTO: 5 [PH]
PLATELET # BLD AUTO: 310 THOUSANDS/UL (ref 149–390)
PMV BLD AUTO: 9.9 FL (ref 8.9–12.7)
POTASSIUM SERPL-SCNC: 4.2 MMOL/L (ref 3.5–5.3)
PROT SERPL-MCNC: 6.6 G/DL (ref 6.4–8.4)
PROT UR STRIP-MCNC: ABNORMAL MG/DL
RBC # BLD AUTO: 4.8 MILLION/UL (ref 3.81–5.12)
RBC #/AREA URNS AUTO: ABNORMAL /HPF
SODIUM SERPL-SCNC: 141 MMOL/L (ref 135–147)
SP GR UR STRIP.AUTO: 1.02 (ref 1–1.03)
TRIGL SERPL-MCNC: 148 MG/DL (ref ?–150)
UROBILINOGEN UR STRIP-ACNC: <2 MG/DL
WBC # BLD AUTO: 6.59 THOUSAND/UL (ref 4.31–10.16)
WBC #/AREA URNS AUTO: ABNORMAL /HPF
WBC CLUMPS # UR AUTO: PRESENT /UL

## 2024-11-19 PROCEDURE — 80061 LIPID PANEL: CPT

## 2024-11-19 PROCEDURE — 83036 HEMOGLOBIN GLYCOSYLATED A1C: CPT

## 2024-11-19 PROCEDURE — 87077 CULTURE AEROBIC IDENTIFY: CPT

## 2024-11-19 PROCEDURE — 36415 COLL VENOUS BLD VENIPUNCTURE: CPT

## 2024-11-19 PROCEDURE — 80053 COMPREHEN METABOLIC PANEL: CPT

## 2024-11-19 PROCEDURE — 81001 URINALYSIS AUTO W/SCOPE: CPT

## 2024-11-19 PROCEDURE — 85025 COMPLETE CBC W/AUTO DIFF WBC: CPT

## 2024-11-19 PROCEDURE — 87186 SC STD MICRODIL/AGAR DIL: CPT

## 2024-11-19 PROCEDURE — 87086 URINE CULTURE/COLONY COUNT: CPT

## 2024-11-21 LAB
BACTERIA UR CULT: ABNORMAL
BACTERIA UR CULT: ABNORMAL

## 2024-11-29 NOTE — TELEPHONE ENCOUNTER
PT  HAS  BAD  COPD   TOLD  NOT  TO  GO  OUT  IN  THIS  WEATHER   BUT  NOW  SHE  NEEDS  ORDER  FOR   MORE  PHY  THERPY   AND  VISITING   NURSE    THE  REHAB  SHE  WAS  IN GAVE   HER   AN  ORDER   BUT  NOW  THEY  NEED  ONE  [facility]

## 2024-12-09 ENCOUNTER — TELEPHONE (OUTPATIENT)
Age: 79
End: 2024-12-09

## 2024-12-09 NOTE — TELEPHONE ENCOUNTER
Patient returned call. Patient reports she is still having UTI symptoms. She is scheduled for Dec. 30th for her AWV      Patients pharmacy is [facility] #[id number] - [place] - [address]   Phone: [phone]   Fax: [phone]

## 2024-12-23 ENCOUNTER — RA CDI HCC (OUTPATIENT)
Dept: OTHER | Facility: HOSPITAL | Age: 79
End: 2024-12-23

## 2024-12-23 NOTE — PROGRESS NOTES
HCC coding opportunities          Chart Reviewed number of suggestions sent to Provider: 2     Patients Insurance   J44.9 and E11.22  Medicare Insurance: Medicare

## 2024-12-26 NOTE — PLAN OF CARE
Problem: MOBILITY - ADULT  Goal: Maintain or return to baseline ADL function  Description: INTERVENTIONS:  -  Assess patient's ability to carry out ADLs; assess patient's baseline for ADL function and identify physical deficits which impact ability to perform ADLs (bathing, care of mouth/teeth, toileting, grooming, dressing, etc )  - Assess/evaluate cause of self-care deficits   - Assess range of motion  - Assess patient's mobility; develop plan if impaired  - Assess patient's need for assistive devices and provide as appropriate  - Encourage maximum independence but intervene and supervise when necessary  - Involve family in performance of ADLs  - Assess for home care needs following discharge   - Consider OT consult to assist with ADL evaluation and planning for discharge  - Provide patient education as appropriate  Outcome: Progressing  Goal: Maintains/Returns to pre admission functional level  Description: INTERVENTIONS:  - Perform BMAT or MOVE assessment daily    - Set and communicate daily mobility goal to care team and patient/family/caregiver  - Collaborate with rehabilitation services on mobility goals if consulted  - Perform Range of Motion  times a day  - Reposition patient every  hours    - Dangle patient  times a day  - Stand patient  times a day  - Ambulate patient  times a day  - Out of bed to chair times a day   - Out of bed for meals  times a day  - Out of bed for toileting  - Record patient progress and toleration of activity level   Outcome: Progressing     Problem: Potential for Falls  Goal: Patient will remain free of falls  Description: INTERVENTIONS:  - Educate patient/family on patient safety including physical limitations  - Instruct patient to call for assistance with activity   - Consult OT/PT to assist with strengthening/mobility   - Keep Call bell within reach  - Keep bed low and locked with side rails adjusted as appropriate  - Keep care items and personal belongings within reach  - Initiate and maintain comfort rounds  - Make Fall Risk Sign visible to staff  - Offer Toileting every  Hours, in advance of need  - Initiate/Maintain alarm  - Obtain necessary fall risk management equipment:   - Apply yellow socks and bracelet for high fall risk patients  - Consider moving patient to room near nurses station  Outcome: Progressing Schizophrenia     Schizophrenia

## 2024-12-27 PROBLEM — E11.9 TYPE 2 DIABETES MELLITUS WITHOUT COMPLICATION, WITHOUT LONG-TERM CURRENT USE OF INSULIN (HCC): Status: ACTIVE | Noted: 2024-12-27

## 2024-12-30 ENCOUNTER — TELEPHONE (OUTPATIENT)
Age: 79
End: 2024-12-30

## 2024-12-30 DIAGNOSIS — B33.8 RSV (RESPIRATORY SYNCYTIAL VIRUS INFECTION): Primary | ICD-10-CM

## 2024-12-30 RX ORDER — ALBUTEROL SULFATE 0.83 MG/ML
2.5 SOLUTION RESPIRATORY (INHALATION) EVERY 6 HOURS PRN
Qty: 180 ML | Refills: 5 | Status: SHIPPED | OUTPATIENT
Start: 2024-12-30

## 2025-01-10 ENCOUNTER — TELEPHONE (OUTPATIENT)
Age: 80
End: 2025-01-10

## 2025-01-10 NOTE — TELEPHONE ENCOUNTER
RAD ONC - Dr. Ruby please review / advise re: message below from patient.  F/U 1/13/24, RT. Breast...KD

## 2025-01-10 NOTE — TELEPHONE ENCOUNTER
Pt calling in requesting a phone visit for her appointment on Monday, patient has bronchitis and does not want to come into the office. Pt unable to do a video visit, this is a phone visit, please advise, thank you. Pt is okay with rescheduling if she needs to.

## 2025-01-17 ENCOUNTER — TELEPHONE (OUTPATIENT)
Age: 80
End: 2025-01-17

## 2025-01-29 ENCOUNTER — TELEPHONE (OUTPATIENT)
Age: 80
End: 2025-01-29

## 2025-02-12 NOTE — ED PROVIDER NOTES
History  Chief Complaint   Patient presents with    Abdominal Pain     Lower abdominal pain for last 2 days  History of diverticulitis  Fever up to 103 on and off for last 2 days     73-year-old female presents with lower abdominal pain for the past 2 days  One month ago she had diverticulitis and states that this feels similar  Denies urinary symptoms  Denies change in bowel habits  Nausea and vomiting as well     She states that she has had fever intermittently as high as 103 at home  Prior to Admission Medications   Prescriptions Last Dose Informant Patient Reported? Taking? ALPRAZolam (XANAX) 0 5 mg tablet   No No   Sig: Take 1 tablet (0 5 mg total) by mouth 2 (two) times a day as needed for anxiety   ASPIRIN 81 PO   Yes No   Sig: Take by mouth   Breo Ellipta 100-25 MCG/INH inhaler   No No   Sig: inhale 1 puff daily Rinse mouth after use   Lidocaine Viscous HCl (XYLOCAINE) 2 % mucosal solution   Yes No   albuterol (2 5 mg/3 mL) 0 083 % nebulizer solution  Self No No   Sig: Take 1 vial (2 5 mg total) by nebulization every 6 (six) hours as needed for wheezing or shortness of breath   albuterol (PROVENTIL HFA,VENTOLIN HFA) 90 mcg/act inhaler  Self No No   Sig: Inhale 2 puffs every 6 (six) hours as needed for wheezing   dexamethasone (DECADRON) 4 mg tablet   No No   Sig: Take 2 tablets by mouth (8mg) with meals in the morning  Take 2 tablets (8mg) with meals in the evening  Do this for three days, the day before treatment, the day of treatment and the day after treatment  dexamethasone (DECADRON) 4 mg tablet   No No   Sig: Take 1 tablet (4 mg total) by mouth 2 (two) times a day with meals Take 2 tablets by mouth (8mg) with meals in the morning  Take 2 tablets (8mg) with meals in the evening   Do this for three days, the day before treatment, the day of treatment and the day after treatment    ergocalciferol (VITAMIN D2) 50,000 units   No No   Sig: take 1 capsule by mouth every week   fluticasone (FLONASE) 50 mcg/act nasal spray  Self No No   Si sprays into each nostril daily   gabapentin (NEURONTIN) 300 mg capsule   No No   Sig: Take 1 capsule (300 mg total) by mouth 2 (two) times a day   gabapentin (NEURONTIN) 400 mg capsule   No No   Sig: take 1 capsule by mouth at bedtime   naloxone (NARCAN) 4 mg/0 1 mL nasal spray  Self No No   Sig: Administer 1 spray into a nostril  If no response after 2-3 minutes, give another dose in the other nostril using a new spray  olopatadine (PATANOL) 0 1 % ophthalmic solution  Self Yes No   Sig:     ondansetron (ZOFRAN) 8 mg tablet   No No   Sig: Take 1 tablet (8 mg total) by mouth every 8 (eight) hours as needed for nausea or vomiting   oxyCODONE-acetaminophen (Percocet) 5-325 mg per tablet  Self No No   Sig: Take 1 tablet by mouth every 6 (six) hours as needed for moderate pain Max Daily Amount: 4 tablets   rosuvastatin (CRESTOR) 20 MG tablet  Self No No   Sig: Take 1 tablet (20 mg total) by mouth daily   saccharomyces boulardii (FLORASTOR) 250 mg capsule   No No   Sig: Take 1 capsule (250 mg total) by mouth 2 (two) times a day   sodium chloride () 2 % hypertonic ophthalmic solution  Self Yes No   Sig: Sue 128 2 % eye drops      Facility-Administered Medications: None       Past Medical History:   Diagnosis Date    Anxiety     Atherosclerosis of native artery of both lower extremities with intermittent claudication ([facility] ) 10/15/2015    Transitioned From: Cardiovascular Symptoms    Breast cancer ([facility] )     Carotid artery plaque, bilateral 2017    Transitioned From:  Atherosclerosis of both carotid arteries    Chronic kidney disease     Chronic sinusitis     Last assessed: 13    COPD [facility]ronic obstructive pulmonary disease) [facility]     COVID     21 not hopsitalized- flu-like symptoms    Fall 2021    Fracture, ankle closed, bimalleolar, right, initial encounter 2021    GERD (gastroesophageal reflux disease)     Hyperlipidemia  Lung nodule     PNA (pneumonia)     PONV (postoperative nausea and vomiting)     with C-sections    Pulmonary emphysema (HCC)     PVD (peripheral vascular disease) (HCC)     Restless leg syndrome     Stage 3 chronic kidney disease ([facility] ) 2019    Tobacco abuse        Past Surgical History:   Procedure Laterality Date    BREAST LUMPECTOMY Right 2022    Procedure: ISAI  DIRECTED LUMPECTOMY;  Surgeon: ERIKA Pereira MD;  Location: [facility];  Service: Surgical Oncology    CATARACT EXTRACTION       SECTION      COLONOSCOPY      Complete  Resolved: 13    DESCENDING AORTIC ANEURYSM REPAIR W/ STENT  2019    IR AORTAGRAM WITH RUN-OFF  8/15/2019    LYMPH NODE BIOPSY Right 2022    Procedure: LYMPHATIC MAPPING WITH BLUE AND RADIOACTIVE DYES, SENTINEL LYMPH NODE BIOPSY, INJECTION IN OR BY DR RODRÍGUEZ AT 1300;  Surgeon: ERIKA Pereira MD;  Location: [facility];  Service: Surgical Oncology    SHOULDER SURGERY      For frozen shoulder     TONSILLECTOMY      US BREAST ISAI  NEEDLE LOC RIGHT Right 3/25/2022    US GUIDED BREAST BIOPSY RIGHT COMPLETE Right 3/25/2022       Family History   Problem Relation Age of Onset    No Known Problems Mother     No Known Problems Father     Arthritis Sister     Pancreatic cancer Sister 61    Melanoma Brother         twice    Prostate cancer Brother     Heart attack Family         Acute Myocardial Infarction    Cirrhosis Family     Prostate cancer Family     Skin cancer Family     Stroke Family         Syndrome    No Known Problems Daughter     No Known Problems Maternal Grandmother     No Known Problems Paternal Grandmother     No Known Problems Sister     No Known Problems Maternal Aunt     Breast cancer Other 30     I have reviewed and agree with the history as documented      E-Cigarette/Vaping    E-Cigarette Use Never User      E-Cigarette/Vaping Substances    Nicotine No     THC No     CBD No     Flavoring No     Other No     Unknown No      Social History     Tobacco Use    Smoking status: Former Smoker     Packs/day: 2 00     Years: 56 00     Pack years: 112 00     Types: Cigarettes     Start date: 1962     Quit date: 2018     Years since quittin 2    Smokeless tobacco: Never Used   Vaping Use    Vaping Use: Never used   Substance Use Topics    Alcohol use: Yes     Comment: occasionally     Drug use: No       Review of Systems   Constitutional: Positive for fever  Negative for chills and fatigue  HENT: Negative for congestion and sore throat  Respiratory: Negative for apnea, cough, chest tightness and shortness of breath  Cardiovascular: Negative for chest pain and palpitations  Gastrointestinal: Positive for abdominal pain, nausea and vomiting  Negative for blood in stool, constipation and diarrhea  Genitourinary: Negative for dysuria and flank pain  Musculoskeletal: Negative for back pain and neck pain  Skin: Negative for color change and rash  Allergic/Immunologic: Negative for immunocompromised state  Neurological: Negative for dizziness, syncope and headaches  Physical Exam  Physical Exam  Vitals reviewed  Constitutional:       General: She is not in acute distress  Appearance: She is well-developed  She is not ill-appearing, toxic-appearing or diaphoretic  HENT:      Head: Normocephalic and atraumatic  Eyes:      General: No scleral icterus  Right eye: No discharge  Left eye: No discharge  Conjunctiva/sclera: Conjunctivae normal       Pupils: Pupils are equal, round, and reactive to light  Neck:      Vascular: No JVD  Cardiovascular:      Rate and Rhythm: Normal rate and regular rhythm  Heart sounds: Normal heart sounds  No murmur heard  No friction rub  No gallop  Pulmonary:      Effort: Pulmonary effort is normal  No respiratory distress  Breath sounds: Normal breath sounds   No wheezing, rhonchi or rales  Chest:      Chest wall: No tenderness  Abdominal:      General: Bowel sounds are normal  There is no distension  Palpations: Abdomen is soft  Tenderness: There is abdominal tenderness in the right lower quadrant, suprapubic area and left lower quadrant  There is no right CVA tenderness, left CVA tenderness, guarding or rebound  Negative signs include Christina's sign and McBurney's sign  Hernia: No hernia is present  Musculoskeletal:         General: No tenderness or deformity  Normal range of motion  Cervical back: Normal range of motion and neck supple  Skin:     General: Skin is warm and dry  Coloration: Skin is not pale  Findings: No erythema or rash  Neurological:      Mental Status: She is alert and oriented to person, place, and time  Cranial Nerves: No cranial nerve deficit     Psychiatric:         Behavior: Behavior normal          Vital Signs  ED Triage Vitals [09/06/22 1904]   Temperature Pulse Respirations Blood Pressure SpO2   98 7 °F (37 1 °C) 87 20 120/59 99 %      Temp Source Heart Rate Source Patient Position - Orthostatic VS BP Location FiO2 (%)   Oral -- Sitting Right arm --      Pain Score       6           Vitals:    09/06/22 1904 09/06/22 2045   BP: 120/59 110/58   Pulse: 87 90   Patient Position - Orthostatic VS: Sitting Lying         Visual Acuity      ED Medications  Medications   sodium chloride 0 9 % bolus 1,000 mL (0 mL Intravenous Stopped 9/6/22 2131)   morphine injection 2 mg (2 mg Intravenous Given 9/6/22 1941)   ondansetron (ZOFRAN) injection 4 mg (4 mg Intravenous Given 9/6/22 1941)   iohexol (OMNIPAQUE) 350 MG/ML injection (MULTI-DOSE) 80 mL (80 mL Intravenous Given 9/6/22 2032)       Diagnostic Studies  Results Reviewed     Procedure Component Value Units Date/Time    UA w Reflex to Microscopic w Reflex to Culture [[id number]] Collected: 09/06/22 2133    Lab Status: Final result Specimen: Urine Updated: 09/06/22 2142     Color, UA Yellow     Clarity, UA Clear     Specific Gravity, UA <=1 005     pH, UA 5 5     Leukocytes, UA Negative     Nitrite, UA Negative     Protein, UA Negative mg/dl      Glucose, UA Negative mg/dl      Ketones, UA Negative mg/dl      Urobilinogen, UA 0 2 E U /dl      Bilirubin, UA Negative     Occult Blood, UA Negative    Comprehensive metabolic panel [[id number]]  (Abnormal) Collected: 09/06/22 1938    Lab Status: Final result Specimen: Blood from Arm, Right Updated: 09/06/22 2010     Sodium 137 mmol/L      Potassium 3 8 mmol/L      Chloride 99 mmol/L      CO2 30 mmol/L      ANION GAP 8 mmol/L      BUN 14 mg/dL      Creatinine 1 34 mg/dL      Glucose 133 mg/dL      Calcium 8 6 mg/dL      Corrected Calcium 9 6 mg/dL      AST 13 U/L      ALT 25 U/L      Alkaline Phosphatase 93 U/L      Total Protein 7 3 g/dL      Albumin 2 8 g/dL      Total Bilirubin 0 29 mg/dL      eGFR 38 ml/min/1 73sq m     Narrative:      [facility] guidelines for Chronic Kidney Disease (CKD):     Stage 1 with normal or high GFR (GFR > 90 mL/min/1 73 square meters)    Stage 2 Mild CKD (GFR = 60-89 mL/min/1 73 square meters)    Stage 3A Moderate CKD (GFR = 45-59 mL/min/1 73 square meters)    Stage 3B Moderate CKD (GFR = 30-44 mL/min/1 73 square meters)    Stage 4 Severe CKD (GFR = 15-29 mL/min/1 73 square meters)    Stage 5 End Stage CKD (GFR <15 mL/min/1 73 square meters)  Note: GFR calculation is accurate only with a steady state creatinine    Lipase [[id number]]  (Abnormal) Collected: 09/06/22 1938    Lab Status: Final result Specimen: Blood from Arm, Right Updated: 09/06/22 2010     Lipase 30 u/L     CBC and differential [[id number]]  (Abnormal) Collected: 09/06/22 1938    Lab Status: Final result Specimen: Blood from Arm, Right Updated: 09/06/22 1945     WBC 12 32 Thousand/uL      RBC 4 22 Million/uL      Hemoglobin 12 0 g/dL      Hematocrit 37 6 %      MCV 89 fL      MCH 28 4 pg      MCHC 31 9 g/dL      RDW 14 9 % MPV 9 8 fL      Platelets 306 Thousands/uL      nRBC 0 /100 WBCs      Neutrophils Relative 83 %      Immat GRANS % 0 %      Lymphocytes Relative 10 %      Monocytes Relative 7 %      Eosinophils Relative 0 %      Basophils Relative 0 %      Neutrophils Absolute 10 05 Thousands/µL      Immature Grans Absolute 0 04 Thousand/uL      Lymphocytes Absolute 1 28 Thousands/µL      Monocytes Absolute 0 87 Thousand/µL      Eosinophils Absolute 0 03 Thousand/µL      Basophils Absolute 0 05 Thousands/µL                  CT abdomen pelvis with contrast   ED Interpretation by Damon Hill DO (09/06 2123)   Wall thickening and edema throughout the rectosigmoid colon which may either be secondary to uncomplicated diverticulitis or reflect proctocolitis of infectious or inflammatory etiology  Final Result by Pat Clark MD (09/06 2113)      Wall thickening and edema throughout the rectosigmoid colon which may either be secondary to uncomplicated diverticulitis or reflect proctocolitis of infectious or inflammatory etiology  Workstation performed: [id number]                    Procedures  Procedures         ED Course  ED Course as of 09/07/22 1759   Tue Sep 06, 2022   2042 Creatinine(!): 1 34  Mild worsening from prior  Will give IV fluid  2135 Patient declines Cipro Flagyl  Will give patient moxifloxacin instead  MDM  Number of Diagnoses or Management Options  Abdominal pain  Diagnosis management comments: Lower abdominal pain - history of diverticulitis  Will get abdominal labs, imaging  Concern for repeat episode of diverticulitis for abdominal abscess  Imaging is concerning for colitis  Will treat with Cipro, Flagyl, advised follow-up with GI, advised return precaution in case worsening condition or signs of systemic illness         Amount and/or Complexity of Data Reviewed  Clinical lab tests: ordered and reviewed  Tests in the radiology section of CPT®: ordered and reviewed        Disposition  Final diagnoses:   Abdominal pain   Colitis   Nausea and vomiting     Time reflects when diagnosis was documented in both MDM as applicable and the Disposition within this note     Time User Action Codes Description Comment    9/6/2022  9:12 PM Eliot Méndez Add [R10 9] Abdominal pain     9/6/2022  9:25 PM Fior Sampsons Add [K52 9] Colitis     9/6/2022  9:26 PM Fior Sampsons Add [R11 2] Nausea and vomiting       ED Disposition     ED Disposition   Discharge    Condition   Stable    Date/Time   Tue Sep 6, 2022  9:25 PM    Comment   Luisa Brennan discharge to home/self care                 Follow-up Information     Follow up With Specialties Details Why Contact Info Additional Information    Goldy Leon MD Internal Medicine Schedule an appointment as soon as possible for a visit  For follow up to ensure improvement, and for further testing and treatment as needed [address] [facility] Gastroenterology Schedule an appointment as soon as possible for a visit  for follow up from this visit Evangelista Smiley [phone]  [facility]astroenter[facility] Special[facility], [address] [place] [address], [address] [facility] Emergency Department Emergency Medicine  If symptoms worsen 34 [facility] 109 [facility] Emergency Department, [address] [place] [address], [id number]          Discharge Medication List as of 9/6/2022  9:26 PM      START taking these medications    Details   metroNIDAZOLE (FLAGYL) 500 mg tablet Take 1 tablet (500 mg total) by mouth every 8 (eight) hours for 10 days, Starting Tue 9/6/2022, Until Fri 9/16/2022, Normal      ondansetron (Zofran ODT) 4 mg disintegrating tablet Take 1 tablet (4 mg total) by mouth every 6 (six) hours as needed for nausea or vomiting, Starting Tue 9/6/2022, Normal      ciprofloxacin (CIPRO) 500 mg tablet Take 1 tablet (500 mg total) by mouth 2 (two) times a day for 10 days, Starting Tue 9/6/2022, Until Fri 9/16/2022, Normal         CONTINUE these medications which have NOT CHANGED    Details   albuterol (2 5 mg/3 mL) 0 083 % nebulizer solution Take 1 vial (2 5 mg total) by nebulization every 6 (six) hours as needed for wheezing or shortness of breath, Starting Thu 10/11/2018, Print      albuterol (PROVENTIL HFA,VENTOLIN HFA) 90 mcg/act inhaler Inhale 2 puffs every 6 (six) hours as needed for wheezing, Starting Mon 7/20/2020, Normal      ALPRAZolam (XANAX) 0 5 mg tablet Take 1 tablet (0 5 mg total) by mouth 2 (two) times a day as needed for anxiety, Starting Wed 4/13/2022, Normal      ASPIRIN 81 PO Take by mouth, Historical Med      Breo Ellipta 100-25 MCG/INH inhaler inhale 1 puff daily Rinse mouth after use, Starting Wed 7/13/2022, Normal      !! dexamethasone (DECADRON) 4 mg tablet Take 2 tablets by mouth (8mg) with meals in the morning  Take 2 tablets (8mg) with meals in the evening  Do this for three days, the day before treatment, the day of treatment and the day after treatment , Normal      !! dexamethasone (DECADRON) 4 mg tablet Take 1 tablet (4 mg total) by mouth 2 (two) times a day with meals Take 2 tablets by mouth (8mg) with meals in the morning  Take 2 tablets (8mg) with meals in the evening  Do this for three days, the day before treatment, the day of treatment and the day  after treatment  , Starting Fri 5/27/2022, Normal      ergocalciferol (VITAMIN D2) 50,000 units take 1 capsule by mouth every week, Normal      fluticasone (FLONASE) 50 mcg/act nasal spray 2 sprays into each nostril daily, Starting Tue 9/14/2021, Normal      !! gabapentin (NEURONTIN) 300 mg capsule Take 1 capsule (300 mg total) by mouth 2 (two) times a day, Starting Thu 7/14/2022, Normal      !! gabapentin (NEURONTIN) 400 mg capsule take 1 capsule by mouth at bedtime, Normal      Lidocaine Viscous HCl (XYLOCAINE) 2 % mucosal solution Starting Wed 6/15/2022, Historical Med      naloxone (NARCAN) 4 mg/0 1 mL nasal spray Administer 1 spray into a nostril  If no response after 2-3 minutes, give another dose in the other nostril using a new spray , Normal      olopatadine (PATANOL) 0 1 % ophthalmic solution  , Starting Tue 9/11/2018, Historical Med      ondansetron (ZOFRAN) 8 mg tablet Take 1 tablet (8 mg total) by mouth every 8 (eight) hours as needed for nausea or vomiting, Starting Thu 5/19/2022, Normal      oxyCODONE-acetaminophen (Percocet) 5-325 mg per tablet Take 1 tablet by mouth every 6 (six) hours as needed for moderate pain Max Daily Amount: 4 tablets, Starting Wed 4/6/2022, Normal      rosuvastatin (CRESTOR) 20 MG tablet Take 1 tablet (20 mg total) by mouth daily, Starting Tue 2/15/2022, Normal      saccharomyces boulardii (FLORASTOR) 250 mg capsule Take 1 capsule (250 mg total) by mouth 2 (two) times a day, Starting Tue 6/28/2022, No Print      sodium chloride () 2 % hypertonic ophthalmic solution Sue 128 2 % eye drops, Historical Med       !! - Potential duplicate medications found  Please discuss with provider  No discharge procedures on file      PDMP Review       Value Time User    PDMP Reviewed  Yes 6/28/2022 11:15 AM Heather Crespo MD          ED Provider  Electronically Signed by           Jeffy Whitney DO  09/07/22 7006 Yes

## 2025-02-15 DIAGNOSIS — G25.81 RLS (RESTLESS LEGS SYNDROME): ICD-10-CM

## 2025-02-17 RX ORDER — GABAPENTIN 300 MG/1
300 CAPSULE ORAL 2 TIMES DAILY
Qty: 180 CAPSULE | Refills: 1 | Status: SHIPPED | OUTPATIENT
Start: 2025-02-17

## 2025-02-20 ENCOUNTER — TELEPHONE (OUTPATIENT)
Dept: HEMATOLOGY ONCOLOGY | Facility: CLINIC | Age: 80
End: 2025-02-20

## 2025-02-20 NOTE — TELEPHONE ENCOUNTER
Called to inform pt of updated appt  -made aware that [facility]AR is scheduled   Left [facility] if needed

## 2025-02-21 ENCOUNTER — TELEPHONE (OUTPATIENT)
Age: 80
End: 2025-02-21

## 2025-02-21 NOTE — TELEPHONE ENCOUNTER
Patient rescheduled appmt with Dr North to 3/25.    Will need STAR for rescheduled appmt.    Patient at [phone]

## 2025-02-27 ENCOUNTER — HOSPITAL ENCOUNTER (OUTPATIENT)
Dept: MAMMOGRAPHY | Facility: CLINIC | Age: 80
End: 2025-02-27
Payer: MEDICARE

## 2025-02-27 VITALS — BODY MASS INDEX: 33.38 KG/M2 | WEIGHT: 170 LBS | HEIGHT: 60 IN

## 2025-02-27 DIAGNOSIS — C50.811 MALIGNANT NEOPLASM OF OVERLAPPING SITES OF RIGHT BREAST IN FEMALE, ESTROGEN RECEPTOR NEGATIVE (HCC): ICD-10-CM

## 2025-02-27 DIAGNOSIS — Z17.1 MALIGNANT NEOPLASM OF OVERLAPPING SITES OF RIGHT BREAST IN FEMALE, ESTROGEN RECEPTOR NEGATIVE (HCC): ICD-10-CM

## 2025-02-27 PROCEDURE — 77066 DX MAMMO INCL CAD BI: CPT

## 2025-02-27 PROCEDURE — G0279 TOMOSYNTHESIS, MAMMO: HCPCS

## 2025-02-28 ENCOUNTER — HOSPITAL ENCOUNTER (OUTPATIENT)
Dept: CT IMAGING | Facility: HOSPITAL | Age: 80
End: 2025-02-28
Attending: SURGERY
Payer: MEDICARE

## 2025-02-28 ENCOUNTER — TELEPHONE (OUTPATIENT)
Age: 80
End: 2025-02-28

## 2025-02-28 DIAGNOSIS — K86.89 MASS OF PANCREAS: ICD-10-CM

## 2025-02-28 PROCEDURE — 74177 CT ABD & PELVIS W/CONTRAST: CPT

## 2025-02-28 RX ADMIN — IOHEXOL 100 ML: 350 INJECTION, SOLUTION INTRAVENOUS at 09:31

## 2025-02-28 NOTE — TELEPHONE ENCOUNTER
Patient had an appt conflict and moved appt from 3/5 to 3/7, please arrange transportation and call patient back with details when this is done at [phone]

## 2025-03-05 PROBLEM — Z85.3 HISTORY OF BREAST CANCER: Status: ACTIVE | Noted: 2025-03-05

## 2025-03-07 ENCOUNTER — OFFICE VISIT (OUTPATIENT)
Dept: SURGICAL ONCOLOGY | Facility: CLINIC | Age: 80
End: 2025-03-07
Payer: MEDICARE

## 2025-03-07 ENCOUNTER — TELEPHONE (OUTPATIENT)
Dept: HEMATOLOGY ONCOLOGY | Facility: CLINIC | Age: 80
End: 2025-03-07

## 2025-03-07 VITALS
WEIGHT: 170 LBS | OXYGEN SATURATION: 94 % | HEIGHT: 60 IN | TEMPERATURE: 97.5 F | DIASTOLIC BLOOD PRESSURE: 70 MMHG | HEART RATE: 89 BPM | BODY MASS INDEX: 33.38 KG/M2 | SYSTOLIC BLOOD PRESSURE: 118 MMHG

## 2025-03-07 DIAGNOSIS — K86.89 MASS OF PANCREAS: ICD-10-CM

## 2025-03-07 DIAGNOSIS — Z98.890 HISTORY OF LUMPECTOMY OF RIGHT BREAST: ICD-10-CM

## 2025-03-07 DIAGNOSIS — Z85.3 HISTORY OF BREAST CANCER: ICD-10-CM

## 2025-03-07 DIAGNOSIS — Z17.1 MALIGNANT NEOPLASM OF OVERLAPPING SITES OF RIGHT BREAST IN FEMALE, ESTROGEN RECEPTOR NEGATIVE (HCC): Primary | ICD-10-CM

## 2025-03-07 DIAGNOSIS — Z08 ENCOUNTER FOR FOLLOW-UP SURVEILLANCE OF BREAST CANCER: ICD-10-CM

## 2025-03-07 DIAGNOSIS — C50.811 MALIGNANT NEOPLASM OF OVERLAPPING SITES OF RIGHT BREAST IN FEMALE, ESTROGEN RECEPTOR NEGATIVE (HCC): Primary | ICD-10-CM

## 2025-03-07 DIAGNOSIS — Z85.3 ENCOUNTER FOR FOLLOW-UP SURVEILLANCE OF BREAST CANCER: ICD-10-CM

## 2025-03-07 PROCEDURE — 99214 OFFICE O/P EST MOD 30 MIN: CPT | Performed by: SURGERY

## 2025-03-07 NOTE — PROGRESS NOTES
Name: Connie Royal      : 1945      MRN: [id number]  Encounter Provider: Enrike Merlos MD  Encounter Date: 3/7/2025   Encounter department: [facility] [place]  :  Assessment & Plan  Malignant neoplasm of overlapping sites of right breast in female, estrogen receptor negative (HCC)    Orders:    Mammo diagnostic bilateral w 3d and cad; Future    Encounter for follow-up surveillance of breast cancer         History of lumpectomy of right breast         Mass of pancreas         History of breast cancer         79-year-old female follow-up with right breast cancer as well as pancreatic cyst after mammogram and CT abdomen to delineate pancreas.  Mammogram was reviewed no worrisome features.  CT pancreas uncinate process mass grossly unchanged since 2019.  More than 6 years.  We will see her in 1 year after her mammogram.    Survivorship  Discussed symptoms related to disease recurrence, Yes     Evaluated for late effects related to cancer treatment, Yes     Screening current for cervical cancer, Yes     Screening current for colon cancer, Yes     Cancer rehabilitation services addressed, Yes     Screening current for osteoporosis, No     Oncology Treatment Summary reviewed with patient and copy provided, Yes     Referral placed for psychosocial evaluation/screening to oncology social work  No    History of Present Illness   Connie Royal is a 79 y.o. year old female who presents for follow-up after CT abdomen pelvis with pancreatic uncinate process mass being watched with serial CT scan.  She also has a history of right breast cancer and follow-up after her mammogram.     Oncology History   Cancer Staging   Malignant neoplasm of overlapping sites of right breast in female, estrogen receptor negative (HCC)  Staging form: Breast, AJCC 8th Edition  - Clinical stage from 2022: Stage IB (cT1c, cN0, cM0, G2, ER-, OR-, HER2-) - Signed by Selena Conrad MD on  4/4/2022  Stage prefix: Initial diagnosis  Nuclear grade: G2  Histologic grading system: 3 grade system  HER2-IHC interpretation: Equivocal  HER2-IHC value: Score 2+  HER2-FISH interpretation: Negative  Oncology History Overview Note   with multiple comorbidities including COPD, oxygen dependent, and stage IA (pT1cN0 M0) grade 3 invasive right breast carcinoma status post lumpectomy and sentinel lymph node biopsy achieving negative margins on 4/28/2022.  Tumor cells were ER/MA/HER2/flash negative. She received 1 cycle of dose reduced TC, but discontinued due to complications.  She completed a course of radiation therapy on 12/15/22. She was last seen 1/15/24 and presents today for follow up.       2/16/24 Diagnostic B/L mammogram  RIGHT  1) POST-SURGICAL FINDING [C]  Mammo diagnostic bilateral w 3d & cad: There are post-surgical findings from a previous lumpectomy seen in the upper central region of the right breast.  There are stable postsurgical changes in the right axilla.  There is mild skin thickening, similar to the prior study.  There are no suspicious masses or grouped microcalcifications  US breast right limited (diagnostic): Targeted ultrasound performed to evaluate the area of concern in the right axilla.  No suspicious cystic or solid masses were seen.    Left  Mammo diagnostic bilateral w 3d & cad  There are no suspicious masses, grouped microcalcifications or areas of unexplained architectural distortion. The skin and nipple areolar complex are unremarkable.   RECOMMENDATION:       - Clinical management for the right breast.       - Diagnostic mammogram in 1 year for both breasts.       2/23/24 Dr. Ruelas  1 stage Ib T1 cN0 M0 triple negative diagnosed 4/4/2022  2 pancreatic mass being followed by surgery  3 COPD oxygen dependent  Plan: Patient doing well follow-up in 1 year she needs to follow-up with surgery watching her pancreatic area.  No other major suggestions.      11/4/24 Dr. Merlos  recently had  CTA neck demonstrated retrograde flow in the vertebral artery distal to left subclavian artery operation consisting is subclavian steal syndrome.   will need her mammogram which we will order for February.   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  She has managed well due to collateral flow from Left vertebral artery  Signs and symptoms of subclavian steal syndrome.    Very high risk patient for intervention due to chronic COPD on long-term oxygen. However if condition worsens we can consider left subclavian stent via brachial approach.       Upcomin25 Mammogram  25 CT  3/5/25 Dr. Langston  3/5/25 Dr. Merlos       Malignant neoplasm of overlapping sites of right breast in female, estrogen receptor negative (HCC)   3/25/2022 Initial Diagnosis    Malignant neoplasm of right female breast (HCC)     3/25/2022 Biopsy    Right breast ultrasound guided biopsy  12 o'clock 5 cm from nipple  Invasive breast carcinoma of no special type (ductal NST/ invasive ductal carcinoma with apocrine features)  Grade 2  ER 0  CT 0  HER 2 2+   FISH negative  Lymphovascular invasion not identified    Concordant. Malignancy is unifocal. Mass measures 1.4 cm on mammogram and 1.9 cm on ultrasound. Right axilla ultrasound clear. Left brest clear. Amanda  reflector placed. 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     2022 -  Cancer Staged    Staging form: Breast, AJCC 8th Edition  - Clinical stage from 2022: Stage IB (cT1c, cN0, cM0, G2, ER-, CT-, HER2-) - Signed by Selena Conrad MD on 2022  Stage prefix: Initial diagnosis  Nuclear grade: G2  Histologic grading system: 3 grade system  HER2-IHC interpretation: Equivocal  HER2-IHC value: Score 2+  HER2-FISH  interpretation: Negative       4/4/2022 Genetic Testing    [facility]  A total of 36 genes were evaluated, including: GILL, BRCA1, BRCA2, CDH1, CHEK2, PALB2, PTEN, STK11, TP53  Negative result. No pathogenic sequence variants or deletions/duplications identified     4/28/2022 Surgery    Right breast lexy  directed lumpectomy with sentinel lymph node biopsy and axillary dissection  Invasive ductal carcinoma with apocrine features  Grade 3  1.4 cm  0/4 Lymph nodes       6/14/2022 - 6/15/2022 Chemotherapy    Pegfilgrastim-bmez (ZIEXTENZO), 6 mg, Subcutaneous, Once, 1 of 4 cycles  Administration: 6 mg (6/15/2022)  cyclophosphamide (CYTOXAN) IVPB, 450 mg/m2 = 778 mg (75 % of original dose 600 mg/m2), Intravenous, Once, 1 of 4 cycles  Dose modification: 450 mg/m2 (75 % of original dose 600 mg/m2, Cycle 1, Reason: Dose Not Tolerated)  Administration: 778 mg (6/14/2022)  DOCEtaxel (TAXOTERE) chemo infusion, 56.25 mg/m2 = 97.4 mg (75 % of original dose 75 mg/m2), Intravenous, Once, 1 of 4 cycles  Dose modification: 56.25 mg/m2 (75 % of original dose 75 mg/m2, Cycle 1, Reason: Dose Not Tolerated)  Administration: 97.4 mg (6/14/2022)     11/17/2022 - 12/15/2022 Radiation    Treatments:  Course: C1  Plan ID Energy Fractions Dose per Fraction (cGy) Dose Correction (cGy) Total Dose Delivered (cGy) Elapsed Days   R Breast Hypo 6X 15 / 15 267 0 4,005 21   R BrstBst 6X 10X/6X 5 / 5 200 0 1,000 6    Treatment Dates:  11/17/2022 - 12/15/2022.           Review of Systems   Constitutional:  Negative for chills and fever.   HENT:  Negative for ear pain and sore throat.    Eyes:  Negative for pain and visual disturbance.   Respiratory: Negative.  Negative for cough and shortness of breath.         On home oxygen   Cardiovascular:  Negative for chest pain and palpitations.   Gastrointestinal:  Negative for abdominal pain and vomiting.   Genitourinary:  Negative for dysuria and hematuria.   Musculoskeletal:  Negative for arthralgias and  back pain.   Skin:  Negative for color change and rash.   Neurological:  Negative for seizures and syncope.   All other systems reviewed and are negative.   A complete review of systems is negative other than that noted above in the HPI.    Medical History Reviewed by provider this encounter:  Tobacco  Allergies  Meds  Problems  Med Hx  Surg Hx  Fam Hx     .       Objective   /70 (BP Location: Right arm, Patient Position: Sitting)   Pulse 89   Temp 97.5 °F (36.4 °C) (Temporal)   Ht 5' (1.524 m)   Wt 77.1 kg (170 lb)   SpO2 94% Comment: Patient on 2L of Oxygen  BMI 33.20 kg/m²     Pain Screening:     ECOG    Physical Exam  Constitutional:       Appearance: Normal appearance.   HENT:      Head: Normocephalic and atraumatic.      Nose: Nose normal.      Mouth/Throat:      Mouth: Mucous membranes are moist.   Eyes:      Pupils: Pupils are equal, round, and reactive to light.   Cardiovascular:      Rate and Rhythm: Normal rate.      Pulses: Normal pulses.      Heart sounds: Normal heart sounds.   Pulmonary:      Effort: Pulmonary effort is normal.      Breath sounds: Normal breath sounds.   Chest:          Comments: Well-healed right breast and right axillary incisions.  Bilateral breast examination no palpable mass masses nipple discharge nipple retraction or skin changes.  Bilateral axillary and supraclavicular examination no palpable adenopathy.  Patient was examined seated as well as supine position.  Skin changes in the right breast is expected posttreatment.  Abdominal:      General: Bowel sounds are normal.      Palpations: Abdomen is soft.   Musculoskeletal:         General: Normal range of motion.      Cervical back: Normal range of motion and neck supple.   Skin:     General: Skin is warm.   Neurological:      General: No focal deficit present.      Mental Status: She is alert and oriented to person, place, and time.   Psychiatric:         Mood and Affect: Mood normal.         Behavior:  Behavior normal.         Thought Content: Thought content normal.         Judgment: Judgment normal.          Labs: I have reviewed pertinent labs.   Lab Results   Component Value Date/Time    WBC 6.59 11/19/2024 09:28 AM    RBC 4.80 11/19/2024 09:28 AM    Hemoglobin 13.9 11/19/2024 09:28 AM    Hematocrit 44.2 11/19/2024 09:28 AM    MCV 92 11/19/2024 09:28 AM    MCH 29.0 11/19/2024 09:28 AM    RDW 13.8 11/19/2024 09:28 AM    Platelets 310 11/19/2024 09:28 AM    Segmented % 58 11/19/2024 09:28 AM    Lymphocytes % 30 11/19/2024 09:28 AM    Monocytes % 7 11/19/2024 09:28 AM    Eosinophils Relative 4 11/19/2024 09:28 AM    Basophils Relative 1 11/19/2024 09:28 AM    Immature Grans % 0 11/19/2024 09:28 AM    Absolute Neutrophils 3.86 11/19/2024 09:28 AM      Lab Results   Component Value Date/Time    Sodium 138 12/29/2024 05:30 PM    Potassium 4.1 12/29/2024 05:30 PM    Chloride 104 12/29/2024 05:30 PM    Carbon Dioxide 27 12/29/2024 05:30 PM    ANION GAP 7 12/29/2024 05:30 PM    BUN 15 12/29/2024 05:30 PM    Creatinine 1.07 12/29/2024 05:30 PM    Glucose 110 (H) 12/29/2024 05:30 PM    Glucose, Fasting 157 (H) 11/19/2024 09:28 AM    Calcium 9.2 12/29/2024 05:30 PM    AST 25 12/29/2024 05:30 PM    ALT 33 12/29/2024 05:30 PM    Alkaline Phosphatase 71 12/29/2024 05:30 PM    Protein, Total 7.3 12/29/2024 05:30 PM    ALBUMIN 4.4 12/29/2024 05:30 PM    Total Bilirubin 0.4 12/29/2024 05:30 PM    eGFRcr 53 (L) 12/29/2024 05:30 PM      No visits with results within 1 Month(s) from this visit.   Latest known visit with results is:   Appointment on 11/19/2024   Component Date Value Ref Range Status    WBC 11/19/2024 6.59  4.31 - 10.16 Thousand/uL Final    RBC 11/19/2024 4.80  3.81 - 5.12 Million/uL Final    Hemoglobin 11/19/2024 13.9  11.5 - 15.4 g/dL Final    Hematocrit 11/19/2024 44.2  34.8 - 46.1 % Final    MCV 11/19/2024 92  82 - 98 fL Final    MCH 11/19/2024 29.0  26.8 - 34.3 pg Final    MCHC 11/19/2024 31.4  31.4 - 37.4 g/dL  Final    RDW 11/19/2024 13.8  11.6 - 15.1 % Final    MPV 11/19/2024 9.9  8.9 - 12.7 fL Final    Platelets 11/19/2024 310  149 - 390 Thousands/uL Final    nRBC 11/19/2024 0  /100 WBCs Final    Segmented % 11/19/2024 58  43 - 75 % Final    Immature Grans % 11/19/2024 0  0 - 2 % Final    Lymphocytes % 11/19/2024 30  14 - 44 % Final    Monocytes % 11/19/2024 7  4 - 12 % Final    Eosinophils Relative 11/19/2024 4  0 - 6 % Final    Basophils Relative 11/19/2024 1  0 - 1 % Final    Absolute Neutrophils 11/19/2024 3.86  1.85 - 7.62 Thousands/µL Final    Absolute Immature Grans 11/19/2024 0.00  0.00 - 0.20 Thousand/uL Final    Absolute Lymphocytes 11/19/2024 1.95  0.60 - 4.47 Thousands/µL Final    Absolute Monocytes 11/19/2024 0.48  0.17 - 1.22 Thousand/µL Final    Eosinophils Absolute 11/19/2024 0.25  0.00 - 0.61 Thousand/µL Final    Basophils Absolute 11/19/2024 0.05  0.00 - 0.10 Thousands/µL Final    Sodium 11/19/2024 141  135 - 147 mmol/L Final    Potassium 11/19/2024 4.2  3.5 - 5.3 mmol/L Final    Chloride 11/19/2024 105  96 - 108 mmol/L Final    CO2 11/19/2024 28  21 - 32 mmol/L Final    ANION GAP 11/19/2024 8  4 - 13 mmol/L Final    BUN 11/19/2024 20  5 - 25 mg/dL Final    Creatinine 11/19/2024 1.09  0.60 - 1.30 mg/dL Final    Standardized to IDMS reference method    Glucose, Fasting 11/19/2024 157 (H)  65 - 99 mg/dL Final    Calcium 11/19/2024 9.1  8.4 - 10.2 mg/dL Final    AST 11/19/2024 21  13 - 39 U/L Final    ALT 11/19/2024 33  7 - 52 U/L Final    Specimen collection should occur prior to Sulfasalazine administration due to the potential for falsely depressed results.     Alkaline Phosphatase 11/19/2024 69  34 - 104 U/L Final    Total Protein 11/19/2024 6.6  6.4 - 8.4 g/dL Final    Albumin 11/19/2024 4.3  3.5 - 5.0 g/dL Final    Total Bilirubin 11/19/2024 0.38  0.20 - 1.00 mg/dL Final    Use of this assay is not recommended for patients undergoing treatment with eltrombopag due to the potential for falsely  elevated results.  N-acetyl-p-benzoquinone imine (metabolite of Acetaminophen) will generate erroneously low results in samples for patients that have taken an overdose of Acetaminophen.    eGFR 11/19/2024 49  ml/min/1.73sq m Final    Cholesterol 11/19/2024 128  See Comment mg/dL Final    Cholesterol:         Pediatric <18 Years        Desirable          <170 mg/dL      Borderline High    170-199 mg/dL      High               >=200 mg/dL        Adult >=18 Years            Desirable         <200 mg/dL      Borderline High   200-239 mg/dL      High              >239 mg/dL      Triglycerides 11/19/2024 148  See Comment mg/dL Final    Triglyceride:     0-9Y            <75mg/dL     10Y-17Y         <90 mg/dL       >=18Y     Normal          <150 mg/dL     Borderline High 150-199 mg/dL     High            200-499 mg/dL        Very High       >499 mg/dL    Specimen collection should occur prior to Metamizole administration due to the potential for falsely depressed results.    HDL, Direct 11/19/2024 38 (L)  >=50 mg/dL Final    LDL Calculated 11/19/2024 60  0 - 100 mg/dL Final    LDL Cholesterol:     Optimal           <100 mg/dl     Near Optimal      100-129 mg/dl     Above Optimal       Borderline High 130-159 mg/dl       High            160-189 mg/dl       Very High       >189 mg/dl         This screening LDL is a calculated result.   It does not have the accuracy of the Direct Measured LDL in the monitoring of patients with hyperlipidemia and/or statin therapy.   Direct Measure LDL ([id number]) must be ordered separately in these patients.    Non-HDL-Chol (CHOL-HDL) 11/19/2024 90  mg/dl Final    Hemoglobin A1C 11/19/2024 7.3 (H)  Normal 4.0-5.6%; PreDiabetic 5.7-6.4%; Diabetic >=6.5%; Glycemic control for adults with diabetes <7.0% % Final    EAG 11/19/2024 163  mg/dl Final    Color, UA 11/19/2024 Yellow   Final    Clarity, UA 11/19/2024 Turbid   Final    Specific Gravity, UA 11/19/2024 1.020  1.003 - 1.030 Final    pH, UA  11/19/2024 5.0  4.5, 5.0, 5.5, 6.0, 6.5, 7.0, 7.5, 8.0 Final    Leukocytes, UA 11/19/2024 Large (A)  Negative Final    Nitrite, UA 11/19/2024 Negative  Negative Final    Protein, UA 11/19/2024 Trace (A)  Negative mg/dl Final    Glucose, UA 11/19/2024 Negative  Negative mg/dl Final    Ketones, UA 11/19/2024 Negative  Negative mg/dl Final    Urobilinogen, UA 11/19/2024 <2.0  <2.0 mg/dl mg/dl Final    Bilirubin, UA 11/19/2024 Negative  Negative Final    Occult Blood, UA 11/19/2024 Trace (A)  Negative Final    RBC, UA 11/19/2024 4-10 (A)  None Seen, 1-2 /hpf Final    WBC, UA 11/19/2024 Innumerable (A)  None Seen, 1-2 /hpf Final    Epithelial Cells 11/19/2024 Occasional  None Seen, Occasional /hpf Final    Bacteria, UA 11/19/2024 Occasional  None Seen, Occasional /hpf Final    MUCUS THREADS 11/19/2024 Occasional (A)  None Seen Final    Hyaline Casts, UA 11/19/2024 0-3 (A)  None Seen /lpf Final    WBC Clumps 11/19/2024 Present   Final    Urine Culture 11/19/2024 >100,000 cfu/ml Escherichia coli (A)   Final    This organism has been edited. The previous result was Gram Negative Francisco Enteric Like on 11/20/2024 at 1200 EST.    Urine Culture 11/19/2024 <10,000 cfu/ml   Final    Mixed Contaminants X2     Pathology: I have reviewed pathology reports described above.    Radiology Results Review: I have reviewed radiology reports from  February2025 including: Mammogram and CT abdomen/pelvis.  Concordance: yes  CT ABDOMEN AND PELVIS WITH IV CONTRAST     INDICATION: K86.89: Other specified diseases of pancreas. .     COMPARISON: CT abdomen pelvis 3/8/2024, CT chest abdomen pelvis 11/24/2021, CT abdomen 9/13/2019     TECHNIQUE: CT examination of the abdomen and pelvis was performed. Multiplanar 2D reformatted images were created from the source data.     This examination, like all CT scans performed in the [facility], was performed utilizing techniques to minimize radiation dose exposure, including the use of  iterative reconstruction and automated exposure control. Radiation dose length   product (DLP) for this visit: 939 mGy-cm     IV Contrast: 100 mL of iohexol  Enteric Contrast: Not administered.     FINDINGS:     ABDOMEN     LOWER CHEST: No clinically significant abnormality in the visualized lower chest.     LIVER/BILIARY TREE: Diffuse hepatic steatosis with some areas of relative peripheral sparing.. Ill-defined, nodular hyperattenuating/enhancing focus in the right hepatic dome (2/16) seen on late arterial phase with washout on portal venous phase, most   consistent with transient hepatic attenuation difference (OUMOU). Subcentimeter hypoattenuating lesion(s), too small to characterize but statistically likely benign, which do not require follow-up (ACR White Paper 2017). No suspicious mass. Normal hepatic   contours. No biliary dilation.     GALLBLADDER: No calcified gallstones. No pericholecystic inflammatory change.     SPLEEN: Calcified granuloma.     PANCREAS: Heterogeneous, predominantly peripherally enhancing posterior pancreatic head/uncinate process mass, adjacent to the distal descending duodenum, measuring 2.2 x 1.8 cm, grossly unchanged since 9/13/2019.     ADRENAL GLANDS: Unremarkable.     KIDNEYS/URETERS: Nonobstructing calculi at the right upper and right lower poles measuring 2 and 3 mm, respectively. Nonobstructing left upper pole calculus measuring 4 mm. Left simple renal cyst. No hydronephrosis.     STOMACH AND BOWEL: Colonic diverticulosis without findings of acute diverticulitis.     APPENDIX: Normal.     ABDOMINOPELVIC CAVITY: No ascites. No pneumoperitoneum. No lymphadenopathy.     VESSELS: Atherosclerosis without abdominal aortic aneurysm.     PELVIS     REPRODUCTIVE ORGANS: Unremarkable for patient's age.     URINARY BLADDER: Underdistended, limiting evaluation. Grossly unremarkable. No calculi.     ABDOMINAL WALL/INGUINAL REGIONS: Unremarkable.     BONES: No acute fracture or suspicious  osseous lesion. Spinal degenerative changes.     IMPRESSION:     1. Pancreatic head/uncinate process mass is grossly unchanged since 9/13/2019.     2. Additional chronic/incidental findings as noted above.  Narrative & Impression   DIAGNOSIS: Malignant neoplasm of overlapping sites of right breast in female, estrogen receptor negative (HCC)      TECHNIQUE:  Digital diagnostic mammography was performed. Computer Aided Detection (CAD) analyzed all applicable images.     COMPARISONS: Multiple prior exams dated between 3/31/2010 and 2/16/2024.     RELEVANT HISTORY:   Family Breast Cancer History: History of breast cancer in Other.  Family Medical History: Family medical history includes breast cancer in other.   Personal History: No known relevant hormone history. Surgical history includes lumpectomy. Medical history includes breast cancer.     RISK ASSESSMENT:   Tyrer-zick risk assessment reporting was suppressed due to the patient's history and/or demographic factors.     TISSUE DENSITY:   There are scattered areas of fibroglandular density.      INDICATION: Connie Royal is a 79 y.o. female presenting for Left breast cancer.     FINDINGS:   RIGHT  1) POST-SURGICAL FINDING [C]: There are post-surgical findings from a previous lumpectomy seen in the upper central region of the right breast.      BILATERAL  There are no suspicious masses, grouped microcalcifications or areas of unexplained architectural distortion. The skin and nipple areolar complex are unremarkable.  Benign-appearing calcifications are noted in each breast.  Circumscribed mass in the retroareolar left breast demonstrates long-term stability.        IMPRESSION:   No evidence of malignancy.           ASSESSMENT/BI-RADS CATEGORY:  Left: 2 - Benign  Right: 2 - Benign  Overall: 2 - Benign     RECOMMENDATION:       - Routine screening mammogram in 1 year for both breasts.

## 2025-03-11 DIAGNOSIS — G25.81 RLS (RESTLESS LEGS SYNDROME): ICD-10-CM

## 2025-03-11 RX ORDER — GABAPENTIN 400 MG/1
400 CAPSULE ORAL
Qty: 100 CAPSULE | Refills: 1 | Status: SHIPPED | OUTPATIENT
Start: 2025-03-11

## 2025-03-11 NOTE — TELEPHONE ENCOUNTER
Pharmacy change this refill   Reason for call:   [x] Refill   [] Prior Auth  [] Other:     Office:   [x] PCP/Provider - Deanne Wong  [] Specialty/Provider -     Medication: Gabapentin     Dose/Frequency: 400 mg Q HS     Quantity: 100    Pharmacy: [facility]   Does the patient have enough for 3 days?   [] Yes   [x] No - Send as HP to POD    Mail Away Pharmacy   Does the patient have enough for 10 days?   [] Yes   [] No - Send as HP to POD

## 2025-03-21 ENCOUNTER — RA CDI HCC (OUTPATIENT)
Dept: OTHER | Facility: HOSPITAL | Age: 80
End: 2025-03-21

## 2025-03-24 ENCOUNTER — TELEPHONE (OUTPATIENT)
Age: 80
End: 2025-03-24

## 2025-03-24 NOTE — TELEPHONE ENCOUNTER
RAD ON[facility] - [facility] transportation cancelled for today as requested by patient.   LM on VM and mailed letter to patient to r/s F/U w/ Dr. Ruby...KD   DVT PPx  -SCDs

## 2025-03-24 NOTE — TELEPHONE ENCOUNTER
Pt had to cancel her appt today because she is not feeling well.  Please cancel her transportation

## 2025-03-26 NOTE — PATIENT INSTRUCTIONS
"Patient Education     Low blood sugar in people with diabetes   The Basics   Written by the doctors and editors at [facility]   What is low blood sugar? -- This is when the level of sugar in a person's blood gets too low. It is also called \"hypoglycemia.\"  Low blood sugar can cause symptoms ranging from sweating and feeling hungry to passing out.  Low blood sugar can happen in people with diabetes who take certain medicines, including insulin, other medicines given as shots, and some types of pills.  When can people with diabetes get low blood sugar? -- People with diabetes can get low blood sugar when they:   Take too much medicine, including insulin, other medicines given as shots, or certain diabetes pills   Do not eat enough food   Exercise too much without eating a snack or reducing their insulin dose   Wait too long between meals   Drink too much alcohol or drink alcohol on an empty stomach  What are the symptoms of low blood sugar? -- The symptoms can be different from person to person, and can change over time. During the early stages of low blood sugar, a person can:   Sweat or tremble   Feel hungry   Feel worried  People who have early symptoms should check their blood sugar level to see if it is low and needs to be treated. If low blood sugar levels are not treated, severe symptoms can occur. These can include:   Trouble walking or feeling weak   Trouble seeing clearly   Being confused or acting in a strange way   Passing out or having a seizure  Some people do not get symptoms during the early stages of low blood sugar. Doctors sometimes call this \"hypoglycemia unawareness.\" People with hypoglycemia unawareness are more likely to have severe symptoms, because they might not know that they have low blood sugar until they have severe symptoms. Hypoglycemia unawareness is more likely in people who:   Have had type 1 diabetes for more than 5 to 10 years   Have frequent episodes of low blood sugar   Use insulin " to keep their blood sugar level tightly managed   Are tired   Drink a lot of alcohol   Take certain medicines for high blood pressure or diabetes  How is low blood sugar treated? -- It can be treated with:   Quick sources of sugar - People can eat or drink quick sources of sugar (table 1). Foods that have fat, such as chocolate or cheese, do not treat low blood sugar as quickly. You and a family member should carry a quick source of sugar at all times.   A dose of glucagon - Glucagon is a hormone that can quickly raise blood sugar levels and stop severe symptoms. It comes as a shot (figure 1) or a nose spray. If your doctor recommends that you carry glucagon with you, they will tell you when and how to use it. If possible, it's also a good idea to have a family member, friend, or roommate learn how to give you glucagon. That way, they can give it to you if you can't do it yourself.  After treating low blood sugar, it is very important to recheck your blood sugar level to make sure that it rises and stays in the normal range. Once your blood sugar is normal, eat a small snack that contains protein, fat, and carbohydrate. This can help keep your blood sugar stable.  What should I do after treatment? -- After treatment for low blood sugar, most people can get back to their usual routine. But your doctor or nurse might recommend that you check your blood sugar level more often during the next 2 to 3 days.  If your low blood sugar was treated with glucagon, call your doctor or nurse. They might change the dose of your diabetes medicine.  How can I prevent low blood sugar? -- The best way is to:   Check your blood sugar levels often - Your doctor or nurse will tell you how and when to check your blood sugar levels at home. They will also tell you what your blood sugar levels should be, and when to treat low blood sugar.   Learn the symptoms of low blood sugar, and be ready to treat it in the early stages. Treating low  "blood sugar early can prevent severe symptoms.  When should I go to a hospital or call for an ambulance? -- A family member or friend should take you to a hospital or call for an ambulance (in the [place] and [place], call 9-1-1) if you:   Are still confused 15 minutes after being treated with a dose of glucagon   Have passed out, and there is no glucagon nearby   Still have low blood sugar after treatment  If you have low blood sugar, do not try to drive yourself to the hospital. Driving with low blood sugar can be dangerous.  All topics are updated as new evidence becomes available and our peer review process is complete.  This topic retrieved from [employer] on: Apr 11, 2024.  Topic 22771 Version 22.0  Release: 32.3.2 - C32.100  © 2024 UpToDate, Inc. and/or its affiliates. All rights reserved.  table 1: Quick sources of sugar to treat low blood sugar  3 or 4 glucose tablets   ½ cup of juice or regular soda (not sugar-free)   2 tablespoons of raisins   4 or 5 saltine crackers   1 tablespoon of sugar   1 tablespoon of honey or corn syrup   6 to 8 hard candies   These sources of sugar act quickly to treat low blood sugar levels. People with diabetes who use insulin or certain other diabetes medicines should carry at least 1 of these items at all times.  Graphic 70054 Version 4.0  figure 1: Glucagon autoinjector     Some people carry glucagon in the form of an autoinjector \"pen.\" This makes it easy to give a dose into the upper arm, thigh, or belly.  Graphic 189469 Version 2.0  Consumer Information Use and Disclaimer   Disclaimer: This generalized information is a limited summary of diagnosis, treatment, and/or medication information. It is not meant to be comprehensive and should be used as a tool to help the user understand and/or assess potential diagnostic and treatment options. It does NOT include all information about conditions, treatments, medications, side effects, or risks that may apply to a specific patient. It " is not intended to be medical advice or a substitute for the medical advice, diagnosis, or treatment of a health care provider based on the health care provider's examination and assessment of a patient's specific and unique circumstances. Patients must speak with a health care provider for complete information about their health, medical questions, and treatment options, including any risks or benefits regarding use of medications. This information does not endorse any treatments or medications as safe, effective, or approved for treating a specific patient. UpToDate, Inc. and its affiliates disclaim any warranty or liability relating to this information or the use thereof.The use of this information is governed by the Terms of Use, available at https://www.woltersSkeeduwer.com/en/know/clinical-effectiveness-terms. 2024© UpToDate, Inc. and its affiliates and/or licensors. All rights reserved.  Copyright   © 2024 UpToDate, Inc. and/or its affiliates. All rights reserved.

## 2025-03-27 ENCOUNTER — OFFICE VISIT (OUTPATIENT)
Age: 80
End: 2025-03-27
Payer: MEDICARE

## 2025-03-27 VITALS
HEART RATE: 96 BPM | HEIGHT: 60 IN | WEIGHT: 173 LBS | OXYGEN SATURATION: 97 % | SYSTOLIC BLOOD PRESSURE: 130 MMHG | DIASTOLIC BLOOD PRESSURE: 80 MMHG | RESPIRATION RATE: 18 BRPM | BODY MASS INDEX: 33.96 KG/M2

## 2025-03-27 DIAGNOSIS — T45.1X5A CHEMOTHERAPY INDUCED NEUTROPENIA (HCC): ICD-10-CM

## 2025-03-27 DIAGNOSIS — E78.2 MIXED HYPERLIPIDEMIA: ICD-10-CM

## 2025-03-27 DIAGNOSIS — Z00.00 MEDICARE ANNUAL WELLNESS VISIT, SUBSEQUENT: Primary | ICD-10-CM

## 2025-03-27 DIAGNOSIS — L98.9 SKIN LESION: ICD-10-CM

## 2025-03-27 DIAGNOSIS — D70.1 CHEMOTHERAPY INDUCED NEUTROPENIA (HCC): ICD-10-CM

## 2025-03-27 DIAGNOSIS — E11.22 TYPE 2 DIABETES MELLITUS WITH DIABETIC CHRONIC KIDNEY DISEASE, UNSPECIFIED CKD STAGE, UNSPECIFIED WHETHER LONG TERM INSULIN USE (HCC): ICD-10-CM

## 2025-03-27 DIAGNOSIS — Z12.11 SCREENING FOR COLON CANCER: ICD-10-CM

## 2025-03-27 DIAGNOSIS — M70.61 GREATER TROCHANTERIC BURSITIS OF RIGHT HIP: ICD-10-CM

## 2025-03-27 DIAGNOSIS — N18.31 STAGE 3A CHRONIC KIDNEY DISEASE (HCC): ICD-10-CM

## 2025-03-27 DIAGNOSIS — J43.2 CENTRILOBULAR EMPHYSEMA (HCC): ICD-10-CM

## 2025-03-27 DIAGNOSIS — E11.9 TYPE 2 DIABETES MELLITUS WITHOUT COMPLICATION, WITHOUT LONG-TERM CURRENT USE OF INSULIN (HCC): ICD-10-CM

## 2025-03-27 DIAGNOSIS — D50.9 IRON DEFICIENCY ANEMIA, UNSPECIFIED IRON DEFICIENCY ANEMIA TYPE: ICD-10-CM

## 2025-03-27 DIAGNOSIS — K51.00 ULCERATIVE (CHRONIC) PANCOLITIS WITHOUT COMPLICATIONS (HCC): ICD-10-CM

## 2025-03-27 DIAGNOSIS — F41.9 ANXIETY: ICD-10-CM

## 2025-03-27 DIAGNOSIS — R53.83 OTHER FATIGUE: ICD-10-CM

## 2025-03-27 DIAGNOSIS — M81.0 AGE-RELATED OSTEOPOROSIS WITHOUT CURRENT PATHOLOGICAL FRACTURE: ICD-10-CM

## 2025-03-27 DIAGNOSIS — J96.11 CHRONIC HYPOXEMIC RESPIRATORY FAILURE (HCC): ICD-10-CM

## 2025-03-27 DIAGNOSIS — Z85.3 HISTORY OF BREAST CANCER: ICD-10-CM

## 2025-03-27 DIAGNOSIS — I74.09 AORTOILIAC OCCLUSIVE DISEASE (HCC): ICD-10-CM

## 2025-03-27 PROCEDURE — G0439 PPPS, SUBSEQ VISIT: HCPCS

## 2025-03-27 RX ORDER — NAPROXEN 500 MG/1
500 TABLET ORAL 2 TIMES DAILY WITH MEALS
Qty: 60 TABLET | Refills: 0 | Status: SHIPPED | OUTPATIENT
Start: 2025-03-27

## 2025-03-27 NOTE — ASSESSMENT & PLAN NOTE
She declines a DEXA scan at this time. Reviewed potential risks and benefits of treatment options in which she declines.

## 2025-03-27 NOTE — PROGRESS NOTES
Name: Connie Royal      : 1945      MRN: [id number]  Encounter Provider: Deanne Wong PA-C  Encounter Date: 3/27/2025   Encounter department: [facility] INTERNAL MEDICINE [facility]    Assessment & Plan  Medicare annual wellness visit, subsequent  Recommended eating a well-balanced diet of fruits, veggies, and lean meats. Recommended increasing water intake, you should be urinating pale to clear yellow every 2-3 hours. She does no formal exercise due to chronic respiratory failure. She sleeps okay.  She denies any alcohol, tobacco, or illicit drug use. She is due for dentist and eye doctor.  She lives at home with her daughter and son.       Centrilobular emphysema (HCC)  O2 is 97% in office. She is currently on 3 L of oxygen on exertion. She is using Wixela daily.  She is overdue for a pulmonology follow-up.       Ulcerative (chronic) pancolitis without complications (HCC)  She is overdue for f/up with GI. She denies any symptoms at this time.         Type 2 diabetes mellitus without complication, without long-term current use of insulin (HCC)  A1c is 7.3. She is not currently on medication at this time. Recommended starting ozempic 0.25 mg weekly. Reviewed benefits, risks, and adverse effects. Will f/up in 1-2 months. Due for DM eye exam.   Lab Results   Component Value Date    HGBA1C 7.3 (H) 2024       Orders:    Hemoglobin A1C; Future    Comprehensive metabolic panel; Future    Albumin / creatinine urine ratio; Future    semaglutide, 0.25 or 0.5 mg/dose, (Ozempic, 0.25 or 0.5 MG/DOSE,) 2 mg/3 mL injection pen; Inject 0.25 mg under the skin once weekly for 28 days, THEN inject 0.5 mg under the skin once weekly    Age-related osteoporosis without current pathological fracture  She declines a DEXA scan at this time. Reviewed potential risks and benefits of treatment options in which she declines.        Stage 3a chronic kidney disease (HCC)  Lab Results   Component Value Date    EGFR  53 (L) 12/29/2024    EGFR 49 11/19/2024    EGFR 55 07/01/2024    CREATININE 1.07 12/29/2024    CREATININE 1.09 11/19/2024    CREATININE 0.99 07/01/2024   Continue to increase fluid intake and avoid nephrotoxic medications like advil, aleve, ibuprofen, and motrin.   Orders:    Comprehensive metabolic panel; Future    Anxiety  She uses xanax prn.        Chemotherapy induced neutropenia (HCC)  Due for updated CBC.   Orders:    CBC and differential; Future    History of breast cancer  Currently in remission.  Followed by oncology.       Mixed hyperlipidemia  Due for lipid panel.  Currently on rosuvastatin 20 mg daily.  Orders:    Lipid Panel with Direct LDL reflex; Future    Other fatigue    Orders:    TSH, 3rd generation with Free T4 reflex; Future    Screening for colon cancer  Due for colonoscopy.  Orders:    Ambulatory Referral to Gastroenterology    Type 2 diabetes mellitus with diabetic chronic kidney disease, unspecified CKD stage, unspecified whether long term insulin use (HCC)  See plan as above.  Lab Results   Component Value Date    HGBA1C 7.3 (H) 11/19/2024          Aortoiliac occlusive disease (HCC)  Followed by vascular.       Chronic hypoxemic respiratory failure (HCC)  O2 is 97% in office.       Greater trochanteric bursitis of right hip  Recommended starting naproxen twice daily and Tylenol as needed.  Reviewed trochanteric bursitis stretching in office today.  Continue to apply heat to the area 20 minutes 3 times daily.  Referral placed to Ortho for 1 to 2-week follow-up for potential injection.  Orders:    Ambulatory referral to Orthopedic Surgery    naproxen (Naprosyn) 500 mg tablet; Take 1 tablet (500 mg total) by mouth 2 (two) times a day with meals    Iron deficiency anemia, unspecified iron deficiency anemia type  She admits to increasing RLS symptoms, will check iron panel.   Orders:    Iron Panel (Includes Ferritin, Iron Sat%, Iron, and TIBC); Future    Skin lesion  She would like a referral to  a dermatologist.   Orders:    Ambulatory Referral to Dermatology; Future      Depression Screening and Follow-up Plan: Patient was screened for depression during today's encounter. They screened negative with a PHQ-2 score of 0.        Preventive health issues were discussed with patient, and age appropriate screening tests were ordered as noted in patient's After Visit Summary. Personalized health advice and appropriate referrals for health education or preventive services given if needed, as noted in patient's After Visit Summary.    History of Present Illness     Patient is a 78 yo female that presents today for her annual medicare wellness visit. She complains of R hip pain that has worsened over the past few months. She denies any inciting injury.  She struggling to walk, sleep, and sit.  She describes the pain as sharp and can radiate into her thigh.  She does admit to some numbness and tingling over her right lateral thigh.  She has tried Tylenol with no relief.    Health Maintenance:  Due for labs, colonoscopy, DEXA, DM foot exam, and DM eye exam.  Immunizations: Due for pneumonia, RSV, and shingles vaccines.         Patient Care Team:  Ceasar Agrawal MD as PCP - General (Internal Medicine)  MD Johny Wayne MD (Vascular Surgery)  Marcin Rodriguez MD (Dermatology)  Enrike Merlos MD as Surgeon (Surgical Oncology)  KRISS Snider as  Care Manager ()  Joel Ordonez III, MD (Gastroenterology)  Vaughn Walker PA-C as Physician Assistant (Internal Medicine)  Deanne Wong PA-C as Physician Assistant (Internal Medicine)    Review of Systems   Constitutional:  Negative for chills, fatigue and fever.   HENT:  Negative for ear discharge, ear pain, postnasal drip, rhinorrhea, sinus pressure, sinus pain, sore throat, tinnitus and trouble swallowing.    Eyes:  Negative for pain, discharge and itching.   Respiratory:  Positive for  shortness of breath. Negative for cough, chest tightness and wheezing.    Cardiovascular:  Negative for chest pain, palpitations and leg swelling.   Gastrointestinal:  Negative for abdominal pain, constipation, diarrhea, nausea and vomiting.   Endocrine: Negative for polydipsia, polyphagia and polyuria.   Genitourinary:  Negative for difficulty urinating, frequency, hematuria and urgency.   Musculoskeletal:  Positive for arthralgias. Negative for joint swelling and myalgias.   Skin:  Negative for color change.   Allergic/Immunologic: Negative for environmental allergies.   Neurological:  Negative for dizziness, weakness, light-headedness, numbness and headaches.   Hematological:  Negative for adenopathy.   Psychiatric/Behavioral:  Negative for decreased concentration and sleep disturbance. The patient is not nervous/anxious.      Medical History Reviewed by provider this encounter:       Annual Wellness Visit Questionnaire   Connie is here for her Subsequent Wellness visit. Last Medicare Wellness visit information reviewed, patient interviewed and updates made to the record today.      Health Risk Assessment:   Patient rates overall health as fair. Patient feels that their physical health rating is slightly worse. Patient is satisfied with their life. Eyesight was rated as same. Hearing was rated as same. Patient feels that their emotional and mental health rating is same. Patients states they are never, rarely angry. Patient states they are often unusually tired/fatigued. Pain experienced in the last 7 days has been a lot. Patient's pain rating has been 6/10. Patient states that she has experienced no weight loss or gain in last 6 months.     Depression Screening:   PHQ-2 Score: 0      Fall Risk Screening:   In the past year, patient has experienced: no history of falling in past year      Urinary Incontinence Screening:   Patient has not leaked urine accidently in the last six months.     Home Safety:  Patient does  not have trouble with stairs inside or outside of their home. Patient has working smoke alarms and has working carbon monoxide detector. Home safety hazards include: none.     Nutrition:   Current diet is Regular.     Medications:   Patient is currently taking over-the-counter supplements. OTC medications include: see medication list. Patient is able to manage medications.     Activities of Daily Living (ADLs)/Instrumental Activities of Daily Living (IADLs):   Walk and transfer into and out of bed and chair?: Yes  Dress and groom yourself?: Yes    Bathe or shower yourself?: Yes    Feed yourself? Yes  Do your laundry/housekeeping?: Yes  Manage your money, pay your bills and track your expenses?: Yes  Make your own meals?: Yes    Do your own shopping?: Yes    Previous Hospitalizations:   Any hospitalizations or ED visits within the last 12 months?: Yes    How many hospitalizations have you had in the last year?: 1-2    Advance Care Planning:   Living will: No    Advanced directive: Yes    Advanced directive counseling given: Yes      PREVENTIVE SCREENINGS      Cardiovascular Screening:    General: Screening Not Indicated, History Lipid Disorder and Screening Current      Diabetes Screening:     General: Screening Not Indicated, History Diabetes and Screening Current      Colorectal Cancer Screening:     General: Risks and Benefits Discussed    Due for: Colonoscopy - High Risk      Breast Cancer Screening:     General: History Breast Cancer      Cervical Cancer Screening:    General: Screening Not Indicated      Osteoporosis Screening:    General: Screening Not Indicated and History Osteoporosis      Abdominal Aortic Aneurysm (AAA) Screening:        General: Screening Not Indicated      Lung Cancer Screening:     General: Screening Current      Hepatitis C Screening:    General: Screening Current    Screening, Brief Intervention, and Referral to Treatment (SBIRT)     Screening  Typical number of drinks in a day:  0  Typical number of drinks in a week: 0  Interpretation: Low risk drinking behavior.    Single Item Drug Screening:  How often have you used an illegal drug (including marijuana) or a prescription medication for non-medical reasons in the past year? never    Single Item Drug Screen Score: 0  Interpretation: Negative screen for possible drug use disorder    Social Drivers of Health     Transportation Needs: No Transportation Needs (6/23/2022)    PRAPARE - Transportation     Lack of Transportation (Medical): No     Lack of Transportation (Non-Medical): No   Housing Stability: Low Risk  (6/23/2022)    Housing Stability Vital Sign     Unable to Pay for Housing in the Last Year: No     Number of Places Lived in the Last Year: 1     Unstable Housing in the Last Year: No     No results found.    Objective   There were no vitals taken for this visit.    Physical Exam  Vitals and nursing note reviewed.   Constitutional:       General: She is awake. She is not in acute distress.     Appearance: Normal appearance. She is well-developed and well-groomed. She is obese.   HENT:      Head: Normocephalic and atraumatic.      Right Ear: Hearing and external ear normal.      Left Ear: Hearing and external ear normal.      Nose: Nose normal.      Mouth/Throat:      Lips: Pink.      Mouth: Mucous membranes are moist.   Eyes:      General: Lids are normal. Vision grossly intact. Gaze aligned appropriately.      Conjunctiva/sclera: Conjunctivae normal.   Neck:      Vascular: No carotid bruit.      Trachea: Trachea and phonation normal.   Cardiovascular:      Rate and Rhythm: Normal rate and regular rhythm.      Heart sounds: Normal heart sounds, S1 normal and S2 normal. No murmur heard.     No friction rub. No gallop.   Pulmonary:      Effort: Pulmonary effort is normal. No respiratory distress.      Breath sounds: Normal breath sounds and air entry. No decreased breath sounds, wheezing, rhonchi or rales.   Abdominal:      General:  Abdomen is protuberant.   Musculoskeletal:         General: No swelling.      Cervical back: Neck supple.      Lumbar back: No tenderness or bony tenderness.      Right hip: Tenderness and bony tenderness present. Normal range of motion. Normal strength.      Right lower leg: No edema.      Left lower leg: No edema.      Comments: Normal strength in lower extremities. Pain to palpation of right hip.    Skin:     General: Skin is warm.      Capillary Refill: Capillary refill takes less than 2 seconds.   Neurological:      Mental Status: She is alert.      Deep Tendon Reflexes:      Reflex Scores:       Patellar reflexes are 2+ on the right side and 2+ on the left side.  Psychiatric:         Attention and Perception: Attention and perception normal.         Mood and Affect: Mood and affect normal.         Speech: Speech normal.         Behavior: Behavior normal. Behavior is cooperative.         Thought Content: Thought content normal.         Cognition and Memory: Cognition and memory normal.         Judgment: Judgment normal.

## 2025-03-27 NOTE — ASSESSMENT & PLAN NOTE
A1c is 7.3. She is not currently on medication at this time. Recommended starting ozempic 0.25 mg weekly. Reviewed benefits, risks, and adverse effects. Will f/up in 1-2 months. Due for DM eye exam.   Lab Results   Component Value Date    HGBA1C 7.3 (H) 11/19/2024       Orders:    Hemoglobin A1C; Future    Comprehensive metabolic panel; Future    Albumin / creatinine urine ratio; Future    semaglutide, 0.25 or 0.5 mg/dose, (Ozempic, 0.25 or 0.5 MG/DOSE,) 2 mg/3 mL injection pen; Inject 0.25 mg under the skin once weekly for 28 days, THEN inject 0.5 mg under the skin once weekly

## 2025-03-27 NOTE — ASSESSMENT & PLAN NOTE
O2 is 97% in office. She is currently on 3 L of oxygen on exertion. She is using Wixela daily.  She is overdue for a pulmonology follow-up.

## 2025-03-27 NOTE — ASSESSMENT & PLAN NOTE
Lab Results   Component Value Date    EGFR 53 (L) 12/29/2024    EGFR 49 11/19/2024    EGFR 55 07/01/2024    CREATININE 1.07 12/29/2024    CREATININE 1.09 11/19/2024    CREATININE 0.99 07/01/2024   Continue to increase fluid intake and avoid nephrotoxic medications like advil, aleve, ibuprofen, and motrin.   Orders:    Comprehensive metabolic panel; Future

## 2025-03-27 NOTE — ASSESSMENT & PLAN NOTE
Due for lipid panel.  Currently on rosuvastatin 20 mg daily.  Orders:    Lipid Panel with Direct LDL reflex; Future

## 2025-04-07 ENCOUNTER — DOCUMENTATION (OUTPATIENT)
Dept: ADMINISTRATIVE | Facility: OTHER | Age: 80
End: 2025-04-07

## 2025-04-07 NOTE — PROGRESS NOTES
04/07/25 10:28 AM    Annual Wellness Visit outreach is not required, an AWV was completed at the PCP office.    Thank you.  Nohemy Oneill MA  [facility]

## 2025-04-24 DIAGNOSIS — F41.1 GENERALIZED ANXIETY DISORDER: ICD-10-CM

## 2025-04-24 NOTE — TELEPHONE ENCOUNTER
Reason for call:   [x] Refill   [] Prior Auth  [] Other:     Office:   [x] PCP/Provider - [facility] [address] - Deanne Wong PA-C   [] Specialty/Provider -     Medication: ALPRAZolam (XANAX) 0.5 mg tablet     Dose/Frequency: Take 1 tablet (0.5 mg total) by mouth 2 (two) times a day as needed for anxiety     Quantity: 60 tablet     Pharmacy: [id number] - [place] - [address] [phone]    Local Pharmacy   Does the patient have enough for 3 days?   [] Yes   [x] No - Send as HP to POD    Mail Away Pharmacy   Does the patient have enough for 10 days?   [] Yes   [] No - Send as HP to POD

## 2025-04-25 RX ORDER — ALPRAZOLAM 0.5 MG
0.5 TABLET ORAL 2 TIMES DAILY PRN
Qty: 60 TABLET | Refills: 0 | Status: SHIPPED | OUTPATIENT
Start: 2025-04-25

## 2025-06-05 ENCOUNTER — TELEPHONE (OUTPATIENT)
Dept: OTHER | Facility: OTHER | Age: 80
End: 2025-06-05

## 2025-06-05 NOTE — TELEPHONE ENCOUNTER
Patient's son is calling regarding cancelling a procedure.    Date/Time: 6/5/2025, 8:30 AM    Performing Physician: Dr. Johny Banerjee    Performing Physician/Nursing Supervisor Notified?:  YES [] NO [x]    Patient requesting call back to reschedule: YES [x] NO []

## 2025-06-22 DIAGNOSIS — E11.9 TYPE 2 DIABETES MELLITUS WITHOUT COMPLICATION, WITHOUT LONG-TERM CURRENT USE OF INSULIN (HCC): ICD-10-CM

## 2025-06-23 RX ORDER — SEMAGLUTIDE 0.68 MG/ML
INJECTION, SOLUTION SUBCUTANEOUS
Qty: 2 ML | Refills: 0 | Status: SHIPPED | OUTPATIENT
Start: 2025-06-23

## 2025-08-01 ENCOUNTER — APPOINTMENT (EMERGENCY)
Dept: CT IMAGING | Facility: HOSPITAL | Age: 80
End: 2025-08-01
Payer: MEDICARE

## 2025-08-01 ENCOUNTER — HOSPITAL ENCOUNTER (EMERGENCY)
Facility: HOSPITAL | Age: 80
Discharge: HOME | End: 2025-08-01
Attending: EMERGENCY MEDICINE | Admitting: EMERGENCY MEDICINE
Payer: MEDICARE

## 2025-08-01 VITALS
TEMPERATURE: 97.4 F | RESPIRATION RATE: 13 BRPM | BODY MASS INDEX: 32.98 KG/M2 | SYSTOLIC BLOOD PRESSURE: 156 MMHG | WEIGHT: 167.99 LBS | DIASTOLIC BLOOD PRESSURE: 64 MMHG | OXYGEN SATURATION: 98 % | HEIGHT: 60 IN | HEART RATE: 77 BPM

## 2025-08-01 DIAGNOSIS — K52.9 ENTERITIS: Primary | ICD-10-CM

## 2025-08-01 LAB
ALBUMIN SERPL BCG-MCNC: 4.5 G/DL (ref 3.5–5)
ALP SERPL-CCNC: 70 U/L (ref 34–104)
ALT SERPL W P-5'-P-CCNC: 25 U/L (ref 7–52)
ANION GAP SERPL CALCULATED.3IONS-SCNC: 7 MMOL/L (ref 4–13)
AST SERPL W P-5'-P-CCNC: 18 U/L (ref 13–39)
ATRIAL RATE: 79 BPM
BACTERIA UR QL AUTO: ABNORMAL /HPF
BASOPHILS # BLD AUTO: 0.04 THOUSANDS/ÂΜL (ref 0–0.1)
BASOPHILS NFR BLD AUTO: 1 % (ref 0–1)
BILIRUB SERPL-MCNC: 0.35 MG/DL (ref 0.2–1)
BILIRUB UR QL STRIP: NEGATIVE
BUN SERPL-MCNC: 18 MG/DL (ref 5–25)
CALCIUM SERPL-MCNC: 9.8 MG/DL (ref 8.4–10.2)
CARDIAC TROPONIN I PNL SERPL HS: 3 NG/L (ref ?–50)
CHLORIDE SERPL-SCNC: 103 MMOL/L (ref 96–108)
CLARITY UR: CLEAR
CO2 SERPL-SCNC: 28 MMOL/L (ref 21–32)
COLOR UR: COLORLESS
CREAT SERPL-MCNC: 1.03 MG/DL (ref 0.6–1.3)
EOSINOPHIL # BLD AUTO: 0.11 THOUSAND/ÂΜL (ref 0–0.61)
EOSINOPHIL NFR BLD AUTO: 1 % (ref 0–6)
ERYTHROCYTE [DISTWIDTH] IN BLOOD BY AUTOMATED COUNT: 13.2 % (ref 11.6–15.1)
GFR SERPL CREATININE-BSD FRML MDRD: 52 ML/MIN/1.73SQ M
GLUCOSE SERPL-MCNC: 108 MG/DL (ref 65–140)
GLUCOSE UR STRIP-MCNC: NEGATIVE MG/DL
HCT VFR BLD AUTO: 43 % (ref 34.8–46.1)
HGB BLD-MCNC: 14.3 G/DL (ref 11.5–15.4)
HGB UR QL STRIP.AUTO: NEGATIVE
IMM GRANULOCYTES # BLD AUTO: 0.01 THOUSAND/UL (ref 0–0.2)
IMM GRANULOCYTES NFR BLD AUTO: 0 % (ref 0–2)
KETONES UR STRIP-MCNC: NEGATIVE MG/DL
LACTATE SERPL-SCNC: 1.1 MMOL/L (ref 0.5–2)
LEUKOCYTE ESTERASE UR QL STRIP: ABNORMAL
LIPASE SERPL-CCNC: 46 U/L (ref 11–82)
LYMPHOCYTES # BLD AUTO: 1.79 THOUSANDS/ÂΜL (ref 0.6–4.47)
LYMPHOCYTES NFR BLD AUTO: 23 % (ref 14–44)
MCH RBC QN AUTO: 30.6 PG (ref 26.8–34.3)
MCHC RBC AUTO-ENTMCNC: 33.3 G/DL (ref 31.4–37.4)
MCV RBC AUTO: 92 FL (ref 82–98)
MONOCYTES # BLD AUTO: 0.52 THOUSAND/ÂΜL (ref 0.17–1.22)
MONOCYTES NFR BLD AUTO: 7 % (ref 4–12)
NEUTROPHILS # BLD AUTO: 5.29 THOUSANDS/ÂΜL (ref 1.85–7.62)
NEUTS SEG NFR BLD AUTO: 68 % (ref 43–75)
NITRITE UR QL STRIP: NEGATIVE
NON-SQ EPI CELLS URNS QL MICRO: ABNORMAL /HPF
NRBC BLD AUTO-RTO: 0 /100 WBCS
P AXIS: 66 DEGREES
PH UR STRIP.AUTO: 5 [PH]
PLATELET # BLD AUTO: 266 THOUSANDS/UL (ref 149–390)
PMV BLD AUTO: 9.5 FL (ref 8.9–12.7)
POTASSIUM SERPL-SCNC: 4.6 MMOL/L (ref 3.5–5.3)
PR INTERVAL: 182 MS
PROT SERPL-MCNC: 7 G/DL (ref 6.4–8.4)
PROT UR STRIP-MCNC: NEGATIVE MG/DL
QRS AXIS: 68 DEGREES
QRSD INTERVAL: 70 MS
QT INTERVAL: 362 MS
QTC INTERVAL: 415 MS
RBC # BLD AUTO: 4.68 MILLION/UL (ref 3.81–5.12)
RBC #/AREA URNS AUTO: ABNORMAL /HPF
SODIUM SERPL-SCNC: 138 MMOL/L (ref 135–147)
SP GR UR STRIP.AUTO: 1.02 (ref 1–1.03)
T WAVE AXIS: 53 DEGREES
UROBILINOGEN UR STRIP-ACNC: <2 MG/DL
VENTRICULAR RATE: 79 BPM
WBC # BLD AUTO: 7.76 THOUSAND/UL (ref 4.31–10.16)
WBC #/AREA URNS AUTO: ABNORMAL /HPF

## 2025-08-01 PROCEDURE — 81001 URINALYSIS AUTO W/SCOPE: CPT | Performed by: EMERGENCY MEDICINE

## 2025-08-01 PROCEDURE — 93005 ELECTROCARDIOGRAM TRACING: CPT

## 2025-08-01 PROCEDURE — 85025 COMPLETE CBC W/AUTO DIFF WBC: CPT | Performed by: EMERGENCY MEDICINE

## 2025-08-01 PROCEDURE — 74177 CT ABD & PELVIS W/CONTRAST: CPT

## 2025-08-01 PROCEDURE — 83605 ASSAY OF LACTIC ACID: CPT | Performed by: EMERGENCY MEDICINE

## 2025-08-01 PROCEDURE — 83690 ASSAY OF LIPASE: CPT | Performed by: EMERGENCY MEDICINE

## 2025-08-01 PROCEDURE — 93010 ELECTROCARDIOGRAM REPORT: CPT | Performed by: STUDENT IN AN ORGANIZED HEALTH CARE EDUCATION/TRAINING PROGRAM

## 2025-08-01 PROCEDURE — 36415 COLL VENOUS BLD VENIPUNCTURE: CPT | Performed by: EMERGENCY MEDICINE

## 2025-08-01 PROCEDURE — 99284 EMERGENCY DEPT VISIT MOD MDM: CPT | Performed by: EMERGENCY MEDICINE

## 2025-08-01 PROCEDURE — 96375 TX/PRO/DX INJ NEW DRUG ADDON: CPT

## 2025-08-01 PROCEDURE — 84484 ASSAY OF TROPONIN QUANT: CPT | Performed by: EMERGENCY MEDICINE

## 2025-08-01 PROCEDURE — 99285 EMERGENCY DEPT VISIT HI MDM: CPT

## 2025-08-01 PROCEDURE — 96361 HYDRATE IV INFUSION ADD-ON: CPT

## 2025-08-01 PROCEDURE — 96374 THER/PROPH/DIAG INJ IV PUSH: CPT

## 2025-08-01 PROCEDURE — 80053 COMPREHEN METABOLIC PANEL: CPT | Performed by: EMERGENCY MEDICINE

## 2025-08-01 RX ORDER — ONDANSETRON 2 MG/ML
4 INJECTION INTRAMUSCULAR; INTRAVENOUS ONCE
Status: COMPLETED | OUTPATIENT
Start: 2025-08-01 | End: 2025-08-01

## 2025-08-01 RX ORDER — ONDANSETRON 4 MG/1
4 TABLET, ORALLY DISINTEGRATING ORAL EVERY 6 HOURS PRN
Qty: 20 TABLET | Refills: 0 | Status: SHIPPED | OUTPATIENT
Start: 2025-08-01

## 2025-08-01 RX ORDER — DICYCLOMINE HCL 20 MG
10 TABLET ORAL 2 TIMES DAILY PRN
Qty: 10 TABLET | Refills: 0 | Status: SHIPPED | OUTPATIENT
Start: 2025-08-01

## 2025-08-01 RX ADMIN — SODIUM CHLORIDE 1000 ML: 0.9 INJECTION, SOLUTION INTRAVENOUS at 17:02

## 2025-08-01 RX ADMIN — ONDANSETRON 4 MG: 2 INJECTION INTRAMUSCULAR; INTRAVENOUS at 17:08

## 2025-08-01 RX ADMIN — IOHEXOL 100 ML: 350 INJECTION, SOLUTION INTRAVENOUS at 18:00

## 2025-08-01 RX ADMIN — MORPHINE SULFATE 2 MG: 2 INJECTION, SOLUTION INTRAMUSCULAR; INTRAVENOUS at 17:09

## 2025-08-04 ENCOUNTER — TELEPHONE (OUTPATIENT)
Age: 80
End: 2025-08-04

## 2025-08-04 ENCOUNTER — OFFICE VISIT (OUTPATIENT)
Age: 80
End: 2025-08-04
Payer: MEDICARE

## 2025-08-04 VITALS
SYSTOLIC BLOOD PRESSURE: 146 MMHG | OXYGEN SATURATION: 97 % | DIASTOLIC BLOOD PRESSURE: 82 MMHG | HEIGHT: 60 IN | WEIGHT: 168 LBS | BODY MASS INDEX: 32.98 KG/M2 | HEART RATE: 119 BPM

## 2025-08-04 DIAGNOSIS — I74.09 AORTOILIAC OCCLUSIVE DISEASE (HCC): ICD-10-CM

## 2025-08-04 DIAGNOSIS — E11.9 TYPE 2 DIABETES MELLITUS WITHOUT COMPLICATION, WITHOUT LONG-TERM CURRENT USE OF INSULIN (HCC): ICD-10-CM

## 2025-08-04 DIAGNOSIS — M54.2 CHRONIC NECK PAIN: ICD-10-CM

## 2025-08-04 DIAGNOSIS — K52.9 ENTERITIS: Primary | ICD-10-CM

## 2025-08-04 DIAGNOSIS — Z12.11 SCREENING FOR COLORECTAL CANCER: ICD-10-CM

## 2025-08-04 DIAGNOSIS — K51.00 ULCERATIVE (CHRONIC) PANCOLITIS WITHOUT COMPLICATIONS (HCC): ICD-10-CM

## 2025-08-04 DIAGNOSIS — Z12.12 SCREENING FOR COLORECTAL CANCER: ICD-10-CM

## 2025-08-04 DIAGNOSIS — G89.29 CHRONIC NECK PAIN: ICD-10-CM

## 2025-08-04 PROCEDURE — 99214 OFFICE O/P EST MOD 30 MIN: CPT

## 2025-08-04 PROCEDURE — G2211 COMPLEX E/M VISIT ADD ON: HCPCS

## 2025-08-04 RX ORDER — TRAMADOL HYDROCHLORIDE 50 MG/1
50 TABLET ORAL EVERY 6 HOURS PRN
Qty: 30 TABLET | Refills: 0 | Status: SHIPPED | OUTPATIENT
Start: 2025-08-04

## 2025-08-05 ENCOUNTER — TELEPHONE (OUTPATIENT)
Age: 80
End: 2025-08-05

## 2025-08-18 ENCOUNTER — TELEPHONE (OUTPATIENT)
Age: 80
End: 2025-08-18

## 2025-08-18 DIAGNOSIS — K52.9 ENTERITIS: ICD-10-CM

## 2025-08-18 DIAGNOSIS — G25.81 RLS (RESTLESS LEGS SYNDROME): ICD-10-CM

## 2025-08-18 RX ORDER — DICYCLOMINE HCL 20 MG
10 TABLET ORAL 2 TIMES DAILY PRN
Qty: 10 TABLET | Refills: 2 | Status: SHIPPED | OUTPATIENT
Start: 2025-08-18

## 2025-08-18 RX ORDER — GABAPENTIN 300 MG/1
300 CAPSULE ORAL 2 TIMES DAILY
Qty: 180 CAPSULE | Refills: 1 | Status: SHIPPED | OUTPATIENT
Start: 2025-08-18

## 2025-08-18 RX ORDER — ONDANSETRON 4 MG/1
4 TABLET, ORALLY DISINTEGRATING ORAL EVERY 6 HOURS PRN
Qty: 20 TABLET | Refills: 2 | Status: SHIPPED | OUTPATIENT
Start: 2025-08-18

## 2025-08-19 ENCOUNTER — TELEPHONE (OUTPATIENT)
Age: 80
End: 2025-08-19

## (undated) DEVICE — SKIN MARKER DUAL TIP WITH RULER CAP, FLEXIBLE RULER AND LABELS: Brand: DEVON

## (undated) DEVICE — IMPERVIOUS STOCKINETTE: Brand: DEROYAL

## (undated) DEVICE — SUT VICRYL 2-0 REEL 54 IN J286G

## (undated) DEVICE — GAUZE SPONGES,USP TYPE VII GAUZE, 12 PLY: Brand: CURITY

## (undated) DEVICE — SUT VICRYL 2-0 SH 27 IN UNDYED J417H

## (undated) DEVICE — PENCIL ELECTROSURG E-Z CLEAN -0035H

## (undated) DEVICE — UTILITY MARKER,BLACK WITH LABELS: Brand: DEVON

## (undated) DEVICE — GLOVE SRG BIOGEL 7

## (undated) DEVICE — COBAN 4 IN STERILE

## (undated) DEVICE — CHLORAPREP HI-LITE 26ML ORANGE

## (undated) DEVICE — 3M™ STERI-STRIP™ REINFORCED ADHESIVE SKIN CLOSURES, R1547, 1/2 IN X 4 IN (12 MM X 100 MM), 6 STRIPS/ENVELOPE: Brand: 3M™ STERI-STRIP™

## (undated) DEVICE — PACK UNIVERSAL DRAPES SUB-Q ICD

## (undated) DEVICE — TUBING SUCTION 5MM X 12 FT

## (undated) DEVICE — INTENDED FOR TISSUE SEPARATION, AND OTHER PROCEDURES THAT REQUIRE A SHARP SURGICAL BLADE TO PUNCTURE OR CUT.: Brand: BARD-PARKER SAFETY BLADES SIZE 15, STERILE

## (undated) DEVICE — DRAPE SHEET THREE QUARTER

## (undated) DEVICE — ADHESIVE SKIN CLOSR DERMABOND PRINEO

## (undated) DEVICE — LIGHT HANDLE COVER SLEEVE DISP BLUE STELLAR

## (undated) DEVICE — SURGICEL 2 X 3

## (undated) DEVICE — TELFA ADHESIVE ISLAND DRESSING: Brand: TELFA

## (undated) DEVICE — HEMOSTAT POWDER ADSORB SURGICEL 3GM

## (undated) DEVICE — PLUMEPEN PRO 10FT

## (undated) DEVICE — ELECTRODE EZ CLEAN BLADE -0012

## (undated) DEVICE — SUT VICRYL 3-0 SH 27 IN J416H

## (undated) DEVICE — SUT SILK 2-0 SH 30 IN K833H

## (undated) DEVICE — MAYO STAND COVER: Brand: CONVERTORS

## (undated) DEVICE — SUT MONOCRYL 4-0 PS-2 18 IN Y496G

## (undated) DEVICE — CHEST/BREAST DRAPE: Brand: CONVERTORS

## (undated) DEVICE — SHEATH, GUIDE, SAVI SCOUT®: Brand: SAVI SCOUT®

## (undated) DEVICE — GLOVE SRG BIOGEL ORTHOPEDIC 7.5

## (undated) DEVICE — BETHLEHEM UNIVERSAL MINOR GEN: Brand: CARDINAL HEALTH

## (undated) DEVICE — PAD GROUNDING ADULT

## (undated) DEVICE — MEDI-VAC YANK SUCT HNDL W/TPRD BULBOUS TIP: Brand: CARDINAL HEALTH

## (undated) DEVICE — DRAPE EQUIPMENT RF WAND

## (undated) DEVICE — ELECTRODE BLADE MOD E-Z CLEAN  2.75IN 7CM -0012AM

## (undated) DEVICE — SMOKE EVACUATION TUBING WITH 8 IN INTEGRAL WAND AND SPONGE GUARD: Brand: BUFFALO FILTER